# Patient Record
Sex: MALE | Race: WHITE | NOT HISPANIC OR LATINO | Employment: FULL TIME | URBAN - METROPOLITAN AREA
[De-identification: names, ages, dates, MRNs, and addresses within clinical notes are randomized per-mention and may not be internally consistent; named-entity substitution may affect disease eponyms.]

---

## 2017-01-18 ENCOUNTER — CLINICAL SUPPORT NO REQUIREMENTS (OUTPATIENT)
Dept: CARDIOLOGY | Facility: CLINIC | Age: 57
End: 2017-01-18

## 2017-01-18 DIAGNOSIS — I42.9 CARDIOMYOPATHY (HCC): Primary | ICD-10-CM

## 2017-01-25 ENCOUNTER — TELEPHONE (OUTPATIENT)
Dept: GASTROENTEROLOGY | Facility: CLINIC | Age: 57
End: 2017-01-25

## 2017-02-13 RX ORDER — MESALAMINE 375 MG/1
CAPSULE, EXTENDED RELEASE ORAL
Qty: 120 CAPSULE | Refills: 0 | Status: SHIPPED | OUTPATIENT
Start: 2017-02-13 | End: 2017-03-10 | Stop reason: SDUPTHER

## 2017-02-16 ENCOUNTER — HOSPITAL ENCOUNTER (INPATIENT)
Facility: HOSPITAL | Age: 57
LOS: 3 days | Discharge: HOME | End: 2017-02-19
Attending: EMERGENCY MEDICINE | Admitting: INTERNAL MEDICINE

## 2017-02-16 ENCOUNTER — APPOINTMENT (OUTPATIENT)
Dept: CT IMAGING | Facility: HOSPITAL | Age: 57
End: 2017-02-16

## 2017-02-16 DIAGNOSIS — K50.012: Primary | ICD-10-CM

## 2017-02-16 PROBLEM — K50.00 EXACERBATION OF CROHN'S DISEASE OF SMALL INTESTINE (HCC): Status: ACTIVE | Noted: 2017-02-16

## 2017-02-16 LAB
ALBUMIN SERPL-MCNC: 4.5 G/DL (ref 3.5–5.2)
ALBUMIN/GLOB SERPL: 1 G/DL
ALP SERPL-CCNC: 69 U/L (ref 39–117)
ALT SERPL W P-5'-P-CCNC: 23 U/L (ref 1–41)
ANION GAP SERPL CALCULATED.3IONS-SCNC: 14.7 MMOL/L
AST SERPL-CCNC: 28 U/L (ref 1–40)
BASOPHILS # BLD AUTO: 0.04 10*3/MM3 (ref 0–0.2)
BASOPHILS NFR BLD AUTO: 0.3 % (ref 0–1.5)
BILIRUB SERPL-MCNC: 0.3 MG/DL (ref 0.1–1.2)
BILIRUB UR QL STRIP: NEGATIVE
BUN BLD-MCNC: 15 MG/DL (ref 6–20)
BUN/CREAT SERPL: 15.6 (ref 7–25)
CALCIUM SPEC-SCNC: 10.1 MG/DL (ref 8.6–10.5)
CHLORIDE SERPL-SCNC: 100 MMOL/L (ref 98–107)
CLARITY UR: ABNORMAL
CO2 SERPL-SCNC: 25.3 MMOL/L (ref 22–29)
COLOR UR: ABNORMAL
CREAT BLD-MCNC: 0.96 MG/DL (ref 0.76–1.27)
CRP SERPL-MCNC: 2.33 MG/DL (ref 0–0.5)
DEPRECATED RDW RBC AUTO: 46.2 FL (ref 37–54)
EOSINOPHIL # BLD AUTO: 0.14 10*3/MM3 (ref 0–0.7)
EOSINOPHIL NFR BLD AUTO: 1 % (ref 0.3–6.2)
ERYTHROCYTE [DISTWIDTH] IN BLOOD BY AUTOMATED COUNT: 15.8 % (ref 11.5–14.5)
GFR SERPL CREATININE-BSD FRML MDRD: 81 ML/MIN/1.73
GLOBULIN UR ELPH-MCNC: 4.4 GM/DL
GLUCOSE BLD-MCNC: 153 MG/DL (ref 65–99)
GLUCOSE BLDC GLUCOMTR-MCNC: 134 MG/DL (ref 70–130)
GLUCOSE BLDC GLUCOMTR-MCNC: 162 MG/DL (ref 70–130)
GLUCOSE UR STRIP-MCNC: NEGATIVE MG/DL
HBA1C MFR BLD: 5.6 % (ref 4.8–5.6)
HCT VFR BLD AUTO: 46.3 % (ref 40.4–52.2)
HGB BLD-MCNC: 15.4 G/DL (ref 13.7–17.6)
HGB UR QL STRIP.AUTO: NEGATIVE
HOLD SPECIMEN: NORMAL
HOLD SPECIMEN: NORMAL
IMM GRANULOCYTES # BLD: 0.03 10*3/MM3 (ref 0–0.03)
IMM GRANULOCYTES NFR BLD: 0.2 % (ref 0–0.5)
KETONES UR QL STRIP: ABNORMAL
LEUKOCYTE ESTERASE UR QL STRIP.AUTO: NEGATIVE
LIPASE SERPL-CCNC: 31 U/L (ref 13–60)
LYMPHOCYTES # BLD AUTO: 2.48 10*3/MM3 (ref 0.9–4.8)
LYMPHOCYTES NFR BLD AUTO: 17.8 % (ref 19.6–45.3)
MCH RBC QN AUTO: 26.5 PG (ref 27–32.7)
MCHC RBC AUTO-ENTMCNC: 33.3 G/DL (ref 32.6–36.4)
MCV RBC AUTO: 79.7 FL (ref 79.8–96.2)
MONOCYTES # BLD AUTO: 1.29 10*3/MM3 (ref 0.2–1.2)
MONOCYTES NFR BLD AUTO: 9.2 % (ref 5–12)
NEUTROPHILS # BLD AUTO: 9.98 10*3/MM3 (ref 1.9–8.1)
NEUTROPHILS NFR BLD AUTO: 71.5 % (ref 42.7–76)
NITRITE UR QL STRIP: NEGATIVE
PH UR STRIP.AUTO: 5.5 [PH] (ref 5–8)
PLATELET # BLD AUTO: 234 10*3/MM3 (ref 140–500)
PMV BLD AUTO: 10.1 FL (ref 6–12)
POTASSIUM BLD-SCNC: 4.5 MMOL/L (ref 3.5–5.2)
PROT SERPL-MCNC: 8.9 G/DL (ref 6–8.5)
PROT UR QL STRIP: ABNORMAL
RBC # BLD AUTO: 5.81 10*6/MM3 (ref 4.6–6)
SODIUM BLD-SCNC: 140 MMOL/L (ref 136–145)
SP GR UR STRIP: 1.03 (ref 1–1.03)
UROBILINOGEN UR QL STRIP: ABNORMAL
WBC NRBC COR # BLD: 13.96 10*3/MM3 (ref 4.5–10.7)
WHOLE BLOOD HOLD SPECIMEN: NORMAL
WHOLE BLOOD HOLD SPECIMEN: NORMAL

## 2017-02-16 PROCEDURE — 25010000002 METHYLPREDNISOLONE PER 40 MG: Performed by: INTERNAL MEDICINE

## 2017-02-16 PROCEDURE — 81003 URINALYSIS AUTO W/O SCOPE: CPT | Performed by: EMERGENCY MEDICINE

## 2017-02-16 PROCEDURE — 80053 COMPREHEN METABOLIC PANEL: CPT | Performed by: EMERGENCY MEDICINE

## 2017-02-16 PROCEDURE — 82962 GLUCOSE BLOOD TEST: CPT

## 2017-02-16 PROCEDURE — 99284 EMERGENCY DEPT VISIT MOD MDM: CPT

## 2017-02-16 PROCEDURE — 0 IOPAMIDOL 61 % SOLUTION: Performed by: EMERGENCY MEDICINE

## 2017-02-16 PROCEDURE — 25010000002 MORPHINE PER 10 MG: Performed by: EMERGENCY MEDICINE

## 2017-02-16 PROCEDURE — 85025 COMPLETE CBC W/AUTO DIFF WBC: CPT | Performed by: EMERGENCY MEDICINE

## 2017-02-16 PROCEDURE — 74177 CT ABD & PELVIS W/CONTRAST: CPT

## 2017-02-16 PROCEDURE — 63710000001 INSULIN ASPART PER 5 UNITS: Performed by: HOSPITALIST

## 2017-02-16 PROCEDURE — 86140 C-REACTIVE PROTEIN: CPT | Performed by: INTERNAL MEDICINE

## 2017-02-16 PROCEDURE — 83690 ASSAY OF LIPASE: CPT | Performed by: EMERGENCY MEDICINE

## 2017-02-16 PROCEDURE — 83036 HEMOGLOBIN GLYCOSYLATED A1C: CPT | Performed by: HOSPITALIST

## 2017-02-16 PROCEDURE — 25010000002 METHYLPREDNISOLONE PER 125 MG: Performed by: EMERGENCY MEDICINE

## 2017-02-16 PROCEDURE — 25010000002 ONDANSETRON PER 1 MG: Performed by: EMERGENCY MEDICINE

## 2017-02-16 PROCEDURE — 99253 IP/OBS CNSLTJ NEW/EST LOW 45: CPT | Performed by: INTERNAL MEDICINE

## 2017-02-16 RX ORDER — MESALAMINE 0.38 G/1
1.5 CAPSULE, EXTENDED RELEASE ORAL DAILY
Status: DISCONTINUED | OUTPATIENT
Start: 2017-02-16 | End: 2017-02-19 | Stop reason: HOSPADM

## 2017-02-16 RX ORDER — METHYLPREDNISOLONE SODIUM SUCCINATE 125 MG/2ML
60 INJECTION, POWDER, LYOPHILIZED, FOR SOLUTION INTRAMUSCULAR; INTRAVENOUS ONCE
Status: COMPLETED | OUTPATIENT
Start: 2017-02-16 | End: 2017-02-16

## 2017-02-16 RX ORDER — ZOLPIDEM TARTRATE 5 MG/1
5 TABLET ORAL NIGHTLY PRN
Status: DISCONTINUED | OUTPATIENT
Start: 2017-02-16 | End: 2017-02-19 | Stop reason: HOSPADM

## 2017-02-16 RX ORDER — PANTOPRAZOLE SODIUM 40 MG/1
40 TABLET, DELAYED RELEASE ORAL
Status: DISCONTINUED | OUTPATIENT
Start: 2017-02-17 | End: 2017-02-19 | Stop reason: HOSPADM

## 2017-02-16 RX ORDER — ACETAMINOPHEN 325 MG/1
650 TABLET ORAL EVERY 4 HOURS PRN
Status: DISCONTINUED | OUTPATIENT
Start: 2017-02-16 | End: 2017-02-19 | Stop reason: HOSPADM

## 2017-02-16 RX ORDER — SODIUM CHLORIDE 0.9 % (FLUSH) 0.9 %
1-10 SYRINGE (ML) INJECTION AS NEEDED
Status: DISCONTINUED | OUTPATIENT
Start: 2017-02-16 | End: 2017-02-19 | Stop reason: HOSPADM

## 2017-02-16 RX ORDER — PROMETHAZINE HYDROCHLORIDE 25 MG/1
12.5 TABLET ORAL EVERY 6 HOURS PRN
Status: DISCONTINUED | OUTPATIENT
Start: 2017-02-16 | End: 2017-02-19 | Stop reason: HOSPADM

## 2017-02-16 RX ORDER — METHYLPREDNISOLONE SODIUM SUCCINATE 40 MG/ML
40 INJECTION, POWDER, LYOPHILIZED, FOR SOLUTION INTRAMUSCULAR; INTRAVENOUS EVERY 8 HOURS
Status: DISCONTINUED | OUTPATIENT
Start: 2017-02-16 | End: 2017-02-19 | Stop reason: HOSPADM

## 2017-02-16 RX ORDER — ONDANSETRON 2 MG/ML
4 INJECTION INTRAMUSCULAR; INTRAVENOUS ONCE
Status: COMPLETED | OUTPATIENT
Start: 2017-02-16 | End: 2017-02-16

## 2017-02-16 RX ORDER — MORPHINE SULFATE 2 MG/ML
1 INJECTION, SOLUTION INTRAMUSCULAR; INTRAVENOUS EVERY 4 HOURS PRN
Status: DISCONTINUED | OUTPATIENT
Start: 2017-02-16 | End: 2017-02-19 | Stop reason: HOSPADM

## 2017-02-16 RX ORDER — DEXTROSE, SODIUM CHLORIDE, AND POTASSIUM CHLORIDE 5; .45; .15 G/100ML; G/100ML; G/100ML
75 INJECTION INTRAVENOUS CONTINUOUS
Status: DISCONTINUED | OUTPATIENT
Start: 2017-02-16 | End: 2017-02-18

## 2017-02-16 RX ORDER — HYDROCODONE BITARTRATE AND ACETAMINOPHEN 5; 325 MG/1; MG/1
1 TABLET ORAL EVERY 6 HOURS PRN
Status: DISCONTINUED | OUTPATIENT
Start: 2017-02-16 | End: 2017-02-19 | Stop reason: HOSPADM

## 2017-02-16 RX ORDER — NALOXONE HCL 0.4 MG/ML
0.4 VIAL (ML) INJECTION
Status: DISCONTINUED | OUTPATIENT
Start: 2017-02-16 | End: 2017-02-19 | Stop reason: HOSPADM

## 2017-02-16 RX ORDER — DEXTROSE MONOHYDRATE 25 G/50ML
25 INJECTION, SOLUTION INTRAVENOUS
Status: DISCONTINUED | OUTPATIENT
Start: 2017-02-16 | End: 2017-02-19 | Stop reason: HOSPADM

## 2017-02-16 RX ORDER — SODIUM CHLORIDE 0.9 % (FLUSH) 0.9 %
10 SYRINGE (ML) INJECTION AS NEEDED
Status: DISCONTINUED | OUTPATIENT
Start: 2017-02-16 | End: 2017-02-19 | Stop reason: HOSPADM

## 2017-02-16 RX ORDER — ONDANSETRON 2 MG/ML
4 INJECTION INTRAMUSCULAR; INTRAVENOUS EVERY 6 HOURS PRN
Status: DISCONTINUED | OUTPATIENT
Start: 2017-02-16 | End: 2017-02-19 | Stop reason: HOSPADM

## 2017-02-16 RX ORDER — PROMETHAZINE HYDROCHLORIDE 25 MG/ML
12.5 INJECTION, SOLUTION INTRAMUSCULAR; INTRAVENOUS EVERY 6 HOURS PRN
Status: DISCONTINUED | OUTPATIENT
Start: 2017-02-16 | End: 2017-02-19 | Stop reason: HOSPADM

## 2017-02-16 RX ORDER — CARVEDILOL 12.5 MG/1
12.5 TABLET ORAL 2 TIMES DAILY WITH MEALS
Status: DISCONTINUED | OUTPATIENT
Start: 2017-02-16 | End: 2017-02-18

## 2017-02-16 RX ORDER — NICOTINE POLACRILEX 4 MG
15 LOZENGE BUCCAL
Status: DISCONTINUED | OUTPATIENT
Start: 2017-02-16 | End: 2017-02-19 | Stop reason: HOSPADM

## 2017-02-16 RX ADMIN — POTASSIUM CHLORIDE, DEXTROSE MONOHYDRATE AND SODIUM CHLORIDE 75 ML/HR: 150; 5; 450 INJECTION, SOLUTION INTRAVENOUS at 21:01

## 2017-02-16 RX ADMIN — IOPAMIDOL 85 ML: 612 INJECTION, SOLUTION INTRAVENOUS at 06:28

## 2017-02-16 RX ADMIN — ONDANSETRON 4 MG: 2 INJECTION INTRAMUSCULAR; INTRAVENOUS at 06:08

## 2017-02-16 RX ADMIN — METRONIDAZOLE 500 MG: 500 INJECTION, SOLUTION INTRAVENOUS at 14:44

## 2017-02-16 RX ADMIN — METHYLPREDNISOLONE SODIUM SUCCINATE 40 MG: 40 INJECTION, POWDER, FOR SOLUTION INTRAMUSCULAR; INTRAVENOUS at 14:43

## 2017-02-16 RX ADMIN — POTASSIUM CHLORIDE, DEXTROSE MONOHYDRATE AND SODIUM CHLORIDE 75 ML/HR: 150; 5; 450 INJECTION, SOLUTION INTRAVENOUS at 13:45

## 2017-02-16 RX ADMIN — METRONIDAZOLE 500 MG: 500 INJECTION, SOLUTION INTRAVENOUS at 21:01

## 2017-02-16 RX ADMIN — SODIUM CHLORIDE 1000 ML: 9 INJECTION, SOLUTION INTRAVENOUS at 06:04

## 2017-02-16 RX ADMIN — CARVEDILOL 12.5 MG: 12.5 TABLET, FILM COATED ORAL at 21:01

## 2017-02-16 RX ADMIN — METHYLPREDNISOLONE SODIUM SUCCINATE 60 MG: 125 INJECTION, POWDER, FOR SOLUTION INTRAMUSCULAR; INTRAVENOUS at 07:21

## 2017-02-16 RX ADMIN — INSULIN ASPART 2 UNITS: 100 INJECTION, SOLUTION INTRAVENOUS; SUBCUTANEOUS at 21:02

## 2017-02-16 RX ADMIN — METHYLPREDNISOLONE SODIUM SUCCINATE 40 MG: 40 INJECTION, POWDER, FOR SOLUTION INTRAMUSCULAR; INTRAVENOUS at 21:02

## 2017-02-16 RX ADMIN — MORPHINE SULFATE 4 MG: 4 INJECTION, SOLUTION INTRAMUSCULAR; INTRAVENOUS at 06:08

## 2017-02-16 NOTE — ED PROVIDER NOTES
" EMERGENCY DEPARTMENT ENCOUNTER    CHIEF COMPLAINT  Chief Complaint: Abdominal Pain  History given by: Patient  History limited by: Nothing  Room Number: 11/11  PMD: Zechariah Fatima MD,  (GI)      HPI:  Pt is a 56 y.o. male, h/o Chron's, presents complaining of constant, non-radiating epigastric abdominal pain, which began suddenly 02:00 this morning and woke the patient from sleep. He describes the sensation as \"aching\". The patient states that he's noticed unusual abdominal distension and he had a few episodes of loose stool PTA.  Pt denies fever, chills, hematochezia, or mucus in his stool.  The patient states that the current abdominal pain is worsened with palpation and improved with nothing. He reports that he developed mild right sided abdominal pain two days ago, although this was alleviated with Aleve. No other complaints at this time.      Duration:  3.5 hours  Onset: Sudden  Timing: Constant  Location: Epigastric  Radiation: None  Quality: Aching  Intensity/Severity: Moderate  Progression: No Change  Associated Symptoms: Abdominal pain, abd distension, diarrhea  Aggravating Factors: Palpation  Alleviating Factors: None  Previous Episodes: None  Treatment before arrival: None    PAST MEDICAL HISTORY  Active Ambulatory Problems     Diagnosis Date Noted   • Cardiomyopathy 02/11/2016   • Paroxysmal VT 02/11/2016   • Palpitations 02/12/2016   • PVC's (premature ventricular contractions) 02/12/2016     Resolved Ambulatory Problems     Diagnosis Date Noted   • No Resolved Ambulatory Problems     Past Medical History   Diagnosis Date   • Crohn's disease    • Diabetes mellitus    • Dysthymic disorder 2012   • Early onset dysthymia    • Essential hypertension    • Genital herpes    • Gout    • Paroxysmal ventricular tachycardia        PAST SURGICAL HISTORY  Past Surgical History   Procedure Laterality Date   • Coronary angioplasty with stent placement  2006     Normal.   • Cardiac defibrillator placement  " 09 Dec 2011     Had [place] lead that was fractured.  Lead was tied off.  New lead and new device implanted.       FAMILY HISTORY  Family History   Problem Relation Age of Onset   • Hypertension Mother    • Diabetes Mother      type 2 mellitus    • Cancer Father      colon   • Heart failure Father      congestive   • Gout Father        SOCIAL HISTORY  Social History     Social History   • Marital status:      Spouse name: N/A   • Number of children: N/A   • Years of education: N/A     Occupational History   • Not on file.     Social History Main Topics   • Smoking status: Never Smoker   • Smokeless tobacco: Not on file   • Alcohol use No   • Drug use: Not on file   • Sexual activity: Not on file     Other Topics Concern   • Not on file     Social History Narrative       ALLERGIES  Bactrim [sulfamethoxazole-trimethoprim] and Latex    REVIEW OF SYSTEMS  Review of Systems   Constitutional: Negative for chills and fatigue.   HENT: Negative for congestion, rhinorrhea and sore throat.    Eyes: Negative for pain.   Respiratory: Negative for cough, shortness of breath and wheezing.    Cardiovascular: Negative for chest pain, palpitations and leg swelling.   Gastrointestinal: Positive for abdominal distention, abdominal pain and diarrhea. Negative for nausea and vomiting.   Genitourinary: Negative for difficulty urinating, dysuria, flank pain and frequency.   Musculoskeletal: Negative for arthralgias, myalgias, neck pain and neck stiffness.   Skin: Negative for rash.   Neurological: Negative for dizziness, speech difficulty, weakness, light-headedness, numbness and headaches.   Psychiatric/Behavioral: Negative.    All other systems reviewed and are negative.      PHYSICAL EXAM  ED Triage Vitals   Temp Heart Rate Resp BP SpO2   02/16/17 0508 02/16/17 0508 02/16/17 0508 02/16/17 0513 02/16/17 0508   98.9 °F (37.2 °C) 118 20 124/86 95 %      Temp src Heart Rate Source Patient Position BP Location FiO2 (%)   02/16/17 0508  02/16/17 0508 -- -- --   Tympanic Monitor          Physical Exam   Constitutional: He is oriented to person, place, and time and well-developed, well-nourished, and in no distress.   HENT:   Head: Normocephalic and atraumatic.   Eyes: EOM are normal.   Neck: Normal range of motion.   Cardiovascular: Normal rate and regular rhythm.    Pulmonary/Chest: Effort normal and breath sounds normal. No respiratory distress.   Abdominal: Bowel sounds are normal. He exhibits distension (Mild). There is tenderness in the right upper quadrant and epigastric area. There is no rebound and no guarding.   Neurological: He is alert and oriented to person, place, and time. He has normal sensation and normal strength.   Skin: Skin is warm and dry.   Psychiatric: Affect normal.   Nursing note and vitals reviewed.      LAB RESULTS  Lab Results (last 24 hours)     Procedure Component Value Units Date/Time    CBC & Differential [[id number]] Collected:  02/16/17 0532    Specimen:  Blood Updated:  02/16/17 0556    Narrative:       The following orders were created for panel order CBC & Differential.  Procedure                               Abnormality         Status                     ---------                               -----------         ------                     CBC Auto Differential[[id number]]         Abnormal            Final result                 Please view results for these tests on the individual orders.    Comprehensive Metabolic Panel [[id number]]  (Abnormal) Collected:  02/16/17 0532    Specimen:  Blood Updated:  02/16/17 0605     Glucose 153 (H) mg/dL      BUN 15 mg/dL      Creatinine 0.96 mg/dL      Sodium 140 mmol/L      Potassium 4.5 mmol/L      Chloride 100 mmol/L      CO2 25.3 mmol/L      Calcium 10.1 mg/dL      Total Protein 8.9 (H) g/dL      Albumin 4.50 g/dL      ALT (SGPT) 23 U/L      AST (SGOT) 28 U/L      Alkaline Phosphatase 69 U/L      Total Bilirubin 0.3 mg/dL      eGFR Non African Amer 81 mL/min/1.73       Globulin 4.4 gm/dL      A/G Ratio 1.0 g/dL      BUN/Creatinine Ratio 15.6      Anion Gap 14.7 mmol/L     Lipase [[id number]]  (Normal) Collected:  02/16/17 0532    Specimen:  Blood Updated:  02/16/17 0605     Lipase 31 U/L     CBC Auto Differential [[id number]]  (Abnormal) Collected:  02/16/17 0532    Specimen:  Blood Updated:  02/16/17 0556     WBC 13.96 (H) 10*3/mm3      RBC 5.81 10*6/mm3      Hemoglobin 15.4 g/dL      Hematocrit 46.3 %      MCV 79.7 (L) fL      MCH 26.5 (L) pg      MCHC 33.3 g/dL      RDW 15.8 (H) %      RDW-SD 46.2 fl      MPV 10.1 fL      Platelets 234 10*3/mm3      Neutrophil % 71.5 %      Lymphocyte % 17.8 (L) %      Monocyte % 9.2 %      Eosinophil % 1.0 %      Basophil % 0.3 %      Immature Grans % 0.2 %      Neutrophils, Absolute 9.98 (H) 10*3/mm3      Lymphocytes, Absolute 2.48 10*3/mm3      Monocytes, Absolute 1.29 (H) 10*3/mm3      Eosinophils, Absolute 0.14 10*3/mm3      Basophils, Absolute 0.04 10*3/mm3      Immature Grans, Absolute 0.03 10*3/mm3     Urinalysis With / Culture If Indicated [[id number]]  (Abnormal) Collected:  02/16/17 0550    Specimen:  Urine from Urine, Clean Catch Updated:  02/16/17 0608     Color, UA Dark Yellow (A)      Appearance, UA Cloudy (A)      pH, UA 5.5      Specific Gravity, UA 1.026      Glucose, UA Negative      Ketones, UA Trace (A)      Bilirubin, UA Negative      Blood, UA Negative      Protein, UA Trace (A)      Leuk Esterase, UA Negative      Nitrite, UA Negative      Urobilinogen, UA 1.0 E.U./dL     Narrative:       Urine microscopic not indicated.          I ordered the above labs and reviewed the results    RADIOLOGY  CT Abdomen Pelvis With Contrast    (Results Pending)      06:47  Reviewed CT Abd/pel - Extensive wall thickening with mesenteric stricture causing dilation of the proximal small bowel. Independently viewed by me. Interpreted by radiologist. Discussed with .    I ordered the above noted radiological studies. Interpreted by  radiologist. Discussed with radiologist (). Reviewed by me in PACS.       PROCEDURES  Procedures      PROGRESS AND CONSULTS  ED Course     05:36  Ordered Labs and CT abd/pel for further evaluation. Also ordered Zofran for nausea and morphine for pain control. Ordered IVF for hydration.     06:47  Rechecked patient and they are resting more comfortably and his pain has alleviated. Discussed pertinent lab and imaging results, including CT abd/pel shows an obstruction due to the inflmmation of Crohn's at the proximal small bowel. He denies any vomiting since onset of his abdominal pain. Discussed the plan to call  (GI) for further treatment plan. Patient agrees with plan and all questions were addressed.     06:50  Discussed case with  (GI) she recommends to admit the patient to medicine and give the patient 60 mg of Solu-medrol. Reviewed history, exam, results and treatments.  Discussed concerns and plan of care.     07:03  Discussed case with Dr Oreilly. Reviewed history, exam, results and treatments.  Discussed concerns and plan of care. He would like me to call GI back and abdomen admit the patient..     07:32  Discussed case with Dr Johnson(GI). Reviewed history, exam, results and treatments.  Discussed concerns and plan of care. Dr Johnson accepts pt to be admitted to med/surg.        MEDICAL DECISION MAKING  Results were reviewed/discussed with the patient and they were also made aware of online access. Pt also made aware that some labs, such as cultures, will not be resulted during ER visit and follow up with PMD is necessary.     MDM  Number of Diagnoses or Management Options  Exacerbation of Crohn's disease of small intestine, with intestinal obstruction:   Diagnosis management comments: Patient has a history of Crohn's.  He presented to the ER complaining of abdominal pain and distention.  CT scan showed inflammation and wall thickening of the terminal ileum with a resulting  stricture.  Patient's white blood cell count was elevated.  He was afebrile.  He was not vomiting.  Case was discussed with Dr. Johnson and she will admit the patient.  Patient was given IV fluids, IV morphine, IV Zofran, and IV Solu-Medrol.       Amount and/or Complexity of Data Reviewed  Decide to obtain previous medical records or to obtain history from someone other than the patient: yes  Review and summarize past medical records: yes (Last seen ER Feb 2015 for fever, pneumonia and sinusitis. )           DIAGNOSIS  Final diagnoses:   Exacerbation of Crohn's disease of small intestine, with intestinal obstruction       DISPOSITION  ADMISSION    Discussed treatment plan and reason for admission with pt/family and admitting physician.  Pt/family voiced understanding of the plan for admission for further testing/treatment as needed.       Latest Documented Vital Signs:  As of 7:03 AM  BP- 113/78 HR- 86 Temp- 98.9 °F (37.2 °C) (Tympanic) O2 sat- 93%    --  Documentation assistance provided by shannan Pro for .  Information recorded by the scribe was done at my direction and has been verified and validated by me.             Moi Pro  02/16/17 0714       Harrison Gutiérrez MD  02/16/17 0733

## 2017-02-16 NOTE — PLAN OF CARE
Problem: Bowel Obstruction (Adult)  Goal: Signs and Symptoms of Listed Potential Problems Will be Absent or Manageable (Bowel Obstruction)  Outcome: Ongoing (interventions implemented as appropriate)    Problem: Patient Care Overview (Adult)  Goal: Plan of Care Review  Outcome: Ongoing (interventions implemented as appropriate)    02/16/17 1314   Coping/Psychosocial Response Interventions   Plan Of Care Reviewed With patient   Patient Care Overview   Progress improving   Outcome Evaluation   Outcome Summary/Follow up Plan no pain since admission to floor-shari clear liq diet at dinner-denies nausea-bm prior to admission-hypoactive bs-no s/s hyper or hypoglycemia noted-no distress noted-vss       Goal: Adult Individualization and Mutuality  Outcome: Ongoing (interventions implemented as appropriate)    Problem: Fall Risk (Adult)  Goal: Identify Related Risk Factors and Signs and Symptoms  Outcome: Ongoing (interventions implemented as appropriate)  Goal: Absence of Falls  Outcome: Ongoing (interventions implemented as appropriate)    Problem: Infection, Risk/Actual (Adult)  Goal: Identify Related Risk Factors and Signs and Symptoms  Outcome: Ongoing (interventions implemented as appropriate)  Goal: Infection Prevention/Resolution  Outcome: Ongoing (interventions implemented as appropriate)    Problem: Pain, Acute (Adult)  Goal: Identify Related Risk Factors and Signs and Symptoms  Outcome: Ongoing (interventions implemented as appropriate)  Goal: Acceptable Pain Control/Comfort Level  Outcome: Ongoing (interventions implemented as appropriate)

## 2017-02-16 NOTE — ED NOTES
Pt reports abd pain and distension that began last night. Pt c/o diarrhea. Pt states hx of Crohn's.      Cynthia Lowry RN  02/16/17 7291

## 2017-02-16 NOTE — ED NOTES
Called [facility] 10:12 AM for consult from Dr Tierra Lincoln  02/16/17 8062

## 2017-02-16 NOTE — CONSULTS
Inpatient Consult to Internal Medicine  Consult performed by: MADAI CARTER  Consult ordered by: MIREYA MAO  Reason for consult: management of DM2 and HTN          Patient Care Team:  Madai Fatima MD as PCP - General    Chief complaint:abd pain    Subjective     HPI Comments: Pt is a very pleasant 55yo gentleman with long h/o Crohn's disease admitted through ER for flare of Crohn's at terminal ileum with resultant pSBO. After receiving abx and steroids in ER he feels much better. Pt has h/o diet controlled DM2. Was on insulin for 3 months only many years ago and since has required no therapy.    Abdominal Pain   Associated symptoms include diarrhea.       Review of Systems   Constitutional: Negative.    HENT: Negative.    Eyes: Negative.    Respiratory: Negative.    Cardiovascular: Negative.    Gastrointestinal: Positive for abdominal distention, abdominal pain and diarrhea.   Endocrine: Negative.    Genitourinary: Negative.    Musculoskeletal: Negative.    Skin: Negative.    Allergic/Immunologic: Negative.    Neurological: Negative.    Hematological: Negative.    Psychiatric/Behavioral: Negative.         Past Medical History   Diagnosis Date   • Arthritis    • Cardiomyopathy      sees Dr. Reyes; NICM/ (-) cath, EF 25-35%; has ICD   • CHF (congestive heart failure)    • Crohn's disease    • Diabetes mellitus      Diet controlled.   • Dysthymic disorder 2012   • Early onset dysthymia    • Essential hypertension    • Genital herpes    • Gout    • Paroxysmal ventricular tachycardia    ,   Past Surgical History   Procedure Laterality Date   • Cardiac surgery     ,   Family History   Problem Relation Age of Onset   • Hypertension Mother    • Diabetes Mother      type 2 mellitus    • Cancer Father      colon   • Heart failure Father      congestive   • Gout Father    ,   Social History   Substance Use Topics   • Smoking status: Never Smoker   • Smokeless tobacco: None   • Alcohol use No   ,   Prescriptions Prior  to Admission   Medication Sig Dispense Refill Last Dose   • Adalimumab (HUMIRA PEN SC) Inject under the skin. 1 INJECTION TWICE WEEKLY   Taking   • APRISO 0.375 G 24 hr capsule TAKE 4 CAPSULES BY MOUTH EVERY  capsule 0    • carvedilol (COREG) 12.5 MG tablet TAKE 1 TABLET BY MOUTH TWICE DAILY 180 tablet 5 Taking   • carvedilol (COREG) 12.5 MG tablet TAKE 1 TABLET BY MOUTH TWICE DAILY 180 tablet 3    • carvedilol (COREG) 12.5 MG tablet TAKE 1 TABLET BY MOUTH TWICE DAILY 60 tablet 0     and Allergies:  Bactrim [sulfamethoxazole-trimethoprim] and Latex    Objective      Vital Signs  Temp:  [97.7 °F (36.5 °C)-98.9 °F (37.2 °C)] 98.1 °F (36.7 °C)  Heart Rate:  [] 77  Resp:  [14-20] 14  BP: (108-124)/(69-86) 108/69    Physical Exam   Constitutional: He is oriented to person, place, and time. He appears well-developed and well-nourished. No distress.   HENT:   Head: Normocephalic and atraumatic.   Mouth/Throat: Oropharynx is clear and moist. No oropharyngeal exudate.   Eyes: Conjunctivae and EOM are normal. Pupils are equal, round, and reactive to light. Right eye exhibits no discharge. Left eye exhibits no discharge. No scleral icterus.   Neck: Normal range of motion. Neck supple. No JVD present. No tracheal deviation present. No thyromegaly present.   Cardiovascular: Normal rate, regular rhythm, normal heart sounds and intact distal pulses.    Pulmonary/Chest: Effort normal and breath sounds normal. No respiratory distress.   Abdominal: Soft. Bowel sounds are normal. He exhibits no distension. There is tenderness (generalized). There is no rebound and no guarding.   Musculoskeletal: Normal range of motion. He exhibits no edema or deformity.   Lymphadenopathy:     He has no cervical adenopathy.   Neurological: He is alert and oriented to person, place, and time. No cranial nerve deficit. He exhibits normal muscle tone. Coordination normal.   Skin: Skin is warm. No rash noted. He is diaphoretic. No erythema.    Psychiatric: He has a normal mood and affect. His behavior is normal. Judgment and thought content normal.   Nursing note and vitals reviewed.      Results Review:    I reviewed the patient's new clinical results.  I reviewed the patient's new imaging results and agree with the interpretation.  I reviewed the patient's other test results and agree with the interpretation  I personally viewed and interpreted the patient's EKG/Telemetry data  Discussed with patient and wife        Assessment/Plan     Active Problems:    Exacerbation of Crohn's disease of small intestine      Assessment:  (1. Crohn's flare terminal ileum  2. pSBO due to above  3. DM2  4. HTN  5. Non-ischemic cardiomyopathy with AICD).     Plan:   (Thanks for consult!  Continue steroids, Flagyl, IVFs, and gut rest per GI  Monitor closely for volume overload  Cover with sliding scale insulin  Surgery eval pending  ).       I discussed the patients findings and my recommendations with patient and family    Zechariah Lambert MD  02/16/17  3:22 PM    Time: 40min

## 2017-02-16 NOTE — H&P
"[facility]  Initial Inpatient Consult Note    Referring Provider: Dr. Gutiérrez    Reason for Consultation: Abdominal pain, h/o crohn's disease.    Subjective     History of present illness:  This 56-year-old white male has a history of Crohn's disease of the terminal ileum that was diagnosed in approximately 2011.  He was admitted through the emergency room today with abdominal pain that he said started last night with abdominal distention.  He also has had lots of acid reflux.  He's had no nausea or vomiting.  He has had similar abdominal pain and abdominal distention before.  In the emergency room the patient did have a CAT scan of the abdomen and pelvis that showed \"findings are consistent with previously resected Crohn's disease.  There is focal small bowel wall thickening at the neoterminal ileum.  There is stranding within the adjacent mesentery.  This whole assumes a somewhat masslike morphology.  Asst. represent focal recurrent Crohn's disease with probable Crohn's-related stricture.  Small bowel proximal to this level is dilated with several small air-fluid levels.  A true underlying mass cannot be excluded.  There are a few small reactive nodes in the right lower quadrant mesentery.  No lymphadenopathy is identified.  I see no evidence for fistula rising or abscess formation.\"  Patient has been on a presumptive 2 by mouth twice a day and Humira 40 mg subcutaneous every 2 weeks.  The Humira is being managed by Dr. Joleen Gama for his ankylosing spondylitis.  He's never had Crohn's disease surgery.  His last bowel movement was about 5 AM today.  He did have some diarrhea once or twice over the last 24 hours.  No rectal bleeding or melena.  No fever or chills or night sweats.  His weight has been stable.  He does have arthritis in hands neck and low back.  He has had mouth sores but has not had any mouth sores recently.  He does have a history of iritis.  He does describe skin rashes.  " Patient does take when necessary Aleve.  He also describes having some right lower quadrant abdominal pain on and off for the last few weeks.  He's had 5 flares of Crohn's disease in the last year to these requiring steroids.  The patient has not seen me in the office in approximately 2 years.  He last had a colonoscopy by me on 4/3/2015.  In the cecum there were 3 polypoid masses each measuring about 3 cm x 1.5 cm with smooth normal-looking overlying mucosa which I biopsied.  There was some scarring in the cecum.  The patient had internal hemorrhoids and some sigmoid colonic diverticula.  Biopsies of the cecal polyp showed that they were hyperplastic.  The patient denies alcohol use and is a nonsmoker.  He is .    Past Medical History:  Past Medical History   Diagnosis Date   • Cardiomyopathy      sees Dr. Reyes; NICM/ (-) cath, EF 25-35%; has ICD   • Crohn's disease    • Diabetes mellitus      Diet controlled.   • Dysthymic disorder 2012   • Early onset dysthymia    • Essential hypertension    • Genital herpes    • Gout    • Paroxysmal ventricular tachycardia        Past Surgical History:  Past Surgical History   Procedure Laterality Date   • Coronary angioplasty with stent placement  2006     Normal.   • Cardiac defibrillator placement  09 Dec 2011     Had Jude lead that was fractured.  Lead was tied off.  New lead and new device implanted.        Social History:   Social History   Substance Use Topics   • Smoking status: Never Smoker   • Smokeless tobacco: Not on file   • Alcohol use No        Family History:  Family History   Problem Relation Age of Onset   • Hypertension Mother    • Diabetes Mother      type 2 mellitus    • Cancer Father      colon   • Heart failure Father      congestive   • Gout Father        Home Meds:    (Not in a hospital admission)    Current Meds:        Allergies:  Allergies   Allergen Reactions   • Bactrim [Sulfamethoxazole-Trimethoprim]    • Latex        Review of  Systems  Pertinent items are noted in HPI     Objective     Vital Signs  Temp:  [98.9 °F (37.2 °C)] 98.9 °F (37.2 °C)  Heart Rate:  [] 80  Resp:  [16-20] 16  BP: (113-124)/(76-86) 119/79    Physical Exam:  General Appearance:    Alert, cooperative, in no acute distress   Head:    Normocephalic, without obvious abnormality, atraumatic   Eyes:            Lids and lashes normal, conjunctivae and sclerae normal, no   icterus   Throat:   No oral lesions, no thrush, oral mucosa moist   Neck:   No adenopathy, supple, trachea midline, no thyromegaly, no   carotid bruit, no JVD   Lungs:     Clear to auscultation,respirations regular, even and                   unlabored    Heart:    Regular rhythm and normal rate, normal S1 and S2, no            murmur, no gallop, no rub, no click   Chest Wall:    No abnormalities observed   Abdomen:     Normal bowel sounds, no masses, no organomegaly, soft        Minimal tenderness in the RLQ, non-distended, no guarding, no rebound                 tenderness   Rectal:     Deferred   Extremities:   no edema, no cyanosis, no redness   Skin:   No bleeding, bruising or rash   Lymph nodes:   No palpable adenopathy   Psychiatric:  Judgement and insight: normal   Orientation to person place and time: normal   Mood and affect: normal     Results Review:   I reviewed the patient's new clinical results.      Results from last 7 days  Lab Units 02/16/17  0532   WBC 10*3/mm3 13.96*   HEMOGLOBIN g/dL 15.4   HEMATOCRIT % 46.3   PLATELETS 10*3/mm3 234         Results from last 7 days  Lab Units 02/16/17  0532   SODIUM mmol/L 140   POTASSIUM mmol/L 4.5   CHLORIDE mmol/L 100   TOTAL CO2 mmol/L 25.3   BUN mg/dL 15   CREATININE mg/dL 0.96   CALCIUM mg/dL 10.1   BILIRUBIN mg/dL 0.3   ALK PHOS U/L 69   ALT (SGPT) U/L 23   AST (SGOT) U/L 28   GLUCOSE mg/dL 153*               0  Lab Value Date/Time   LIPASE 31 02/16/2017 0532       Radiology:  Imaging Results (last 72 hours)     Procedure Component Value  Units Date/Time    CT Abdomen Pelvis With Contrast [[id number]] Collected:  02/16/17 0817     Updated:  02/16/17 0817    Narrative:       CT ABDOMEN AND PELVIS WITH CONTRAST     HISTORY: Abdominal pain, history of Crohn's disease.     TECHNIQUE: Spiral axial CT imaging of the abdomen and pelvis was  performed at 3 mm intervals throughout. Intravenous contrast was  administered for this imaging.     COMPARISON: None.     FINDINGS: The liver, gallbladder, spleen, pancreas, adrenal glands, and  kidneys are largely unremarkable. There is a simple cortical cyst upper  pole right kidney. There appears to have been a distal small bowel and  ascending colonic resection. The colon is fluid-filled and somewhat  decompressed. There are a number of mildly prominent small bowel loops  throughout the abdomen extending into the pelvis. These loops measure up  to 3.5 cm in diameter and contain scattered air-fluid levels. There  clearly is distal small bowel wall thickening at the neoterminal ileum  and extensive surrounding inflammation. This process actually assumes a  somewhat masslike morphology. There is clearly luminal compromise. No  adenopathy is present. There are a few small reactive nodes in the right  lower quadrant mesentery. There is no evidence for fistulization or  abscess formation. There is a trace amount of sympathetic free fluid in  the pelvic cul-de-sac. There is no free air. Visualized lung bases are  clear.       Impression:       Findings are consistent with previously resected Crohn's  disease. There is focal small bowel wall thickening at the neoterminal  ileum. There is stranding within the adjacent mesentery. This whole  assumes a somewhat masslike morphology. This is assumed to represent  focal recurrent Crohn's with probable Crohn's related stricture. Small  bowel proximal to this level is dilated with several small air-fluid  levels. A true underlying mass cannot be excluded. There are few  small  reactive nodes in the right lower quadrant mesentery. No lymphadenopathy  is identified. I see no evidence for fistulization or abscess formation.     NOTE: These findings were discussed with Dr. Hernandez Gutiérrez of the ER at  the time of the dictation.             Assessment/Plan     Active Problems:    Exacerbation of Crohn's disease of small intestine      Assessment :  1.  This is a 56-year-old gentleman who has a flare of terminal ileal Crohn's disease with a partial small bowel obstruction.  He has no nausea or vomiting and his bowels have been moving.  The patient is on Humira and a presumed.  He has required steroids twice in the last year because of flares of Crohn's disease.  Has failed medical therapy?  Should he have resection of the terminal ileum?  Patient has seen Dr. Kuhn in the past.   2.  The patient does have a history of paroxysmal ventricular tachycardia and has a pacemaker with defibrillator.  He also has a history of cardiomyopathy.    Recommendations:  1.  I think we will admit him to the hospital and give him IV fluids.  We'll put him on gut rest and give him IV steroids with IV Flagyl.  2.  I last Dr. Kuhn see the patient to consider at some point resecting his terminal ileum.  3.  I will get a an upper GI with small bowel follow-through.  4.  I will put him on a proton pump inhibitor.  5.  We will continue his other home medications.  6.  I'll ask [facility] to follow him for other medical issues including the paroxysmal ventricular tachycardia.  If they would like to have cardiology see him that would be fine.      I discussed the patients findings and my recommendations with patient, family and nursing staff    Del Mayorga MD  [facility]  02/16/17

## 2017-02-17 ENCOUNTER — APPOINTMENT (OUTPATIENT)
Dept: GENERAL RADIOLOGY | Facility: HOSPITAL | Age: 57
End: 2017-02-17
Attending: INTERNAL MEDICINE

## 2017-02-17 LAB
ALBUMIN SERPL-MCNC: 3.7 G/DL (ref 3.5–5.2)
ALBUMIN/GLOB SERPL: 1 G/DL
ALP SERPL-CCNC: 53 U/L (ref 39–117)
ALT SERPL W P-5'-P-CCNC: 16 U/L (ref 1–41)
AMYLASE SERPL-CCNC: 65 U/L (ref 28–100)
ANION GAP SERPL CALCULATED.3IONS-SCNC: 11.5 MMOL/L
AST SERPL-CCNC: 17 U/L (ref 1–40)
BASOPHILS # BLD AUTO: 0 10*3/MM3 (ref 0–0.2)
BASOPHILS NFR BLD AUTO: 0 % (ref 0–1.5)
BILIRUB SERPL-MCNC: 0.3 MG/DL (ref 0.1–1.2)
BUN BLD-MCNC: 11 MG/DL (ref 6–20)
BUN/CREAT SERPL: 13.8 (ref 7–25)
CALCIUM SPEC-SCNC: 8.8 MG/DL (ref 8.6–10.5)
CHLORIDE SERPL-SCNC: 104 MMOL/L (ref 98–107)
CO2 SERPL-SCNC: 25.5 MMOL/L (ref 22–29)
CREAT BLD-MCNC: 0.8 MG/DL (ref 0.76–1.27)
DEPRECATED RDW RBC AUTO: 46.1 FL (ref 37–54)
EOSINOPHIL # BLD AUTO: 0 10*3/MM3 (ref 0–0.7)
EOSINOPHIL NFR BLD AUTO: 0 % (ref 0.3–6.2)
ERYTHROCYTE [DISTWIDTH] IN BLOOD BY AUTOMATED COUNT: 15.7 % (ref 11.5–14.5)
ERYTHROCYTE [SEDIMENTATION RATE] IN BLOOD: 15 MM/HR (ref 0–20)
GFR SERPL CREATININE-BSD FRML MDRD: 100 ML/MIN/1.73
GLOBULIN UR ELPH-MCNC: 3.6 GM/DL
GLUCOSE BLD-MCNC: 148 MG/DL (ref 65–99)
GLUCOSE BLDC GLUCOMTR-MCNC: 109 MG/DL (ref 70–130)
GLUCOSE BLDC GLUCOMTR-MCNC: 126 MG/DL (ref 70–130)
GLUCOSE BLDC GLUCOMTR-MCNC: 126 MG/DL (ref 70–130)
GLUCOSE BLDC GLUCOMTR-MCNC: 162 MG/DL (ref 70–130)
HCT VFR BLD AUTO: 39.6 % (ref 40.4–52.2)
HGB BLD-MCNC: 13 G/DL (ref 13.7–17.6)
IMM GRANULOCYTES # BLD: 0.03 10*3/MM3 (ref 0–0.03)
IMM GRANULOCYTES NFR BLD: 0.2 % (ref 0–0.5)
LIPASE SERPL-CCNC: 18 U/L (ref 13–60)
LYMPHOCYTES # BLD AUTO: 2.05 10*3/MM3 (ref 0.9–4.8)
LYMPHOCYTES NFR BLD AUTO: 16.2 % (ref 19.6–45.3)
MCH RBC QN AUTO: 26.5 PG (ref 27–32.7)
MCHC RBC AUTO-ENTMCNC: 32.8 G/DL (ref 32.6–36.4)
MCV RBC AUTO: 80.7 FL (ref 79.8–96.2)
MONOCYTES # BLD AUTO: 0.31 10*3/MM3 (ref 0.2–1.2)
MONOCYTES NFR BLD AUTO: 2.5 % (ref 5–12)
NEUTROPHILS # BLD AUTO: 10.25 10*3/MM3 (ref 1.9–8.1)
NEUTROPHILS NFR BLD AUTO: 81.1 % (ref 42.7–76)
PLATELET # BLD AUTO: 207 10*3/MM3 (ref 140–500)
PMV BLD AUTO: 10 FL (ref 6–12)
POTASSIUM BLD-SCNC: 4.3 MMOL/L (ref 3.5–5.2)
PROT SERPL-MCNC: 7.3 G/DL (ref 6–8.5)
RBC # BLD AUTO: 4.91 10*6/MM3 (ref 4.6–6)
SODIUM BLD-SCNC: 141 MMOL/L (ref 136–145)
TROPONIN T SERPL-MCNC: <0.01 NG/ML (ref 0–0.03)
WBC NRBC COR # BLD: 12.64 10*3/MM3 (ref 4.5–10.7)

## 2017-02-17 PROCEDURE — 25010000002 METHYLPREDNISOLONE PER 40 MG: Performed by: INTERNAL MEDICINE

## 2017-02-17 PROCEDURE — 82962 GLUCOSE BLOOD TEST: CPT

## 2017-02-17 PROCEDURE — 74249: CPT

## 2017-02-17 PROCEDURE — 84484 ASSAY OF TROPONIN QUANT: CPT | Performed by: INTERNAL MEDICINE

## 2017-02-17 PROCEDURE — 82150 ASSAY OF AMYLASE: CPT | Performed by: INTERNAL MEDICINE

## 2017-02-17 PROCEDURE — 83690 ASSAY OF LIPASE: CPT | Performed by: INTERNAL MEDICINE

## 2017-02-17 PROCEDURE — 85025 COMPLETE CBC W/AUTO DIFF WBC: CPT | Performed by: INTERNAL MEDICINE

## 2017-02-17 PROCEDURE — 99232 SBSQ HOSP IP/OBS MODERATE 35: CPT | Performed by: INTERNAL MEDICINE

## 2017-02-17 PROCEDURE — 99254 IP/OBS CNSLTJ NEW/EST MOD 60: CPT | Performed by: SURGERY

## 2017-02-17 PROCEDURE — 80053 COMPREHEN METABOLIC PANEL: CPT | Performed by: INTERNAL MEDICINE

## 2017-02-17 PROCEDURE — 85652 RBC SED RATE AUTOMATED: CPT | Performed by: INTERNAL MEDICINE

## 2017-02-17 RX ADMIN — METRONIDAZOLE 500 MG: 500 INJECTION, SOLUTION INTRAVENOUS at 17:18

## 2017-02-17 RX ADMIN — CARVEDILOL 12.5 MG: 12.5 TABLET, FILM COATED ORAL at 17:19

## 2017-02-17 RX ADMIN — METRONIDAZOLE 500 MG: 500 INJECTION, SOLUTION INTRAVENOUS at 05:04

## 2017-02-17 RX ADMIN — PANTOPRAZOLE SODIUM 40 MG: 40 TABLET, DELAYED RELEASE ORAL at 17:19

## 2017-02-17 RX ADMIN — METHYLPREDNISOLONE SODIUM SUCCINATE 40 MG: 40 INJECTION, POWDER, FOR SOLUTION INTRAMUSCULAR; INTRAVENOUS at 17:18

## 2017-02-17 RX ADMIN — MESALAMINE 1.5 G: 375 CAPSULE, EXTENDED RELEASE ORAL at 17:19

## 2017-02-17 RX ADMIN — METHYLPREDNISOLONE SODIUM SUCCINATE 40 MG: 40 INJECTION, POWDER, FOR SOLUTION INTRAMUSCULAR; INTRAVENOUS at 05:04

## 2017-02-17 NOTE — PROGRESS NOTES
BGA/GI Progress Note   Chief Complaint:  abd pain, nausea/vomiting, hx of Crohn's    Subjective     Interval History: No abdominal pain since IV steroids initiated, no further nausea/vomiting.  Last stool 24 hours ago, soft and brown in color.  Currently NPO awaiting UGI SBFT.      History taken from: patient chart    Review of Systems:    The following systems were reviewed and negative;  gastrointestinal    Objective     Vital Signs  Temp:  [97.6 °F (36.4 °C)-98.7 °F (37.1 °C)] 97.6 °F (36.4 °C)  Heart Rate:  [73-80] 73  Resp:  [14-20] 20  BP: ()/(55-81) 94/55  Body mass index is 30.27 kg/(m^2).  No intake or output data in the 24 hours ending 02/17/17 0828       Physical Exam:   General: patient awake, alert and cooperative.  Resting in bed, no distress   Eyes: Normal lids and lashes, no scleral icterus   Neck: supple, normal ROM, no tracheal deviation   Skin: warm and dry, not jaundiced   Cardiovascular: regular rhythm and rate, no murmurs auscultated   Pulm: clear to auscultation bilaterally, regular and unlabored   Abdomen: soft, nontender, nondistended; hypoactive bowel sounds, positive flatus   Rectal: deferred   Extremities: no rash or edema   Neurologic: Normal mood and behavior    All Medications Have Been Reviewed     Results Review:       Results from last 7 days  Lab Units 02/17/17  0555 02/16/17  0532   WBC 10*3/mm3 12.64* 13.96*   HEMOGLOBIN g/dL 13.0* 15.4   HEMATOCRIT % 39.6* 46.3   PLATELETS 10*3/mm3 207 234         Results from last 7 days  Lab Units 02/17/17  0555 02/16/17  0532   SODIUM mmol/L 141 140   POTASSIUM mmol/L 4.3 4.5   CHLORIDE mmol/L 104 100   TOTAL CO2 mmol/L 25.5 25.3   BUN mg/dL 11 15   CREATININE mg/dL 0.80 0.96   CALCIUM mg/dL 8.8 10.1   BILIRUBIN mg/dL 0.3 0.3   ALK PHOS U/L 53 69   ALT (SGPT) U/L 16 23   AST (SGOT) U/L 17 28   GLUCOSE mg/dL 148* 153*             RADIOLOGY:    Imaging Results (last 72 hours)     Procedure Component Value Units Date/Time    CT  Abdomen Pelvis With Contrast [[id number]] Collected:  02/16/17 0817     Updated:  02/16/17 1141    Narrative:       CT ABDOMEN AND PELVIS WITH CONTRAST     HISTORY: Abdominal pain, history of Crohn's disease.     TECHNIQUE: Spiral axial CT imaging of the abdomen and pelvis was  performed at 3 mm intervals throughout. Intravenous contrast was  administered for this imaging.     COMPARISON: None.     FINDINGS: The liver, gallbladder, spleen, pancreas, adrenal glands, and  kidneys are largely unremarkable. There is a simple cortical cyst upper  pole right kidney. There appears to have been a distal small bowel and  ascending colonic resection. The colon is fluid-filled and somewhat  decompressed. There are a number of mildly prominent small bowel loops  throughout the abdomen extending into the pelvis. These loops measure up  to 3.5 cm in diameter and contain scattered air-fluid levels. There  clearly is distal small bowel wall thickening at the neoterminal ileum  and extensive surrounding inflammation. This process actually assumes a  somewhat masslike morphology. There is clearly luminal compromise. No  adenopathy is present. There are a few small reactive nodes in the right  lower quadrant mesentery. There is no evidence for fistulization or  abscess formation. There is a trace amount of sympathetic free fluid in  the pelvic cul-de-sac. There is no free air. Visualized lung bases are  clear.       Impression:       Findings are consistent with previously resected Crohn's  disease. There is focal small bowel wall thickening at the neoterminal  ileum. There is stranding within the adjacent mesentery. This whole  assumes a somewhat masslike morphology. This is assumed to represent  focal recurrent Crohn's with probable Crohn's related stricture. Small  bowel proximal to this level is dilated with several small air-fluid  levels. A true underlying mass cannot be excluded. There are few small  reactive nodes in the right  lower quadrant mesentery. No lymphadenopathy  is identified. I see no evidence for fistulization or abscess formation.     NOTE: These findings were discussed with Dr. Hernandez Gutiérrez of the ER at  the time of the dictation.     This report was finalized on 2/16/2017 11:38 AM by Dr. Specner Brink MD.             Assessment/Plan     Patient Active Problem List   Diagnosis Code   • Cardiomyopathy I42.9   • Paroxysmal VT I47.2   • Palpitations R00.2   • PVC's (premature ventricular contractions) I49.3   • Exacerbation of Crohn's disease of small intestine K50.00           Kecia Farfan, APRN  02/17/17  8:28 AM    No further n/v.  No BM today.  Denies pain - abd distension improved shortly after starting steroids.      NAD  Alert and oriented  Chest - CTAB  CV - RRR no murmur  abd - soft, nontender, nondistended. Nml BS  No le edema    Labs reviewed by me      Assessment :  1. Crohn's ileitis with partial small bowel obstruction - on outpatient humira and apriso  2. anklyosing spondylitis    Plan:  Continue IV steroids and Flagyl.  UGI SBFT pending for this am.    Advance diet depending on SBFT results  Surgery consult pending - will f/u on recs

## 2017-02-17 NOTE — PLAN OF CARE
Problem: Bowel Obstruction (Adult)  Goal: Signs and Symptoms of Listed Potential Problems Will be Absent or Manageable (Bowel Obstruction)  Outcome: Ongoing (interventions implemented as appropriate)    02/17/17 0337   Bowel Obstruction   Problems Assessed (Bowel Obstruction) situational response   Problems Present (Bowel Obstruction) pain         Problem: Patient Care Overview (Adult)  Goal: Plan of Care Review  Outcome: Ongoing (interventions implemented as appropriate)    02/17/17 0337   Coping/Psychosocial Response Interventions   Plan Of Care Reviewed With patient   Patient Care Overview   Progress improving   Outcome Evaluation   Outcome Summary/Follow up Plan Denies pain this shift. To have UGI with SBFT today-NPO for test. Up ad bernie.       Goal: Adult Individualization and Mutuality  Outcome: Ongoing (interventions implemented as appropriate)  Goal: Discharge Needs Assessment  Outcome: Ongoing (interventions implemented as appropriate)    Problem: Fall Risk (Adult)  Goal: Identify Related Risk Factors and Signs and Symptoms  Outcome: Outcome(s) achieved Date Met:  02/17/17  Goal: Absence of Falls  Outcome: Ongoing (interventions implemented as appropriate)    Problem: Infection, Risk/Actual (Adult)  Goal: Identify Related Risk Factors and Signs and Symptoms  Outcome: Outcome(s) achieved Date Met:  02/17/17  Goal: Infection Prevention/Resolution  Outcome: Ongoing (interventions implemented as appropriate)    Problem: Pain, Acute (Adult)  Goal: Identify Related Risk Factors and Signs and Symptoms  Outcome: Outcome(s) achieved Date Met:  02/17/17  Goal: Acceptable Pain Control/Comfort Level  Outcome: Ongoing (interventions implemented as appropriate)

## 2017-02-17 NOTE — PLAN OF CARE
Problem: Bowel Obstruction (Adult)  Goal: Signs and Symptoms of Listed Potential Problems Will be Absent or Manageable (Bowel Obstruction)  Outcome: Ongoing (interventions implemented as appropriate)    Problem: Patient Care Overview (Adult)  Goal: Plan of Care Review  Outcome: Ongoing (interventions implemented as appropriate)    02/17/17 8567   Coping/Psychosocial Response Interventions   Plan Of Care Reviewed With patient   Patient Care Overview   Progress improving   Outcome Evaluation   Outcome Summary/Follow up Plan No falls. NO c/o pain. Tolerating clear liquid diet. SBFT completed today. On IV abx and IV steroids yet. Blood sugars with good control today.        Goal: Adult Individualization and Mutuality  Outcome: Ongoing (interventions implemented as appropriate)  Goal: Discharge Needs Assessment  Outcome: Ongoing (interventions implemented as appropriate)    Problem: Fall Risk (Adult)  Goal: Absence of Falls  Outcome: Ongoing (interventions implemented as appropriate)    Problem: Infection, Risk/Actual (Adult)  Goal: Infection Prevention/Resolution  Outcome: Ongoing (interventions implemented as appropriate)    Problem: Pain, Acute (Adult)  Goal: Acceptable Pain Control/Comfort Level  Outcome: Ongoing (interventions implemented as appropriate)

## 2017-02-17 NOTE — PROGRESS NOTES
LOS: 1 day   Patient Care Team:  Zechariah Fatima MD as PCP - General    Chief Complaint: none    Subjective     HPI Comments: Pt off the floor for SBFT, will follow up tomorrow    Abdominal Pain         Subjective    History taken from: chart    Objective     Vital Signs  Temp:  [97.6 °F (36.4 °C)-97.8 °F (36.6 °C)] 97.6 °F (36.4 °C)  Heart Rate:  [73-79] 73  Resp:  [14-20] 20  BP: ()/(55-72) 94/55    Objective    Results Review:     I reviewed the patient's new clinical results.  I reviewed the patient's new imaging results and agree with the interpretation.  I reviewed the patient's other test results and agree with the interpretation    Medication Review: reviewed    Assessment/Plan     Active Problems:    Exacerbation of Crohn's disease of small intestine      Assessment:  (1. Crohn's flare terminal ileum  2. pSBO due to above  3. DM2  4. HTN  5. Non-ischemic cardiomyopathy with AICD  6. Ankylosing spondylitis).     Plan:   (May need to decrease dose of Coreg  Continue IV steroids and Flagyl per GI).       Zechariah Lambert MD  02/17/17  3:40 PM    Time: 10min

## 2017-02-18 LAB
BASOPHILS # BLD AUTO: 0 10*3/MM3 (ref 0–0.2)
BASOPHILS NFR BLD AUTO: 0 % (ref 0–1.5)
DEPRECATED RDW RBC AUTO: 45.5 FL (ref 37–54)
EOSINOPHIL # BLD AUTO: 0 10*3/MM3 (ref 0–0.7)
EOSINOPHIL NFR BLD AUTO: 0 % (ref 0.3–6.2)
ERYTHROCYTE [DISTWIDTH] IN BLOOD BY AUTOMATED COUNT: 15.6 % (ref 11.5–14.5)
GLUCOSE BLDC GLUCOMTR-MCNC: 116 MG/DL (ref 70–130)
GLUCOSE BLDC GLUCOMTR-MCNC: 124 MG/DL (ref 70–130)
GLUCOSE BLDC GLUCOMTR-MCNC: 125 MG/DL (ref 70–130)
HCT VFR BLD AUTO: 38.2 % (ref 40.4–52.2)
HGB BLD-MCNC: 12.2 G/DL (ref 13.7–17.6)
IMM GRANULOCYTES # BLD: 0.04 10*3/MM3 (ref 0–0.03)
IMM GRANULOCYTES NFR BLD: 0.3 % (ref 0–0.5)
LYMPHOCYTES # BLD AUTO: 1.48 10*3/MM3 (ref 0.9–4.8)
LYMPHOCYTES NFR BLD AUTO: 12.6 % (ref 19.6–45.3)
MCH RBC QN AUTO: 25.7 PG (ref 27–32.7)
MCHC RBC AUTO-ENTMCNC: 31.9 G/DL (ref 32.6–36.4)
MCV RBC AUTO: 80.4 FL (ref 79.8–96.2)
MONOCYTES # BLD AUTO: 0.33 10*3/MM3 (ref 0.2–1.2)
MONOCYTES NFR BLD AUTO: 2.8 % (ref 5–12)
NEUTROPHILS # BLD AUTO: 9.88 10*3/MM3 (ref 1.9–8.1)
NEUTROPHILS NFR BLD AUTO: 84.3 % (ref 42.7–76)
PLATELET # BLD AUTO: 221 10*3/MM3 (ref 140–500)
PMV BLD AUTO: 9.9 FL (ref 6–12)
RBC # BLD AUTO: 4.75 10*6/MM3 (ref 4.6–6)
WBC NRBC COR # BLD: 11.73 10*3/MM3 (ref 4.5–10.7)

## 2017-02-18 PROCEDURE — 82962 GLUCOSE BLOOD TEST: CPT

## 2017-02-18 PROCEDURE — 85025 COMPLETE CBC W/AUTO DIFF WBC: CPT | Performed by: INTERNAL MEDICINE

## 2017-02-18 PROCEDURE — 99232 SBSQ HOSP IP/OBS MODERATE 35: CPT | Performed by: INTERNAL MEDICINE

## 2017-02-18 PROCEDURE — 25010000002 METHYLPREDNISOLONE PER 40 MG: Performed by: INTERNAL MEDICINE

## 2017-02-18 PROCEDURE — 99231 SBSQ HOSP IP/OBS SF/LOW 25: CPT | Performed by: SURGERY

## 2017-02-18 RX ORDER — CARVEDILOL 6.25 MG/1
6.25 TABLET ORAL 2 TIMES DAILY WITH MEALS
Status: DISCONTINUED | OUTPATIENT
Start: 2017-02-18 | End: 2017-02-19 | Stop reason: HOSPADM

## 2017-02-18 RX ADMIN — CARVEDILOL 6.25 MG: 6.25 TABLET, FILM COATED ORAL at 17:39

## 2017-02-18 RX ADMIN — PANTOPRAZOLE SODIUM 40 MG: 40 TABLET, DELAYED RELEASE ORAL at 06:19

## 2017-02-18 RX ADMIN — METRONIDAZOLE 500 MG: 500 INJECTION, SOLUTION INTRAVENOUS at 01:11

## 2017-02-18 RX ADMIN — METRONIDAZOLE 500 MG: 500 INJECTION, SOLUTION INTRAVENOUS at 08:29

## 2017-02-18 RX ADMIN — POTASSIUM CHLORIDE, DEXTROSE MONOHYDRATE AND SODIUM CHLORIDE 75 ML/HR: 150; 5; 450 INJECTION, SOLUTION INTRAVENOUS at 05:12

## 2017-02-18 RX ADMIN — CARVEDILOL 12.5 MG: 12.5 TABLET, FILM COATED ORAL at 08:27

## 2017-02-18 RX ADMIN — METHYLPREDNISOLONE SODIUM SUCCINATE 40 MG: 40 INJECTION, POWDER, FOR SOLUTION INTRAMUSCULAR; INTRAVENOUS at 08:27

## 2017-02-18 RX ADMIN — METHYLPREDNISOLONE SODIUM SUCCINATE 40 MG: 40 INJECTION, POWDER, FOR SOLUTION INTRAMUSCULAR; INTRAVENOUS at 17:40

## 2017-02-18 RX ADMIN — MESALAMINE 1.5 G: 375 CAPSULE, EXTENDED RELEASE ORAL at 09:08

## 2017-02-18 RX ADMIN — METHYLPREDNISOLONE SODIUM SUCCINATE 40 MG: 40 INJECTION, POWDER, FOR SOLUTION INTRAMUSCULAR; INTRAVENOUS at 01:11

## 2017-02-18 RX ADMIN — METRONIDAZOLE 500 MG: 500 INJECTION, SOLUTION INTRAVENOUS at 17:39

## 2017-02-18 NOTE — CONSULTS
Subjective:     Chief Complaint   Patient presents with   • Abdominal Pain          Arnie Calvo is a 56 y.o. male who was admitted to the emergency department a couple of nights ago with the acute onset of abdominal pain, abdominal distention as well as some nausea and vomiting.  This came on out of the blue after eating some dinner, after coming to the emergency department and getting started on steroids, the symptoms resolved almost immediately.  Over the last 24 hours he has really had no symptoms.  The pain that he had was most prominent in the lower half of his abdomen and.  It did not seem to radiate.  He has not had any diarrhea.    He has a history significant for Crohn's disease which has been altogether pretty well tolerated and treated over the last 7-8 years.  He has never been hospitalized for a previously.  In general his bowels work well and he eats well.  He's had no significant weight loss.      The following portions of the patient's history were reviewed and updated as appropriate: allergies, current medications, past family history, past medical history, past social history and past surgical history.    Past Medical History   Diagnosis Date   • Acute pain of left hip    • Adjustment disorder with depressed mood    • Ankylosis of spine    • Arthritis    • Cardiomyopathy      sees Dr. Reyes; NICM/ (-) cath, EF 25-35%; has ICD   • CHF (congestive heart failure)    • Crohn's disease    • Diabetes mellitus      Diet controlled.   • Dysthymic disorder 2012   • Dysuria    • Early onset dysthymia    • Elevated total protein    • Essential hypertension    • External hemorrhoids    • Genital herpes    • Gout    • Health care maintenance    • Insomnia    • Iron deficiency    • Paroxysmal ventricular tachycardia    • Pneumonia    • Prostate nodule    • Recurrent canker sores    • Thoracic back pain    • Urinary hesitancy    • Xerosis cutis        Past Surgical History   Procedure Laterality Date   •  Cardiac surgery     • Cardiac defibrillator placement       Had [place] lead that was fractured.  Lead was tied off.  New lead and new device implanted.   • Colonoscopy  04/03/2015     abnormal cecum, three polypoid masses, pre cancerous vs crohns, IH, tics, hyperplastic polyp         Current Facility-Administered Medications:   •  acetaminophen (TYLENOL) tablet 650 mg, 650 mg, Oral, Q4H PRN, Del Mayroga MD  •  carvedilol (COREG) tablet 12.5 mg, 12.5 mg, Oral, BID With Meals, Del Mayorga MD, 12.5 mg at 02/17/17 1719  •  dextrose (D50W) solution 25 g, 25 g, Intravenous, Q15 Min PRN, Zechariah Lambert MD  •  dextrose (GLUTOSE) oral gel 15 g, 15 g, Oral, Q15 Min PRN, Zechariah Lambert MD  •  dextrose 5 % and sodium chloride 0.45 % with KCl 20 mEq/L infusion, 75 mL/hr, Intravenous, Continuous, Del Mayorga MD, Last Rate: 75 mL/hr at 02/16/17 2101, 75 mL/hr at 02/16/17 2101  •  glucagon (human recombinant) (GLUCAGEN DIAGNOSTIC) injection 1 mg, 1 mg, Subcutaneous, Q15 Min PRN, Zechariah Lambert MD  •  HYDROcodone-acetaminophen (NORCO) 5-325 MG per tablet 1 tablet, 1 tablet, Oral, Q6H PRN, Gume Pisano MD  •  insulin aspart (novoLOG) injection 0-7 Units, 0-7 Units, Subcutaneous, 4x Daily AC & at Bedtime, Zechariah Lambert MD, 2 Units at 02/16/17 2102  •  mesalamine (APRISO) 24 hr capsule 1.5 g, 1.5 g, Oral, Daily, Del Mayorga MD, 1.5 g at 02/17/17 1719  •  methylPREDNISolone sodium succinate (SOLU-Medrol) injection 40 mg, 40 mg, Intravenous, Q8H, Del Mayorga MD, 40 mg at 02/17/17 1718  •  metroNIDAZOLE (FLAGYL) IVPB 500 mg, 500 mg, Intravenous, Q8H, Del Mayorga MD, 500 mg at 02/17/17 1718  •  morphine injection 1 mg, 1 mg, Intravenous, Q4H PRN **AND** naloxone (NARCAN) injection 0.4 mg, 0.4 mg, Intravenous, Q5 Min PRN, Del Mayorga MD  •  ondansetron (ZOFRAN) injection 4 mg, 4 mg, Intravenous, Q6H PRN, Gume Pisano MD  •  pantoprazole (PROTONIX) EC tablet 40 mg, 40 mg, Oral, Q AM, Del Mayorga MD, 40 mg at 02/17/17 1719  •   promethazine (PHENERGAN) tablet 12.5 mg, 12.5 mg, Oral, Q6H PRN **OR** promethazine (PHENERGAN) injection 12.5 mg, 12.5 mg, Intravenous, Q6H PRN, Gume Pisano MD  •  sodium chloride 0.9 % flush 1-10 mL, 1-10 mL, Intravenous, PRN, Del Mayorga MD  •  sodium chloride 0.9 % flush 10 mL, 10 mL, Intravenous, PRN, Harrison Gutiérrez MD  •  zolpidem (AMBIEN) tablet 5 mg, 5 mg, Oral, Nightly PRN, Gume Pisano MD    Allergies   Allergen Reactions   • Bactrim [Sulfamethoxazole-Trimethoprim]    • Latex        Family History   Problem Relation Age of Onset   • Hypertension Mother    • Diabetes Mother      type 2 mellitus    • Cancer Father      colon   • Heart failure Father      congestive   • Gout Father        Social History     Social History   • Marital status:      Spouse name: N/A   • Number of children: N/A   • Years of education: N/A     Occupational History   • Not on file.     Social History Main Topics   • Smoking status: Never Smoker   • Smokeless tobacco: Not on file   • Alcohol use No   • Drug use: No   • Sexual activity: Not on file     Other Topics Concern   • Not on file     Social History Narrative         Review of Systems  Constitutional: negative for anorexia, fatigue, night sweats and weight loss  Respiratory: negative for chronic bronchitis, dyspnea on exertion and emphysema  Gastrointestinal: positive for abdominal pain, negative for constipation, diarrhea and melena  Behavioral/Psych: negative for irritability and tobacco use    All other systems were reviewed and were negative     Objective:     Vitals:    02/17/17 1900   BP: 103/76   Pulse: 77   Resp: 18   Temp: 97.8 °F (36.6 °C)   SpO2: 92%       PHYSICAL EXAM:  - Constitutional: Well-developed well-nourished, no acute distress  Height 69 inches  Weight 205 pounds    - Eyes: Conjunctiva normal, sclera nonicteric  - ENMT: Hearing grossly normal, oral mucosa moist  - Neck: Supple, no palpable mass,  trachea midline  - Respiratory: Clear  to auscultation, normal inspiratory effort  - Cardiovascular: Regular rate, no peripheral edema, no jugular venous distention  - Gastrointestinal: Soft, nontender, no palpable mass, no hepatosplenomegaly, negative for hernia, bowel sounds normal  - Skin: Warm, dry  - Musculoskeletal: Symmetric strength, normal gait  - Psychiatric: Alert and oriented ×3, normal affect     Laboratory  Lab Results   Component Value Date    WBC 12.64 (H) 02/17/2017    RBC 4.91 02/17/2017    HGB 13.0 (L) 02/17/2017    HCT 39.6 (L) 02/17/2017     02/17/2017     Lab Results   Component Value Date    AMYLASE 65 02/17/2017     Lab Results   Component Value Date    LIPASE 18 02/17/2017         CT scan of the abdomen and pelvis is reviewed by me personally.  There is obvious think acute inflammatory changes with what appears be mass effect in the right lower quadrant in the distal ileum consistent with that of Crohn's disease.  There is no abscess formation noted.  There may be some minimal proximal dilatation of the small bowel.    I also reviewed the small bowel follow-through.  This demonstrates what appears to be a very short segment stricture down to about 7 mm in the distal ileum, and some and chronic inflammatory changes in the terminal ileum with possible sinus or fistula formation.    Assessment:     Flare of Crohn's disease with apparent complication of possible stricture and/or fistula formation.  However, the patient is almost entirely asymptomatic at this point.     Plan:     I had a very long discussion with the patient about his potential options.  I did tell him that people who appear to have disease which is limited to the distal ileum do tend to respond well to ileocecal ileocecectomy.  Many of them may develop a long-term remission from their disease.  However, is also fair to say that the patient essentially has had very well controlled disease for a long period of time and this is his first hospitalization for this  problem, and he resolved any symptoms almost immediately upon receiving some steroids.  At this time the patient does not seem to be inclined towards an intestinal resection, and I don't know that I can tell them that it's absolutely necessary.  I think it might be wise to try and get him over this acute flare and set him up for a colonoscopy, to rule out malignancy as the source of his problem.  The patient tells me that he believes Dr. Mayorga is going to get this set up for him on an outpatient basis.  I think it might be wise to repeat his CT scan several weeks from now after he is had a time for the flare to cool and see what it looks like.  If his colonoscopy suggests only Crohn's disease, and the CT continues to show mass effect, then I think an augment could be made for an elective resection.  I do not think that it and augment should be made immediately for an urgent or emergent resection.  Furthermore, the patient will need to be more convinced that this would benefit him.  It is a little bit hard to tell him that he needs to have this done, given the fact that his symptoms have been so well controlled on his medication regimen.  However, the imaging findings do suggest that he may develop worse problems in the relatively near future without further treatment        Jose Corona MD  General and Endoscopic Surgery  [facility]    [address], [address]  [place], [place], [address]  P: [phone]  F: [phone]

## 2017-02-18 NOTE — PROGRESS NOTES
[facility], operated by [facility]  Inpatient Progress Note    Reason for Follow Up:  Flair of crohn's disease, abdominal pain.    Subjective     Interval History:   He denies abdominal pain, nausea or vomiting. No diarrhea.    Current Facility-Administered Medications:   •  acetaminophen (TYLENOL) tablet 650 mg, 650 mg, Oral, Q4H PRN, Del Mayorga MD  •  carvedilol (COREG) tablet 12.5 mg, 12.5 mg, Oral, BID With Meals, Del Mayorga MD, 12.5 mg at 02/18/17 0827  •  dextrose (D50W) solution 25 g, 25 g, Intravenous, Q15 Min PRN, Zechariah Lambert MD  •  dextrose (GLUTOSE) oral gel 15 g, 15 g, Oral, Q15 Min PRN, Zechariah Labmert MD  •  dextrose 5 % and sodium chloride 0.45 % with KCl 20 mEq/L infusion, 75 mL/hr, Intravenous, Continuous, Del Mayorga MD, Last Rate: 75 mL/hr at 02/18/17 0512, 75 mL/hr at 02/18/17 0512  •  glucagon (human recombinant) (GLUCAGEN DIAGNOSTIC) injection 1 mg, 1 mg, Subcutaneous, Q15 Min PRN, Zechariah Lambert MD  •  HYDROcodone-acetaminophen (NORCO) 5-325 MG per tablet 1 tablet, 1 tablet, Oral, Q6H PRN, Gume Pisano MD  •  insulin aspart (novoLOG) injection 0-7 Units, 0-7 Units, Subcutaneous, 4x Daily AC & at Bedtime, Zechariah Lambert MD, 2 Units at 02/16/17 2102  •  mesalamine (APRISO) 24 hr capsule 1.5 g, 1.5 g, Oral, Daily, Del Mayorga MD, 1.5 g at 02/18/17 0908  •  methylPREDNISolone sodium succinate (SOLU-Medrol) injection 40 mg, 40 mg, Intravenous, Q8H, Del Mayorga MD, 40 mg at 02/18/17 0827  •  metroNIDAZOLE (FLAGYL) IVPB 500 mg, 500 mg, Intravenous, Q8H, Del Mayorga MD, 500 mg at 02/18/17 0829  •  morphine injection 1 mg, 1 mg, Intravenous, Q4H PRN **AND** naloxone (NARCAN) injection 0.4 mg, 0.4 mg, Intravenous, Q5 Min PRN, Del Mayorga MD  •  ondansetron (ZOFRAN) injection 4 mg, 4 mg, Intravenous, Q6H PRN, Gume Pisano MD  •  pantoprazole (PROTONIX) EC tablet 40 mg, 40 mg, Oral, Q AM, Del Mayorga MD, 40 mg at 02/18/17 0619  •  promethazine (PHENERGAN) tablet 12.5 mg, 12.5 mg, Oral,  Q6H PRN **OR** promethazine (PHENERGAN) injection 12.5 mg, 12.5 mg, Intravenous, Q6H PRN, Gume Pisano MD  •  sodium chloride 0.9 % flush 1-10 mL, 1-10 mL, Intravenous, PRN, Del Mayorga MD  •  sodium chloride 0.9 % flush 10 mL, 10 mL, Intravenous, PRN, Harrison Gutiérrez MD  •  zolpidem (AMBIEN) tablet 5 mg, 5 mg, Oral, Nightly PRN, Gume Pisano MD  Review of Systems:    The following systems were reviewed and negative;  respiratory, cardiovascular, musculoskeletal and neurological    Objective     Vital Signs  Temp:  [97.3 °F (36.3 °C)-97.8 °F (36.6 °C)] 97.7 °F (36.5 °C)  Heart Rate:  [56-77] 56  Resp:  [16-18] 16  BP: ()/(57-76) 106/68  Body mass index is 30.27 kg/(m^2).    Intake/Output Summary (Last 24 hours) at 02/18/17 0917  Last data filed at 02/18/17 0512   Gross per 24 hour   Intake   2564 ml   Output      0 ml   Net   2564 ml             Physical Exam:   General: patient awake, alert and cooperative   Eyes: Normal lids and lashes, no scleral icterus   Neck: supple, normal ROM   Skin: warm and dry, not jaundiced   Cardiovascular: regular rhythm and rate, no murmurs auscultated   Pulm: clear to auscultation bilaterally, regular and unlabored   Abdomen: soft, nontender, nondistended; normal bowel sounds   Rectal: deferred   Extremities: no rash or edema   Psychiatric: Normal mood and behavior; memory intact     Results Review:     I reviewed the patient's new clinical results.      Results from last 7 days  Lab Units 02/18/17  0627 02/17/17  0555 02/16/17  0532   WBC 10*3/mm3 11.73* 12.64* 13.96*   HEMOGLOBIN g/dL 12.2* 13.0* 15.4   HEMATOCRIT % 38.2* 39.6* 46.3   PLATELETS 10*3/mm3 221 207 234         Results from last 7 days  Lab Units 02/17/17  0555 02/16/17  0532   SODIUM mmol/L 141 140   POTASSIUM mmol/L 4.3 4.5   CHLORIDE mmol/L 104 100   TOTAL CO2 mmol/L 25.5 25.3   BUN mg/dL 11 15   CREATININE mg/dL 0.80 0.96   CALCIUM mg/dL 8.8 10.1   BILIRUBIN mg/dL 0.3 0.3   ALK PHOS U/L 53 69   ALT  (SGPT) U/L 16 23   AST (SGOT) U/L 17 28   GLUCOSE mg/dL 148* 153*               0  Lab Value Date/Time   LIPASE 18 02/17/2017 0555   LIPASE 31 02/16/2017 0532       Radiology:    Imaging Results (last 24 hours)     Procedure Component Value Units Date/Time    FL Upper GI With Air Contrast & SBFT [[id number]] Collected:  02/17/17 1717     Updated:  02/17/17 1751    Narrative:       CLINICAL HISTORY: 56-year-old male with diagnosis of Crohn's disease  approximately 6 years ago with recent onset of marked abdominal pain and  CT scan abdominal findings of both abnormal wall thickening of terminal  ileum to cecum and of numerous fluid-filled borderline to mildly dilated  small bowel loops compatible with partial obstruction.     EXAM: AIR-CONTRAST UPPER GI SERIES AND SMALL BOWEL FOLLOW-THROUGH DATED  02/17/2017.     FINDINGS: The multiplanar CT scan sections of abdomen and pelvis dated  02/16/2017 have been reviewed. The only other available prior study is  PA and lateral chest exam of 02/28/2015, also reviewed. The current  preliminary radiographs of the abdomen and pelvis demonstrate  degenerative changes at both hips and in the spine. A few air-filled  borderline to mildly dilated small bowel loops are demonstrated in the  mid to right abdomen. Stool and some gas are present in the colon. There  is partial demonstration of lead wires from the left subclavian  permanent transvenous pacer or pacer defibrillator device demonstrated  on chest radiographs of 02/28/2015. This, or a similar device, is again  demonstrated on subsequent radiographs.     AIR-CONTRAST UPPER GI SERIES     The patient ingested gas-forming crystals, water, and both thick and  thin barium liquid mixtures for the current examinations. No aspiration  was demonstrated. Trace upper laryngeal penetration was seen. The  esophagus has apparently normal distensibility. There was prompt  emptying of barium into the stomach through approximately 2.9 cm  maximal  demonstrated diameter esophagogastric junction and small sliding hiatal  herniation of the upper stomach. Slightly thickened mucosal folds in the  esophagus do not allow exclusion of esophagitis. Gastroesophageal reflux  was demonstrated with patient recumbent. With the exception of the  hiatal hernia, the gastric contours appear otherwise normal. There was  prompt emptying of some barium from the stomach into the duodenum. The  duodenum distends normally and has apparently normal mucosal pattern.  There was prompt propulsion of barium through the duodenum and into the  jejunum.     SMALL BOWEL FOLLOW-THROUGH     Progress of the barium meal through the mesenteric small bowel was  followed by exposure of serial abdominal radiographs. The upper  mesenteric small bowel demonstrated normal caliber but there was  progressively increased caliber of the mid mesenteric small bowel. The  small bowel follow-through study was monitored by the afternoon and  evening radiologist between 3 hours and 4 hours. Trace barium began to  reach the cecum by the time of the 4 hour radiograph and was not present  on the 3 1/2 hour radiograph or earlier. At 4 1/2 hours there was  excellent opacification of the ascending and transverse colon.  Compression fluoroscopy and radiography of the small bowel were  performed by Dr. Doan with the patient in multiple projections on the  x-ray table. Final overhead oblique projection radiographs were  acquired. There is change in caliber from mild persistent dilatation of  mid mesenteric small bowel up to approximately 4 cm diameter to  approximately 2.5 cm immediately more distal diameter by a persistent  short segment, smoothly contoured stenosis of approximately 7 mm maximal  diameter. This is located apparently several tens of centimeters  proximal to the ileocecal valve. The terminal approximately 10 cm of the  ileum has mildly spiculated irregular contours with mild to  moderate  narrowing and with some intramural sinus tract or fistula demonstrated.  There appears to be moderate to marked narrowing of caliber of the  ileocecal valve, and there is distortion of the caudal portions of the  cecum by lobulated filling defect of concern for superimposed mass  lesion. The findings therefore could all be related to Crohn's disease  but do not allow exclusion of superimposed carcinoma, lymphoma, or  tuberculosis. Colonoscopy might provide additional information if  feasible.     A total of 7 minutes 51 seconds fluoroscopy was used. A total of 21  digital overhead radiographs and 110 digital fluoroscopic spot image  radiographs were acquired.     CONCLUSION: Small hiatal hernia with gastroesophageal reflux and some  thickening of esophageal mucosal folds. No esophageal, gastric, or  duodenal mass or mucosal ulceration was demonstrated. Several tens of  centimeters proximal to the cecum is approximately 7 mm diameter by  10-15mm-long, persistently stenotic segment that accompanies a change in  caliber from dilated to upper normal caliber and may represent Crohn's  disease or other lesion. The most distal and terminal 10 cm or more of  the ileum, as suspected from the CT scan findings, demonstrate  irregularity of contour and some apparent sinus tract or fistula  extending into what is presumed to be wall thickening or abnormal  tissue, and there is a lobulated mass effect lesion distorting the  ileocecal valve and caudal portion of the cecum. Neoplasm superimposing  Crohn's disease or other granulomatous disease such a tuberculosis needs  to be considered.     This report was finalized on 2/17/2017 5:48 PM by Dr. Arnie Doan MD.               Assessment/Plan     Patient Active Problem List   Diagnosis Code   • Cardiomyopathy I42.9   • Paroxysmal VT I47.2   • Palpitations R00.2   • PVC's (premature ventricular contractions) I49.3   • Exacerbation of Crohn's disease of small intestine  K50.00       Assessment/Plan:    1) Flair of TI crohn's with CT abd showing a mass in the area of the TI, crohn's vs other (?).  The patient said that the Surgeon had not finished talking to him when he was called away but that Surgery was inclined to not operate on him now but to see what an outpatient colonoscopy shows. The patient is tolerating a GI Soft Diet that she started this am.  We will see how he does today on this diet. We will see if discharge tomorrow (potentially) would be OK with Surgery. If Surgery is OK with discharge then I might discharge Sunday.  2) HTN - Appreciate [facility] help. Yesterday's note had suggested that they may decrease his Coreg dose. I defer to them. Is Internal Medicine OK with the patient potentially being discharged Sunday?  3) Non-ischemic cardiomyopathy with AICD  4) Ankylosing spondylitis - on Theo given by Dr. Joleen Gama.    Del Mayorga MD  02/18/17  9:17 AM

## 2017-02-18 NOTE — PLAN OF CARE
Problem: Bowel Obstruction (Adult)  Goal: Signs and Symptoms of Listed Potential Problems Will be Absent or Manageable (Bowel Obstruction)  Outcome: Ongoing (interventions implemented as appropriate)    02/18/17 0537   Bowel Obstruction   Problems Assessed (Bowel Obstruction) pain;nausea and vomiting;diarrhea;electrolyte/acid-base imbalance;fluid volume deficit/hypovolemia   Problems Present (Bowel Obstruction) none         Problem: Patient Care Overview (Adult)  Goal: Plan of Care Review  Outcome: Ongoing (interventions implemented as appropriate)    02/18/17 0537   Coping/Psychosocial Response Interventions   Plan Of Care Reviewed With patient   Patient Care Overview   Progress improving   Outcome Evaluation   Outcome Summary/Follow up Plan VSS; no reports of pain or discomfort; blood sugars well controlled today; on IV abx w/continuous fluids; no distress       Goal: Adult Individualization and Mutuality  Outcome: Ongoing (interventions implemented as appropriate)    02/18/17 0537   Individualization   Patient Specific Preferences pt wants fingersticks to be on side of finger, not middle   Patient Specific Goals pt would like to be on a soft diet today; pt to be D/C as soon as possible       Goal: Discharge Needs Assessment  Outcome: Ongoing (interventions implemented as appropriate)    02/18/17 0537   Discharge Needs Assessment   Discharge Disposition still a patient         Problem: Fall Risk (Adult)  Goal: Absence of Falls  Outcome: Ongoing (interventions implemented as appropriate)    02/18/17 0537   Fall Risk (Adult)   Absence of Falls making progress toward outcome         Problem: Infection, Risk/Actual (Adult)  Goal: Infection Prevention/Resolution  Outcome: Ongoing (interventions implemented as appropriate)    02/18/17 0537   Infection, Risk/Actual (Adult)   Infection Prevention/Resolution making progress toward outcome         Problem: Pain, Acute (Adult)  Goal: Acceptable Pain Control/Comfort  Level  Outcome: Ongoing (interventions implemented as appropriate)    02/18/17 0537   Pain, Acute (Adult)   Acceptable Pain Control/Comfort Level making progress toward outcome

## 2017-02-18 NOTE — PROGRESS NOTES
"   LOS: 2 days   Patient Care Team:  Zechariah Fatima MD as PCP - General    Chief Complaint: none today    Subjective     HPI Comments: Feeling much better today. No complaints. No headache or visual changes.    Abdominal Pain   Pertinent negatives include no anorexia, diarrhea, fever, nausea or vomiting.       Subjective:  Symptoms:  Resolved.  No shortness of breath, malaise, cough, chest pain, weakness, headache, chest pressure, anorexia, diarrhea or anxiety.    Diet:  Adequate intake.  No nausea or vomiting.    Activity level: Normal.    Pain:  He reports no pain.        History taken from: patient chart    Objective     Vital Signs  Temp:  [97.3 °F (36.3 °C)-97.8 °F (36.6 °C)] 97.7 °F (36.5 °C)  Heart Rate:  [56-77] 56  Resp:  [16-18] 16  BP: ()/(57-76) 106/68    Objective:  General Appearance:  Comfortable and in no acute distress.    Vital signs: (most recent): Blood pressure 106/68, pulse 56, temperature 97.7 °F (36.5 °C), temperature source Oral, resp. rate 16, height 69\" (175.3 cm), weight 205 lb (93 kg), SpO2 94 %.  Vital signs are normal.  No fever.    Output: Producing urine and producing stool.    Lungs:  Normal respiratory rate and normal effort.  Breath sounds clear to auscultation.    Heart: Normal rate.  Regular rhythm.    Abdomen: Abdomen is soft.  Bowel sounds are normal.   There is no abdominal tenderness.     Extremities: There is no dependent edema.    Pulses: Distal pulses are intact.    Neurological: Patient is alert and oriented to person, place and time.    Skin:  Warm and dry.              Results Review:     I reviewed the patient's new clinical results.  I reviewed the patient's other test results and agree with the interpretation  Discussed with [patient    Medication Review: reviewed and adjusted    Assessment/Plan     Active Problems:    Exacerbation of Crohn's disease of small intestine      Assessment:  (1. Crohn's flare terminal ileum  2. pSBO due to above  3. DM2  4. HTN  5. " Non-ischemic cardiomyopathy with AICD  6. Ankylosing spondylitis).     Plan:   (Will decrease dose of Coreg given his low BPs and HRs  Continue IV steroids and Flagyl per GI  Possibly home tomorrow if no surgery indicated  (likely will complete current treatment and get a c-scope prior to considering surgery)).       Zechariah Lambert MD  02/18/17  12:44 PM    Time: 20min

## 2017-02-18 NOTE — PLAN OF CARE
Problem: Patient Care Overview (Adult)  Goal: Plan of Care Review  Outcome: Ongoing (interventions implemented as appropriate)    02/18/17 7712   Coping/Psychosocial Response Interventions   Plan Of Care Reviewed With patient   Patient Care Overview   Progress improving   Outcome Evaluation   Outcome Summary/Follow up Plan Pt rested well through out the day. Diet advanced to GI bland and pt tolerating well without nausea or diarrhea. IV abx and steroids continued, IV fluids d/c. VSS.        Goal: Adult Individualization and Mutuality  Outcome: Ongoing (interventions implemented as appropriate)  Goal: Discharge Needs Assessment  Outcome: Ongoing (interventions implemented as appropriate)    Problem: Fall Risk (Adult)  Goal: Absence of Falls  Outcome: Ongoing (interventions implemented as appropriate)    Problem: Infection, Risk/Actual (Adult)  Goal: Infection Prevention/Resolution  Outcome: Ongoing (interventions implemented as appropriate)    Problem: Pain, Acute (Adult)  Goal: Acceptable Pain Control/Comfort Level  Outcome: Ongoing (interventions implemented as appropriate)

## 2017-02-19 ENCOUNTER — PREP FOR SURGERY (OUTPATIENT)
Dept: GASTROENTEROLOGY | Facility: CLINIC | Age: 57
End: 2017-02-19

## 2017-02-19 ENCOUNTER — TELEPHONE (OUTPATIENT)
Dept: GASTROENTEROLOGY | Facility: CLINIC | Age: 57
End: 2017-02-19

## 2017-02-19 VITALS
HEART RATE: 59 BPM | HEIGHT: 69 IN | TEMPERATURE: 97.5 F | BODY MASS INDEX: 30.36 KG/M2 | DIASTOLIC BLOOD PRESSURE: 61 MMHG | WEIGHT: 205 LBS | OXYGEN SATURATION: 94 % | RESPIRATION RATE: 14 BRPM | SYSTOLIC BLOOD PRESSURE: 96 MMHG

## 2017-02-19 DIAGNOSIS — K50.012 CROHN'S DISEASE OF SMALL INTESTINE WITH INTESTINAL OBSTRUCTION (HCC): Primary | ICD-10-CM

## 2017-02-19 LAB — GLUCOSE BLDC GLUCOMTR-MCNC: 119 MG/DL (ref 70–130)

## 2017-02-19 PROCEDURE — 99238 HOSP IP/OBS DSCHRG MGMT 30/<: CPT | Performed by: INTERNAL MEDICINE

## 2017-02-19 PROCEDURE — 25010000002 METHYLPREDNISOLONE PER 40 MG: Performed by: INTERNAL MEDICINE

## 2017-02-19 PROCEDURE — 82962 GLUCOSE BLOOD TEST: CPT

## 2017-02-19 RX ORDER — CARVEDILOL 6.25 MG/1
6.25 TABLET ORAL 2 TIMES DAILY WITH MEALS
Qty: 60 TABLET | Refills: 11 | Status: SHIPPED | OUTPATIENT
Start: 2017-02-19 | End: 2017-02-19 | Stop reason: SDUPTHER

## 2017-02-19 RX ORDER — PREDNISONE 10 MG/1
10 TABLET ORAL 4 TIMES DAILY
Qty: 120 TABLET | Refills: 3 | Status: SHIPPED | OUTPATIENT
Start: 2017-02-19 | End: 2017-04-18

## 2017-02-19 RX ORDER — SODIUM CHLORIDE 0.9 % (FLUSH) 0.9 %
1-10 SYRINGE (ML) INJECTION AS NEEDED
Status: CANCELLED | OUTPATIENT
Start: 2017-02-19

## 2017-02-19 RX ADMIN — METRONIDAZOLE 500 MG: 500 INJECTION, SOLUTION INTRAVENOUS at 08:20

## 2017-02-19 RX ADMIN — PANTOPRAZOLE SODIUM 40 MG: 40 TABLET, DELAYED RELEASE ORAL at 06:14

## 2017-02-19 RX ADMIN — METRONIDAZOLE 500 MG: 500 INJECTION, SOLUTION INTRAVENOUS at 01:16

## 2017-02-19 RX ADMIN — ZOLPIDEM TARTRATE 5 MG: 5 TABLET, FILM COATED ORAL at 01:30

## 2017-02-19 RX ADMIN — METHYLPREDNISOLONE SODIUM SUCCINATE 40 MG: 40 INJECTION, POWDER, FOR SOLUTION INTRAMUSCULAR; INTRAVENOUS at 01:17

## 2017-02-19 RX ADMIN — MESALAMINE 1.5 G: 375 CAPSULE, EXTENDED RELEASE ORAL at 08:20

## 2017-02-19 RX ADMIN — METHYLPREDNISOLONE SODIUM SUCCINATE 40 MG: 40 INJECTION, POWDER, FOR SOLUTION INTRAMUSCULAR; INTRAVENOUS at 08:20

## 2017-02-19 NOTE — DISCHARGE INSTR - APPOINTMENTS
Follow up with your primary care doctor in 2 weeks.  Take you blood pressure and heart rate and record them every day until seeing your primary care provider so they can adjust your carvedilol as needed.

## 2017-02-19 NOTE — DISCHARGE SUMMARY
"[facility]  Discharge Summary  Date of admission: 2/16/17  Date of Discharge: 2/19/17    Discharge Diagnoses: Exacerbation of terminal ileal crohn's disease.  Hypertension  Idiopathic Cardiomyopathy with paroxysmal ventricular tachycardia  Diet controlled DM  Ankylosing spondylitis     History:   This 56-year-old white male is followed by me because of a history of Crohn's disease of the terminal ileum that was diagnosed in approximately 2011.  The patient was admitted 2/16/2017 with generalized abdominal pain and abdominal distention that it been going on from the night before.  He had a CAT scan of the abdomen and pelvis that showed \"findings consistent with previously resected Crohn's disease.  There is focal small bowel wall thickening at the neoterminal ileum.  There is stranding within the adjacent mesentery.  This assumes a somewhat masslike morphology.  This could represent focal recurrent Crohn's disease with probable Crohn's related stricture.  Small bowel proximal to this level was dilated with several small air-fluid levels.  A true underlying mass cannot be excluded.  There were a few small reactive nodes in the right lower quadrant mesentery.  No lymphadenopathy is identified.  I see no evidence for fistulae or abscess formation.\"  The patient has been on Humira for ankylosing spondylitis.  He also has been on a presumptive 4 pills a day.  During the last 1 year he's had 5 flares of his Crohn's disease requiring him to be on steroids 2 other times besides this time.    The patient was admitted to the hospital and was placed on IV steroids and IV Flagyl.  He did have a small bowel follow-through on 2/17/2017 that showed a small hiatal hernia was a distal small bowel stricture consistent with Crohn's disease.  The radiologist could not rule out neoplasm on top of Crohn's disease?.  The patient was followed by [facility] for his hypertension, cardiomyopathy, and diet-controlled diabetes " mellitus..  They did decrease the Coreg from 12.5 mg BID to 6.25 mg by mouth twice a day.  The patient is going to follow-up with Dr. Paresh Reyes tomorrow.  The patient was seen by Dr. Corona from the general surgery service.  The plan is for me to do a colonoscopy on him as an outpatient in the next 2 weeks.  Dr. Corona then wants to see the patient in follow-up and plans to do an elective resection of this terminal small bowel stricture and mass.  My plan is to discharge the patient on prednisone 40 mg by mouth daily.  I will keep him on that dose until I did colonoscopy.  Then talked to Dr. Corona about when to start weaning the prednisone.    Current Facility-Administered Medications:   •  acetaminophen (TYLENOL) tablet 650 mg, 650 mg, Oral, Q4H PRN, Del Mayorga MD  •  carvedilol (COREG) tablet 6.25 mg, 6.25 mg, Oral, BID With Meals, Zechariah Lambert MD, 6.25 mg at 02/18/17 1739  •  dextrose (D50W) solution 25 g, 25 g, Intravenous, Q15 Min PRN, Zechariah Lambert MD  •  dextrose (GLUTOSE) oral gel 15 g, 15 g, Oral, Q15 Min PRN, Zechariah Lambert MD  •  glucagon (human recombinant) (GLUCAGEN DIAGNOSTIC) injection 1 mg, 1 mg, Subcutaneous, Q15 Min PRN, Zechariah Lambert MD  •  HYDROcodone-acetaminophen (NORCO) 5-325 MG per tablet 1 tablet, 1 tablet, Oral, Q6H PRN, Gume Pisano MD  •  insulin aspart (novoLOG) injection 0-7 Units, 0-7 Units, Subcutaneous, 4x Daily AC & at Bedtime, Zechariah Lambert MD, 2 Units at 02/16/17 2102  •  mesalamine (APRISO) 24 hr capsule 1.5 g, 1.5 g, Oral, Daily, Del Mayorga MD, 1.5 g at 02/19/17 0820  •  methylPREDNISolone sodium succinate (SOLU-Medrol) injection 40 mg, 40 mg, Intravenous, Q8H, Del Mayorga MD, 40 mg at 02/19/17 0820  •  metroNIDAZOLE (FLAGYL) IVPB 500 mg, 500 mg, Intravenous, Q8H, Del Mayorga MD, 500 mg at 02/19/17 0820  •  morphine injection 1 mg, 1 mg, Intravenous, Q4H PRN **AND** naloxone (NARCAN) injection 0.4 mg, 0.4 mg, Intravenous, Q5 Min PRN, Del Mayorga MD  •   ondansetron (ZOFRAN) injection 4 mg, 4 mg, Intravenous, Q6H PRN, Gume Pisano MD  •  pantoprazole (PROTONIX) EC tablet 40 mg, 40 mg, Oral, Q AM, Del Mayorga MD, 40 mg at 02/19/17 0614  •  promethazine (PHENERGAN) tablet 12.5 mg, 12.5 mg, Oral, Q6H PRN **OR** promethazine (PHENERGAN) injection 12.5 mg, 12.5 mg, Intravenous, Q6H PRN, Gume Pisano MD  •  sodium chloride 0.9 % flush 1-10 mL, 1-10 mL, Intravenous, PRN, Del Mayorga MD  •  sodium chloride 0.9 % flush 10 mL, 10 mL, Intravenous, PRN, Harrison Gutiérrez MD  •  zolpidem (AMBIEN) tablet 5 mg, 5 mg, Oral, Nightly PRN, Gume Pisano MD, 5 mg at 02/19/17 0130    Objective     Vital Signs  Temp:  [97.5 °F (36.4 °C)-98.6 °F (37 °C)] 97.5 °F (36.4 °C)  Heart Rate:  [50-70] 59  Resp:  [14-20] 14  BP: ()/(61-64) 96/61  Body mass index is 30.27 kg/(m^2).    Intake/Output Summary (Last 24 hours) at 02/19/17 0922  Last data filed at 02/19/17 0815   Gross per 24 hour   Intake   1091 ml   Output      0 ml   Net   1091 ml     I/O this shift:  In: 240 [P.O.:240]  Out: -        Physical Exam:   General: patient awake, alert and cooperative   Eyes: Normal lids and lashes, no scleral icterus   Neck: supple, normal ROM   Skin: warm and dry, not jaundiced   Cardiovascular: regular rhythm and rate, no murmurs auscultated   Pulm: clear to auscultation bilaterally, regular and unlabored   Abdomen: soft, nontender, nondistended; normal bowel sounds   Rectal: deferred   Extremities: no rash or edema   Psychiatric: Normal mood and behavior; memory intact     Results Review:     I reviewed the patient's new clinical results.      Results from last 7 days  Lab Units 02/18/17  0627 02/17/17  0555 02/16/17  0532   WBC 10*3/mm3 11.73* 12.64* 13.96*   HEMOGLOBIN g/dL 12.2* 13.0* 15.4   HEMATOCRIT % 38.2* 39.6* 46.3   PLATELETS 10*3/mm3 221 207 234         Results from last 7 days  Lab Units 02/17/17  0555 02/16/17  0532   SODIUM mmol/L 141 140   POTASSIUM mmol/L 4.3 4.5    CHLORIDE mmol/L 104 100   TOTAL CO2 mmol/L 25.5 25.3   BUN mg/dL 11 15   CREATININE mg/dL 0.80 0.96   CALCIUM mg/dL 8.8 10.1   BILIRUBIN mg/dL 0.3 0.3   ALK PHOS U/L 53 69   ALT (SGPT) U/L 16 23   AST (SGOT) U/L 17 28   GLUCOSE mg/dL 148* 153*               0  Lab Value Date/Time   LIPASE 18 02/17/2017 0555   LIPASE 31 02/16/2017 0532       Radiology:    Imaging Results (last 24 hours)     ** No results found for the last 24 hours. **            Assessment/Plan     Patient Active Problem List   Diagnosis Code   • Cardiomyopathy I42.9   • Paroxysmal VT I47.2   • Palpitations R00.2   • PVC's (premature ventricular contractions) I49.3   • Exacerbation of Crohn's disease of small intestine K50.00       Discharge Meds:  - Prednisone 10 mg 4 po daily.  - Apriso 4 po daily  - Coreg 6.25 one po BID  - Humira 40 mg sq q 2 weeks. The patient talked to Dr. Corona about taking his next dose this next Tuesday and to then hold the Humira in anticipation of Dr. Corona performing surgery soon.    Diet - Low residue diet.    Follow up:  - My office will call him to schedule an outpatient colonoscopy to be done in the next two weeks.  - f/u with Dr. Harrison Reyes tomorrow. This has been previously scheduled.  - f/u with your PCP in the next 2 weeks. Check your BP and pulse daily and write on a piece of paper and take with you to see your PCP so that they could adjust the Coreg if needed.    Del Mayorga MD  02/19/17  9:22 AM    CC: Send a copy of this to his PCP, Dr. Harrison Reyes, and Del Mayorga M.D.

## 2017-02-19 NOTE — PLAN OF CARE
Problem: Patient Care Overview (Adult)  Goal: Plan of Care Review  Outcome: Ongoing (interventions implemented as appropriate)    02/19/17 0428   Coping/Psychosocial Response Interventions   Plan Of Care Reviewed With patient   Patient Care Overview   Progress improving   Outcome Evaluation   Outcome Summary/Follow up Plan pt irritable from lack of sleep throughout the night; ambien seemed to help; pt ready for D/C ASAP; tolerating GI Soft diet; afebrile w/VSS other than low HR; no pain; no distress; albin continue to monitor w/ possibility of D/C today;          Problem: Fall Risk (Adult)  Goal: Absence of Falls  Outcome: Ongoing (interventions implemented as appropriate)    02/19/17 0428   Fall Risk (Adult)   Absence of Falls making progress toward outcome         Problem: Infection, Risk/Actual (Adult)  Goal: Infection Prevention/Resolution  Outcome: Ongoing (interventions implemented as appropriate)    02/19/17 0428   Infection, Risk/Actual (Adult)   Infection Prevention/Resolution making progress toward outcome         Problem: Pain, Acute (Adult)  Goal: Acceptable Pain Control/Comfort Level  Outcome: Ongoing (interventions implemented as appropriate)    02/19/17 0428   Pain, Acute (Adult)   Acceptable Pain Control/Comfort Level making progress toward outcome

## 2017-02-19 NOTE — PROGRESS NOTES
Chief complaint: Exacerbation of Crohn's disease    Subjective:  Patient continues to feel quite well at this time.  He denies any pain.  He is tolerated a regular diet.  He had a bowel movement.      Objective:  Patient has been afebrile with normal vital signs  Gen.: Patient in no acute distress, alert oriented and appropriate ×3  Chest: Clear bilaterally without wheezes, no use of accessory muscles  Abdomen: Soft, benign, nontender     assessment and plan:  1.  Crohn's exacerbation with apparent fistula formation, further workup indicated in the sense of colonoscopy.  2.  Ankylosing spondylitis, adequately controlled on Humira.  I had a long discussion with the patient and I also discussed the patient's case with Dr. Mayorga.  From my standpoint if he continues to do well and think it would be okay to discharge him tomorrow with tapering steroid dose.  He certainly does not have a hard indication for operation at this time, but I think given the findings at small bowel follow-through, and the fact that he has been on both Humira and Apriso, despite the fact that he seems to improve so rapidly with a steroid burst, it is probably reasonable to consider ileocecectomy.  However I think given the fact that his small bowel follow-through could not rule out malignancy, I think a colonoscopy prior to this would be wise.  A cancer operation would be slightly different, and will need to be sure that were giving him the exact right operation.  I would like to see him back in my office after his colonoscopy has been completed.  At that time we can have a further detailed discussion of the risks and benefits of surgery.      Jose Corona MD  General and Endoscopic Surgery  [facility]    [address], [address]  [place], [place], [address]  P: [phone]  F: [phone]

## 2017-02-19 NOTE — TELEPHONE ENCOUNTER
I just discharged him from [facility]. He was admitted for a flair of Crohn's disease.    SCHEDULING - please schedule him for a colonscopy to be done in the next two weeks. [email or web address]

## 2017-02-20 ENCOUNTER — OFFICE VISIT (OUTPATIENT)
Dept: CARDIOLOGY | Facility: CLINIC | Age: 57
End: 2017-02-20

## 2017-02-20 ENCOUNTER — CLINICAL SUPPORT NO REQUIREMENTS (OUTPATIENT)
Dept: CARDIOLOGY | Facility: CLINIC | Age: 57
End: 2017-02-20

## 2017-02-20 VITALS
HEIGHT: 69 IN | HEART RATE: 53 BPM | BODY MASS INDEX: 30.51 KG/M2 | SYSTOLIC BLOOD PRESSURE: 126 MMHG | DIASTOLIC BLOOD PRESSURE: 88 MMHG | WEIGHT: 206 LBS

## 2017-02-20 DIAGNOSIS — I49.3 PVC'S (PREMATURE VENTRICULAR CONTRACTIONS): ICD-10-CM

## 2017-02-20 DIAGNOSIS — K50.012: ICD-10-CM

## 2017-02-20 DIAGNOSIS — I42.9 CARDIOMYOPATHY (HCC): Primary | ICD-10-CM

## 2017-02-20 DIAGNOSIS — I47.29 PAROXYSMAL VT (HCC): ICD-10-CM

## 2017-02-20 PROCEDURE — 93290 INTERROG DEV EVAL ICPMS IP: CPT | Performed by: INTERNAL MEDICINE

## 2017-02-20 PROCEDURE — 99214 OFFICE O/P EST MOD 30 MIN: CPT | Performed by: INTERNAL MEDICINE

## 2017-02-20 PROCEDURE — 93283 PRGRMG EVAL IMPLANTABLE DFB: CPT | Performed by: INTERNAL MEDICINE

## 2017-02-20 PROCEDURE — 93000 ELECTROCARDIOGRAM COMPLETE: CPT | Performed by: INTERNAL MEDICINE

## 2017-02-20 RX ORDER — CARVEDILOL 6.25 MG/1
TABLET ORAL
Qty: 180 TABLET | Refills: 11 | Status: SHIPPED | OUTPATIENT
Start: 2017-02-20 | End: 2018-01-25

## 2017-02-20 NOTE — PROGRESS NOTES
Date of Office Visit: 2017  Encounter Provider: Harrison Reyes MD  Place of Service: [facility]  Patient Name: Arnie Calvo  :1960  [id number]    Chief Complaint   Patient presents with   • Cardiomyopathy   :     HPI: Arnie Calvo is a 56 y.o. male   He just recently was hospitalized with a Crohn's disease exacerbation and he is evidently going to have to see a surgeon.  At that time he did have some bradycardia and they cut his Coreg back in half.  He does have some fatigue but he is not sure if it is from his medications or his illness.  He is not having any PND, orthopnea, edema or syncope.  He is not having much in the way of palpitations.          Past Medical History   Diagnosis Date   • Acute pain of left hip    • Adjustment disorder with depressed mood    • Ankylosis of spine    • Arthritis    • Cardiomyopathy      sees Dr. Reyes; NICM/ (-) cath, EF 25-35%; has ICD   • CHF (congestive heart failure)    • Crohn's disease    • Diabetes mellitus      Diet controlled.   • Dysthymic disorder    • Dysuria    • Early onset dysthymia    • Elevated total protein    • Essential hypertension    • External hemorrhoids    • Genital herpes    • Gout    • Health care maintenance    • Insomnia    • Iron deficiency    • Paroxysmal ventricular tachycardia    • Pneumonia    • Prostate nodule    • Recurrent canker sores    • Thoracic back pain    • Urinary hesitancy    • Xerosis cutis        Past Surgical History   Procedure Laterality Date   • Cardiac surgery     • Cardiac defibrillator placement       Had Jude lead that was fractured.  Lead was tied off.  New lead and new device implanted.   • Colonoscopy  2015     abnormal cecum, three polypoid masses, pre cancerous vs crohns, IH, tics, hyperplastic polyp       Social History     Social History   • Marital status:      Spouse name: N/A   • Number of children: N/A   • Years of education: N/A      Occupational History   • Not on file.     Social History Main Topics   • Smoking status: Never Smoker   • Smokeless tobacco: Not on file   • Alcohol use No   • Drug use: No   • Sexual activity: Not on file     Other Topics Concern   • Not on file     Social History Narrative       Family History   Problem Relation Age of Onset   • Hypertension Mother    • Diabetes Mother      type 2 mellitus    • Cancer Father      colon   • Heart failure Father      congestive   • Gout Father        Review of Systems   Constitution: Negative for decreased appetite, fever, malaise/fatigue and weight loss.   HENT: Negative for nosebleeds.    Eyes: Negative for double vision.   Cardiovascular: Negative for chest pain, claudication, cyanosis, dyspnea on exertion, irregular heartbeat, leg swelling, near-syncope, orthopnea, palpitations, paroxysmal nocturnal dyspnea and syncope.   Respiratory: Negative for cough, hemoptysis and shortness of breath.    Hematologic/Lymphatic: Negative for bleeding problem.   Skin: Negative for rash.   Musculoskeletal: Negative for falls and myalgias.   Gastrointestinal: Negative for hematochezia, jaundice, melena, nausea and vomiting.   Genitourinary: Negative for hematuria.   Neurological: Negative for dizziness and seizures.   Psychiatric/Behavioral: Negative for altered mental status and memory loss.       Allergies   Allergen Reactions   • Bactrim [Sulfamethoxazole-Trimethoprim]    • Latex          Current Outpatient Prescriptions:   •  Adalimumab (HUMIRA PEN SC), Inject under the skin. 1 INJECTION TWICE WEEKLY, Disp: , Rfl:   •  APRISO 0.375 G 24 hr capsule, TAKE 4 CAPSULES BY MOUTH EVERY DAY, Disp: 120 capsule, Rfl: 0  •  carvedilol (COREG) 6.25 MG tablet, Take 1 tablet by mouth 2 (Two) Times a Day With Meals., Disp: 60 tablet, Rfl: 11  •  predniSONE (DELTASONE) 10 MG tablet, Take 1 tablet by mouth 4 (Four) Times a Day., Disp: 120 tablet, Rfl: 3  No current facility-administered medications for  "this visit.      Objective:     Vitals:    02/20/17 0923   BP: 126/88   Pulse: 53   Weight: 206 lb (93.4 kg)   Height: 69\" (175.3 cm)     Body mass index is 30.42 kg/(m^2).    Physical Exam   Constitutional: He is oriented to person, place, and time. He appears well-developed and well-nourished.   HENT:   Head: Normocephalic.   Eyes: No scleral icterus.   Neck: No JVD present. No thyromegaly present.   Cardiovascular: Normal rate, regular rhythm and normal heart sounds.  Exam reveals no friction rub.    No murmur heard.  Pulmonary/Chest: Effort normal and breath sounds normal. He has no wheezes. He has no rales.   Abdominal: Soft. There is no hepatosplenomegaly. There is no tenderness.   Musculoskeletal: Normal range of motion. He exhibits no edema.   Lymphadenopathy:     He has no cervical adenopathy.   Neurological: He is alert and oriented to person, place, and time.   Skin: Skin is warm and dry. No rash noted.   Psychiatric: He has a normal mood and affect.         ECG 12 Lead  Date/Time: 2/20/2017 10:06 AM  Performed by: NATHANIEL BRAN  Authorized by: NATHANIEL BRAN   Comparison: compared with previous ECG   Similar to previous ECG  Rhythm: sinus bradycardia  Clinical impression: abnormal ECG  Comments: ns ST-T wave abnormality                 Assessment:       Diagnosis Plan   1. Cardiomyopathy     2. Paroxysmal VT     3. PVC's (premature ventricular contractions)     4. Exacerbation of Crohn's disease of small intestine, with intestinal obstruction            Plan:        I think he seems to be stable.  He has had some lowering of his heart rate.  I am not clear why.  He just had his Coreg cut back.  He has not really had much in the way of ventricular tachycardia or palpitation symptoms.  Unfortunately for this amanuel, he has a severe ischemic cardiomyopathy.  He has trouble with lowish blood pressures in the past so adding anything else probably is not going to work and he has been diagnosed with Crohns and " is having a flare.  He may even need to have surgery.  That is where it gets dangerous for him because his ejection fraction is about 20% to 25% and, if he gets a lot of IV fluid, that could be tricky for him.  He does not have any evidence really of heart failure.  I would say he is kind of between a class II and class III on his heart failure association ranking.  I will have him come and see me in six months and he will see ILIANA Camacho in three months.      Heart Failure  Assessment  • NYHA class III-A - There is limitation of physical activity. The patient is comfortable at rest, but ordinary activity causes fatigue, palpitations or shortness of breath.  • ACE inhibitor not prescribed for medical reasons  • Beta blocker prescribed  • Diuretics not prescribed for medical reasons  • Angiotensin receptor blocker (ARB) not prescribed  • Left ventricular function is severely reduced by qualitative assessment    Plan  • The patient has received heart failure education on the following topics: dietary sodium restriction, medication instructions, symptom management, physical activity, weight monitoring and minimizing alcohol intake  • The heart failure care plan was discussed with the patient today including: continuing the current program    Subjective/Objective    • Physical exam findings negative for rales and elevated JVP.  • The patient has an ICD implant        As always, it has been a pleasure to participate in your patient's care.      Sincerely,       Harrison Reyes MD

## 2017-03-08 ENCOUNTER — TELEPHONE (OUTPATIENT)
Dept: GASTROENTEROLOGY | Facility: CLINIC | Age: 57
End: 2017-03-08

## 2017-03-08 NOTE — TELEPHONE ENCOUNTER
Call to pt.  Advise per Dr Mayorga that it is fine to be on prednisone when c/s done.  Pt verb understanding.

## 2017-03-08 NOTE — TELEPHONE ENCOUNTER
Call from pt.  He reports is on Prednisone daily and wondering if ok to remain on this until c/s scheduled for 3/17.  Advise pt will send message to Dr Mayorga.  Pt verb understanding.

## 2017-03-08 NOTE — TELEPHONE ENCOUNTER
----- Message from Lakeshia Jordan sent at 3/8/2017 10:18 AM EST -----  Regarding: MED QUESTIONS   Contact: [phone]  PT CALLED WITH QUESTIONS ABOUT MEDICATION TO HAVING A SCOPE 3-17-17

## 2017-03-10 RX ORDER — MESALAMINE 375 MG/1
CAPSULE, EXTENDED RELEASE ORAL
Qty: 120 CAPSULE | Refills: 0 | Status: SHIPPED | OUTPATIENT
Start: 2017-03-10 | End: 2017-04-04 | Stop reason: SDUPTHER

## 2017-03-17 ENCOUNTER — ANESTHESIA EVENT (OUTPATIENT)
Dept: GASTROENTEROLOGY | Facility: HOSPITAL | Age: 57
End: 2017-03-17

## 2017-03-17 ENCOUNTER — HOSPITAL ENCOUNTER (OUTPATIENT)
Facility: HOSPITAL | Age: 57
Setting detail: HOSPITAL OUTPATIENT SURGERY
Discharge: HOME | End: 2017-03-17
Attending: INTERNAL MEDICINE | Admitting: INTERNAL MEDICINE

## 2017-03-17 ENCOUNTER — ANESTHESIA (OUTPATIENT)
Dept: GASTROENTEROLOGY | Facility: HOSPITAL | Age: 57
End: 2017-03-17

## 2017-03-17 VITALS
RESPIRATION RATE: 14 BRPM | DIASTOLIC BLOOD PRESSURE: 74 MMHG | SYSTOLIC BLOOD PRESSURE: 100 MMHG | HEIGHT: 69 IN | OXYGEN SATURATION: 96 % | TEMPERATURE: 98.2 F | WEIGHT: 196.6 LBS | HEART RATE: 74 BPM | BODY MASS INDEX: 29.12 KG/M2

## 2017-03-17 DIAGNOSIS — K50.012 CROHN'S DISEASE OF SMALL INTESTINE WITH INTESTINAL OBSTRUCTION (HCC): ICD-10-CM

## 2017-03-17 LAB — GLUCOSE BLDC GLUCOMTR-MCNC: 115 MG/DL (ref 70–130)

## 2017-03-17 PROCEDURE — 88305 TISSUE EXAM BY PATHOLOGIST: CPT | Performed by: INTERNAL MEDICINE

## 2017-03-17 PROCEDURE — 45380 COLONOSCOPY AND BIOPSY: CPT | Performed by: INTERNAL MEDICINE

## 2017-03-17 PROCEDURE — 25010000002 PROPOFOL 10 MG/ML EMULSION: Performed by: ANESTHESIOLOGY

## 2017-03-17 PROCEDURE — 82962 GLUCOSE BLOOD TEST: CPT

## 2017-03-17 RX ORDER — PROPOFOL 10 MG/ML
VIAL (ML) INTRAVENOUS AS NEEDED
Status: DISCONTINUED | OUTPATIENT
Start: 2017-03-17 | End: 2017-03-17 | Stop reason: SURG

## 2017-03-17 RX ORDER — LIDOCAINE HYDROCHLORIDE 20 MG/ML
INJECTION, SOLUTION INFILTRATION; PERINEURAL AS NEEDED
Status: DISCONTINUED | OUTPATIENT
Start: 2017-03-17 | End: 2017-03-17 | Stop reason: SURG

## 2017-03-17 RX ORDER — PROPOFOL 10 MG/ML
VIAL (ML) INTRAVENOUS CONTINUOUS PRN
Status: DISCONTINUED | OUTPATIENT
Start: 2017-03-17 | End: 2017-03-17 | Stop reason: SURG

## 2017-03-17 RX ORDER — SODIUM CHLORIDE, SODIUM LACTATE, POTASSIUM CHLORIDE, CALCIUM CHLORIDE 600; 310; 30; 20 MG/100ML; MG/100ML; MG/100ML; MG/100ML
30 INJECTION, SOLUTION INTRAVENOUS CONTINUOUS PRN
Status: DISCONTINUED | OUTPATIENT
Start: 2017-03-17 | End: 2017-03-17 | Stop reason: HOSPADM

## 2017-03-17 RX ADMIN — PROPOFOL 300 MG: 10 INJECTION, EMULSION INTRAVENOUS at 12:36

## 2017-03-17 RX ADMIN — PROPOFOL 140 MCG/KG/MIN: 10 INJECTION, EMULSION INTRAVENOUS at 12:39

## 2017-03-17 RX ADMIN — LIDOCAINE HYDROCHLORIDE 50 MG: 20 INJECTION, SOLUTION INFILTRATION; PERINEURAL at 12:35

## 2017-03-17 RX ADMIN — SODIUM CHLORIDE, POTASSIUM CHLORIDE, SODIUM LACTATE AND CALCIUM CHLORIDE 30 ML/HR: 600; 310; 30; 20 INJECTION, SOLUTION INTRAVENOUS at 11:50

## 2017-03-17 NOTE — ANESTHESIA POSTPROCEDURE EVALUATION
Patient: Arnie Calvo    Procedure Summary     Date Anesthesia Start Anesthesia Stop Room / Location    03/17/17 1230 1309  [facility] /  [facility]       Procedure Diagnosis Surgeon Provider    COLONOSCOPY WITH BIOPSIES AND POLYPECTOMY (COLD BIOPSY) (N/A ) Crohn's disease of small intestine with intestinal obstruction  (Crohn's disease of small intestine with intestinal obstruction [K50.012]) MD Soco Ortega MD          Anesthesia Type: MAC  Last vitals  BP      Temp      Pulse     Resp      SpO2        Post Anesthesia Care and Evaluation    Patient location during evaluation: PACU  Patient participation: complete - patient participated  Level of consciousness: awake  Pain management: adequate  Airway patency: patent  Anesthetic complications: No anesthetic complications  PONV Status: none  Cardiovascular status: acceptable  Respiratory status: acceptable  Hydration status: acceptable

## 2017-03-17 NOTE — H&P
[facility], [place], operated by [facility]  Pre Procedure History & Physical    Chief Complaint:   H/o TI crohn's disease.    Subjective     HPI:   55 yo male with a h/o TI crohn's disease. He was admitted for a few days in 2/17. Dr. Corona is considering performing a resection of the TI if this colonoscopy only shows crohn's disease.     Past Medical History:   Past Medical History   Diagnosis Date   • Acute pain of left hip    • Adjustment disorder with depressed mood    • Ankylosis of spine    • Arthritis    • Cardiomyopathy      sees Dr. Reyes; NICM/ (-) cath, EF 25-35%; has ICD   • CHF (congestive heart failure)    • Crohn's disease    • Diabetes mellitus      Diet controlled.   • Dysthymic disorder 2012   • Dysuria    • Early onset dysthymia    • Elevated total protein    • Essential hypertension    • External hemorrhoids    • Genital herpes    • Gout    • Health care maintenance    • Insomnia    • Iron deficiency    • Paroxysmal ventricular tachycardia    • Pneumonia    • Prostate nodule    • Recurrent canker sores    • Thoracic back pain    • Urinary hesitancy    • Xerosis cutis        Family History:  Family History   Problem Relation Age of Onset   • Hypertension Mother    • Diabetes Mother      type 2 mellitus    • Cancer Father      colon   • Heart failure Father      congestive   • Gout Father        Social History:   reports that he has never smoked. He does not have any smokeless tobacco history on file. He reports that he does not drink alcohol or use illicit drugs.    Medications:   Prescriptions Prior to Admission   Medication Sig Dispense Refill Last Dose   • Adalimumab (HUMIRA PEN SC) Inject under the skin. 1 INJECTION TWICE WEEKLY   Past Week at Unknown time   • APRISO 0.375 G 24 hr capsule TAKE 4 CAPSULES BY MOUTH EVERY  capsule 0 3/16/2017 at Unknown time   • carvedilol (COREG) 6.25 MG tablet TAKE 1 TABLET BY MOUTH TWICE DAILY WITH MEALS 180 tablet 11 3/17/2017 at Unknown time   • predniSONE  "(DELTASONE) 10 MG tablet Take 1 tablet by mouth 4 (Four) Times a Day. 120 tablet 3 3/16/2017 at Unknown time       Allergies:  Bactrim [sulfamethoxazole-trimethoprim] and Latex    ROS:    Pertinent items are noted in HPI     Objective     Blood pressure 114/82, pulse 79, temperature 98.2 °F (36.8 °C), temperature source Oral, height 69\" (175.3 cm), weight 196 lb 9.6 oz (89.2 kg), SpO2 98 %.    Physical Exam   Constitutional: Pt is oriented to person, place, and time and well-developed, well-nourished, and in no distress.   HENT:   Mouth/Throat: Oropharynx is clear and moist.   Neck: Normal range of motion. Neck supple.   Cardiovascular: Normal rate, regular rhythm and normal heart sounds.    Pulmonary/Chest: Effort normal and breath sounds normal. No respiratory distress. No  wheezes.   Abdominal: Soft. Bowel sounds are normal.   Skin: Skin is warm and dry.   Psychiatric: Mood, memory, affect and judgment normal.     Assessment/Plan     Diagnosis:  57 yo male with a h/o TI crohn's disease. He was admitted for a few days in 2/17. Dr. Corona is considering performing a resection of the TI if this colonoscopy only shows crohn's disease.       Anticipated Surgical Procedure:  Colonoscopy.    The risks, benefits, and alternatives of this procedure have been discussed with the patient or the responsible party- the patient understands and agrees to proceed.                                                                  "

## 2017-03-17 NOTE — PLAN OF CARE
Problem: Patient Care Overview (Adult)  Goal: Plan of Care Review  Outcome: Ongoing (interventions implemented as appropriate)    03/17/17 1138   Coping/Psychosocial Response Interventions   Plan Of Care Reviewed With patient   Patient Care Overview   Progress no change       Goal: Adult Individualization and Mutuality  Outcome: Ongoing (interventions implemented as appropriate)  Goal: Discharge Needs Assessment  Outcome: Ongoing (interventions implemented as appropriate)    03/17/17 1138   Discharge Needs Assessment   Concerns To Be Addressed no discharge needs identified   Discharge Disposition home or self-care   Living Environment   Transportation Available car;family or friend will provide         Problem: GI Endoscopy (Adult)  Goal: Signs and Symptoms of Listed Potential Problems Will be Absent or Manageable (GI Endoscopy)  Outcome: Ongoing (interventions implemented as appropriate)    03/17/17 1138   GI Endoscopy   Problems Assessed (GI Endoscopy) all   Problems Present (GI Endoscopy) none

## 2017-03-17 NOTE — ANESTHESIA PREPROCEDURE EVALUATION
Anesthesia Evaluation     Patient summary reviewed and Nursing notes reviewed   no history of anesthetic complications:  NPO Status: > 8 hours   Airway   Mallampati: II  TM distance: >3 FB  Neck ROM: full  no difficulty expected  Dental - normal exam     Pulmonary - normal exam    breath sounds clear to auscultation  (+) pneumonia resolved ,   Cardiovascular - normal exam    Rhythm: regular  Rate: normal    (+) pacemaker, hypertension, CHF (EF = 25-35%),       Neuro/Psych  (+) psychiatric history,    GI/Hepatic/Renal/Endo    (+)  diabetes mellitus (diet controled),     Musculoskeletal     Abdominal  - normal exam   Substance History      OB/GYN negative ob/gyn ROS         Other   (+) arthritis                                 Anesthesia Plan    ASA 3     MAC     intravenous induction   Anesthetic plan and risks discussed with patient.

## 2017-03-20 LAB
CYTO UR: NORMAL
LAB AP CASE REPORT: NORMAL
Lab: NORMAL
PATH REPORT.FINAL DX SPEC: NORMAL
PATH REPORT.GROSS SPEC: NORMAL

## 2017-04-03 ENCOUNTER — TELEPHONE (OUTPATIENT)
Dept: GASTROENTEROLOGY | Facility: CLINIC | Age: 57
End: 2017-04-03

## 2017-04-03 NOTE — TELEPHONE ENCOUNTER
----- Message from Del Mayorga MD sent at 4/2/2017  2:18 PM EDT -----  Tell him that the colon biopsies were all consistent with him having Crohn's at the end of the small intestine and it coming into the first part of the colon where we saw scarring.  Has he seen Dr. Corona yet to discuss possibly doing surgery?  Is he still feeling okay?

## 2017-04-03 NOTE — TELEPHONE ENCOUNTER
Called pt and advised per Dr Mayorga that the colon bx were all consistent with him having Crohns at the end of the sm intestine and it coming into the first part of the colon where we saw scarring.      Pt verb understanding .  Pt reports he has not seen Dr Corona yet and did not know if Dr Mayorga needed to refer him.  He states he wanted to get these results before seeing Dr Corona.  Also the pt reports he is feeling great.  Message sent to Dr Mayorga to update him.

## 2017-04-04 RX ORDER — MESALAMINE 375 MG/1
CAPSULE, EXTENDED RELEASE ORAL
Qty: 120 CAPSULE | Refills: 2 | Status: SHIPPED | OUTPATIENT
Start: 2017-04-04 | End: 2017-06-22 | Stop reason: SDUPTHER

## 2017-04-04 NOTE — TELEPHONE ENCOUNTER
Called pt and he advises that he is on his last week of prednisone and he is currently taking one pill per day.  Pt made appt for 06/15 10am with Dr Mayorga.

## 2017-04-04 NOTE — TELEPHONE ENCOUNTER
Is he off of prednisone yet? If he wants we could just hold off on Dr. Corona and have him f/u with me in the office in 8 weeks and we'll talk more about what to do next. [email or web address]

## 2017-04-06 ENCOUNTER — OFFICE VISIT (OUTPATIENT)
Dept: INTERNAL MEDICINE | Facility: CLINIC | Age: 57
End: 2017-04-06

## 2017-04-06 VITALS
DIASTOLIC BLOOD PRESSURE: 74 MMHG | WEIGHT: 207 LBS | OXYGEN SATURATION: 98 % | SYSTOLIC BLOOD PRESSURE: 130 MMHG | BODY MASS INDEX: 30.57 KG/M2 | HEART RATE: 104 BPM

## 2017-04-06 DIAGNOSIS — J01.00 ACUTE NON-RECURRENT MAXILLARY SINUSITIS: ICD-10-CM

## 2017-04-06 DIAGNOSIS — M54.5 ACUTE BILATERAL LOW BACK PAIN, WITH SCIATICA PRESENCE UNSPECIFIED: ICD-10-CM

## 2017-04-06 DIAGNOSIS — R35.0 FREQUENCY OF URINATION: Primary | ICD-10-CM

## 2017-04-06 DIAGNOSIS — R22.9 NODULE, SUBCUTANEOUS: ICD-10-CM

## 2017-04-06 LAB
BILIRUB BLD-MCNC: NEGATIVE MG/DL
CLARITY, POC: CLEAR
COLOR UR: YELLOW
GLUCOSE UR STRIP-MCNC: NEGATIVE MG/DL
KETONES UR QL: NEGATIVE
LEUKOCYTE EST, POC: NEGATIVE
NITRITE UR-MCNC: NEGATIVE MG/ML
PH UR: 7.5 [PH] (ref 5–8)
PROT UR STRIP-MCNC: NEGATIVE MG/DL
RBC # UR STRIP: ABNORMAL /UL
SP GR UR: 1.02 (ref 1–1.03)
UROBILINOGEN UR QL: NORMAL

## 2017-04-06 PROCEDURE — 99214 OFFICE O/P EST MOD 30 MIN: CPT | Performed by: INTERNAL MEDICINE

## 2017-04-06 PROCEDURE — 81003 URINALYSIS AUTO W/O SCOPE: CPT | Performed by: INTERNAL MEDICINE

## 2017-04-06 RX ORDER — ECHINACEA PURPUREA EXTRACT 125 MG
1 TABLET ORAL AS NEEDED
Qty: 1 EACH | Refills: 12 | Status: SHIPPED | OUTPATIENT
Start: 2017-04-06 | End: 2017-08-08

## 2017-04-06 RX ORDER — FLUTICASONE PROPIONATE 50 MCG
SPRAY, SUSPENSION (ML) NASAL
Qty: 48 ML | Refills: 0 | Status: SHIPPED | OUTPATIENT
Start: 2017-04-06 | End: 2017-06-30 | Stop reason: SDUPTHER

## 2017-04-06 RX ORDER — SACCHAROMYCES BOULARDII 250 MG
250 CAPSULE ORAL 2 TIMES DAILY
Qty: 20 CAPSULE | Refills: 0 | Status: SHIPPED | OUTPATIENT
Start: 2017-04-06 | End: 2017-04-18

## 2017-04-06 RX ORDER — AMOXICILLIN AND CLAVULANATE POTASSIUM 875; 125 MG/1; MG/1
1 TABLET, FILM COATED ORAL 2 TIMES DAILY
Qty: 20 TABLET | Refills: 0 | Status: SHIPPED | OUTPATIENT
Start: 2017-04-06 | End: 2017-04-16

## 2017-04-06 RX ORDER — FLUTICASONE PROPIONATE 50 MCG
2 SPRAY, SUSPENSION (ML) NASAL DAILY
Qty: 1 EACH | Refills: 0 | Status: SHIPPED | OUTPATIENT
Start: 2017-04-06 | End: 2017-04-06 | Stop reason: SDUPTHER

## 2017-04-06 NOTE — PROGRESS NOTES
"Chief Complaint   Patient presents with   • Urinary Tract Infection   • Nasal Congestion       History of Present Illness   Arnie Calvo is a 56 y.o. male presents for acute care/ new needs. Reports having about 3-4 day history of sinus pain and pressure. \"behind my eyes there is so much pressure it is so painful\". Sore throat. Cough.   Additional c/o low back pain and a burning sensation when urinating. On humira, apriso, and prednisone for crohns/ ankylosing spondilitis.        The following portions of the patient's history were reviewed and updated as appropriate: allergies, current medications, past family history, past medical history, past social history, past surgical history and problem list.  Current Outpatient Prescriptions on File Prior to Visit   Medication Sig Dispense Refill   • Adalimumab (HUMIRA PEN SC) Inject under the skin. 1 INJECTION TWICE WEEKLY     • APRISO 0.375 G 24 hr capsule TAKE 4 CAPSULES BY MOUTH EVERY  capsule 2   • carvedilol (COREG) 6.25 MG tablet TAKE 1 TABLET BY MOUTH TWICE DAILY WITH MEALS 180 tablet 11   • predniSONE (DELTASONE) 10 MG tablet Take 1 tablet by mouth 4 (Four) Times a Day. 120 tablet 3     No current facility-administered medications on file prior to visit.      Review of Systems   Constitutional: Positive for fatigue.   HENT: Positive for ear pain, rhinorrhea, sinus pressure and sore throat.    Respiratory: Positive for cough. Negative for shortness of breath and wheezing.    Cardiovascular: Negative.    Gastrointestinal: Negative.    Endocrine: Negative.    Genitourinary: Positive for flank pain.   Skin:        New nodule midline forehead   Neurological: Negative.    Hematological: Negative.    Psychiatric/Behavioral: Negative.        Objective   Physical Exam   Constitutional: He is oriented to person, place, and time. He appears well-developed and well-nourished.   Alert. No acute distress. Mild ill appearing   HENT:   Head: Normocephalic and atraumatic. "   Pharyngeal erythema. Max sinus pain and pressure. Tm dullness B.   Eyes: Conjunctivae and EOM are normal. Pupils are equal, round, and reactive to light.   Neck: Normal range of motion. Neck supple.   shotty lymphadenopathy   Cardiovascular: Normal rate, regular rhythm, normal heart sounds and intact distal pulses.    Pulmonary/Chest: Effort normal and breath sounds normal.   Abdominal: Soft. Bowel sounds are normal.   Neurological: He is alert and oriented to person, place, and time. He has normal reflexes.   Skin:   Large subcutaneous nodule midline forehead   Psychiatric: He has a normal mood and affect. His behavior is normal. Judgment and thought content normal.   Nursing note and vitals reviewed.       /74  Pulse 104  Wt 207 lb (93.9 kg)  SpO2 98%  BMI 30.57 kg/m2    Assessment/Plan   Diagnoses and all orders for this visit:    Frequency of urination  -     POC Urinalysis Dipstick, Automated  -     Urine Culture    Acute bilateral low back pain, with sciatica presence unspecified  -     POC Urinalysis Dipstick, Automated  -     Urine Culture    Nodule, subcutaneous  -     Ambulatory Referral to Plastic Surgery    Acute non-recurrent maxillary sinusitis    Other orders  -     amoxicillin-clavulanate (AUGMENTIN) 875-125 MG per tablet; Take 1 tablet by mouth 2 (Two) Times a Day for 10 days.  -     saccharomyces boulardii (FLORASTOR) 250 MG capsule; Take 1 capsule by mouth 2 (Two) Times a Day.  -     Discontinue: fluticasone (FLONASE) 50 MCG/ACT nasal spray; 2 sprays into each nostril Daily for 30 days.  -     sodium chloride (OCEAN NASAL SPRAY) 0.65 % nasal spray; 1 spray into each nostril As Needed for Congestion.    patient w/ acute sinusitis. New issue. Will start augmentin given immunocompromised and failed conservative therapy. To also start flonase daily and qd-tid nasal saline rinse. He will take a probiotic w/ antibiotic. Will send urine for culture. Initial u/a is relatively bland. augmentin  likely to cover most organisms. He has incidental finding of new nodule on his forehead. Will send to plastic surgeon for further review. To f/u if worsens or no improvement in symptoms.

## 2017-04-07 ENCOUNTER — TELEPHONE (OUTPATIENT)
Dept: INTERNAL MEDICINE | Facility: CLINIC | Age: 57
End: 2017-04-07

## 2017-04-07 NOTE — TELEPHONE ENCOUNTER
Please advise patient to be seen at urgent care. Needs further evaluation of cough. Can not treat over the phone

## 2017-04-07 NOTE — TELEPHONE ENCOUNTER
Pts wife called to say that pt needs something for cough. He really has a bed cough at night. She states he had whooping cough a few years back

## 2017-04-08 LAB
BACTERIA UR CULT: NO GROWTH
BACTERIA UR CULT: NORMAL

## 2017-04-10 ENCOUNTER — TELEPHONE (OUTPATIENT)
Dept: INTERNAL MEDICINE | Facility: CLINIC | Age: 57
End: 2017-04-10

## 2017-04-10 RX ORDER — BENZONATATE 100 MG/1
100 CAPSULE ORAL 3 TIMES DAILY PRN
Qty: 30 CAPSULE | Refills: 0 | Status: SHIPPED | OUTPATIENT
Start: 2017-04-10 | End: 2017-04-18

## 2017-04-10 NOTE — TELEPHONE ENCOUNTER
Pt called and stated that he is having a really bad cough. He came in and seen Dr. Oliveira  On Thursday and she  Called in an antibiotic for him. He then called back on Friday and asked for something for his cough and she informed him to go to the [facility]. He is wanting to know if you can call in something for his cough.

## 2017-04-18 ENCOUNTER — OFFICE VISIT (OUTPATIENT)
Dept: INTERNAL MEDICINE | Facility: CLINIC | Age: 57
End: 2017-04-18

## 2017-04-18 VITALS
WEIGHT: 207 LBS | OXYGEN SATURATION: 96 % | BODY MASS INDEX: 30.66 KG/M2 | SYSTOLIC BLOOD PRESSURE: 115 MMHG | HEART RATE: 94 BPM | DIASTOLIC BLOOD PRESSURE: 80 MMHG | TEMPERATURE: 98.7 F | HEIGHT: 69 IN

## 2017-04-18 DIAGNOSIS — R05.8 POST-VIRAL COUGH SYNDROME: Primary | ICD-10-CM

## 2017-04-18 PROBLEM — N42.89 PROSTATE MASS: Status: ACTIVE | Noted: 2017-04-18

## 2017-04-18 PROBLEM — M10.9 GOUT: Status: ACTIVE | Noted: 2017-04-18

## 2017-04-18 PROBLEM — F43.21 ADJUSTMENT DISORDER WITH DEPRESSED MOOD: Status: ACTIVE | Noted: 2017-04-18

## 2017-04-18 PROBLEM — K12.0 RECURRENT APHTHOUS ULCER: Status: ACTIVE | Noted: 2017-04-18

## 2017-04-18 PROBLEM — K64.4 EXTERNAL HEMORRHOIDS: Status: ACTIVE | Noted: 2017-04-18

## 2017-04-18 PROBLEM — M43.20 ANKYLOSIS OF SPINE: Status: ACTIVE | Noted: 2017-04-18

## 2017-04-18 PROBLEM — E61.1 IRON DEFICIENCY: Status: ACTIVE | Noted: 2017-04-18

## 2017-04-18 PROBLEM — K50.90 CROHN'S DISEASE: Status: ACTIVE | Noted: 2017-04-18

## 2017-04-18 PROBLEM — G47.00 INSOMNIA: Status: ACTIVE | Noted: 2017-04-18

## 2017-04-18 PROBLEM — L85.3 ASTEATOSIS CUTIS: Status: ACTIVE | Noted: 2017-04-18

## 2017-04-18 PROBLEM — A60.00 GENITAL HERPES SIMPLEX: Status: ACTIVE | Noted: 2017-04-18

## 2017-04-18 PROBLEM — E11.9 DIABETES MELLITUS (HCC): Status: ACTIVE | Noted: 2017-04-18

## 2017-04-18 PROBLEM — I10 ESSENTIAL HYPERTENSION: Status: ACTIVE | Noted: 2017-04-18

## 2017-04-18 PROCEDURE — 99213 OFFICE O/P EST LOW 20 MIN: CPT | Performed by: INTERNAL MEDICINE

## 2017-04-18 RX ORDER — GUAIFENESIN AND DEXTROMETHORPHAN HYDROBROMIDE 1200; 60 MG/1; MG/1
TABLET, EXTENDED RELEASE ORAL
Qty: 28 EACH | Refills: 0
Start: 2017-04-18 | End: 2017-05-25

## 2017-04-18 RX ORDER — DOXYCYCLINE 100 MG/1
CAPSULE ORAL
Refills: 0 | COMMUNITY
Start: 2017-04-13 | End: 2017-05-25

## 2017-04-18 NOTE — PROGRESS NOTES
"Cough (rattle in lungs )      HPI  Arnie Calvo is a 56 y.o. male  RTC In acute care:   Notes had a C-scope 6 weeks ago, Was inpt 6 weeks ago with Chrohn's flare.  Around same time had 3 teeth taken out.    Pt notes aftger that time, around 1st of April,  pt developed \"classic sinus\" sx and \"really croupy cough\".  Saw Dr. Oliveira on 4/6/17 and given Augmentin which pt completed. Feels like it did not change much.  Pt decided to go to [facility] and then given course of doxycycline, on day 4/ 7  As pt was told APRN \"heard something\" in R lower lungs.  Pt notes has lots of mucous and phlegm in throat at time that was audible to him.  Told to come in \"somewhere where can get CXR\".    \"Reminds me of when I had pertussis\" with severe episodes of cough.    In total sx have been about 2-3 weeks.     Notes overall is getting better and cough is slowly going away.  Is feeling better.  Notes only came in at direction of [facility] to get CXR. \"I wouldn't have come otherwise\".  Feels like had issues for last 6 weeks due ot Crohn's issue, tooth issues, and now cough issue so is frustrated with ongoing issues.     Meds: Doxycycline, nasal saline; off benozonatate.    Not taken Humira due to teeth issues, 2 weeks overdue to injx.     Review of Systems   Constitution: Negative for chills, fever and malaise/fatigue (doing overall well.  Worked 6p-6A last night).        \"Today, I feel as good as I have felt in a while\".    HENT: Positive for ear pain (B, mild) and hoarse voice (early on, now resolved). Negative for congestion (resolved after initial sinus sx. ) and sore throat.    Cardiovascular: Negative for dyspnea on exertion (took stairs to office today/. ).   Respiratory: Positive for cough and wheezing. Negative for shortness of breath. Sputum production: \"some, but not a lot\".     Skin: Positive for itching and rash (since on doxycycline, chest and buttocks.  Itchy.).   Musculoskeletal: Negative for myalgias.   Gastrointestinal: Positive " "for diarrhea. Negative for constipation, nausea and vomiting.       The following portions of the patient's history were reviewed and updated as appropriate: allergies, current medications, past medical history and problem list.      Current Outpatient Prescriptions:   •  Adalimumab (HUMIRA PEN SC), Inject under the skin. 1 INJECTION TWICE WEEKLY, Disp: , Rfl:   •  APRISO 0.375 G 24 hr capsule, TAKE 4 CAPSULES BY MOUTH EVERY DAY, Disp: 120 capsule, Rfl: 2  •  carvedilol (COREG) 6.25 MG tablet, TAKE 1 TABLET BY MOUTH TWICE DAILY WITH MEALS, Disp: 180 tablet, Rfl: 11  •  fluticasone (FLONASE) 50 MCG/ACT nasal spray, SHAKE LIQUID AND USE 2 SPRAYS IN EACH NOSTRIL DAILY, Disp: 48 mL, Rfl: 0  •  sodium chloride (OCEAN NASAL SPRAY) 0.65 % nasal spray, 1 spray into each nostril As Needed for Congestion., Disp: 1 each, Rfl: 12  •  aspirin 81 MG tablet, Take 81 mg by mouth., Disp: , Rfl:   •  Dextromethorphan-Guaifenesin (MUCINEX DM MAXIMUM STRENGTH)  MG tablet sustained-release 12 hour, One PO BID, Disp: 28 each, Rfl: 0  •  doxycycline (MONODOX) 100 MG capsule, TK 1 C PO BID FOR 10 DAYS, Disp: , Rfl: 0    Vitals:    04/18/17 1012   BP: 115/80   Pulse: 94   Temp: 98.7 °F (37.1 °C)   SpO2: 96%   Weight: 207 lb (93.9 kg)   Height: 69\" (175.3 cm)         Physical Exam   Constitutional: He is oriented to person, place, and time. He appears well-developed and well-nourished. He does not have a sickly appearance. He does not appear ill. No distress.   HENT:   Head: Normocephalic and atraumatic.   Right Ear: Tympanic membrane, external ear and ear canal normal.   Left Ear: Tympanic membrane, external ear and ear canal normal.   Nose: Right sinus exhibits no maxillary sinus tenderness and no frontal sinus tenderness. Left sinus exhibits no maxillary sinus tenderness and no frontal sinus tenderness.   Mouth/Throat: Oropharynx is clear and moist. No oropharyngeal exudate.   Eyes: Pupils are equal, round, and reactive to light. "   Neck: Normal range of motion. Neck supple.   Cardiovascular: Normal rate, regular rhythm and intact distal pulses.    Pulmonary/Chest: Effort normal and breath sounds normal. No tachypnea. No respiratory distress. He has no decreased breath sounds. He has no wheezes. He has no rhonchi. He has no rales.   Lymphadenopathy:     He has no cervical adenopathy.   Neurological: He is alert and oriented to person, place, and time. No cranial nerve deficit.   Psychiatric: He has a normal mood and affect. His behavior is normal.   Vitals reviewed.    XR chest: Cough x 3 weeks; No prior for comaprison  Lungs clear, NAD  No masses  Pacemaker in place  No effusion  No cardiomegaly      Assessment/ Plan  Diagnoses and all orders for this visit:    Post-viral cough syndrome  -     Dextromethorphan-Guaifenesin (MUCINEX DM MAXIMUM STRENGTH)  MG tablet sustained-release 12 hour; One PO BID    Other orders  -     aspirin 81 MG tablet; Take 81 mg by mouth.  -     doxycycline (MONODOX) 100 MG capsule; TK 1 C PO BID FOR 10 DAYS        Return for Next scheduled follow up.      Discussion:  Arnie Calvo is a 56 y.o. male RTC in acute care (new issue to examiner) with 3 weeks of acute sinusitis and bronchitis sx, now resolving with improved sx and cough. Presents for eval at direction of [facility] for reported abnormal lung exam at their visit 4 days ago. Pt is s/p 1 course of Abx and on second course per [facility] after recent visit.  Today lung exam is clear and pt looks like he feels well. CXR is clear in office today. I suspect pt is dealing with post viral cough issue. Reassured pt today. Despite my suspicion, I would like pt to complete Abx to avoid incomplete Abx courses.  Add Mucinex DM BID as mucolytic and sx control. OK to use benzonatate PRN at this point. Pt to call or RTC if T> 100.5, SOA, double sickness, or failure to continue to improve.

## 2017-05-25 ENCOUNTER — CLINICAL SUPPORT NO REQUIREMENTS (OUTPATIENT)
Dept: CARDIOLOGY | Facility: CLINIC | Age: 57
End: 2017-05-25

## 2017-05-25 ENCOUNTER — OFFICE VISIT (OUTPATIENT)
Dept: INTERNAL MEDICINE | Facility: CLINIC | Age: 57
End: 2017-05-25

## 2017-05-25 VITALS
HEART RATE: 85 BPM | TEMPERATURE: 98 F | WEIGHT: 210 LBS | BODY MASS INDEX: 31.1 KG/M2 | SYSTOLIC BLOOD PRESSURE: 110 MMHG | OXYGEN SATURATION: 96 % | HEIGHT: 69 IN | DIASTOLIC BLOOD PRESSURE: 80 MMHG

## 2017-05-25 DIAGNOSIS — Z87.01 HISTORY OF PNEUMONIA: ICD-10-CM

## 2017-05-25 DIAGNOSIS — I42.9 CARDIOMYOPATHY (HCC): Primary | ICD-10-CM

## 2017-05-25 DIAGNOSIS — R93.89 ABNORMAL CXR (CHEST X-RAY): Primary | ICD-10-CM

## 2017-05-25 DIAGNOSIS — R05.8 POST-VIRAL COUGH SYNDROME: ICD-10-CM

## 2017-05-25 PROCEDURE — 93297 REM INTERROG DEV EVAL ICPMS: CPT | Performed by: INTERNAL MEDICINE

## 2017-05-25 PROCEDURE — 71020 XR CHEST PA AND LATERAL: CPT | Performed by: INTERNAL MEDICINE

## 2017-05-25 PROCEDURE — 99213 OFFICE O/P EST LOW 20 MIN: CPT | Performed by: INTERNAL MEDICINE

## 2017-05-25 PROCEDURE — 93295 DEV INTERROG REMOTE 1/2/MLT: CPT | Performed by: INTERNAL MEDICINE

## 2017-05-25 PROCEDURE — 93296 REM INTERROG EVL PM/IDS: CPT | Performed by: INTERNAL MEDICINE

## 2017-05-25 RX ORDER — ADALIMUMAB 40MG/0.8ML
KIT SUBCUTANEOUS
COMMUNITY
Start: 2017-04-20 | End: 2018-03-02 | Stop reason: HOSPADM

## 2017-06-15 ENCOUNTER — OFFICE VISIT (OUTPATIENT)
Dept: GASTROENTEROLOGY | Facility: CLINIC | Age: 57
End: 2017-06-15

## 2017-06-15 VITALS
BODY MASS INDEX: 31.22 KG/M2 | HEIGHT: 69 IN | DIASTOLIC BLOOD PRESSURE: 74 MMHG | SYSTOLIC BLOOD PRESSURE: 118 MMHG | WEIGHT: 210.8 LBS | TEMPERATURE: 98.1 F

## 2017-06-15 DIAGNOSIS — Z79.899 IMMUNOSUPPRESSED DUE TO CHEMOTHERAPY (HCC): ICD-10-CM

## 2017-06-15 DIAGNOSIS — D84.821 IMMUNOSUPPRESSED DUE TO CHEMOTHERAPY (HCC): ICD-10-CM

## 2017-06-15 DIAGNOSIS — Z80.0 FH: COLON CANCER: ICD-10-CM

## 2017-06-15 DIAGNOSIS — K50.00 CROHN'S DISEASE OF SMALL INTESTINE WITHOUT COMPLICATION (HCC): Primary | ICD-10-CM

## 2017-06-15 DIAGNOSIS — K63.5 COLON POLYPS: ICD-10-CM

## 2017-06-15 DIAGNOSIS — T45.1X5A IMMUNOSUPPRESSED DUE TO CHEMOTHERAPY (HCC): ICD-10-CM

## 2017-06-15 PROCEDURE — 99214 OFFICE O/P EST MOD 30 MIN: CPT | Performed by: INTERNAL MEDICINE

## 2017-06-15 NOTE — PROGRESS NOTES
Chief Complaint   Patient presents with   • Follow-up     from scope        History of Present Illness: 57 yo male with crohn's of the TI and colon diagnosed in 2011. We reviewed the 3/17 colonsoscopy. He feels good. He has been off of Prednisone for 1-2 mos and has no abdominal pain. No diarrhea, no constipation. He has 1-2 BM/day.  He is on Humira (on Humira x 1 yr) and Apriso 4/day. No nausea or vomiting, No fevers, chills, night sweats. Weight stable. C/o neck and hand joint pain. No mouth sores, No skin rashes. H/o iritis but no eye c/o in the last 2 yrs. He had labs done 3 wks ago at Dr. Hope's office - all OK. He was last tested for TB one year ago.    Past Medical History:   Diagnosis Date   • Acute pain of left hip    • Adjustment disorder with depressed mood    • Ankylosis of spine    • Arthritis    • Cardiomyopathy     sees Dr. Reyes; NICM/ (-) cath, EF 25-35%; has ICD   • CHF (congestive heart failure)    • Crohn's disease    • Diabetes mellitus     Diet controlled.   • Dysthymic disorder 2012   • Dysuria    • Early onset dysthymia    • Elevated total protein    • Essential hypertension    • External hemorrhoids    • Genital herpes    • Gout    • Health care maintenance    • Insomnia    • Iron deficiency    • Paroxysmal ventricular tachycardia    • Pneumonia    • Prostate nodule    • Recurrent canker sores    • Thoracic back pain    • Urinary hesitancy    • Xerosis cutis        Past Surgical History:   Procedure Laterality Date   • CARDIAC CATHETERIZATION     • CARDIAC DEFIBRILLATOR PLACEMENT      Had Jude lead that was fractured.  Lead was tied off.  New lead and new device implanted.   • CARDIAC SURGERY     • COLONOSCOPY  04/03/2015    abnormal cecum, three polypoid masses, pre cancerous vs crohns, IH, tics, hyperplastic polyp   • COLONOSCOPY N/A 3/17/2017    polypoid masses, tics, IH, polyp         Current Outpatient Prescriptions:   •  Adalimumab (HUMIRA PEN SC), Inject under the skin. 1  INJECTION TWICE WEEKLY, Disp: , Rfl:   •  APRISO 0.375 G 24 hr capsule, TAKE 4 CAPSULES BY MOUTH EVERY DAY, Disp: 120 capsule, Rfl: 2  •  aspirin 81 MG tablet, Take 81 mg by mouth., Disp: , Rfl:   •  carvedilol (COREG) 6.25 MG tablet, TAKE 1 TABLET BY MOUTH TWICE DAILY WITH MEALS, Disp: 180 tablet, Rfl: 11  •  HUMIRA 40 MG/0.8ML Prefilled Syringe Kit injection, , Disp: , Rfl:   •  fluticasone (FLONASE) 50 MCG/ACT nasal spray, SHAKE LIQUID AND USE 2 SPRAYS IN EACH NOSTRIL DAILY, Disp: 48 mL, Rfl: 0  •  sodium chloride (OCEAN NASAL SPRAY) 0.65 % nasal spray, 1 spray into each nostril As Needed for Congestion., Disp: 1 each, Rfl: 12    Allergies   Allergen Reactions   • Adhesive Tape    • Bactrim [Sulfamethoxazole-Trimethoprim]    • Latex        Family History   Problem Relation Age of Onset   • Hypertension Mother    • Diabetes Mother      type 2 mellitus    • Cancer Father      colon   • Heart failure Father      congestive   • Gout Father    • Colon cancer Father        Social History     Social History   • Marital status:      Spouse name: N/A   • Number of children: N/A   • Years of education: N/A     Occupational History   • Not on file.     Social History Main Topics   • Smoking status: Never Smoker   • Smokeless tobacco: Not on file   • Alcohol use No   • Drug use: No   • Sexual activity: Not on file     Other Topics Concern   • Not on file     Social History Narrative       Review of Systems   All other systems reviewed and are negative.      Vitals:    06/15/17 0939   BP: 118/74   Temp: 98.1 °F (36.7 °C)       Physical Exam   Constitutional: He is oriented to person, place, and time. He appears well-developed and well-nourished. No distress.   HENT:   Head: Normocephalic and atraumatic. Hair is normal.   Right Ear: Hearing, tympanic membrane, external ear and ear canal normal.   Left Ear: Hearing, tympanic membrane, external ear and ear canal normal.   Nose: No sinus tenderness or nasal deformity.    Mouth/Throat: Uvula is midline, oropharynx is clear and moist and mucous membranes are normal. No oral lesions. No uvula swelling.   Eyes: Conjunctivae, EOM and lids are normal. Pupils are equal, round, and reactive to light. Right eye exhibits no discharge. Left eye exhibits no discharge. No scleral icterus. Right eye exhibits normal extraocular motion and no nystagmus. Left eye exhibits normal extraocular motion and no nystagmus.   Fundoscopic exam:       The right eye shows red reflex.        The left eye shows red reflex.   Neck: Normal range of motion. Neck supple. No JVD present. No tracheal deviation present. No thyromegaly present.   Cardiovascular: Normal rate, regular rhythm, normal heart sounds, intact distal pulses and normal pulses.  Exam reveals no gallop.    No murmur heard.  Pulmonary/Chest: Effort normal and breath sounds normal. No respiratory distress. He has no wheezes. He has no rales. He exhibits no tenderness.   Abdominal: Soft. Bowel sounds are normal. He exhibits no distension and no mass. There is no tenderness. There is no guarding. No hernia.   Genitourinary: Rectum normal and prostate normal.   Musculoskeletal: Normal range of motion. He exhibits no edema, tenderness or deformity.   Lymphadenopathy:     He has no cervical adenopathy.   Neurological: He is alert and oriented to person, place, and time. He has normal reflexes. He displays normal reflexes. No cranial nerve deficit. He exhibits normal muscle tone. Coordination normal.   Skin: Skin is warm and dry. No rash noted. He is not diaphoretic.   Psychiatric: He has a normal mood and affect. His behavior is normal. Judgment and thought content normal.   Nursing note and vitals reviewed.      Arnie was seen today for follow-up.    Diagnoses and all orders for this visit:    Crohn's disease of small intestine without complication  -     QuantiFERON TB Gold; Future  -     Hepatitis B Surface Antibody; Future  -     Hepatitis B Surface  Antigen; Future    Immunosuppressed due to chemotherapy  -     QuantiFERON TB Gold; Future  -     Hepatitis B Surface Antibody; Future  -     Hepatitis B Surface Antigen; Future    Colon polyps    FH: colon cancer       Assessment:  1) h/o colonic adenomas  2) FH (dad) colon cancer.  3) h/o crohn's of the TI and colon diagnosed in 2011.   4) Ankylosing spondylitis.    Recommendations:  1) We talked about possibly sending him back to Dr. Corona about resecting his TI vs leaving him alone for now. We could have his TI resected when he flairs again.   2) Continue the Humira and the Apriso  3) Next Tues get a trough Adalimumab level and antibody level. ([facility] # [id number]), and a T-spot, hepatitis B serology.  4) f/u 3 mos.  5)Repeat c/s in 3/2018.      Return in about 3 months (around 9/15/2017).

## 2017-06-19 ENCOUNTER — RESULTS ENCOUNTER (OUTPATIENT)
Dept: GASTROENTEROLOGY | Facility: CLINIC | Age: 57
End: 2017-06-19

## 2017-06-19 DIAGNOSIS — K50.00 CROHN'S DISEASE OF SMALL INTESTINE WITHOUT COMPLICATION (HCC): ICD-10-CM

## 2017-06-19 DIAGNOSIS — D84.821 IMMUNOSUPPRESSED DUE TO CHEMOTHERAPY (HCC): ICD-10-CM

## 2017-06-19 DIAGNOSIS — Z79.899 IMMUNOSUPPRESSED DUE TO CHEMOTHERAPY (HCC): ICD-10-CM

## 2017-06-19 DIAGNOSIS — T45.1X5A IMMUNOSUPPRESSED DUE TO CHEMOTHERAPY (HCC): ICD-10-CM

## 2017-06-20 ENCOUNTER — TELEPHONE (OUTPATIENT)
Dept: GASTROENTEROLOGY | Facility: CLINIC | Age: 57
End: 2017-06-20

## 2017-06-20 DIAGNOSIS — K50.90 CROHN'S DISEASE WITHOUT COMPLICATION, UNSPECIFIED GASTROINTESTINAL TRACT LOCATION (HCC): Primary | ICD-10-CM

## 2017-06-20 NOTE — TELEPHONE ENCOUNTER
Pt in office for lab work.  Order placed for Adalimumab level and antibody level, and psa. (Pt states was advised by Dr Mayorga that could have PSA completed here). Message to DR Mayorga.

## 2017-06-22 RX ORDER — MESALAMINE 375 MG/1
CAPSULE, EXTENDED RELEASE ORAL
Qty: 120 CAPSULE | Refills: 3 | Status: SHIPPED | OUTPATIENT
Start: 2017-06-22 | End: 2017-11-18 | Stop reason: SDUPTHER

## 2017-06-24 LAB
ANNOTATION COMMENT IMP: NORMAL
GAMMA INTERFERON BACKGROUND BLD IA-ACNC: 0.15 IU/ML
HBV SURFACE AB SER QL: NON REACTIVE
HBV SURFACE AG SERPL QL IA: NEGATIVE
M TB IFN-G BLD-IMP: NEGATIVE
M TB IFN-G CD4+ BCKGRND COR BLD-ACNC: 0.34 IU/ML
M TB IFN-G CD4+ T-CELLS BLD-ACNC: 0.49 IU/ML
MITOGEN IGNF BLD-ACNC: >10 IU/ML
QUANTIFERON INCUBATION: NORMAL
SERVICE CMNT-IMP: NORMAL

## 2017-06-25 LAB
ADALIMUMAB AB SERPL-MCNC: <25 NG/ML
ADALIMUMAB SERPL-MCNC: 2.2 UG/ML
PSA SERPL-MCNC: 0.88 NG/ML (ref 0–4)

## 2017-06-27 NOTE — TELEPHONE ENCOUNTER
I reviewed the adalimumab drug level (2.2) and the adalimumab antibody level (less than 25) with the patient.  I talked to him about the possibility of increasing the Humira that he is on to every week.  The patient was concerned about his idiopathic cardiomyopathy.  I then called Dr. Paresh Reyes (Cardiology).  Dr. Reyes said he was not concerned about increasing the dose of Humira to once a week.  I then called Dr. Dion Hope.  Dr. Dion Hope is concerned about increasing the Humira to once a week because of the patient's history of cardiomyopathy.  Dr. Hope wants the patient to come see him and he will discuss with Dr. Hope whether Dr. Hope would change the patient from Humira to Stelara.  Stelara is indicated for ankylosing spondylitis and for Crohn's disease but does not have the risk of congestive heart failure.  The patient will continue the Humira every 2 weeks in the meantime.  The patient will follow up with me in September and will call sooner if there are problems.

## 2017-06-30 RX ORDER — FLUTICASONE PROPIONATE 50 MCG
SPRAY, SUSPENSION (ML) NASAL
Qty: 48 ML | Refills: 0 | Status: SHIPPED | OUTPATIENT
Start: 2017-06-30 | End: 2017-08-08

## 2017-08-08 ENCOUNTER — OFFICE VISIT (OUTPATIENT)
Dept: INTERNAL MEDICINE | Facility: CLINIC | Age: 57
End: 2017-08-08

## 2017-08-08 VITALS
DIASTOLIC BLOOD PRESSURE: 90 MMHG | OXYGEN SATURATION: 99 % | HEART RATE: 89 BPM | HEIGHT: 69 IN | BODY MASS INDEX: 31.25 KG/M2 | TEMPERATURE: 97.6 F | SYSTOLIC BLOOD PRESSURE: 120 MMHG | WEIGHT: 211 LBS

## 2017-08-08 DIAGNOSIS — B35.4 TINEA CORPORIS: Primary | ICD-10-CM

## 2017-08-08 PROCEDURE — 99214 OFFICE O/P EST MOD 30 MIN: CPT | Performed by: INTERNAL MEDICINE

## 2017-08-08 RX ORDER — FLUCONAZOLE 150 MG/1
150 TABLET ORAL WEEKLY
Qty: 2 TABLET | Refills: 0 | Status: SHIPPED | OUTPATIENT
Start: 2017-08-08 | End: 2017-08-16

## 2017-08-08 RX ORDER — PRENATAL VIT 91/IRON/FOLIC/DHA 28-975-200
COMBINATION PACKAGE (EA) ORAL 2 TIMES DAILY
Qty: 36 G | Refills: 1 | Status: SHIPPED | OUTPATIENT
Start: 2017-08-08 | End: 2018-02-14

## 2017-08-08 NOTE — PROGRESS NOTES
"Rash (under arms, stomach )      HPI  Arnie Calvo is a 56 y.o. male RTC in acute care :  Notes rash under arms about 3 weeks ago. Was using new deodorant and so stopped that, but no difference after stopped. Spread to groin aobut one week after under arm occurrence. Stings more than itches, however \"itches intensely\" when first breaks out\".  No drainage from rash.  Rash is \"hives, looking like hives\".   No F/C. No body ache issues. Feels like has \"been fighting fatigue.... Like I cannot get enough sleep\" about the same time.   Meds: tried lotion (moisturizer) but no help beyond taking care of some itching issues.  No other new meds recently other than new deodorant.    Thinks had something many years ago that did respond to oral steroids.       Review of Systems   Constitution: Positive for malaise/fatigue. Negative for chills and fever.   Hematologic/Lymphatic: Negative for bleeding problem. Does not bruise/bleed easily.   Skin: Positive for color change (redness at rash site), itching and rash.       The following portions of the patient's history were reviewed and updated as appropriate: allergies, current medications, past medical history and problem list.      Current Outpatient Prescriptions:   •  Adalimumab (HUMIRA PEN SC), Inject under the skin. 1 INJECTION TWICE WEEKLY, Disp: , Rfl:   •  APRISO 0.375 G 24 hr capsule, TAKE 4 CAPSULES BY MOUTH EVERY DAY, Disp: 120 capsule, Rfl: 3  •  carvedilol (COREG) 6.25 MG tablet, TAKE 1 TABLET BY MOUTH TWICE DAILY WITH MEALS, Disp: 180 tablet, Rfl: 11  •  HUMIRA 40 MG/0.8ML Prefilled Syringe Kit injection, , Disp: , Rfl:   •  fluconazole (DIFLUCAN) 150 MG tablet, Take 1 tablet by mouth 1 (One) Time Per Week for 2 doses., Disp: 2 tablet, Rfl: 0  •  terbinafine (lamISIL) 1 % cream, Apply  topically 2 (Two) Times a Day., Disp: 36 g, Rfl: 1    Vitals:    08/08/17 1340   BP: 120/90   Pulse: 89   Temp: 97.6 °F (36.4 °C)   SpO2: 99%   Weight: 211 lb (95.7 kg)   Height: 69\" " (175.3 cm)         Physical Exam   Constitutional: He is oriented to person, place, and time. He appears well-developed and well-nourished. No distress.   Pulmonary/Chest: Effort normal. No respiratory distress.   Neurological: He is alert and oriented to person, place, and time.   Skin: Rash noted.        Psychiatric: He has a normal mood and affect. His behavior is normal.   Vitals reviewed.      Assessment/ Plan  Diagnoses and all orders for this visit:    Tinea corporis  -     fluconazole (DIFLUCAN) 150 MG tablet; Take 1 tablet by mouth 1 (One) Time Per Week for 2 doses.  -     terbinafine (lamISIL) 1 % cream; Apply  topically 2 (Two) Times a Day.        Return for Next scheduled follow up.      Discussion:  Arnie Calvo is a 56 y.o. male  RTC in acute care with 3 weeks of itchy and stinging rash in B axilla, R ribs and under R breast, R groin, and upper gluteal fold.  Pt has typical tinea corporis appearance with erythema borders with central clearing and near isolation to sweaty areas/ skin folds.  Will have pt complete oral [id number] PO weekly x 2 doses given widespread nature of infection. Pt to also use topical terbinafine BID to cover all lesions as best as he can. RTC or call if not better.

## 2017-08-14 ENCOUNTER — TELEPHONE (OUTPATIENT)
Dept: INTERNAL MEDICINE | Facility: CLINIC | Age: 57
End: 2017-08-14

## 2017-08-14 DIAGNOSIS — B36.8 TINEA INCOGNITO: ICD-10-CM

## 2017-08-14 DIAGNOSIS — K50.919 CROHN'S DISEASE WITH COMPLICATION, UNSPECIFIED GASTROINTESTINAL TRACT LOCATION (HCC): Primary | ICD-10-CM

## 2017-08-14 DIAGNOSIS — L98.9 SKIN LESION: ICD-10-CM

## 2017-08-14 PROBLEM — R05.8 POST-VIRAL COUGH SYNDROME: Status: RESOLVED | Noted: 2017-04-18 | Resolved: 2017-08-14

## 2017-08-14 NOTE — TELEPHONE ENCOUNTER
Psoriasis and nummular eczema are on Ddx, but seem less likely.  I think areas are too widespread to reasonably apply topical steroid trial.  Would like derm to give us their opinion.   Referral placed.

## 2017-08-14 NOTE — TELEPHONE ENCOUNTER
Pt is calling and stated that the rash that he saw you for last week is spreading. He really thinks it may be getting worse.

## 2017-09-25 RX ORDER — FLUTICASONE PROPIONATE 50 MCG
SPRAY, SUSPENSION (ML) NASAL
Qty: 48 ML | Refills: 3 | Status: SHIPPED | OUTPATIENT
Start: 2017-09-25 | End: 2018-10-10

## 2017-09-25 RX ORDER — CARVEDILOL 12.5 MG/1
TABLET ORAL
Qty: 180 TABLET | Refills: 0 | Status: SHIPPED | OUTPATIENT
Start: 2017-09-25 | End: 2018-03-21 | Stop reason: SDUPTHER

## 2017-11-21 RX ORDER — MESALAMINE 375 MG/1
CAPSULE, EXTENDED RELEASE ORAL
Qty: 120 CAPSULE | Refills: 6 | Status: SHIPPED | OUTPATIENT
Start: 2017-11-21 | End: 2018-06-22 | Stop reason: HOSPADM

## 2017-12-05 ENCOUNTER — TELEPHONE (OUTPATIENT)
Dept: GASTROENTEROLOGY | Facility: CLINIC | Age: 57
End: 2017-12-05

## 2017-12-05 NOTE — TELEPHONE ENCOUNTER
I called him and discussed how he is doing.  He feels good.  He has no abdominal pain.  He has no diarrhea.  No nausea or vomiting.  No fever, chills or night sweats.  I told him that we would plan to do a colonoscopy in 3 of 2018.  If he develops any problems in the meantime he can follow-up with me before then. brice

## 2017-12-05 NOTE — TELEPHONE ENCOUNTER
Call to pt.  States insurance will allow only 4 Specialist visits/yr.  States not having any symptoms.  Asking IF asymptomatic, would it be ok to just see Dr Mayorga for yrly c/s instead of also needing o/v.    Question to Dr Mayorga.

## 2017-12-05 NOTE — TELEPHONE ENCOUNTER
----- Message from Mara Willoughby sent at 12/5/2017  1:44 PM EST -----  Regarding: PT CALLED - UPDATE  Contact: [phone]  PT CX O/V TOMORROW WITH DR OTTO. STATED DUE TO HIS INS. HE CAN ONLY SEE SPECIALIST 4X'S A YR. PT SAID HE IS NOT HAVING ANY SYMPTOMS. WANTED DR OTTO TO KNOW. HE STATED HE IS TO HAVE A C/S YEARLY AND WON'T TO KEEP DOING THAT. CAN WE PUT IN RECALL.

## 2018-01-25 ENCOUNTER — OFFICE VISIT (OUTPATIENT)
Dept: INTERNAL MEDICINE | Facility: CLINIC | Age: 58
End: 2018-01-25

## 2018-01-25 VITALS
HEART RATE: 78 BPM | TEMPERATURE: 97.6 F | HEIGHT: 69 IN | OXYGEN SATURATION: 98 % | DIASTOLIC BLOOD PRESSURE: 90 MMHG | BODY MASS INDEX: 30.66 KG/M2 | WEIGHT: 207 LBS | SYSTOLIC BLOOD PRESSURE: 120 MMHG

## 2018-01-25 DIAGNOSIS — R10.31 RIGHT LOWER QUADRANT ABDOMINAL PAIN: Primary | ICD-10-CM

## 2018-01-25 DIAGNOSIS — K50.919 CROHN'S DISEASE WITH COMPLICATION, UNSPECIFIED GASTROINTESTINAL TRACT LOCATION (HCC): ICD-10-CM

## 2018-01-25 DIAGNOSIS — K50.00 EXACERBATION OF CROHN'S DISEASE OF SMALL INTESTINE (HCC): ICD-10-CM

## 2018-01-25 DIAGNOSIS — R30.0 BURNING WITH URINATION: ICD-10-CM

## 2018-01-25 LAB
BILIRUB BLD-MCNC: NEGATIVE MG/DL
CLARITY, POC: CLEAR
COLOR UR: NORMAL
GLUCOSE UR STRIP-MCNC: NEGATIVE MG/DL
HCT VFR BLDA CALC: 45.2 %
HGB BLDA-MCNC: 14.5 G/DL
KETONES UR QL: NEGATIVE
LEUKOCYTE EST, POC: NEGATIVE
LYMPHOCYTES # BLD: 33 %
MCH, POC: 24.4
MCHC, POC: 32.1
MCV, POC: 76.2
MONOCYTES # BLD: 6.3 %
NITRITE UR-MCNC: NEGATIVE MG/ML
PH UR: 5 [PH] (ref 5–8)
PLATELET # BLD: 259 10*3/MM3
PMV BLD: 7.2 FL
POC NEUTROPHIL: 60.7 %
PROT UR STRIP-MCNC: NEGATIVE MG/DL
RBC # UR STRIP: NEGATIVE /UL
RBC, POC: 5.93
RDW, POC: 16.3
SP GR UR: 1.01 (ref 1–1.03)
UROBILINOGEN UR QL: NORMAL
WBC # BLD: 8.8 10*3/UL

## 2018-01-25 PROCEDURE — 81003 URINALYSIS AUTO W/O SCOPE: CPT | Performed by: INTERNAL MEDICINE

## 2018-01-25 PROCEDURE — 85025 COMPLETE CBC W/AUTO DIFF WBC: CPT | Performed by: INTERNAL MEDICINE

## 2018-01-25 PROCEDURE — 99214 OFFICE O/P EST MOD 30 MIN: CPT | Performed by: INTERNAL MEDICINE

## 2018-01-25 RX ORDER — PREDNISONE 10 MG/1
TABLET ORAL
Qty: 22 TABLET | Refills: 0 | Status: SHIPPED | OUTPATIENT
Start: 2018-01-25 | End: 2018-02-08

## 2018-01-25 NOTE — PROGRESS NOTES
"Abdominal Pain (right side) and Urinary Tract Infection (burning)      HPI  Arnie Calvo is a 57 y.o. male RTC in acute care:   \"Some weird pains\". Started 8-9 days ago. Pain is located in R lower quadrant of abdomen and into \"side\".  Notes \"I am used to pain from my Crohn's, but this is not the same\". Pain was severe enough last night to require pt to take a leftover hydrocodone yesterday.  Pain is variable through the day.  \"No, it just comes and goes\".  Did have some pain after eating yesterday, but not always related to meals.  Has some pain if pushes on RLQ abdomen, but not right now.    Bowels are OK, \"they are moving, ya, but yesterday I had really bad cramps but that went away with a bowel movement\".  Has some burning sensation with urination. Urine is clear and yellow, no blood.   Meds: hydrocodone yesterday, last week used a steroid dose (single 10mg pill) as thought this was Crohn's related (only took one pill).       Review of Systems   Constitution: Negative for chills and fever.   Cardiovascular: Negative for dyspnea on exertion.   Respiratory: Negative for shortness of breath, sputum production and wheezing.    Skin: Negative for rash and suspicious lesions.   Musculoskeletal: Positive for back pain (low back pain 3 days ago, but none since. Pain was only about 15 minutes in duration. ).   Gastrointestinal: Positive for abdominal pain. Negative for bloating, change in bowel habit, constipation, diarrhea, melena, nausea and vomiting.        No blood in bowels         The following portions of the patient's history were reviewed and updated as appropriate: allergies, current medications, past medical history and problem list.      Current Outpatient Prescriptions:   •  Adalimumab (HUMIRA PEN SC), Inject under the skin. 1 INJECTION TWICE WEEKLY, Disp: , Rfl:   •  APRISO 0.375 g 24 hr capsule, TAKE 4 CAPSULES BY MOUTH EVERY DAY, Disp: 120 capsule, Rfl: 6  •  carvedilol (COREG) 12.5 MG tablet, TAKE 1 " "TABLET BY MOUTH TWICE DAILY, Disp: 180 tablet, Rfl: 0  •  fluticasone (FLONASE) 50 MCG/ACT nasal spray, SHAKE LIQUID AND USE 2 SPRAYS IN EACH NOSTRIL DAILY, Disp: 48 mL, Rfl: 3  •  HUMIRA 40 MG/0.8ML Prefilled Syringe Kit injection, , Disp: , Rfl:   •  terbinafine (lamISIL) 1 % cream, Apply  topically 2 (Two) Times a Day., Disp: 36 g, Rfl: 1  •  predniSONE (DELTASONE) 10 MG tablet, Take 6 tabs PO on day 1 then decrease by one pill daily. Take 1/2 pill PO on final day of taper., Disp: 22 tablet, Rfl: 0    Vitals:    01/25/18 0854   BP: 120/90   Pulse: 78   Temp: 97.6 °F (36.4 °C)   SpO2: 98%   Weight: 93.9 kg (207 lb)   Height: 175.3 cm (69\")         Physical Exam   Constitutional: He is oriented to person, place, and time. He appears well-developed and well-nourished. No distress.   HENT:   Head: Normocephalic and atraumatic.   Eyes: Pupils are equal, round, and reactive to light.   Neck: Carotid bruit is not present.   Cardiovascular: Normal rate, regular rhythm and normal heart sounds.    Pulses:       Carotid pulses are 2+ on the right side, and 2+ on the left side.       Radial pulses are 2+ on the right side, and 2+ on the left side.   Pulmonary/Chest: Effort normal and breath sounds normal. No respiratory distress. He has no wheezes. He has no rales.   Abdominal: Soft. Bowel sounds are normal. He exhibits no distension and no mass. There is no hepatomegaly. There is tenderness (mild, no rebound) in the right lower quadrant. There is no rebound, no guarding and negative Andrade's sign. Hernia confirmed negative in the ventral area.   Musculoskeletal: He exhibits no edema.        Right hip: He exhibits normal range of motion, no tenderness and no bony tenderness.   Lymphadenopathy:        Right: No inguinal adenopathy present.        Left: No inguinal adenopathy present.   Neurological: He is alert and oriented to person, place, and time. No cranial nerve deficit. Gait normal.   Skin: No rash (over back and " torso) noted.   Psychiatric: He has a normal mood and affect. His behavior is normal.   Vitals reviewed.        Results for orders placed or performed in visit on 01/25/18   POC Urinalysis Dipstick, Automated   Result Value Ref Range    Color Dark Yellow Yellow, Straw, Dark Yellow, Mary    Clarity, UA Clear Clear    Glucose, UA Negative Negative, 1000 mg/dL (3+) mg/dL    Bilirubin Negative Negative    Ketones, UA Negative Negative    Specific Gravity  1.015 1.005 - 1.030    Blood, UA Negative Negative    pH, Urine 5.0 5.0 - 8.0    Protein, POC Negative Negative mg/dL    Urobilinogen, UA Normal Normal    Leukocytes Negative Negative    Nitrite, UA Negative Negative   POC CBC With / Auto Diff   Result Value Ref Range    WBC 8.8     RBC 5.93     Hemoglobin 14.5 g/dL    Hematocrit 45.2 %    MCV 76.2     MCH 24.4     MCHC 32.1     RDW-CV 16.3     MPV 7.2     Platelets 259 10*3/mm3    Neutrophil Rel % 60.7 %    Monocyte Rel % 6.3 %    Lymphocyte Rel % 33.0 %       Assessment/ Plan  Diagnoses and all orders for this visit:    Right lower quadrant abdominal pain  -     POC Urinalysis Dipstick, Automated  -     Urine Culture - Urine, Urine, Clean Catch  -     POC CBC With / Auto Diff  -     predniSONE (DELTASONE) 10 MG tablet; Take 6 tabs PO on day 1 then decrease by one pill daily. Take 1/2 pill PO on final day of taper.    Burning with urination  -     POC Urinalysis Dipstick, Automated  -     Urine Culture - Urine, Urine, Clean Catch  -     POC CBC With / Auto Diff    Crohn's disease with complication, unspecified gastrointestinal tract location  -     POC CBC With / Auto Diff  -     predniSONE (DELTASONE) 10 MG tablet; Take 6 tabs PO on day 1 then decrease by one pill daily. Take 1/2 pill PO on final day of taper.    Exacerbation of Crohn's disease of small intestine  -     predniSONE (DELTASONE) 10 MG tablet; Take 6 tabs PO on day 1 then decrease by one pill daily. Take 1/2 pill PO on final day of taper.        Return  for Next scheduled follow up.      Discussion:  Arnie Calvo is a 57 y.o. male with hx of Crohn's RTC In acute care with 8-9 days of RLQ abdominal pain, not responsive to single 10mg pill of prednisone pt took for home supply.  Pt has been stable on Humira and mesalamine. Exam today is largely benign with only minimal pain in RLQ. I d/w pt that his presentation is atypical for appendicitis with noted normal WBC count.  U/A clear which makes kidney stone atypcial.  I am really most suspicious that this is Crohn's flare as review of records shows pt had focal area of inflammation at terminal ileum in early 2017 that led to C-scope with Dr. Mayorga. Partial bowel resection was considered but deferred.  WIll tx with Prednisone taper at this time and see how pt progresses. Pt to call if any fevers, progression of pain, N/V/D, or other concerns. Will copy GI on note.

## 2018-01-27 LAB
BACTERIA UR CULT: NO GROWTH
BACTERIA UR CULT: NORMAL

## 2018-01-29 ENCOUNTER — TELEPHONE (OUTPATIENT)
Dept: GASTROENTEROLOGY | Facility: CLINIC | Age: 58
End: 2018-01-29

## 2018-01-29 NOTE — TELEPHONE ENCOUNTER
Called pt and pt reports he is doing better.  He states the pain is non-existing.  He denies any fever, chill, nausea, and vomiting.  He reports that his pcp did put him on a 1wk prednisone taper. Advised the pt that Dr Mayorga would like to either him or one of the np in the next 10 days.     Pt verb understanding , but reports his insurance only allows 4 total speciality visits per year and then he has to pay 100% of the office visit.  Pt states it is just January and he hates to use up one of the visits already.  He is asking if he can wait on the office visit and would like to know when should he have his next c/s done.   Advised would send message to Dr Mayorga.

## 2018-01-29 NOTE — TELEPHONE ENCOUNTER
----- Message from Del Mayorga MD sent at 1/27/2018  1:10 PM EST -----  SA/MT - please call him and see how he is doing. Work him in to see me (or one of the NP's) in the next ten days. [facility]  ----- Message -----     From: Zechariah Fatima MD     Sent: 1/26/2018   6:07 PM       To: Del Mayorga MD    [facility], see note.

## 2018-01-30 NOTE — TELEPHONE ENCOUNTER
Tell him that I last saw him in the office in 6/17. At that time we discussed possibly sending him for surgery vs changing medications. He never did f/u in 3 mos as I had recommended. I wander if he should find another GI doctor if he doesn't want to work with me in managing his crohn's? There are lots of good GI doctors in this town who he may be happier with.

## 2018-02-01 ENCOUNTER — TELEPHONE (OUTPATIENT)
Dept: GASTROENTEROLOGY | Facility: CLINIC | Age: 58
End: 2018-02-01

## 2018-02-01 NOTE — TELEPHONE ENCOUNTER
----- Message from Mara Willoughby sent at 2/1/2018 11:51 AM EST -----  Regarding: PT CALLED - SCRIPT FOR PREDNISONE  Contact: [phone]  STATED HE IS HAVING A CHRON'S FLARE. ASK HE COULD GET A SCRIPT CALLED IN.

## 2018-02-01 NOTE — TELEPHONE ENCOUNTER
"Call to pt.  (see note of 1/29/18).  States continues to have intermittent pain RLQ.  Occurs multiple times/day and lasts about 25 min/time.  Ranks at 4 on pain scale.  Denies fever, N&V, diarrhea.  States when on Prednisone thru PCP, pain went away.  States PCP will not represcribe - is to f/u with DR Mayorga.  Advise per note of 1/29 that f/u o/v recommended.    Pt verb understanding and states because of insurance issues, would it be possible to have prednisone rx to see if that \"takes care of it\", and if not - will make f/u appt.      Request to Dr Mayorga.  "

## 2018-02-07 ENCOUNTER — TELEPHONE (OUTPATIENT)
Dept: GASTROENTEROLOGY | Facility: CLINIC | Age: 58
End: 2018-02-07

## 2018-02-07 DIAGNOSIS — R10.31 RIGHT LOWER QUADRANT ABDOMINAL PAIN: ICD-10-CM

## 2018-02-07 DIAGNOSIS — K50.00 EXACERBATION OF CROHN'S DISEASE OF SMALL INTESTINE (HCC): ICD-10-CM

## 2018-02-07 DIAGNOSIS — K50.919 CROHN'S DISEASE WITH COMPLICATION, UNSPECIFIED GASTROINTESTINAL TRACT LOCATION (HCC): ICD-10-CM

## 2018-02-07 NOTE — TELEPHONE ENCOUNTER
----- Message from Tod Bentley sent at 2/7/2018 12:55 PM EST -----  Regarding: PT CALLED WITH QUESTIONS ABOUT MEDS   Contact: [phone]  ...

## 2018-02-07 NOTE — TELEPHONE ENCOUNTER
"Call to pt.  States has f/u appt with DR Mayorga on 2/14 and definitely plans to keep appt.  Asking if can have Prednisone rx \"to get me thru until the appt\".  Reports RLQ aching and occasional \"lightning bolt pains\".    Advise per DR Mayorga note of 1/29 that pt condition more complicated and not comfortable tx over the phone.    Pt verb understanding but requests check with DR Mayorga if may have Prednisone Rx.  States understands that this may not be possible, and will just wait for o/v if necessary.    Message to DR Mayorga.  "

## 2018-02-07 NOTE — TELEPHONE ENCOUNTER
Yes, you could prescribe him prednisone 10 mg 4 po daily. #120 with no refills. When I see him on 2/14/18 we can see how he is doing. brice

## 2018-02-08 RX ORDER — PREDNISONE 10 MG/1
TABLET ORAL
Qty: 120 TABLET | Refills: 0 | Status: SHIPPED | OUTPATIENT
Start: 2018-02-08 | End: 2018-02-27

## 2018-02-08 NOTE — TELEPHONE ENCOUNTER
Call to pt.  Advise per Dr Mayorga that will prescribe prednisone.  When seen on 2/14 - will see how he is doing.    Pt verb understanding.    Yajaira completed for Prednisone per DR Mayorga's instructions.

## 2018-02-14 ENCOUNTER — OFFICE VISIT (OUTPATIENT)
Dept: GASTROENTEROLOGY | Facility: CLINIC | Age: 58
End: 2018-02-14

## 2018-02-14 VITALS
TEMPERATURE: 98.5 F | BODY MASS INDEX: 30.21 KG/M2 | DIASTOLIC BLOOD PRESSURE: 80 MMHG | HEIGHT: 69 IN | SYSTOLIC BLOOD PRESSURE: 118 MMHG | WEIGHT: 204 LBS

## 2018-02-14 DIAGNOSIS — K63.5 POLYP OF COLON, UNSPECIFIED PART OF COLON, UNSPECIFIED TYPE: ICD-10-CM

## 2018-02-14 DIAGNOSIS — Z80.0 FH: COLON CANCER: ICD-10-CM

## 2018-02-14 DIAGNOSIS — R10.11 RUQ ABDOMINAL PAIN: Primary | ICD-10-CM

## 2018-02-14 DIAGNOSIS — K50.018 CROHN'S DISEASE OF SMALL INTESTINE WITH OTHER COMPLICATION (HCC): ICD-10-CM

## 2018-02-14 PROCEDURE — 99213 OFFICE O/P EST LOW 20 MIN: CPT | Performed by: INTERNAL MEDICINE

## 2018-02-14 NOTE — PROGRESS NOTES
Chief Complaint   Patient presents with   • Crohn's Disease       History of Present Illness: h/o crohn's of the TI and colon diagnosed in 2011. He developed some right flank discomfort which was relieved with a 4 day jolt of prednisone. This felt different than the usual flairs when he gets epigastric pain. No nausea or vomiting. HE had a temp of 99. His weight has been semi dieting. He has been on Humira x 2-2.5 yrs x 2 weeks for ankylosing spondylitis.     Past Medical History:   Diagnosis Date   • Acute pain of left hip    • Adjustment disorder with depressed mood    • Ankylosis of spine    • Arthritis    • Cardiomyopathy     sees Dr. Reyes; NICM/ (-) cath, EF 25-35%; has ICD   • CHF (congestive heart failure)    • Crohn's disease    • Diabetes mellitus     Diet controlled.   • Dysthymic disorder 2012   • Dysuria    • Early onset dysthymia    • Elevated total protein    • Essential hypertension    • External hemorrhoids    • Genital herpes    • Gout    • Health care maintenance    • Insomnia    • Iron deficiency    • Paroxysmal ventricular tachycardia    • Pneumonia    • Prostate nodule    • Recurrent canker sores    • Thoracic back pain    • Urinary hesitancy    • Xerosis cutis        Past Surgical History:   Procedure Laterality Date   • CARDIAC CATHETERIZATION     • CARDIAC DEFIBRILLATOR PLACEMENT      Had [place] lead that was fractured.  Lead was tied off.  New lead and new device implanted.   • CARDIAC SURGERY     • COLONOSCOPY  04/03/2015    abnormal cecum, three polypoid masses, pre cancerous vs crohns, IH, tics, hyperplastic polyp   • COLONOSCOPY N/A 3/17/2017    polypoid masses, tics, IH, polyp         Current Outpatient Prescriptions:   •  Adalimumab (HUMIRA PEN SC), Inject under the skin. 1 INJECTION TWICE WEEKLY, Disp: , Rfl:   •  APRISO 0.375 g 24 hr capsule, TAKE 4 CAPSULES BY MOUTH EVERY DAY, Disp: 120 capsule, Rfl: 6  •  carvedilol (COREG) 12.5 MG tablet, TAKE 1 TABLET BY MOUTH TWICE DAILY,  Disp: 180 tablet, Rfl: 0  •  fluticasone (FLONASE) 50 MCG/ACT nasal spray, SHAKE LIQUID AND USE 2 SPRAYS IN EACH NOSTRIL DAILY, Disp: 48 mL, Rfl: 3  •  HUMIRA 40 MG/0.8ML Prefilled Syringe Kit injection, , Disp: , Rfl:   •  predniSONE (DELTASONE) 10 MG tablet, Take 4 tablets by mouth daily., Disp: 120 tablet, Rfl: 0    Allergies   Allergen Reactions   • Adhesive Tape    • Bactrim [Sulfamethoxazole-Trimethoprim]    • Latex        Family History   Problem Relation Age of Onset   • Hypertension Mother    • Diabetes Mother      type 2 mellitus    • Cancer Father      colon   • Heart failure Father      congestive   • Gout Father    • Colon cancer Father        Social History     Social History   • Marital status:      Spouse name: N/A   • Number of children: N/A   • Years of education: N/A     Occupational History   • Not on file.     Social History Main Topics   • Smoking status: Never Smoker   • Smokeless tobacco: Not on file   • Alcohol use No   • Drug use: No   • Sexual activity: Not on file     Other Topics Concern   • Not on file     Social History Narrative       Review of Systems   All other systems reviewed and are negative.      Vitals:    02/14/18 1528   BP: 118/80   Temp: 98.5 °F (36.9 °C)       Physical Exam   Constitutional: He is oriented to person, place, and time. He appears well-developed and well-nourished. No distress.   HENT:   Head: Normocephalic and atraumatic. Hair is normal.   Right Ear: Hearing, tympanic membrane, external ear and ear canal normal.   Left Ear: Hearing, tympanic membrane, external ear and ear canal normal.   Nose: No sinus tenderness or nasal deformity.   Mouth/Throat: Uvula is midline, oropharynx is clear and moist and mucous membranes are normal. No oral lesions. No uvula swelling.   Eyes: Conjunctivae, EOM and lids are normal. Pupils are equal, round, and reactive to light. Right eye exhibits no discharge. Left eye exhibits no discharge. No scleral icterus. Right eye  exhibits normal extraocular motion and no nystagmus. Left eye exhibits normal extraocular motion and no nystagmus.   Neck: Normal range of motion. Neck supple. No JVD present. No thyromegaly present.   Cardiovascular: Normal rate, regular rhythm, normal heart sounds, intact distal pulses and normal pulses.  Exam reveals no gallop.    No murmur heard.  Pulmonary/Chest: Effort normal and breath sounds normal. No respiratory distress. He has no wheezes. He has no rales. He exhibits no tenderness.   Abdominal: Soft. Bowel sounds are normal. He exhibits no distension and no mass. There is no tenderness. There is no guarding. No hernia.   Musculoskeletal: Normal range of motion. He exhibits no edema, tenderness or deformity.   Lymphadenopathy:     He has no cervical adenopathy.   Neurological: He is alert and oriented to person, place, and time. He has normal reflexes. He displays normal reflexes. No cranial nerve deficit. He exhibits normal muscle tone. Coordination normal.   Skin: Skin is warm and dry. No rash noted. He is not diaphoretic.   Psychiatric: He has a normal mood and affect. His behavior is normal. Judgment and thought content normal.   Vitals reviewed.      Arnie was seen today for crohn's disease.    Diagnoses and all orders for this visit:    RUQ abdominal pain  -     Case Request; Standing  -     Case Request  -     US Gallbladder; Future    Crohn's disease of small intestine with other complication  -     Case Request; Standing  -     Case Request  -     US Gallbladder; Future    Polyp of colon, unspecified part of colon, unspecified type    FH: colon cancer    Other orders  -     Implement Anesthesia orders day of procedure.; Standing  -     Obtain informed consent; Standing  -     Verify bowel prep was successful; Standing  -     Give tap water enema if bowel prep was insufficient; Standing      Assessment:  1) h/o colonic adenomas  2) FH (dad) colon cancer.  3) h/o crohn's of the TI and colon  diagnosed in 2011.   4) Ankylosing spondylitis.  5) Right flank pain     Recommendations:  1) US of the gallbladder  2) c/s  3) Wean prednisone    No Follow-up on file.    Del Mayorga MD  2/14/2018

## 2018-02-27 ENCOUNTER — HOSPITAL ENCOUNTER (INPATIENT)
Facility: HOSPITAL | Age: 58
LOS: 3 days | Discharge: HOME | End: 2018-03-02
Attending: EMERGENCY MEDICINE | Admitting: HOSPITALIST

## 2018-02-27 ENCOUNTER — TELEPHONE (OUTPATIENT)
Dept: INTERNAL MEDICINE | Facility: CLINIC | Age: 58
End: 2018-02-27

## 2018-02-27 ENCOUNTER — APPOINTMENT (OUTPATIENT)
Dept: CT IMAGING | Facility: HOSPITAL | Age: 58
End: 2018-02-27

## 2018-02-27 DIAGNOSIS — R10.31 RIGHT LOWER QUADRANT ABDOMINAL PAIN: Primary | ICD-10-CM

## 2018-02-27 DIAGNOSIS — K50.119 CROHN'S DISEASE OF COLON WITH COMPLICATION (HCC): ICD-10-CM

## 2018-02-27 PROBLEM — K52.9 ENTERITIS: Status: ACTIVE | Noted: 2018-02-27

## 2018-02-27 PROBLEM — IMO0002 MASS: Status: ACTIVE | Noted: 2018-02-27

## 2018-02-27 LAB
ALBUMIN SERPL-MCNC: 3.3 G/DL (ref 3.5–5.2)
ALBUMIN/GLOB SERPL: 0.8 G/DL
ALP SERPL-CCNC: 66 U/L (ref 39–117)
ALT SERPL W P-5'-P-CCNC: 99 U/L (ref 1–41)
ANION GAP SERPL CALCULATED.3IONS-SCNC: 12.2 MMOL/L
AST SERPL-CCNC: 68 U/L (ref 1–40)
BACTERIA UR QL AUTO: NORMAL /HPF
BASOPHILS # BLD AUTO: 0.01 10*3/MM3 (ref 0–0.2)
BASOPHILS NFR BLD AUTO: 0.1 % (ref 0–1.5)
BILIRUB SERPL-MCNC: 0.9 MG/DL (ref 0.1–1.2)
BILIRUB UR QL STRIP: NEGATIVE
BUN BLD-MCNC: 11 MG/DL (ref 6–20)
BUN/CREAT SERPL: 14.7 (ref 7–25)
CALCIUM SPEC-SCNC: 8.9 MG/DL (ref 8.6–10.5)
CHLORIDE SERPL-SCNC: 99 MMOL/L (ref 98–107)
CLARITY UR: CLEAR
CO2 SERPL-SCNC: 23.8 MMOL/L (ref 22–29)
COLOR UR: YELLOW
CREAT BLD-MCNC: 0.75 MG/DL (ref 0.76–1.27)
DEPRECATED RDW RBC AUTO: 49.7 FL (ref 37–54)
EOSINOPHIL # BLD AUTO: 0.04 10*3/MM3 (ref 0–0.7)
EOSINOPHIL NFR BLD AUTO: 0.3 % (ref 0.3–6.2)
ERYTHROCYTE [DISTWIDTH] IN BLOOD BY AUTOMATED COUNT: 17 % (ref 11.5–14.5)
GFR SERPL CREATININE-BSD FRML MDRD: 107 ML/MIN/1.73
GLOBULIN UR ELPH-MCNC: 3.9 GM/DL
GLUCOSE BLD-MCNC: 111 MG/DL (ref 65–99)
GLUCOSE UR STRIP-MCNC: NEGATIVE MG/DL
HCT VFR BLD AUTO: 44.3 % (ref 40.4–52.2)
HGB BLD-MCNC: 14.1 G/DL (ref 13.7–17.6)
HGB UR QL STRIP.AUTO: NEGATIVE
HYALINE CASTS UR QL AUTO: NORMAL /LPF
IMM GRANULOCYTES # BLD: 0.04 10*3/MM3 (ref 0–0.03)
IMM GRANULOCYTES NFR BLD: 0.3 % (ref 0–0.5)
KETONES UR QL STRIP: ABNORMAL
LEUKOCYTE ESTERASE UR QL STRIP.AUTO: ABNORMAL
LIPASE SERPL-CCNC: 33 U/L (ref 13–60)
LYMPHOCYTES # BLD AUTO: 2.31 10*3/MM3 (ref 0.9–4.8)
LYMPHOCYTES NFR BLD AUTO: 18.7 % (ref 19.6–45.3)
MCH RBC QN AUTO: 25.5 PG (ref 27–32.7)
MCHC RBC AUTO-ENTMCNC: 31.8 G/DL (ref 32.6–36.4)
MCV RBC AUTO: 80.1 FL (ref 79.8–96.2)
MONOCYTES # BLD AUTO: 1.32 10*3/MM3 (ref 0.2–1.2)
MONOCYTES NFR BLD AUTO: 10.7 % (ref 5–12)
NEUTROPHILS # BLD AUTO: 8.64 10*3/MM3 (ref 1.9–8.1)
NEUTROPHILS NFR BLD AUTO: 69.9 % (ref 42.7–76)
NITRITE UR QL STRIP: NEGATIVE
PH UR STRIP.AUTO: 7 [PH] (ref 5–8)
PLATELET # BLD AUTO: 138 10*3/MM3 (ref 140–500)
PMV BLD AUTO: 9.1 FL (ref 6–12)
POTASSIUM BLD-SCNC: 4.1 MMOL/L (ref 3.5–5.2)
PROT SERPL-MCNC: 7.2 G/DL (ref 6–8.5)
PROT UR QL STRIP: ABNORMAL
RBC # BLD AUTO: 5.53 10*6/MM3 (ref 4.6–6)
RBC # UR: NORMAL /HPF
REF LAB TEST METHOD: NORMAL
SODIUM BLD-SCNC: 135 MMOL/L (ref 136–145)
SP GR UR STRIP: >=1.03 (ref 1–1.03)
SQUAMOUS #/AREA URNS HPF: NORMAL /HPF
UROBILINOGEN UR QL STRIP: ABNORMAL
WBC NRBC COR # BLD: 12.36 10*3/MM3 (ref 4.5–10.7)
WBC UR QL AUTO: NORMAL /HPF

## 2018-02-27 PROCEDURE — 81001 URINALYSIS AUTO W/SCOPE: CPT | Performed by: EMERGENCY MEDICINE

## 2018-02-27 PROCEDURE — 83690 ASSAY OF LIPASE: CPT | Performed by: EMERGENCY MEDICINE

## 2018-02-27 PROCEDURE — 74177 CT ABD & PELVIS W/CONTRAST: CPT

## 2018-02-27 PROCEDURE — 85025 COMPLETE CBC W/AUTO DIFF WBC: CPT | Performed by: EMERGENCY MEDICINE

## 2018-02-27 PROCEDURE — 25010000002 PIPERACILLIN SOD-TAZOBACTAM PER 1 G: Performed by: HOSPITALIST

## 2018-02-27 PROCEDURE — 0 IOPAMIDOL 61 % SOLUTION: Performed by: EMERGENCY MEDICINE

## 2018-02-27 PROCEDURE — 80053 COMPREHEN METABOLIC PANEL: CPT | Performed by: EMERGENCY MEDICINE

## 2018-02-27 PROCEDURE — 99285 EMERGENCY DEPT VISIT HI MDM: CPT

## 2018-02-27 RX ADMIN — IOPAMIDOL 85 ML: 612 INJECTION, SOLUTION INTRAVENOUS at 21:49

## 2018-02-27 RX ADMIN — SODIUM CHLORIDE 1000 ML: 9 INJECTION, SOLUTION INTRAVENOUS at 20:57

## 2018-02-27 RX ADMIN — TAZOBACTAM SODIUM AND PIPERACILLIN SODIUM 4.5 G: 500; 4 INJECTION, SOLUTION INTRAVENOUS at 22:57

## 2018-02-27 NOTE — TELEPHONE ENCOUNTER
Pt is calling and stating that the pain the right side lower hip area. Burning with urination more than before, low grade temp, pt is in pain when walking. Pt has the u/s for the gallbladder tomorrow. He is wanting to know what the next step is.

## 2018-02-28 ENCOUNTER — HOSPITAL ENCOUNTER (OUTPATIENT)
Dept: ULTRASOUND IMAGING | Facility: HOSPITAL | Age: 58
End: 2018-02-28
Attending: INTERNAL MEDICINE

## 2018-02-28 PROBLEM — J01.90 ACUTE SINUSITIS: Status: ACTIVE | Noted: 2018-02-28

## 2018-02-28 LAB
ALBUMIN SERPL-MCNC: 3.2 G/DL (ref 3.5–5.2)
ALBUMIN/GLOB SERPL: 0.9 G/DL
ALP SERPL-CCNC: 58 U/L (ref 39–117)
ALT SERPL W P-5'-P-CCNC: 78 U/L (ref 1–41)
ANION GAP SERPL CALCULATED.3IONS-SCNC: 7.9 MMOL/L
AST SERPL-CCNC: 37 U/L (ref 1–40)
BILIRUB SERPL-MCNC: 0.9 MG/DL (ref 0.1–1.2)
BUN BLD-MCNC: 10 MG/DL (ref 6–20)
BUN/CREAT SERPL: 12.2 (ref 7–25)
CALCIUM SPEC-SCNC: 8.4 MG/DL (ref 8.6–10.5)
CHLORIDE SERPL-SCNC: 99 MMOL/L (ref 98–107)
CO2 SERPL-SCNC: 27.1 MMOL/L (ref 22–29)
CREAT BLD-MCNC: 0.82 MG/DL (ref 0.76–1.27)
DEPRECATED RDW RBC AUTO: 51 FL (ref 37–54)
ERYTHROCYTE [DISTWIDTH] IN BLOOD BY AUTOMATED COUNT: 17.3 % (ref 11.5–14.5)
GFR SERPL CREATININE-BSD FRML MDRD: 96 ML/MIN/1.73
GLOBULIN UR ELPH-MCNC: 3.7 GM/DL
GLUCOSE BLD-MCNC: 94 MG/DL (ref 65–99)
GLUCOSE BLDC GLUCOMTR-MCNC: 74 MG/DL (ref 70–130)
GLUCOSE BLDC GLUCOMTR-MCNC: 84 MG/DL (ref 70–130)
GLUCOSE BLDC GLUCOMTR-MCNC: 87 MG/DL (ref 70–130)
GLUCOSE BLDC GLUCOMTR-MCNC: 89 MG/DL (ref 70–130)
HAV IGM SERPL QL IA: NORMAL
HBA1C MFR BLD: 5.9 % (ref 4.8–5.6)
HBV CORE IGM SERPL QL IA: NORMAL
HBV SURFACE AG SERPL QL IA: NORMAL
HCT VFR BLD AUTO: 43.3 % (ref 40.4–52.2)
HCV AB SER DONR QL: NORMAL
HGB BLD-MCNC: 13.4 G/DL (ref 13.7–17.6)
MCH RBC QN AUTO: 25.1 PG (ref 27–32.7)
MCHC RBC AUTO-ENTMCNC: 30.9 G/DL (ref 32.6–36.4)
MCV RBC AUTO: 81.1 FL (ref 79.8–96.2)
PLATELET # BLD AUTO: 135 10*3/MM3 (ref 140–500)
PMV BLD AUTO: 9.1 FL (ref 6–12)
POTASSIUM BLD-SCNC: 4 MMOL/L (ref 3.5–5.2)
PROT SERPL-MCNC: 6.9 G/DL (ref 6–8.5)
RBC # BLD AUTO: 5.34 10*6/MM3 (ref 4.6–6)
SODIUM BLD-SCNC: 134 MMOL/L (ref 136–145)
WBC NRBC COR # BLD: 11.42 10*3/MM3 (ref 4.5–10.7)

## 2018-02-28 PROCEDURE — 83036 HEMOGLOBIN GLYCOSYLATED A1C: CPT | Performed by: HOSPITALIST

## 2018-02-28 PROCEDURE — 82962 GLUCOSE BLOOD TEST: CPT

## 2018-02-28 PROCEDURE — 25010000002 PIPERACILLIN SOD-TAZOBACTAM PER 1 G: Performed by: HOSPITALIST

## 2018-02-28 PROCEDURE — 85027 COMPLETE CBC AUTOMATED: CPT | Performed by: HOSPITALIST

## 2018-02-28 PROCEDURE — 80074 ACUTE HEPATITIS PANEL: CPT | Performed by: HOSPITALIST

## 2018-02-28 PROCEDURE — 99254 IP/OBS CNSLTJ NEW/EST MOD 60: CPT | Performed by: INTERNAL MEDICINE

## 2018-02-28 PROCEDURE — 99254 IP/OBS CNSLTJ NEW/EST MOD 60: CPT | Performed by: SURGERY

## 2018-02-28 PROCEDURE — 80053 COMPREHEN METABOLIC PANEL: CPT | Performed by: HOSPITALIST

## 2018-02-28 RX ORDER — MESALAMINE 0.38 G/1
1.5 CAPSULE, EXTENDED RELEASE ORAL DAILY
Status: DISCONTINUED | OUTPATIENT
Start: 2018-02-28 | End: 2018-03-02

## 2018-02-28 RX ORDER — ACETAMINOPHEN 325 MG/1
650 TABLET ORAL EVERY 6 HOURS PRN
Status: DISCONTINUED | OUTPATIENT
Start: 2018-02-28 | End: 2018-03-02 | Stop reason: HOSPADM

## 2018-02-28 RX ORDER — ONDANSETRON 2 MG/ML
4 INJECTION INTRAMUSCULAR; INTRAVENOUS EVERY 6 HOURS PRN
Status: DISCONTINUED | OUTPATIENT
Start: 2018-02-28 | End: 2018-03-02 | Stop reason: HOSPADM

## 2018-02-28 RX ORDER — NALOXONE HCL 0.4 MG/ML
0.4 VIAL (ML) INJECTION
Status: DISCONTINUED | OUTPATIENT
Start: 2018-02-28 | End: 2018-03-02 | Stop reason: HOSPADM

## 2018-02-28 RX ORDER — NICOTINE POLACRILEX 4 MG
15 LOZENGE BUCCAL
Status: DISCONTINUED | OUTPATIENT
Start: 2018-02-28 | End: 2018-03-02 | Stop reason: HOSPADM

## 2018-02-28 RX ORDER — MORPHINE SULFATE 10 MG/ML
4 INJECTION INTRAMUSCULAR; INTRAVENOUS; SUBCUTANEOUS
Status: DISCONTINUED | OUTPATIENT
Start: 2018-02-28 | End: 2018-03-02 | Stop reason: HOSPADM

## 2018-02-28 RX ORDER — SODIUM CHLORIDE 9 MG/ML
50 INJECTION, SOLUTION INTRAVENOUS CONTINUOUS
Status: DISCONTINUED | OUTPATIENT
Start: 2018-02-28 | End: 2018-03-01

## 2018-02-28 RX ORDER — HYDROCODONE BITARTRATE AND ACETAMINOPHEN 5; 325 MG/1; MG/1
1 TABLET ORAL EVERY 6 HOURS PRN
Status: DISCONTINUED | OUTPATIENT
Start: 2018-02-28 | End: 2018-02-28

## 2018-02-28 RX ORDER — ECHINACEA PURPUREA EXTRACT 125 MG
2 TABLET ORAL AS NEEDED
Status: DISCONTINUED | OUTPATIENT
Start: 2018-02-28 | End: 2018-03-02 | Stop reason: HOSPADM

## 2018-02-28 RX ORDER — NALOXONE HCL 0.4 MG/ML
0.4 VIAL (ML) INJECTION
Status: DISCONTINUED | OUTPATIENT
Start: 2018-02-28 | End: 2018-02-28

## 2018-02-28 RX ORDER — CARVEDILOL 12.5 MG/1
12.5 TABLET ORAL 2 TIMES DAILY WITH MEALS
Status: DISCONTINUED | OUTPATIENT
Start: 2018-02-28 | End: 2018-03-02 | Stop reason: HOSPADM

## 2018-02-28 RX ORDER — DEXTROSE MONOHYDRATE 25 G/50ML
25 INJECTION, SOLUTION INTRAVENOUS
Status: DISCONTINUED | OUTPATIENT
Start: 2018-02-28 | End: 2018-03-02 | Stop reason: HOSPADM

## 2018-02-28 RX ORDER — MORPHINE SULFATE 2 MG/ML
1 INJECTION, SOLUTION INTRAMUSCULAR; INTRAVENOUS EVERY 4 HOURS PRN
Status: DISCONTINUED | OUTPATIENT
Start: 2018-02-28 | End: 2018-02-28

## 2018-02-28 RX ORDER — OXYCODONE AND ACETAMINOPHEN 7.5; 325 MG/1; MG/1
1 TABLET ORAL EVERY 4 HOURS PRN
Status: DISCONTINUED | OUTPATIENT
Start: 2018-02-28 | End: 2018-03-02 | Stop reason: HOSPADM

## 2018-02-28 RX ORDER — SODIUM CHLORIDE 0.9 % (FLUSH) 0.9 %
1-10 SYRINGE (ML) INJECTION AS NEEDED
Status: DISCONTINUED | OUTPATIENT
Start: 2018-02-28 | End: 2018-03-02 | Stop reason: HOSPADM

## 2018-02-28 RX ORDER — FLUTICASONE PROPIONATE 50 MCG
2 SPRAY, SUSPENSION (ML) NASAL DAILY
Status: DISCONTINUED | OUTPATIENT
Start: 2018-02-28 | End: 2018-03-02 | Stop reason: HOSPADM

## 2018-02-28 RX ADMIN — ACETAMINOPHEN 650 MG: 325 TABLET ORAL at 19:30

## 2018-02-28 RX ADMIN — HYDROCODONE BITARTRATE AND ACETAMINOPHEN 1 TABLET: 5; 325 TABLET ORAL at 03:28

## 2018-02-28 RX ADMIN — TAZOBACTAM SODIUM AND PIPERACILLIN SODIUM 3.38 G: 375; 3 INJECTION, SOLUTION INTRAVENOUS at 08:27

## 2018-02-28 RX ADMIN — SODIUM CHLORIDE 50 ML/HR: 9 INJECTION, SOLUTION INTRAVENOUS at 13:13

## 2018-02-28 RX ADMIN — CARVEDILOL 12.5 MG: 12.5 TABLET, FILM COATED ORAL at 18:07

## 2018-02-28 RX ADMIN — CARVEDILOL 12.5 MG: 12.5 TABLET, FILM COATED ORAL at 08:27

## 2018-02-28 RX ADMIN — HYDROCODONE BITARTRATE AND ACETAMINOPHEN 1 TABLET: 5; 325 TABLET ORAL at 10:09

## 2018-02-28 RX ADMIN — TAZOBACTAM SODIUM AND PIPERACILLIN SODIUM 3.38 G: 375; 3 INJECTION, SOLUTION INTRAVENOUS at 16:22

## 2018-03-01 PROBLEM — R51.9 HEADACHE: Status: ACTIVE | Noted: 2018-03-01

## 2018-03-01 LAB
ANION GAP SERPL CALCULATED.3IONS-SCNC: 7.7 MMOL/L
BUN BLD-MCNC: 11 MG/DL (ref 6–20)
BUN/CREAT SERPL: 11 (ref 7–25)
CALCIUM SPEC-SCNC: 8.6 MG/DL (ref 8.6–10.5)
CHLORIDE SERPL-SCNC: 99 MMOL/L (ref 98–107)
CO2 SERPL-SCNC: 30.3 MMOL/L (ref 22–29)
CREAT BLD-MCNC: 1 MG/DL (ref 0.76–1.27)
DEPRECATED RDW RBC AUTO: 49.2 FL (ref 37–54)
ERYTHROCYTE [DISTWIDTH] IN BLOOD BY AUTOMATED COUNT: 16.7 % (ref 11.5–14.5)
GFR SERPL CREATININE-BSD FRML MDRD: 77 ML/MIN/1.73
GLUCOSE BLD-MCNC: 84 MG/DL (ref 65–99)
GLUCOSE BLDC GLUCOMTR-MCNC: 125 MG/DL (ref 70–130)
GLUCOSE BLDC GLUCOMTR-MCNC: 84 MG/DL (ref 70–130)
GLUCOSE BLDC GLUCOMTR-MCNC: 90 MG/DL (ref 70–130)
GLUCOSE BLDC GLUCOMTR-MCNC: 94 MG/DL (ref 70–130)
HCT VFR BLD AUTO: 41.3 % (ref 40.4–52.2)
HGB BLD-MCNC: 13 G/DL (ref 13.7–17.6)
MCH RBC QN AUTO: 25.3 PG (ref 27–32.7)
MCHC RBC AUTO-ENTMCNC: 31.5 G/DL (ref 32.6–36.4)
MCV RBC AUTO: 80.4 FL (ref 79.8–96.2)
PLATELET # BLD AUTO: 139 10*3/MM3 (ref 140–500)
PMV BLD AUTO: 9.4 FL (ref 6–12)
POTASSIUM BLD-SCNC: 4.1 MMOL/L (ref 3.5–5.2)
RBC # BLD AUTO: 5.14 10*6/MM3 (ref 4.6–6)
SODIUM BLD-SCNC: 137 MMOL/L (ref 136–145)
WBC NRBC COR # BLD: 10.48 10*3/MM3 (ref 4.5–10.7)

## 2018-03-01 PROCEDURE — 99231 SBSQ HOSP IP/OBS SF/LOW 25: CPT | Performed by: SURGERY

## 2018-03-01 PROCEDURE — 82962 GLUCOSE BLOOD TEST: CPT

## 2018-03-01 PROCEDURE — 25010000002 PIPERACILLIN SOD-TAZOBACTAM PER 1 G: Performed by: HOSPITALIST

## 2018-03-01 PROCEDURE — 85027 COMPLETE CBC AUTOMATED: CPT | Performed by: INTERNAL MEDICINE

## 2018-03-01 PROCEDURE — 25010000002 DIPHENHYDRAMINE PER 50 MG: Performed by: INTERNAL MEDICINE

## 2018-03-01 PROCEDURE — 99222 1ST HOSP IP/OBS MODERATE 55: CPT | Performed by: INTERNAL MEDICINE

## 2018-03-01 PROCEDURE — 93005 ELECTROCARDIOGRAM TRACING: CPT | Performed by: INTERNAL MEDICINE

## 2018-03-01 PROCEDURE — 93010 ELECTROCARDIOGRAM REPORT: CPT | Performed by: INTERNAL MEDICINE

## 2018-03-01 PROCEDURE — 99232 SBSQ HOSP IP/OBS MODERATE 35: CPT | Performed by: INTERNAL MEDICINE

## 2018-03-01 PROCEDURE — 25010000002 PROCHLORPERAZINE EDISYLATE PER 10 MG: Performed by: INTERNAL MEDICINE

## 2018-03-01 PROCEDURE — 80048 BASIC METABOLIC PNL TOTAL CA: CPT | Performed by: INTERNAL MEDICINE

## 2018-03-01 RX ORDER — METHYLPREDNISOLONE SODIUM SUCCINATE 40 MG/ML
20 INJECTION, POWDER, LYOPHILIZED, FOR SOLUTION INTRAMUSCULAR; INTRAVENOUS EVERY 12 HOURS
Status: DISCONTINUED | OUTPATIENT
Start: 2018-03-01 | End: 2018-03-01

## 2018-03-01 RX ORDER — DIPHENHYDRAMINE HYDROCHLORIDE 50 MG/ML
25 INJECTION INTRAMUSCULAR; INTRAVENOUS ONCE
Status: COMPLETED | OUTPATIENT
Start: 2018-03-01 | End: 2018-03-01

## 2018-03-01 RX ADMIN — CARVEDILOL 12.5 MG: 12.5 TABLET, FILM COATED ORAL at 17:59

## 2018-03-01 RX ADMIN — TAZOBACTAM SODIUM AND PIPERACILLIN SODIUM 3.38 G: 375; 3 INJECTION, SOLUTION INTRAVENOUS at 08:28

## 2018-03-01 RX ADMIN — SODIUM CHLORIDE 50 ML/HR: 9 INJECTION, SOLUTION INTRAVENOUS at 00:25

## 2018-03-01 RX ADMIN — DIPHENHYDRAMINE HYDROCHLORIDE 25 MG: 50 INJECTION, SOLUTION INTRAMUSCULAR; INTRAVENOUS at 18:31

## 2018-03-01 RX ADMIN — ACETAMINOPHEN 650 MG: 325 TABLET ORAL at 14:06

## 2018-03-01 RX ADMIN — MESALAMINE 1.5 G: 375 CAPSULE, EXTENDED RELEASE ORAL at 08:29

## 2018-03-01 RX ADMIN — ACETAMINOPHEN 650 MG: 325 TABLET ORAL at 07:51

## 2018-03-01 RX ADMIN — TAZOBACTAM SODIUM AND PIPERACILLIN SODIUM 3.38 G: 375; 3 INJECTION, SOLUTION INTRAVENOUS at 16:45

## 2018-03-01 RX ADMIN — CARVEDILOL 12.5 MG: 12.5 TABLET, FILM COATED ORAL at 08:29

## 2018-03-01 RX ADMIN — ACETAMINOPHEN 650 MG: 325 TABLET ORAL at 20:19

## 2018-03-01 RX ADMIN — TAZOBACTAM SODIUM AND PIPERACILLIN SODIUM 3.38 G: 375; 3 INJECTION, SOLUTION INTRAVENOUS at 00:25

## 2018-03-01 RX ADMIN — OXYCODONE HYDROCHLORIDE AND ACETAMINOPHEN 1 TABLET: 7.5; 325 TABLET ORAL at 20:32

## 2018-03-01 RX ADMIN — ACETAMINOPHEN 650 MG: 325 TABLET ORAL at 01:42

## 2018-03-01 RX ADMIN — PROCHLORPERAZINE EDISYLATE 10 MG: 5 INJECTION INTRAMUSCULAR; INTRAVENOUS at 18:31

## 2018-03-01 RX ADMIN — SODIUM CHLORIDE 50 ML/HR: 9 INJECTION, SOLUTION INTRAVENOUS at 16:45

## 2018-03-01 NOTE — PROGRESS NOTES
Chief complaint: Right lower quadrant pain related to Crohn's disease    Subjective:  Patient feels better today.  He's quite hungry and had no nausea.  His bowels been functioning.  Overall his right lower quadrant discomfort is improved.    Objective:  Patient reports fevers, but per chart the highest temperature I see is 100.1.  Vitals are stable  Gen.: Awake alert and oriented, no acute distress  Abdomen: Soft, mild right lower quadrant tenderness without guarding, no mass appreciated    Labs reviewed.  White blood cell count 10.4.  Hemoglobin 13.  Recent A1c reviewed and is 5.9.    Assessment and plan:  Ileocolic inflammation consistent with Crohn's disease.  No evidence of perforation or abscess, but there may be some small spider web-type fistula noted.  Long discussion undertaken with patient and his wife.  On the whole I think that we would probably be wise to go ahead with an ileocolic resection on an elective basis sometime in the not too distant future given the fact that this is his second admission with a problem.  He did do well for about a year in between.  He also has some medical issues which would need to be looked into.  I'm waiting to see what cardiology has to see about him.  If they think he is a reasonable surgical candidate then I think it would be appropriate to consider him for an attempt at a laparoscopic ileocolic resection.  Certainly there are multiple consultations to be considered.  We'll discuss with GI the timing of his medicines.  Ideally I like to have him off of his Humira for about a month's which would put him about 10 days to 2 weeks from now.  We'll have to see what to do with his steroids given his current flare.    Jose Corona MD  General and Endoscopic Surgery  [facility]    [address], [address]  [place], [place], [address]  P: [phone]  F: [phone]

## 2018-03-01 NOTE — CONSULTS
Date of Hospital Visit: 18  Encounter Provider: Harrison Reyes MD  Place of Service: [facility] CARDIOLOGY  Patient Name: Arnie Calvo  :1960  [id number]  Referral Provider: Rodo Wood MD    Chief complaint: Abdominal Pain    Reason for Consult:  Cardiomyopathy    History of Present Illness:  Mr Calvo is a 57 year old patient of mine with a history of non ischemic cardiomyopathy s/ps ICD placement, paroxysmal ventricular tachycardia, PVC's, Crohn's disease, CHF, and  hypertension.  He has had a history of low blood pressure when his medications  were adjusted so he is not able to tolerate an ACE inhibitors.     He presented to the Ed on 2018 with complaints of RLQ abdominal pain and fever of 101.  On arrival to the ED his WBC was 12.36 and CT of abdomen showed inflammation in the terminal ileum with mass like spiculations extending from the terminal ileum.  He was admitted for further evaluation and IV antibiotics and steroids. General surgery has been consulted and surgical options are being considered.  We have been consulted for cardiac evaluation in case surgery is pursued.  His last ECHO was in  which showed EF 20%.      He denies any recent cardiac symptoms.  He states that since he has been in the hospital and his fever spikes he notices some palpitations and skipped beats.  He denies any SOA, LE edema, or shocks from his defibrillator.  He is on IVF's at 50 cc/hr.      I have reviewed the above history of present illness and agree with it.,  No followed for a long time with a nonischemic cardiomyopathy.  He's had lots of ventricular ectopy at times in the past he's been stable from a heart failure symptoms standpoint no PND orthopnea edema he does have a defibrillator in place without any syncope     Previous Cardiac Testing    ECHO 2016  · The left ventricular cavity is moderately dilated.  · Left ventricular wall segments contract  abnormally. Refer to wall scoring diagram for more information.  · Left ventricular function is severely decreased. Estimated EF = 20%.  · Left ventricular diastolic dysfunction (grade I) consistent with impaired relaxation.        Past Medical History:   Diagnosis Date   • Acute pain of left hip    • Adjustment disorder with depressed mood    • Ankylosis of spine    • Arthritis    • Cardiomyopathy     sees Dr. Reyes; NICM/ (-) cath, EF 25-35%; has ICD   • CHF (congestive heart failure)    • Crohn's disease    • Diabetes mellitus     Diet controlled.   • Dysthymic disorder 2012   • Dysuria    • Early onset dysthymia    • Elevated total protein    • Essential hypertension    • External hemorrhoids    • Genital herpes    • Gout    • Health care maintenance    • Insomnia    • Iron deficiency    • Paroxysmal ventricular tachycardia    • Pneumonia    • Prostate nodule    • Recurrent canker sores    • Thoracic back pain    • Urinary hesitancy    • Xerosis cutis        Past Surgical History:   Procedure Laterality Date   • CARDIAC CATHETERIZATION     • CARDIAC DEFIBRILLATOR PLACEMENT      Had [place] lead that was fractured.  Lead was tied off.  New lead and new device implanted.   • CARDIAC SURGERY     • COLONOSCOPY  04/03/2015    abnormal cecum, three polypoid masses, pre cancerous vs crohns, IH, tics, hyperplastic polyp   • COLONOSCOPY N/A 3/17/2017    polypoid masses, tics, IH, polyp       Prescriptions Prior to Admission   Medication Sig Dispense Refill Last Dose   • APRISO 0.375 g 24 hr capsule TAKE 4 CAPSULES BY MOUTH EVERY  capsule 6 Taking   • carvedilol (COREG) 12.5 MG tablet TAKE 1 TABLET BY MOUTH TWICE DAILY 180 tablet 0 Taking   • fluticasone (FLONASE) 50 MCG/ACT nasal spray SHAKE LIQUID AND USE 2 SPRAYS IN EACH NOSTRIL DAILY (Patient taking differently: SHAKE LIQUID AND USE 2 SPRAYS IN EACH NOSTRIL DAILY PRN) 48 mL 3 Taking   • HUMIRA 40 MG/0.8ML Prefilled Syringe Kit injection    Taking       Current  Meds  Scheduled Meds:    carvedilol 12.5 mg Oral BID With Meals   fluticasone 2 spray Each Nare Daily   insulin aspart 0-7 Units Subcutaneous 4x Daily With Meals & Nightly   mesalamine 1.5 g Oral Daily   piperacillin-tazobactam 3.375 g Intravenous Q8H     Continuous Infusions:    Pharmacy to Dose Zosyn     sodium chloride 50 mL/hr Last Rate: 50 mL/hr (03/01/18 0025)     PRN Meds:.•  acetaminophen  •  dextrose  •  dextrose  •  glucagon (human recombinant)  •  Morphine **AND** naloxone  •  ondansetron  •  oxyCODONE-acetaminophen  •  Pharmacy to Dose Zosyn  •  sodium chloride  •  sodium chloride    Allergies as of 02/27/2018 - Neo as Reviewed 02/27/2018   Allergen Reaction Noted   • Bactrim [sulfamethoxazole-trimethoprim] Hives 02/12/2016   • Adhesive tape Rash 05/20/2017   • Latex Rash 02/10/2016       Social History     Social History   • Marital status:      Spouse name: N/A   • Number of children: N/A   • Years of education: N/A     Occupational History   • Not on file.     Social History Main Topics   • Smoking status: Never Smoker   • Smokeless tobacco: Not on file   • Alcohol use No   • Drug use: No   • Sexual activity: Not on file     Other Topics Concern   • Not on file     Social History Narrative       Family History   Problem Relation Age of Onset   • Hypertension Mother    • Diabetes Mother      type 2 mellitus    • Cancer Father      colon   • Heart failure Father      congestive   • Gout Father    • Colon cancer Father        REVIEW OF SYSTEMS:   ROS was performed and is negative except as outlined in HPI     REVIEW OF SYSTEMS:   CONSTITUTIONAL: No weight loss, fever, chills, weakness or fatigue.   HEENT: Eyes: No visual loss, blurred vision, double vision or yellow sclerae. Ears, Nose, Throat: No hearing loss, sneezing, congestion, runny nose or sore throat.   SKIN: No rash or itching.     RESPIRATORY: No shortness of breath, hemoptysis, cough or sputum.   GASTROINTESTINAL: No anorexia, nausea,  "vomiting or diarrhea. No abdominal pain, bright red blood per rectum or melena.  GENITOURINARY: No burning on urination, hematuria or increased frequency.  NEUROLOGICAL: No headache, dizziness, syncope, paralysis, ataxia, numbness or tingling in the extremities. No change in bowel or bladder control.   MUSCULOSKELETAL: No muscle, back pain, joint pain or stiffness.   HEMATOLOGIC: No anemia, bleeding or bruising.   LYMPHATICS: No enlarged nodes. No history of splenectomy.   PSYCHIATRIC: No history of depression, anxiety, hallucinations.   ENDOCRINOLOGIC: No reports of sweating, cold or heat intolerance. No polyuria or polydipsia.       Objective:   Temp:  [99.1 °F (37.3 °C)-100.1 °F (37.8 °C)] 99.1 °F (37.3 °C)  Heart Rate:  [79-91] 79  Resp:  [16-18] 18  BP: (105-121)/(71-89) 106/72  Body mass index is 29.36 kg/(m^2).  Flowsheet Rows         First Filed Value    Admission Height  175.3 cm (69\") Documented at 02/27/2018 2014    Admission Weight  95.3 kg (210 lb) Documented at 02/27/2018 2014        Vitals:    03/01/18 1154   BP: 106/72   Pulse: 79   Resp: 18   Temp: 99.1 °F (37.3 °C)   SpO2: 93%       Head:    Normocephalic, without obvious abnormality, atraumatic   Eyes:            Lids and lashes normal, conjunctivae and sclerae normal, no   icterus, no pallor   Ears:    Ears appear intact with no abnormalities noted   Throat:   No oral lesions, dentition good   Neck:   No adenopathy, supple, trachea midline, no thyromegaly, no   carotid bruit, no JVD   Lungs:     Breath sounds are equal and clear to auscultation    Heart:    Normal S1 and S2, RRR, No M/G/R   Abdomen:     Normal bowel sounds, no masses, no organomegaly, soft        non-tender, non-distended, no guarding   Extremities:   Moves all extremities well, no edema, no cyanosis, no redness   Pulses:   Pulses palpable and equal bilaterally.    Skin:  Psychiatric:   No bleeding, bruising or rash    Awake, alert and oriented x 3, normal mood and affect         "   CT of abdomen  IMPRESSION:  1.  Worsening inflammation of the terminal ileum which may be related to  Crohn's disease in keeping with known history.  2.  From the inflamed terminal ileum masslike spiculated densities  extending up to the right psoas muscle, one of these measures 6.4 cm,  another 3.2 cm. Etiology may be related to an inflammatory  process/phlegmon related to Crohn's disease or neoplasm since there is  interval progression of this pathology compared to previous examination.  Close follow-up is recommended. If indicated repeat examination with  oral and intravenous contrast may be performed.    Telemetry        EKG today pending    EKG 02/20/2017    I personally viewed and interpreted the patient's EKG/Telemetry data    Assessment:  Active Hospital Problems (** Indicates Principal Problem)    Diagnosis Date Noted   • **Right lower quadrant abdominal pain [R10.31] 02/27/2018   • Acute sinusitis [J01.90] 02/28/2018   • Enteritis [K52.9] 02/27/2018   • Mass [R22.9] 02/27/2018   • Crohn's disease [K50.90] 04/18/2017   • Essential hypertension [I10] 04/18/2017   • Cardiomyopathy [I42.9] 02/11/2016      Resolved Hospital Problems    Diagnosis Date Noted Date Resolved   No resolved problems to display.       Plan:He has a pretty severe nonischemic myopathy but he's had class I heart failure symptoms and I think if he needed to have abdominal surgery could have it obviously is at an increased risk but there is really nothing modifiable and we just with mainly have to watch for volume issues with him I am going to check an echo and see where we are at this point and I talked with Dr. Gomez about his care    Harrison Reyes MD  03/01/18  2:25 PM.

## 2018-03-01 NOTE — PROGRESS NOTES
BGA/GI Progress Note   Chief Complaint:   Crohns disease, abdominal pain, abnormal CT scan    Subjective     Interval History:   Abdominal pain improved. No diarrhea. Tolerating clear liquids. Wants to eat. Considering surger in several weeks.     History taken from: patient chart family RN    Review of Systems:    All systems were reviewed and negative except for:  Gastrointestinal: postitive for  pain    Objective     Vital Signs  Temp:  [99.1 °F (37.3 °C)-100.1 °F (37.8 °C)] 99.1 °F (37.3 °C)  Heart Rate:  [79-91] 79  Resp:  [16] 16  BP: (105-121)/(71-89) 109/71  Body mass index is 29.36 kg/(m^2).    Intake/Output Summary (Last 24 hours) at 03/01/18 1104  Last data filed at 03/01/18 0712   Gross per 24 hour   Intake              300 ml   Output             1000 ml   Net             -700 ml     I/O this shift:  In: -   Out: 400 [Urine:400]    Physical Exam:   General: patient awake, alert and cooperative   Eyes: Normal lids and lashes, no scleral icterus, no conjunctival pallor   Neck: supple, normal ROM, no tracheal deviation, no thyromegaly   Skin: warm and dry, not jaundiced   Cardiovascular: regular rhythm and rate, no murmurs auscultated   Pulm: clear to auscultation bilaterally, regular and unlabored   Abdomen: soft, mild RLQ tender, nondistended; normal bowel sounds   Rectal: deferred   Extremities: no rash or edema   Neurologic: Normal mood and behavior    All Medications Have Been Reviewed     Results Review:       Results from last 7 days  Lab Units 03/01/18 0520 02/28/18 0434 02/27/18 2045   WBC 10*3/mm3 10.48 11.42* 12.36*   HEMOGLOBIN g/dL 13.0* 13.4* 14.1   HEMATOCRIT % 41.3 43.3 44.3   PLATELETS 10*3/mm3 139* 135* 138*         Results from last 7 days  Lab Units 03/01/18 0520 02/28/18 0434 02/27/18 2045   SODIUM mmol/L 137 134* 135*   POTASSIUM mmol/L 4.1 4.0 4.1   CHLORIDE mmol/L 99 99 99   CO2 mmol/L 30.3* 27.1 23.8   BUN mg/dL 11 10 11   CREATININE mg/dL 1.00 0.82 0.75*   CALCIUM  mg/dL 8.6 8.4* 8.9   BILIRUBIN mg/dL  --  0.9 0.9   ALK PHOS U/L  --  58 66   ALT (SGPT) U/L  --  78* 99*   AST (SGOT) U/L  --  37 68*   GLUCOSE mg/dL 84 94 111*             RADIOLOGY:    Imaging Results (last 72 hours)     Procedure Component Value Units Date/Time    CT Abdomen Pelvis With Contrast [[id number]] Collected:  02/27/18 2207     Updated:  02/28/18 0444    Narrative:       CT ABDOMEN AND PELVIS WITH CONTRAST.     TECHNIQUE: Radiation dose reduction techniques were utilized, including  automated exposure control and exposure modulation based on body size. A  routine CT scan of the abdomen and pelvis was performed with coronal and  sagittal reconstructed images.     HISTORY: Abdominal pain.     COMPARISON: 02/16/2017.     FINDINGS:  Lung bases demonstrate no consolidation or effusion. Small hiatal  hernia.      Liver demonstrates homogeneous parenchyma, no definite mass.  Unremarkable gallbladder. No intra or extrahepatic biliary ductal  dilatation.      Spleen is unremarkable. Pancreas is unremarkable, no pancreatic ductal  dilatation. Adrenal glands are within normal limits.     No definite renal calculi. 2.9 cm cyst of the right kidney. Kidneys do  not demonstrate hydronephrosis. Urinary bladder is unremarkable.         Terminal ileum is inflamed with mural thickening measuring up to 1.1 cm.  Mild inflammatory changes are seen in the adjacent mesentery. On the  previous examination, spiculated inflammatory phlegmon was seen  circumventing the inflamed ileum, on today's examination it appears to  have enlarged and extends to involve the right edge of the right psoas  muscle. It appears to have enlarged on today's examination and appears  to have 2 components a longitudinal component measures 6.4 x 2.3 cm, an  ovoid spiculated shorter component measures 3.2 x 2.5 cm. Colon is  unremarkable. Appendix is not visualized.     No significant retroperitoneal lymphadenopathy.              Impression:       1.   Worsening inflammation of the terminal ileum which may be related to  Crohn's disease in keeping with known history.  2.  From the inflamed terminal ileum masslike spiculated densities  extending up to the right psoas muscle, one of these measures 6.4 cm,  another 3.2 cm. Etiology may be related to an inflammatory  process/phlegmon related to Crohn's disease or neoplasm since there is  interval progression of this pathology compared to previous examination.  Close follow-up is recommended. If indicated repeat examination with  oral and intravenous contrast may be performed.     Findings called to Dr. Clemons, 10:02 PM.                      Assessment/Plan     Patient Active Problem List   Diagnosis Code   • Cardiomyopathy I42.9   • Paroxysmal VT I47.2   • Palpitations R00.2   • PVC's (premature ventricular contractions) I49.3   • Exacerbation of Crohn's disease of small intestine K50.00   • Adjustment disorder with depressed mood F43.21   • Ankylosis of spine M43.20   • Crohn's disease K50.90   • Diabetes mellitus E11.9   • Essential hypertension I10   • External hemorrhoids K64.4   • Genital herpes simplex A60.00   • Gout M10.9   • Insomnia G47.00   • Iron deficiency E61.1   • Prostate mass N42.9   • Recurrent aphthous ulcer K12.0   • Asteatosis cutis L85.3   • History of pneumonia Z87.01   • Abnormal CXR (chest x-ray) R93.8   • RUQ abdominal pain R10.11   • Crohn's disease of small intestine with other complication K50.018   • Right lower quadrant abdominal pain R10.31   • Enteritis K52.9   • Mass R22.9   • Acute sinusitis J01.90         Martina Rodrigues, ILIANA  03/01/18  11:04 AM    We are following for Crohn's disease  Surgery recommendations noted       Constitutional: He is oriented to person, place, and time. He appears well-developed and well-nourished.   HENT: anicteric and no thyromegaly  Head: Normocephalic and atraumatic.   Eyes: Conjunctivae and EOM are normal.   Neck: Normal range of motion. No  tracheal deviation present.   Cardiovascular: Normal rate and regular rhythm.    Pulmonary/Chest: Effort normal and breath sounds normal. No respiratory distress.   Abdominal: Soft. Bowel sounds are normal. He exhibits no distension and no mass. There is no tenderness. There is no rebound and no guarding.   Musculoskeletal: Normal range of motion.   Neurological: He is alert and oriented to person, place, and time.   Skin: Skin is warm and dry.   Psychiatric: He has a normal mood and affect. Judgment normal.   Nursing note and vitals reviewed.      Impression   1.  Hx of ileal Crohns disease managed with anti-TNF therapy - CT suggestive of penetrating disease with inflammatory mass/phlegmon  2. Ankylosing spondylitis   3. RLQ abdominal pain- improved    Plan  Continue broad-spectrum antibiotics.    Surgical consultation noted- patient is now now leaning toward surgical intervention  Surgery would want him off Humira for about a month's which would put him about 10 days to 2 weeks from now.  I agree with that.  Hold on steroids   Advance diet  After cardiology clearance he could be discharged home to follow-up for surgery with Dr. Corona

## 2018-03-01 NOTE — PLAN OF CARE
Problem: Patient Care Overview (Adult)  Goal: Plan of Care Review  Outcome: Outcome(s) achieved Date Met: 03/01/18 03/01/18 0319   Coping/Psychosocial Response Interventions   Plan Of Care Reviewed With patient   Patient Care Overview   Progress improving   Outcome Evaluation   Outcome Summary/Follow up Plan C/O sinus pain/pressure behind eyes and into temples. Does not want PO narcotics, only taking Tylenol and ice pack. Up ad bernie in room. States that RLQ pain is much better. Low grade fever.     Goal: Adult Individualization and Mutuality  Outcome: Ongoing (interventions implemented as appropriate)    Goal: Discharge Needs Assessment  Outcome: Ongoing (interventions implemented as appropriate)      Problem: Pain, Acute (Adult)  Goal: Acceptable Pain Control/Comfort Level  Outcome: Ongoing (interventions implemented as appropriate)

## 2018-03-01 NOTE — PLAN OF CARE
Problem: Patient Care Overview (Adult)  Goal: Plan of Care Review  Outcome: Ongoing (interventions implemented as appropriate)   03/01/18 1700   Coping/Psychosocial Response Interventions   Plan Of Care Reviewed With patient   Patient Care Overview   Progress improving   Outcome Evaluation   Outcome Summary/Follow up Plan Pain controlled with Tylenol. Using ice pack also for sinus pressure behind eyes. Up ad bernie in room. Diet advanced. Continues on IV abx. No steroids at this time d/t possible surgery in 1-2 weeks. Cont to monitor.     Goal: Adult Individualization and Mutuality  Outcome: Ongoing (interventions implemented as appropriate)    Goal: Discharge Needs Assessment  Outcome: Ongoing (interventions implemented as appropriate)      Problem: Pain, Acute (Adult)  Goal: Acceptable Pain Control/Comfort Level  Outcome: Ongoing (interventions implemented as appropriate)

## 2018-03-01 NOTE — PROGRESS NOTES
Name: Arnei Calvo ADMIT: 2018   : 1960  PCP: Zechariah Fatima MD    MRN: [id number] LOS: 2 days   AGE/SEX: 57 y.o. male  ROOM: [facility]/   Subjective   Subjective  Cc- abd pain    Pain is mild  RLQ  Improved  Tolerating diet  with sinus pain  Recurrent  Somewhat worse  HA, not improved with APAP or nasal sprays    ROS  No f/ +chills  +n/ -v  No cp/palp  No soa/cough  +HA  Objective   Vital Signs  Temp:  [99.1 °F (37.3 °C)-100.1 °F (37.8 °C)] 99.2 °F (37.3 °C)  Heart Rate:  [79-91] 80  Resp:  [16-18] 18  BP: (105-121)/(64-89) 118/64  SpO2:  [91 %-93 %] 93 %  on   ;   O2 Device: room air  Body mass index is 29.36 kg/(m^2).    Physical Exam   Constitutional: He is oriented to person, place, and time. He appears well-developed and well-nourished. No distress.   HENT:   Head: Normocephalic and atraumatic.   Mouth/Throat: Oropharynx is clear and moist.   Mild frontal sinus tenderness   Eyes: Conjunctivae are normal. No scleral icterus.   Neck: Normal range of motion. Neck supple.   Cardiovascular: Normal rate, regular rhythm and normal heart sounds.    No murmur heard.  Pulmonary/Chest: Effort normal and breath sounds normal. No respiratory distress.   Abdominal: Soft. Bowel sounds are normal. He exhibits no distension. There is tenderness (mild rlq).   Genitourinary:   Genitourinary Comments: Deferred    Musculoskeletal: He exhibits no edema or deformity.   Neurological: He is alert and oriented to person, place, and time.   Skin: Skin is warm and dry. He is not diaphoretic.   Psychiatric: He has a normal mood and affect. His behavior is normal. Thought content normal.   Nursing note and vitals reviewed.      Results Review:       I reviewed the patient's new clinical results.    Results from last 7 days  Lab Units 18  0520 18  0434 18  2045   WBC 10*3/mm3 10.48 11.42* 12.36*   HEMOGLOBIN g/dL 13.0* 13.4* 14.1   PLATELETS 10*3/mm3 139* 135* 138*       Results from last 7 days  Lab Units  03/01/18 0520 02/28/18 0434 02/27/18 2045   SODIUM mmol/L 137 134* 135*   POTASSIUM mmol/L 4.1 4.0 4.1   CHLORIDE mmol/L 99 99 99   CO2 mmol/L 30.3* 27.1 23.8   BUN mg/dL 11 10 11   CREATININE mg/dL 1.00 0.82 0.75*   GLUCOSE mg/dL 84 94 111*   Estimated Creatinine Clearance: 89.4 mL/min (by C-G formula based on Cr of 1).    Results from last 7 days  Lab Units 03/01/18 0520 02/28/18 0434 02/27/18 2045   CALCIUM mg/dL 8.6 8.4* 8.9   ALBUMIN g/dL  --  3.20* 3.30*     CT Abdomen Pelvis With Contrast [[id number]] Not Reviewed        Order Status: Completed Collected: 02/27/18 2207        Updated: 02/28/18 0444       Narrative:         CT ABDOMEN AND PELVIS WITH CONTRAST.     TECHNIQUE: Radiation dose reduction techniques were utilized, including  automated exposure control and exposure modulation based on body size. A  routine CT scan of the abdomen and pelvis was performed with coronal and  sagittal reconstructed images.     HISTORY: Abdominal pain.     COMPARISON: 02/16/2017.     FINDINGS:  Lung bases demonstrate no consolidation or effusion. Small hiatal  hernia.      Liver demonstrates homogeneous parenchyma, no definite mass.  Unremarkable gallbladder. No intra or extrahepatic biliary ductal  dilatation.      Spleen is unremarkable. Pancreas is unremarkable, no pancreatic ductal  dilatation. Adrenal glands are within normal limits.     No definite renal calculi. 2.9 cm cyst of the right kidney. Kidneys do  not demonstrate hydronephrosis. Urinary bladder is unremarkable.         Terminal ileum is inflamed with mural thickening measuring up to 1.1 cm.  Mild inflammatory changes are seen in the adjacent mesentery. On the  previous examination, spiculated inflammatory phlegmon was seen  circumventing the inflamed ileum, on today's examination it appears to  have enlarged and extends to involve the right edge of the right psoas  muscle. It appears to have enlarged on today's examination and appears  to have 2  components a longitudinal component measures 6.4 x 2.3 cm, an  ovoid spiculated shorter component measures 3.2 x 2.5 cm. Colon is  unremarkable. Appendix is not visualized.     No significant retroperitoneal lymphadenopathy.                 Impression:         1.  Worsening inflammation of the terminal ileum which may be related to  Crohn's disease in keeping with known history.  2.  From the inflamed terminal ileum masslike spiculated densities  extending up to the right psoas muscle, one of these measures 6.4 cm,  another 3.2 cm. Etiology may be related to an inflammatory  process/phlegmon related to Crohn's disease or neoplasm since there is  interval progression of this pathology compared to previous examination.  Close follow-up is recommended. If indicated repeat examination with  oral and intravenous contrast may be performed.             carvedilol 12.5 mg Oral BID With Meals   fluticasone 2 spray Each Nare Daily   insulin aspart 0-7 Units Subcutaneous 4x Daily With Meals & Nightly   mesalamine 1.5 g Oral Daily   piperacillin-tazobactam 3.375 g Intravenous Q8H       Pharmacy to Dose Zosyn    Diet Regular; GI Soft/[place], Consistent Carbohydrate, Cardiac      Assessment/Plan   Active Hospital Problems (** Indicates Principal Problem)    Diagnosis Date Noted   • **Right lower quadrant abdominal pain [R10.31] 02/27/2018   • Headache [R51] 03/01/2018   • Acute sinusitis [J01.90] 02/28/2018   • Enteritis [K52.9] 02/27/2018   • Mass [R22.9] 02/27/2018   • Crohn's disease [K50.90] 04/18/2017   • Essential hypertension [I10] 04/18/2017   • Cardiomyopathy [I42.9] 02/11/2016      Resolved Hospital Problems    Diagnosis Date Noted Date Resolved   No resolved problems to display.       Mr. Calvo is a 57 y.o. male with history of Crohn's disease and Cardiomyopathy who has been admitted with abdominal pain and phlegmon vs mass    · Empiric ABX( maybe change to augmentin +/- flagyl tomorrow depending on what GI and Surgery  think), PO and IV pain medications  · Likely will have surgery in ~2 weeks as outpatient  · Diet advanced  · IV compazine and benadryl x 1 for HA  · Add additional agents for sinusitis  · Can stop fluids  · Continue coreg for cardiomyopathy. Seems euvolemic currently. Dr. Reyes has seen today and getting ECHO pre-operatively.     D/W Family  D/W Dr. Corona  Reviewed previous records    Nate Garcia MD  [facility]  03/01/18  6:49 PM

## 2018-03-02 ENCOUNTER — APPOINTMENT (OUTPATIENT)
Dept: CARDIOLOGY | Facility: HOSPITAL | Age: 58
End: 2018-03-02
Attending: INTERNAL MEDICINE

## 2018-03-02 VITALS
BODY MASS INDEX: 29.44 KG/M2 | HEART RATE: 87 BPM | DIASTOLIC BLOOD PRESSURE: 69 MMHG | TEMPERATURE: 98.1 F | WEIGHT: 198.8 LBS | HEIGHT: 69 IN | OXYGEN SATURATION: 94 % | SYSTOLIC BLOOD PRESSURE: 108 MMHG | RESPIRATION RATE: 16 BRPM

## 2018-03-02 PROBLEM — K50.919 CROHN'S DISEASE WITH COMPLICATION (HCC): Status: ACTIVE | Noted: 2017-04-18

## 2018-03-02 LAB
BASOPHILS # BLD AUTO: 0.01 10*3/MM3 (ref 0–0.2)
BASOPHILS NFR BLD AUTO: 0.1 % (ref 0–1.5)
BH CV ECHO MEAS - ACS: 2.5 CM
BH CV ECHO MEAS - AO MEAN PG (FULL): 1 MMHG
BH CV ECHO MEAS - AO MEAN PG: 2 MMHG
BH CV ECHO MEAS - AO ROOT AREA (BSA CORRECTED): 1.8
BH CV ECHO MEAS - AO ROOT AREA: 10.8 CM^2
BH CV ECHO MEAS - AO ROOT DIAM: 3.7 CM
BH CV ECHO MEAS - AO V2 MEAN: 66.3 CM/SEC
BH CV ECHO MEAS - AO V2 VTI: 19.8 CM
BH CV ECHO MEAS - AVA(I,A): 3.2 CM^2
BH CV ECHO MEAS - AVA(I,D): 3.2 CM^2
BH CV ECHO MEAS - BSA(HAYCOCK): 2.1 M^2
BH CV ECHO MEAS - BSA: 2.1 M^2
BH CV ECHO MEAS - BZI_BMI: 29.2 KILOGRAMS/M^2
BH CV ECHO MEAS - BZI_METRIC_HEIGHT: 175.3 CM
BH CV ECHO MEAS - BZI_METRIC_WEIGHT: 89.8 KG
BH CV ECHO MEAS - CONTRAST EF (2CH): 34.6 ML/M^2
BH CV ECHO MEAS - CONTRAST EF 4CH: 32 ML/M^2
BH CV ECHO MEAS - EDV(CUBED): 185.2 ML
BH CV ECHO MEAS - EDV(MOD-SP2): 107 ML
BH CV ECHO MEAS - EDV(MOD-SP4): 153 ML
BH CV ECHO MEAS - EDV(TEICH): 160 ML
BH CV ECHO MEAS - EF(CUBED): 36.5 %
BH CV ECHO MEAS - EF(MOD-SP2): 34.6 %
BH CV ECHO MEAS - EF(MOD-SP4): 32 %
BH CV ECHO MEAS - EF(TEICH): 29.5 %
BH CV ECHO MEAS - ESV(CUBED): 117.6 ML
BH CV ECHO MEAS - ESV(MOD-SP2): 70 ML
BH CV ECHO MEAS - ESV(MOD-SP4): 104 ML
BH CV ECHO MEAS - ESV(TEICH): 112.8 ML
BH CV ECHO MEAS - FS: 14 %
BH CV ECHO MEAS - IVS/LVPW: 1
BH CV ECHO MEAS - IVSD: 1 CM
BH CV ECHO MEAS - LA DIMENSION: 4.3 CM
BH CV ECHO MEAS - LA/AO: 1.2
BH CV ECHO MEAS - LAT PEAK E' VEL: 6 CM/SEC
BH CV ECHO MEAS - LV DIASTOLIC VOL/BSA (35-75): 74.4 ML/M^2
BH CV ECHO MEAS - LV MASS(C)D: 226.4 GRAMS
BH CV ECHO MEAS - LV MASS(C)DI: 110 GRAMS/M^2
BH CV ECHO MEAS - LV MEAN PG: 1 MMHG
BH CV ECHO MEAS - LV SYSTOLIC VOL/BSA (12-30): 50.6 ML/M^2
BH CV ECHO MEAS - LV V1 MEAN: 40.9 CM/SEC
BH CV ECHO MEAS - LV V1 VTI: 12.8 CM
BH CV ECHO MEAS - LVIDD: 5.7 CM
BH CV ECHO MEAS - LVIDS: 4.9 CM
BH CV ECHO MEAS - LVLD AP2: 7.9 CM
BH CV ECHO MEAS - LVLD AP4: 8.1 CM
BH CV ECHO MEAS - LVLS AP2: 7.6 CM
BH CV ECHO MEAS - LVLS AP4: 7.6 CM
BH CV ECHO MEAS - LVOT AREA (M): 4.9 CM^2
BH CV ECHO MEAS - LVOT AREA: 4.9 CM^2
BH CV ECHO MEAS - LVOT DIAM: 2.5 CM
BH CV ECHO MEAS - LVPWD: 1 CM
BH CV ECHO MEAS - MED PEAK E' VEL: 3.8 CM/SEC
BH CV ECHO MEAS - MV A DUR: 0.14 SEC
BH CV ECHO MEAS - MV A MAX VEL: 76 CM/SEC
BH CV ECHO MEAS - MV DEC SLOPE: 230 CM/SEC^2
BH CV ECHO MEAS - MV DEC TIME: 0.19 SEC
BH CV ECHO MEAS - MV E MAX VEL: 53.3 CM/SEC
BH CV ECHO MEAS - MV E/A: 0.7
BH CV ECHO MEAS - MV MEAN PG: 1 MMHG
BH CV ECHO MEAS - MV P1/2T MAX VEL: 62.4 CM/SEC
BH CV ECHO MEAS - MV P1/2T: 79.5 MSEC
BH CV ECHO MEAS - MV V2 MEAN: 52.3 CM/SEC
BH CV ECHO MEAS - MV V2 VTI: 19.1 CM
BH CV ECHO MEAS - MVA P1/2T LCG: 3.5 CM^2
BH CV ECHO MEAS - MVA(P1/2T): 2.8 CM^2
BH CV ECHO MEAS - MVA(VTI): 3.3 CM^2
BH CV ECHO MEAS - PA ACC SLOPE: 699 CM/SEC^2
BH CV ECHO MEAS - PA ACC TIME: 0.11 SEC
BH CV ECHO MEAS - PA MAX PG (FULL): 1.5 MMHG
BH CV ECHO MEAS - PA MAX PG: 2.8 MMHG
BH CV ECHO MEAS - PA PR(ACCEL): 28.2 MMHG
BH CV ECHO MEAS - PA V2 MAX: 84.2 CM/SEC
BH CV ECHO MEAS - PULM A REVS DUR: 0.15 SEC
BH CV ECHO MEAS - PULM A REVS VEL: 28.5 CM/SEC
BH CV ECHO MEAS - PULM DIAS VEL: 33.9 CM/SEC
BH CV ECHO MEAS - PULM S/D: 1.2
BH CV ECHO MEAS - PULM SYS VEL: 42 CM/SEC
BH CV ECHO MEAS - PVA(V,A): 2.4 CM^2
BH CV ECHO MEAS - PVA(V,D): 2.4 CM^2
BH CV ECHO MEAS - QP/QS: 0.6
BH CV ECHO MEAS - RV MAX PG: 1.3 MMHG
BH CV ECHO MEAS - RV MEAN PG: 1 MMHG
BH CV ECHO MEAS - RV V1 MAX: 57.9 CM/SEC
BH CV ECHO MEAS - RV V1 MEAN: 42 CM/SEC
BH CV ECHO MEAS - RV V1 VTI: 10.8 CM
BH CV ECHO MEAS - RVOT AREA: 3.5 CM^2
BH CV ECHO MEAS - RVOT DIAM: 2.1 CM
BH CV ECHO MEAS - SI(AO): 103.5 ML/M^2
BH CV ECHO MEAS - SI(CUBED): 32.8 ML/M^2
BH CV ECHO MEAS - SI(LVOT): 30.5 ML/M^2
BH CV ECHO MEAS - SI(MOD-SP2): 18 ML/M^2
BH CV ECHO MEAS - SI(MOD-SP4): 23.8 ML/M^2
BH CV ECHO MEAS - SI(TEICH): 23 ML/M^2
BH CV ECHO MEAS - SV(AO): 212.9 ML
BH CV ECHO MEAS - SV(CUBED): 67.5 ML
BH CV ECHO MEAS - SV(LVOT): 62.8 ML
BH CV ECHO MEAS - SV(MOD-SP2): 37 ML
BH CV ECHO MEAS - SV(MOD-SP4): 49 ML
BH CV ECHO MEAS - SV(RVOT): 37.4 ML
BH CV ECHO MEAS - SV(TEICH): 47.2 ML
BH CV ECHO MEAS - TAPSE (>1.6): 1.8 CM2
BH CV XLRA - RV BASE: 3.4 CM
BH CV XLRA - RV LENGTH: 8.3 CM
BH CV XLRA - RV MID: 3.3 CM
BH CV XLRA - TDI S': 13.3 CM/SEC
DEPRECATED RDW RBC AUTO: 48.9 FL (ref 37–54)
E/E' RATIO: 12
EOSINOPHIL # BLD AUTO: 0.04 10*3/MM3 (ref 0–0.7)
EOSINOPHIL NFR BLD AUTO: 0.4 % (ref 0.3–6.2)
ERYTHROCYTE [DISTWIDTH] IN BLOOD BY AUTOMATED COUNT: 16.6 % (ref 11.5–14.5)
GLUCOSE BLDC GLUCOMTR-MCNC: 102 MG/DL (ref 70–130)
GLUCOSE BLDC GLUCOMTR-MCNC: 106 MG/DL (ref 70–130)
HCT VFR BLD AUTO: 40.9 % (ref 40.4–52.2)
HGB BLD-MCNC: 13 G/DL (ref 13.7–17.6)
IMM GRANULOCYTES # BLD: 0 10*3/MM3 (ref 0–0.03)
IMM GRANULOCYTES NFR BLD: 0 % (ref 0–0.5)
LEFT ATRIUM VOLUME INDEX: 36 ML/M2
LV EF 2D ECHO EST: 32 %
LYMPHOCYTES # BLD AUTO: 1.84 10*3/MM3 (ref 0.9–4.8)
LYMPHOCYTES NFR BLD AUTO: 20 % (ref 19.6–45.3)
MAXIMAL PREDICTED HEART RATE: 162 BPM
MCH RBC QN AUTO: 25.5 PG (ref 27–32.7)
MCHC RBC AUTO-ENTMCNC: 31.8 G/DL (ref 32.6–36.4)
MCV RBC AUTO: 80.4 FL (ref 79.8–96.2)
MONOCYTES # BLD AUTO: 1.42 10*3/MM3 (ref 0.2–1.2)
MONOCYTES NFR BLD AUTO: 15.5 % (ref 5–12)
NEUTROPHILS # BLD AUTO: 5.87 10*3/MM3 (ref 1.9–8.1)
NEUTROPHILS NFR BLD AUTO: 64 % (ref 42.7–76)
PLATELET # BLD AUTO: 111 10*3/MM3 (ref 140–500)
PMV BLD AUTO: 9.2 FL (ref 6–12)
RBC # BLD AUTO: 5.09 10*6/MM3 (ref 4.6–6)
STRESS TARGET HR: 138 BPM
WBC NRBC COR # BLD: 9.18 10*3/MM3 (ref 4.5–10.7)

## 2018-03-02 PROCEDURE — 85025 COMPLETE CBC W/AUTO DIFF WBC: CPT | Performed by: NURSE PRACTITIONER

## 2018-03-02 PROCEDURE — 25010000002 PERFLUTREN (DEFINITY) 8.476 MG IN SODIUM CHLORIDE 0.9 % 10 ML INJECTION: Performed by: INTERNAL MEDICINE

## 2018-03-02 PROCEDURE — 99232 SBSQ HOSP IP/OBS MODERATE 35: CPT | Performed by: INTERNAL MEDICINE

## 2018-03-02 PROCEDURE — 99231 SBSQ HOSP IP/OBS SF/LOW 25: CPT | Performed by: SURGERY

## 2018-03-02 PROCEDURE — 93306 TTE W/DOPPLER COMPLETE: CPT | Performed by: INTERNAL MEDICINE

## 2018-03-02 PROCEDURE — 82962 GLUCOSE BLOOD TEST: CPT

## 2018-03-02 PROCEDURE — 93306 TTE W/DOPPLER COMPLETE: CPT

## 2018-03-02 PROCEDURE — 25010000002 PIPERACILLIN SOD-TAZOBACTAM PER 1 G: Performed by: HOSPITALIST

## 2018-03-02 RX ORDER — PREDNISONE 20 MG/1
40 TABLET ORAL
Qty: 2 TABLET | Refills: 0 | Status: SHIPPED | OUTPATIENT
Start: 2018-03-02 | End: 2018-03-04

## 2018-03-02 RX ORDER — ECHINACEA PURPUREA EXTRACT 125 MG
2 TABLET ORAL AS NEEDED
Refills: 12
Start: 2018-03-02 | End: 2018-10-10

## 2018-03-02 RX ORDER — METRONIDAZOLE 500 MG/1
500 TABLET ORAL 3 TIMES DAILY
Qty: 30 TABLET | Refills: 0 | Status: SHIPPED | OUTPATIENT
Start: 2018-03-02 | End: 2018-03-12

## 2018-03-02 RX ORDER — MESALAMINE 0.38 G/1
0.75 CAPSULE, EXTENDED RELEASE ORAL 2 TIMES DAILY
Status: DISCONTINUED | OUTPATIENT
Start: 2018-03-02 | End: 2018-03-02 | Stop reason: HOSPADM

## 2018-03-02 RX ORDER — METRONIDAZOLE 500 MG/1
500 TABLET ORAL 3 TIMES DAILY
Status: DISCONTINUED | OUTPATIENT
Start: 2018-03-02 | End: 2018-03-02 | Stop reason: HOSPADM

## 2018-03-02 RX ORDER — AMOXICILLIN AND CLAVULANATE POTASSIUM 875; 125 MG/1; MG/1
1 TABLET, FILM COATED ORAL EVERY 12 HOURS SCHEDULED
Qty: 20 TABLET | Refills: 0 | Status: SHIPPED | OUTPATIENT
Start: 2018-03-02 | End: 2018-03-12

## 2018-03-02 RX ORDER — PREDNISONE 20 MG/1
40 TABLET ORAL
Status: DISCONTINUED | OUTPATIENT
Start: 2018-03-02 | End: 2018-03-02 | Stop reason: HOSPADM

## 2018-03-02 RX ORDER — OXYCODONE AND ACETAMINOPHEN 7.5; 325 MG/1; MG/1
1 TABLET ORAL EVERY 6 HOURS PRN
Qty: 8 TABLET | Refills: 0 | Status: SHIPPED | OUTPATIENT
Start: 2018-03-02 | End: 2018-03-04

## 2018-03-02 RX ORDER — AMOXICILLIN AND CLAVULANATE POTASSIUM 875; 125 MG/1; MG/1
1 TABLET, FILM COATED ORAL EVERY 12 HOURS SCHEDULED
Status: DISCONTINUED | OUTPATIENT
Start: 2018-03-02 | End: 2018-03-02 | Stop reason: HOSPADM

## 2018-03-02 RX ORDER — ACETAMINOPHEN 500 MG
1000 TABLET ORAL EVERY 6 HOURS PRN
Start: 2018-03-02 | End: 2018-10-10

## 2018-03-02 RX ADMIN — PERFLUTREN 4 ML: 6.52 INJECTION, SUSPENSION INTRAVENOUS at 08:08

## 2018-03-02 RX ADMIN — TAZOBACTAM SODIUM AND PIPERACILLIN SODIUM 3.38 G: 375; 3 INJECTION, SOLUTION INTRAVENOUS at 00:40

## 2018-03-02 RX ADMIN — METRONIDAZOLE 500 MG: 500 TABLET, FILM COATED ORAL at 14:24

## 2018-03-02 RX ADMIN — OXYCODONE HYDROCHLORIDE AND ACETAMINOPHEN 1 TABLET: 7.5; 325 TABLET ORAL at 04:48

## 2018-03-02 RX ADMIN — ACETAMINOPHEN 650 MG: 325 TABLET ORAL at 04:44

## 2018-03-02 RX ADMIN — TAZOBACTAM SODIUM AND PIPERACILLIN SODIUM 3.38 G: 375; 3 INJECTION, SOLUTION INTRAVENOUS at 08:54

## 2018-03-02 RX ADMIN — AMOXICILLIN AND CLAVULANATE POTASSIUM 1 TABLET: 875; 125 TABLET, FILM COATED ORAL at 14:24

## 2018-03-02 RX ADMIN — CARVEDILOL 12.5 MG: 12.5 TABLET, FILM COATED ORAL at 08:54

## 2018-03-02 RX ADMIN — MESALAMINE 0.75 G: 375 CAPSULE, EXTENDED RELEASE ORAL at 08:54

## 2018-03-02 NOTE — PROGRESS NOTES
Case Management Discharge Note    Final Note: pt dc'd home     Discharge Placement     No information found        Other:  (private vehicle )    Discharge Codes: 01  Discharge to home

## 2018-03-02 NOTE — PROGRESS NOTES
Chief complaint: Crohn's disease, abdominal pain    Subjective:  Overall patient is feeling better.  He denies any significant abdominal pain.  He still is little bit of pain in his hip when he is moving.  Tolerating a regular diet and had a couple of normal bowel movements yesterday.    Objective:  MAXIMUM TEMPERATURE 101 yesterday.  Currently afebrile.  Vitals are stable  Gen.: Awake alert and oriented, no acute distress  Abdomen: Soft, minimally tender in the right lower quadrant without any guarding or rebound.    Assessment and plan:  Crohn's disease with terminal ileal involvement.  No evidence of obstruction at this time.  Symptoms are improved.  I again discussed with the patient that I think he would benefit from an ileocolic resection.  I think ideally this would be done once he is over this acute flare and has been off of his Humira for a couple more weeks.  If gastroenterology feels that a burst of steroids would help him get over this I would not be opposed to that.  It's not clear to me that his fever is related to this GI process, although it is certainly possible.  If he is doing better it is okay from my standpoint from to go home and follow up with me on an outpatient basis so that we can arrange for his resection.    Jose Corona MD  General and Endoscopic Surgery  [facility]    [address], [address]  [place], [place], [address]  P: [phone]  F: [phone]

## 2018-03-02 NOTE — PROGRESS NOTES
"Arnie Calvo  1960 57 y.o.  [id number]      Patient Care Team:  Zechariah Fatima MD as PCP - General    CC: Severe nonischemic cardiomyopathy ventricular ectopy Crohns disease    Interval History: He is stable and doing well      Objective   Vital Signs  Temp:  [98.1 °F (36.7 °C)-101 °F (38.3 °C)] 98.1 °F (36.7 °C)  Heart Rate:  [] 75  Resp:  [16-18] 16  BP: ()/(63-72) 98/63    Intake/Output Summary (Last 24 hours) at 03/02/18 0912  Last data filed at 03/02/18 0446   Gross per 24 hour   Intake              240 ml   Output              400 ml   Net             -160 ml     Flowsheet Rows         First Filed Value    Admission Height  175.3 cm (69\") Documented at 02/27/2018 2014    Admission Weight  95.3 kg (210 lb) Documented at 02/27/2018 2014          Physical Exam:   General Appearance:    Alert,oriented, in no acute distress   Lungs:     Clear to auscultation,BS are equal    Heart:    Normal S1 and S2, RRR without murmur, gallop or rub   HEENT:    Sclera are clear, no JVD or adenopathy   Abdomen:     Normal bowel sounds, soft non-tender, non-distended, no HSM   Extremities:   Moves all extremities well, no edema, no cyanosis, no             Redness, no rash     Medication Review:        carvedilol 12.5 mg Oral BID With Meals   fluticasone 2 spray Each Nare Daily   insulin aspart 0-7 Units Subcutaneous 4x Daily With Meals & Nightly   mesalamine 0.75 g Oral BID   piperacillin-tazobactam 3.375 g Intravenous Q8H       Pharmacy to Dose Zosyn          I reviewed the patient's new clinical results.  I personally viewed and interpreted the patient's EKG/Telemetry data    Assessment/Plan  Active Hospital Problems (** Indicates Principal Problem)    Diagnosis Date Noted   • **Right lower quadrant abdominal pain [R10.31] 02/27/2018   • Headache [R51] 03/01/2018   • Acute sinusitis [J01.90] 02/28/2018   • Enteritis [K52.9] 02/27/2018   • Mass [R22.9] 02/27/2018   • Crohn's disease [K50.90] 04/18/2017   • " Essential hypertension [I10] 04/18/2017   • Cardiomyopathy [I42.9] 02/11/2016      Resolved Hospital Problems    Diagnosis Date Noted Date Resolved   No resolved problems to display.       He has a long-standing severe nonischemic cardiomyopathy with class I heart failure symptoms and is been stable he's also had fairly frequent ventricular ectopy in the past.  If he needs to have surgery he is at an increased but acceptable risk and we would have to watch for volume overload we will see him again on Monday if he still here or sooner if there is an issue that comes up over the weekend    Harrison Reyes MD  03/02/18  9:12 AM

## 2018-03-02 NOTE — DISCHARGE SUMMARY
NAME: Arnie Calvo ADMIT: 2018   : 1960  PCP: Zechariah Fatima MD    MRN: [id number] LOS: 3 days   AGE/SEX: 57 y.o. male  ROOM: [facility]/     Date of Admission:  2018  Date of Discharge:  3/2/2018    PCP: Zechariah Fatima MD    CHIEF COMPLAINT  Abdominal Pain      DISCHARGE DIAGNOSIS  Active Hospital Problems (** Indicates Principal Problem)    Diagnosis Date Noted   • **Crohn's disease with complication [K50.919] 2017   • Headache [R51] 2018   • Acute sinusitis [J01.90] 2018   • Right lower quadrant abdominal pain [R10.31] 2018   • Enteritis [K52.9] 2018   • Mass-like inflammation at terminal ileum [R22.9] 2018   • Essential hypertension [I10] 2017   • Cardiomyopathy [I42.9] 2016      Resolved Hospital Problems    Diagnosis Date Noted Date Resolved   No resolved problems to display.       SECONDARY DIAGNOSES  Past Medical History:   Diagnosis Date   • Acute pain of left hip    • Adjustment disorder with depressed mood    • Ankylosis of spine    • Arthritis    • Cardiomyopathy     sees Dr. Reyes; NICM/ (-) cath, EF 25-35%; has ICD   • CHF (congestive heart failure)    • Crohn's disease    • Diabetes mellitus     Diet controlled.   • Dysthymic disorder    • Dysuria    • Early onset dysthymia    • Elevated total protein    • Essential hypertension    • External hemorrhoids    • Genital herpes    • Gout    • Health care maintenance    • Insomnia    • Iron deficiency    • Paroxysmal ventricular tachycardia    • Pneumonia    • Prostate nodule    • Recurrent canker sores    • Thoracic back pain    • Urinary hesitancy    • Xerosis cutis        CONSULTS   Dr. Corona/Dr. Roberson- Surgery  Dr. Reyes- Cardiology  Dr. Rodrigues-     HOSPITAL COURSE  Mr Calvo is a 57 year old with a history of non ischemic cardiomyopathy s/ps ICD placement, paroxysmal ventricular tachycardia, PVC's, Crohn's disease on humira, Ankylosing spondylitis, CHF, and  hypertension.   He has had a history of low blood pressure when his medications were adjusted so he is not able to tolerate an ACE inhibitors. He presented with abdominal pain. CT suggestive of penetrating disease with inflammatory mass/phlegmon near the terminal ileum.     He was admitted. Started on IV zosyn. Seen by GI and Surgery. It was felt this is probably going to need surgical resection (see CT below). His abdominal symptoms have resolved and Dr. Corona felt would be better to wait for surgery for a few weeks after some abx and to be off his humira for a few more weeks. Will dc with 10 additional days of augmentin and flagyl and 3 days steroids.    He did have some headache and symptoms of sinusitis. This has improved. ABX may help for this but I am also having him do daily Flonase and netti pot (using distilled water) at home. The 3 days of steroids may decrease some of this inflammation too.    He was seen by Dr. Reyes Cardiology who felt he was acceptable risk for GI surgery. ECHO was done and listed below. Would need to watch for volume overload perioperatively with his CHF.      DIAGNOSTICS    CT Abdomen Pelvis With Contrast [[id number]] Not Reviewed        Order Status: Completed Collected: 02/27/18 2207        Updated: 03/02/18 1137       Narrative:         CT ABDOMEN AND PELVIS WITH CONTRAST.     TECHNIQUE: Radiation dose reduction techniques were utilized, including  automated exposure control and exposure modulation based on body size. A  routine CT scan of the abdomen and pelvis was performed with coronal and  sagittal reconstructed images.     HISTORY: Abdominal pain.     COMPARISON: 02/16/2017.     FINDINGS:  Lung bases demonstrate no consolidation or effusion. Small hiatal  hernia.      Liver demonstrates homogeneous parenchyma, no definite mass.  Unremarkable gallbladder. No intra or extrahepatic biliary ductal  dilatation.      Spleen is unremarkable. Pancreas is unremarkable, no pancreatic  ductal  dilatation. Adrenal glands are within normal limits.     No definite renal calculi. 2.9 cm cyst of the right kidney. Kidneys do  not demonstrate hydronephrosis. Urinary bladder is unremarkable.         Terminal ileum is inflamed with mural thickening measuring up to 1.1 cm.  Mild inflammatory changes are seen in the adjacent mesentery. On the  previous examination, spiculated inflammatory phlegmon was seen  circumventing the inflamed ileum, on today's examination it appears to  have enlarged and extends to involve the right edge of the right psoas  muscle. It appears to have enlarged on today's examination and appears  to have 2 components a longitudinal component measures 6.4 x 2.3 cm, an  ovoid spiculated shorter component measures 3.2 x 2.5 cm. Colon is  unremarkable. Appendix is not visualized.     No significant retroperitoneal lymphadenopathy.                 Impression:         1.  Worsening inflammation of the terminal ileum which may be related to  Crohn's disease in keeping with known history.  2.  From the inflamed terminal ileum masslike spiculated densities  extending up to the right psoas muscle, one of these measures 6.4 cm,  another 3.2 cm. Etiology may be related to an inflammatory  process/phlegmon related to Crohn's disease or neoplasm since there is  interval progression of this pathology compared to previous examination.  Close follow-up is recommended. If indicated repeat examination with  oral and intravenous contrast may be performed.        CBC Auto Differential [[id number]] (Abnormal) Collected: 03/02/18 1209       Lab Status: Final result Specimen: Blood Updated: 03/02/18 1225        WBC 9.18 10*3/mm3         RBC 5.09 10*6/mm3         Hemoglobin 13.0 (L) g/dL         Hematocrit 40.9 %         MCV 80.4 fL         MCH 25.5 (L) pg         MCHC 31.8 (L) g/dL         RDW 16.6 (H) %         RDW-SD 48.9 fl         MPV 9.2 fL         Platelets 111 (L) 10*3/mm3         Neutrophil % 64.0 %          Lymphocyte % 20.0 %         Monocyte % 15.5 (H) %         Eosinophil % 0.4 %         Basophil % 0.1 %         Immature Grans % 0.0 %         Neutrophils, Absolute 5.87 10*3/mm3         Lymphocytes, Absolute 1.84 10*3/mm3         Monocytes, Absolute 1.42 (H) 10*3/mm3         Eosinophils, Absolute 0.04 10*3/mm3         Basophils, Absolute 0.01 10*3/mm3         Immature Grans, Absolute 0.00 10*3/mm3        Basic Metabolic Panel [[id number]] (Abnormal) Collected: 03/01/18 0520       Lab Status: Final result Specimen: Blood Updated: 03/01/18 0655        Glucose 84 mg/dL         BUN 11 mg/dL         Creatinine 1.00 mg/dL         Sodium 137 mmol/L         Potassium 4.1 mmol/L         Chloride 99 mmol/L         CO2 30.3 (H) mmol/L         Calcium 8.6 mg/dL         eGFR Non African Amer 77 mL/min/1.73         BUN/Creatinine Ratio 11.0        Anion Gap 7.7 mmol/L       Hemoglobin A1c [[id number]] (Abnormal) Collected: 02/28/18 0434        Lab Status: Final result Specimen: Blood Updated: 02/28/18 0534        Hemoglobin A1C 5.90 (H) %        Narrative:         Hemoglobin A1C Ranges:    Increased Risk for Diabetes  5.7% to 6.4%  Diabetes                     >= 6.5%  Diabetic Goal                < 7.0%       Hepatitis Panel, Acute [[id number]] (Normal) Collected: 02/28/18 0434       Lab Status: Final result Specimen: Blood Updated: 02/28/18 1018        Hepatitis B Surface Ag Non-Reactive        Hep A IgM Non-Reactive        Hep B C IgM Non-Reactive        Hepatitis C Ab Non-Reactive      2D ECHO 3/1/18  · Left ventricular systolic function is severely decreased. Estimated EF = 32%.  · The left ventricular cavity is borderline dilated.  · Left ventricular diastolic dysfunction (grade I) consistent with impaired relaxation.  · Left atrial cavity size is mildly dilated.  · The following left ventricular wall segments are hypokinetic: mid anterior, apical anterior, basal anterolateral, mid anterolateral, apical lateral, basal  inferolateral, mid inferolateral, apical inferior, mid inferior, apical septal, basal inferoseptal, mid inferoseptal, apex hypokinetic, mid anteroseptal, basal anterior, basal inferior and basal inferoseptal.    PHYSICAL EXAM  Objective     Alert, nad  Lungs clear  Soft, nt  Neck supple, no stiff neck    CONDITION ON DISCHARGE  Stable.      DISCHARGE DISPOSITION   Home or Self Care    Cali Reviewed  Gave 2 days of oxycodone 7.5/325mg only for significant pain not controlled by tylenol    DISCHARGE MEDICATIONS   CalvoArnie   Home Medication Instructions GEE:[id number]    Printed on:03/02/18 0688   Medication Information                      acetaminophen (TYLENOL) 500 MG tablet  Take 2 tablets by mouth Every 6 (Six) Hours As Needed for Mild Pain  (do not exceede 4grams total/day).             amoxicillin-clavulanate (AUGMENTIN) 875-125 MG per tablet  Take 1 tablet by mouth Every 12 (Twelve) Hours for 10 days. Indications: Infection Within the Abdomen             APRISO 0.375 g 24 hr capsule  TAKE 4 CAPSULES BY MOUTH EVERY DAY             carvedilol (COREG) 12.5 MG tablet  TAKE 1 TABLET BY MOUTH TWICE DAILY             fluticasone (FLONASE) 50 MCG/ACT nasal spray  SHAKE LIQUID AND USE 2 SPRAYS IN EACH NOSTRIL DAILY             hyoscyamine (LEVSIN) 0.125 MG SL tablet  Take 1 tablet by mouth Every 4 (Four) Hours As Needed for Cramping or Diarrhea.             metroNIDAZOLE (FLAGYL) 500 MG tablet  Take 1 tablet by mouth 3 (Three) Times a Day for 10 days. Indications: Infection Within the Abdomen             oxyCODONE-acetaminophen (PERCOCET) 7.5-325 MG per tablet  Take 1 tablet by mouth Every 6 (Six) Hours As Needed for Moderate Pain  or Severe Pain  for up to 2 days.             predniSONE (DELTASONE) 20 MG tablet  Take 2 tablets by mouth Daily With Breakfast for 2 doses.             sodium chloride (OCEAN) 0.65 % nasal spray  2 sprays into each nostril As Needed for Congestion.                  Future  Appointments  Date Time Provider Department Center   7/24/2018 10:30 AM ESTEVAN Currie [facility] None     Additional Instructions for the Follow-ups that You Need to Schedule     Discharge Follow-up with PCP    As directed    Follow Up Details:  1-2 weeks if able to schedule           Discharge Follow-up with Specialty: Dr. Reyes; 6 Weeks    As directed    Specialty:  Dr. Reyes    Follow Up:  6 Weeks           Discharge Follow-up with Specified Provider: Dr. Corona Surgery    As directed    To:  Dr. oCrona Surgery    Follow Up Details:  1-2 weeks           Discharge Follow-up with Specified Provider: GI; 1 Month    As directed    To:  GI    Follow Up:  1 Month                 Follow-up Information     Follow up with Zechariah Fatima MD .    Specialty:  Internal Medicine    Why:  1-2 weeks if able to schedule    Contact information:    [address] [place] [id number]  [phone]            TEST  RESULTS PENDING AT DISCHARGE         Nate Garcia MD  [facility]  03/02/18  2:55 PM      Time: greater than 32 minutes on discharge  It was a pleasure taking care of this patient while in the hospital.

## 2018-03-02 NOTE — PROGRESS NOTES
[facility]/GI Progress Note   Chief Complaint:   Crohns disease, abdominal pain, abnormal CT scan    Subjective     Interval History:   Abdominal pain improved but mild abdominal cramping. No diarrhea. Tolerating diet. Headache with fever overnight.  surgery in several weeks.     History taken from: patient chart family RN    Review of Systems:    All systems were reviewed and negative except for:  Gastrointestinal: postitive for  pain    Objective     Vital Signs  Temp:  [98.1 °F (36.7 °C)-101 °F (38.3 °C)] 98.1 °F (36.7 °C)  Heart Rate:  [] 75  Resp:  [16-18] 16  BP: ()/(63-72) 98/63  Body mass index is 29.36 kg/(m^2).    Intake/Output Summary (Last 24 hours) at 03/02/18 0943  Last data filed at 03/02/18 0446   Gross per 24 hour   Intake              240 ml   Output              400 ml   Net             -160 ml          Physical Exam:   General: patient awake, alert and cooperative   Eyes: Normal lids and lashes, no scleral icterus, no conjunctival pallor   Neck: supple, normal ROM, no tracheal deviation, no thyromegaly   Skin: warm and dry, not jaundiced   Cardiovascular: regular rhythm and rate, no murmurs auscultated   Pulm: clear to auscultation bilaterally, regular and unlabored   Abdomen: soft, non tender, nondistended; normal bowel sounds   Rectal: deferred   Extremities: no rash or edema   Neurologic: Normal mood and behavior    All Medications Have Been Reviewed     Results Review:       Results from last 7 days  Lab Units 03/01/18 0520 02/28/18 0434 02/27/18 2045   WBC 10*3/mm3 10.48 11.42* 12.36*   HEMOGLOBIN g/dL 13.0* 13.4* 14.1   HEMATOCRIT % 41.3 43.3 44.3   PLATELETS 10*3/mm3 139* 135* 138*         Results from last 7 days  Lab Units 03/01/18 0520 02/28/18 0434 02/27/18 2045   SODIUM mmol/L 137 134* 135*   POTASSIUM mmol/L 4.1 4.0 4.1   CHLORIDE mmol/L 99 99 99   CO2 mmol/L 30.3* 27.1 23.8   BUN mg/dL 11 10 11   CREATININE mg/dL 1.00 0.82 0.75*   CALCIUM mg/dL 8.6 8.4* 8.9    BILIRUBIN mg/dL  --  0.9 0.9   ALK PHOS U/L  --  58 66   ALT (SGPT) U/L  --  78* 99*   AST (SGOT) U/L  --  37 68*   GLUCOSE mg/dL 84 94 111*             RADIOLOGY:    Imaging Results (last 72 hours)     Procedure Component Value Units Date/Time    CT Abdomen Pelvis With Contrast [[id number]] Collected:  02/27/18 2207     Updated:  02/28/18 0444    Narrative:       CT ABDOMEN AND PELVIS WITH CONTRAST.     TECHNIQUE: Radiation dose reduction techniques were utilized, including  automated exposure control and exposure modulation based on body size. A  routine CT scan of the abdomen and pelvis was performed with coronal and  sagittal reconstructed images.     HISTORY: Abdominal pain.     COMPARISON: 02/16/2017.     FINDINGS:  Lung bases demonstrate no consolidation or effusion. Small hiatal  hernia.      Liver demonstrates homogeneous parenchyma, no definite mass.  Unremarkable gallbladder. No intra or extrahepatic biliary ductal  dilatation.      Spleen is unremarkable. Pancreas is unremarkable, no pancreatic ductal  dilatation. Adrenal glands are within normal limits.     No definite renal calculi. 2.9 cm cyst of the right kidney. Kidneys do  not demonstrate hydronephrosis. Urinary bladder is unremarkable.         Terminal ileum is inflamed with mural thickening measuring up to 1.1 cm.  Mild inflammatory changes are seen in the adjacent mesentery. On the  previous examination, spiculated inflammatory phlegmon was seen  circumventing the inflamed ileum, on today's examination it appears to  have enlarged and extends to involve the right edge of the right psoas  muscle. It appears to have enlarged on today's examination and appears  to have 2 components a longitudinal component measures 6.4 x 2.3 cm, an  ovoid spiculated shorter component measures 3.2 x 2.5 cm. Colon is  unremarkable. Appendix is not visualized.     No significant retroperitoneal lymphadenopathy.              Impression:       1.  Worsening  inflammation of the terminal ileum which may be related to  Crohn's disease in keeping with known history.  2.  From the inflamed terminal ileum masslike spiculated densities  extending up to the right psoas muscle, one of these measures 6.4 cm,  another 3.2 cm. Etiology may be related to an inflammatory  process/phlegmon related to Crohn's disease or neoplasm since there is  interval progression of this pathology compared to previous examination.  Close follow-up is recommended. If indicated repeat examination with  oral and intravenous contrast may be performed.     Findings called to Dr. Clemons, 10:02 PM.                      Assessment/Plan     Patient Active Problem List   Diagnosis Code   • Cardiomyopathy I42.9   • Paroxysmal VT I47.2   • Palpitations R00.2   • PVC's (premature ventricular contractions) I49.3   • Exacerbation of Crohn's disease of small intestine K50.00   • Adjustment disorder with depressed mood F43.21   • Ankylosis of spine M43.20   • Crohn's disease K50.90   • Diabetes mellitus E11.9   • Essential hypertension I10   • External hemorrhoids K64.4   • Genital herpes simplex A60.00   • Gout M10.9   • Insomnia G47.00   • Iron deficiency E61.1   • Prostate mass N42.9   • Recurrent aphthous ulcer K12.0   • Asteatosis cutis L85.3   • History of pneumonia Z87.01   • Abnormal CXR (chest x-ray) R93.8   • RUQ abdominal pain R10.11   • Crohn's disease of small intestine with other complication K50.018   • Right lower quadrant abdominal pain R10.31   • Enteritis K52.9   • Mass R22.9   • Acute sinusitis J01.90   • Headache R51         Martina Rodrigues, APRN  03/02/18  9:43 AM    We are following for Crohn's disease, phlegmon, Gen. surgery notes reviewed       Constitutional: He is oriented to person, place, and time. He appears well-developed and well-nourished.   HENT: anicteric and no thyromegaly  Head: Normocephalic and atraumatic.   Eyes: Conjunctivae and EOM are normal.   Neck: Normal range of  motion. No tracheal deviation present.   Cardiovascular: Normal rate and regular rhythm.    Pulmonary/Chest: Effort normal and breath sounds normal. No respiratory distress.   Abdominal: Soft. Bowel sounds are normal. He exhibits no distension and no mass. There is no tenderness. There is no rebound and no guarding.   Musculoskeletal: Normal range of motion.   Neurological: He is alert and oriented to person, place, and time.   Skin: Skin is warm and dry.   Psychiatric: He has a normal mood and affect. Judgment normal.   Nursing note and vitals reviewed.       Impression   1.  Hx of ileal Crohns disease managed with anti-TNF therapy - CT suggestive of penetrating disease with inflammatory mass/phlegmon  2. Ankylosing spondylitis   3. RLQ abdominal pain- improved   4. Fever- temp 101 last night. NO significant GI symptoms.   5. Headache     Plan  Continue antibiotics  Add levsin  Agree with holding Humira prior to surgery  Tolerating diet  Cardiology evaluation noted.  Follow-up for surgery with Dr. Corona  GI will sign off. Ok to switch abx to augmentin + flagyl.

## 2018-03-02 NOTE — DISCHARGE INSTR - LAB
[facility]  Dr. Jose Corona  [address] [address]  [phone]

## 2018-03-02 NOTE — PLAN OF CARE
Problem: Patient Care Overview (Adult)  Goal: Plan of Care Review  Outcome: Ongoing (interventions implemented as appropriate)   03/02/18 0600   Coping/Psychosocial Response Interventions   Plan Of Care Reviewed With patient   Patient Care Overview   Progress no change   Outcome Evaluation   Outcome Summary/Follow up Plan Temp up to 101.0. Still c/o headache. Denies abd. pain. at present.     Goal: Adult Individualization and Mutuality  Outcome: Ongoing (interventions implemented as appropriate)    Goal: Discharge Needs Assessment  Outcome: Ongoing (interventions implemented as appropriate)      Problem: Pain, Acute (Adult)  Goal: Acceptable Pain Control/Comfort Level  Outcome: Ongoing (interventions implemented as appropriate)

## 2018-03-05 ENCOUNTER — TRANSITIONAL CARE MANAGEMENT TELEPHONE ENCOUNTER (OUTPATIENT)
Dept: INTERNAL MEDICINE | Facility: CLINIC | Age: 58
End: 2018-03-05

## 2018-03-06 ENCOUNTER — OFFICE (OUTPATIENT)
Dept: URBAN - METROPOLITAN AREA CLINIC 75 | Facility: CLINIC | Age: 58
End: 2018-03-06

## 2018-03-06 VITALS
WEIGHT: 198 LBS | DIASTOLIC BLOOD PRESSURE: 80 MMHG | HEIGHT: 69 IN | SYSTOLIC BLOOD PRESSURE: 110 MMHG | HEART RATE: 88 BPM

## 2018-03-06 DIAGNOSIS — Z92.25 PERSONAL HISTORY OF IMMUNOSUPPRESSION THERAPY: ICD-10-CM

## 2018-03-06 DIAGNOSIS — K50.818 CROHN'S DISEASE OF BOTH SMALL AND LARGE INTESTINE WITH OTHER: ICD-10-CM

## 2018-03-06 PROCEDURE — 99205 OFFICE O/P NEW HI 60 MIN: CPT | Performed by: INTERNAL MEDICINE

## 2018-03-06 RX ORDER — PREDNISONE 10 MG/1
TABLET ORAL
Qty: 120 | Refills: 2 | Status: COMPLETED
Start: 2018-03-06 | End: 2020-10-08

## 2018-03-21 RX ORDER — CARVEDILOL 12.5 MG/1
TABLET ORAL
Qty: 180 TABLET | Refills: 0 | Status: ON HOLD | OUTPATIENT
Start: 2018-03-21 | End: 2018-06-16 | Stop reason: SDUPTHER

## 2018-03-29 ENCOUNTER — CLINICAL SUPPORT NO REQUIREMENTS (OUTPATIENT)
Dept: CARDIOLOGY | Facility: CLINIC | Age: 58
End: 2018-03-29

## 2018-03-29 DIAGNOSIS — I25.5 ISCHEMIC CARDIOMYOPATHY: Primary | ICD-10-CM

## 2018-03-29 DIAGNOSIS — I47.29 PAROXYSMAL VT (HCC): ICD-10-CM

## 2018-03-29 PROCEDURE — 93283 PRGRMG EVAL IMPLANTABLE DFB: CPT | Performed by: INTERNAL MEDICINE

## 2018-03-29 PROCEDURE — 93290 INTERROG DEV EVAL ICPMS IP: CPT | Performed by: INTERNAL MEDICINE

## 2018-06-17 ENCOUNTER — APPOINTMENT (OUTPATIENT)
Dept: GENERAL RADIOLOGY | Facility: HOSPITAL | Age: 58
End: 2018-06-17

## 2018-06-17 ENCOUNTER — HOSPITAL ENCOUNTER (INPATIENT)
Facility: HOSPITAL | Age: 58
LOS: 5 days | Discharge: ANOTHER HOSPITAL | End: 2018-06-22
Attending: EMERGENCY MEDICINE | Admitting: INTERNAL MEDICINE

## 2018-06-17 ENCOUNTER — APPOINTMENT (OUTPATIENT)
Dept: CT IMAGING | Facility: HOSPITAL | Age: 58
End: 2018-06-17

## 2018-06-17 DIAGNOSIS — R00.2 PALPITATIONS: ICD-10-CM

## 2018-06-17 DIAGNOSIS — I95.1 ORTHOSTATIC HYPOTENSION: Primary | ICD-10-CM

## 2018-06-17 PROBLEM — D50.9 IRON DEFICIENCY ANEMIA: Status: ACTIVE | Noted: 2018-06-17

## 2018-06-17 PROBLEM — Z93.2 ILEOSTOMY PRESENT (HCC): Status: ACTIVE | Noted: 2018-06-17

## 2018-06-17 PROBLEM — K50.90 CROHN'S DISEASE: Status: ACTIVE | Noted: 2018-06-17

## 2018-06-17 PROBLEM — I50.22 CHRONIC SYSTOLIC CHF (CONGESTIVE HEART FAILURE): Status: ACTIVE | Noted: 2018-06-17

## 2018-06-17 PROBLEM — I47.29 PAROXYSMAL VENTRICULAR TACHYCARDIA (HCC): Status: ACTIVE | Noted: 2018-06-17

## 2018-06-17 PROBLEM — I42.8 NONISCHEMIC CARDIOMYOPATHY (HCC): Status: ACTIVE | Noted: 2018-06-17

## 2018-06-17 LAB
ALBUMIN SERPL-MCNC: 4.2 G/DL (ref 3.5–5.2)
ALBUMIN/GLOB SERPL: 1.1 G/DL
ALP SERPL-CCNC: 60 U/L (ref 39–117)
ALT SERPL W P-5'-P-CCNC: 33 U/L (ref 1–41)
ANION GAP SERPL CALCULATED.3IONS-SCNC: 16.1 MMOL/L
AST SERPL-CCNC: 32 U/L (ref 1–40)
BASOPHILS # BLD AUTO: 0.02 10*3/MM3 (ref 0–0.2)
BASOPHILS NFR BLD AUTO: 0.3 % (ref 0–1.5)
BILIRUB SERPL-MCNC: 0.6 MG/DL (ref 0.1–1.2)
BILIRUB UR QL STRIP: NEGATIVE
BUN BLD-MCNC: 17 MG/DL (ref 6–20)
BUN/CREAT SERPL: 16.2 (ref 7–25)
CALCIUM SPEC-SCNC: 10.3 MG/DL (ref 8.6–10.5)
CHLORIDE SERPL-SCNC: 98 MMOL/L (ref 98–107)
CLARITY UR: CLEAR
CO2 SERPL-SCNC: 18.9 MMOL/L (ref 22–29)
COLOR UR: YELLOW
CREAT BLD-MCNC: 1.05 MG/DL (ref 0.76–1.27)
DEPRECATED RDW RBC AUTO: 43.7 FL (ref 37–54)
EOSINOPHIL # BLD AUTO: 0.13 10*3/MM3 (ref 0–0.7)
EOSINOPHIL NFR BLD AUTO: 1.8 % (ref 0.3–6.2)
ERYTHROCYTE [DISTWIDTH] IN BLOOD BY AUTOMATED COUNT: 15 % (ref 11.5–14.5)
GFR SERPL CREATININE-BSD FRML MDRD: 73 ML/MIN/1.73
GLOBULIN UR ELPH-MCNC: 4 GM/DL
GLUCOSE BLD-MCNC: 104 MG/DL (ref 65–99)
GLUCOSE BLDC GLUCOMTR-MCNC: 110 MG/DL (ref 70–130)
GLUCOSE UR STRIP-MCNC: NEGATIVE MG/DL
HCT VFR BLD AUTO: 42.2 % (ref 40.4–52.2)
HGB BLD-MCNC: 14.2 G/DL (ref 13.7–17.6)
HGB UR QL STRIP.AUTO: NEGATIVE
HOLD SPECIMEN: NORMAL
HOLD SPECIMEN: NORMAL
IMM GRANULOCYTES # BLD: 0 10*3/MM3 (ref 0–0.03)
IMM GRANULOCYTES NFR BLD: 0 % (ref 0–0.5)
KETONES UR QL STRIP: ABNORMAL
LEUKOCYTE ESTERASE UR QL STRIP.AUTO: NEGATIVE
LYMPHOCYTES # BLD AUTO: 3.12 10*3/MM3 (ref 0.9–4.8)
LYMPHOCYTES NFR BLD AUTO: 44.3 % (ref 19.6–45.3)
MAGNESIUM SERPL-MCNC: 1.6 MG/DL (ref 1.6–2.6)
MCH RBC QN AUTO: 27.2 PG (ref 27–32.7)
MCHC RBC AUTO-ENTMCNC: 33.6 G/DL (ref 32.6–36.4)
MCV RBC AUTO: 80.7 FL (ref 79.8–96.2)
MONOCYTES # BLD AUTO: 0.77 10*3/MM3 (ref 0.2–1.2)
MONOCYTES NFR BLD AUTO: 10.9 % (ref 5–12)
NEUTROPHILS # BLD AUTO: 3.01 10*3/MM3 (ref 1.9–8.1)
NEUTROPHILS NFR BLD AUTO: 42.7 % (ref 42.7–76)
NITRITE UR QL STRIP: NEGATIVE
PH UR STRIP.AUTO: 5.5 [PH] (ref 5–8)
PLATELET # BLD AUTO: 183 10*3/MM3 (ref 140–500)
PMV BLD AUTO: 9.8 FL (ref 6–12)
POTASSIUM BLD-SCNC: 5 MMOL/L (ref 3.5–5.2)
PROT SERPL-MCNC: 8.2 G/DL (ref 6–8.5)
PROT UR QL STRIP: NEGATIVE
RBC # BLD AUTO: 5.23 10*6/MM3 (ref 4.6–6)
SODIUM BLD-SCNC: 133 MMOL/L (ref 136–145)
SP GR UR STRIP: 1.02 (ref 1–1.03)
TROPONIN T SERPL-MCNC: <0.01 NG/ML (ref 0–0.03)
UROBILINOGEN UR QL STRIP: ABNORMAL
WBC NRBC COR # BLD: 7.05 10*3/MM3 (ref 4.5–10.7)
WHOLE BLOOD HOLD SPECIMEN: NORMAL
WHOLE BLOOD HOLD SPECIMEN: NORMAL

## 2018-06-17 PROCEDURE — G0378 HOSPITAL OBSERVATION PER HR: HCPCS

## 2018-06-17 PROCEDURE — 96360 HYDRATION IV INFUSION INIT: CPT

## 2018-06-17 PROCEDURE — 85025 COMPLETE CBC W/AUTO DIFF WBC: CPT

## 2018-06-17 PROCEDURE — 82962 GLUCOSE BLOOD TEST: CPT

## 2018-06-17 PROCEDURE — 93005 ELECTROCARDIOGRAM TRACING: CPT

## 2018-06-17 PROCEDURE — 83735 ASSAY OF MAGNESIUM: CPT

## 2018-06-17 PROCEDURE — 80053 COMPREHEN METABOLIC PANEL: CPT

## 2018-06-17 PROCEDURE — 71275 CT ANGIOGRAPHY CHEST: CPT

## 2018-06-17 PROCEDURE — 0 IOPAMIDOL PER 1 ML: Performed by: EMERGENCY MEDICINE

## 2018-06-17 PROCEDURE — 99285 EMERGENCY DEPT VISIT HI MDM: CPT

## 2018-06-17 PROCEDURE — 81003 URINALYSIS AUTO W/O SCOPE: CPT | Performed by: EMERGENCY MEDICINE

## 2018-06-17 PROCEDURE — 82272 OCCULT BLD FECES 1-3 TESTS: CPT | Performed by: HOSPITALIST

## 2018-06-17 PROCEDURE — 93005 ELECTROCARDIOGRAM TRACING: CPT | Performed by: EMERGENCY MEDICINE

## 2018-06-17 PROCEDURE — 84484 ASSAY OF TROPONIN QUANT: CPT

## 2018-06-17 PROCEDURE — 93010 ELECTROCARDIOGRAM REPORT: CPT | Performed by: INTERNAL MEDICINE

## 2018-06-17 PROCEDURE — 71045 X-RAY EXAM CHEST 1 VIEW: CPT

## 2018-06-17 RX ORDER — CARVEDILOL 12.5 MG/1
12.5 TABLET ORAL 2 TIMES DAILY
Status: DISCONTINUED | OUTPATIENT
Start: 2018-06-17 | End: 2018-06-19

## 2018-06-17 RX ORDER — ONDANSETRON 4 MG/1
4 TABLET, FILM COATED ORAL EVERY 6 HOURS PRN
Status: DISCONTINUED | OUTPATIENT
Start: 2018-06-17 | End: 2018-06-22 | Stop reason: HOSPADM

## 2018-06-17 RX ORDER — ONDANSETRON 4 MG/1
4 TABLET, ORALLY DISINTEGRATING ORAL EVERY 6 HOURS PRN
Status: DISCONTINUED | OUTPATIENT
Start: 2018-06-17 | End: 2018-06-22 | Stop reason: HOSPADM

## 2018-06-17 RX ORDER — ECHINACEA PURPUREA EXTRACT 125 MG
2 TABLET ORAL AS NEEDED
Status: DISCONTINUED | OUTPATIENT
Start: 2018-06-17 | End: 2018-06-22 | Stop reason: HOSPADM

## 2018-06-17 RX ORDER — ACETAMINOPHEN 325 MG/1
650 TABLET ORAL EVERY 4 HOURS PRN
Status: DISCONTINUED | OUTPATIENT
Start: 2018-06-17 | End: 2018-06-22 | Stop reason: HOSPADM

## 2018-06-17 RX ORDER — ONDANSETRON 2 MG/ML
4 INJECTION INTRAMUSCULAR; INTRAVENOUS EVERY 6 HOURS PRN
Status: DISCONTINUED | OUTPATIENT
Start: 2018-06-17 | End: 2018-06-22 | Stop reason: HOSPADM

## 2018-06-17 RX ORDER — SODIUM CHLORIDE 9 MG/ML
75 INJECTION, SOLUTION INTRAVENOUS CONTINUOUS
Status: DISCONTINUED | OUTPATIENT
Start: 2018-06-17 | End: 2018-06-20

## 2018-06-17 RX ORDER — TAMSULOSIN HYDROCHLORIDE 0.4 MG/1
0.4 CAPSULE ORAL NIGHTLY
Status: DISCONTINUED | OUTPATIENT
Start: 2018-06-17 | End: 2018-06-19

## 2018-06-17 RX ORDER — NITROGLYCERIN 0.4 MG/1
0.4 TABLET SUBLINGUAL
Status: DISCONTINUED | OUTPATIENT
Start: 2018-06-17 | End: 2018-06-22 | Stop reason: HOSPADM

## 2018-06-17 RX ORDER — FLUTICASONE PROPIONATE 50 MCG
2 SPRAY, SUSPENSION (ML) NASAL DAILY
Status: DISCONTINUED | OUTPATIENT
Start: 2018-06-18 | End: 2018-06-22 | Stop reason: HOSPADM

## 2018-06-17 RX ORDER — TAMSULOSIN HYDROCHLORIDE 0.4 MG/1
1 CAPSULE ORAL NIGHTLY
COMMUNITY
End: 2018-06-22 | Stop reason: HOSPADM

## 2018-06-17 RX ORDER — SODIUM CHLORIDE 0.9 % (FLUSH) 0.9 %
1-10 SYRINGE (ML) INJECTION AS NEEDED
Status: DISCONTINUED | OUTPATIENT
Start: 2018-06-17 | End: 2018-06-22 | Stop reason: HOSPADM

## 2018-06-17 RX ORDER — SODIUM CHLORIDE 0.9 % (FLUSH) 0.9 %
10 SYRINGE (ML) INJECTION AS NEEDED
Status: DISCONTINUED | OUTPATIENT
Start: 2018-06-17 | End: 2018-06-22 | Stop reason: HOSPADM

## 2018-06-17 RX ADMIN — TAMSULOSIN HYDROCHLORIDE 0.4 MG: 0.4 CAPSULE ORAL at 23:14

## 2018-06-17 RX ADMIN — IOPAMIDOL 100 ML: 755 INJECTION, SOLUTION INTRAVENOUS at 16:21

## 2018-06-17 RX ADMIN — SODIUM CHLORIDE 75 ML/HR: 9 INJECTION, SOLUTION INTRAVENOUS at 22:09

## 2018-06-17 RX ADMIN — SODIUM CHLORIDE 1000 ML: 9 INJECTION, SOLUTION INTRAVENOUS at 17:38

## 2018-06-17 NOTE — ED PROVIDER NOTES
EMERGENCY DEPARTMENT ENCOUNTER    Room Number:  39/39  Date seen:  6/17/2018  Time seen: 1:33 PM  PCP: Zechariah Fatima MD   Cardiac: Dr. Reyes    HPI:  Chief complaint:Palpitations  Context:Arnie Calvo is a 57 y.o. male who has a pacemaker in place and presents to the ED with c/o palpitations and a sudden decrease in his heart rate. Patient also notes intermittent SOA that is worsened exertion Patient states that his palpitations are worsened when the patient stands up and that he will have dizziness. Patient has been less mobile due to his recent small bowel reduction seven weeks ago.       Timing:gradual  Duration: PTA  Location:Cardiac  Radiation:none  Quality:palpitations  Intensity/Severity:moderate  Associated Symptoms:dizziness, SOA  Aggravating Factors:standing up  Alleviating Factors:none  Previous Episodes:none  Treatment before arrival:none    MEDICAL RECORD REVIEW  Patient had his pacemaker evaluated on March 29,2018. Patient had an echo on 3/1/18 that showed an EF of 32%.     ALLERGIES  Adhesive tape; Bactrim [sulfamethoxazole-trimethoprim]; and Latex    PAST MEDICAL HISTORY  Active Ambulatory Problems     Diagnosis Date Noted   • Cardiomyopathy 02/11/2016   • Paroxysmal VT 02/11/2016   • Palpitations 02/12/2016   • PVC's (premature ventricular contractions) 02/12/2016   • Exacerbation of Crohn's disease of small intestine 02/16/2017   • Adjustment disorder with depressed mood 04/18/2017   • Ankylosis of spine 04/18/2017   • Crohn's disease with complication 04/18/2017   • Diabetes mellitus 04/18/2017   • Essential hypertension 04/18/2017   • External hemorrhoids 04/18/2017   • Genital herpes simplex 04/18/2017   • Gout 04/18/2017   • Insomnia 04/18/2017   • Iron deficiency 04/18/2017   • Prostate mass 04/18/2017   • Recurrent aphthous ulcer 04/18/2017   • Asteatosis cutis 04/18/2017   • History of pneumonia 05/25/2017   • Abnormal CXR (chest x-ray) 05/25/2017   • RUQ abdominal pain 02/14/2018   •  Crohn's disease of small intestine with other complication 02/14/2018   • Right lower quadrant abdominal pain 02/27/2018   • Enteritis 02/27/2018   • Mass-like inflammation at terminal ileum 02/27/2018   • Acute sinusitis 02/28/2018   • Headache 03/01/2018     Resolved Ambulatory Problems     Diagnosis Date Noted   • Post-viral cough syndrome 04/18/2017     Past Medical History:   Diagnosis Date   • Acute pain of left hip    • Adjustment disorder with depressed mood    • Ankylosis of spine    • Arthritis    • Cardiomyopathy    • CHF (congestive heart failure)    • Crohn's disease    • Diabetes mellitus    • Dysthymic disorder 2012   • Dysuria    • Early onset dysthymia    • Elevated total protein    • Essential hypertension    • External hemorrhoids    • Genital herpes    • Gout    • Health care maintenance    • Insomnia    • Iron deficiency    • Paroxysmal ventricular tachycardia    • Pneumonia    • Prostate nodule    • Recurrent canker sores    • Thoracic back pain    • Urinary hesitancy    • Xerosis cutis        PAST SURGICAL HISTORY  Past Surgical History:   Procedure Laterality Date   • CARDIAC CATHETERIZATION     • CARDIAC DEFIBRILLATOR PLACEMENT      Had Jude lead that was fractured.  Lead was tied off.  New lead and new device implanted.   • CARDIAC SURGERY     • COLONOSCOPY  04/03/2015    abnormal cecum, three polypoid masses, pre cancerous vs crohns, IH, tics, hyperplastic polyp   • COLONOSCOPY N/A 3/17/2017    polypoid masses, tics, IH, polyp       FAMILY HISTORY  Family History   Problem Relation Age of Onset   • Hypertension Mother    • Diabetes Mother         type 2 mellitus    • Cancer Father         colon   • Heart failure Father         congestive   • Gout Father    • Colon cancer Father        SOCIAL HISTORY  Social History     Social History   • Marital status:      Spouse name: N/A   • Number of children: N/A   • Years of education: N/A     Occupational History   • Not on file.      Social History Main Topics   • Smoking status: Never Smoker   • Smokeless tobacco: Not on file   • Alcohol use No   • Drug use: No   • Sexual activity: Defer     Other Topics Concern   • Not on file     Social History Narrative   • No narrative on file       REVIEW OF SYSTEMS  Review of Systems    PHYSICAL EXAM  ED Triage Vitals   Temp Heart Rate Resp BP SpO2   06/17/18 1243 06/17/18 1241 06/17/18 1242 06/17/18 1241 06/17/18 1241   98.2 °F (36.8 °C) 66 18 106/72 97 %      Temp src Heart Rate Source Patient Position BP Location FiO2 (%)   06/17/18 1243 -- -- -- --   Tympanic         Physical Exam   Constitutional: He is oriented to person, place, and time and well-developed, well-nourished, and in no distress. No distress.   HENT:   Head: Normocephalic and atraumatic.   Eyes: Pupils are equal, round, and reactive to light.   Neck: Normal range of motion. Neck supple.   Cardiovascular: Normal rate, S1 normal, S2 normal and normal heart sounds.  Exam reveals no gallop and no friction rub.    No murmur heard.  Pulmonary/Chest: Effort normal. No respiratory distress. He has no wheezes. He has no rales. He exhibits no tenderness.   Abdominal: Soft. There is no rebound and no guarding.   Musculoskeletal: Normal range of motion. He exhibits no deformity.   Lymphadenopathy:     He has no cervical adenopathy.   Neurological: He is alert and oriented to person, place, and time.   Skin: Skin is warm and dry.   Psychiatric: Affect normal.   Nursing note and vitals reviewed.      LAB RESULTS  Recent Results (from the past 24 hour(s))   Comprehensive Metabolic Panel    Collection Time: 06/17/18 12:58 PM   Result Value Ref Range    Glucose 104 (H) 65 - 99 mg/dL    BUN 17 6 - 20 mg/dL    Creatinine 1.05 0.76 - 1.27 mg/dL    Sodium 133 (L) 136 - 145 mmol/L    Potassium 5.0 3.5 - 5.2 mmol/L    Chloride 98 98 - 107 mmol/L    CO2 18.9 (L) 22.0 - 29.0 mmol/L    Calcium 10.3 8.6 - 10.5 mg/dL    Total Protein 8.2 6.0 - 8.5 g/dL     Albumin 4.20 3.50 - 5.20 g/dL    ALT (SGPT) 33 1 - 41 U/L    AST (SGOT) 32 1 - 40 U/L    Alkaline Phosphatase 60 39 - 117 U/L    Total Bilirubin 0.6 0.1 - 1.2 mg/dL    eGFR Non African Amer 73 >60 mL/min/1.73    Globulin 4.0 gm/dL    A/G Ratio 1.1 g/dL    BUN/Creatinine Ratio 16.2 7.0 - 25.0    Anion Gap 16.1 mmol/L   Troponin    Collection Time: 06/17/18 12:58 PM   Result Value Ref Range    Troponin T <0.010 0.000 - 0.030 ng/mL   Magnesium    Collection Time: 06/17/18 12:58 PM   Result Value Ref Range    Magnesium 1.6 1.6 - 2.6 mg/dL   Light Blue Top    Collection Time: 06/17/18 12:58 PM   Result Value Ref Range    Extra Tube hold for add-on    Green Top (Gel)    Collection Time: 06/17/18 12:58 PM   Result Value Ref Range    Extra Tube Hold for add-ons.    Lavender Top    Collection Time: 06/17/18 12:58 PM   Result Value Ref Range    Extra Tube hold for add-on    Gold Top - SST    Collection Time: 06/17/18 12:58 PM   Result Value Ref Range    Extra Tube Hold for add-ons.    CBC Auto Differential    Collection Time: 06/17/18 12:58 PM   Result Value Ref Range    WBC 7.05 4.50 - 10.70 10*3/mm3    RBC 5.23 4.60 - 6.00 10*6/mm3    Hemoglobin 14.2 13.7 - 17.6 g/dL    Hematocrit 42.2 40.4 - 52.2 %    MCV 80.7 79.8 - 96.2 fL    MCH 27.2 27.0 - 32.7 pg    MCHC 33.6 32.6 - 36.4 g/dL    RDW 15.0 (H) 11.5 - 14.5 %    RDW-SD 43.7 37.0 - 54.0 fl    MPV 9.8 6.0 - 12.0 fL    Platelets 183 140 - 500 10*3/mm3    Neutrophil % 42.7 42.7 - 76.0 %    Lymphocyte % 44.3 19.6 - 45.3 %    Monocyte % 10.9 5.0 - 12.0 %    Eosinophil % 1.8 0.3 - 6.2 %    Basophil % 0.3 0.0 - 1.5 %    Immature Grans % 0.0 0.0 - 0.5 %    Neutrophils, Absolute 3.01 1.90 - 8.10 10*3/mm3    Lymphocytes, Absolute 3.12 0.90 - 4.80 10*3/mm3    Monocytes, Absolute 0.77 0.20 - 1.20 10*3/mm3    Eosinophils, Absolute 0.13 0.00 - 0.70 10*3/mm3    Basophils, Absolute 0.02 0.00 - 0.20 10*3/mm3    Immature Grans, Absolute 0.00 0.00 - 0.03 10*3/mm3   Urinalysis With / Culture  If Indicated - Urine, Clean Catch    Collection Time: 06/17/18  2:53 PM   Result Value Ref Range    Color, UA Yellow Yellow, Straw    Appearance, UA Clear Clear    pH, UA 5.5 5.0 - 8.0    Specific Gravity, UA 1.016 1.005 - 1.030    Glucose, UA Negative Negative    Ketones, UA Trace (A) Negative    Bilirubin, UA Negative Negative    Blood, UA Negative Negative    Protein, UA Negative Negative    Leuk Esterase, UA Negative Negative    Nitrite, UA Negative Negative    Urobilinogen, UA 0.2 E.U./dL 0.2 - 1.0 E.U./dL       I ordered the above labs and reviewed the results    RADIOLOGY  CT Angiogram Chest With Contrast   Final Result   The CT scan was performed as an emergency procedure through the chest  with CT angiography protocol using venous contrast and 3-D  reconstructions. The following findings are present:  1. The pulmonary arteries are moderately well opacified and there is no  evidence of pulmonary embolus. The thoracic aorta shows no evidence of  aneurysm or dissection.  2. The lungs are well-expanded and clear. There is no mediastinal or  hilar or axillary adenopathy. There is no pericardial effusion.  3. The CT images through the upper liver, spleen, and both adrenal  glands are unremarkable. There is a small cyst in the partially  visualized upper pole of right kidney.                 Radiation dose reduction techniques were utilized, including automated  exposure control and exposure modulation based on body size.     This report was finalized on 6/17/2018 5:06 PM by Dr. Dante Hassan M.D.   XR Chest 1 View   Final Result   FINDINGS:  The lungs are well-expanded and clear. The heart is top  normal in size with a pacemaker in place and there is no acute disease  or change from 02/28/2015.     This report was finalized on 6/17/2018 3:59 PM by Dr. Dante Hassan M.D.         I ordered the above noted radiological studies and reviewed the images on the PACS system.  Spoke with radiologis regarding CT/MRI  "scan results    MEDICATIONS GIVEN IN ER  Medications   sodium chloride 0.9 % flush 10 mL (not administered)   sodium chloride 0.9 % bolus 1,000 mL (1,000 mL Intravenous New Bag 6/17/18 1738)   iopamidol (ISOVUE-370) 76 % injection 100 mL (100 mL Intravenous Given 6/17/18 1621)       EKG  Interpreted by ED Physician    PROCEDURES  Procedures    COURSE & MEDICAL DECISION MAKING  Pertinent Labs and Imaging studies that were ordered and reviewed are noted above.  Results were reviewed/discussed with the patient and they were also made aware of online access.  Pt also made aware that some labs, such as cultures, will not be resulted during ER visit and follow up with PMD is necessary.     PROGRESS AND CONSULTS    Progress Notes:       1351: I discussed plan for the patient to have his pacemaker evaluated in the ED. I discussed labs so far and plan for UA to be collected.     1400 Reviewed pt's history and workup with Dr. Hernandez Gutiérrez.  After a bedside evaluation, Dr. Gutiérrez agrees with the plan of care.    1645: Rechecked pt. I discussed labs, CHest CT and plan for consultation with cardiology for admission at this time. Pt understands and agrees with the plan, all questions answered.    1750: Discussed case with Dr. Patterson, [facility] hospitalist concerning the patient and the findings in the ED. Dr. Patterson requests admittance to tele.    Based on the patient's lab findings and presenting symptoms, the doctor and I feel it is appropriate to admit the patient for further management, evaluation, and treatment.  I have discussed this with the admitting team.  I have also discussed this with the patient/family.  They are in agreement with admission.        Disposition vitals:  /76 (BP Location: Right arm, Patient Position: Sitting)   Pulse 102   Temp 98.2 °F (36.8 °C) (Tympanic)   Resp 15   Ht 175.3 cm (69\")   Wt 79.4 kg (175 lb)   SpO2 95%   BMI 25.84 kg/m²       DIAGNOSIS  Final diagnoses:   Orthostatic " hypotension   Palpitations     Documentation assistance provided by shannan Fowler for ILIANA Kelley.  Information recorded by the brittoniblizandro was done at my direction and has been verified and validated by me.  Electronically signed by Loni Fowler on 6/17/2018 at time 1:33 PM       Loni Fowler  06/17/18 1805       ILIANA Fermin  06/17/18 181

## 2018-06-17 NOTE — ED TRIAGE NOTES
Pt to ED via [facility] with c/o persistent fatigue x 2 weeks, SOA with exertion, dizziness when standing. Hx tachycardia, pt had small bowel reduction 7 weeks ago at . Had low K and magnesium while in hospital. Sees dr walter, cardio

## 2018-06-17 NOTE — ED TRIAGE NOTES
Pt also states intermittent trouble with urination since surgery. 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 Intermittent sharp pains to midsternal chest.

## 2018-06-17 NOTE — ED PROVIDER NOTES
Pt presents to the ED with complaints of increasing SOA, worsening with exertion for the past month. Pt also c/o lightheadedness, dizziness, nausea, HA, brief episodic CP, and states that his heart rate has been intermittently speeding up and slowing down without cause. Pt denies vomiting, abnormal ostomy output. Pt states that he had a bowel resection last month for chron's.      Physical Exam:   Cardiovascular: mild tachycardia   Lungs: CTAB  Abdomen: R sided ostomy  Neuro: normal exam    EKG           EKG time: 1327  Rhythm/Rate: sinus rhythm 87 with PVCs  P waves and RI: normal   QRS, axis: normal    ST and T waves: normal      Interpreted Contemporaneously by me, independently viewed  unchanged compared to prior 3/1/18    Plan to evaluate the pt's labs and interrogate his pacemaker.     Documentation assistance provided by shannan Lopez for Dr. Gutiérrez.  Information recorded by the scribe was done at my direction and has been verified and validated by me.    MD ATTESTATION NOTE    The KEHINDE and I have discussed this patient's history, physical exam, and treatment plan.  I have reviewed the documentation and personally had a face to face interaction with the patient. I affirm the documentation and agree with the treatment and plan.  The attached note describes my personal findings.         Bernard Lopez  06/17/18 9166       Harrison Gutiérrez MD  06/17/18 7842

## 2018-06-18 ENCOUNTER — APPOINTMENT (OUTPATIENT)
Dept: CARDIOLOGY | Facility: HOSPITAL | Age: 58
End: 2018-06-18
Attending: INTERNAL MEDICINE

## 2018-06-18 LAB
ANION GAP SERPL CALCULATED.3IONS-SCNC: 14.7 MMOL/L
BASOPHILS # BLD AUTO: 0.03 10*3/MM3 (ref 0–0.2)
BASOPHILS NFR BLD AUTO: 0.5 % (ref 0–1.5)
BH CV ECHO MEAS - BSA(HAYCOCK): 2 M^2
BH CV ECHO MEAS - BSA: 2 M^2
BH CV ECHO MEAS - BZI_BMI: 25.8 KILOGRAMS/M^2
BH CV ECHO MEAS - BZI_METRIC_HEIGHT: 175.3 CM
BH CV ECHO MEAS - BZI_METRIC_WEIGHT: 79.4 KG
BH CV ECHO MEAS - CONTRAST EF (2CH): 18.3 ML/M^2
BH CV ECHO MEAS - CONTRAST EF 4CH: 15.1 ML/M^2
BH CV ECHO MEAS - EDV(CUBED): 175.6 ML
BH CV ECHO MEAS - EDV(MOD-SP2): 115 ML
BH CV ECHO MEAS - EDV(MOD-SP4): 159 ML
BH CV ECHO MEAS - EDV(TEICH): 153.7 ML
BH CV ECHO MEAS - EF(CUBED): 37 %
BH CV ECHO MEAS - EF(MOD-BP): 15 %
BH CV ECHO MEAS - EF(MOD-SP2): 18.3 %
BH CV ECHO MEAS - EF(MOD-SP4): 15.1 %
BH CV ECHO MEAS - EF(TEICH): 30 %
BH CV ECHO MEAS - ESV(CUBED): 110.6 ML
BH CV ECHO MEAS - ESV(MOD-SP2): 94 ML
BH CV ECHO MEAS - ESV(MOD-SP4): 135 ML
BH CV ECHO MEAS - ESV(TEICH): 107.5 ML
BH CV ECHO MEAS - FS: 14.3 %
BH CV ECHO MEAS - IVS/LVPW: 1
BH CV ECHO MEAS - IVSD: 1 CM
BH CV ECHO MEAS - LV DIASTOLIC VOL/BSA (35-75): 81.5 ML/M^2
BH CV ECHO MEAS - LV MASS(C)D: 219.7 GRAMS
BH CV ECHO MEAS - LV MASS(C)DI: 112.6 GRAMS/M^2
BH CV ECHO MEAS - LV SYSTOLIC VOL/BSA (12-30): 69.2 ML/M^2
BH CV ECHO MEAS - LVIDD: 5.6 CM
BH CV ECHO MEAS - LVIDS: 4.8 CM
BH CV ECHO MEAS - LVLD AP2: 7.8 CM
BH CV ECHO MEAS - LVLD AP4: 8.1 CM
BH CV ECHO MEAS - LVLS AP2: 7.6 CM
BH CV ECHO MEAS - LVLS AP4: 7.9 CM
BH CV ECHO MEAS - LVPWD: 1 CM
BH CV ECHO MEAS - SI(CUBED): 33.3 ML/M^2
BH CV ECHO MEAS - SI(MOD-SP2): 10.8 ML/M^2
BH CV ECHO MEAS - SI(MOD-SP4): 12.3 ML/M^2
BH CV ECHO MEAS - SI(TEICH): 23.6 ML/M^2
BH CV ECHO MEAS - SV(CUBED): 65 ML
BH CV ECHO MEAS - SV(MOD-SP2): 21 ML
BH CV ECHO MEAS - SV(MOD-SP4): 24 ML
BH CV ECHO MEAS - SV(TEICH): 46.1 ML
BH CV VAS BP RIGHT ARM: NORMAL MMHG
BUN BLD-MCNC: 12 MG/DL (ref 6–20)
BUN/CREAT SERPL: 12.1 (ref 7–25)
CALCIUM SPEC-SCNC: 9.4 MG/DL (ref 8.6–10.5)
CHLORIDE SERPL-SCNC: 101 MMOL/L (ref 98–107)
CO2 SERPL-SCNC: 19.3 MMOL/L (ref 22–29)
CORTIS SERPL-MCNC: 12.43 MCG/DL
CORTIS SERPL-MCNC: 19.41 MCG/DL
CORTIS SERPL-MCNC: 20.24 MCG/DL
CORTIS SERPL-MCNC: 4.36 MCG/DL
CREAT BLD-MCNC: 0.99 MG/DL (ref 0.76–1.27)
DEPRECATED RDW RBC AUTO: 45.6 FL (ref 37–54)
EOSINOPHIL # BLD AUTO: 0.19 10*3/MM3 (ref 0–0.7)
EOSINOPHIL NFR BLD AUTO: 3 % (ref 0.3–6.2)
ERYTHROCYTE [DISTWIDTH] IN BLOOD BY AUTOMATED COUNT: 15.2 % (ref 11.5–14.5)
GFR SERPL CREATININE-BSD FRML MDRD: 78 ML/MIN/1.73
GLUCOSE BLD-MCNC: 87 MG/DL (ref 65–99)
HBA1C MFR BLD: 5.91 % (ref 4.8–5.6)
HCT VFR BLD AUTO: 39.8 % (ref 40.4–52.2)
HEMOCCULT STL QL: NEGATIVE
HGB BLD-MCNC: 12.9 G/DL (ref 13.7–17.6)
IMM GRANULOCYTES # BLD: 0 10*3/MM3 (ref 0–0.03)
IMM GRANULOCYTES NFR BLD: 0 % (ref 0–0.5)
LV EF 2D ECHO EST: 15 %
LYMPHOCYTES # BLD AUTO: 3.06 10*3/MM3 (ref 0.9–4.8)
LYMPHOCYTES NFR BLD AUTO: 48.6 % (ref 19.6–45.3)
MAGNESIUM SERPL-MCNC: 1.8 MG/DL (ref 1.6–2.6)
MAXIMAL PREDICTED HEART RATE: 162 BPM
MCH RBC QN AUTO: 26.8 PG (ref 27–32.7)
MCHC RBC AUTO-ENTMCNC: 32.4 G/DL (ref 32.6–36.4)
MCV RBC AUTO: 82.6 FL (ref 79.8–96.2)
MONOCYTES # BLD AUTO: 0.71 10*3/MM3 (ref 0.2–1.2)
MONOCYTES NFR BLD AUTO: 11.3 % (ref 5–12)
NEUTROPHILS # BLD AUTO: 2.3 10*3/MM3 (ref 1.9–8.1)
NEUTROPHILS NFR BLD AUTO: 36.6 % (ref 42.7–76)
PHOSPHATE SERPL-MCNC: 3.7 MG/DL (ref 2.5–4.5)
PLATELET # BLD AUTO: 170 10*3/MM3 (ref 140–500)
PMV BLD AUTO: 10.1 FL (ref 6–12)
POTASSIUM BLD-SCNC: 5.1 MMOL/L (ref 3.5–5.2)
RBC # BLD AUTO: 4.82 10*6/MM3 (ref 4.6–6)
SODIUM BLD-SCNC: 135 MMOL/L (ref 136–145)
STRESS TARGET HR: 138 BPM
TSH SERPL DL<=0.05 MIU/L-ACNC: 1.76 MIU/ML (ref 0.27–4.2)
WBC NRBC COR # BLD: 6.29 10*3/MM3 (ref 4.5–10.7)

## 2018-06-18 PROCEDURE — 99244 OFF/OP CNSLTJ NEW/EST MOD 40: CPT | Performed by: INTERNAL MEDICINE

## 2018-06-18 PROCEDURE — 94799 UNLISTED PULMONARY SVC/PX: CPT

## 2018-06-18 PROCEDURE — 93325 DOPPLER ECHO COLOR FLOW MAPG: CPT

## 2018-06-18 PROCEDURE — 85025 COMPLETE CBC W/AUTO DIFF WBC: CPT | Performed by: HOSPITALIST

## 2018-06-18 PROCEDURE — 82533 TOTAL CORTISOL: CPT | Performed by: HOSPITALIST

## 2018-06-18 PROCEDURE — G0378 HOSPITAL OBSERVATION PER HR: HCPCS

## 2018-06-18 PROCEDURE — 83036 HEMOGLOBIN GLYCOSYLATED A1C: CPT | Performed by: HOSPITALIST

## 2018-06-18 PROCEDURE — 25010000002 PERFLUTREN (DEFINITY) 8.476 MG IN SODIUM CHLORIDE 0.9 % 10 ML INJECTION: Performed by: INTERNAL MEDICINE

## 2018-06-18 PROCEDURE — 93308 TTE F-UP OR LMTD: CPT

## 2018-06-18 PROCEDURE — 93325 DOPPLER ECHO COLOR FLOW MAPG: CPT | Performed by: INTERNAL MEDICINE

## 2018-06-18 PROCEDURE — 84100 ASSAY OF PHOSPHORUS: CPT | Performed by: HOSPITALIST

## 2018-06-18 PROCEDURE — 80048 BASIC METABOLIC PNL TOTAL CA: CPT | Performed by: HOSPITALIST

## 2018-06-18 PROCEDURE — 93321 DOPPLER ECHO F-UP/LMTD STD: CPT

## 2018-06-18 PROCEDURE — 99255 IP/OBS CONSLTJ NEW/EST HI 80: CPT | Performed by: INTERNAL MEDICINE

## 2018-06-18 PROCEDURE — 25010000002 COSYNTROPIN PER 0.25 MG: Performed by: INTERNAL MEDICINE

## 2018-06-18 PROCEDURE — 93321 DOPPLER ECHO F-UP/LMTD STD: CPT | Performed by: INTERNAL MEDICINE

## 2018-06-18 PROCEDURE — 93308 TTE F-UP OR LMTD: CPT | Performed by: INTERNAL MEDICINE

## 2018-06-18 PROCEDURE — 84443 ASSAY THYROID STIM HORMONE: CPT | Performed by: HOSPITALIST

## 2018-06-18 PROCEDURE — 83735 ASSAY OF MAGNESIUM: CPT | Performed by: HOSPITALIST

## 2018-06-18 PROCEDURE — 82533 TOTAL CORTISOL: CPT | Performed by: INTERNAL MEDICINE

## 2018-06-18 RX ORDER — DOXYCYCLINE 100 MG/1
100 CAPSULE ORAL EVERY 12 HOURS SCHEDULED
Status: DISCONTINUED | OUTPATIENT
Start: 2018-06-18 | End: 2018-06-22 | Stop reason: HOSPADM

## 2018-06-18 RX ORDER — COSYNTROPIN 0.25 MG/ML
0.25 INJECTION, POWDER, FOR SOLUTION INTRAMUSCULAR; INTRAVENOUS ONCE
Status: COMPLETED | OUTPATIENT
Start: 2018-06-18 | End: 2018-06-18

## 2018-06-18 RX ORDER — DIPHENOXYLATE HYDROCHLORIDE AND ATROPINE SULFATE 2.5; .025 MG/1; MG/1
1 TABLET ORAL 4 TIMES DAILY PRN
Status: DISCONTINUED | OUTPATIENT
Start: 2018-06-18 | End: 2018-06-22 | Stop reason: HOSPADM

## 2018-06-18 RX ORDER — SACCHAROMYCES BOULARDII 250 MG
250 CAPSULE ORAL 2 TIMES DAILY
Status: DISCONTINUED | OUTPATIENT
Start: 2018-06-18 | End: 2018-06-22 | Stop reason: HOSPADM

## 2018-06-18 RX ORDER — CARVEDILOL 12.5 MG/1
TABLET ORAL
Qty: 180 TABLET | Refills: 0 | OUTPATIENT
Start: 2018-06-18 | End: 2018-06-22 | Stop reason: HOSPADM

## 2018-06-18 RX ORDER — DIPHENOXYLATE HCL/ATROPINE 2.5-.025/5
10 LIQUID (ML) ORAL 4 TIMES DAILY PRN
Status: DISCONTINUED | OUTPATIENT
Start: 2018-06-18 | End: 2018-06-18 | Stop reason: CLARIF

## 2018-06-18 RX ADMIN — COSYNTROPIN 0.25 MG: 0.25 INJECTION, POWDER, LYOPHILIZED, FOR SOLUTION INTRAMUSCULAR; INTRAVENOUS at 13:32

## 2018-06-18 RX ADMIN — PERFLUTREN 3 ML: 6.52 INJECTION, SUSPENSION INTRAVENOUS at 13:45

## 2018-06-18 RX ADMIN — TAMSULOSIN HYDROCHLORIDE 0.4 MG: 0.4 CAPSULE ORAL at 20:33

## 2018-06-18 RX ADMIN — Medication 250 MG: at 20:33

## 2018-06-18 RX ADMIN — DOXYCYCLINE 100 MG: 100 CAPSULE ORAL at 20:33

## 2018-06-18 RX ADMIN — Medication 250 MG: at 13:09

## 2018-06-18 RX ADMIN — DOXYCYCLINE 100 MG: 100 CAPSULE ORAL at 11:14

## 2018-06-18 NOTE — PROGRESS NOTES
Discharge Planning Assessment  [facility]     Patient Name: Arnie Calvo  MRN: [id number]  Today's Date: 6/18/2018    Admit Date: 6/17/2018          Discharge Needs Assessment     Row Name 06/18/18 0804       Living Environment    Lives With spouse    Current Living Arrangements home/apartment/condo    Primary Care Provided by self    Provides Primary Care For no one    Family Caregiver if Needed spouse    Family Caregiver Names Kiara Calvo  ( [phone])    Able to Return to Prior Arrangements yes    Living Arrangement Comments Pt lives in single story house with wife  (Kiara)       Resource/Environmental Concerns    Resource/Environmental Concerns none    Transportation Concerns car, none       Transition Planning    Patient/Family Anticipates Transition to home with family    Patient/Family Anticipated Services at Transition none    Transportation Anticipated car, drives self;family or friend will provide       Discharge Needs Assessment    Equipment Currently Used at Home none    Anticipated Changes Related to Illness none    Equipment Needed After Discharge none    Offered/Gave Vendor List no            Discharge Plan     Row Name 06/18/18 7461       Plan    Plan Plan home with Shantel Villarreal RN    Patient/Family in Agreement with Plan yes    Plan Comments FACE SHEET VERIFIED.   Spoke to pt at bedside.  Pt lives in a single story house with wife  (Kiara Calvo [phone]). Pt is independent with ADL's.  Pt gets prescriptions from [facility]  ([facility]).  Pt denies any issues affording medications.  Pt states he is current with [facility].  Viola  ([phone]) with [facility] called..  Viola states pt is approved for HH thru 7/1.  Pt has not been in SNF.   Plan home with Shantel Villarreal RN        Destination     No service coordination in this encounter.      Durable Medical Equipment     No service coordination in this encounter.      Dialysis/Infusion     No service coordination  in this encounter.      Home Medical Care     Service Request Status Selected Specialties Address Phone Number Fax Number    [facility] Pending - Request Sent N/A [id number] [facility][note 3], [place] [id number] [phone]      Social Care     No service coordination in this encounter.                Demographic Summary     Row Name 06/18/18 0854       General Information    Admission Type observation    Arrived From emergency department    Referral Source admission list    Reason for Consult discharge planning    Preferred Language English     Used During This Interaction no            Functional Status     Row Name 06/18/18 0826       Functional Status    Usual Activity Tolerance good    Current Activity Tolerance good       Functional Status, IADL    Medications independent    Meal Preparation independent    Housekeeping independent    Laundry independent    Shopping independent       Mental Status    General Appearance WDL WDL       Mental Status Summary    Recent Changes in Mental Status/Cognitive Functioning no changes            Psychosocial    No documentation.           Abuse/Neglect    No documentation.           Legal    No documentation.           Substance Abuse    No documentation.           Patient Forms    No documentation.         Elizabet Cuadra, RN

## 2018-06-18 NOTE — PROGRESS NOTES
Malnutrition Severity Assessment    Patient Name:  Arnie Calvo  YOB: 1960  MRN: [id number]  Admit Date:  6/17/2018    Patient meets criteria for : Moderate malnutrition    Comments:  Pt meets criteria for moderate malnutrition based on nutrition focused physical exam as detailed below. Pt reports 25 lb weight loss/2 months, or 12.5% weight change, since his bowel resection in April 2018. His energy intake has been decreased associated with this surgery, post-op infection and dizziness.     Full nutrition assessment in separate note.     Malnutrition Type: Chronic Illness Malnutrition     Malnutrition Type (last 8 hours)      Malnutrition Severity Assessment     Row Name 06/18/18 1345       Malnutrition Severity Assessment    Malnutrition Type Chronic Illness Malnutrition    Row Name 06/18/18 1345       Physical Signs of Malnutrition (Chronic)    Muscle Wasting None    Fat Loss Mild   slightly dark circles in orbital region, slight hollowing.     Fluid Accumulation None    Row Name 06/18/18 1345       Weight Status (Chronic)    %UBW Mild (86-90%)    Weight Loss Severe (>7.5% / 3 mo)    Row Name 06/18/18 1345       Energy Intake Status (Chronic)    Energy Intake Mod (<75% / > or equal to 1 mo)    Row Name 06/18/18 1345       Criteria Met (Must meet criteria for severity in at least 2 of these categories: M Wasting, Fat Loss, Fluid, Secondary Signs, Wt. Status, Intake)    Patient meets criteria for  Moderate malnutrition          Electronically signed by:  Nancy Lee RD  06/18/18 1:48 PM

## 2018-06-18 NOTE — DISCHARGE PLACEMENT REQUEST
"Leda Calvo (57 y.o. Male)     Date of Birth Social Security Number Address Home Phone MRN    1960  [id number] [facility] [phone] [id number]    Cheondoism Marital Status          None        Admission Date Admission Type Admitting Provider Attending Provider Department, Room/Bed    6/17/18 Emergency Abel Pal MD [place], Robert Russell, MD [address], 657/1    Discharge Date Discharge Disposition Discharge Destination                       Attending Provider:  Hermes Oreilly MD    Allergies:  Iodine, Shellfish-derived Products, Adhesive Tape, Bactrim [Sulfamethoxazole-trimethoprim], Latex    Isolation:  Contact   Infection:  None   Code Status:  FULL    Ht:  175.3 cm (69\")   Wt:  79.4 kg (175 lb 0.7 oz)    Admission Cmt:  None   Principal Problem:  Orthostasis [I95.1]                 Active Insurance as of 6/17/2018     Primary Coverage     Payor Plan Insurance Group Employer/Plan Group    [facility] [id number]     Payor Plan Address Payor Plan Phone Number Effective From Effective To    [address]  10/1/2017     [place] [facility] [address]       Subscriber Name Subscriber Birth Date Member ID       LEDA CALVO 1960 [id number]                 Emergency Contacts      (Rel.) Home Phone Work Phone Mobile Phone    Kiara Calvo (Spouse) [phone] -- [phone]              "

## 2018-06-18 NOTE — PLAN OF CARE
Problem: Patient Care Overview  Goal: Plan of Care Review  Outcome: Ongoing (interventions implemented as appropriate)   06/18/18 1612   Coping/Psychosocial   Plan of Care Reviewed With patient;spouse   Plan of Care Review   Progress no change   OTHER   Outcome Summary Pt has had some Bigiminys today throughout his HR but Cariologist is aware of the Dysrhthmia. Cortisol testing today and Sandhya is aware of the result in the labwork. Doxy restarted for infection. BP can be low and so perimeters have been placed for the Coreg for administration. Will cont to monitor.       Problem: Ileostomy (Adult)  Goal: Signs and Symptoms of Listed Potential Problems Will be Absent, Minimized or Managed (Ileostomy)  Outcome: Ongoing (interventions implemented as appropriate)    Goal: Anesthesia/Sedation Recovery  Outcome: Ongoing (interventions implemented as appropriate)      Problem: Fall Risk (Adult)  Goal: Identify Related Risk Factors and Signs and Symptoms  Outcome: Ongoing (interventions implemented as appropriate)    Goal: Absence of Fall  Outcome: Ongoing (interventions implemented as appropriate)      Problem: Cardiac: Heart Failure (Adult)  Goal: Signs and Symptoms of Listed Potential Problems Will be Absent, Minimized or Managed (Cardiac: Heart Failure)  Outcome: Ongoing (interventions implemented as appropriate)

## 2018-06-18 NOTE — CONSULTS
"Adult Nutrition  Assessment/PES    Patient Name:  Arnie Calvo  YOB: 1960  MRN: [id number]  Admit Date:  6/17/2018    Assessment Date:  6/18/2018    Comments:  Nursing consult due to reported weight loss. Down 25 lb since his bowel resection in 4/2018. Pt reports issues with hydration, ostomy output and changes in eating habits due to fear of clogging his ostomy. We discussed diet, foods to avoid. Encouraged supplements and reviewed alternative menu options while here.     Will continue to monitor.           Reason for Assessment     Row Name 06/18/18 1336          Reason for Assessment    Reason For Assessment nurse/nurse practitioner consult     Diagnosis gastrointestinal disease;cardiac disease     Identified At Risk by Screening Criteria unintentional loss of 10 lbs or more in the past 2 mos             Nutrition/Diet History     Row Name 06/18/18 1334          Nutrition/Diet History    Typical Food/Fluid Intake SB resection on April 23 at [facility] - since then, decreased intake & appetite. Issues with peristomal infection (about to finish abx) and dehydration/dizziness. Very frustrated & hoping he can get better in time to have reversal surgery on 7/9.     Food Preferences None specific. Said he's always been one for spicy foods but unable to tolerate salads due to Crohn's. Reviewed alt choices on menu.      Factors Affecting Nutritional Intake abdominal pain;altered gastrointestinal function;other (see comments)   generalized weakness             Anthropometrics     Row Name 06/18/18 1340          Anthropometrics    Height 175.3 cm (69\")     Weight 79.4 kg (175 lb 0.7 oz)        Admit Weight    Admit Weight Method measured     Admit Weight 79.8 kg (176 lb)        Ideal Body Weight (IBW)    Ideal Body Weight (IBW) (kg) 73.69     % Ideal Body Weight 107.75        Usual Body Weight (UBW)    Usual Body Weight 90.7 kg (200 lb)     % Usual Body Weight 87.52     % of Usual Body Weight Assessment 85-95% - " "mild deficit     Weight Loss unintentional     Weight Loss Time Frame 25#/2 mos (12.5%)        Body Mass Index (BMI)    BMI (kg/m2) 25.9     BMI Assessment BMI 25-29.9: overweight          Labs/Tests/Procedures/Meds     Row Name 06/18/18 1342          Labs/Procedures/Meds    Lab Results Reviewed reviewed     Lab Results Comments Na 135, Aic 5.91, h/h        Diagnostic Tests/Procedures    Diagnostic Test/Procedure Reviewed reviewed        Medications    Pertinent Medications Reviewed reviewed     Pertinent Medications Comments abx, IVF 75             Physical Findings     Row Name 06/18/18 1343          Physical Findings    Overall Physical Appearance loss of subcutaneous fat     Gastrointestinal ostomy     Skin other (see comments);pallor   healing infected area at peristomal site             Estimated/Assessed Needs     Row Name 06/18/18 1343 06/18/18 1340       Calculation Measurements    Weight Used For Calculations --   8369-9534 (25-30 doreen/kg), 79 gm pro (1.0), 2382 cc  --    Height  -- 175.3 cm (69\")            Nutrition Prescription Ordered     Row Name 06/18/18 1344          Nutrition Prescription PO    Common Modifiers Cardiac;Consistent Carbohydrate             Evaluation of Received Nutrient/Fluid Intake     Row Name 06/18/18 1345 06/18/18 1343       Calculation Measurements    Weight Used For Calculations  -- --   4841-5028 (25-30 doreen/kg), 79 gm pro (1.0), 2382 cc       Fluid Intake Evaluation    IV Fluid (mL) 1800  --       PO Evaluation    Number of Days PO Intake Evaluated 1 day  --    Number of Meals 2  --    % PO Intake 50-75%. says he is tolerating trays  --    Row Name 06/18/18 1340          Calculation Measurements    Height 175.3 cm (69\")             Evaluation of Prescribed Nutrient/Fluid Intake     Row Name 06/18/18 1343 06/18/18 1340       Calculation Measurements    Weight Used For Calculations --   4946-6918 (25-30 doreen/kg), 79 gm pro (1.0), 2382 cc  --    Height  -- 175.3 cm (69\")          "   Malnutrition Severity Assessment     Row Name 06/18/18 1341          Malnutrition Severity Assessment    Malnutrition Type Chronic Illness Malnutrition        Physical Signs of Malnutrition (Chronic)    Muscle Wasting None     Fat Loss Mild   slightly dark circles in orbital region, slight hollowing.      Fluid Accumulation None        Weight Status (Chronic)    %UBW Mild (86-90%)     Weight Loss Severe (>7.5% / 3 mo)        Energy Intake Status (Chronic)    Energy Intake Mod (<75% / > or equal to 1 mo)        Criteria Met (Must meet criteria for severity in at least 2 of these categories: M Wasting, Fat Loss, Fluid, Secondary Signs, Wt. Status, Intake)    Patient meets criteria for  Moderate malnutrition         Problem/Interventions:        Problem 1     Row Name 06/18/18 1351          Nutrition Diagnoses Problem 1    Problem 1 Altered GI Function     Etiology (related to) Medical Diagnosis     Gastrointestinal Bowel resection;Crohn's disease             Problem 2     Row Name 06/18/18 1352          Nutrition Diagnoses Problem 2    Problem 2 Malnutrition     Etiology (related to) Factors Affecting Nutrition     Reported GI Symptoms Abdominal Pain;Altered GI Function     Signs/Symptoms (evidenced by) Report of Minimal PO Intake;Unintended Weight Change     Unintended Weight Change Loss     Number of Pounds Lost 25     Weight loss time period 2                   Intervention Goal     Row Name 06/18/18 1354          Intervention Goal    General Maintain nutrition;Disease management/therapy;Reduce/improve symptoms     PO Tolerate PO;Increase intake;PO intake (%)     PO Intake % 75 %     Weight Appropriate weight gain             Nutrition Intervention     Row Name 06/18/18 1358          Nutrition Intervention    RD/Tech Action Menu provided;Interview for preference;Advise alternate selection;Encourage intake;Recommend/ordered;Follow Tx progress;Care plan reviewd     Recommended/Ordered Supplement   pt will request  ensure if desired - not ordered                Education/Evaluation     Row Name 06/18/18 2247          Education    Education Provided education regarding;Education topics;Advised regarding habits/behavior     Provided education regarding Diet rationale     Education Topics Ostomy     Advised Regarding Habits/Behavior Food choices;Use supplement        Monitor/Evaluation    Monitor Per protocol     Education Follow-up Reinforce PRN         Electronically signed by:  Nancy Lee RD  06/18/18 1:54 PM

## 2018-06-18 NOTE — PLAN OF CARE
Problem: Nutrition, Imbalanced: Inadequate Oral Intake (Adult)  Intervention: Promote/Optimize Nutrition   06/18/18 1359   Nutrition Interventions   Oral Nutrition Promotion nutritional therapy counseling provided       Goal: Identify Related Risk Factors and Signs and Symptoms  Outcome: Outcome(s) achieved Date Met: 06/18/18 06/18/18 1359   Nutrition, Imbalanced: Inadequate Oral Intake (Adult)   Related Risk Factors (Nutrition Imbalance, Inadequate Oral Intake) appetite decreased;chronic illness/infection   Signs and Symptoms (Nutrition Imbalance, Inadequate Oral Intake: Signs and Symptoms) diarrhea/malabsorption;loss of subcutaneous fat;weakness/lethargy;weight decreased (percent weight loss, percent usual body weight, body mass index less than 18.5) (Adults)     Goal: Improved Oral Intake  Outcome: Ongoing (interventions implemented as appropriate)   06/18/18 1359   Nutrition, Imbalanced: Inadequate Oral Intake (Adult)   Improved Oral Intake making progress toward outcome     Goal: Prevent Further Weight Loss  Outcome: Ongoing (interventions implemented as appropriate)   06/18/18 1359   Nutrition, Imbalanced: Inadequate Oral Intake (Adult)   Prevent Further Weight Loss making progress toward outcome

## 2018-06-18 NOTE — PROGRESS NOTES
Continued Stay Note  [facility]     Patient Name: Arnie Calvo  MRN: [id number]  Today's Date: 6/18/2018    Admit Date: 6/17/2018          Discharge Plan     Row Name 06/18/18 0827       Plan    Plan [facility]   BREE Villarreal    Patient/Family in Agreement with Plan yes    Plan Comments FACE SHEET VERIFIED.   Spoke to pt at bedside.  Pt lives in a single story house with wife  (Kiara Calvo [phone]). Pt is independent with ADL's.  Pt gets prescriptions from [facility]  ([employer]  Pt denies any issues affording medications.  Pt states [facility] is [facility]  ([phone]) with [facility] called..  Viola states pt is approved for HH thru 7/1.  Pt has not been in SNF.   Plan home with Shantel LUONG.  BREE Villarreal              Discharge Codes    No documentation.           Elizabet Cuadra, RN

## 2018-06-18 NOTE — CONSULTS
57 y.o.  Patient Care Team:  Zechariah Fatima MD as PCP - General      Chief Complaint   Patient presents with   • Weakness - Generalized     x 2 weeks   • Chest Pain     intermittent x 2 weeks   • Dizziness     with standing   Orthostatic symptoms and adrenal insufficiency    HPI   Patient is a 57-year-old white male with a past medical history of nonischemic cardiomyopathy and congestive heart failure status post AICD hypertension and Crohn's disease admitted to the hospital for 2 weeks of symptoms including generalized weakness chest pain and dizziness and lightheadedness.  Patient is also having significant fluctuations in his heart rate with heart rate dropping into 40s intermittently  He reports shortness of breath on exertion  He also reports lightheadedness on standing and dizziness  Denies any nausea and abdominal pain  He had ileostomy and denies any significant change in the consistency of the stool    Patient admitted that he had been on steroids multiple times in the past several years.  He reports that he used to be on 30 mg of prednisone prior to his surgery 6 weeks ago and apparently was discontinued one day after surgery  He has had 3-4 week courses of prednisone several times in the past few years  Patient admits that when he is on steroids he feels significantly better more energy and is able to walk without any symptoms  Apparently this last use of prednisone 30 mg was 6 weeks ago prior to surgery    Patient denies any history of hyponatremia in the past  His blood pressure has been in the 70 to 80s systolic range and he was holding his Coreg due to his low blood pressure        Past Medical History:   Diagnosis Date   • Acute pain of left hip    • Adjustment disorder with depressed mood    • Ankylosis of spine    • Arthritis    • Cardiomyopathy     sees Dr. Reyes; NICM/ (-) cath, EF 25-35%; has ICD   • CHF (congestive heart failure)    • Crohn's disease    • Diabetes mellitus     Diet controlled.    • Dysthymic disorder 2012   • Dysuria    • Early onset dysthymia    • Elevated total protein    • Essential hypertension    • External hemorrhoids    • Genital herpes    • Gout    • Health care maintenance    • Insomnia    • Iron deficiency    • Paroxysmal ventricular tachycardia    • Pneumonia    • Prostate nodule    • Recurrent canker sores    • Thoracic back pain    • Urinary hesitancy    • Xerosis cutis        Family History   Problem Relation Age of Onset   • Hypertension Mother    • Diabetes Mother         type 2 mellitus    • Cancer Father         colon   • Heart failure Father         congestive   • Gout Father    • Colon cancer Father        Social History     Social History   • Marital status:      Spouse name: N/A   • Number of children: N/A   • Years of education: N/A     Occupational History   • Not on file.     Social History Main Topics   • Smoking status: Never Smoker   • Smokeless tobacco: Not on file   • Alcohol use No   • Drug use: No   • Sexual activity: Defer     Other Topics Concern   • Not on file     Social History Narrative   • No narrative on file       Allergies   Allergen Reactions   • Iodine Nausea And Vomiting   • Shellfish-Derived Products Nausea And Vomiting   • Adhesive Tape Rash   • Bactrim [Sulfamethoxazole-Trimethoprim] Hives   • Latex Rash         Current Facility-Administered Medications:   •  acetaminophen (TYLENOL) tablet 650 mg, 650 mg, Oral, Q4H PRN, Abel Pal MD  •  carvedilol (COREG) tablet 12.5 mg, 12.5 mg, Oral, BID, Hermes Oreilly MD  •  diphenoxylate-atropine (LOMOTIL) 2.5-0.025 MG per tablet 1 tablet, 1 tablet, Oral, 4x Daily PRN, Hermes Oreilly MD  •  doxycycline (MONODOX) capsule 100 mg, 100 mg, Oral, Q12H, Hermes Oreilly MD, 100 mg at 06/18/18 1114  •  fluticasone (FLONASE) 50 MCG/ACT nasal spray 2 spray, 2 spray, Each Nare, Daily, Abel Pal MD  •  nitroglycerin (NITROSTAT) SL tablet 0.4 mg, 0.4 mg,  Sublingual, Q5 Min PRN, Abel Pal MD  •  ondansetron (ZOFRAN) tablet 4 mg, 4 mg, Oral, Q6H PRN **OR** ondansetron ODT (ZOFRAN-ODT) disintegrating tablet 4 mg, 4 mg, Oral, Q6H PRN **OR** ondansetron (ZOFRAN) injection 4 mg, 4 mg, Intravenous, Q6H PRN, Abel Pal MD  •  pneumococcal polysaccharide 23-valent (PNEUMOVAX-23) vaccine 0.5 mL, 0.5 mL, Intramuscular, During Hospitalization, Abel Pal MD  •  saccharomyces boulardii (FLORASTOR) capsule 250 mg, 250 mg, Oral, BID, Hermes Oreilly MD, 250 mg at 06/18/18 1309  •  sodium chloride (OCEAN) nasal spray 2 spray, 2 spray, Nasal, PRN, Abel Pal MD  •  sodium chloride 0.9 % flush 1-10 mL, 1-10 mL, Intravenous, PRN, Abel Pal MD  •  sodium chloride 0.9 % flush 10 mL, 10 mL, Intravenous, PRN, Harrison Gutiérrez MD  •  sodium chloride 0.9 % infusion, 75 mL/hr, Intravenous, Continuous, Abel Pal MD, Last Rate: 75 mL/hr at 06/17/18 2209, 75 mL/hr at 06/17/18 2209  •  tamsulosin (FLOMAX) 24 hr capsule 0.4 mg, 0.4 mg, Oral, Nightly, Abel Pal MD, 0.4 mg at 06/17/18 2314         Review of Systems   Constitutional: Positive for fatigue.   Eyes: Negative.    Respiratory: Positive for shortness of breath.    Cardiovascular: Positive for palpitations.   Gastrointestinal: Positive for abdominal distention. Negative for abdominal pain and nausea.   Endocrine: Negative.    Skin: Negative.         No evidence for hyperpigmentation   Neurological: Positive for dizziness and light-headedness.   Psychiatric/Behavioral: Negative.    All other systems reviewed and are negative.    Objective     Vital Signs  Temp:  [96.7 °F (35.9 °C)-98 °F (36.7 °C)] 98 °F (36.7 °C)  Heart Rate:  [] 83  Resp:  [15-18] 16  BP: ()/(57-80) 115/80    Physical Exam  Physical Exam   Eyes: EOM are normal. Pupils are equal, round, and reactive to light.   Neck: Normal range of motion. Neck supple. No thyromegaly present.    Cardiovascular: Normal rate, regular rhythm, normal heart sounds and intact distal pulses.    Pulmonary/Chest: Effort normal and breath sounds normal.   Abdominal: Soft. Bowel sounds are normal.   Musculoskeletal: Normal range of motion. He exhibits no edema.   Skin: Skin is warm and dry.   Psychiatric: He has a normal mood and affect. His behavior is normal.   Nursing note and vitals reviewed.      Results Review:    I reviewed the patient's new clinical results.  Glucose   Date/Time Value Ref Range Status   06/17/2018 2047 110 70 - 130 mg/dL Final     Lab Results (last 72 hours)     Procedure Component Value Units Date/Time    Cortisol [[id number]] Collected:  06/18/18 1407    Specimen:  Blood Updated:  06/18/18 1450    Cortisol [[id number]] Collected:  06/18/18 1435    Specimen:  Blood Updated:  06/18/18 1450    Cortisol [[id number]]  (Normal) Collected:  06/18/18 1329    Specimen:  Blood Updated:  06/18/18 1427     Cortisol 12.43 mcg/dL     Narrative:       Cortisol Reference Ranges:    Cortisol 6AM - 10AM Range: 6.02-18.40 mcg/dl  Cortisol 4PM - 8PM Range: 2.68-10.50 mcg/dl  Critical AM/PM:    Less than 2 mcg/dl    Occult Blood X 1, Stool - Stool, Per Rectum [[id number]]  (Normal) Collected:  06/17/18 2225    Specimen:  Stool from Per Stoma Updated:  06/18/18 0756     Fecal Occult Blood Negative    Cortisol [[id number]]  (Normal) Collected:  06/18/18 0515    Specimen:  Blood Updated:  06/18/18 0615     Cortisol 4.36 mcg/dL     Narrative:       Cortisol Reference Ranges:    Cortisol 6AM - 10AM Range: 6.02-18.40 mcg/dl  Cortisol 4PM - 8PM Range: 2.68-10.50 mcg/dl  Critical AM/PM:    Less than 2 mcg/dl    TSH [[id number]]  (Normal) Collected:  06/18/18 0515    Specimen:  Blood Updated:  06/18/18 0615     TSH 1.760 mIU/mL     Basic Metabolic Panel [[id number]]  (Abnormal) Collected:  06/18/18 0515    Specimen:  Blood Updated:  06/18/18 0601     Glucose 87 mg/dL      BUN 12 mg/dL      Creatinine 0.99 mg/dL      Sodium  135 (L) mmol/L      Potassium 5.1 mmol/L      Chloride 101 mmol/L      CO2 19.3 (L) mmol/L      Calcium 9.4 mg/dL      eGFR Non African Amer 78 mL/min/1.73      BUN/Creatinine Ratio 12.1     Anion Gap 14.7 mmol/L     Narrative:       GFR Normal >60  Chronic Kidney Disease <60  Kidney Failure <15    Phosphorus [[id number]]  (Normal) Collected:  06/18/18 0515    Specimen:  Blood Updated:  06/18/18 0601     Phosphorus 3.7 mg/dL     Magnesium [[id number]]  (Normal) Collected:  06/18/18 0515    Specimen:  Blood Updated:  06/18/18 0601     Magnesium 1.8 mg/dL     Hemoglobin A1c [[id number]]  (Abnormal) Collected:  06/18/18 0515    Specimen:  Blood Updated:  06/18/18 0545     Hemoglobin A1C 5.91 (H) %     Narrative:       Hemoglobin A1C Ranges:    Increased Risk for Diabetes  5.7% to 6.4%  Diabetes                     >= 6.5%  Diabetic Goal                < 7.0%    CBC Auto Differential [[id number]]  (Abnormal) Collected:  06/18/18 0515    Specimen:  Blood Updated:  06/18/18 0541     WBC 6.29 10*3/mm3      RBC 4.82 10*6/mm3      Hemoglobin 12.9 (L) g/dL      Hematocrit 39.8 (L) %      MCV 82.6 fL      MCH 26.8 (L) pg      MCHC 32.4 (L) g/dL      RDW 15.2 (H) %      RDW-SD 45.6 fl      MPV 10.1 fL      Platelets 170 10*3/mm3      Neutrophil % 36.6 (L) %      Lymphocyte % 48.6 (H) %      Monocyte % 11.3 %      Eosinophil % 3.0 %      Basophil % 0.5 %      Immature Grans % 0.0 %      Neutrophils, Absolute 2.30 10*3/mm3      Lymphocytes, Absolute 3.06 10*3/mm3      Monocytes, Absolute 0.71 10*3/mm3      Eosinophils, Absolute 0.19 10*3/mm3      Basophils, Absolute 0.03 10*3/mm3      Immature Grans, Absolute 0.00 10*3/mm3     POC Glucose Once [[id number]]  (Normal) Collected:  06/17/18 2047    Specimen:  Blood Updated:  06/17/18 2047     Glucose 110 mg/dL     Narrative:       Meter: [id number] : [id number] Siddharth VELASCO    Urinalysis With / Culture If Indicated - Urine, Clean Catch [[id number]]  (Abnormal) Collected:  06/17/18  1453    Specimen:  Urine from Urine, Clean Catch Updated:  06/17/18 1510     Color, UA Yellow     Appearance, UA Clear     pH, UA 5.5     Specific Gravity, UA 1.016     Glucose, UA Negative     Ketones, UA Trace (A)     Bilirubin, UA Negative     Blood, UA Negative     Protein, UA Negative     Leuk Esterase, UA Negative     Nitrite, UA Negative     Urobilinogen, UA 0.2 E.U./dL    Narrative:       Urine microscopic not indicated.    [place] [facility] [[id number]] Collected:  06/17/18 1258    Specimen:  Blood Updated:  06/17/18 1400    Narrative:       The following orders were created for panel order [place] [facility].  Procedure                               Abnormality         Status                     ---------                               -----------         ------                     Light Blue Top[[id number]]                                   Final result               Green Top (Gel)[[id number]]                                  Final result               Lavender Top[[id number]]                                     Final result               Gold Top - SST[[id number]]                                   Final result                 Please view results for these tests on the individual orders.    Light Blue Top [[id number]] Collected:  06/17/18 1258    Specimen:  Blood Updated:  06/17/18 1400     Extra Tube hold for add-on     Comment: Auto resulted       Green Top (Gel) [[id number]] Collected:  06/17/18 1258    Specimen:  Blood Updated:  06/17/18 1400     Extra Tube Hold for add-ons.     Comment: Auto resulted.       Lavender Top [[id number]] Collected:  06/17/18 1258    Specimen:  Blood Updated:  06/17/18 1400     Extra Tube hold for add-on     Comment: Auto resulted       Gold Top - SST [[id number]] Collected:  06/17/18 1258    Specimen:  Blood Updated:  06/17/18 1400     Extra Tube Hold for add-ons.     Comment: Auto resulted.       Comprehensive Metabolic Panel [[id number]]  (Abnormal) Collected:  06/17/18 1258    Specimen:   Blood Updated:  06/17/18 1334     Glucose 104 (H) mg/dL      BUN 17 mg/dL      Creatinine 1.05 mg/dL      Sodium 133 (L) mmol/L      Potassium 5.0 mmol/L      Chloride 98 mmol/L      CO2 18.9 (L) mmol/L      Calcium 10.3 mg/dL      Total Protein 8.2 g/dL      Albumin 4.20 g/dL      ALT (SGPT) 33 U/L      AST (SGOT) 32 U/L      Alkaline Phosphatase 60 U/L      Total Bilirubin 0.6 mg/dL      eGFR Non African Amer 73 mL/min/1.73      Globulin 4.0 gm/dL      A/G Ratio 1.1 g/dL      BUN/Creatinine Ratio 16.2     Anion Gap 16.1 mmol/L     Troponin [[id number]]  (Normal) Collected:  06/17/18 1258    Specimen:  Blood Updated:  06/17/18 1334     Troponin T <0.010 ng/mL     Narrative:       Troponin T Reference Ranges:  Less than 0.03 ng/mL:    Negative for AMI  0.03 to 0.09 ng/mL:      Indeterminant for AMI  Greater than 0.09 ng/mL: Positive for AMI    Magnesium [[id number]]  (Normal) Collected:  06/17/18 1258    Specimen:  Blood Updated:  06/17/18 1334     Magnesium 1.6 mg/dL     CBC & Differential [[id number]] Collected:  06/17/18 1258    Specimen:  Blood Updated:  06/17/18 1308    Narrative:       The following orders were created for panel order CBC & Differential.  Procedure                               Abnormality         Status                     ---------                               -----------         ------                     CBC Auto Differential[[id number]]        Abnormal            Final result                 Please view results for these tests on the individual orders.    CBC Auto Differential [[id number]]  (Abnormal) Collected:  06/17/18 1258    Specimen:  Blood Updated:  06/17/18 1308     WBC 7.05 10*3/mm3      RBC 5.23 10*6/mm3      Hemoglobin 14.2 g/dL      Hematocrit 42.2 %      MCV 80.7 fL      MCH 27.2 pg      MCHC 33.6 g/dL      RDW 15.0 (H) %      RDW-SD 43.7 fl      MPV 9.8 fL      Platelets 183 10*3/mm3      Neutrophil % 42.7 %      Lymphocyte % 44.3 %      Monocyte % 10.9 %      Eosinophil % 1.8 %       Basophil % 0.3 %      Immature Grans % 0.0 %      Neutrophils, Absolute 3.01 10*3/mm3      Lymphocytes, Absolute 3.12 10*3/mm3      Monocytes, Absolute 0.77 10*3/mm3      Eosinophils, Absolute 0.13 10*3/mm3      Basophils, Absolute 0.02 10*3/mm3      Immature Grans, Absolute 0.00 10*3/mm3           Imaging Results (last 72 hours)     Procedure Component Value Units Date/Time    CT Angiogram Chest With Contrast [[id number]] Collected:  06/17/18 1633     Updated:  06/17/18 1710    Narrative:       CT ANGIOGRAPHY OF THE CHEST WITH INTRAVENOUS CONTRAST AND 3-D  RECONSTRUCTIONS     HISTORY: Chest pain.     The CT scan was performed as an emergency procedure through the chest  with CT angiography protocol using venous contrast and 3-D  reconstructions. The following findings are present:  1. The pulmonary arteries are moderately well opacified and there is no  evidence of pulmonary embolus. The thoracic aorta shows no evidence of  aneurysm or dissection.  2. The lungs are well-expanded and clear. There is no mediastinal or  hilar or axillary adenopathy. There is no pericardial effusion.  3. The CT images through the upper liver, spleen, and both adrenal  glands are unremarkable. There is a small cyst in the partially  visualized upper pole of right kidney.                 Radiation dose reduction techniques were utilized, including automated  exposure control and exposure modulation based on body size.     This report was finalized on 6/17/2018 5:06 PM by Dr. Dante Hassan M.D.       XR Chest 1 View [[id number]] Collected:  06/17/18 1442     Updated:  06/17/18 1602    Narrative:       ONE-VIEW PORTABLE CHEST     HISTORY: Shortness of breath.     FINDINGS:  The lungs are well-expanded and clear. The heart is top  normal in size with a pacemaker in place and there is no acute disease  or change from 02/28/2015.     This report was finalized on 6/17/2018 3:59 PM by Dr. Dante Hassan M.D.             Assessment/Plan      Patient Active Problem List    Diagnosis   • Crohn's disease [K50.90]   • Ileostomy present [Z93.2]   • Paroxysmal ventricular tachycardia [I47.2]   • Chronic systolic CHF (congestive heart failure) [I50.22]   • Nonischemic cardiomyopathy [I42.8]   • Orthostasis [I95.1]   • Iron deficiency anemia [D50.9]   • Headache [R51]   • Acute sinusitis [J01.90]   • Right lower quadrant abdominal pain [R10.31]   • Enteritis [K52.9]   • Mass-like inflammation at terminal ileum [R22.9]   • RUQ abdominal pain [R10.11]     Overview Note:     Added automatically from request for surgery [id number]     • Crohn's disease of small intestine with other complication [K50.018]     Overview Note:     Added automatically from request for surgery [id number]     • History of pneumonia [Z87.01]   • Abnormal CXR (chest x-ray) [R93.8]     Overview Note:     Hazy area in RLL on CXR noted since 2015 CXR     • Adjustment disorder with depressed mood [F43.21]   • Ankylosis of spine [M43.20]   • Crohn's disease with complication [K50.919]   • Diabetes mellitus [E11.9]     Overview Note:     Description: Diet controlled.     • Essential hypertension [I10]   • External hemorrhoids [K64.4]     Overview Note:     Description: hx thrombosis     • Genital herpes simplex [A60.00]   • Gout [M10.9]   • Insomnia [G47.00]   • Iron deficiency [E61.1]   • Prostate mass [N42.9]   • Recurrent aphthous ulcer [K12.0]   • Asteatosis cutis [L85.3]   • Exacerbation of Crohn's disease of small intestine [K50.00]   • Palpitations [R00.2]   • PVC's (premature ventricular contractions) [I49.3]   • Cardiomyopathy [I42.9]     Overview Note:     Description: sees Dr. Reyes; NICM/ (-) cath, EF 25-35%; has ICD     • Paroxysmal VT [I47.2]     Orthostatic hypotension  Dizziness  Mild hyponatremia  History of intermittent steroid use for the past several years  Crohn's disease  Palpitations    I discussed with the patient and his wife over the phone  It appears that patient has had  "several courses of steroids in the past several years and the last use was 6 weeks ago immediately prior to his surgery and was discontinued abruptly  Patient has not felt well since then  He has been feeling fatigued and lightheaded and dizzy weight loss and poor appetite since then    Baseline cortisol was 5.5 at 5 AM not an optimal time for baseline test  I discussed the cosyntropin stim testing with the patient  Will check baseline cortisol prior to the testing  Considering his history of chronic intermittent steroid use he may not have maximal response to cosyntropin  At best he may have secondary adrenal insufficiency  Cosyntropin testing would've confirmed if he had primary adrenal insufficiency    I recommend doing this test this afternoon  After the results have been reviewed I will recommend further  It is possible that patient may have to try empiric steroids for some time and see for the response  I discussed my plan with the patient in person and his wife over the phone and they both verbalized understanding    The total time spent for old record and lab review and floor time was more than 110 min of which greater than 60 min of time ( greater than 50% of the total time )  was spent face to face with the patient counseling and coordination of care on recommended evaluation and treatment options, instructions for management/treatment and /or follow up  and importance of compliance with chosen management or treatment options  Compa Arthur MD FACE.  06/18/18  3:15 PM        EMR Dragon / transcription disclaimer:     \"Dictated utilizing Dragon dictation\".   "

## 2018-06-18 NOTE — CONSULTS
Date of Hospital Visit: 18  Encounter Provider: Harrison Reyes MD  Place of Service: [facility] CARDIOLOGY  Patient Name: Arnie Calvo  :1960  [id number]  Referral Provider: No ref. provider found    Chief complaint: Orthostasis    Reason for Consult:  Palpitations/ Dyspnea on exertion    History of Present Illness:    Mr Calvo is a 57 year old patient of mine with a history of non-ischemic cardiomyopathy ( has ICD and EF 25-35%), CHF, diabetes, hypertension, paroxysmal ventricular tachycardia, and Crohn's disease.  I last saw this patient in 2018.  At the time of that visit he was recently hospitalized with an exacerbation of his Crohn's and had some bradycardia during that admission. His Coreg was cut in half.  His HR was 53 on his EKG in office. He was not having any ventricular tachycardia or palpitations so no further medication changes were done.  His last ECHO from 2018 showed EF 32% with hypokinetic  mid anterior, apical anterior, basal anterolateral, mid anterolateral, apical lateral, basal inferolateral, mid inferolateral, apical inferior, mid inferior, apical septal, basal inferoseptal, mid inferoseptal, apex hypokinetic, mid anteroseptal, basal anterior, basal inferior and basal inferoseptal walls.    He presented to the ED yesterday with complaints of palpitations and decrease in his HR along with SOA on exertion.  His stated his palpitations increased when he stood up and he became dizzy.  He noticed his HR speeding up and slowing down.   He had small bowel reduction seven weeks ago and is scheduled for his ostomy reversal on . His HR was 66 on arrival and he EKG showed NSR with PVC's.  Initial troponin was negative, BUN/Creat 12/0.99, K 5.1, TSH 1.76, Hgb 12.9  WBC 6.29.  His ICD was interrogated in the ER and the patient states they did not find anything significant. Orthostatic blood pressures were obtained 101/76 lying, 99/55  standing, 105/71 standing.  He was admitted for further evaluation.    This morning he is not feeling SOA or dizzy.  He has gotten out of bed several times to empty his ostomy through the night.  His blood pressure has been running low 76/57 and 84/66 this morning.  The patient states he has marginal blood pressures but this is very low for him.  His Coreg is on hold.  He has episodes of bigeminy and had some SVT with  that last few minutes.  He is getting IVF's for hydration due to his ostomy output.          I reviewed the above history of present illness and agree with it.  Is a gentleman with a severe nonischemic cardiomyopathy he's had ventricular tachycardia and frequent PVCs he also has Crohn's disease.  He recently has had surgery on his Crohn's disease and is lost about 25 pounds and now is having shortness of breath with minimal exertion as well as lightheadedness and he's been hypotensive is not having hemoptysis does not have any pain or swelling in his legs not had chest pain       Previous Cardiac Testing   ECHO 03/02/2018  · Left ventricular systolic function is severely decreased. Estimated EF = 32%.  · The left ventricular cavity is borderline dilated.  · Left ventricular diastolic dysfunction (grade I) consistent with impaired relaxation.  · Left atrial cavity size is mildly dilated.  · The following left ventricular wall segments are hypokinetic: mid anterior, apical anterior, basal anterolateral, mid anterolateral, apical lateral, basal inferolateral, mid inferolateral, apical inferior, mid inferior, apical septal, basal inferoseptal, mid inferoseptal, apex hypokinetic, mid anteroseptal, basal anterior, basal inferior and basal inferoseptal.           Past Medical History:   Diagnosis Date   • Acute pain of left hip    • Adjustment disorder with depressed mood    • Ankylosis of spine    • Arthritis    • Cardiomyopathy     sees Dr. Reyes; NICM/ (-) cath, EF 25-35%; has ICD   • CHF  (congestive heart failure)    • Crohn's disease    • Diabetes mellitus     Diet controlled.   • Dysthymic disorder 2012   • Dysuria    • Early onset dysthymia    • Elevated total protein    • Essential hypertension    • External hemorrhoids    • Genital herpes    • Gout    • Health care maintenance    • Insomnia    • Iron deficiency    • Paroxysmal ventricular tachycardia    • Pneumonia    • Prostate nodule    • Recurrent canker sores    • Thoracic back pain    • Urinary hesitancy    • Xerosis cutis        Past Surgical History:   Procedure Laterality Date   • ABDOMINAL PERINEAL BOWEL RESECTION W/ ILEOANAL POUCH     • CARDIAC CATHETERIZATION     • CARDIAC DEFIBRILLATOR PLACEMENT      Had Jude lead that was fractured.  Lead was tied off.  New lead and new device implanted.   • CARDIAC SURGERY     • COLON SURGERY     • COLONOSCOPY  04/03/2015    abnormal cecum, three polypoid masses, pre cancerous vs crohns, IH, tics, hyperplastic polyp   • COLONOSCOPY N/A 3/17/2017    polypoid masses, tics, IH, polyp   • COLOSTOMY         Prescriptions Prior to Admission   Medication Sig Dispense Refill Last Dose   • acetaminophen (TYLENOL) 500 MG tablet Take 2 tablets by mouth Every 6 (Six) Hours As Needed for Mild Pain  (do not exceede 4grams total/day).   Past Month at Unknown time   • DOXYCYCLINE CALCIUM PO Take  by mouth. Patient does not know mg   6/16/2018 at Unknown time   • fluticasone (FLONASE) 50 MCG/ACT nasal spray SHAKE LIQUID AND USE 2 SPRAYS IN EACH NOSTRIL DAILY (Patient taking differently: SHAKE LIQUID AND USE 2 SPRAYS IN EACH NOSTRIL DAILY PRN) 48 mL 3 Past Month at Unknown time   • sodium chloride (OCEAN) 0.65 % nasal spray 2 sprays into each nostril As Needed for Congestion.  12 Past Month at Unknown time   • tamsulosin (FLOMAX) 0.4 MG capsule 24 hr capsule Take 1 capsule by mouth Every Night.   6/16/2018 at Unknown time   • APRISO 0.375 g 24 hr capsule TAKE 4 CAPSULES BY MOUTH EVERY  capsule 6 Taking    • hyoscyamine (LEVSIN) 0.125 MG SL tablet Take 1 tablet by mouth Every 4 (Four) Hours As Needed for Cramping or Diarrhea. 30 tablet 0        Current Meds  Scheduled Meds:    carvedilol 12.5 mg Oral BID   fluticasone 2 spray Each Nare Daily   tamsulosin 0.4 mg Oral Nightly     Continuous Infusions:    sodium chloride 75 mL/hr Last Rate: 75 mL/hr (06/17/18 7993)     PRN Meds:.•  acetaminophen  •  nitroglycerin  •  ondansetron **OR** ondansetron ODT **OR** ondansetron  •  pneumococcal polysaccharide 23-valent  •  sodium chloride  •  sodium chloride  •  sodium chloride    Allergies as of 06/17/2018 - Reviewed 06/17/2018   Allergen Reaction Noted   • Iodine Nausea And Vomiting 06/17/2018   • Shellfish-derived products Nausea And Vomiting 06/17/2018   • Adhesive tape Rash 05/20/2017   • Bactrim [sulfamethoxazole-trimethoprim] Hives 02/12/2016   • Latex Rash 02/10/2016       Social History     Social History   • Marital status:      Spouse name: N/A   • Number of children: N/A   • Years of education: N/A     Occupational History   • Not on file.     Social History Main Topics   • Smoking status: Never Smoker   • Smokeless tobacco: Not on file   • Alcohol use No   • Drug use: No   • Sexual activity: Defer     Other Topics Concern   • Not on file     Social History Narrative   • No narrative on file       Family History   Problem Relation Age of Onset   • Hypertension Mother    • Diabetes Mother         type 2 mellitus    • Cancer Father         colon   • Heart failure Father         congestive   • Gout Father    • Colon cancer Father        REVIEW OF SYSTEMS:   ROS was performed and is negative except as outlined in HPI     REVIEW OF SYSTEMS:   CONSTITUTIONAL: No weight loss, fever, chills, weakness or fatigue.   HEENT: Eyes: No visual loss, blurred vision, double vision or yellow sclerae. Ears, Nose, Throat: No hearing loss, sneezing, congestion, runny nose or sore throat.   SKIN: No rash or itching.    "  RESPIRATORY: No shortness of breath, hemoptysis, cough or sputum.   GASTROINTESTINAL: No anorexia, nausea, vomiting or diarrhea. No abdominal pain, bright red blood per rectum or melena.  GENITOURINARY: No burning on urination, hematuria or increased frequency.  NEUROLOGICAL: No headache, dizziness, syncope, paralysis, ataxia, numbness or tingling in the extremities. No change in bowel or bladder control.   MUSCULOSKELETAL: No muscle, back pain, joint pain or stiffness.   HEMATOLOGIC: No anemia, bleeding or bruising.   LYMPHATICS: No enlarged nodes. No history of splenectomy.   PSYCHIATRIC: No history of depression, anxiety, hallucinations.   ENDOCRINOLOGIC: No reports of sweating, cold or heat intolerance. No polyuria or polydipsia.        Objective:   Temp:  [96.7 °F (35.9 °C)-98.2 °F (36.8 °C)] 97.5 °F (36.4 °C)  Heart Rate:  [] 91  Resp:  [15-18] 18  BP: ()/(55-80) 92/70  Body mass index is 25.85 kg/m².  Flowsheet Rows      First Filed Value   Admission Height  175.3 cm (69\") Documented at 06/17/2018 1246   Admission Weight  79.4 kg (175 lb) Documented at 06/17/2018 1246        Vitals:    06/18/18 0822   BP: 92/70   Pulse: 91   Resp: 18   Temp: 97.5 °F (36.4 °C)   SpO2: 97%       Head:    Normocephalic, without obvious abnormality, atraumatic   Eyes:            Lids and lashes normal, conjunctivae and sclerae normal, no   icterus, no pallor   Ears:    Ears appear intact with no abnormalities noted   Throat:   No oral lesions, dentition good   Neck:   No adenopathy, supple, trachea midline, no thyromegaly, no   carotid bruit, no JVD   Lungs:     Breath sounds are equal and clear to auscultation    Heart:    Normal S1 and S2, RRR, No M/G/R   Abdomen:     Normal bowel sounds, no masses, no organomegaly, soft        non-tender, non-distended, no guarding   Extremities:   Moves all extremities well, no edema, no cyanosis, no redness   Pulses:   Pulses palpable and equal bilaterally.  "   Skin:  Psychiatric:   No bleeding, bruising or rash    Awake, alert and oriented x 3, normal mood and affect             CXR  FINDINGS:  The lungs are well-expanded and clear. The heart is top  normal in size with a pacemaker in place and there is no acute disease  or change from 02/28/2015.    Telemetry          EKG      I personally viewed and interpreted the patient's EKG/Telemetry data    Assessment:  Active Hospital Problems (** Indicates Principal Problem)    Diagnosis Date Noted   • **Orthostasis [I95.1] 06/17/2018   • Crohn's disease [K50.90] 06/17/2018   • Ileostomy present [Z93.2] 06/17/2018   • Paroxysmal ventricular tachycardia [I47.2] 06/17/2018   • Chronic systolic CHF (congestive heart failure) [I50.22] 06/17/2018   • Nonischemic cardiomyopathy [I42.8] 06/17/2018   • Iron deficiency anemia [D50.9] 06/17/2018      Resolved Hospital Problems    Diagnosis Date Noted Date Resolved   No resolved problems to display.       Plan:He looks the same as he usually does except he's lost quite a bit of weight and he is hypotensive now.  His Coreg has been held it appears that his a.m. cortisol level is a little on the low side and he may need a cosyntropin stem test.  I don't have an explanation for his hypotension we can get a limited echo and make sure that has not been a change but the EF before was very reduced.  He has had a CT of his chest that was unremarkable    Harrison Reyes MD  06/18/18  8:52 AM.

## 2018-06-18 NOTE — H&P
HISTORY AND PHYSICAL   [facility]        Patient Identification:  Name: Arnie Calvo  Age: 57 y.o.  Sex: male  :  1960  MRN: [id number]                     Primary Care Physician: Zechariah Fatima MD    Chief Complaint:  Dyspnea on exertion.  Lightheadedness with standing    History of Present Illness:   Pleasant 57-year-old gentleman with a number of medical issues.  This includes a nonischemic cardiomyopathy.  A fairly recent echocardiogram showed ejection fraction 32% with essential global hypokinesia.  He has a history of paroxysmal VT.  He has a pacer and defibrillator in place.  He has noted in the last couple weeks a decline in his exercise tolerance.  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  The last couple weeks however he is noting increasing dyspnea on exertion.  In addition to this he is also noting that he feels lightheaded when he goes from a sitting or lying position to standing.  He has been noting increase in palpitations.  Most notably has been noting his heart rate going from very slow, apparently paced rate too very rapid heart rates.  He periodically has had a subjective sense of shortness of air.  He has noted atypical chest pains but they do not appear to have any anginal pattern.  He notes they're very brief and sharp and positional.    He has a history of Crohn's disease for which she has been struggling for quite some time.  Approximately 2 weeks prior to the above he underwent resection of a part of the small bowel and he now has an ileostomy.  He states that most of the colon has been preserved and plans are for a reanastomosis in the future.  However in the meantime he does have the ileostomy with the associated increased output.  He has not noted any recent changes in that output.  No nausea or vomiting.  Per rectum he only passes occasional mucus which is to be expected from his surgical history.  Presently lying and resting in the bed he feels fine.      Past Medical  History:  Past Medical History:   Diagnosis Date   • Acute pain of left hip    • Adjustment disorder with depressed mood    • Ankylosis of spine    • Arthritis    • Cardiomyopathy     sees Dr. Reyes; NICM/ (-) cath, EF 25-35%; has ICD   • CHF (congestive heart failure)    • Crohn's disease    • Diabetes mellitus     Diet controlled.   • Dysthymic disorder 2012   • Dysuria    • Early onset dysthymia    • Elevated total protein    • Essential hypertension    • External hemorrhoids    • Genital herpes    • Gout    • Health care maintenance    • Insomnia    • Iron deficiency    • Paroxysmal ventricular tachycardia    • Pneumonia    • Prostate nodule    • Recurrent canker sores    • Thoracic back pain    • Urinary hesitancy    • Xerosis cutis      Past Surgical History:  Past Surgical History:   Procedure Laterality Date   • ABDOMINAL PERINEAL BOWEL RESECTION W/ ILEOANAL POUCH     • CARDIAC CATHETERIZATION     • CARDIAC DEFIBRILLATOR PLACEMENT      Had Jude lead that was fractured.  Lead was tied off.  New lead and new device implanted.   • CARDIAC SURGERY     • COLON SURGERY     • COLONOSCOPY  04/03/2015    abnormal cecum, three polypoid masses, pre cancerous vs crohns, IH, tics, hyperplastic polyp   • COLONOSCOPY N/A 3/17/2017    polypoid masses, tics, IH, polyp   • COLOSTOMY        Home Meds:  Prescriptions Prior to Admission   Medication Sig Dispense Refill Last Dose   • acetaminophen (TYLENOL) 500 MG tablet Take 2 tablets by mouth Every 6 (Six) Hours As Needed for Mild Pain  (do not exceede 4grams total/day).   Past Month at Unknown time   • carvedilol (COREG) 12.5 MG tablet TAKE 1 TABLET BY MOUTH TWICE DAILY 180 tablet 0 6/16/2018 at Unknown time   • DOXYCYCLINE CALCIUM PO Take  by mouth. Patient does not know mg   6/16/2018 at Unknown time   • fluticasone (FLONASE) 50 MCG/ACT nasal spray SHAKE LIQUID AND USE 2 SPRAYS IN EACH NOSTRIL DAILY (Patient taking differently: SHAKE LIQUID AND USE 2 SPRAYS IN EACH  NOSTRIL DAILY PRN) 48 mL 3 Past Month at Unknown time   • sodium chloride (OCEAN) 0.65 % nasal spray 2 sprays into each nostril As Needed for Congestion.  12 Past Month at Unknown time   • tamsulosin (FLOMAX) 0.4 MG capsule 24 hr capsule Take 1 capsule by mouth Every Night.   6/16/2018 at Unknown time   • APRISO 0.375 g 24 hr capsule TAKE 4 CAPSULES BY MOUTH EVERY  capsule 6 Taking   • hyoscyamine (LEVSIN) 0.125 MG SL tablet Take 1 tablet by mouth Every 4 (Four) Hours As Needed for Cramping or Diarrhea. 30 tablet 0        Allergies:  Allergies   Allergen Reactions   • Iodine Nausea And Vomiting   • Shellfish-Derived Products Nausea And Vomiting   • Adhesive Tape Rash   • Bactrim [Sulfamethoxazole-Trimethoprim] Hives   • Latex Rash     Immunizations:  Immunization History   Administered Date(s) Administered   • Influenza, Quadrivalent 09/14/2015   • Pneumococcal Conjugate (PCV7) 01/01/2011   • Td 01/01/2011     Social History:   Social History     Social History Narrative   • No narrative on file     Social History   Substance Use Topics   • Smoking status: Never Smoker   • Smokeless tobacco: Not on file   • Alcohol use No     Family History:  Family History   Problem Relation Age of Onset   • Hypertension Mother    • Diabetes Mother         type 2 mellitus    • Cancer Father         colon   • Heart failure Father         congestive   • Gout Father    • Colon cancer Father         Review of Systems  Review of Systems   Constitutional:        As per history of present illness.  Otherwise negative.   HENT: Negative.    Eyes: Negative.    Respiratory:        As per history of present illness.  Otherwise negative.   Cardiovascular:        As per history of present illness.  Otherwise negative.   Gastrointestinal:        As per history of present illness.  Otherwise negative.   Endocrine: Negative.    Genitourinary: Negative.    Musculoskeletal: Negative.    Skin: Negative.    Allergic/Immunologic: Negative.     Neurological: Negative.    Hematological: Negative.    Psychiatric/Behavioral: Negative.        Objective:  tMax 24 hrs: Temp (24hrs), Av.4 °F (36.3 °C), Min:96.7 °F (35.9 °C), Max:98.2 °F (36.8 °C)    Vitals Ranges:   Temp:  [96.7 °F (35.9 °C)-98.2 °F (36.8 °C)] 97.9 °F (36.6 °C)  Heart Rate:  [] 94  Resp:  [15-18] 16  BP: ()/(55-79) 112/74      Exam:  Physical Exam   Constitutional: He is oriented to person, place, and time. He appears well-developed and well-nourished. No distress.   HENT:   Head: Normocephalic and atraumatic.   Right Ear: External ear normal.   Left Ear: External ear normal.   Nose: Nose normal.   Mouth/Throat: Oropharynx is clear and moist.   Eyes: Conjunctivae and EOM are normal. Right eye exhibits no discharge. Left eye exhibits no discharge. No scleral icterus.   Neck: Neck supple. No JVD present. No tracheal deviation present. No thyromegaly present.   Cardiovascular: Normal rate, regular rhythm, normal heart sounds and intact distal pulses.    Pulmonary/Chest: Effort normal and breath sounds normal. No stridor. No respiratory distress. He exhibits no tenderness.   Abdominal: Soft. Bowel sounds are normal. He exhibits no distension and no mass. There is no tenderness. There is no rebound and no guarding.   The output from his ileostomy in the right lower quadrant appears frankly bloody.  He states he believes this is because he just drank a lot of red Gatorade.     Musculoskeletal: He exhibits no edema or deformity.   Neurological: He is alert and oriented to person, place, and time. No cranial nerve deficit. He exhibits normal muscle tone. Coordination normal.   Skin: Skin is warm and dry. He is not diaphoretic.   Psychiatric: He has a normal mood and affect. His behavior is normal. Judgment and thought content normal.       Data Review:  All labs and radiology reviewed.    Assessment:  Principal Problem:    Orthostasis  Active Problems:    Crohn's disease    Ileostomy  present    Paroxysmal ventricular tachycardia    Chronic systolic CHF (congestive heart failure)    Nonischemic cardiomyopathy    Iron deficiency anemia      Plan:  Patient is been admitted to monitored unit.  We'll obtain a evaluation by his cardiologist.  I suspect that with the recent ileostomy he has slowly been getting behind in his fluids and we will cautiously hydrate him overnight.  Check a cortisol level in the morning.  I will check the contents of his ileostomy be sure that indeed it is not blood.  For full details please see orders.    Abel Pal MD  6/17/2018  8:47 PM    EMR Dragon/Transcription disclaimer:   Much of this encounter note is an electronic transcription/translation of spoken language to printed text. The electronic translation of spoken language may permit erroneous, or at times, nonsensical words or phrases to be inadvertently transcribed; Although I have reviewed the note for such errors, some may still exist.

## 2018-06-18 NOTE — PLAN OF CARE
Problem: Patient Care Overview  Goal: Plan of Care Review  Outcome: Ongoing (interventions implemented as appropriate)   06/18/18 6586   Coping/Psychosocial   Plan of Care Reviewed With spouse   Plan of Care Review   Progress no change   OTHER   Outcome Summary ADMISSION ORDERS EXPLAINED TO PT, QUESTIONS ANWERED. UP TO BR TO EMPTY ILEOSTOMY. PREFERS TO TAKE CARE OF ILEOSTOMY. STOOL IN ILEOSTOMY LIQUID, BROWN WITH BRIGHT RED WATER. SAMPLE SENT TO LAB FOR OCCULT TEST. PT DENIES PAIN, AND NAUSEA. ABD FLAT WITH SCAR ,WELL HEALED. NAD,. ORTHO BP OBTAINED, WAS 20MMHG LOWER FROM LYING TO STANDING B/P. COREG NOT GIVEN. INSTRUCTED TO CALL FOR HELP WHEN GETTING OOB, PT VOICED UNDERSTANDING OF BEING FALL RISK..     Goal: Individualization and Mutuality  Outcome: Ongoing (interventions implemented as appropriate)    Goal: Discharge Needs Assessment  Outcome: Ongoing (interventions implemented as appropriate)    Goal: Interprofessional Rounds/Family Conf  Outcome: Ongoing (interventions implemented as appropriate)      Problem: Ileostomy (Adult)  Goal: Signs and Symptoms of Listed Potential Problems Will be Absent, Minimized or Managed (Ileostomy)  Outcome: Ongoing (interventions implemented as appropriate)      Problem: Fall Risk (Adult)  Goal: Identify Related Risk Factors and Signs and Symptoms  Outcome: Ongoing (interventions implemented as appropriate)    Goal: Absence of Fall  Outcome: Ongoing (interventions implemented as appropriate)

## 2018-06-18 NOTE — PROGRESS NOTES
" LOS: 0 days     Name: Arnie Calvo  Age: 57 y.o.  Sex: male  :  1960  MRN: [id number]         Primary Care Physician: Zechariah Fatima MD    Subjective   Subjective  Feeling better today with IV fluids overnight.  Still a little dizzy and lightheaded when he gets up and also becomes short of breath when he walks or then about 15.  Does not feel that his ostomy output has increased from baseline.  The bloody appearance yesterday has resolved and stool occult blood was negative.  He denies any abdominal pain, nausea, vomiting.  He states that he has 5 more days of doxycycline to take for a peristomal abscess that was found to be MRSA positive.    Objective   Vital Signs  Temp:  [96.7 °F (35.9 °C)-97.9 °F (36.6 °C)] 97.5 °F (36.4 °C)  Heart Rate:  [] 91  Resp:  [15-18] 18  BP: ()/(55-80) 92/70  Body mass index is 25.85 kg/m².    Objective:  General Appearance:  Comfortable and in no acute distress.    Vital signs: (most recent): Blood pressure 92/70, pulse 91, temperature 97.5 °F (36.4 °C), temperature source Oral, resp. rate 18, height 175.3 cm (69\"), weight 79.4 kg (175 lb 0.7 oz), SpO2 95 %.    Lungs:  Normal effort and normal respiratory rate.    Heart: Normal rate.  Regular rhythm.    Abdomen: Abdomen is soft.  (Ileostomy present).  Bowel sounds are normal.   There is no abdominal tenderness.     Extremities: There is no dependent edema or local swelling.    Neurological: Patient is alert and oriented to person, place and time.    Skin:  Warm and dry.              Results Review:       I reviewed the patient's new clinical results.      Results from last 7 days  Lab Units 18  0515 18  1258   WBC 10*3/mm3 6.29 7.05   HEMOGLOBIN g/dL 12.9* 14.2   PLATELETS 10*3/mm3 170 183       Results from last 7 days  Lab Units 18  0515 18  1258   SODIUM mmol/L 135* 133*   POTASSIUM mmol/L 5.1 5.0   CHLORIDE mmol/L 101 98   CO2 mmol/L 19.3* 18.9*   BUN mg/dL 12 17   CREATININE mg/dL " 0.99 1.05   CALCIUM mg/dL 9.4 10.3   GLUCOSE mg/dL 87 104*                 Scheduled Meds:     carvedilol 12.5 mg Oral BID   doxycycline 100 mg Oral Q12H   fluticasone 2 spray Each Nare Daily   saccharomyces boulardii 250 mg Oral BID   tamsulosin 0.4 mg Oral Nightly     PRN Meds:   •  acetaminophen  •  diphenoxylate-atropine  •  nitroglycerin  •  ondansetron **OR** ondansetron ODT **OR** ondansetron  •  pneumococcal polysaccharide 23-valent  •  sodium chloride  •  sodium chloride  •  sodium chloride  Continuous Infusions:    sodium chloride 75 mL/hr Last Rate: 75 mL/hr (06/17/18 2209)       Assessment/Plan   Principal Problem:    Orthostasis  Active Problems:    Crohn's disease    Ileostomy present    Paroxysmal ventricular tachycardia    Chronic systolic CHF (congestive heart failure)    Nonischemic cardiomyopathy    Iron deficiency anemia  Resolving peristomal abscess    Assessment & Plan    - Limited echocardiogram has been ordered for reevaluation of his his heart failure.  - With the slightly depressed cortisol level this morning I will ask endocrinology to evaluate for the possibility of adrenal insufficiency contributing to his hypotension  - Holding parameters have been placed on Coreg and he did not receive this morning.  - Continue gentle IV fluids  - per the patient it does not appear that he has had excessive ostomy output but does say that he was recently admitted at [facility] for dehydration.  Will monitor this and add as needed Lomotil and a probiotic.  - He will complete 5 more days of doxycycline for peristomal abscess that now appears largely resolved.      Hermes Oreilly MD  [facility]  06/18/18  12:52 PM

## 2018-06-19 PROBLEM — I95.1 ORTHOSTATIC HYPOTENSION: Status: ACTIVE | Noted: 2018-06-19

## 2018-06-19 LAB
ANION GAP SERPL CALCULATED.3IONS-SCNC: 14 MMOL/L
BASOPHILS # BLD AUTO: 0.02 10*3/MM3 (ref 0–0.2)
BASOPHILS NFR BLD AUTO: 0.4 % (ref 0–1.5)
BUN BLD-MCNC: 11 MG/DL (ref 6–20)
BUN/CREAT SERPL: 13.1 (ref 7–25)
CALCIUM SPEC-SCNC: 9.1 MG/DL (ref 8.6–10.5)
CHLORIDE SERPL-SCNC: 107 MMOL/L (ref 98–107)
CO2 SERPL-SCNC: 19 MMOL/L (ref 22–29)
CREAT BLD-MCNC: 0.84 MG/DL (ref 0.76–1.27)
DEPRECATED RDW RBC AUTO: 45.9 FL (ref 37–54)
EOSINOPHIL # BLD AUTO: 0.14 10*3/MM3 (ref 0–0.7)
EOSINOPHIL NFR BLD AUTO: 2.8 % (ref 0.3–6.2)
ERYTHROCYTE [DISTWIDTH] IN BLOOD BY AUTOMATED COUNT: 15.3 % (ref 11.5–14.5)
GFR SERPL CREATININE-BSD FRML MDRD: 94 ML/MIN/1.73
GLUCOSE BLD-MCNC: 89 MG/DL (ref 65–99)
HCT VFR BLD AUTO: 36.7 % (ref 40.4–52.2)
HGB BLD-MCNC: 11.7 G/DL (ref 13.7–17.6)
IMM GRANULOCYTES # BLD: 0 10*3/MM3 (ref 0–0.03)
IMM GRANULOCYTES NFR BLD: 0 % (ref 0–0.5)
LYMPHOCYTES # BLD AUTO: 2.4 10*3/MM3 (ref 0.9–4.8)
LYMPHOCYTES NFR BLD AUTO: 47.9 % (ref 19.6–45.3)
MCH RBC QN AUTO: 26.4 PG (ref 27–32.7)
MCHC RBC AUTO-ENTMCNC: 31.9 G/DL (ref 32.6–36.4)
MCV RBC AUTO: 82.8 FL (ref 79.8–96.2)
MONOCYTES # BLD AUTO: 0.6 10*3/MM3 (ref 0.2–1.2)
MONOCYTES NFR BLD AUTO: 12 % (ref 5–12)
NEUTROPHILS # BLD AUTO: 1.85 10*3/MM3 (ref 1.9–8.1)
NEUTROPHILS NFR BLD AUTO: 36.9 % (ref 42.7–76)
PLATELET # BLD AUTO: 153 10*3/MM3 (ref 140–500)
PMV BLD AUTO: 10.2 FL (ref 6–12)
POTASSIUM BLD-SCNC: 4 MMOL/L (ref 3.5–5.2)
RBC # BLD AUTO: 4.43 10*6/MM3 (ref 4.6–6)
SODIUM BLD-SCNC: 140 MMOL/L (ref 136–145)
WBC NRBC COR # BLD: 5.01 10*3/MM3 (ref 4.5–10.7)

## 2018-06-19 PROCEDURE — 63710000001 PREDNISONE PER 5 MG: Performed by: INTERNAL MEDICINE

## 2018-06-19 PROCEDURE — 94799 UNLISTED PULMONARY SVC/PX: CPT

## 2018-06-19 PROCEDURE — 99233 SBSQ HOSP IP/OBS HIGH 50: CPT | Performed by: INTERNAL MEDICINE

## 2018-06-19 PROCEDURE — 80048 BASIC METABOLIC PNL TOTAL CA: CPT | Performed by: INTERNAL MEDICINE

## 2018-06-19 PROCEDURE — 25010000002 HYDROCORTISONE SODIUM SUCCINATE 100 MG RECONSTITUTED SOLUTION: Performed by: INTERNAL MEDICINE

## 2018-06-19 PROCEDURE — 99232 SBSQ HOSP IP/OBS MODERATE 35: CPT | Performed by: INTERNAL MEDICINE

## 2018-06-19 PROCEDURE — 85025 COMPLETE CBC W/AUTO DIFF WBC: CPT | Performed by: INTERNAL MEDICINE

## 2018-06-19 RX ORDER — FLUDROCORTISONE ACETATE 0.1 MG/1
50 TABLET ORAL DAILY
Status: DISCONTINUED | OUTPATIENT
Start: 2018-06-19 | End: 2018-06-19

## 2018-06-19 RX ORDER — PREDNISONE 2.5 MG
2.5 TABLET ORAL
Status: DISCONTINUED | OUTPATIENT
Start: 2018-06-19 | End: 2018-06-22 | Stop reason: HOSPADM

## 2018-06-19 RX ORDER — PREDNISONE 1 MG/1
5 TABLET ORAL
Status: DISCONTINUED | OUTPATIENT
Start: 2018-06-19 | End: 2018-06-22 | Stop reason: HOSPADM

## 2018-06-19 RX ADMIN — Medication 250 MG: at 20:16

## 2018-06-19 RX ADMIN — DOXYCYCLINE 100 MG: 100 CAPSULE ORAL at 07:39

## 2018-06-19 RX ADMIN — PREDNISONE 2.5 MG: 2.5 TABLET ORAL at 17:42

## 2018-06-19 RX ADMIN — DOXYCYCLINE 100 MG: 100 CAPSULE ORAL at 20:16

## 2018-06-19 RX ADMIN — HYDROCORTISONE SODIUM SUCCINATE 100 MG: 100 INJECTION, POWDER, FOR SOLUTION INTRAMUSCULAR; INTRAVENOUS at 11:17

## 2018-06-19 RX ADMIN — Medication 250 MG: at 07:39

## 2018-06-19 RX ADMIN — PREDNISONE 5 MG: 5 TABLET ORAL at 11:17

## 2018-06-19 NOTE — PLAN OF CARE
Problem: Patient Care Overview  Goal: Plan of Care Review  Outcome: Ongoing (interventions implemented as appropriate)   06/19/18 0326   Coping/Psychosocial   Plan of Care Reviewed With patient   Plan of Care Review   Progress no change   OTHER   Outcome Summary Patient alert and oriented. IV fluids continued. Coreg held per parameters. HR overnight labile. SR with bigeminy. HR did dip into the high 40s-mid 50s a couple of times. Ostomy output is thickening up. Probiotic given last night. No signs of acute distress noted. Will continue to monitor.        Problem: Fall Risk (Adult)  Goal: Identify Related Risk Factors and Signs and Symptoms  Outcome: Outcome(s) achieved Date Met: 06/19/18    Goal: Absence of Fall  Outcome: Ongoing (interventions implemented as appropriate)      Problem: Nutrition, Imbalanced: Inadequate Oral Intake (Adult)  Goal: Improved Oral Intake  Outcome: Ongoing (interventions implemented as appropriate)    Goal: Prevent Further Weight Loss  Outcome: Ongoing (interventions implemented as appropriate)      Problem: Cardiac: Heart Failure (Adult)  Goal: Signs and Symptoms of Listed Potential Problems Will be Absent, Minimized or Managed (Cardiac: Heart Failure)  Outcome: Ongoing (interventions implemented as appropriate)

## 2018-06-19 NOTE — PLAN OF CARE
Problem: Patient Care Overview  Goal: Plan of Care Review  Outcome: Ongoing (interventions implemented as appropriate)   06/19/18 3233   Coping/Psychosocial   Plan of Care Reviewed With spouse   Plan of Care Review   Progress no change   OTHER   Outcome Summary Pt had pacemaker interrogated and [employer] said that the pacemaker is working as it should. I dissagreed about their answer as wel as the patient. EF 15% per Echo, half less than last time. Pt did not receive good news about the outcome but he is staying optimistist. BP remain low, received steroids as orders. No c/o pain or nausea. Will cont to monitor.       Problem: Ileostomy (Adult)  Goal: Signs and Symptoms of Listed Potential Problems Will be Absent, Minimized or Managed (Ileostomy)  Outcome: Ongoing (interventions implemented as appropriate)    Goal: Anesthesia/Sedation Recovery  Outcome: Ongoing (interventions implemented as appropriate)      Problem: Fall Risk (Adult)  Goal: Absence of Fall  Outcome: Ongoing (interventions implemented as appropriate)      Problem: Cardiac: Heart Failure (Adult)  Goal: Signs and Symptoms of Listed Potential Problems Will be Absent, Minimized or Managed (Cardiac: Heart Failure)  Outcome: Ongoing (interventions implemented as appropriate)

## 2018-06-19 NOTE — PROGRESS NOTES
57 y.o.   LOS: 0 days   Patient Care Team:  Zechariah Fatima MD as PCP - General    Chief Complaint:  Orthostatic symptoms and adrenal insufficiency    Chief Complaint   Patient presents with   • Weakness - Generalized     x 2 weeks   • Chest Pain     intermittent x 2 weeks   • Dizziness     with standing       Subjective     HPI  Patient is still experiencing dizziness and lightheadedness  His heart rate apparently drops into 40s while he is resting and is very concerned about it  Patient's blood pressure is also low and is not able to take any medication  Patient reports shortness of breath on standing    Interval History:    Orthostatic symptoms and adrenal insufficiency     HPI   Patient is a 57-year-old white male with a past medical history of nonischemic cardiomyopathy and congestive heart failure status post AICD hypertension and Crohn's disease admitted to the hospital for 2 weeks of symptoms including generalized weakness chest pain and dizziness and lightheadedness.  Patient is also having significant fluctuations in his heart rate with heart rate dropping into 40s intermittently  He reports shortness of breath on exertion  He also reports lightheadedness on standing and dizziness  Denies any nausea and abdominal pain  He had ileostomy and denies any significant change in the consistency of the stool     Patient admitted that he had been on steroids multiple times in the past several years.  He reports that he used to be on 30 mg of prednisone prior to his surgery 6 weeks ago and apparently was discontinued one day after surgery  He has had 3-4 week courses of prednisone several times in the past few years  Patient admits that when he is on steroids he feels significantly better more energy and is able to walk without any symptoms  Apparently this last use of prednisone 30 mg was 6 weeks ago prior to surgery     Patient denies any history of hyponatremia in the past  His blood pressure has been in the 70 to  80s systolic range and he was holding his Coreg due to his low blood pressure     Review of Systems:      Review of Systems   Constitutional: Positive for fatigue.   Respiratory: Positive for shortness of breath.    Cardiovascular: Negative.    Gastrointestinal: Positive for abdominal distention. Negative for abdominal pain and nausea.   Neurological: Positive for dizziness, light-headedness and headaches.   Psychiatric/Behavioral: Negative.    All other systems reviewed and are negative.    Objective     Vital Signs   Temp:  [97.2 °F (36.2 °C)-98 °F (36.7 °C)] 97.2 °F (36.2 °C)  Heart Rate:  [78-90] 86  Resp:  [16-18] 16  BP: ()/(58-80) 90/72    Physical Exam:  Physical Exam   Constitutional: He is oriented to person, place, and time.   Eyes: EOM are normal. Pupils are equal, round, and reactive to light.   Neck: Normal range of motion. Neck supple.   Cardiovascular: Normal rate, regular rhythm, normal heart sounds and intact distal pulses.    Pulmonary/Chest: Effort normal and breath sounds normal.   Abdominal: Soft. Bowel sounds are normal. He exhibits distension.   Musculoskeletal: Normal range of motion. He exhibits no edema.   Neurological: He is alert and oriented to person, place, and time.   Skin: Skin is warm and dry.   Psychiatric: He has a normal mood and affect. His behavior is normal.   Results Review:     I reviewed the patient's new clinical results.      Glucose   Date/Time Value Ref Range Status   06/19/2018 0721 89 65 - 99 mg/dL Final   06/18/2018 0515 87 65 - 99 mg/dL Final   06/17/2018 1258 104 (H) 65 - 99 mg/dL Final     Lab Results (last 72 hours)     Procedure Component Value Units Date/Time    Basic Metabolic Panel [[id number]]  (Abnormal) Collected:  06/19/18 0721    Specimen:  Blood Updated:  06/19/18 0826     Glucose 89 mg/dL      BUN 11 mg/dL      Creatinine 0.84 mg/dL      Sodium 140 mmol/L      Potassium 4.0 mmol/L      Chloride 107 mmol/L      CO2 19.0 (L) mmol/L      Calcium 9.1  mg/dL      eGFR Non African Amer 94 mL/min/1.73      BUN/Creatinine Ratio 13.1     Anion Gap 14.0 mmol/L     Narrative:       GFR Normal >60  Chronic Kidney Disease <60  Kidney Failure <15    CBC & Differential [[id number]] Collected:  06/19/18 0721    Specimen:  Blood Updated:  06/19/18 0757    Narrative:       The following orders were created for panel order CBC & Differential.  Procedure                               Abnormality         Status                     ---------                               -----------         ------                     CBC Auto Differential[[id number]]        Abnormal            Final result                 Please view results for these tests on the individual orders.    CBC Auto Differential [[id number]]  (Abnormal) Collected:  06/19/18 0721    Specimen:  Blood Updated:  06/19/18 0757     WBC 5.01 10*3/mm3      RBC 4.43 (L) 10*6/mm3      Hemoglobin 11.7 (L) g/dL      Hematocrit 36.7 (L) %      MCV 82.8 fL      MCH 26.4 (L) pg      MCHC 31.9 (L) g/dL      RDW 15.3 (H) %      RDW-SD 45.9 fl      MPV 10.2 fL      Platelets 153 10*3/mm3      Neutrophil % 36.9 (L) %      Lymphocyte % 47.9 (H) %      Monocyte % 12.0 %      Eosinophil % 2.8 %      Basophil % 0.4 %      Immature Grans % 0.0 %      Neutrophils, Absolute 1.85 (L) 10*3/mm3      Lymphocytes, Absolute 2.40 10*3/mm3      Monocytes, Absolute 0.60 10*3/mm3      Eosinophils, Absolute 0.14 10*3/mm3      Basophils, Absolute 0.02 10*3/mm3      Immature Grans, Absolute 0.00 10*3/mm3     Cortisol [[id number]]  (Normal) Collected:  06/18/18 1407    Specimen:  Blood Updated:  06/18/18 1532     Cortisol 19.41 mcg/dL     Narrative:       Cortisol Reference Ranges:    Cortisol 6AM - 10AM Range: 6.02-18.40 mcg/dl  Cortisol 4PM - 8PM Range: 2.68-10.50 mcg/dl  Critical AM/PM:    Less than 2 mcg/dl    Cortisol [[id number]]  (Normal) Collected:  06/18/18 1435    Specimen:  Blood Updated:  06/18/18 1531     Cortisol 20.24 mcg/dL     Narrative:        Cortisol Reference Ranges:    Cortisol 6AM - 10AM Range: 6.02-18.40 mcg/dl  Cortisol 4PM - 8PM Range: 2.68-10.50 mcg/dl  Critical AM/PM:    Less than 2 mcg/dl    Cortisol [[id number]]  (Normal) Collected:  06/18/18 1329    Specimen:  Blood Updated:  06/18/18 1427     Cortisol 12.43 mcg/dL     Narrative:       Cortisol Reference Ranges:    Cortisol 6AM - 10AM Range: 6.02-18.40 mcg/dl  Cortisol 4PM - 8PM Range: 2.68-10.50 mcg/dl  Critical AM/PM:    Less than 2 mcg/dl    Occult Blood X 1, Stool - Stool, Per Rectum [[id number]]  (Normal) Collected:  06/17/18 2225    Specimen:  Stool from Per Stoma Updated:  06/18/18 0756     Fecal Occult Blood Negative    Cortisol [[id number]]  (Normal) Collected:  06/18/18 0515    Specimen:  Blood Updated:  06/18/18 0615     Cortisol 4.36 mcg/dL     Narrative:       Cortisol Reference Ranges:    Cortisol 6AM - 10AM Range: 6.02-18.40 mcg/dl  Cortisol 4PM - 8PM Range: 2.68-10.50 mcg/dl  Critical AM/PM:    Less than 2 mcg/dl    TSH [[id number]]  (Normal) Collected:  06/18/18 0515    Specimen:  Blood Updated:  06/18/18 0615     TSH 1.760 mIU/mL     Basic Metabolic Panel [[id number]]  (Abnormal) Collected:  06/18/18 0515    Specimen:  Blood Updated:  06/18/18 0601     Glucose 87 mg/dL      BUN 12 mg/dL      Creatinine 0.99 mg/dL      Sodium 135 (L) mmol/L      Potassium 5.1 mmol/L      Chloride 101 mmol/L      CO2 19.3 (L) mmol/L      Calcium 9.4 mg/dL      eGFR Non African Amer 78 mL/min/1.73      BUN/Creatinine Ratio 12.1     Anion Gap 14.7 mmol/L     Narrative:       GFR Normal >60  Chronic Kidney Disease <60  Kidney Failure <15    Phosphorus [[id number]]  (Normal) Collected:  06/18/18 0515    Specimen:  Blood Updated:  06/18/18 0601     Phosphorus 3.7 mg/dL     Magnesium [[id number]]  (Normal) Collected:  06/18/18 0515    Specimen:  Blood Updated:  06/18/18 0601     Magnesium 1.8 mg/dL     Hemoglobin A1c [[id number]]  (Abnormal) Collected:  06/18/18 0515    Specimen:  Blood Updated:  06/18/18  0545     Hemoglobin A1C 5.91 (H) %     Narrative:       Hemoglobin A1C Ranges:    Increased Risk for Diabetes  5.7% to 6.4%  Diabetes                     >= 6.5%  Diabetic Goal                < 7.0%    CBC Auto Differential [[id number]]  (Abnormal) Collected:  06/18/18 0515    Specimen:  Blood Updated:  06/18/18 0541     WBC 6.29 10*3/mm3      RBC 4.82 10*6/mm3      Hemoglobin 12.9 (L) g/dL      Hematocrit 39.8 (L) %      MCV 82.6 fL      MCH 26.8 (L) pg      MCHC 32.4 (L) g/dL      RDW 15.2 (H) %      RDW-SD 45.6 fl      MPV 10.1 fL      Platelets 170 10*3/mm3      Neutrophil % 36.6 (L) %      Lymphocyte % 48.6 (H) %      Monocyte % 11.3 %      Eosinophil % 3.0 %      Basophil % 0.5 %      Immature Grans % 0.0 %      Neutrophils, Absolute 2.30 10*3/mm3      Lymphocytes, Absolute 3.06 10*3/mm3      Monocytes, Absolute 0.71 10*3/mm3      Eosinophils, Absolute 0.19 10*3/mm3      Basophils, Absolute 0.03 10*3/mm3      Immature Grans, Absolute 0.00 10*3/mm3     POC Glucose Once [[id number]]  (Normal) Collected:  06/17/18 2047    Specimen:  Blood Updated:  06/17/18 2047     Glucose 110 mg/dL     Narrative:       Meter: [id number] : [id number] Siddharth VELASCO    Urinalysis With / Culture If Indicated - Urine, Clean Catch [[id number]]  (Abnormal) Collected:  06/17/18 1453    Specimen:  Urine from Urine, Clean Catch Updated:  06/17/18 1510     Color, UA Yellow     Appearance, UA Clear     pH, UA 5.5     Specific Gravity, UA 1.016     Glucose, UA Negative     Ketones, UA Trace (A)     Bilirubin, UA Negative     Blood, UA Negative     Protein, UA Negative     Leuk Esterase, UA Negative     Nitrite, UA Negative     Urobilinogen, UA 0.2 E.U./dL    Narrative:       Urine microscopic not indicated.    [place] Draw [[id number]] Collected:  06/17/18 1258    Specimen:  Blood Updated:  06/17/18 1400    Narrative:       The following orders were created for panel order [place] Draw.  Procedure                                Abnormality         Status                     ---------                               -----------         ------                     Light Blue Top[[id number]]                                   Final result               Green Top (Gel)[[id number]]                                  Final result               Lavender Top[[id number]]                                     Final result               Gold Top - SST[[id number]]                                   Final result                 Please view results for these tests on the individual orders.    Light Blue Top [[id number]] Collected:  06/17/18 1258    Specimen:  Blood Updated:  06/17/18 1400     Extra Tube hold for add-on     Comment: Auto resulted       Green Top (Gel) [[id number]] Collected:  06/17/18 1258    Specimen:  Blood Updated:  06/17/18 1400     Extra Tube Hold for add-ons.     Comment: Auto resulted.       Lavender Top [[id number]] Collected:  06/17/18 1258    Specimen:  Blood Updated:  06/17/18 1400     Extra Tube hold for add-on     Comment: Auto resulted       Gold Top - SST [[id number]] Collected:  06/17/18 1258    Specimen:  Blood Updated:  06/17/18 1400     Extra Tube Hold for add-ons.     Comment: Auto resulted.       Comprehensive Metabolic Panel [[id number]]  (Abnormal) Collected:  06/17/18 1258    Specimen:  Blood Updated:  06/17/18 1334     Glucose 104 (H) mg/dL      BUN 17 mg/dL      Creatinine 1.05 mg/dL      Sodium 133 (L) mmol/L      Potassium 5.0 mmol/L      Chloride 98 mmol/L      CO2 18.9 (L) mmol/L      Calcium 10.3 mg/dL      Total Protein 8.2 g/dL      Albumin 4.20 g/dL      ALT (SGPT) 33 U/L      AST (SGOT) 32 U/L      Alkaline Phosphatase 60 U/L      Total Bilirubin 0.6 mg/dL      eGFR Non African Amer 73 mL/min/1.73      Globulin 4.0 gm/dL      A/G Ratio 1.1 g/dL      BUN/Creatinine Ratio 16.2     Anion Gap 16.1 mmol/L     Troponin [[id number]]  (Normal) Collected:  06/17/18 1258    Specimen:  Blood Updated:  06/17/18 1334     Troponin T  <0.010 ng/mL     Narrative:       Troponin T Reference Ranges:  Less than 0.03 ng/mL:    Negative for AMI  0.03 to 0.09 ng/mL:      Indeterminant for AMI  Greater than 0.09 ng/mL: Positive for AMI    Magnesium [[id number]]  (Normal) Collected:  06/17/18 1258    Specimen:  Blood Updated:  06/17/18 1334     Magnesium 1.6 mg/dL     CBC & Differential [[id number]] Collected:  06/17/18 1258    Specimen:  Blood Updated:  06/17/18 1308    Narrative:       The following orders were created for panel order CBC & Differential.  Procedure                               Abnormality         Status                     ---------                               -----------         ------                     CBC Auto Differential[[id number]]        Abnormal            Final result                 Please view results for these tests on the individual orders.    CBC Auto Differential [[id number]]  (Abnormal) Collected:  06/17/18 1258    Specimen:  Blood Updated:  06/17/18 1308     WBC 7.05 10*3/mm3      RBC 5.23 10*6/mm3      Hemoglobin 14.2 g/dL      Hematocrit 42.2 %      MCV 80.7 fL      MCH 27.2 pg      MCHC 33.6 g/dL      RDW 15.0 (H) %      RDW-SD 43.7 fl      MPV 9.8 fL      Platelets 183 10*3/mm3      Neutrophil % 42.7 %      Lymphocyte % 44.3 %      Monocyte % 10.9 %      Eosinophil % 1.8 %      Basophil % 0.3 %      Immature Grans % 0.0 %      Neutrophils, Absolute 3.01 10*3/mm3      Lymphocytes, Absolute 3.12 10*3/mm3      Monocytes, Absolute 0.77 10*3/mm3      Eosinophils, Absolute 0.13 10*3/mm3      Basophils, Absolute 0.02 10*3/mm3      Immature Grans, Absolute 0.00 10*3/mm3         Imaging Results (last 72 hours)     Procedure Component Value Units Date/Time    CT Angiogram Chest With Contrast [[id number]] Collected:  06/17/18 1633     Updated:  06/17/18 1710    Narrative:       CT ANGIOGRAPHY OF THE CHEST WITH INTRAVENOUS CONTRAST AND 3-D  RECONSTRUCTIONS     HISTORY: Chest pain.     The CT scan was performed as an emergency  procedure through the chest  with CT angiography protocol using venous contrast and 3-D  reconstructions. The following findings are present:  1. The pulmonary arteries are moderately well opacified and there is no  evidence of pulmonary embolus. The thoracic aorta shows no evidence of  aneurysm or dissection.  2. The lungs are well-expanded and clear. There is no mediastinal or  hilar or axillary adenopathy. There is no pericardial effusion.  3. The CT images through the upper liver, spleen, and both adrenal  glands are unremarkable. There is a small cyst in the partially  visualized upper pole of right kidney.                 Radiation dose reduction techniques were utilized, including automated  exposure control and exposure modulation based on body size.     This report was finalized on 6/17/2018 5:06 PM by Dr. Dante Hassan M.D.       XR Chest 1 View [[id number]] Collected:  06/17/18 1442     Updated:  06/17/18 1602    Narrative:       ONE-VIEW PORTABLE CHEST     HISTORY: Shortness of breath.     FINDINGS:  The lungs are well-expanded and clear. The heart is top  normal in size with a pacemaker in place and there is no acute disease  or change from 02/28/2015.     This report was finalized on 6/17/2018 3:59 PM by Dr. Dante Hassan M.D.             Medication Review:       Current Facility-Administered Medications:   •  acetaminophen (TYLENOL) tablet 650 mg, 650 mg, Oral, Q4H PRN, Abel Pal MD  •  carvedilol (COREG) tablet 12.5 mg, 12.5 mg, Oral, BID, Hermes Oreilly MD  •  diphenoxylate-atropine (LOMOTIL) 2.5-0.025 MG per tablet 1 tablet, 1 tablet, Oral, 4x Daily PRN, Hermes Oreilly MD  •  doxycycline (MONODOX) capsule 100 mg, 100 mg, Oral, Q12H, Hermes Oreilly MD, 100 mg at 06/19/18 0739  •  fluticasone (FLONASE) 50 MCG/ACT nasal spray 2 spray, 2 spray, Each Nare, Daily, Abel Pal MD  •  hydrocortisone sodium succinate (Solu-CORTEF) injection 100 mg, 100  mg, Intravenous, Once, Compa EVANS MD  •  nitroglycerin (NITROSTAT) SL tablet 0.4 mg, 0.4 mg, Sublingual, Q5 Min PRN, Abel Pal MD  •  ondansetron (ZOFRAN) tablet 4 mg, 4 mg, Oral, Q6H PRN **OR** ondansetron ODT (ZOFRAN-ODT) disintegrating tablet 4 mg, 4 mg, Oral, Q6H PRN **OR** ondansetron (ZOFRAN) injection 4 mg, 4 mg, Intravenous, Q6H PRN, Abel Pal MD  •  pneumococcal polysaccharide 23-valent (PNEUMOVAX-23) vaccine 0.5 mL, 0.5 mL, Intramuscular, During Hospitalization, Abel Pal MD  •  predniSONE (DELTASONE) tablet 2.5 mg, 2.5 mg, Oral, Daily Before Supper, Compa EVANS MD  •  predniSONE (DELTASONE) tablet 5 mg, 5 mg, Oral, Daily With Breakfast, Compa EVANS MD  •  saccharomyces boulardii (FLORASTOR) capsule 250 mg, 250 mg, Oral, BID, Hermes Oreilly MD, 250 mg at 06/19/18 0739  •  sodium chloride (OCEAN) nasal spray 2 spray, 2 spray, Nasal, PRN, Abel Pal MD  •  sodium chloride 0.9 % flush 1-10 mL, 1-10 mL, Intravenous, PRN, Abel Pal MD  •  sodium chloride 0.9 % flush 10 mL, 10 mL, Intravenous, PRN, Harrison Gutiérrez MD  •  sodium chloride 0.9 % infusion, 75 mL/hr, Intravenous, Continuous, Abel Pal MD, Last Rate: 75 mL/hr at 06/17/18 2209, 75 mL/hr at 06/17/18 2209  •  tamsulosin (FLOMAX) 24 hr capsule 0.4 mg, 0.4 mg, Oral, Nightly, Abel Pal MD, 0.4 mg at 06/18/18 2033    Assessment/Plan     Patient Active Problem List   Diagnosis   • Cardiomyopathy   • Paroxysmal VT   • Palpitations   • PVC's (premature ventricular contractions)   • Exacerbation of Crohn's disease of small intestine   • Adjustment disorder with depressed mood   • Ankylosis of spine   • Crohn's disease with complication   • Diabetes mellitus   • Essential hypertension   • External hemorrhoids   • Genital herpes simplex   • Gout   • Insomnia   • Iron deficiency   • Prostate mass   • Recurrent aphthous ulcer   • Asteatosis cutis   •  History of pneumonia   • Abnormal CXR (chest x-ray)   • RUQ abdominal pain   • Crohn's disease of small intestine with other complication   • Right lower quadrant abdominal pain   • Enteritis   • Mass-like inflammation at terminal ileum   • Acute sinusitis   • Headache   • Crohn's disease   • Ileostomy present   • Paroxysmal ventricular tachycardia   • Chronic systolic CHF (congestive heart failure)   • Nonischemic cardiomyopathy   • Orthostasis   • Iron deficiency anemia     I discussed at length with the patient again  Cosyntropin stim testing response was excellent  Patient is still very symptomatic and hypotensive    With a history of excellent symptomatic improvement with steroids in the past I recommend empiric use of steroids for now  I will start him on a low dose of prednisone 5 mg in the morning and 2.5 mg in the afternoon  Due to mild hyponatremia he may benefit from fludrocortisone but I would avoid that for now since he has history of congestive heart failure    I will also give him one dose of intravenous hydrocortisone 50 mg today this morning  Will monitor the response closely and then adjust further    Secondary adrenal insufficiency might be the only consideration in this situation but patient may have autonomic dysfunction and to return to orthostatic symptoms  Midodrine may also be used cautiously if needed while monitoring the heart rate mixed and patient verbalized understanding    The total time spent for old record and lab review and floor time was more than 35 min of which greater than 20 min of time  ( greater than 50% of the total time )  was spent face to face with the patient counseling and coordination of care owas spent on counseling the patient on recommended evaluation and treatment options, instructions for management/treatment and /or follow up  and importance of compliance with chosen management or treatment options    Compa Arthur MD FACE.  06/19/18  9:13 AM      EMR  "Dragon / transcription disclaimer:     \"Dictated utilizing Dragon dictation\".   "

## 2018-06-19 NOTE — PROGRESS NOTES
"Arnie Calvo  1960 57 y.o.  [id number]      Patient Care Team:  Zechariah Fatima MD as PCP - General    CC: Nonsustained VT, severe nonischemic cardiomyopathy, Crohn's disease, ectopy him a ICD    Interval History: He feels fine no shortness of breath      Objective   Vital Signs  Temp:  [97.2 °F (36.2 °C)-98 °F (36.7 °C)] 97.2 °F (36.2 °C)  Heart Rate:  [78-90] 86  Resp:  [16-18] 16  BP: ()/(58-80) 90/72    Intake/Output Summary (Last 24 hours) at 06/19/18 1046  Last data filed at 06/19/18 0623   Gross per 24 hour   Intake             1635 ml   Output                0 ml   Net             1635 ml     Flowsheet Rows      First Filed Value   Admission Height  175.3 cm (69\") Documented at 06/17/2018 1246   Admission Weight  79.4 kg (175 lb) Documented at 06/17/2018 1246          Physical Exam:   General Appearance:    Alert,oriented, in no acute distress   Lungs:     Clear to auscultation,BS are equal    Heart:    Normal S1 and S2, RRR without murmur, + S3 gallop or rub   HEENT:    Sclera are clear, no JVD or adenopathy   Abdomen:     Normal bowel sounds, soft non-tender, non-distended, no HSM   Extremities:   Moves all extremities well, no edema, no cyanosis, no             Redness, no rash     Medication Review:        doxycycline 100 mg Oral Q12H   fluticasone 2 spray Each Nare Daily   hydrocortisone sodium succinate 100 mg Intravenous Once   predniSONE 2.5 mg Oral Daily Before Supper   predniSONE 5 mg Oral Daily With Breakfast   saccharomyces boulardii 250 mg Oral BID       sodium chloride 75 mL/hr Last Rate: 75 mL/hr (06/17/18 2209)         I reviewed the patient's new clinical results.  I personally viewed and interpreted the patient's EKG/Telemetry data    Assessment/Plan  Active Hospital Problems (** Indicates Principal Problem)    Diagnosis Date Noted   • **Orthostasis [I95.1] 06/17/2018   • Crohn's disease [K50.90] 06/17/2018   • Ileostomy present [Z93.2] 06/17/2018   • Paroxysmal ventricular " tachycardia [I47.2] 06/17/2018   • Chronic systolic CHF (congestive heart failure) [I50.22] 06/17/2018   • Nonischemic cardiomyopathy [I42.8] 06/17/2018   • Iron deficiency anemia [D50.9] 06/17/2018      Resolved Hospital Problems    Diagnosis Date Noted Date Resolved   No resolved problems to display.       Clinically he is no change although he is persistently hypotensive he's given a be started on steroids in the event that he is adrenally insufficient he however his echo looks much worse than it did before it's dropped from an EF of 30 to 15.  He's never had coronary disease I don't have an explanation for this drop.  He doesn't have sleep apnea he doesn't drink alcohol possibly could be worse if he is really adrenally insufficient mind improve with replacement at this point I have stopped his Coreg and his Flomax to try and let his blood pressure, and if it does we will restart his Coreg at a low-dose if he gets worse or were to start having heart failure symptoms would have to consider advanced cardiomyopathy therapy at the [facility]    Harrison Reyes MD  06/19/18  10:46 AM

## 2018-06-19 NOTE — NURSING NOTE
Changed pt's ileostomy pouch, using a one piece convex Coloplast pouch with adapt seal.  Stoma pink, budded, peristomal skin intact and without erythema.  Pt independent in care of ileostomy; hoping to have it reversed sometime this summer

## 2018-06-19 NOTE — PROGRESS NOTES
" LOS: 0 days     Name: Arnie Calvo  Age: 57 y.o.  Sex: male  :  1960  MRN: [id number]         Primary Care Physician: Zechariah Fatima MD    Subjective   Subjective  Currently tearful and upset over receiving news that his ejection fraction had decreased to 15%.  Dizziness and lightheadedness have resolved but blood pressure remains low.    Objective   Vital Signs  Temp:  [97.2 °F (36.2 °C)-98 °F (36.7 °C)] 97.6 °F (36.4 °C)  Heart Rate:  [78-90] 89  Resp:  [16-18] 16  BP: ()/(58-80) 95/67  Body mass index is 26.89 kg/m².    Objective:  General Appearance:  Comfortable and in no acute distress.    Vital signs: (most recent): Blood pressure 95/67, pulse 89, temperature 97.6 °F (36.4 °C), temperature source Oral, resp. rate 16, height 175.3 cm (69\"), weight 82.6 kg (182 lb 1.6 oz), SpO2 97 %.    Lungs:  Normal effort and normal respiratory rate.    Heart: Normal rate.  Regular rhythm.    Abdomen: Abdomen is soft.  (Ileostomy in place in the right abdomen).  Bowel sounds are normal.   There is no abdominal tenderness.     Extremities: There is no dependent edema or local swelling.    Neurological: Patient is alert and oriented to person, place and time.    Skin:  Warm and dry.              Results Review:       I reviewed the patient's new clinical results.      Results from last 7 days  Lab Units 18  0721 18  0515 18  1258   WBC 10*3/mm3 5.01 6.29 7.05   HEMOGLOBIN g/dL 11.7* 12.9* 14.2   PLATELETS 10*3/mm3 153 170 183       Results from last 7 days  Lab Units 18  0721 18  0515 18  1258   SODIUM mmol/L 140 135* 133*   POTASSIUM mmol/L 4.0 5.1 5.0   CHLORIDE mmol/L 107 101 98   CO2 mmol/L 19.0* 19.3* 18.9*   BUN mg/dL 11 12 17   CREATININE mg/dL 0.84 0.99 1.05   CALCIUM mg/dL 9.1 9.4 10.3   GLUCOSE mg/dL 89 87 104*         Scheduled Meds:     doxycycline 100 mg Oral Q12H   fluticasone 2 spray Each Nare Daily   predniSONE 2.5 mg Oral Daily Before Supper   predniSONE 5 " mg Oral Daily With Breakfast   saccharomyces boulardii 250 mg Oral BID     PRN Meds:   •  acetaminophen  •  diphenoxylate-atropine  •  nitroglycerin  •  ondansetron **OR** ondansetron ODT **OR** ondansetron  •  pneumococcal polysaccharide 23-valent  •  sodium chloride  •  sodium chloride  •  sodium chloride  Continuous Infusions:    sodium chloride 75 mL/hr Last Rate: 75 mL/hr (06/17/18 2207)       Assessment/Plan   Principal Problem:    Orthostasis  Active Problems:    Crohn's disease    Ileostomy present    Paroxysmal ventricular tachycardia    Chronic systolic CHF (congestive heart failure)    Nonischemic cardiomyopathy    Iron deficiency anemia      Assessment & Plan    - Appreciate input from cardiology and endocrinology.  He has been started on steroids for adrenal insufficiency.  Agree with discontinuation of Coreg and Flomax.  Hopefully we can get his blood pressure improved enough that he can restart the Coreg at some point.  - Continue gentle IV fluids  - He will complete 4 more days of doxycycline for peristomal abscess that now appears largely resolved.  - Continue to monitor    Hermes Oreilly MD  [facility]  06/19/18  12:36 PM

## 2018-06-20 ENCOUNTER — APPOINTMENT (OUTPATIENT)
Dept: CT IMAGING | Facility: HOSPITAL | Age: 58
End: 2018-06-20
Attending: INTERNAL MEDICINE

## 2018-06-20 PROCEDURE — 63710000001 PREDNISONE PER 5 MG: Performed by: INTERNAL MEDICINE

## 2018-06-20 PROCEDURE — 70450 CT HEAD/BRAIN W/O DYE: CPT

## 2018-06-20 PROCEDURE — 99233 SBSQ HOSP IP/OBS HIGH 50: CPT | Performed by: INTERNAL MEDICINE

## 2018-06-20 PROCEDURE — 99232 SBSQ HOSP IP/OBS MODERATE 35: CPT | Performed by: INTERNAL MEDICINE

## 2018-06-20 RX ORDER — PREDNISONE 2.5 MG
TABLET ORAL
Qty: 90 TABLET | Refills: 1 | Status: SHIPPED | OUTPATIENT
Start: 2018-06-20 | End: 2018-06-22

## 2018-06-20 RX ADMIN — ACETAMINOPHEN 650 MG: 325 TABLET, FILM COATED ORAL at 08:10

## 2018-06-20 RX ADMIN — Medication 250 MG: at 20:15

## 2018-06-20 RX ADMIN — Medication 250 MG: at 08:10

## 2018-06-20 RX ADMIN — DOXYCYCLINE 100 MG: 100 CAPSULE ORAL at 20:15

## 2018-06-20 RX ADMIN — PREDNISONE 5 MG: 5 TABLET ORAL at 08:11

## 2018-06-20 RX ADMIN — PREDNISONE 2.5 MG: 2.5 TABLET ORAL at 17:26

## 2018-06-20 RX ADMIN — ACETAMINOPHEN 650 MG: 325 TABLET, FILM COATED ORAL at 03:56

## 2018-06-20 RX ADMIN — DOXYCYCLINE 100 MG: 100 CAPSULE ORAL at 08:11

## 2018-06-20 NOTE — PLAN OF CARE
Problem: Patient Care Overview  Goal: Plan of Care Review  Outcome: Ongoing (interventions implemented as appropriate)   06/20/18 0324   Coping/Psychosocial   Plan of Care Reviewed With patient   Plan of Care Review   Progress no change   OTHER   Outcome Summary Patient alert and oriented. BP still running low. Patient concerned with results of ECHO and about the amount of fluids he ins receiving. Patient still optimistic about ileostomy reversal in the future. No signs of acute distress noted. No signs of fluid overload. Will continue to monitor closely.        Problem: Fall Risk (Adult)  Goal: Absence of Fall  Outcome: Ongoing (interventions implemented as appropriate)      Problem: Nutrition, Imbalanced: Inadequate Oral Intake (Adult)  Goal: Improved Oral Intake  Outcome: Ongoing (interventions implemented as appropriate)    Goal: Prevent Further Weight Loss  Outcome: Ongoing (interventions implemented as appropriate)      Problem: Cardiac: Heart Failure (Adult)  Goal: Signs and Symptoms of Listed Potential Problems Will be Absent, Minimized or Managed (Cardiac: Heart Failure)  Outcome: Ongoing (interventions implemented as appropriate)

## 2018-06-20 NOTE — PLAN OF CARE
Problem: Patient Care Overview  Goal: Plan of Care Review  Outcome: Ongoing (interventions implemented as appropriate)   06/20/18 6405   Coping/Psychosocial   Plan of Care Reviewed With patient   Plan of Care Review   Progress improving   OTHER   Outcome Summary Pt A&Ox4. BP up to low 100s. Otherwise VSS. No complaints of pain or discomfort. CT of head done today. Will cont to monitor.

## 2018-06-20 NOTE — PROGRESS NOTES
57 y.o.   LOS: 1 day   Patient Care Team:  Zechariah Fatima MD as PCP - General    Chief Complaint:  Adrenal insufficiency    Chief Complaint   Patient presents with   • Weakness - Generalized     x 2 weeks   • Chest Pain     intermittent x 2 weeks   • Dizziness     with standing       Subjective     HPI  Patient is tolerating oral steroids  He is currently getting prednisone 5 mg in the morning and 2.5 mg in the evening  His blood pressure is only slightly better in the  systolic range  Patient reports slight improvement in his orthostatic symptoms  He was previously used 30 mg of prednisone daily.    His surgery 6 weeks ago  Patient also reports that he likes to keep his blood pressure close it  100 systolic due to cardiomyopathy  Interval History:    Patient is a 57-year-old white male with a past medical history of nonischemic cardiomyopathy and congestive heart failure status post AICD hypertension and Crohn's disease admitted to the hospital for 2 weeks of symptoms including generalized weakness chest pain and dizziness and lightheadedness.  Patient is also having significant fluctuations in his heart rate with heart rate dropping into 40s intermittently  He reports shortness of breath on exertion  He also reports lightheadedness on standing and dizziness  Denies any nausea and abdominal pain  He had ileostomy and denies any significant change in the consistency of the stool     Patient admitted that he had been on steroids multiple times in the past several years.  He reports that he used to be on 30 mg of prednisone prior to his surgery 6 weeks ago and apparently was discontinued one day after surgery  He has had 3-4 week courses of prednisone several times in the past few years  Patient admits that when he is on steroids he feels significantly better more energy and is able to walk without any symptoms  Apparently this last use of prednisone 30 mg was 6 weeks ago prior to surgery     Patient denies any  history of hyponatremia in the past  His blood pressure has been in the 70 to 80s systolic range and he was holding his Coreg due to his low blood pressure        Review of Systems:      Review of Systems   Constitutional: Positive for fatigue.   Respiratory: Positive for shortness of breath.    Cardiovascular: Negative.    Gastrointestinal: Negative.    Neurological: Positive for dizziness, light-headedness and headaches.   All other systems reviewed and are negative.    Objective     Vital Signs   Temp:  [96.9 °F (36.1 °C)-98 °F (36.7 °C)] 97.4 °F (36.3 °C)  Heart Rate:  [] 84  Resp:  [14-18] 15  BP: ()/(55-69) 102/69    Physical Exam:  Physical Exam   Constitutional: He is oriented to person, place, and time.   Eyes: EOM are normal. Pupils are equal, round, and reactive to light.   Neck: Normal range of motion. Neck supple.   Cardiovascular: Normal rate, regular rhythm, normal heart sounds and intact distal pulses.    Pulmonary/Chest: Effort normal and breath sounds normal.   Abdominal: Soft. Bowel sounds are normal.   Musculoskeletal: Normal range of motion. He exhibits no edema.   Neurological: He is alert and oriented to person, place, and time.   Skin: Skin is warm and dry.   Psychiatric: He has a normal mood and affect. His behavior is normal.   Nursing note and vitals reviewed.  Results Review:     I reviewed the patient's new clinical results.      Glucose   Date/Time Value Ref Range Status   06/19/2018 0721 89 65 - 99 mg/dL Final   06/18/2018 0515 87 65 - 99 mg/dL Final     Lab Results (last 72 hours)     Procedure Component Value Units Date/Time    Basic Metabolic Panel [[id number]]  (Abnormal) Collected:  06/19/18 0721    Specimen:  Blood Updated:  06/19/18 0826     Glucose 89 mg/dL      BUN 11 mg/dL      Creatinine 0.84 mg/dL      Sodium 140 mmol/L      Potassium 4.0 mmol/L      Chloride 107 mmol/L      CO2 19.0 (L) mmol/L      Calcium 9.1 mg/dL      eGFR Non African Amer 94 mL/min/1.73       BUN/Creatinine Ratio 13.1     Anion Gap 14.0 mmol/L     Narrative:       GFR Normal >60  Chronic Kidney Disease <60  Kidney Failure <15    CBC & Differential [[id number]] Collected:  06/19/18 0721    Specimen:  Blood Updated:  06/19/18 0757    Narrative:       The following orders were created for panel order CBC & Differential.  Procedure                               Abnormality         Status                     ---------                               -----------         ------                     CBC Auto Differential[[id number]]        Abnormal            Final result                 Please view results for these tests on the individual orders.    CBC Auto Differential [[id number]]  (Abnormal) Collected:  06/19/18 0721    Specimen:  Blood Updated:  06/19/18 0757     WBC 5.01 10*3/mm3      RBC 4.43 (L) 10*6/mm3      Hemoglobin 11.7 (L) g/dL      Hematocrit 36.7 (L) %      MCV 82.8 fL      MCH 26.4 (L) pg      MCHC 31.9 (L) g/dL      RDW 15.3 (H) %      RDW-SD 45.9 fl      MPV 10.2 fL      Platelets 153 10*3/mm3      Neutrophil % 36.9 (L) %      Lymphocyte % 47.9 (H) %      Monocyte % 12.0 %      Eosinophil % 2.8 %      Basophil % 0.4 %      Immature Grans % 0.0 %      Neutrophils, Absolute 1.85 (L) 10*3/mm3      Lymphocytes, Absolute 2.40 10*3/mm3      Monocytes, Absolute 0.60 10*3/mm3      Eosinophils, Absolute 0.14 10*3/mm3      Basophils, Absolute 0.02 10*3/mm3      Immature Grans, Absolute 0.00 10*3/mm3     Cortisol [[id number]]  (Normal) Collected:  06/18/18 1407    Specimen:  Blood Updated:  06/18/18 1532     Cortisol 19.41 mcg/dL     Narrative:       Cortisol Reference Ranges:    Cortisol 6AM - 10AM Range: 6.02-18.40 mcg/dl  Cortisol 4PM - 8PM Range: 2.68-10.50 mcg/dl  Critical AM/PM:    Less than 2 mcg/dl    Cortisol [[id number]]  (Normal) Collected:  06/18/18 1435    Specimen:  Blood Updated:  06/18/18 1531     Cortisol 20.24 mcg/dL     Narrative:       Cortisol Reference Ranges:    Cortisol 6AM - 10AM  Range: 6.02-18.40 mcg/dl  Cortisol 4PM - 8PM Range: 2.68-10.50 mcg/dl  Critical AM/PM:    Less than 2 mcg/dl    Cortisol [[id number]]  (Normal) Collected:  06/18/18 1329    Specimen:  Blood Updated:  06/18/18 1427     Cortisol 12.43 mcg/dL     Narrative:       Cortisol Reference Ranges:    Cortisol 6AM - 10AM Range: 6.02-18.40 mcg/dl  Cortisol 4PM - 8PM Range: 2.68-10.50 mcg/dl  Critical AM/PM:    Less than 2 mcg/dl    Occult Blood X 1, Stool - Stool, Per Rectum [[id number]]  (Normal) Collected:  06/17/18 2225    Specimen:  Stool from Per Stoma Updated:  06/18/18 0756     Fecal Occult Blood Negative    Cortisol [[id number]]  (Normal) Collected:  06/18/18 0515    Specimen:  Blood Updated:  06/18/18 0615     Cortisol 4.36 mcg/dL     Narrative:       Cortisol Reference Ranges:    Cortisol 6AM - 10AM Range: 6.02-18.40 mcg/dl  Cortisol 4PM - 8PM Range: 2.68-10.50 mcg/dl  Critical AM/PM:    Less than 2 mcg/dl    TSH [[id number]]  (Normal) Collected:  06/18/18 0515    Specimen:  Blood Updated:  06/18/18 0615     TSH 1.760 mIU/mL     Basic Metabolic Panel [[id number]]  (Abnormal) Collected:  06/18/18 0515    Specimen:  Blood Updated:  06/18/18 0601     Glucose 87 mg/dL      BUN 12 mg/dL      Creatinine 0.99 mg/dL      Sodium 135 (L) mmol/L      Potassium 5.1 mmol/L      Chloride 101 mmol/L      CO2 19.3 (L) mmol/L      Calcium 9.4 mg/dL      eGFR Non African Amer 78 mL/min/1.73      BUN/Creatinine Ratio 12.1     Anion Gap 14.7 mmol/L     Narrative:       GFR Normal >60  Chronic Kidney Disease <60  Kidney Failure <15    Phosphorus [[id number]]  (Normal) Collected:  06/18/18 0515    Specimen:  Blood Updated:  06/18/18 0601     Phosphorus 3.7 mg/dL     Magnesium [[id number]]  (Normal) Collected:  06/18/18 0515    Specimen:  Blood Updated:  06/18/18 0601     Magnesium 1.8 mg/dL     Hemoglobin A1c [[id number]]  (Abnormal) Collected:  06/18/18 0515    Specimen:  Blood Updated:  06/18/18 0545     Hemoglobin A1C 5.91 (H) %     Narrative:        Hemoglobin A1C Ranges:    Increased Risk for Diabetes  5.7% to 6.4%  Diabetes                     >= 6.5%  Diabetic Goal                < 7.0%    CBC Auto Differential [[id number]]  (Abnormal) Collected:  06/18/18 0515    Specimen:  Blood Updated:  06/18/18 0541     WBC 6.29 10*3/mm3      RBC 4.82 10*6/mm3      Hemoglobin 12.9 (L) g/dL      Hematocrit 39.8 (L) %      MCV 82.6 fL      MCH 26.8 (L) pg      MCHC 32.4 (L) g/dL      RDW 15.2 (H) %      RDW-SD 45.6 fl      MPV 10.1 fL      Platelets 170 10*3/mm3      Neutrophil % 36.6 (L) %      Lymphocyte % 48.6 (H) %      Monocyte % 11.3 %      Eosinophil % 3.0 %      Basophil % 0.5 %      Immature Grans % 0.0 %      Neutrophils, Absolute 2.30 10*3/mm3      Lymphocytes, Absolute 3.06 10*3/mm3      Monocytes, Absolute 0.71 10*3/mm3      Eosinophils, Absolute 0.19 10*3/mm3      Basophils, Absolute 0.03 10*3/mm3      Immature Grans, Absolute 0.00 10*3/mm3     POC Glucose Once [[id number]]  (Normal) Collected:  06/17/18 2047    Specimen:  Blood Updated:  06/17/18 2047     Glucose 110 mg/dL     Narrative:       Meter: [id number] : [id number] Siddharth Acosta ROD        Imaging Results (last 72 hours)     Procedure Component Value Units Date/Time    CT Head Without Contrast [[id number]] Collected:  06/20/18 1207     Updated:  06/20/18 1207    Narrative:       CT HEAD WITHOUT CONTRAST     HISTORY:  New onset headaches.     COMPARISON: None.     TECHNIQUE: A noncontrasted CT examination of the brain was performed.  The patient's head is somewhat canted in the scanner. The brain and  ventricles are symmetrical. There is no evidence of intracranial  hemorrhage, hydrocephalus or of abnormal extra-axial fluid. No focal  area of decreased attenuation to suggest acute infarction is identified.       Impression:       No acute process identified. Further evaluation could be  performed with a MRI examination of the brain as indicated.     Radiation dose reduction techniques were  utilized, including automated  exposure control and exposure modulation based on body size.                Medication Review:       Current Facility-Administered Medications:   •  acetaminophen (TYLENOL) tablet 650 mg, 650 mg, Oral, Q4H PRN, Abel Pal MD, 650 mg at 06/20/18 0810  •  diphenoxylate-atropine (LOMOTIL) 2.5-0.025 MG per tablet 1 tablet, 1 tablet, Oral, 4x Daily PRN, Hermes Oreilly MD  •  doxycycline (MONODOX) capsule 100 mg, 100 mg, Oral, Q12H, Hermes Oreilly MD, 100 mg at 06/20/18 0811  •  fluticasone (FLONASE) 50 MCG/ACT nasal spray 2 spray, 2 spray, Each Nare, Daily, Abel Pal MD, Stopped at 06/20/18 0813  •  nitroglycerin (NITROSTAT) SL tablet 0.4 mg, 0.4 mg, Sublingual, Q5 Min PRN, Abel Pal MD  •  ondansetron (ZOFRAN) tablet 4 mg, 4 mg, Oral, Q6H PRN **OR** ondansetron ODT (ZOFRAN-ODT) disintegrating tablet 4 mg, 4 mg, Oral, Q6H PRN **OR** ondansetron (ZOFRAN) injection 4 mg, 4 mg, Intravenous, Q6H PRN, Abel Pal MD  •  pneumococcal polysaccharide 23-valent (PNEUMOVAX-23) vaccine 0.5 mL, 0.5 mL, Intramuscular, During Hospitalization, Abel Pal MD  •  predniSONE (DELTASONE) tablet 2.5 mg, 2.5 mg, Oral, Daily Before Supper, Compa EVANS MD, 2.5 mg at 06/20/18 1726  •  predniSONE (DELTASONE) tablet 5 mg, 5 mg, Oral, Daily With Breakfast, Compa EVANS MD, 5 mg at 06/20/18 0811  •  saccharomyces boulardii (FLORASTOR) capsule 250 mg, 250 mg, Oral, BID, Hermes Oreilly MD, 250 mg at 06/20/18 0810  •  sodium chloride (OCEAN) nasal spray 2 spray, 2 spray, Nasal, PRN, Abel Pal MD  •  sodium chloride 0.9 % flush 1-10 mL, 1-10 mL, Intravenous, PRN, Abel Pal MD  •  sodium chloride 0.9 % flush 10 mL, 10 mL, Intravenous, PRN, Harrison Gutiérrez MD    Assessment/Plan     Patient Active Problem List   Diagnosis   • Cardiomyopathy   • Paroxysmal VT   • Palpitations   • PVC's (premature  ventricular contractions)   • Exacerbation of Crohn's disease of small intestine   • Adjustment disorder with depressed mood   • Ankylosis of spine   • Crohn's disease with complication   • Diabetes mellitus   • Essential hypertension   • External hemorrhoids   • Genital herpes simplex   • Gout   • Insomnia   • Iron deficiency   • Prostate mass   • Recurrent aphthous ulcer   • Asteatosis cutis   • History of pneumonia   • Abnormal CXR (chest x-ray)   • RUQ abdominal pain   • Crohn's disease of small intestine with other complication   • Right lower quadrant abdominal pain   • Enteritis   • Mass-like inflammation at terminal ileum   • Acute sinusitis   • Headache   • Crohn's disease   • Ileostomy present   • Paroxysmal ventricular tachycardia   • Chronic systolic CHF (congestive heart failure)   • Nonischemic cardiomyopathy   • Orthostasis   • Iron deficiency anemia   • Orthostatic hypotension   Orthostatic hypotension  Dizziness  Mild hyponatremia  History of intermittent steroid use for the past several years  Crohn's disease  Palpitations     Patient reports that he may be going home today  I advised him to continue the prednisone 5 mg in the morning and 2.5 mg in the afternoon  Patient symptoms have only slightly improved but I doubt if it is all related to adrenal status since his cosyntropin stim testing response was adequate    Patient is currently on empiric treatment with steroids due to frequent use of steroids in the recent and more past   Will close monitoring for response and plan to taper over a longer period of time    I discussed my plan with the patient in person and his wife over the phone and they both verbalized understanding  Feels slightly better  BP stabilizing  No more dizziness  Advised to continue current prednisone dose  followup in 3-4 weeks in my office    The total time spent for old record and lab review and floor time was more than 35 min of which greater than 20 min of time  ( greater  "than 50% of the total time )  was spent face to face with the patient counseling and coordination of care owas spent on counseling the patient on recommended evaluation and treatment options, instructions for management/treatment and /or follow up  and importance of compliance with chosen management or treatment options    Compa Arthur MD FACE.  06/20/18  5:42 PM      EMR Dragon / transcription disclaimer:     \"Dictated utilizing Dragon dictation\".   "

## 2018-06-20 NOTE — PROGRESS NOTES
" LOS: 1 day     Name: Arnie Calvo  Age: 57 y.o.  Sex: male  :  1960  MRN: [id number]         Primary Care Physician: Zechariah Fatima MD    Subjective   Subjective  Complains of headache.  It is sharp and stabbing in nature and centered over the top of his head.  He has had previous issues with sinus headaches but this is different.  He states he has been getting these intermittent headaches of a new nature since his bowel surgery 6 or 7 weeks ago.  He has them 3-4 times per week.  No associated vision changes.  He feels that his dizziness and lightheadedness has improved.    Objective   Vital Signs  Temp:  [96.9 °F (36.1 °C)-98 °F (36.7 °C)] 96.9 °F (36.1 °C)  Heart Rate:  [] 81  Resp:  [14-18] 15  BP: (88-95)/(55-67) 92/62  Body mass index is 27.14 kg/m².    Objective:  General Appearance:  Comfortable and in no acute distress.    Vital signs: (most recent): Blood pressure 92/62, pulse 81, temperature 96.9 °F (36.1 °C), temperature source Oral, resp. rate 15, height 175.3 cm (69\"), weight 83.4 kg (183 lb 12.8 oz), SpO2 96 %.    Lungs:  Normal effort and normal respiratory rate.    Heart: Normal rate.  Regular rhythm.    Abdomen: Abdomen is soft.  Bowel sounds are normal.   There is no abdominal tenderness.     Extremities: There is no dependent edema or local swelling.    Neurological: Patient is alert and oriented to person, place and time.    Skin:  Warm and dry.              Results Review:       I reviewed the patient's new clinical results.      Results from last 7 days  Lab Units 18  0721 18  0515 18  1258   WBC 10*3/mm3 5.01 6.29 7.05   HEMOGLOBIN g/dL 11.7* 12.9* 14.2   PLATELETS 10*3/mm3 153 170 183       Results from last 7 days  Lab Units 18  0721 18  0515 18  1258   SODIUM mmol/L 140 135* 133*   POTASSIUM mmol/L 4.0 5.1 5.0   CHLORIDE mmol/L 107 101 98   CO2 mmol/L 19.0* 19.3* 18.9*   BUN mg/dL 11 12 17   CREATININE mg/dL 0.84 0.99 1.05   CALCIUM mg/dL " 9.1 9.4 10.3   GLUCOSE mg/dL 89 87 104*         Scheduled Meds:     doxycycline 100 mg Oral Q12H   fluticasone 2 spray Each Nare Daily   predniSONE 2.5 mg Oral Daily Before Supper   predniSONE 5 mg Oral Daily With Breakfast   saccharomyces boulardii 250 mg Oral BID     PRN Meds:   •  acetaminophen  •  diphenoxylate-atropine  •  nitroglycerin  •  ondansetron **OR** ondansetron ODT **OR** ondansetron  •  pneumococcal polysaccharide 23-valent  •  sodium chloride  •  sodium chloride  •  sodium chloride  Continuous Infusions:    sodium chloride 75 mL/hr Last Rate: 75 mL/hr (06/17/18 2200)       Assessment/Plan   Principal Problem:    Orthostasis  Active Problems:    Crohn's disease    Ileostomy present    Paroxysmal ventricular tachycardia    Chronic systolic CHF (congestive heart failure)    Nonischemic cardiomyopathy    Iron deficiency anemia    Orthostatic hypotension  Headache    Assessment & Plan    - Initiated on steroids yesterday.  Follow-up additional recommendations from endocrinology.  No change in blood pressure as of yet.  - We'll continue to hold all medications that would lower blood pressure.  This includes Flomax but he states he is not having any urinary difficulty.  - His main complaint today is headache.  These have been new since his bowel surgery 7 weeks ago and occur 3-4 times per week.  It is better at present with Tylenol but not fully resolved.  He has never had this evaluated.  The headaches are likely related to his hypotension but I will check a head CT given these are new for him and he has no history of migraines.  - He will complete 3 more days of doxycycline for peristomal abscess.  - Continue to monitor      Hermes Oreilly MD  [facility]  06/20/18  9:41 AM

## 2018-06-20 NOTE — PROGRESS NOTES
Continued Stay Note  [facility]     Patient Name: Arnie Calvo  MRN: [id number]  Today's Date: 6/20/2018    Admit Date: 6/17/2018          Discharge Plan     Row Name 06/20/18 1442       Plan    [facility]    Plan Comments Spoke with pt at bedside.  [facility]  ( [phone].  Plan home with [facility]  BREE Villarreal              Discharge Codes    No documentation.           Elizabet Cuadra, BREE

## 2018-06-20 NOTE — PROGRESS NOTES
Hospital Follow Up    LOS:  LOS: 1 day   Patient Name: Arnie Calvo  Age/Sex: 57 y.o. male  : 1960  MRN: [id number]    Date of Hospital Visit: 18  Length of Stay: 1  Encounter Provider: Nazario Farmer MD  Place of Service: [facility] CARDIOLOGY    Subjective:     Follow Up for: c/o headache    Interval History: less dizzy on ambulation    Objective:     Objective:  Temp:  [96.9 °F (36.1 °C)-98 °F (36.7 °C)] 96.9 °F (36.1 °C)  Heart Rate:  [] 81  Resp:  [14-18] 15  BP: (88-95)/(55-67) 92/62  Body mass index is 27.14 kg/m².    Intake/Output Summary (Last 24 hours) at 18 0830  Last data filed at 18 0621   Gross per 24 hour   Intake             2100 ml   Output              750 ml   Net             1350 ml     1    18  1340 18  0539 18  0624   Weight: 79.4 kg (175 lb 0.7 oz) 82.6 kg (182 lb 1.6 oz) 83.4 kg (183 lb 12.8 oz)     Weight change: 3.971 kg (8 lb 12.1 oz)    Physical Exam:   General Appearance: Alert, cooperative, in no acute distress. AAOx4.   HEENT: Normocephalic.  Neck: Supple. No JVD. No Carotid bruit. No thyromegaly  Lungs: CTAB. Normal respiratory effort and rate.  Heart:: Regular rate and rhythm, normal S1 and S2, no murmurs, gallops or rubs.  Abdomen: Soft, nontender, non-distended. positive bowel sounds  Extremities: Warm, no cyanosis, or clubbing. No edema.     Lab Review:     Results from last 7 days  Lab Units 18  0721 18  0515 18  1258   SODIUM mmol/L 140 135* 133*   POTASSIUM mmol/L 4.0 5.1 5.0   CHLORIDE mmol/L 107 101 98   CO2 mmol/L 19.0* 19.3* 18.9*   BUN mg/dL 11 12 17   CREATININE mg/dL 0.84 0.99 1.05   GLUCOSE mg/dL 89 87 104*   CALCIUM mg/dL 9.1 9.4 10.3         Results from last 7 days  Lab Units 18  1258   TROPONIN T ng/mL <0.010       Results from last 7 days  Lab Units 18  0721 18  0515   WBC 10*3/mm3 5.01 6.29   HEMOGLOBIN g/dL 11.7* 12.9*   HEMATOCRIT % 36.7*  39.8*   PLATELETS 10*3/mm3 153 170           Results from last 7 days  Lab Units 06/18/18  0515 06/17/18  1258   MAGNESIUM mg/dL 1.8 1.6               Results from last 7 days  Lab Units 06/18/18  0515   TSH mIU/mL 1.760         I reviewed the patient's new clinical results.          I personally viewed and interpreted the patient's EKG/Telemetry data.  Current Medications:   Scheduled Meds:  doxycycline 100 mg Oral Q12H   fluticasone 2 spray Each Nare Daily   predniSONE 2.5 mg Oral Daily Before Supper   predniSONE 5 mg Oral Daily With Breakfast   saccharomyces boulardii 250 mg Oral BID     Continuous Infusions:  sodium chloride 75 mL/hr Last Rate: 75 mL/hr (06/17/18 2209)       Allergies:  Allergies   Allergen Reactions   • Iodine Nausea And Vomiting   • Shellfish-Derived Products Nausea And Vomiting   • Adhesive Tape Rash   • Bactrim [Sulfamethoxazole-Trimethoprim] Hives   • Latex Rash       Assessment & Plan     Principal Problem:    Orthostasis  Active Problems:    Crohn's disease    Ileostomy present    Paroxysmal ventricular tachycardia    Chronic systolic CHF (congestive heart failure)    Nonischemic cardiomyopathy    Iron deficiency anemia    Orthostatic hypotension          Plan: continue present course.  Contribution to low BP can be severe MONI Farmer MD  06/20/18

## 2018-06-21 LAB
ANION GAP SERPL CALCULATED.3IONS-SCNC: 10.7 MMOL/L
BASOPHILS # BLD AUTO: 0.02 10*3/MM3 (ref 0–0.2)
BASOPHILS NFR BLD AUTO: 0.3 % (ref 0–1.5)
BUN BLD-MCNC: 9 MG/DL (ref 6–20)
BUN/CREAT SERPL: 11.7 (ref 7–25)
CALCIUM SPEC-SCNC: 9.4 MG/DL (ref 8.6–10.5)
CHLORIDE SERPL-SCNC: 105 MMOL/L (ref 98–107)
CO2 SERPL-SCNC: 24.3 MMOL/L (ref 22–29)
CREAT BLD-MCNC: 0.77 MG/DL (ref 0.76–1.27)
DEPRECATED RDW RBC AUTO: 47.3 FL (ref 37–54)
EOSINOPHIL # BLD AUTO: 0.11 10*3/MM3 (ref 0–0.7)
EOSINOPHIL NFR BLD AUTO: 1.9 % (ref 0.3–6.2)
ERYTHROCYTE [DISTWIDTH] IN BLOOD BY AUTOMATED COUNT: 15.8 % (ref 11.5–14.5)
GFR SERPL CREATININE-BSD FRML MDRD: 104 ML/MIN/1.73
GLUCOSE BLD-MCNC: 100 MG/DL (ref 65–99)
HCT VFR BLD AUTO: 37.8 % (ref 40.4–52.2)
HGB BLD-MCNC: 12.3 G/DL (ref 13.7–17.6)
IMM GRANULOCYTES # BLD: 0.01 10*3/MM3 (ref 0–0.03)
IMM GRANULOCYTES NFR BLD: 0.2 % (ref 0–0.5)
LYMPHOCYTES # BLD AUTO: 2.7 10*3/MM3 (ref 0.9–4.8)
LYMPHOCYTES NFR BLD AUTO: 46.3 % (ref 19.6–45.3)
MCH RBC QN AUTO: 26.9 PG (ref 27–32.7)
MCHC RBC AUTO-ENTMCNC: 32.5 G/DL (ref 32.6–36.4)
MCV RBC AUTO: 82.5 FL (ref 79.8–96.2)
MONOCYTES # BLD AUTO: 0.4 10*3/MM3 (ref 0.2–1.2)
MONOCYTES NFR BLD AUTO: 6.9 % (ref 5–12)
NEUTROPHILS # BLD AUTO: 2.6 10*3/MM3 (ref 1.9–8.1)
NEUTROPHILS NFR BLD AUTO: 44.6 % (ref 42.7–76)
PLATELET # BLD AUTO: 158 10*3/MM3 (ref 140–500)
PMV BLD AUTO: 9.8 FL (ref 6–12)
POTASSIUM BLD-SCNC: 4 MMOL/L (ref 3.5–5.2)
RBC # BLD AUTO: 4.58 10*6/MM3 (ref 4.6–6)
SODIUM BLD-SCNC: 140 MMOL/L (ref 136–145)
WBC NRBC COR # BLD: 5.83 10*3/MM3 (ref 4.5–10.7)

## 2018-06-21 PROCEDURE — 99233 SBSQ HOSP IP/OBS HIGH 50: CPT | Performed by: INTERNAL MEDICINE

## 2018-06-21 PROCEDURE — 80048 BASIC METABOLIC PNL TOTAL CA: CPT | Performed by: INTERNAL MEDICINE

## 2018-06-21 PROCEDURE — 85025 COMPLETE CBC W/AUTO DIFF WBC: CPT | Performed by: INTERNAL MEDICINE

## 2018-06-21 PROCEDURE — 63710000001 PREDNISONE PER 5 MG: Performed by: INTERNAL MEDICINE

## 2018-06-21 RX ORDER — MIDODRINE HYDROCHLORIDE 2.5 MG/1
2.5 TABLET ORAL
Status: DISCONTINUED | OUTPATIENT
Start: 2018-06-21 | End: 2018-06-22 | Stop reason: HOSPADM

## 2018-06-21 RX ADMIN — PREDNISONE 2.5 MG: 2.5 TABLET ORAL at 18:08

## 2018-06-21 RX ADMIN — PREDNISONE 5 MG: 5 TABLET ORAL at 10:25

## 2018-06-21 RX ADMIN — Medication 250 MG: at 21:35

## 2018-06-21 RX ADMIN — Medication 250 MG: at 10:26

## 2018-06-21 RX ADMIN — DOXYCYCLINE 100 MG: 100 CAPSULE ORAL at 10:26

## 2018-06-21 RX ADMIN — DOXYCYCLINE 100 MG: 100 CAPSULE ORAL at 21:35

## 2018-06-21 RX ADMIN — MIDODRINE HYDROCHLORIDE 2.5 MG: 2.5 TABLET ORAL at 18:08

## 2018-06-21 NOTE — PLAN OF CARE
Problem: Patient Care Overview  Goal: Plan of Care Review  Outcome: Ongoing (interventions implemented as appropriate)   06/21/18 0488   Coping/Psychosocial   Plan of Care Reviewed With patient   Plan of Care Review   Progress improving   OTHER   Outcome Summary pt resting quietly in bed for the day. Pt started on Mididrine today for low blood pressures, orthostatic blood pressures in the A.M., pt still able to maintain ileostomy site, no distress, BP improving this evening at 112/76, will continue to monitor for further complaints.        Problem: Ileostomy (Adult)  Goal: Signs and Symptoms of Listed Potential Problems Will be Absent, Minimized or Managed (Ileostomy)  Outcome: Ongoing (interventions implemented as appropriate)    Goal: Anesthesia/Sedation Recovery  Outcome: Ongoing (interventions implemented as appropriate)      Problem: Fall Risk (Adult)  Goal: Absence of Fall  Outcome: Ongoing (interventions implemented as appropriate)      Problem: Cardiac: Heart Failure (Adult)  Goal: Signs and Symptoms of Listed Potential Problems Will be Absent, Minimized or Managed (Cardiac: Heart Failure)  Outcome: Ongoing (interventions implemented as appropriate)

## 2018-06-21 NOTE — PROGRESS NOTES
Hospital Follow Up    LOS:  LOS: 2 days   Patient Name: Arnie Calvo  Age/Sex: 57 y.o. male  : 1960  MRN: [id number]    Date of Hospital Visit: 18  Length of Stay: 2  Encounter Provider: Nazario Farmer MD  Place of Service: [facility] CARDIOLOGY    Subjective:     Follow Up for: Cardiomyopathy    Interval History: Patient's blood pressure still remains fairly low.  No significant response to steroid therapy.  Reviewed with Dr. Monson.  Also reviewed extensively with the patient and his wife different options.    The patient has severe cardiomyopathy.  The etiology appears to be uncertain.  The rapid decline in ejection fraction is certainly concerning and probably the basis for his hypotension.  He is not able to tolerate any of his cardiac meds at this point.  It is possible in view of his Crohn's disease that he has some form of myocarditis but he doesn't have other constitutional symptoms to suggest inflammatory issue.  Objective:     Objective:  Temp:  [97.2 °F (36.2 °C)-97.9 °F (36.6 °C)] 97.3 °F (36.3 °C)  Heart Rate:  [] 83  Resp:  [16] 16  BP: ()/(64-71) 109/71  Body mass index is 26.98 kg/m².    Intake/Output Summary (Last 24 hours) at 18 1628  Last data filed at 18 1827   Gross per 24 hour   Intake              360 ml   Output                0 ml   Net              360 ml     1    18  0539 18  0624 18  0642   Weight: 82.6 kg (182 lb 1.6 oz) 83.4 kg (183 lb 12.8 oz) 82.9 kg (182 lb 11.2 oz)     Weight change: -0.499 kg (-1 lb 1.6 oz)    Physical Exam:   General Appearance: Alert, cooperative, in no acute distress. AAOx4.   HEENT: Normocephalic.  Neck: Supple. No JVD. No Carotid bruit. No thyromegaly  Lungs: CTAB. Normal respiratory effort and rate.  Heart:: Regular rate and rhythm, normal S1 and S2, no murmurs, gallops or rubs.  Abdomen: Soft, nontender, non-distended. positive bowel sounds  Extremities: Warm, no  cyanosis, or clubbing. No edema.     Lab Review:     Results from last 7 days  Lab Units 06/21/18  0851 06/19/18  0721 06/18/18  0515   SODIUM mmol/L 140 140 135*   POTASSIUM mmol/L 4.0 4.0 5.1   CHLORIDE mmol/L 105 107 101   CO2 mmol/L 24.3 19.0* 19.3*   BUN mg/dL 9 11 12   CREATININE mg/dL 0.77 0.84 0.99   GLUCOSE mg/dL 100* 89 87   CALCIUM mg/dL 9.4 9.1 9.4         Results from last 7 days  Lab Units 06/17/18  1258   TROPONIN T ng/mL <0.010       Results from last 7 days  Lab Units 06/21/18  0851 06/19/18  0721   WBC 10*3/mm3 5.83 5.01   HEMOGLOBIN g/dL 12.3* 11.7*   HEMATOCRIT % 37.8* 36.7*   PLATELETS 10*3/mm3 158 153           Results from last 7 days  Lab Units 06/18/18  0515 06/17/18  1258   MAGNESIUM mg/dL 1.8 1.6               Results from last 7 days  Lab Units 06/18/18  0515   TSH mIU/mL 1.760         I reviewed the patient's new clinical results.          I personally viewed and interpreted the patient's EKG/Telemetry data.  Current Medications:   Scheduled Meds:  doxycycline 100 mg Oral Q12H   fluticasone 2 spray Each Nare Daily   midodrine 2.5 mg Oral TID AC   predniSONE 2.5 mg Oral Daily Before Supper   predniSONE 5 mg Oral Daily With Breakfast   saccharomyces boulardii 250 mg Oral BID     Continuous Infusions:     Allergies:  Allergies   Allergen Reactions   • Iodine Nausea And Vomiting   • Shellfish-Derived Products Nausea And Vomiting   • Adhesive Tape Rash   • Bactrim [Sulfamethoxazole-Trimethoprim] Hives   • Latex Rash       Assessment & Plan     Principal Problem:    Nonischemic cardiomyopathy  Active Problems:    Crohn's disease    Ileostomy present    Paroxysmal ventricular tachycardia    Chronic systolic CHF (congestive heart failure)    Orthostasis    Iron deficiency anemia    Orthostatic hypotension          Plan: Our plan is to start him on Midodrine to see if that helps elevate his blood pressure.  We also discussed transfer to the [facility] for an extensive evaluation in  consideration of left ventricular assist device or other therapies.  I will contact the heart failure coordinator tomorrow morning at the [facility] to discuss the case and to see what options are available for this patient.  As I told the patient and his wife we will not simply discharge him home with no plan for further care.  They're completely agreeable.      Nazario Farmer MD  06/21/18

## 2018-06-21 NOTE — PROGRESS NOTES
"     LOS: 2 days   Primary Care Physician: Zechariah Fatima MD     Subjective   Multiple questions and concerns.  Wife was on speaker phone during my visit.  Patient still having dizziness and palpitations when he gets up.  His wife is contacted Dr. Lynn who requests selenium level and supplementation.  She also mentioned viral cardiomyopathy.    Vital Signs  Body mass index is 26.98 kg/m².  Temp:  [97.2 °F (36.2 °C)-97.9 °F (36.6 °C)] 97.3 °F (36.3 °C)  Heart Rate:  [] 83  Resp:  [16] 16  BP: ()/(64-71) 109/71      Objective:  General Appearance:  In no acute distress and comfortable (Looks a little older than age).    Vital signs: (most recent): Blood pressure 109/71, pulse 83, temperature 97.3 °F (36.3 °C), temperature source Oral, resp. rate 16, height 175.3 cm (69\"), weight 82.9 kg (182 lb 11.2 oz), SpO2 96 %.    HEENT: (No JVD.  Neck supple.  Trachea midline.  No lymphadenopathy)    Lungs:  He is not in respiratory distress.  No stridor.  There are decreased breath sounds.  No rales, wheezes or rhonchi.    Heart: Normal rate.  Irregular rhythm.  No murmur.   Abdomen: Abdomen is soft and non-distended.  (Ostomy noted).  Bowel sounds are normal.   There is no abdominal tenderness.   There is no splenomegaly. There is no hepatomegaly.   Extremities: There is no dependent edema.    Neurological: Patient is alert.    Skin:  Warm and dry.          Results Review:    I reviewed the patient's new clinical results.      Results from last 7 days  Lab Units 06/21/18  0851 06/19/18  0721   WBC 10*3/mm3 5.83 5.01   HEMOGLOBIN g/dL 12.3* 11.7*   PLATELETS 10*3/mm3 158 153       Results from last 7 days  Lab Units 06/21/18  0851 06/19/18  0721   SODIUM mmol/L 140 140   POTASSIUM mmol/L 4.0 4.0   CHLORIDE mmol/L 105 107   CO2 mmol/L 24.3 19.0*   BUN mg/dL 9 11   CREATININE mg/dL 0.77 0.84   CALCIUM mg/dL 9.4 9.1   GLUCOSE mg/dL 100* 89         Hemoglobin A1C:  Lab Results   Component Value Date    HGBA1C 5.91 " (H) 06/18/2018       Glucose Range:No results found for: POCGLU    No results found for: [id number]    Lab Results   Component Value Date    TSH 1.760 06/18/2018       Assessment & Plan      Medication Review: Yes    Active Hospital Problems (** Indicates Principal Problem)    Diagnosis Date Noted   • **Orthostasis [I95.1] 06/17/2018   • Orthostatic hypotension [I95.1] 06/19/2018   • Crohn's disease [K50.90] 06/17/2018   • Ileostomy present [Z93.2] 06/17/2018   • Paroxysmal ventricular tachycardia [I47.2] 06/17/2018   • Chronic systolic CHF (congestive heart failure) [I50.22] 06/17/2018   • Nonischemic cardiomyopathy [I42.8] 06/17/2018   • Iron deficiency anemia [D50.9] 06/17/2018      Resolved Hospital Problems    Diagnosis Date Noted Date Resolved   No resolved problems to display.       Assessment/Plan  1.  Nonischemic cardiomyopathy, uncertain etiology.  I did order a selenium level as requested by patient and wife.  Okay to start selenium supplementation.  Discussed briefly with Dr. Farmer who will see patient shortly.  Patient and wife asking if referral to [facility] is indicated.  Per Dr. Lagos, adrenal insufficiency is unlikely to be the cause of worsening cardiac function.  Not sure if midodrine is an option for blood pressure-  await further input.  2.  Orthostasis, as above.  Continue steroid doses as recommended by endo  3.  Peristomal abscess.  Continue doxycycline for 1 more day  4.  Crohn's disease with colon resection 7 weeks ago.    Discussed at length with patient and by phone with wife.    Diamond Monson MD  06/21/18  3:33 PM

## 2018-06-21 NOTE — PLAN OF CARE
Problem: Patient Care Overview  Goal: Plan of Care Review  Outcome: Ongoing (interventions implemented as appropriate)   06/21/18 0341   Coping/Psychosocial   Plan of Care Reviewed With patient   Plan of Care Review   Progress no change   OTHER   Outcome Summary RESTING QUIETLY IN BED, NO C/O PAIN VOICED, UP TO BR TO EMPTY ILEOSTOMY POUCH. STOOL BROWN WITH RED TINGED FROM RED JELLO. NO ACUTE DISTRESS,NO N/V. B/P REMAINS LOW [id number],[id number]. CONCERN ABOUT HIS B/P AND COREG MEDICATION. ENCOURAGED TO TALK TO CARDIAC GROUP TODAY WHEN THEY VISIT.      Goal: Individualization and Mutuality  Outcome: Ongoing (interventions implemented as appropriate)    Goal: Discharge Needs Assessment  Outcome: Ongoing (interventions implemented as appropriate)    Goal: Interprofessional Rounds/Family Conf  Outcome: Ongoing (interventions implemented as appropriate)      Problem: Ileostomy (Adult)  Goal: Signs and Symptoms of Listed Potential Problems Will be Absent, Minimized or Managed (Ileostomy)  Outcome: Ongoing (interventions implemented as appropriate)    Goal: Anesthesia/Sedation Recovery  Outcome: Ongoing (interventions implemented as appropriate)      Problem: Fall Risk (Adult)  Goal: Absence of Fall  Outcome: Ongoing (interventions implemented as appropriate)      Problem: Nutrition, Imbalanced: Inadequate Oral Intake (Adult)  Goal: Improved Oral Intake  Outcome: Ongoing (interventions implemented as appropriate)    Goal: Prevent Further Weight Loss  Outcome: Ongoing (interventions implemented as appropriate)      Problem: Cardiac: Heart Failure (Adult)  Goal: Signs and Symptoms of Listed Potential Problems Will be Absent, Minimized or Managed (Cardiac: Heart Failure)  Outcome: Ongoing (interventions implemented as appropriate)

## 2018-06-22 VITALS
OXYGEN SATURATION: 100 % | WEIGHT: 181.4 LBS | SYSTOLIC BLOOD PRESSURE: 110 MMHG | DIASTOLIC BLOOD PRESSURE: 74 MMHG | BODY MASS INDEX: 26.87 KG/M2 | HEART RATE: 89 BPM | RESPIRATION RATE: 14 BRPM | HEIGHT: 69 IN | TEMPERATURE: 97 F

## 2018-06-22 PROCEDURE — 84255 ASSAY OF SELENIUM: CPT | Performed by: INTERNAL MEDICINE

## 2018-06-22 PROCEDURE — 99232 SBSQ HOSP IP/OBS MODERATE 35: CPT | Performed by: INTERNAL MEDICINE

## 2018-06-22 PROCEDURE — 63710000001 PREDNISONE PER 5 MG: Performed by: INTERNAL MEDICINE

## 2018-06-22 RX ORDER — PREDNISONE 2.5 MG
TABLET ORAL
Qty: 90 TABLET | Refills: 1 | Status: SHIPPED | OUTPATIENT
Start: 2018-06-22 | End: 2018-06-22

## 2018-06-22 RX ORDER — MIDODRINE HYDROCHLORIDE 2.5 MG/1
2.5 TABLET ORAL
Qty: 4 TABLET | Refills: 0 | Status: SHIPPED | OUTPATIENT
Start: 2018-06-22 | End: 2018-10-10

## 2018-06-22 RX ORDER — PREDNISONE 2.5 MG
TABLET ORAL
Qty: 3 TABLET | Refills: 1 | Status: SHIPPED | OUTPATIENT
Start: 2018-06-22 | End: 2018-10-10

## 2018-06-22 RX ADMIN — MIDODRINE HYDROCHLORIDE 2.5 MG: 2.5 TABLET ORAL at 07:28

## 2018-06-22 RX ADMIN — PREDNISONE 2.5 MG: 2.5 TABLET ORAL at 19:40

## 2018-06-22 RX ADMIN — Medication 250 MG: at 08:57

## 2018-06-22 RX ADMIN — MIDODRINE HYDROCHLORIDE 2.5 MG: 2.5 TABLET ORAL at 19:40

## 2018-06-22 RX ADMIN — DOXYCYCLINE 100 MG: 100 CAPSULE ORAL at 08:57

## 2018-06-22 RX ADMIN — PREDNISONE 5 MG: 5 TABLET ORAL at 08:59

## 2018-06-22 RX ADMIN — MIDODRINE HYDROCHLORIDE 2.5 MG: 2.5 TABLET ORAL at 11:27

## 2018-06-22 NOTE — PLAN OF CARE
Problem: Patient Care Overview  Goal: Plan of Care Review  Outcome: Ongoing (interventions implemented as appropriate)   06/22/18 0419   Coping/Psychosocial   Plan of Care Reviewed With patient   Plan of Care Review   Progress improving   OTHER   Outcome Summary No complaints voiced, taking care of ostomy per self, up ad bernie, gait steady, Eyes closed at long interval, resting quietly     Goal: Individualization and Mutuality  Outcome: Ongoing (interventions implemented as appropriate)    Goal: Discharge Needs Assessment  Outcome: Ongoing (interventions implemented as appropriate)    Goal: Interprofessional Rounds/Family Conf  Outcome: Ongoing (interventions implemented as appropriate)      Problem: Ileostomy (Adult)  Goal: Signs and Symptoms of Listed Potential Problems Will be Absent, Minimized or Managed (Ileostomy)  Outcome: Ongoing (interventions implemented as appropriate)    Goal: Anesthesia/Sedation Recovery  Outcome: Ongoing (interventions implemented as appropriate)      Problem: Fall Risk (Adult)  Goal: Absence of Fall  Outcome: Ongoing (interventions implemented as appropriate)      Problem: Nutrition, Imbalanced: Inadequate Oral Intake (Adult)  Goal: Improved Oral Intake  Outcome: Ongoing (interventions implemented as appropriate)    Goal: Prevent Further Weight Loss  Outcome: Ongoing (interventions implemented as appropriate)      Problem: Cardiac: Heart Failure (Adult)  Goal: Signs and Symptoms of Listed Potential Problems Will be Absent, Minimized or Managed (Cardiac: Heart Failure)  Outcome: Ongoing (interventions implemented as appropriate)

## 2018-06-22 NOTE — PROGRESS NOTES
Continued Stay Note  [facility]     Patient Name: Arnie Calvo  MRN: [id number]  Today's Date: 6/22/2018    Admit Date: 6/17/2018          Discharge Plan     Row Name 06/22/18 1527       Plan    Plan Plan transfer to [facility].  BREE Villarreal    Patient/Family in Agreement with Plan yes    Plan Comments Spoke to pt at bedside.  Pt is awaiting transfer to [facility].  Dr. Monson and cardiology state pt can transfer per private car.  Packet with disks from radiology and  copy of chart on floor for transfer.  Plan [facility] for continue care.  BREE Villarreal                Discharge Codes    No documentation.       Expected Discharge Date and Time     Expected Discharge Date Expected Discharge Time    Jun 22, 2018             Elizabet Cuadra, RN

## 2018-06-22 NOTE — PROGRESS NOTES
"     LOS: 3 days   Primary Care Physician: Zechariah Fatima MD     Subjective   Feels no worse, may be a little better.  Heart rate goes up when he walks but he is not feeling palpitations.  No dizziness today.    Vital Signs  Body mass index is 26.79 kg/m².  Temp:  [97 °F (36.1 °C)-98.7 °F (37.1 °C)] 97 °F (36.1 °C)  Heart Rate:  [] 89  Resp:  [14-16] 14  BP: ()/(57-76) 98/58      Objective:  General Appearance:  In no acute distress.    Vital signs: (most recent): Blood pressure 98/58, pulse 89, temperature 97 °F (36.1 °C), temperature source Oral, resp. rate 14, height 175.3 cm (69\"), weight 82.3 kg (181 lb 6.4 oz), SpO2 100 %.    Lungs:  He is not in respiratory distress.  No stridor.  There are decreased breath sounds.  No rales, wheezes or rhonchi.    Heart: Normal rate.  Regular rhythm.  No murmur.   Abdomen: Abdomen is soft and non-distended.  Bowel sounds are normal.   There is no abdominal tenderness.     Extremities: There is no dependent edema.    Neurological: Patient is alert.    Skin:  Warm and dry.          Results Review:    I reviewed the patient's new clinical results.      Results from last 7 days  Lab Units 06/21/18  0851 06/19/18  0721   WBC 10*3/mm3 5.83 5.01   HEMOGLOBIN g/dL 12.3* 11.7*   PLATELETS 10*3/mm3 158 153       Results from last 7 days  Lab Units 06/21/18  0851 06/19/18  0721   SODIUM mmol/L 140 140   POTASSIUM mmol/L 4.0 4.0   CHLORIDE mmol/L 105 107   CO2 mmol/L 24.3 19.0*   BUN mg/dL 9 11   CREATININE mg/dL 0.77 0.84   CALCIUM mg/dL 9.4 9.1   GLUCOSE mg/dL 100* 89         Hemoglobin A1C:  Lab Results   Component Value Date    HGBA1C 5.91 (H) 06/18/2018       Glucose Range:No results found for: POCGLU    No results found for: [id number]    Lab Results   Component Value Date    TSH 1.760 06/18/2018       Assessment & Plan      Medication Review: Yes    Active Hospital Problems (** Indicates Principal Problem)    Diagnosis Date Noted   • **Nonischemic cardiomyopathy " [I42.8] 06/17/2018   • Orthostatic hypotension [I95.1] 06/19/2018   • Crohn's disease [K50.90] 06/17/2018   • Ileostomy present [Z93.2] 06/17/2018   • Paroxysmal ventricular tachycardia [I47.2] 06/17/2018   • Chronic systolic CHF (congestive heart failure) [I50.22] 06/17/2018   • Orthostasis [I95.1] 06/17/2018   • Iron deficiency anemia [D50.9] 06/17/2018      Resolved Hospital Problems    Diagnosis Date Noted Date Resolved   No resolved problems to display.       Assessment/Plan  1.  Nonischemic cardiomyopathy with worsening ejection fraction.  [facility] has agreed to [facility] him once bed is available.  No bed available today, possibly tomorrow.    2.  Hypotension.  Blood pressures are a little bit better with the midodrine.  Continue same  3.  Crohn's disease with resection and ileostomy 7 weeks ago.  Stable.  He completes doxycycline today for the peristomal abscess.  4.  Adrenal insufficiency, mild.  Continue prednisone 5 mg in a.m. and 2.5 mg in p.m. as directed by endocrinologist.  Cosyntropin stimulation testing response was adequate.    Discussed at length with patient, CCP, Dr. Farmer.  35 minutes with greater than half in counseling and coordinating care      Diamond Monson MD  06/22/18  3:04 PM

## 2018-06-22 NOTE — PROGRESS NOTES
57 y.o.   LOS: 2 days   Patient Care Team:  Zechariah Fatima MD as PCP - General    Chief Complaint:  Secondary adrenal insufficiency    Chief Complaint   Patient presents with   • Weakness - Generalized     x 2 weeks   • Chest Pain     intermittent x 2 weeks   • Dizziness     with standing       Subjective     HPI  Patient is currently getting prednisone 5 mg in the morning and 2.5 mg in the evening  He was also started on midodrine with cardiology 22.5 mg 3 times a day  His blood pressure is still in the 90 systolic range  Patient reports slight improvement in his symptoms    Interval History:     Patient is a 57-year-old white male with a past medical history of nonischemic cardiomyopathy and congestive heart failure status post AICD hypertension and Crohn's disease admitted to the hospital for 2 weeks of symptoms including generalized weakness chest pain and dizziness and lightheadedness.  Patient is also having significant fluctuations in his heart rate with heart rate dropping into 40s intermittently  He reports shortness of breath on exertion  He also reports lightheadedness on standing and dizziness  Denies any nausea and abdominal pain  He had ileostomy and denies any significant change in the consistency of the stool     Patient admitted that he had been on steroids multiple times in the past several years.  He reports that he used to be on 30 mg of prednisone prior to his surgery 6 weeks ago and apparently was discontinued one day after surgery  He has had 3-4 week courses of prednisone several times in the past few years  Patient admits that when he is on steroids he feels significantly better more energy and is able to walk without any symptoms  Apparently this last use of prednisone 30 mg was 6 weeks ago prior to surgery     Patient denies any history of hyponatremia in the past  His blood pressure has been in the 70 to 80s systolic range and he was holding his Coreg due to his low blood  pressure     Review of Systems:      Review of Systems   Constitutional: Positive for fatigue.   Respiratory: Positive for shortness of breath.    Cardiovascular: Negative.    Gastrointestinal: Negative.    Neurological: Positive for dizziness, light-headedness and headaches.   All other systems reviewed and are negative.    Objective     Vital Signs   Temp:  [97.2 °F (36.2 °C)-98.7 °F (37.1 °C)] 98.6 °F (37 °C)  Heart Rate:  [] 77  Resp:  [16] 16  BP: ()/(57-76) 98/69    Physical Exam:  Physical Exam   Constitutional: He is oriented to person, place, and time.   Eyes: EOM are normal. Pupils are equal, round, and reactive to light.   Neck: Normal range of motion. Neck supple.   Cardiovascular: Normal rate, regular rhythm, normal heart sounds and intact distal pulses.    Pulmonary/Chest: Effort normal and breath sounds normal.   Abdominal: Soft. Bowel sounds are normal.   Musculoskeletal: Normal range of motion. He exhibits no edema.   Neurological: He is alert and oriented to person, place, and time.   Skin: Skin is warm and dry.   Psychiatric: He has a normal mood and affect. His behavior is normal.   Nursing note and vitals reviewed.  Results Review:     I reviewed the patient's new clinical results.      Glucose   Date/Time Value Ref Range Status   06/21/2018 0851 100 (H) 65 - 99 mg/dL Final   06/19/2018 0721 89 65 - 99 mg/dL Final     Lab Results (last 72 hours)     Procedure Component Value Units Date/Time    Basic Metabolic Panel [[id number]]  (Abnormal) Collected:  06/21/18 0851    Specimen:  Blood Updated:  06/21/18 0932     Glucose 100 (H) mg/dL      BUN 9 mg/dL      Creatinine 0.77 mg/dL      Sodium 140 mmol/L      Potassium 4.0 mmol/L      Chloride 105 mmol/L      CO2 24.3 mmol/L      Calcium 9.4 mg/dL      eGFR Non African Amer 104 mL/min/1.73      BUN/Creatinine Ratio 11.7     Anion Gap 10.7 mmol/L     Narrative:       GFR Normal >60  Chronic Kidney Disease <60  Kidney Failure <15    CBC &  Differential [[id number]] Collected:  06/21/18 0851    Specimen:  Blood Updated:  06/21/18 0907    Narrative:       The following orders were created for panel order CBC & Differential.  Procedure                               Abnormality         Status                     ---------                               -----------         ------                     CBC Auto Differential[[id number]]        Abnormal            Final result                 Please view results for these tests on the individual orders.    CBC Auto Differential [[id number]]  (Abnormal) Collected:  06/21/18 0851    Specimen:  Blood Updated:  06/21/18 0907     WBC 5.83 10*3/mm3      RBC 4.58 (L) 10*6/mm3      Hemoglobin 12.3 (L) g/dL      Hematocrit 37.8 (L) %      MCV 82.5 fL      MCH 26.9 (L) pg      MCHC 32.5 (L) g/dL      RDW 15.8 (H) %      RDW-SD 47.3 fl      MPV 9.8 fL      Platelets 158 10*3/mm3      Neutrophil % 44.6 %      Lymphocyte % 46.3 (H) %      Monocyte % 6.9 %      Eosinophil % 1.9 %      Basophil % 0.3 %      Immature Grans % 0.2 %      Neutrophils, Absolute 2.60 10*3/mm3      Lymphocytes, Absolute 2.70 10*3/mm3      Monocytes, Absolute 0.40 10*3/mm3      Eosinophils, Absolute 0.11 10*3/mm3      Basophils, Absolute 0.02 10*3/mm3      Immature Grans, Absolute 0.01 10*3/mm3     Basic Metabolic Panel [[id number]]  (Abnormal) Collected:  06/19/18 0721    Specimen:  Blood Updated:  06/19/18 0826     Glucose 89 mg/dL      BUN 11 mg/dL      Creatinine 0.84 mg/dL      Sodium 140 mmol/L      Potassium 4.0 mmol/L      Chloride 107 mmol/L      CO2 19.0 (L) mmol/L      Calcium 9.1 mg/dL      eGFR Non African Amer 94 mL/min/1.73      BUN/Creatinine Ratio 13.1     Anion Gap 14.0 mmol/L     Narrative:       GFR Normal >60  Chronic Kidney Disease <60  Kidney Failure <15    CBC & Differential [[id number]] Collected:  06/19/18 0721    Specimen:  Blood Updated:  06/19/18 0757    Narrative:       The following orders were created for panel order CBC &  Differential.  Procedure                               Abnormality         Status                     ---------                               -----------         ------                     CBC Auto Differential[[id number]]        Abnormal            Final result                 Please view results for these tests on the individual orders.    CBC Auto Differential [[id number]]  (Abnormal) Collected:  06/19/18 0721    Specimen:  Blood Updated:  06/19/18 0757     WBC 5.01 10*3/mm3      RBC 4.43 (L) 10*6/mm3      Hemoglobin 11.7 (L) g/dL      Hematocrit 36.7 (L) %      MCV 82.8 fL      MCH 26.4 (L) pg      MCHC 31.9 (L) g/dL      RDW 15.3 (H) %      RDW-SD 45.9 fl      MPV 10.2 fL      Platelets 153 10*3/mm3      Neutrophil % 36.9 (L) %      Lymphocyte % 47.9 (H) %      Monocyte % 12.0 %      Eosinophil % 2.8 %      Basophil % 0.4 %      Immature Grans % 0.0 %      Neutrophils, Absolute 1.85 (L) 10*3/mm3      Lymphocytes, Absolute 2.40 10*3/mm3      Monocytes, Absolute 0.60 10*3/mm3      Eosinophils, Absolute 0.14 10*3/mm3      Basophils, Absolute 0.02 10*3/mm3      Immature Grans, Absolute 0.00 10*3/mm3         Imaging Results (last 72 hours)     Procedure Component Value Units Date/Time    CT Head Without Contrast [[id number]] Collected:  06/20/18 1207     Updated:  06/21/18 2058    Narrative:       CT HEAD WITHOUT CONTRAST     HISTORY:  New onset headaches.     COMPARISON: None.     TECHNIQUE: A noncontrasted CT examination of the brain was performed.  The patient's head is somewhat canted in the scanner. The brain and  ventricles are symmetrical. There is no evidence of intracranial  hemorrhage, hydrocephalus or of abnormal extra-axial fluid. No focal  area of decreased attenuation to suggest acute infarction is identified.       Impression:       No acute process identified. Further evaluation could be  performed with a MRI examination of the brain as indicated.     Radiation dose reduction techniques were utilized,  including automated  exposure control and exposure modulation based on body size.     This report was finalized on 6/21/2018 8:55 PM by Dr. Nigel Chapa M.D.             Medication Review:       Current Facility-Administered Medications:   •  acetaminophen (TYLENOL) tablet 650 mg, 650 mg, Oral, Q4H PRN, Abel Pal MD, 650 mg at 06/20/18 0810  •  diphenoxylate-atropine (LOMOTIL) 2.5-0.025 MG per tablet 1 tablet, 1 tablet, Oral, 4x Daily PRN, Hermes Oreilly MD  •  doxycycline (MONODOX) capsule 100 mg, 100 mg, Oral, Q12H, Hermes Oreilly MD, 100 mg at 06/21/18 2135  •  fluticasone (FLONASE) 50 MCG/ACT nasal spray 2 spray, 2 spray, Each Nare, Daily, Abel Pal MD, Stopped at 06/20/18 0813  •  midodrine (PROAMATINE) tablet 2.5 mg, 2.5 mg, Oral, TID AC, Diamond Monson MD, 2.5 mg at 06/21/18 1808  •  nitroglycerin (NITROSTAT) SL tablet 0.4 mg, 0.4 mg, Sublingual, Q5 Min PRN, Abel Pal MD  •  ondansetron (ZOFRAN) tablet 4 mg, 4 mg, Oral, Q6H PRN **OR** ondansetron ODT (ZOFRAN-ODT) disintegrating tablet 4 mg, 4 mg, Oral, Q6H PRN **OR** ondansetron (ZOFRAN) injection 4 mg, 4 mg, Intravenous, Q6H PRN, Abel Pal MD  •  pneumococcal polysaccharide 23-valent (PNEUMOVAX-23) vaccine 0.5 mL, 0.5 mL, Intramuscular, During Hospitalization, Abel Pal MD  •  predniSONE (DELTASONE) tablet 2.5 mg, 2.5 mg, Oral, Daily Before Supper, Compa EVANS MD, 2.5 mg at 06/21/18 1808  •  predniSONE (DELTASONE) tablet 5 mg, 5 mg, Oral, Daily With Breakfast, Compa EVANS MD, 5 mg at 06/21/18 1025  •  saccharomyces boulardii (FLORASTOR) capsule 250 mg, 250 mg, Oral, BID, Hermes Oreilly MD, 250 mg at 06/21/18 2133  •  sodium chloride (OCEAN) nasal spray 2 spray, 2 spray, Nasal, PRN, Abel Pal MD  •  sodium chloride 0.9 % flush 1-10 mL, 1-10 mL, Intravenous, PRN, Abel Pal MD  •  sodium chloride 0.9 % flush 10 mL, 10 mL, Intravenous,  PRN, Harrison Gutiérrez MD    Assessment/Plan     Patient Active Problem List   Diagnosis   • Cardiomyopathy   • Paroxysmal VT   • Palpitations   • PVC's (premature ventricular contractions)   • Exacerbation of Crohn's disease of small intestine   • Adjustment disorder with depressed mood   • Ankylosis of spine   • Crohn's disease with complication   • Diabetes mellitus   • Essential hypertension   • External hemorrhoids   • Genital herpes simplex   • Gout   • Insomnia   • Iron deficiency   • Prostate mass   • Recurrent aphthous ulcer   • Asteatosis cutis   • History of pneumonia   • Abnormal CXR (chest x-ray)   • RUQ abdominal pain   • Crohn's disease of small intestine with other complication   • Right lower quadrant abdominal pain   • Enteritis   • Mass-like inflammation at terminal ileum   • Acute sinusitis   • Headache   • Crohn's disease   • Ileostomy present   • Paroxysmal ventricular tachycardia   • Chronic systolic CHF (congestive heart failure)   • Nonischemic cardiomyopathy   • Orthostasis   • Iron deficiency anemia   • Orthostatic hypotension   Orthostatic hypotension  Dizziness  Mild hyponatremia  History of intermittent steroid use for the past several years  Crohn's disease  Palpitations     Patient reports that he may be going home today  I advised him to continue the prednisone 5 mg in the morning and 2.5 mg in the afternoon  Patient symptoms have only slightly improved but I doubt if it is all related to adrenal status since his cosyntropin stim testing response was adequate     Patient is currently on empiric treatment with steroids due to frequent use of steroids in the recent and more past   Will close monitoring for response and plan to taper over a longer period of time     I discussed my plan with the patient in person and his wife over the phone and they both verbalized understanding  Feels slightly better  BP stabilizing  No more dizziness  Advised to continue current prednisone  "dose  followup in 3-4 weeks in my office  Patient is tolerating the prednisone 5 mg in the morning and 2.5 mg in the evening  He reports slightly improved his symptoms    He was also started on midodrine 2.5 mg 3 times a day today by the cardiologist  Attempts are being made to communicate with the [facility] cardiology team for further management of patient's heart failure  Patient's sodium and potassium have stabilized since starting prednisone.    The total time spent for old record and lab review and floor time was more than 35 min of which greater than 20 min of time  ( greater than 50% of the total time )  was spent face to face with the patient counseling and coordination of care owas spent on counseling the patient on recommended evaluation and treatment options, instructions for management/treatment and /or follow up  and importance of compliance with chosen management or treatment options    Compa Arthur MD FACE.  06/21/18  9:44 PM      EMR Dragon / transcription disclaimer:     \"Dictated utilizing Dragon dictation\".   "

## 2018-06-22 NOTE — PLAN OF CARE
Problem: Patient Care Overview  Goal: Plan of Care Review  Outcome: Ongoing (interventions implemented as appropriate)   06/22/18 9571   Coping/Psychosocial   Plan of Care Reviewed With patient   OTHER   Outcome Summary pt denies pain, up ad bernie to bathroom, vss, sr on monitor, colostomy bag changed, prescriptions picked up from pharmacy and given to patient.     Goal: Individualization and Mutuality  Outcome: Ongoing (interventions implemented as appropriate)    Goal: Discharge Needs Assessment  Outcome: Ongoing (interventions implemented as appropriate)    Goal: Interprofessional Rounds/Family Conf  Outcome: Ongoing (interventions implemented as appropriate)      Problem: Ileostomy (Adult)  Goal: Signs and Symptoms of Listed Potential Problems Will be Absent, Minimized or Managed (Ileostomy)  Outcome: Ongoing (interventions implemented as appropriate)    Goal: Anesthesia/Sedation Recovery  Outcome: Ongoing (interventions implemented as appropriate)      Problem: Fall Risk (Adult)  Goal: Absence of Fall  Outcome: Ongoing (interventions implemented as appropriate)      Problem: Nutrition, Imbalanced: Inadequate Oral Intake (Adult)  Goal: Improved Oral Intake  Outcome: Ongoing (interventions implemented as appropriate)    Goal: Prevent Further Weight Loss  Outcome: Ongoing (interventions implemented as appropriate)      Problem: Cardiac: Heart Failure (Adult)  Goal: Signs and Symptoms of Listed Potential Problems Will be Absent, Minimized or Managed (Cardiac: Heart Failure)  Outcome: Ongoing (interventions implemented as appropriate)

## 2018-06-22 NOTE — NURSING NOTE
Spoke with Adarsh at [facility] about patient transferring. Said they do not anticipate having a bed available today. Hopefully Saturday or Sunday.

## 2018-06-22 NOTE — DISCHARGE SUMMARY
Date of Admission: 6/17/2018  Date of Discharge:  6/22/2018  Primary Care Physician: Zechariah Fatima MD     Discharge Diagnosis:  Active Hospital Problems (** Indicates Principal Problem)    Diagnosis Date Noted   • **Nonischemic cardiomyopathy [I42.8] 06/17/2018   • Orthostatic hypotension [I95.1] 06/19/2018   • Crohn's disease [K50.90] 06/17/2018   • Ileostomy present [Z93.2] 06/17/2018   • Paroxysmal ventricular tachycardia [I47.2] 06/17/2018   • Chronic systolic CHF (congestive heart failure) [I50.22] 06/17/2018   • Orthostasis [I95.1] 06/17/2018   • Iron deficiency anemia [D50.9] 06/17/2018      Resolved Hospital Problems    Diagnosis Date Noted Date Resolved   No resolved problems to display.       Presenting Problem/History of Present Illness:  Orthostatic hypotension [I95.1]  Palpitations [R00.2]  Orthostatic hypotension [I95.1]  Orthostatic hypotension [I95.1]     Hospital Course:  The patient is a 57 y.o. man admitted for hypotension and orthostasis.  He has known nonischemic cardiomyopathy.  Ejection fraction has dropped to 15% down from 32%.  Coreg had to be discontinued due to significant hypotension.  He was seen by endocrinology.  He was started on low-dose prednisone but endocrinology does not think the adrenal insufficiency is causing the worsened cardiac function.  His cosyntropin stimulation testing response was adequate.  Midodrine was started yesterday.  His blood pressures are a little better.  They're still not sufficient to allow Coreg to be restarted.  I discussed his case at length yesterday and today with cardiology.  The [facility] has been contacted and they had accepted the patient for further evaluation.  His wife will transport him to [facility].       Stable condition; uncertain prognosis    Exam Today: See progress note from today    Procedures Performed:  Ct Head Without Contrast    Result Date: 6/21/2018  No acute process identified. Further evaluation could be  performed with a MRI examination of the brain as indicated.  Radiation dose reduction techniques were utilized, including automated exposure control and exposure modulation based on body size.  This report was finalized on 6/21/2018 8:55 PM by Dr. Nigel Chapa M.D.      Echocardiogram 6/18/18 which showed ejection fraction of 15% with severe LV global hypokinesis present.  Diastolic function indeterminate.       Labs:  Lab Results   Component Value Date    WBC 5.83 06/21/2018    HGB 12.3 (L) 06/21/2018    HCT 37.8 (L) 06/21/2018     06/21/2018     Lab Results   Component Value Date     06/21/2018    K 4.0 06/21/2018     06/21/2018    CO2 24.3 06/21/2018    BUN 9 06/21/2018    CREATININE 0.77 06/21/2018    GLUCOSE 100 (H) 06/21/2018     Cortisol 4.4 at 6/18 0515 hours, 12.4 at 1329 hrs, 19.4 1407 hrs,  20 at 1435 hrs.  A1c 5.9  TSH 1.8  Fecal occult blood negative  Troponin T less than 0.01          Consults:   Dr. Paresh Lagos     Discharge Disposition:  [facility] ([place] - [place])    Discharge Medications:     Discharge Medications      New Medications      Instructions Start Date   midodrine 2.5 MG tablet  Commonly known as:  PROAMATINE   2.5 mg, Oral, 3 Times Daily Before Meals      predniSONE 2.5 MG tablet  Commonly known as:  DELTASONE   TAKE 2 TABLETS BY MOUTH IN THE MORNING AND 1 TABLET AT 4 PM         Changes to Medications      Instructions Start Date   fluticasone 50 MCG/ACT nasal spray  Commonly known as:  FLONASE  What changed:  See the new instructions.   SHAKE LIQUID AND USE 2 SPRAYS IN EACH NOSTRIL DAILY         Continue These Medications      Instructions Start Date   acetaminophen 500 MG tablet  Commonly known as:  TYLENOL   1,000 mg, Oral, Every 6 Hours PRN      sodium chloride 0.65 % nasal spray  Commonly known as:  OCEAN   2 sprays, Nasal, As Needed         Stop These Medications    APRISO 0.375 g 24 hr capsule  Generic drug:  mesalamine      carvedilol 12.5 MG tablet  Commonly known as:  COREG     DOXYCYCLINE CALCIUM PO     hyoscyamine 0.125 MG SL tablet  Commonly known as:  LEVSIN     tamsulosin 0.4 MG capsule 24 hr capsule  Commonly known as:  FLOMAX            Discharge Diet:   Diet Instructions     Diet: Specialty Diet; Thin Liquids, No Restrictions; Low Sodium       Discharge Diet:  Specialty Diet    Fluid Consistency:  Thin Liquids, No Restrictions    Specialty Diets:  Low Sodium          Activity at Discharge:   Activity Instructions     Activity as Tolerated             Follow-up Appointments:  Future Appointments  Date Time Provider Department Center   7/24/2018 10:00 AM MEIR IN OFFICE, LINDA CEJA [facility] None   7/24/2018 10:30 AM ESTEVAN Ruano [facility] None     Follow-up Information     Zechariah Fatima MD Follow up.    Specialty:  Internal Medicine  Contact information:  [address] [place] [id number] [phone]             [facility] Follow up.    Why:  Admission 6/23/18                   Test Results Pending at Discharge:   Order Current Status    Selenium Serum In process           Diamond Monson MD  06/22/18  6:17 PM    Time Spent on Discharge Activities: 45 minutes.  Discussed with Dr. Farmer, patient's nurse, CCP

## 2018-06-22 NOTE — PROGRESS NOTES
Hospital Follow Up    LOS:  LOS: 3 days   Patient Name: Arnie Calvo  Age/Sex: 57 y.o. male  : 1960  MRN: [id number]    Date of Hospital Visit: 18  Length of Stay: 3  Encounter Provider: Nazario Farmer MD  Place of Service: [facility] CARDIOLOGY    Subjective:     Follow Up for: cardiomyopathy    Interval History: The patient has had some improvement with midodrine.  Objective:     Objective:  Temp:  [98 °F (36.7 °C)-98.7 °F (37.1 °C)] 98.3 °F (36.8 °C)  Heart Rate:  [] 79  Resp:  [14-16] 14  BP: ()/(57-76) 98/68  Body mass index is 26.79 kg/m².    Intake/Output Summary (Last 24 hours) at 18 1204  Last data filed at 18 0700   Gross per 24 hour   Intake              240 ml   Output                0 ml   Net              240 ml     1    18  0624 18  0642 18  0622   Weight: 83.4 kg (183 lb 12.8 oz) 82.9 kg (182 lb 11.2 oz) 82.3 kg (181 lb 6.4 oz)     Weight change: -0.59 kg (-1 lb 4.8 oz)    Physical Exam:   General Appearance: Alert, cooperative, in no acute distress. AAOx4.   HEENT: Normocephalic.  Neck: Supple. No JVD. No Carotid bruit. No thyromegaly  Lungs: CTAB. Normal respiratory effort and rate.  Heart:: Regular rate and rhythm, normal S1 and S2, no murmurs, gallops or rubs.  Abdomen: Soft, nontender, non-distended. positive bowel sounds  Extremities: Warm, no cyanosis, or clubbing. No edema.     Lab Review:     Results from last 7 days  Lab Units 18  0851 18  0721 18  0515   SODIUM mmol/L 140 140 135*   POTASSIUM mmol/L 4.0 4.0 5.1   CHLORIDE mmol/L 105 107 101   CO2 mmol/L 24.3 19.0* 19.3*   BUN mg/dL 9 11 12   CREATININE mg/dL 0.77 0.84 0.99   GLUCOSE mg/dL 100* 89 87   CALCIUM mg/dL 9.4 9.1 9.4         Results from last 7 days  Lab Units 18  1258   TROPONIN T ng/mL <0.010       Results from last 7 days  Lab Units 18  0851 18  0721   WBC 10*3/mm3 5.83 5.01   HEMOGLOBIN g/dL  12.3* 11.7*   HEMATOCRIT % 37.8* 36.7*   PLATELETS 10*3/mm3 158 153           Results from last 7 days  Lab Units 06/18/18  0515 06/17/18  1258   MAGNESIUM mg/dL 1.8 1.6               Results from last 7 days  Lab Units 06/18/18  0515   TSH mIU/mL 1.760         I reviewed the patient's new clinical results.          I personally viewed and interpreted the patient's EKG/Telemetry data.  Current Medications:   Scheduled Meds:  doxycycline 100 mg Oral Q12H   fluticasone 2 spray Each Nare Daily   midodrine 2.5 mg Oral TID AC   predniSONE 2.5 mg Oral Daily Before Supper   predniSONE 5 mg Oral Daily With Breakfast   saccharomyces boulardii 250 mg Oral BID     Continuous Infusions:     Allergies:  Allergies   Allergen Reactions   • Iodine Nausea And Vomiting   • Shellfish-Derived Products Nausea And Vomiting   • Adhesive Tape Rash   • Bactrim [Sulfamethoxazole-Trimethoprim] Hives   • Latex Rash       Assessment & Plan     Principal Problem:    Nonischemic cardiomyopathy  Active Problems:    Crohn's disease    Ileostomy present    Paroxysmal ventricular tachycardia    Chronic systolic CHF (congestive heart failure)    Orthostasis    Iron deficiency anemia    Orthostatic hypotension          Plan: I spoke to Dr. Andrew at the [facility].  The patient will be accepted for further evaluation and treatment pending insurance approval and that availability.  He indicated he would either be later today or tomorrow.      Nazario Farmer MD  06/22/18

## 2018-06-22 NOTE — NURSING NOTE
Ileostomy pouch changed.  1 piece Coloplast convex pouch with adapt ring.  Stoma pink, moist, mild prolapse. Patient independent of emptying pouch.  Hopeful for discharge to [facility] this weekend.  Supplied for 2 additional pouch changes left in room.

## 2018-06-23 NOTE — NURSING NOTE
Was notified around 1700 that patient had bed available tonight at [facility]. Patient and wife were unable to drive this evening. They were notified that the bed could not be held overnight. Patient was discharged home. Wife will drive patient to [facility] tomorrow morning. Spoke with Uday Bed Manager at [facility] to notify him patient will be coming tomorrow 6/23/18. Although they cannot hold his bed, Uday did state they have a lot of discharges on Saturday, and getting a bed shouldn't be a problem. Patient was thoroughly explained his choices, and decided to travel tomorrow morning. Was given packet of medical records and xrays to take with him. Patient discharged 6/22/18 2007. Monica Funez RN

## 2018-06-25 LAB — SELENIUM SERPL-MCNC: 136 UG/L (ref 91–198)

## 2018-06-25 NOTE — PROGRESS NOTES
Case Management Discharge Note    Final Note: Pt discharged to go to [facility].  BREE Villarreal    Destination - Selection Complete     Service Request Status Selected Specialties Address Phone Number Fax Number    [facility] [address] [facility] [id number] [phone] --      Durable Medical Equipment     No service coordination in this encounter.      Dialysis/Infusion     No service coordination in this encounter.      Home Medical Care     Service Request Status Selected Specialties Address Phone Number Fax Number    [facility] N/A [id number] [place], [place] [id number] [phone]      Social Care     No service coordination in this encounter.        Other: Other (Private Car)

## 2018-06-28 ENCOUNTER — TELEPHONE (OUTPATIENT)
Dept: CARDIOLOGY | Facility: CLINIC | Age: 58
End: 2018-06-28

## 2018-07-02 NOTE — TELEPHONE ENCOUNTER
Serena,  I know he is anxious to have his surgery.  In an ideal world it would be good to have one check up prior to it.  As you know I am out of the office until next week.  We could see him on Tuesday morning at 10:40 if he could move his surgery back till later in the week.  I think that would be best.

## 2018-07-02 NOTE — TELEPHONE ENCOUNTER
Patient called back and said he wouldn't  Be able to make the appt. For next Tue. Will call back next week.

## 2018-07-02 NOTE — TELEPHONE ENCOUNTER
Called s/w patient he said he would get back with me and let me know if he can come in on Tue at 10:40

## 2018-07-30 ENCOUNTER — TELEPHONE (OUTPATIENT)
Dept: CARDIOLOGY | Facility: CLINIC | Age: 58
End: 2018-07-30

## 2018-07-30 NOTE — TELEPHONE ENCOUNTER
Pt requesting to speak with Dr. Reyes regarding Stomach procedures. Pt states sorry he missed appointment, was in the hospital. Called pt to inform Eric Shannon is Out of office, will leave message for a returned phone call upon Dr. Reyes's return. Thanks, Jessica

## 2018-08-07 ENCOUNTER — OFFICE (OUTPATIENT)
Dept: URBAN - METROPOLITAN AREA CLINIC 75 | Facility: CLINIC | Age: 58
End: 2018-08-07

## 2018-08-07 VITALS
SYSTOLIC BLOOD PRESSURE: 126 MMHG | HEART RATE: 86 BPM | HEIGHT: 69 IN | DIASTOLIC BLOOD PRESSURE: 82 MMHG | WEIGHT: 177 LBS | RESPIRATION RATE: 16 BRPM

## 2018-08-07 DIAGNOSIS — K50.818 CROHN'S DISEASE OF BOTH SMALL AND LARGE INTESTINE WITH OTHER: ICD-10-CM

## 2018-08-07 PROCEDURE — 99214 OFFICE O/P EST MOD 30 MIN: CPT

## 2018-08-15 ENCOUNTER — TELEPHONE (OUTPATIENT)
Dept: CARDIOLOGY | Facility: CLINIC | Age: 58
End: 2018-08-15

## 2018-08-15 RX ORDER — CARVEDILOL 3.12 MG/1
3.12 TABLET ORAL 2 TIMES DAILY
Qty: 180 TABLET | Refills: 3 | Status: SHIPPED | OUTPATIENT
Start: 2018-08-15 | End: 2019-05-20 | Stop reason: SDUPTHER

## 2018-08-15 NOTE — TELEPHONE ENCOUNTER
Patient states his carvedilol was lowered to 3.125mg 1 po bid when he was at the [facility].  He needs this refilled is this ok to do.  [facility] [id number]

## 2018-10-10 ENCOUNTER — CLINICAL SUPPORT NO REQUIREMENTS (OUTPATIENT)
Dept: CARDIOLOGY | Facility: CLINIC | Age: 58
End: 2018-10-10

## 2018-10-10 ENCOUNTER — OFFICE VISIT (OUTPATIENT)
Dept: CARDIOLOGY | Facility: CLINIC | Age: 58
End: 2018-10-10

## 2018-10-10 VITALS
HEART RATE: 92 BPM | HEIGHT: 69 IN | WEIGHT: 187 LBS | SYSTOLIC BLOOD PRESSURE: 112 MMHG | BODY MASS INDEX: 27.7 KG/M2 | OXYGEN SATURATION: 99 % | DIASTOLIC BLOOD PRESSURE: 80 MMHG

## 2018-10-10 DIAGNOSIS — I42.9 CARDIOMYOPATHY, UNSPECIFIED TYPE (HCC): Primary | ICD-10-CM

## 2018-10-10 PROBLEM — K56.609 BOWEL OBSTRUCTION (HCC): Status: ACTIVE | Noted: 2018-07-15

## 2018-10-10 PROBLEM — E44.0 MALNUTRITION OF MODERATE DEGREE (HCC): Status: ACTIVE | Noted: 2018-06-25

## 2018-10-10 PROBLEM — Z93.9 HISTORY OF CREATION OF OSTOMY (HCC): Status: ACTIVE | Noted: 2018-06-24

## 2018-10-10 PROBLEM — Z90.49 S/P COLECTOMY: Status: ACTIVE | Noted: 2018-06-24

## 2018-10-10 PROBLEM — K50.119 CROHN'S DISEASE OF COLON WITH COMPLICATION (HCC): Status: ACTIVE | Noted: 2018-06-24

## 2018-10-10 PROBLEM — K91.89 DEHISCED INTESTINAL ANASTOMOSIS: Status: ACTIVE | Noted: 2018-07-15

## 2018-10-10 PROBLEM — I95.89 HYPOTENSION, CHRONIC: Status: ACTIVE | Noted: 2018-06-24

## 2018-10-10 PROBLEM — T81.32XA DEHISCED INTESTINAL ANASTOMOSIS: Status: ACTIVE | Noted: 2018-07-15

## 2018-10-10 PROBLEM — E87.6 HYPOKALEMIA: Status: ACTIVE | Noted: 2018-07-18

## 2018-10-10 PROCEDURE — 93283 PRGRMG EVAL IMPLANTABLE DFB: CPT | Performed by: INTERNAL MEDICINE

## 2018-10-10 PROCEDURE — 93000 ELECTROCARDIOGRAM COMPLETE: CPT | Performed by: PHYSICIAN ASSISTANT

## 2018-10-10 PROCEDURE — 99213 OFFICE O/P EST LOW 20 MIN: CPT | Performed by: PHYSICIAN ASSISTANT

## 2018-10-10 NOTE — PROGRESS NOTES
Date of Office Visit: 10/10/2018  Encounter Provider: ESTEVAN Ruano  Place of Service: [facility] CARDIOLOGY  Patient Name: Arnie Calvo  :1960    Chief Complaint   Patient presents with   • Congestive Heart Failure     1 year follow up   • Cardiomyopathy   :     HPI: Arnie Calvo is a 57 y.o. male , new to me, who presents today for follow-up.  Old records have been obtained and reviewed by me.  He is a patient of Dr. Reyes's with a past cardiac history significant for nonischemic cardiomyopathy.  He has multiple different autoimmune diseases, including Crohn's and ankylosing spondylitis.  We have been treating him for his cardiomyopathy.  His EF has cut been all over the place ever since .  In 2016 it was 20%.  Then in 2018 it improved to 32%.  He recently had it checked in  of this year, and he was down to 15%.  Evidently sometime after that echocardiogram he was at the [facility] in [place] and was diagnosed with severe dehydration.  He was fluid resuscitated, had a repeat echocardiogram, and his EF improved to 25%.  It looks like his medications were significantly reduced.  He is here today for follow-up.   Have his pacemaker interrogated today.  He has not had any shocks.  He has had 35 episodes of nonsustained tachycardia, the longest of which was 9 seconds.  He was mostly asymptomatic with the exception of feeling a few palpitations during one of those episodes.  Since he was last in our office and especially since he's been to the [facility] he's been doing very well.  Evidently his ileostomy is very high output, and this was causing him to be dehydrated.  He worked with a nutritionist up at the [facility], she suggested that he add a little bit of salt to his Gatorade and this has made all the difference in the world.  He is suffering from a little bit of sciatic pain right now, however he exercises every day.  He rides his  bike when the weather is a little higher, and with the weather cools down he hikes for at least 2 hours daily.  He can do this without difficulty.  He does not add salt to any of his food, and his weights have remained stable.    Past Medical History:   Diagnosis Date   • Acute pain of left hip    • Adjustment disorder with depressed mood    • Ankylosis of spine    • Arthritis    • Cardiomyopathy (CMS/HCC)     sees Dr. Reyes; NICM/ (-) cath, EF 25-35%; has ICD   • CHF (congestive heart failure) (CMS/HCC)    • Crohn's disease (CMS/HCC)    • Diabetes mellitus (CMS/HCC)     Diet controlled.   • Dysthymic disorder 2012   • Dysuria    • Early onset dysthymia    • Elevated total protein    • Essential hypertension    • External hemorrhoids    • Genital herpes    • Gout    • Health care maintenance    • Insomnia    • Iron deficiency    • Paroxysmal ventricular tachycardia (CMS/HCC)    • Pneumonia    • Prostate nodule    • Recurrent canker sores    • Thoracic back pain    • Urinary hesitancy    • Xerosis cutis        Past Surgical History:   Procedure Laterality Date   • ABDOMINAL PERINEAL BOWEL RESECTION W/ ILEOANAL POUCH     • CARDIAC CATHETERIZATION     • CARDIAC DEFIBRILLATOR PLACEMENT      Had Jude lead that was fractured.  Lead was tied off.  New lead and new device implanted.   • CARDIAC SURGERY     • COLON SURGERY     • COLONOSCOPY  04/03/2015    abnormal cecum, three polypoid masses, pre cancerous vs crohns, IH, tics, hyperplastic polyp   • COLONOSCOPY N/A 3/17/2017    polypoid masses, tics, IH, polyp   • COLOSTOMY         Social History     Social History   • Marital status:      Spouse name: N/A   • Number of children: N/A   • Years of education: N/A     Occupational History   • Not on file.     Social History Main Topics   • Smoking status: Never Smoker   • Smokeless tobacco: Never Used   • Alcohol use No   • Drug use: No   • Sexual activity: Defer     Other Topics Concern   • Not on file     Social  "History Narrative   • No narrative on file       Family History   Problem Relation Age of Onset   • Hypertension Mother    • Diabetes Mother         type 2 mellitus    • Cancer Father         colon   • Heart failure Father         congestive   • Gout Father    • Colon cancer Father        Review of Systems   Constitution: Negative for chills, fever and malaise/fatigue.   Cardiovascular: Positive for palpitations. Negative for chest pain, dyspnea on exertion, leg swelling, near-syncope, orthopnea, paroxysmal nocturnal dyspnea and syncope.   Respiratory: Negative for cough and shortness of breath.    Musculoskeletal: Negative for joint pain, joint swelling and myalgias.   Gastrointestinal: Negative for abdominal pain, diarrhea, melena, nausea and vomiting.   Genitourinary: Negative for frequency and hematuria.   Neurological: Negative for light-headedness, numbness, paresthesias and seizures.   Allergic/Immunologic: Negative.    All other systems reviewed and are negative.      Allergies   Allergen Reactions   • Iodine Nausea And Vomiting   • Shellfish-Derived Products Nausea And Vomiting   • Adhesive Tape Rash   • Bactrim [Sulfamethoxazole-Trimethoprim] Hives   • Latex Rash         Current Outpatient Prescriptions:   •  carvedilol (COREG) 3.125 MG tablet, Take 1 tablet by mouth 2 (Two) Times a Day., Disp: 180 tablet, Rfl: 3      Objective:     Vitals:    10/10/18 1353 10/10/18 1420   BP: 110/78 112/80   BP Location: Right arm Left arm   Pulse: 92    SpO2: 99%    Weight: 84.8 kg (187 lb)    Height: 175.3 cm (69\")      Body mass index is 27.62 kg/m².    PHYSICAL EXAM:    Physical Exam   Constitutional: He is oriented to person, place, and time. He appears well-developed and well-nourished. No distress.   HENT:   Head: Normocephalic and atraumatic.   Eyes: Pupils are equal, round, and reactive to light.   Neck: No JVD present. No thyromegaly present.   Cardiovascular: Normal rate, regular rhythm, normal heart sounds and " intact distal pulses.    No murmur heard.  Pulmonary/Chest: Effort normal and breath sounds normal. No respiratory distress. He has no rales.   Abdominal: Soft. Bowel sounds are normal. He exhibits no distension and no ascites. There is no splenomegaly or hepatomegaly. There is no tenderness.   Musculoskeletal: Normal range of motion. He exhibits no edema.   Neurological: He is alert and oriented to person, place, and time.   Skin: Skin is warm and dry. He is not diaphoretic. No erythema.   Psychiatric: He has a normal mood and affect. His behavior is normal. Judgment normal.         ECG 12 Lead  Date/Time: 10/10/2018 2:30 PM  Performed by: SUNDAR ACOSTA  Authorized by: SUNDAR ACOSTA   Comparison: compared with previous ECG from 6/17/2018  Similar to previous ECG  Rhythm: sinus rhythm  BPM: 88  Clinical impression: normal ECG  Comments: Indication: Cardiomyopathy.              Assessment:       Diagnosis Plan   1. Cardiomyopathy, unspecified type (CMS/HCC)  ECG 12 Lead     Orders Placed This Encounter   Procedures   • ECG 12 Lead     This order was created via procedure documentation          Plan:       1.  Heart Failure  Assessment  • NYHA class I - There is no limitation of physical activity. Physical activity does not cause fatigue, palpitations or shortness of breath.  • Beta blocker prescribed  • The most recent ejection fraction is 25%  • Left ventricular function is severely reduced by qualitative assessment    Plan  • The patient has received heart failure education on the following topics: dietary sodium restriction, medication instructions, physical activity and weight monitoring  • The heart failure care plan was discussed with the patient today including: continuing the current program    Subjective/Objective    • Physical exam findings negative for rales, peripheral edema, elevated JVP, hepatomegaly and ascites.  • The patient has an ICD implant  • Overall he's actually doing remarkably well  considering his multiple medical issues.  His last EF was up to 25%.  He is an interesting position because he has heart failure and he also has a high output ileostomy.  This has lead to dehydration that was diagnosed by the [facility].  Ever since he has been treating this accordingly he has done much better.  He has had some episodes of nonsustained tachycardia, however he has not had any ICD shocks and he has been relatively asymptomatic.  At this point I'm not going to make any changes to his medical regimen.  He will follow-up with Dr. Reyes in 1 year or sooner if needed.      As always, it has been a pleasure to participate in your patient's care.      Sincerely,         Alexandra Bettencourt PA-C

## 2018-11-14 ENCOUNTER — TELEPHONE (OUTPATIENT)
Dept: CARDIOLOGY | Facility: CLINIC | Age: 58
End: 2018-11-14

## 2018-11-14 ENCOUNTER — CLINICAL SUPPORT NO REQUIREMENTS (OUTPATIENT)
Dept: CARDIOLOGY | Facility: CLINIC | Age: 58
End: 2018-11-14

## 2018-11-14 DIAGNOSIS — I47.29 PAROXYSMAL VENTRICULAR TACHYCARDIA (HCC): Primary | ICD-10-CM

## 2018-11-14 DIAGNOSIS — I42.9 CARDIOMYOPATHY, UNSPECIFIED TYPE (HCC): Primary | ICD-10-CM

## 2018-11-14 PROCEDURE — 93295 DEV INTERROG REMOTE 1/2/MLT: CPT | Performed by: INTERNAL MEDICINE

## 2018-11-14 PROCEDURE — 93296 REM INTERROG EVL PM/IDS: CPT | Performed by: INTERNAL MEDICINE

## 2018-11-14 NOTE — TELEPHONE ENCOUNTER
alert remote transmission on Medtronic Protecta ICD, D801ODS, dual chamber.  Battery has reached AMY as of today, 11/14/18, 2.61v.   Presenting egm, sinus, AS/VS @ 96bpm.  Since last in-clinic check on 10/10/18, 14 VT-NS, 1 VHR, 1-5 sec. in duration and 2 AT/AF, 26 and 35 sec. OPtivol fluid index is at baseline today.  LEADS 6947 and 5076 are MRI compatible. I called pt. and gave results.  Need order for replacement.  Thank you. Shannon

## 2018-11-14 NOTE — TELEPHONE ENCOUNTER
I spoke with him at length -- he wants to stay w the ICD   WIll replace   Kenya he was agreeable to the DECIDE_ICD    Stay w [facility] for MRI

## 2018-11-15 ENCOUNTER — TELEPHONE (OUTPATIENT)
Dept: CARDIOLOGY | Facility: CLINIC | Age: 58
End: 2018-11-15

## 2018-11-15 NOTE — TELEPHONE ENCOUNTER
I called patient to speak with him about possible participation in the DECIDE-ICD Trial. Dr. Randolph had discussed it with him yesterday.  I  explained the study,went over the consent form over the phone, and allowed adequate time for questions He verbalized understanding and gave me verbal consent to participate in the study. I told him I will mail out paper copies of the baseline survey and ICF, including a return stamped envelope for him to mail them back in upon completion.  He was  agreeable to this and had no further questions at this time. BREE Marie

## 2018-12-05 ENCOUNTER — OFFICE (OUTPATIENT)
Dept: URBAN - METROPOLITAN AREA INFUSION 4 | Facility: INFUSION | Age: 58
End: 2018-12-05

## 2018-12-05 VITALS
SYSTOLIC BLOOD PRESSURE: 114 MMHG | SYSTOLIC BLOOD PRESSURE: 107 MMHG | DIASTOLIC BLOOD PRESSURE: 71 MMHG | TEMPERATURE: 97.2 F | SYSTOLIC BLOOD PRESSURE: 109 MMHG | SYSTOLIC BLOOD PRESSURE: 118 MMHG | HEART RATE: 76 BPM | DIASTOLIC BLOOD PRESSURE: 76 MMHG | HEART RATE: 81 BPM | TEMPERATURE: 97.8 F | WEIGHT: 186 LBS | HEART RATE: 96 BPM | DIASTOLIC BLOOD PRESSURE: 72 MMHG | HEIGHT: 69 IN | RESPIRATION RATE: 18 BRPM

## 2018-12-05 DIAGNOSIS — K50.818 CROHN'S DISEASE OF BOTH SMALL AND LARGE INTESTINE WITH OTHER: ICD-10-CM

## 2018-12-05 PROCEDURE — 96365 THER/PROPH/DIAG IV INF INIT: CPT

## 2018-12-11 ENCOUNTER — OFFICE (OUTPATIENT)
Dept: URBAN - METROPOLITAN AREA CLINIC 75 | Facility: CLINIC | Age: 58
End: 2018-12-11

## 2018-12-11 VITALS
SYSTOLIC BLOOD PRESSURE: 116 MMHG | DIASTOLIC BLOOD PRESSURE: 88 MMHG | WEIGHT: 200 LBS | OXYGEN SATURATION: 98 % | HEART RATE: 80 BPM | HEIGHT: 69 IN

## 2018-12-11 DIAGNOSIS — Z86.010 PERSONAL HISTORY OF COLONIC POLYPS: ICD-10-CM

## 2018-12-11 DIAGNOSIS — Z92.25 PERSONAL HISTORY OF IMMUNOSUPPRESSION THERAPY: ICD-10-CM

## 2018-12-11 DIAGNOSIS — K50.818 CROHN'S DISEASE OF BOTH SMALL AND LARGE INTESTINE WITH OTHER: ICD-10-CM

## 2018-12-11 DIAGNOSIS — Z80.0 FAMILY HISTORY OF MALIGNANT NEOPLASM OF DIGESTIVE ORGANS: ICD-10-CM

## 2018-12-11 PROCEDURE — 99213 OFFICE O/P EST LOW 20 MIN: CPT

## 2018-12-28 ENCOUNTER — TRANSCRIBE ORDERS (OUTPATIENT)
Dept: ADMINISTRATIVE | Facility: HOSPITAL | Age: 58
End: 2018-12-28

## 2018-12-28 ENCOUNTER — LAB (OUTPATIENT)
Dept: LAB | Facility: HOSPITAL | Age: 58
End: 2018-12-28
Attending: INTERNAL MEDICINE

## 2018-12-28 DIAGNOSIS — Z13.6 SCREENING FOR ISCHEMIC HEART DISEASE: ICD-10-CM

## 2018-12-28 DIAGNOSIS — I47.29 PAROXYSMAL VENTRICULAR TACHYCARDIA (HCC): ICD-10-CM

## 2018-12-28 DIAGNOSIS — Z01.810 PRE-OPERATIVE CARDIOVASCULAR EXAMINATION: Primary | ICD-10-CM

## 2018-12-28 DIAGNOSIS — Z01.810 PRE-OPERATIVE CARDIOVASCULAR EXAMINATION: ICD-10-CM

## 2018-12-28 LAB
ANION GAP SERPL CALCULATED.3IONS-SCNC: 10.3 MMOL/L
BASOPHILS # BLD AUTO: 0.03 10*3/MM3 (ref 0–0.2)
BASOPHILS NFR BLD AUTO: 0.5 % (ref 0–1.5)
BUN BLD-MCNC: 14 MG/DL (ref 6–20)
BUN/CREAT SERPL: 14 (ref 7–25)
CALCIUM SPEC-SCNC: 9.8 MG/DL (ref 8.6–10.5)
CHLORIDE SERPL-SCNC: 107 MMOL/L (ref 98–107)
CO2 SERPL-SCNC: 24.7 MMOL/L (ref 22–29)
CREAT BLD-MCNC: 1 MG/DL (ref 0.76–1.27)
DEPRECATED RDW RBC AUTO: 55.7 FL (ref 37–54)
EOSINOPHIL # BLD AUTO: 0.11 10*3/MM3 (ref 0–0.7)
EOSINOPHIL NFR BLD AUTO: 1.7 % (ref 0.3–6.2)
ERYTHROCYTE [DISTWIDTH] IN BLOOD BY AUTOMATED COUNT: 17.2 % (ref 11.5–14.5)
GFR SERPL CREATININE-BSD FRML MDRD: 76 ML/MIN/1.73
GLUCOSE BLD-MCNC: 100 MG/DL (ref 65–99)
HCT VFR BLD AUTO: 45.1 % (ref 40.4–52.2)
HGB BLD-MCNC: 14.3 G/DL (ref 13.7–17.6)
IMM GRANULOCYTES # BLD AUTO: 0 10*3/MM3 (ref 0–0.03)
IMM GRANULOCYTES NFR BLD AUTO: 0 % (ref 0–0.5)
LYMPHOCYTES # BLD AUTO: 1.9 10*3/MM3 (ref 0.9–4.8)
LYMPHOCYTES NFR BLD AUTO: 29 % (ref 19.6–45.3)
MCH RBC QN AUTO: 27.9 PG (ref 27–32.7)
MCHC RBC AUTO-ENTMCNC: 31.7 G/DL (ref 32.6–36.4)
MCV RBC AUTO: 88.1 FL (ref 79.8–96.2)
MONOCYTES # BLD AUTO: 0.62 10*3/MM3 (ref 0.2–1.2)
MONOCYTES NFR BLD AUTO: 9.5 % (ref 5–12)
NEUTROPHILS # BLD AUTO: 3.89 10*3/MM3 (ref 1.9–8.1)
NEUTROPHILS NFR BLD AUTO: 59.3 % (ref 42.7–76)
PLATELET # BLD AUTO: 151 10*3/MM3 (ref 140–500)
PMV BLD AUTO: 9.9 FL (ref 6–12)
POTASSIUM BLD-SCNC: 5.2 MMOL/L (ref 3.5–5.2)
RBC # BLD AUTO: 5.12 10*6/MM3 (ref 4.6–6)
SODIUM BLD-SCNC: 142 MMOL/L (ref 136–145)
WBC NRBC COR # BLD: 6.55 10*3/MM3 (ref 4.5–10.7)

## 2018-12-28 PROCEDURE — 80048 BASIC METABOLIC PNL TOTAL CA: CPT

## 2018-12-28 PROCEDURE — 36415 COLL VENOUS BLD VENIPUNCTURE: CPT

## 2018-12-28 PROCEDURE — 85025 COMPLETE CBC W/AUTO DIFF WBC: CPT

## 2019-01-03 ENCOUNTER — RESEARCH ENCOUNTER (OUTPATIENT)
Dept: CARDIOLOGY | Facility: CLINIC | Age: 59
End: 2019-01-03

## 2019-01-03 ENCOUNTER — HOSPITAL ENCOUNTER (OUTPATIENT)
Facility: HOSPITAL | Age: 59
Setting detail: HOSPITAL OUTPATIENT SURGERY
Discharge: HOME | End: 2019-01-03
Attending: INTERNAL MEDICINE | Admitting: INTERNAL MEDICINE

## 2019-01-03 VITALS
WEIGHT: 195 LBS | RESPIRATION RATE: 16 BRPM | TEMPERATURE: 99.6 F | HEART RATE: 79 BPM | HEIGHT: 69 IN | DIASTOLIC BLOOD PRESSURE: 75 MMHG | SYSTOLIC BLOOD PRESSURE: 110 MMHG | BODY MASS INDEX: 28.88 KG/M2 | OXYGEN SATURATION: 95 %

## 2019-01-03 DIAGNOSIS — I47.29 PAROXYSMAL VENTRICULAR TACHYCARDIA (HCC): ICD-10-CM

## 2019-01-03 PROCEDURE — 93005 ELECTROCARDIOGRAM TRACING: CPT | Performed by: INTERNAL MEDICINE

## 2019-01-03 PROCEDURE — 33263 RMVL & RPLCMT DFB GEN 2 LEAD: CPT | Performed by: INTERNAL MEDICINE

## 2019-01-03 PROCEDURE — C1721 AICD, DUAL CHAMBER: HCPCS | Performed by: INTERNAL MEDICINE

## 2019-01-03 PROCEDURE — 25010000002 MIDAZOLAM PER 1 MG: Performed by: INTERNAL MEDICINE

## 2019-01-03 PROCEDURE — 25010000002 VANCOMYCIN PER 500 MG

## 2019-01-03 PROCEDURE — 25010000002 FENTANYL CITRATE (PF) 100 MCG/2ML SOLUTION: Performed by: INTERNAL MEDICINE

## 2019-01-03 PROCEDURE — S0260 H&P FOR SURGERY: HCPCS | Performed by: INTERNAL MEDICINE

## 2019-01-03 PROCEDURE — 93010 ELECTROCARDIOGRAM REPORT: CPT | Performed by: INTERNAL MEDICINE

## 2019-01-03 DEVICE — IMPLANTABLE DEVICE: Type: IMPLANTABLE DEVICE | Status: FUNCTIONAL

## 2019-01-03 RX ORDER — LIDOCAINE HYDROCHLORIDE AND EPINEPHRINE 10; 10 MG/ML; UG/ML
INJECTION, SOLUTION INFILTRATION; PERINEURAL AS NEEDED
Status: DISCONTINUED | OUTPATIENT
Start: 2019-01-03 | End: 2019-01-03 | Stop reason: HOSPADM

## 2019-01-03 RX ORDER — SODIUM CHLORIDE 0.9 % (FLUSH) 0.9 %
3-10 SYRINGE (ML) INJECTION AS NEEDED
Status: DISCONTINUED | OUTPATIENT
Start: 2019-01-03 | End: 2019-01-03 | Stop reason: HOSPADM

## 2019-01-03 RX ORDER — MIDAZOLAM HYDROCHLORIDE 1 MG/ML
INJECTION INTRAMUSCULAR; INTRAVENOUS AS NEEDED
Status: DISCONTINUED | OUTPATIENT
Start: 2019-01-03 | End: 2019-01-03 | Stop reason: HOSPADM

## 2019-01-03 RX ORDER — VANCOMYCIN HYDROCHLORIDE 1 G/200ML
INJECTION, SOLUTION INTRAVENOUS CONTINUOUS PRN
Status: COMPLETED | OUTPATIENT
Start: 2019-01-03 | End: 2019-01-03

## 2019-01-03 RX ORDER — PREDNISONE 10 MG/1
10 TABLET ORAL DAILY
COMMUNITY
End: 2019-08-12 | Stop reason: DRUGHIGH

## 2019-01-03 RX ORDER — LIDOCAINE HYDROCHLORIDE 10 MG/ML
0.1 INJECTION, SOLUTION EPIDURAL; INFILTRATION; INTRACAUDAL; PERINEURAL ONCE AS NEEDED
Status: DISCONTINUED | OUTPATIENT
Start: 2019-01-03 | End: 2019-01-03 | Stop reason: HOSPADM

## 2019-01-03 RX ORDER — SODIUM CHLORIDE 9 MG/ML
75 INJECTION, SOLUTION INTRAVENOUS CONTINUOUS
Status: DISCONTINUED | OUTPATIENT
Start: 2019-01-03 | End: 2019-01-03 | Stop reason: HOSPADM

## 2019-01-03 RX ORDER — FENTANYL CITRATE 50 UG/ML
INJECTION, SOLUTION INTRAMUSCULAR; INTRAVENOUS AS NEEDED
Status: DISCONTINUED | OUTPATIENT
Start: 2019-01-03 | End: 2019-01-03 | Stop reason: HOSPADM

## 2019-01-03 RX ORDER — SODIUM CHLORIDE 0.9 % (FLUSH) 0.9 %
3 SYRINGE (ML) INJECTION EVERY 12 HOURS SCHEDULED
Status: DISCONTINUED | OUTPATIENT
Start: 2019-01-03 | End: 2019-01-03 | Stop reason: HOSPADM

## 2019-01-03 RX ADMIN — SODIUM CHLORIDE 75 ML/HR: 9 INJECTION, SOLUTION INTRAVENOUS at 07:54

## 2019-01-03 NOTE — DISCHARGE INSTRUCTIONS
[facility]   [phone]    Post Pacemaker / Defibrillator Implant Instructions        1. You may shower in 24 hours// do not rub glue off     2. Do not allow shower water to hit directly on incision.    3 No lotion/powder/ointment/cream on incision until it is healed       4. You may reapply a dressing if there is drainage, otherwise leave your incision open to air. If you reapply a dressing, please notify the pacemaker clinic.    5. No heavy lifting, pulling, or pushing.    6  No  heavy arm exercises for at least 2 weeks     7 SEDATION DISCHARGE INSTRUCTIONS.    IMPORTANT: The following information will help you return to your best level of health.    You had sedation     Follow these instructions:   Go right home. Rest quietly at home today, then you can be up and about.   Do not drink alcohol, drive or operate machinery for 24 hours.   Do not make any important decisions or sign any legal papers in the next 24 hours.   Have a RESPONSIBLE PERSON stay with you the rest of today and overnight for your protection and safety.   Start your diet with fluids and light foods (jello, soup, juice, toast). Then eat a normal diet if not nauseated.    Call your doctor if you have:   any blue or gray skin color.   repeated vomiting.   trouble breathing.   any new problems or concerns. Please call the office if you experience any of the following:   bleeding or drainage from your incision   swelling, redness, or opening of your incision   fever or chills   pain not relieved with medication   chest pain or difficulty breathing   lightheadness    11. For defibrillator patients only: If you receive a shock from your device, please call the office. If you receive 2 or more shocks within a 24 hour period OR if you receive 1 shock and feel poorly, you should be evaluated in the emergency room. Please DO NOT DRIVE if you have received a shock until your device has been checked.

## 2019-01-03 NOTE — H&P
CC here for gen change     No changes   cardiac history significant for nonischemic cardiomyopathy.  He has multiple different autoimmune diseases, including Crohn's and ankylosing spondylitis.  We have been treating him for his cardiomyopathy.  His EF has cut been all over the place ever since 2016.  In March 2016 it was 20%.  Then in March 2018 it improved to 32%.  He recently had it checked in June of this year, and he was down to 15%.  Evidently sometime after that echocardiogram he was at the [facility] in [place] and was diagnosed with severe dehydration.  He was fluid resuscitated, had a repeat echocardiogram, and his EF improved to 25%.  It looks like his medications were significantly reduced.  He is here today for follow-up.              Have his pacemaker interrogated today.  He has not had any shocks.  He has had 35 episodes of nonsustained tachycardia, the longest of which was 9 seconds.  He was mostly asymptomatic with the exception of feeling a few palpitations during one of those episodes.  Since he was last in our office and especially since he's been to the [facility] he's been doing very well.  Evidently his ileostomy is very high output, and this was causing him to be dehydrated.  He worked with a nutritionist up at the [facility], she suggested that he add a little bit of salt to his Gatorade and this has made all the difference in the world.  He is suffering from a little bit of sciatic pain right now, however he exercises every day.  He rides his bike when the weather is a little higher, and with the weather cools down he hikes for at least 2 hours daily.  He can do this without difficulty.  He does not add salt to any of his food, and his weights have remained stable.     Medical History        Past Medical History:   Diagnosis Date   • Acute pain of left hip     • Adjustment disorder with depressed mood     • Ankylosis of spine     • Arthritis     • Cardiomyopathy (CMS/HCC)        sees Dr. Reyes; NICM/ (-) cath, EF 25-35%; has ICD   • CHF (congestive heart failure) (CMS/HCC)     • Crohn's disease (CMS/HCC)     • Diabetes mellitus (CMS/HCC)       Diet controlled.   • Dysthymic disorder 2012   • Dysuria     • Early onset dysthymia     • Elevated total protein     • Essential hypertension     • External hemorrhoids     • Genital herpes     • Gout     • Health care maintenance     • Insomnia     • Iron deficiency     • Paroxysmal ventricular tachycardia (CMS/HCC)     • Pneumonia     • Prostate nodule     • Recurrent canker sores     • Thoracic back pain     • Urinary hesitancy     • Xerosis cutis              Surgical History         Past Surgical History:   Procedure Laterality Date   • ABDOMINAL PERINEAL BOWEL RESECTION W/ ILEOANAL POUCH       • CARDIAC CATHETERIZATION       • CARDIAC DEFIBRILLATOR PLACEMENT         Had [employer] lead that was fractured.  Lead was tied off.  New lead and new device implanted.   • CARDIAC SURGERY       • COLON SURGERY       • COLONOSCOPY   04/03/2015     abnormal cecum, three polypoid masses, pre cancerous vs crohns, IH, tics, hyperplastic polyp   • COLONOSCOPY N/A 3/17/2017     polypoid masses, tics, IH, polyp   • COLOSTOMY                Social History               Social History   • Marital status:        Spouse name: N/A   • Number of children: N/A   • Years of education: N/A          Occupational History   • Not on file.           Social History Main Topics   • Smoking status: Never Smoker   • Smokeless tobacco: Never Used   • Alcohol use No   • Drug use: No   • Sexual activity: Defer      Other Topics Concern   • Not on file          Social History Narrative   • No narrative on file                  Family History   Problem Relation Age of Onset   • Hypertension Mother     • Diabetes Mother           type 2 mellitus    • Cancer Father           colon   • Heart failure Father           congestive   • Gout Father     • Colon cancer Father            Review of Systems   Constitution: Negative for chills, fever and malaise/fatigue.   Cardiovascular: Positive for palpitations. Negative for chest pain, dyspnea on exertion, leg swelling, near-syncope, orthopnea, paroxysmal nocturnal dyspnea and syncope.   Respiratory: Negative for cough and shortness of breath.    Musculoskeletal: Negative for joint pain, joint swelling and myalgias.   Gastrointestinal: Negative for abdominal pain, diarrhea, melena, nausea and vomiting.   Genitourinary: Negative for frequency and hematuria.   Neurological: Negative for light-headedness, numbness, paresthesias and seizures.   Allergic/Immunologic: Negative.    All other systems reviewed and are negative.             Allergies   Allergen Reactions   • Iodine Nausea And Vomiting   • Shellfish-Derived Products Nausea And Vomiting   • Adhesive Tape Rash   • Bactrim [Sulfamethoxazole-Trimethoprim] Hives   • Latex Rash            Current Outpatient Prescriptions:   •  carvedilol (COREG) 3.125 MG tablet, Take 1 tablet by mouth 2 (Two) Times a Day., Disp: 180 tablet, Rfl: 3      Objective:      Vitals     Body mass index is 27.62 kg/m².     PHYSICAL EXAM:     Physical Exam   Constitutional: He is oriented to person, place, and time. He appears well-developed and well-nourished. No distress.   HENT:   Head: Normocephalic and atraumatic.   Eyes: Pupils are equal, round, and reactive to light.   Neck: No JVD present. No thyromegaly present.   Cardiovascular: Normal rate, regular rhythm, normal heart sounds and intact distal pulses.    No murmur heard.  Pulmonary/Chest: Effort normal and breath sounds normal. No respiratory distress. He has no rales.   Abdominal: Soft. Bowel sounds are normal. He exhibits no distension and no ascites. There is no splenomegaly or hepatomegaly. There is no tenderness.   Musculoskeletal: Normal range of motion. He exhibits no edema.   Neurological: He is alert and oriented to person, place, and time.    Skin: Skin is warm and dry. He is not diaphoretic. No erythema.   Psychiatric: He has a normal mood and affect. His behavior is normal. Judgment normal.            ECG 12 Lead  Date/Time: 10/10/2018 2:30 PM  Performed by: SUNDAR ACOSTA  Authorized by: SUNDAR ACOSTA   Comparison: compared with previous ECG from 6/17/2018  Similar to previous ECG  Rhythm: sinus rhythm  BPM: 88  Clinical impression: normal ECG  Comments: Indication: Cardiomyopathy.                Assessment:        Diagnosis Plan   1. Cardiomyopathy, unspecified type (CMS/HCC)  ECG 12 Lead           Orders Placed This Encounter   Procedures   • ECG 12 Lead       This order was created via procedure documentation            Plan:       1 Gen change -- we have disc previously   Risks discussed now and on the phone previously

## 2019-01-04 NOTE — RESEARCH
[facility]  [address]  [place] [address]  [phone]    Research Note:   DECIDE-ICD Trial    Patient Information:  Arnie Calvo    Study ID#: [id number]  Subject Initials: ROGELIO    I met with ROGELIO this morning at the time of his ICD generator change to have him sign a  copy of the ICF form.  As per study protocol, we obtained informed consent for the study verbally over the phone, and at the time of his verbal consent, we reviewed the ICF, allowing plenty of time for questions, after which he verbally consented to participate in the study. I had previously mailed [email or web address] A copy of the ICF form that we discussed on the phone and explained I would have him sign it today, when he came in for his generator change, which I did, as well as had him initial and sign the pages of the form previously reviewed. He had no additional questions or concerns at this time.  He completed the REALM survey on the I-pad. I provide him a copy of the baseline survey and a return, stamped envelope to mail back to us when completed.  I had mailed one previously, but he stated he had not had a chance to mail back yet.   I explained to GUNNER that we we will mail him his $20 gift card upon our receipt of his completed survey booklet..  He verbalized understanding. We will see ROGELIO for  his 1 month survey.  In the meantime, he will call us as needed with any questions or concerns.    1/10/19:         I received his completed responses today in the mail and entered them into [employer] as per protocol.  I also mailed his baseline survey gift card as per protocol. We will see ROGELIO  at the time of his 1- month survey.         In the meantime, he will call us as needed with any questions or concerns.        Kenya Coyle RN  Research Coordinator

## 2019-01-11 ENCOUNTER — TELEPHONE (OUTPATIENT)
Dept: CARDIOLOGY | Facility: CLINIC | Age: 59
End: 2019-01-11

## 2019-01-11 ENCOUNTER — CLINICAL SUPPORT NO REQUIREMENTS (OUTPATIENT)
Dept: CARDIOLOGY | Facility: CLINIC | Age: 59
End: 2019-01-11

## 2019-01-11 DIAGNOSIS — I42.8 NON-ISCHEMIC CARDIOMYOPATHY (HCC): Primary | ICD-10-CM

## 2019-01-11 PROCEDURE — 93283 PRGRMG EVAL IMPLANTABLE DFB: CPT | Performed by: INTERNAL MEDICINE

## 2019-01-11 NOTE — TELEPHONE ENCOUNTER
Pt had an icd gen change on 1/3/19.  Later that day he had 25 beats of nst vt and within seconds of vt termination he had AF.  The VT had rate of 190bpm.  It did briefly fall below the detection rate and the device labeled it as 2 separate episodes.  Since doi there have been an additional 4 nst vt, 6-15 beats each.  AF was 26 min, max v rate 171bpm, median v rate 125pbm.  Pt states he felt dizzy and could feel his heart skipping beats when he got home that day.  He does not have a history of af.  I did set his home monitor to alert us to af if he has 6 hrs with in a 24 hr period.  This value is programmable to as low as .5hrs with in a 24 hour period.  Next apt is with Dr. Reyes in Oct.    Dr. Reyes pt, Dr. Randolph did gen change and follows icd.

## 2019-01-14 ENCOUNTER — CLINICAL SUPPORT NO REQUIREMENTS (OUTPATIENT)
Dept: CARDIOLOGY | Facility: CLINIC | Age: 59
End: 2019-01-14

## 2019-01-14 DIAGNOSIS — I42.8 NON-ISCHEMIC CARDIOMYOPATHY (HCC): Primary | ICD-10-CM

## 2019-01-22 ENCOUNTER — RESEARCH ENCOUNTER (OUTPATIENT)
Dept: CARDIOLOGY | Facility: CLINIC | Age: 59
End: 2019-01-22

## 2019-01-22 NOTE — RESEARCH
Research Study = Decide ICD  Patient ID# [id number]    ADAM returned a completed 1-month survey. The data was entered into [employer] today as per study protocol. We mailed his $20 gift card per study protocol. We plan to contact ADAM with his 6-month survey either by mail or during a f/u appointment.     Shanon LEACH RN  Research Coordinator

## 2019-01-31 ENCOUNTER — CLINICAL SUPPORT NO REQUIREMENTS (OUTPATIENT)
Dept: CARDIOLOGY | Facility: CLINIC | Age: 59
End: 2019-01-31

## 2019-01-31 DIAGNOSIS — I42.8 NON-ISCHEMIC CARDIOMYOPATHY (HCC): Primary | ICD-10-CM

## 2019-01-31 NOTE — TELEPHONE ENCOUNTER
Remote check today to assess for arrhythmias per Dr. Randolph order.  Since 1/14/19 6 nst vt, 5 with egms are 7-9 beats each.  No atrial arrhythmias since 1/14/19.  His next remote is in April.

## 2019-04-16 ENCOUNTER — CLINICAL SUPPORT NO REQUIREMENTS (OUTPATIENT)
Dept: CARDIOLOGY | Facility: CLINIC | Age: 59
End: 2019-04-16

## 2019-04-16 DIAGNOSIS — I42.9 CARDIOMYOPATHY, UNSPECIFIED TYPE (HCC): Primary | ICD-10-CM

## 2019-04-16 PROCEDURE — 93295 DEV INTERROG REMOTE 1/2/MLT: CPT | Performed by: INTERNAL MEDICINE

## 2019-04-16 PROCEDURE — 93296 REM INTERROG EVL PM/IDS: CPT | Performed by: INTERNAL MEDICINE

## 2019-04-29 ENCOUNTER — OFFICE VISIT (OUTPATIENT)
Dept: INTERNAL MEDICINE | Facility: CLINIC | Age: 59
End: 2019-04-29

## 2019-04-29 VITALS
HEIGHT: 69 IN | TEMPERATURE: 98 F | WEIGHT: 205 LBS | SYSTOLIC BLOOD PRESSURE: 124 MMHG | OXYGEN SATURATION: 98 % | BODY MASS INDEX: 30.36 KG/M2 | HEART RATE: 49 BPM | DIASTOLIC BLOOD PRESSURE: 80 MMHG

## 2019-04-29 DIAGNOSIS — M43.20 ANKYLOSIS OF SPINE: ICD-10-CM

## 2019-04-29 DIAGNOSIS — R74.8 ELEVATED LIVER ENZYMES: ICD-10-CM

## 2019-04-29 DIAGNOSIS — H00.011 HORDEOLUM EXTERNUM OF RIGHT UPPER EYELID: Primary | ICD-10-CM

## 2019-04-29 DIAGNOSIS — Z00.00 HEALTHCARE MAINTENANCE: ICD-10-CM

## 2019-04-29 PROCEDURE — 90715 TDAP VACCINE 7 YRS/> IM: CPT | Performed by: INTERNAL MEDICINE

## 2019-04-29 PROCEDURE — 90471 IMMUNIZATION ADMIN: CPT | Performed by: INTERNAL MEDICINE

## 2019-04-29 PROCEDURE — 99214 OFFICE O/P EST MOD 30 MIN: CPT | Performed by: INTERNAL MEDICINE

## 2019-04-29 RX ORDER — ALENDRONATE SODIUM 35 MG/1
35 TABLET ORAL
COMMUNITY
End: 2021-09-13

## 2019-04-29 NOTE — PROGRESS NOTES
"Labs Only (liver enzymes ) and eye swelling (right )      HPI  Arnie Calvo is a 58 y.o. male RTC In acute care:  Has not been seen in > 1 year.  Notes 'has had 3 stomach surgeries(due to Crohn's) since I saw you last, ultimately leading to an ostomy'.  Notes was in Dr. Jamison in last week and was placed on Stellara for AS. Notes it has not been helpful for AS.  Is still planned for repeat infusion before calling it a treatment failure. Had labs and noted liver enzymes \"were elevated\".  \"They did not have anything to compare it to\".  Only new med is alendronate for bone density last week, so brand new med. No herbals or OTC meds. No real sx noted from LFT elevation. Was incidental on labs.   Has some issue with R eyelid. Tenderness and swelling at R upper eyelid, \"just in eyelid\". No vision changes.  No pain in globe. No associated HA with issues.  Wonders if stye. Did do a few compresses but not regularly.  This AM was more itchy and irritated than it had been. Thought it was going away until this AM.   \"I also want to see if there are any vaccinations I need to get\".     Review of Systems   Constitution: Negative for chills and fever.   HENT: Negative for congestion, hoarse voice and sore throat.    Eyes: Negative for blurred vision, vision loss in left eye and vision loss in right eye.   Cardiovascular: Negative for chest pain and dyspnea on exertion.   Respiratory: Negative for shortness of breath.    Gastrointestinal: Negative for bloating, abdominal pain, nausea and vomiting.       The following portions of the patient's history were reviewed and updated as appropriate: allergies, current medications, past medical history, past social history and problem list.      Current Outpatient Medications:   •  alendronate (FOSAMAX) 35 MG tablet, Take 35 mg by mouth Every 7 (Seven) Days., Disp: , Rfl:   •  carvedilol (COREG) 3.125 MG tablet, Take 1 tablet by mouth 2 (Two) Times a Day., Disp: 180 tablet, Rfl: 3  •  " "predniSONE (DELTASONE) 10 MG tablet, Take 10 mg by mouth Daily., Disp: , Rfl:   •  Ustekinumab (STELARA) 90 MG/ML solution prefilled syringe Injection, Inject  under the skin into the appropriate area as directed. HAS EVERY 8 WEEKS, Disp: , Rfl:     Vitals:    04/29/19 0943   BP: 124/80   Pulse: (!) 49   Temp: 98 °F (36.7 °C)   SpO2: 98%   Weight: 93 kg (205 lb)   Height: 175.3 cm (69\")         Physical Exam   Constitutional: He is oriented to person, place, and time. He appears well-developed and well-nourished. No distress.   HENT:   Head: Normocephalic and atraumatic.   Mouth/Throat: Oropharynx is clear and moist. No oropharyngeal exudate.   Eyes: Conjunctivae and EOM are normal. Pupils are equal, round, and reactive to light. Right eye exhibits hordeolum (upper eyelid, small punctum at lid margin. ).   Neck: Normal range of motion. Neck supple. Carotid bruit is not present.   Cardiovascular: Normal rate, regular rhythm and normal heart sounds. Frequent extrasystoles are present.   Pulses:       Carotid pulses are 2+ on the right side, and 2+ on the left side.       Radial pulses are 2+ on the right side, and 2+ on the left side.   Pulmonary/Chest: Effort normal and breath sounds normal. No respiratory distress. He has no wheezes. He has no rales.   Abdominal: Soft. Bowel sounds are normal. He exhibits no distension and no mass. There is no tenderness. There is no rebound, no guarding and negative Andrade's sign.       Musculoskeletal: He exhibits no edema.   Lymphadenopathy:     He has no cervical adenopathy.   Neurological: He is alert and oriented to person, place, and time. No cranial nerve deficit.   Psychiatric: He has a normal mood and affect. His behavior is normal.   Vitals reviewed.      Assessment/ Plan  Diagnoses and all orders for this visit:    Hordeolum externum of right upper eyelid    Healthcare maintenance  -     Tdap Vaccine Greater Than or Equal To 6yo IM    Elevated liver enzymes  -     " Comprehensive Metabolic Panel  -     CK  -     Hepatitis Panel, Acute  -     Ferritin  -     Anti-Smooth Muscle Antibody Titer  -     JANELL  -     Iron Profile    Ankylosis of spine    Other orders  -     alendronate (FOSAMAX) 35 MG tablet; Take 35 mg by mouth Every 7 (Seven) Days.        Return in about 4 months (around 8/29/2019) for Annual physical.      Discussion:  1. Hordeolum - R upper eyelid. D/W pt mainstay of tx with warm compresses. Offered ABx ointment but pt declined, which is reasonable.  Call if not better.   2. Elevated LFT's - noted on recent labs at rheum, ALT elevation isolated. Unclear etiology and simple lab variation is on Ddx.  Chronic prednisone use could be source if elevated CPK, will check. Otherwise, will recheck today and add initial w/u with labs as noted above.  RUQ U/S if not revealing.   3. HM - Tdap today; SHingrix at pharmacy.     RTC 4 months CPE, F labs prior.

## 2019-04-30 LAB
ACTIN IGG SERPL-ACNC: 5 UNITS (ref 0–19)
ALBUMIN SERPL-MCNC: 4.5 G/DL (ref 3.5–5.2)
ALBUMIN/GLOB SERPL: 1.5 G/DL
ALP SERPL-CCNC: 48 U/L (ref 39–117)
ALT SERPL-CCNC: 89 U/L (ref 1–41)
ANA SER QL: NEGATIVE
AST SERPL-CCNC: 51 U/L (ref 1–40)
BILIRUB SERPL-MCNC: 0.5 MG/DL (ref 0.2–1.2)
BUN SERPL-MCNC: 14 MG/DL (ref 6–20)
BUN/CREAT SERPL: 14.9 (ref 7–25)
CALCIUM SERPL-MCNC: 10.4 MG/DL (ref 8.6–10.5)
CHLORIDE SERPL-SCNC: 102 MMOL/L (ref 98–107)
CK SERPL-CCNC: 92 U/L (ref 20–200)
CO2 SERPL-SCNC: 23 MMOL/L (ref 22–29)
CREAT SERPL-MCNC: 0.94 MG/DL (ref 0.76–1.27)
FERRITIN SERPL-MCNC: 77.5 NG/ML (ref 30–400)
GLOBULIN SER CALC-MCNC: 3 GM/DL
GLUCOSE SERPL-MCNC: 77 MG/DL (ref 65–99)
HAV IGM SERPL QL IA: NEGATIVE
HBV CORE IGM SERPL QL IA: NEGATIVE
HBV SURFACE AG SERPL QL IA: NEGATIVE
HCV AB S/CO SERPL IA: <0.1 S/CO RATIO (ref 0–0.9)
IRON SATN MFR SERPL: 17 % (ref 20–50)
IRON SERPL-MCNC: 91 MCG/DL (ref 59–158)
POTASSIUM SERPL-SCNC: 4.5 MMOL/L (ref 3.5–5.2)
PROT SERPL-MCNC: 7.5 G/DL (ref 6–8.5)
SODIUM SERPL-SCNC: 141 MMOL/L (ref 136–145)
TIBC SERPL-MCNC: 531 MCG/DL
UIBC SERPL-MCNC: 440 MCG/DL

## 2019-05-04 DIAGNOSIS — R79.89 ELEVATED LFTS: Primary | ICD-10-CM

## 2019-05-07 RX ORDER — CARVEDILOL 6.25 MG/1
TABLET ORAL
Qty: 180 TABLET | Refills: 0 | OUTPATIENT
Start: 2019-05-07

## 2019-05-09 DIAGNOSIS — E61.1 IRON DEFICIENCY: ICD-10-CM

## 2019-05-09 DIAGNOSIS — R79.89 ELEVATED LIVER FUNCTION TESTS: Primary | ICD-10-CM

## 2019-05-13 ENCOUNTER — HOSPITAL ENCOUNTER (OUTPATIENT)
Dept: ULTRASOUND IMAGING | Facility: HOSPITAL | Age: 59
Discharge: HOME | End: 2019-05-13
Admitting: INTERNAL MEDICINE

## 2019-05-13 ENCOUNTER — APPOINTMENT (OUTPATIENT)
Dept: LAB | Facility: HOSPITAL | Age: 59
End: 2019-05-13

## 2019-05-13 DIAGNOSIS — R79.89 ELEVATED LFTS: ICD-10-CM

## 2019-05-13 LAB
ALPHA1 GLOB MFR UR ELPH: 106 MG/DL (ref 90–200)
CERULOPLASMIN SERPL-MCNC: 13 MG/DL (ref 16–31)

## 2019-05-13 PROCEDURE — 36415 COLL VENOUS BLD VENIPUNCTURE: CPT | Performed by: INTERNAL MEDICINE

## 2019-05-13 PROCEDURE — 82103 ALPHA-1-ANTITRYPSIN TOTAL: CPT | Performed by: INTERNAL MEDICINE

## 2019-05-13 PROCEDURE — 82390 ASSAY OF CERULOPLASMIN: CPT | Performed by: INTERNAL MEDICINE

## 2019-05-13 PROCEDURE — 76705 ECHO EXAM OF ABDOMEN: CPT

## 2019-05-13 PROCEDURE — 83516 IMMUNOASSAY NONANTIBODY: CPT | Performed by: INTERNAL MEDICINE

## 2019-05-14 LAB — DEPRECATED MITOCHONDRIA M2 IGG SER-ACNC: <20 UNITS (ref 0–20)

## 2019-05-20 DIAGNOSIS — R79.89 LFT ELEVATION: Primary | ICD-10-CM

## 2019-05-20 DIAGNOSIS — E61.1 IRON DEFICIENCY: ICD-10-CM

## 2019-05-20 RX ORDER — CARVEDILOL 3.12 MG/1
3.12 TABLET ORAL 2 TIMES DAILY
Qty: 180 TABLET | Refills: 3 | Status: SHIPPED | OUTPATIENT
Start: 2019-05-20 | End: 2019-07-22 | Stop reason: SDUPTHER

## 2019-05-30 LAB
COPPER 24H UR-MRATE: 9 UG/24 HR (ref 3–35)
COPPER UR-MCNC: 6 UG/L
COPPER/CREAT UR: 6 UG/G CREAT (ref 0–49)
CREAT UR-MCNC: 0.93 G/L (ref 0.3–3)

## 2019-05-31 ENCOUNTER — OFFICE VISIT (OUTPATIENT)
Dept: INTERNAL MEDICINE | Facility: CLINIC | Age: 59
End: 2019-05-31

## 2019-05-31 VITALS
OXYGEN SATURATION: 98 % | TEMPERATURE: 98.1 F | HEIGHT: 69 IN | DIASTOLIC BLOOD PRESSURE: 80 MMHG | HEART RATE: 90 BPM | BODY MASS INDEX: 30.36 KG/M2 | WEIGHT: 205 LBS | SYSTOLIC BLOOD PRESSURE: 122 MMHG

## 2019-05-31 DIAGNOSIS — K76.0 FATTY LIVER DISEASE, NONALCOHOLIC: ICD-10-CM

## 2019-05-31 DIAGNOSIS — L03.011 PARONYCHIA OF RIGHT INDEX FINGER: Primary | ICD-10-CM

## 2019-05-31 PROCEDURE — 26010 DRAINAGE OF FINGER ABSCESS: CPT | Performed by: INTERNAL MEDICINE

## 2019-05-31 PROCEDURE — 99212 OFFICE O/P EST SF 10 MIN: CPT | Performed by: INTERNAL MEDICINE

## 2019-05-31 RX ORDER — DOXYCYCLINE 100 MG/1
100 CAPSULE ORAL 2 TIMES DAILY
Qty: 14 CAPSULE | Refills: 0 | Status: SHIPPED | OUTPATIENT
Start: 2019-05-31 | End: 2019-06-07

## 2019-05-31 RX ORDER — LIDOCAINE HYDROCHLORIDE 10 MG/ML
1 INJECTION, SOLUTION INFILTRATION; PERINEURAL ONCE
Status: COMPLETED | OUTPATIENT
Start: 2019-05-31 | End: 2019-05-31

## 2019-05-31 NOTE — PROGRESS NOTES
"finger infection      HPI  Arnie Calvo is a 58 y.o. male RTC in acute care:   1. Finger infection - \"I think it is a staph infection\".  Recalls had similar event 12 years ago in L hand.  Has event now on R index finger, started 3 days ago.  No drainage.  Painful to touch.  No fevers. No loss of ROM but feels a little stiff and swollen.    2. Fatty Liver - has been gaining weight over time.  Had work-up and is aware of results however has not gotten 24 hour urine results. Is struggling to loose weight on Prednisone and notes exercise is way down due to pain.  Notes Stelara is not helping with AS sx.  Thinks needs alternate biologic. Weight loss as been a struggle. Is working out cutting out carbs at this time.     Review of Systems   Constitution: Negative for chills and fever.   Skin: Positive for color change (redness at index finger with swelling. ). Negative for nail changes and rash.   Musculoskeletal: Negative for joint pain and joint swelling.       The following portions of the patient's history were reviewed and updated as appropriate: allergies, current medications, past medical history, past social history and problem list.      Current Outpatient Medications:   •  alendronate (FOSAMAX) 35 MG tablet, Take 35 mg by mouth Every 7 (Seven) Days., Disp: , Rfl:   •  carvedilol (COREG) 3.125 MG tablet, Take 1 tablet by mouth 2 (Two) Times a Day., Disp: 180 tablet, Rfl: 3  •  predniSONE (DELTASONE) 10 MG tablet, Take 10 mg by mouth Daily., Disp: , Rfl:   •  Ustekinumab (STELARA) 90 MG/ML solution prefilled syringe Injection, Inject  under the skin into the appropriate area as directed. HAS EVERY 8 WEEKS, Disp: , Rfl:   •  doxycycline (MONODOX) 100 MG capsule, Take 1 capsule by mouth 2 (Two) Times a Day for 7 days., Disp: 14 capsule, Rfl: 0  No current facility-administered medications for this visit.     Vitals:    05/31/19 0948   BP: 122/80   Pulse: 90   Temp: 98.1 °F (36.7 °C)   SpO2: 98%   Weight: 93 kg (205 " "lb)   Height: 175.3 cm (69\")         Physical Exam   Constitutional: He is oriented to person, place, and time. He appears well-developed and well-nourished. No distress.   HENT:   Head: Normocephalic and atraumatic.   Pulmonary/Chest: Effort normal. No respiratory distress.   Musculoskeletal:        Right hand: He exhibits tenderness (at distal index finger at base of nail, swelling and flucutance noted. ). He exhibits normal range of motion and no bony tenderness.        Left hand: He exhibits normal range of motion, no tenderness and no bony tenderness.   Neurological: He is alert and oriented to person, place, and time.   Skin:        Psychiatric: He has a normal mood and affect. His behavior is normal.   Vitals reviewed.    Incision & Drainage  Date/Time: 5/31/2019 12:40 PM  Performed by: Zechariah Fatima MD  Authorized by: Zechariah Fatima MD   Consent: Verbal consent obtained.  Risks and benefits: risks, benefits and alternatives were discussed  Consent given by: patient  Type: abscess  Body area: upper extremity  Location details: right index finger  Anesthesia: local infiltration    Anesthesia:  Local Anesthetic: lidocaine 1% without epinephrine  Anesthetic total: 0.1 mL  Needle gauge: 22  Incision type: 2 separate needle sticks at skin at nail border, pus obtained from both.  Complexity: simple  Drainage: purulent and  bloody  Drainage amount: moderate  Patient tolerance: Patient tolerated the procedure well with no immediate complications          Assessment/ Plan  Diagnoses and all orders for this visit:    Paronychia of right index finger  -     lidocaine (XYLOCAINE) 1 % injection 1 mL  -     doxycycline (MONODOX) 100 MG capsule; Take 1 capsule by mouth 2 (Two) Times a Day for 7 days.  -     Anaerobic & Aerobic Culture ([facility] Only) - Swab, Finger    Fatty liver disease, nonalcoholic    Other orders  -     Incision & Drainage        Return for Next scheduled follow up.      Discussion:  Arnie Calvo is a " 58 y.o. male RTC In acute care:   1. Paronychia, R index finger, acute - simple large bore needle incision today x 2 sites with good pus drainage and resolved fluctuance.  Will have pt complete clean-up therapy with doxycycline 100mg BID x 7 days.  Will cx and tailor Abx as needed.   2. LFT elevation - reviewed recent work-up with pt and normal 24 hour urine copper result.  I am suspicious this is all fatty liver as U/S showed changes c/w fatty liver and pt has gained weight.  Weight loss advised. Will trend over time but if progressive or persistent, will need to consider adding fish oil.  Do not suspect med related as not inciting meds on med list at this time.     RTC as scheduled.

## 2019-06-04 LAB
BACTERIA SPEC AEROBE CULT: NORMAL
BACTERIA SPEC ANAEROBE CULT: NORMAL
BACTERIA SPEC CULT: NORMAL
BACTERIA SPEC CULT: NORMAL

## 2019-07-12 ENCOUNTER — TELEPHONE (OUTPATIENT)
Dept: INTERNAL MEDICINE | Facility: CLINIC | Age: 59
End: 2019-07-12

## 2019-07-12 NOTE — TELEPHONE ENCOUNTER
Nakia int called stating his thinks he is dehydrated. He is asking for a blood to be order without him having OV.     I called patient back left a detailed message explaining  is out of the office until Monday. If he thinks he might be dehydrated he needs to go to Urgent care or ED for futher treatment. MAULIK

## 2019-07-22 RX ORDER — CARVEDILOL 6.25 MG/1
6.25 TABLET ORAL 2 TIMES DAILY
Qty: 180 TABLET | Refills: 2 | Status: SHIPPED | OUTPATIENT
Start: 2019-07-22 | End: 2020-03-19

## 2019-08-08 ENCOUNTER — TELEPHONE (OUTPATIENT)
Dept: CARDIOLOGY | Facility: CLINIC | Age: 59
End: 2019-08-08

## 2019-08-08 NOTE — TELEPHONE ENCOUNTER
----- Message from Josselyn Greene sent at 8/8/2019  8:31 AM EDT -----  This is a Dr. Reyes patient and he called today and said he is scheduled for an ileostomy reversal and hernia repair surgery on 9/16/19 and he is needing surgery clearance. Does he need to come in to be seen or is this something that can be taken care of without an appt?     Thanks so much,   Libertad Ramos

## 2019-08-12 ENCOUNTER — OFFICE VISIT (OUTPATIENT)
Dept: CARDIOLOGY | Facility: CLINIC | Age: 59
End: 2019-08-12

## 2019-08-12 VITALS
RESPIRATION RATE: 18 BRPM | WEIGHT: 205 LBS | BODY MASS INDEX: 30.36 KG/M2 | HEART RATE: 94 BPM | HEIGHT: 69 IN | DIASTOLIC BLOOD PRESSURE: 80 MMHG | OXYGEN SATURATION: 97 % | SYSTOLIC BLOOD PRESSURE: 120 MMHG

## 2019-08-12 DIAGNOSIS — I42.8 CARDIOMYOPATHY, NONISCHEMIC (HCC): Primary | ICD-10-CM

## 2019-08-12 PROCEDURE — 93000 ELECTROCARDIOGRAM COMPLETE: CPT | Performed by: NURSE PRACTITIONER

## 2019-08-12 PROCEDURE — 99213 OFFICE O/P EST LOW 20 MIN: CPT | Performed by: NURSE PRACTITIONER

## 2019-08-12 RX ORDER — PREDNISONE 1 MG/1
10 TABLET ORAL
Refills: 1 | COMMUNITY
Start: 2019-07-22 | End: 2021-08-13

## 2019-08-12 NOTE — PROGRESS NOTES
Date of Office Visit: 2019  Encounter Provider: ILIANA Kovacs  Place of Service: [facility] CARDIOLOGY  Patient Name: rAnie Calvo  :1960    Chief Complaint   Patient presents with   • Surgical Clearance     Ileostomy Reversal and Hernia Repair   :     HPI: Arnie Calvo is a 58 y.o. male, new to me, who presents today for follow-up.  Old records have been obtained and reviewed by me.  He is a patient of Dr. Reyes's with a past cardiac history significant for nonischemic cardiomyopathy status post ICD.  Additionally, he has multiple different autoimmune diseases including Crohn's and ankylosing spondylitis.  His ejection fraction has been all over the place since .  In 2016 it was 20%.  In 2018 and improved to 32%.  In 2018 it was estimated to be 15%.  Evidently around the time of that echocardiogram he was at the [facility] in [place] and diagnosed with severe dehydration.  After fluid resuscitation he had a repeat echocardiogram and his EF had improved to 25%.  He was last seen in the office on 10/10/2018 at which time he was overall doing well.  Evidently his ileostomy has a very high output which was causing the dehydration.  He worked with a nutritionist at the [facility] who suggested adding salt to his Gatorade, and apparently this made all the difference in the world.  No changes were made to his medical regimen and he was advised to follow-up in 1 year.  His last remote check was on 2019 which revealed normal dual-chamber ICD function with 20 NST VT episodes the longest being 37 beats.  There were also 2 SVT episodes both with one-to-one AV conduction the longest lasting 43 seconds.  I am seeing him today sooner than that appointment for upcoming surgical clearance for an ileostomy reversal and hernia repair.   Since he was last seen in the office, he has been doing well from a cardiac standpoint.  He denies any chest  "pain, shortness of air, edema, dizziness, PND, orthopnea, or syncope.  He will occasionally have episodes of \"fluttering\" in his chest but this is nothing new for him.  He has been a little off of his exercise routine because of the heat.  He normally enjoys hiking and biking outside.  Evidently he took the stairs to the office this morning which he was able to do with no difficulty.  He works on the weekends at a [employer].  He is looking forward to undergoing the ileostomy reversal.  Evidently the ostomy is a high output and has been causing him issues with hydration.  H is currently having to drinks 6-8 Gatorade's a day to prevent dehydration.    Past Medical History:   Diagnosis Date   • Acute pain of left hip    • Adjustment disorder with depressed mood    • Ankylosis of spine    • Arthritis    • Cardiomyopathy (CMS/HCC)     sees Dr. Reyes; NICM/ (-) cath, EF 25-35%; has ICD   • CHF (congestive heart failure) (CMS/HCC)    • Crohn's disease (CMS/HCC)    • Diabetes mellitus (CMS/HCC)     Diet controlled.   • Dysthymic disorder 2012   • Dysuria    • Early onset dysthymia    • Elevated total protein    • Essential hypertension    • External hemorrhoids    • Genital herpes    • Gout    • Health care maintenance    • Insomnia    • Iron deficiency    • Paroxysmal ventricular tachycardia (CMS/HCC)    • Pneumonia    • Prostate nodule    • Recurrent canker sores    • Thoracic back pain    • Urinary hesitancy    • Xerosis cutis        Past Surgical History:   Procedure Laterality Date   • ABDOMINAL PERINEAL BOWEL RESECTION W/ ILEOANAL POUCH     • CARDIAC CATHETERIZATION     • CARDIAC DEFIBRILLATOR PLACEMENT      Had Jude lead that was fractured.  Lead was tied off.  New lead and new device implanted.   • CARDIAC ELECTROPHYSIOLOGY PROCEDURE N/A 1/3/2019    Procedure: ICD DC generator change  [employer];  Surgeon: Zechariah Randolph MD;  Location:  INVASIVE LOCATION;  Service: Cardiology   • CARDIAC " SURGERY     • COLON SURGERY     • COLONOSCOPY  04/03/2015    abnormal cecum, three polypoid masses, pre cancerous vs crohns, IH, tics, hyperplastic polyp   • COLONOSCOPY N/A 3/17/2017    polypoid masses, tics, IH, polyp   • COLOSTOMY         Social History     Socioeconomic History   • Marital status:      Spouse name: Not on file   • Number of children: Not on file   • Years of education: Not on file   • Highest education level: Not on file   Tobacco Use   • Smoking status: Never Smoker   • Smokeless tobacco: Never Used   Substance and Sexual Activity   • Alcohol use: No   • Drug use: No   • Sexual activity: Defer       Family History   Problem Relation Age of Onset   • Hypertension Mother    • Diabetes Mother         type 2 mellitus    • Cancer Father         colon   • Heart failure Father         congestive   • Gout Father    • Colon cancer Father        Review of Systems   Constitution: Negative for chills, fever and malaise/fatigue.   Cardiovascular: Positive for palpitations. Negative for chest pain, dyspnea on exertion, leg swelling, near-syncope, orthopnea, paroxysmal nocturnal dyspnea and syncope.   Respiratory: Negative for cough and shortness of breath.    Musculoskeletal: Negative for joint pain, joint swelling and myalgias.   Gastrointestinal: Negative for abdominal pain, diarrhea, melena, nausea and vomiting.   Genitourinary: Negative for frequency and hematuria.   Neurological: Negative for light-headedness, numbness, paresthesias and seizures.   Allergic/Immunologic: Negative.    All other systems reviewed and are negative.      Allergies   Allergen Reactions   • Iodine Nausea And Vomiting   • Shellfish-Derived Products Nausea And Vomiting   • Adhesive Tape Rash   • Bactrim [Sulfamethoxazole-Trimethoprim] Hives   • Latex Rash         Current Outpatient Medications:   •  alendronate (FOSAMAX) 35 MG tablet, Take 35 mg by mouth Every 7 (Seven) Days., Disp: , Rfl:   •  carvedilol (COREG) 6.25 MG  "tablet, Take 1 tablet by mouth 2 (Two) Times a Day., Disp: 180 tablet, Rfl: 2  •  predniSONE (DELTASONE) 5 MG tablet, , Disp: , Rfl: 1  •  Ustekinumab (STELARA) 90 MG/ML solution prefilled syringe Injection, Inject  under the skin into the appropriate area as directed. HAS EVERY 8 WEEKS, Disp: , Rfl:       Objective:     Vitals:    08/12/19 0901 08/12/19 0904   BP: 118/78 120/80   BP Location: Right arm Left arm   Patient Position: Sitting Sitting   Pulse: 94    Resp: 18    SpO2: 97%    Weight: 93 kg (205 lb)    Height: 175.3 cm (69\")      Body mass index is 30.27 kg/m².    PHYSICAL EXAM:    Physical Exam   Constitutional: He is oriented to person, place, and time. He appears well-developed and well-nourished. No distress.   HENT:   Head: Normocephalic and atraumatic.   Eyes: Pupils are equal, round, and reactive to light.   Neck: No JVD present. No thyromegaly present.   Cardiovascular: Normal rate, regular rhythm, normal heart sounds and intact distal pulses.   No murmur heard.  Pulmonary/Chest: Effort normal and breath sounds normal. No respiratory distress.   Abdominal: Soft. Bowel sounds are normal. He exhibits no distension. There is no splenomegaly or hepatomegaly. There is no tenderness.   Musculoskeletal: Normal range of motion. He exhibits no edema.   Neurological: He is alert and oriented to person, place, and time.   Skin: Skin is warm and dry. He is not diaphoretic. No erythema.   Psychiatric: He has a normal mood and affect. His behavior is normal. Judgment normal.         ECG 12 Lead  Date/Time: 8/12/2019 9:13 AM  Performed by: Jenny Lima APRN  Authorized by: Jenny Lima APRN   Comparison: compared with previous ECG from 1/3/2019  Similar to previous ECG  Rhythm: sinus rhythm  Rate: normal  BPM: 89    Clinical impression: normal ECG  Comments: Indication: Cardiomyopathy              Assessment:       Diagnosis Plan   1. Cardiomyopathy, nonischemic (CMS/HCC)  ECG 12 Lead     Orders " Placed This Encounter   Procedures   • ECG 12 Lead     This order was created via procedure documentation          Plan:       1. Nonischemic cardiomyopathy/Heart Failure  Assessment  • NYHA class I - There is no limitation of physical activity. Physical activity does not cause fatigue, palpitations or shortness of breath.  • Beta blocker prescribed  • The most recent ejection fraction is 25%  • Left ventricular function is severely reduced by qualitative assessment    Plan  • The patient has received heart failure education on the following topics: dietary sodium restriction, medication instructions and weight monitoring  • The heart failure care plan was discussed with the patient today including: continuing the current program    Subjective/Objective    • Physical exam findings negative for elevated JVP and hepatomegaly.  • The patient has an ICD implant        Overall I think he is doing well from a cardiac standpoint.  He denies any symptoms of angina or heart failure.  According to the Miguel Ángel's Revised Cardiac Risk Index, his risk of adverse cardiac outcome during high risk surgery is moderate (6.6%).  I did discuss with Dr. Israel and he is in agreement.  I would recommend keeping a close eye on his volume status.  I am not going to make any changes to his medical regimen and he will follow-up as regularly scheduled in October or sooner if needed.      As always, it has been a pleasure to participate in your patient's care.      Sincerely,         ILIANA Tate

## 2019-10-09 ENCOUNTER — RESEARCH ENCOUNTER (OUTPATIENT)
Dept: CARDIOLOGY | Facility: CLINIC | Age: 59
End: 2019-10-09

## 2019-10-09 NOTE — RESEARCH
Research study: DECIDE-ICD Trial  Subject initials: ADAM  Subject study ID#: [id number]    ADAM completed his final survey for the DECIDE study and returned it via the US Mail per our instructions. I entered his responses into the survey database ([employer]) and mailed a gift card to ADAM per study protocol. I also completed a 6-month chart review. This concludes ADAM's active participation in the study.     Shanon LEACH RN  Research Coordinator

## 2019-10-21 ENCOUNTER — OFFICE VISIT (OUTPATIENT)
Dept: CARDIOLOGY | Facility: CLINIC | Age: 59
End: 2019-10-21

## 2019-10-21 ENCOUNTER — CLINICAL SUPPORT NO REQUIREMENTS (OUTPATIENT)
Dept: CARDIOLOGY | Facility: CLINIC | Age: 59
End: 2019-10-21

## 2019-10-21 VITALS
DIASTOLIC BLOOD PRESSURE: 76 MMHG | SYSTOLIC BLOOD PRESSURE: 108 MMHG | BODY MASS INDEX: 31.31 KG/M2 | RESPIRATION RATE: 18 BRPM | WEIGHT: 211.4 LBS | HEIGHT: 69 IN | HEART RATE: 87 BPM

## 2019-10-21 DIAGNOSIS — I42.9 CARDIOMYOPATHY, UNSPECIFIED TYPE (HCC): Primary | ICD-10-CM

## 2019-10-21 DIAGNOSIS — I47.20 VENTRICULAR TACHYCARDIA (HCC): Primary | ICD-10-CM

## 2019-10-21 PROCEDURE — 99213 OFFICE O/P EST LOW 20 MIN: CPT | Performed by: PHYSICIAN ASSISTANT

## 2019-10-21 PROCEDURE — 93283 PRGRMG EVAL IMPLANTABLE DFB: CPT | Performed by: INTERNAL MEDICINE

## 2019-10-21 PROCEDURE — 93000 ELECTROCARDIOGRAM COMPLETE: CPT | Performed by: PHYSICIAN ASSISTANT

## 2019-10-21 NOTE — PROGRESS NOTES
Date of Office Visit: 10/21/2019  Encounter Provider: ESTEVAN Ruano  Place of Service: [facility]  Patient Name: Arnie Calvo  :1960    Chief Complaint   Patient presents with   • Congestive Heart Failure     1 YR FOLLOW UP   • Cardiomyopathy   :     HPI: Arnie Calvo is a 58 y.o. male who presents today for follow-up.  Old records have been obtained and reviewed by me.  He is a patient of Dr. Gloria with a past cardiac history significant for nonischemic cardiomyopathy.  He also has multiple different autoimmune diseases including Crohn's and ankylosing spondylitis.  Because of his Crohn's disease he has an ileostomy.  His EF has been all over the place since 2016, anywhere from 15% up to 32%.  Last year he became pretty dehydrated when he was at the [facility].  This was attributed to the high output of his ileostomy.  His nutritionist at the [facility] suggested that he had a little bit of salt to his Gatorade and this made a huge difference.  His last echocardiogram was performed at the [facility] in 2018.  Specifically on 2018.  This showed his EF to be around 25% with no significant valvular abnormalities.  He does have an ICD and had his generator changed in January of this year..  Despite all of this he is managed to exercise regularly and do fairly well.  He was last in our office to see Jenny Lima on 2019.  This was for surgical clearance prior to an ileostomy reversal.  She felt that he was at an acceptable although elevated risk for his surgery.  Recommendations were to keep a close eye on his volume status during surgery.  He is here today for his regular follow-up appointment.   He did have his ICD interrogated today and he had an episode that was in the V. fib range and he almost got shocked.  He has also had several episodes of nonsustained VT lasting anywhere between 6 and 15 beats.  He has been  asymptomatic with the nonsustained VT, however he did have symptoms with the V. fib episode.  He was actually driving his car and pulled over.  He felt like he was going to pass out but did not.  The V. fib self terminated.  Other than that he is been doing really well.  He is still hiking some pretty difficult trails about 2 times a week at least.  He denies any chest pain, edema, dizziness, syncope, orthopnea, or PND.  He gets a little short of breath with exertion but the exertion that he does is pretty significant when he is hiking on the trails so I think is appropriate.  He has not had his ileostomy reversed yet, he is thinking this will happen sometime at the beginning of next year.  He states he has never been on Entresto therapy, lisinopril, or losartan.      Past Medical History:   Diagnosis Date   • Acute pain of left hip    • Adjustment disorder with depressed mood    • Ankylosis of spine    • Arthritis    • Cardiomyopathy (CMS/HCC)     sees Dr. Reyes; NICM/ (-) cath, EF 25-35%; has ICD   • CHF (congestive heart failure) (CMS/HCC)    • Crohn's disease (CMS/HCC)    • Diabetes mellitus (CMS/HCC)     Diet controlled.   • Dysthymic disorder 2012   • Dysuria    • Early onset dysthymia    • Elevated total protein    • Essential hypertension    • External hemorrhoids    • Genital herpes    • Gout    • Health care maintenance    • Insomnia    • Iron deficiency    • Paroxysmal ventricular tachycardia (CMS/HCC)    • Pneumonia    • Prostate nodule    • Recurrent canker sores    • Thoracic back pain    • Urinary hesitancy    • Xerosis cutis        Past Surgical History:   Procedure Laterality Date   • ABDOMINAL PERINEAL BOWEL RESECTION W/ ILEOANAL POUCH     • CARDIAC CATHETERIZATION     • CARDIAC DEFIBRILLATOR PLACEMENT      Had Shaniko lead that was fractured.  Lead was tied off.  New lead and new device implanted.   • CARDIAC ELECTROPHYSIOLOGY PROCEDURE N/A 1/3/2019    Procedure: ICD DC generator change   [employer];  Surgeon: Zechariah Randolph MD;  Location: [facility] INVASIVE LOCATION;  Service: Cardiology   • CARDIAC SURGERY     • COLON SURGERY     • COLONOSCOPY  04/03/2015    abnormal cecum, three polypoid masses, pre cancerous vs crohns, IH, tics, hyperplastic polyp   • COLONOSCOPY N/A 3/17/2017    polypoid masses, tics, IH, polyp   • COLOSTOMY         Social History     Socioeconomic History   • Marital status:      Spouse name: Not on file   • Number of children: Not on file   • Years of education: Not on file   • Highest education level: Not on file   Tobacco Use   • Smoking status: Never Smoker   • Smokeless tobacco: Never Used   • Tobacco comment: NO CAFFEINE USE   Substance and Sexual Activity   • Alcohol use: No   • Drug use: No   • Sexual activity: Defer       Family History   Problem Relation Age of Onset   • Hypertension Mother    • Diabetes Mother         type 2 mellitus    • Cancer Father         colon   • Heart failure Father         congestive   • Gout Father    • Colon cancer Father        Review of Systems   Constitution: Negative for chills, fever and malaise/fatigue.   Cardiovascular: Positive for dyspnea on exertion and palpitations. Negative for chest pain, leg swelling, near-syncope, orthopnea, paroxysmal nocturnal dyspnea and syncope.   Respiratory: Negative for cough and shortness of breath.    Musculoskeletal: Negative for joint pain, joint swelling and myalgias.   Gastrointestinal: Negative for abdominal pain, diarrhea, melena, nausea and vomiting.   Genitourinary: Negative for frequency and hematuria.   Neurological: Negative for light-headedness, numbness, paresthesias and seizures.   Allergic/Immunologic: Negative.    All other systems reviewed and are negative.      Allergies   Allergen Reactions   • Iodine Nausea And Vomiting   • Shellfish-Derived Products Nausea And Vomiting   • Adhesive Tape Rash   • Bactrim [Sulfamethoxazole-Trimethoprim] Hives   • Latex Rash  "        Current Outpatient Medications:   •  carvedilol (COREG) 6.25 MG tablet, Take 1 tablet by mouth 2 (Two) Times a Day., Disp: 180 tablet, Rfl: 2  •  predniSONE (DELTASONE) 5 MG tablet, , Disp: , Rfl: 1  •  alendronate (FOSAMAX) 35 MG tablet, Take 35 mg by mouth Every 7 (Seven) Days., Disp: , Rfl:   •  Ustekinumab (STELARA) 90 MG/ML solution prefilled syringe Injection, Inject  under the skin into the appropriate area as directed. HAS EVERY 8 WEEKS  MEDICATION ON HOLD, Disp: , Rfl:       Objective:     Vitals:    10/21/19 1221 10/21/19 1230   BP: 112/80 108/76   BP Location: Right arm Left arm   Patient Position: Sitting Sitting   Pulse: 87    Resp: 18    Weight: 95.9 kg (211 lb 6.4 oz)    Height: 175.3 cm (69\")      Body mass index is 31.22 kg/m².    PHYSICAL EXAM:    Physical Exam   Constitutional: He is oriented to person, place, and time. He appears well-developed and well-nourished. No distress.   HENT:   Head: Normocephalic and atraumatic.   Eyes: Pupils are equal, round, and reactive to light.   Neck: No JVD present. No thyromegaly present.   Cardiovascular: Normal rate, regular rhythm, normal heart sounds and intact distal pulses.   No murmur heard.  Pulmonary/Chest: Effort normal and breath sounds normal. No respiratory distress.   Abdominal: Soft. Bowel sounds are normal. He exhibits no distension. There is no splenomegaly or hepatomegaly. There is no tenderness.   Musculoskeletal: Normal range of motion. He exhibits no edema.   Neurological: He is alert and oriented to person, place, and time.   Skin: Skin is warm and dry. He is not diaphoretic. No erythema.   Psychiatric: He has a normal mood and affect. His behavior is normal. Judgment normal.         ECG 12 Lead  Date/Time: 10/21/2019 12:45 PM  Performed by: Alexandra Bettencourt PA  Authorized by: Alexandra Bettencourt PA   Comparison: compared with previous ECG from 8/12/2019  Similar to previous ECG  Rhythm: sinus rhythm  BPM: 87    Clinical impression: " normal ECG  Comments: Indication: Nonischemic cardiomyopathy              Assessment:       Diagnosis Plan   1. Cardiomyopathy, unspecified type (CMS/HCC)  ECG 12 Lead     Orders Placed This Encounter   Procedures   • ECG 12 Lead     This order was created via procedure documentation          Plan:       Overall he stable.  However, it looks like he did have some V. fib on his ICD interrogation.  He was actually symptomatic with this.  He never lost consciousness but was close.  He was driving at the time.  I am going to further discuss the plan of care with Dr. Reyes.  We may need to recheck another echocardiogram, and I think that at this point we can consider decreasing his carvedilol to give us some blood pressure room and may be starting him on very low-dose Entresto.  We may also need to add amiodarone to his medical regimen as well if he is having VT and V. fib.  From a symptom standpoint he is actually doing great and has excellent risk factor modification.  I am not going to make any changes at this time, further recommendations will be made pending my conversation with Dr. Reyes.  He will come back and see us in a year or sooner if needed.    As always, it has been a pleasure to participate in your patient's care.      Sincerely,         Alexandra Bettencourt PA-C

## 2019-10-23 ENCOUNTER — TELEPHONE (OUTPATIENT)
Dept: CARDIOLOGY | Facility: CLINIC | Age: 59
End: 2019-10-23

## 2019-10-23 DIAGNOSIS — I50.22 CHRONIC SYSTOLIC CONGESTIVE HEART FAILURE (HCC): Primary | ICD-10-CM

## 2019-10-23 DIAGNOSIS — I49.01 VENTRICULAR FIBRILLATION (HCC): ICD-10-CM

## 2019-10-23 NOTE — TELEPHONE ENCOUNTER
I reviewed the plan of care with Dr. Randolph.  The patient is having some VT and VF.  Our recommendations is for him to no longer drive, the episode of VT fib that he had occurred while he was driving and he almost passed out.  I am also going to check a magnesium level and a CMP on him.  Dr. Randolph is considering starting him on amiodarone if his liver function and electro lites are normal.  We will also need to have him come and see Dr. Randolph for an appointment in the office.  I will put a referral in for this as well.    Elizabeth, this is an FYI for you.  Not sure when Agustín was wanting to see him, please check with Dr. Randolph.

## 2019-10-23 NOTE — TELEPHONE ENCOUNTER
----- Message from ESTEVAN Ruano sent at 10/22/2019  1:52 PM EDT -----  Zechariah and Jessica, this patient had V. fib on his last interrogation.  He was not shocked but was close.  He was driving his car when it happened and felt like he was going to pass out so he pulled over.  I discussed this with Dr. Reyes and he would like for you guys to give some input here.    P

## 2019-10-23 NOTE — TELEPHONE ENCOUNTER
He wants to see the labs---can we see when he is coming in for those--I would say a couple of weeks

## 2019-10-24 ENCOUNTER — LAB (OUTPATIENT)
Dept: LAB | Facility: HOSPITAL | Age: 59
End: 2019-10-24

## 2019-10-24 DIAGNOSIS — I50.22 CHRONIC SYSTOLIC CONGESTIVE HEART FAILURE (HCC): ICD-10-CM

## 2019-10-24 LAB
ALBUMIN SERPL-MCNC: 4.4 G/DL (ref 3.5–5.2)
ALBUMIN/GLOB SERPL: 1.4 G/DL
ALP SERPL-CCNC: 56 U/L (ref 39–117)
ALT SERPL W P-5'-P-CCNC: 68 U/L (ref 1–41)
ANION GAP SERPL CALCULATED.3IONS-SCNC: 10.9 MMOL/L (ref 5–15)
AST SERPL-CCNC: 53 U/L (ref 1–40)
BILIRUB SERPL-MCNC: 0.5 MG/DL (ref 0.2–1.2)
BUN BLD-MCNC: 9 MG/DL (ref 6–20)
BUN/CREAT SERPL: 9.6 (ref 7–25)
CALCIUM SPEC-SCNC: 9.6 MG/DL (ref 8.6–10.5)
CHLORIDE SERPL-SCNC: 102 MMOL/L (ref 98–107)
CO2 SERPL-SCNC: 25.1 MMOL/L (ref 22–29)
CREAT BLD-MCNC: 0.94 MG/DL (ref 0.76–1.27)
GFR SERPL CREATININE-BSD FRML MDRD: 82 ML/MIN/1.73
GLOBULIN UR ELPH-MCNC: 3.2 GM/DL
GLUCOSE BLD-MCNC: 98 MG/DL (ref 65–99)
MAGNESIUM SERPL-MCNC: 2.2 MG/DL (ref 1.6–2.6)
POTASSIUM BLD-SCNC: 4.1 MMOL/L (ref 3.5–5.2)
PROT SERPL-MCNC: 7.6 G/DL (ref 6–8.5)
SODIUM BLD-SCNC: 138 MMOL/L (ref 136–145)

## 2019-10-24 PROCEDURE — 36415 COLL VENOUS BLD VENIPUNCTURE: CPT

## 2019-10-24 PROCEDURE — 83735 ASSAY OF MAGNESIUM: CPT

## 2019-10-24 PROCEDURE — 80053 COMPREHEN METABOLIC PANEL: CPT

## 2019-10-28 ENCOUNTER — TELEPHONE (OUTPATIENT)
Dept: CARDIOLOGY | Facility: CLINIC | Age: 59
End: 2019-10-28

## 2019-10-28 NOTE — TELEPHONE ENCOUNTER
----- Message from Zechariah Randolph MD sent at 10/28/2019  1:05 PM EDT -----  Let's wait until then

## 2019-10-28 NOTE — TELEPHONE ENCOUNTER
I spoke with him about his lab results.  His electrolytes are normal.  His LFTs are a little elevated.  Per Dr. Randolph's recommendations, we are going to wait until he sees Dr. Menendez in office on 11/14/2019 before making any other changes to his medical regimen.    Del, I am copying you on this.  This is just an FYI.  I originally sent this to Zechariah because I thought that the patient was going to be seeing him instead.

## 2019-11-14 ENCOUNTER — OFFICE VISIT (OUTPATIENT)
Dept: CARDIOLOGY | Facility: CLINIC | Age: 59
End: 2019-11-14

## 2019-11-14 ENCOUNTER — CLINICAL SUPPORT NO REQUIREMENTS (OUTPATIENT)
Dept: CARDIOLOGY | Facility: CLINIC | Age: 59
End: 2019-11-14

## 2019-11-14 VITALS
BODY MASS INDEX: 30.36 KG/M2 | SYSTOLIC BLOOD PRESSURE: 122 MMHG | HEIGHT: 69 IN | WEIGHT: 205 LBS | DIASTOLIC BLOOD PRESSURE: 80 MMHG

## 2019-11-14 DIAGNOSIS — I47.20 VENTRICULAR TACHYCARDIA (HCC): Primary | ICD-10-CM

## 2019-11-14 DIAGNOSIS — I47.29 PAROXYSMAL VT (HCC): Primary | ICD-10-CM

## 2019-11-14 DIAGNOSIS — I50.22 CHRONIC SYSTOLIC HEART FAILURE (HCC): ICD-10-CM

## 2019-11-14 PROCEDURE — 99214 OFFICE O/P EST MOD 30 MIN: CPT | Performed by: INTERNAL MEDICINE

## 2019-11-14 PROCEDURE — 93283 PRGRMG EVAL IMPLANTABLE DFB: CPT | Performed by: INTERNAL MEDICINE

## 2019-11-15 PROCEDURE — 93000 ELECTROCARDIOGRAM COMPLETE: CPT | Performed by: INTERNAL MEDICINE

## 2019-11-15 NOTE — PROGRESS NOTES
Date of Office Visit: 2019  Encounter Provider: Del Abel MD  Place of Service: [facility] CARDIOLOGY  Patient Name: Arnie Calvo  :1960    Chief Complaint   Patient presents with   • Irregular Heart Beat     New Patient Consult V-Fib PT ref   :     HPI: Arnie Calvo is a 58 y.o. male who presents today for evaluation of ventricular tachycardia.  The patient has a many year history of non-ischemic cardiomyopathy.  He has a long history of non-sustained ventricular tachycardia.  He had an episodes of sudden very fast palpitations which correspond to an episode of ventricular tachycardia in the VF zone of his device 2 months ago.  He had symptoms of palpitations and dizziness with the the episode.  The episode spontaneously terminated prior to receiving any therapy. He reports having several similar previous episodes.  He has never gotten an ICD shock nor had syncope.           Past Medical History:   Diagnosis Date   • Acute pain of left hip    • Adjustment disorder with depressed mood    • Ankylosis of spine    • Arthritis    • Cardiomyopathy (CMS/HCC)     sees Dr. Reyes; NICM/ (-) cath, EF 25-35%; has ICD   • CHF (congestive heart failure) (CMS/HCC)    • Crohn's disease (CMS/HCC)    • Diabetes mellitus (CMS/HCC)     Diet controlled.   • Dysthymic disorder    • Dysuria    • Early onset dysthymia    • Elevated total protein    • Essential hypertension    • External hemorrhoids    • Genital herpes    • Gout    • Health care maintenance    • Insomnia    • Iron deficiency    • Paroxysmal ventricular tachycardia (CMS/HCC)    • Pneumonia    • Prostate nodule    • Recurrent canker sores    • Thoracic back pain    • Urinary hesitancy    • Xerosis cutis        Past Surgical History:   Procedure Laterality Date   • ABDOMINAL PERINEAL BOWEL RESECTION W/ ILEOANAL POUCH     • CARDIAC CATHETERIZATION     • CARDIAC DEFIBRILLATOR PLACEMENT      Had Jude lead that was  fractured.  Lead was tied off.  New lead and new device implanted.   • CARDIAC ELECTROPHYSIOLOGY PROCEDURE N/A 1/3/2019    Procedure: ICD DC generator change  [employer];  Surgeon: Zechariah Randolph MD;  Location: [facility] INVASIVE LOCATION;  Service: Cardiology   • CARDIAC SURGERY     • COLON SURGERY     • COLONOSCOPY  04/03/2015    abnormal cecum, three polypoid masses, pre cancerous vs crohns, IH, tics, hyperplastic polyp   • COLONOSCOPY N/A 3/17/2017    polypoid masses, tics, IH, polyp   • COLOSTOMY         Social History     Socioeconomic History   • Marital status:      Spouse name: Not on file   • Number of children: Not on file   • Years of education: Not on file   • Highest education level: Not on file   Tobacco Use   • Smoking status: Never Smoker   • Smokeless tobacco: Never Used   • Tobacco comment: NO CAFFEINE USE   Substance and Sexual Activity   • Alcohol use: No   • Drug use: No   • Sexual activity: Defer       Family History   Problem Relation Age of Onset   • Hypertension Mother    • Diabetes Mother         type 2 mellitus    • Cancer Father         colon   • Heart failure Father         congestive   • Gout Father    • Colon cancer Father        Review of Systems   Constitution: Negative for weakness and malaise/fatigue.   HENT: Negative.    Eyes: Negative.    Cardiovascular: Positive for palpitations. Negative for chest pain, dyspnea on exertion, leg swelling and near-syncope.   Respiratory: Negative for cough and shortness of breath.    Endocrine: Negative.    Hematologic/Lymphatic: Negative.    Skin: Negative.    Musculoskeletal: Negative.    Gastrointestinal: Negative.    Genitourinary: Negative.    Neurological: Negative.    Psychiatric/Behavioral: Negative.    Allergic/Immunologic: Negative.        Allergies   Allergen Reactions   • Iodine Nausea And Vomiting   • Shellfish-Derived Products Nausea And Vomiting   • Adhesive Tape Rash   • Bactrim [Sulfamethoxazole-Trimethoprim] Hives   •  "Latex Rash         Current Outpatient Medications:   •  carvedilol (COREG) 6.25 MG tablet, Take 1 tablet by mouth 2 (Two) Times a Day., Disp: 180 tablet, Rfl: 2  •  alendronate (FOSAMAX) 35 MG tablet, Take 35 mg by mouth Every 7 (Seven) Days., Disp: , Rfl:   •  predniSONE (DELTASONE) 5 MG tablet, , Disp: , Rfl: 1  •  Ustekinumab (STELARA) 90 MG/ML solution prefilled syringe Injection, Inject  under the skin into the appropriate area as directed. HAS EVERY 8 WEEKS  MEDICATION ON HOLD, Disp: , Rfl:       Objective:     Vitals:    11/14/19 1102   BP: 122/80   BP Location: Right arm   Patient Position: Sitting   Cuff Size: Large Adult   Weight: 93 kg (205 lb)   Height: 175.3 cm (69\")     Body mass index is 30.27 kg/m².    PHYSICAL EXAM:    Physical Exam   Constitutional: He is oriented to person, place, and time. He appears well-developed and well-nourished. No distress.   HENT:   Head: Normocephalic and atraumatic.   Eyes: Conjunctivae are normal. Right eye exhibits no discharge. Left eye exhibits no discharge.   Neck: Neck supple. No JVD present.   Cardiovascular: Normal rate and normal heart sounds.   Pulmonary/Chest: Effort normal and breath sounds normal. No respiratory distress.   ICD in place on left chest   Abdominal: Soft. Bowel sounds are normal. He exhibits no distension. There is no tenderness.   Musculoskeletal: Normal range of motion. He exhibits no edema.   Neurological: He is alert and oriented to person, place, and time. No cranial nerve deficit.   Skin: He is not diaphoretic.   Nursing note and vitals reviewed.          ECG 12 Lead  Date/Time: 11/15/2019 2:13 PM  Performed by: Del Abel MD  Authorized by: Del Abel MD   Comparison: compared with previous ECG from 10/21/2019  Similar to previous ECG  Rhythm: sinus rhythm  Q waves: II, III and aVF      Clinical impression: abnormal EKG        I reviewed echocardiographic images from 2018.  His ejection fraction is severely " decreased.        Assessment:       Diagnosis Plan   1. Paroxysmal VT (CMS/[facility])     2. Chronic systolic heart failure (CMS/[facility])            Plan:       Nonsustained ventricular tachycardia, very fast, but brief.  I would recommend no change to device therapy or addition of antiarrhythmic at this time.  If further events result in therapy, we can consider addition of sotalol or amiodarone.     As always, it has been a pleasure to participate in your patient's care.      Sincerely,         Del Abel MD

## 2019-12-18 NOTE — ADDENDUM NOTE
Addendum  created 03/17/17 1521 by Soco Pierce MD    Anesthesia Event edited, Anesthesia Intra Flowsheets edited, Anesthesia Intra Meds edited, Anesthesia Review and Sign - Ready for Procedure, Anesthesia Review and Sign - Signed, Anesthesia Staff edited, Flowsheet data copied forward, Patient device added, Patient device removed, Problem List reviewed, Procedure Event Log accessed, Sign clinical note, SmartForm saved      
Negative

## 2020-02-19 ENCOUNTER — CLINICAL SUPPORT NO REQUIREMENTS (OUTPATIENT)
Dept: CARDIOLOGY | Facility: CLINIC | Age: 60
End: 2020-02-19

## 2020-02-19 DIAGNOSIS — I42.8 NICM (NONISCHEMIC CARDIOMYOPATHY) (HCC): Primary | ICD-10-CM

## 2020-02-19 PROCEDURE — 93297 REM INTERROG DEV EVAL ICPMS: CPT | Performed by: INTERNAL MEDICINE

## 2020-02-19 PROCEDURE — 93295 DEV INTERROG REMOTE 1/2/MLT: CPT | Performed by: INTERNAL MEDICINE

## 2020-02-19 PROCEDURE — 93296 REM INTERROG EVL PM/IDS: CPT | Performed by: INTERNAL MEDICINE

## 2020-03-19 RX ORDER — CARVEDILOL 6.25 MG/1
TABLET ORAL
Qty: 180 TABLET | Refills: 1 | Status: SHIPPED | OUTPATIENT
Start: 2020-03-19 | End: 2020-10-13

## 2020-07-16 ENCOUNTER — OFFICE VISIT (OUTPATIENT)
Dept: CARDIOLOGY | Facility: CLINIC | Age: 60
End: 2020-07-16

## 2020-07-16 VITALS
HEIGHT: 69 IN | DIASTOLIC BLOOD PRESSURE: 80 MMHG | HEART RATE: 89 BPM | BODY MASS INDEX: 29.51 KG/M2 | SYSTOLIC BLOOD PRESSURE: 110 MMHG | WEIGHT: 199.2 LBS

## 2020-07-16 DIAGNOSIS — I50.22 CHRONIC SYSTOLIC HEART FAILURE (HCC): ICD-10-CM

## 2020-07-16 DIAGNOSIS — K50.00 EXACERBATION OF CROHN'S DISEASE OF SMALL INTESTINE (HCC): ICD-10-CM

## 2020-07-16 DIAGNOSIS — I42.8 CARDIOMYOPATHY, NONISCHEMIC (HCC): ICD-10-CM

## 2020-07-16 DIAGNOSIS — I47.29 PAROXYSMAL VENTRICULAR TACHYCARDIA (HCC): Primary | ICD-10-CM

## 2020-07-16 PROCEDURE — 99214 OFFICE O/P EST MOD 30 MIN: CPT | Performed by: INTERNAL MEDICINE

## 2020-07-16 PROCEDURE — 93000 ELECTROCARDIOGRAM COMPLETE: CPT | Performed by: INTERNAL MEDICINE

## 2020-07-16 NOTE — PROGRESS NOTES
Date of Office Visit: 20  Encounter Provider: Harrison Reyes MD  Place of Service: [facility]  Patient Name: Arnie Calvo  :1960  [id number]    Chief Complaint   Patient presents with   • Congestive Heart Failure   • Hypertension   :     HPI: Arnie Calvo is a 59 y.o. male he has a history of a nonischemic cardiomyopathy nonsustained ventricular tachycardia and an ICD.  When I last saw him in 2018 he had a EF of 15% and he went to the [facility] and was seen there.  They determined that he was just dehydrated from his high ostomy output he has had surgery to reattach things that broke down and he had a re-disconnected.  His Crohn's disease is been pretty quiesced sent but he has ankylosing spondylitis and that is been bothering him and is interested in starting back on Humira.  He has been active no PND no orthopnea no edema occasional palpitations which he is always had he just recently hiked 7 miles in the [facility] with no problem.    Past Medical History:   Diagnosis Date   • Acute pain of left hip    • Adjustment disorder with depressed mood    • Ankylosis of spine    • Arthritis    • Cardiomyopathy (CMS/HCC)     sees Dr. Reyes; NICM/ (-) cath, EF 25-35%; has ICD   • CHF (congestive heart failure) (CMS/HCC)    • Crohn's disease (CMS/HCC)    • Diabetes mellitus (CMS/HCC)     Diet controlled.   • Dysthymic disorder    • Dysuria    • Early onset dysthymia    • Elevated total protein    • Essential hypertension    • External hemorrhoids    • Genital herpes    • Gout    • Health care maintenance    • Insomnia    • Iron deficiency    • Paroxysmal ventricular tachycardia (CMS/HCC)    • Pneumonia    • Prostate nodule    • Recurrent canker sores    • Thoracic back pain    • Urinary hesitancy    • Xerosis cutis        Past Surgical History:   Procedure Laterality Date   • ABDOMINAL PERINEAL BOWEL RESECTION W/ ILEOANAL POUCH     • CARDIAC  CATHETERIZATION     • CARDIAC DEFIBRILLATOR PLACEMENT      Had Jude lead that was fractured.  Lead was tied off.  New lead and new device implanted.   • CARDIAC ELECTROPHYSIOLOGY PROCEDURE N/A 1/3/2019    Procedure: ICD DC generator change  [employer];  Surgeon: Zechariah Randolph MD;  Location: [facility] INVASIVE LOCATION;  Service: Cardiology   • CARDIAC SURGERY     • COLON SURGERY     • COLONOSCOPY  04/03/2015    abnormal cecum, three polypoid masses, pre cancerous vs crohns, IH, tics, hyperplastic polyp   • COLONOSCOPY N/A 3/17/2017    polypoid masses, tics, IH, polyp   • COLOSTOMY         Social History     Socioeconomic History   • Marital status:      Spouse name: Not on file   • Number of children: Not on file   • Years of education: Not on file   • Highest education level: Not on file   Tobacco Use   • Smoking status: Never Smoker   • Smokeless tobacco: Never Used   • Tobacco comment: NO CAFFEINE USE   Substance and Sexual Activity   • Alcohol use: No   • Drug use: No   • Sexual activity: Defer       Family History   Problem Relation Age of Onset   • Hypertension Mother    • Diabetes Mother         type 2 mellitus    • Cancer Father         colon   • Heart failure Father         congestive   • Gout Father    • Colon cancer Father        Review of Systems   Constitution: Negative for decreased appetite, fever, malaise/fatigue and weight loss.   HENT: Negative for nosebleeds.    Eyes: Negative for double vision.   Cardiovascular: Negative for chest pain, claudication, cyanosis, dyspnea on exertion, irregular heartbeat, leg swelling, near-syncope, orthopnea, palpitations, paroxysmal nocturnal dyspnea and syncope.   Respiratory: Negative for cough, hemoptysis and shortness of breath.    Hematologic/Lymphatic: Negative for bleeding problem.   Skin: Negative for rash.   Musculoskeletal: Negative for falls and myalgias.   Gastrointestinal: Negative for hematochezia, jaundice, melena, nausea and vomiting.  "  Genitourinary: Negative for hematuria.   Neurological: Negative for dizziness and seizures.   Psychiatric/Behavioral: Negative for altered mental status and memory loss.       Allergies   Allergen Reactions   • Iodine Nausea And Vomiting   • Shellfish-Derived Products Nausea And Vomiting   • Adhesive Tape Rash   • Bactrim [Sulfamethoxazole-Trimethoprim] Hives   • Latex Rash         Current Outpatient Medications:   •  alendronate (FOSAMAX) 35 MG tablet, Take 35 mg by mouth Every 7 (Seven) Days., Disp: , Rfl:   •  carvedilol (COREG) 6.25 MG tablet, TAKE 1 TABLET BY MOUTH TWICE DAILY, Disp: 180 tablet, Rfl: 1  •  predniSONE (DELTASONE) 5 MG tablet, 10 mg., Disp: , Rfl: 1  •  Ustekinumab (STELARA) 90 MG/ML solution prefilled syringe Injection, Inject  under the skin into the appropriate area as directed. HAS EVERY 8 WEEKS  MEDICATION ON HOLD, Disp: , Rfl:       Objective:     Vitals:    07/16/20 0954   Weight: 90.4 kg (199 lb 3.2 oz)   Height: 175.3 cm (69\")     Body mass index is 29.42 kg/m².    Physical Exam   Constitutional: He is oriented to person, place, and time. He appears well-developed and well-nourished.   HENT:   Head: Normocephalic.   Eyes: No scleral icterus.   Neck: No JVD present. No thyromegaly present.   Cardiovascular: Normal rate, regular rhythm and normal heart sounds. Exam reveals no gallop and no friction rub.   No murmur heard.  Pulmonary/Chest: Effort normal and breath sounds normal. He has no wheezes. He has no rales.   Abdominal: Soft. There is no hepatosplenomegaly. There is no tenderness.   Musculoskeletal: Normal range of motion. He exhibits no edema.   Lymphadenopathy:     He has no cervical adenopathy.   Neurological: He is alert and oriented to person, place, and time.   Skin: Skin is warm and dry. No rash noted.   Psychiatric: He has a normal mood and affect.         ECG 12 Lead  Date/Time: 7/16/2020 10:20 AM  Performed by: Harrison Reyes MD  Authorized by: Harrison Reyes MD "   Comparison: compared with previous ECG   Similar to previous ECG  Rhythm: sinus rhythm  Conduction: non-specific intraventricular conduction delay    Clinical impression: abnormal EKG             Assessment:       Diagnosis Plan   1. Paroxysmal ventricular tachycardia (CMS/HCC)     2. Chronic systolic heart failure (CMS/HCC)     3. Cardiomyopathy, nonischemic (CMS/HCC)     4. Exacerbation of Crohn's disease of small intestine (CMS/HCC)            Plan:       He has had a pretty severe nonischemic cardiomyopathy in the past and I have not seen him in a while now he wants to go back on Humira potentially which can exacerbate heart failure I think at this point the best route to go would be to check an echo and see where he is probably will clear him to go ahead and try Humira but will have to do it with some trepidation would like him to come back and see us in a year    As always, it has been a pleasure to participate in your patient's care.      Sincerely,       Harrison Reyes MD

## 2020-07-28 ENCOUNTER — HOSPITAL ENCOUNTER (OUTPATIENT)
Dept: CARDIOLOGY | Facility: HOSPITAL | Age: 60
Discharge: HOME | End: 2020-07-28
Admitting: INTERNAL MEDICINE

## 2020-07-28 VITALS
WEIGHT: 195 LBS | HEART RATE: 89 BPM | SYSTOLIC BLOOD PRESSURE: 128 MMHG | DIASTOLIC BLOOD PRESSURE: 90 MMHG | HEIGHT: 69 IN | BODY MASS INDEX: 28.88 KG/M2

## 2020-07-28 DIAGNOSIS — I42.8 CARDIOMYOPATHY, NONISCHEMIC (HCC): ICD-10-CM

## 2020-07-28 DIAGNOSIS — K50.00 EXACERBATION OF CROHN'S DISEASE OF SMALL INTESTINE (HCC): ICD-10-CM

## 2020-07-28 DIAGNOSIS — I47.29 PAROXYSMAL VENTRICULAR TACHYCARDIA (HCC): ICD-10-CM

## 2020-07-28 DIAGNOSIS — I50.22 CHRONIC SYSTOLIC HEART FAILURE (HCC): ICD-10-CM

## 2020-07-28 LAB
AORTIC ARCH: 2.7 CM
ASCENDING AORTA: 3.1 CM
BH CV ECHO MEAS - ACS: 2.3 CM
BH CV ECHO MEAS - AO ARCH DIAM (PROXIMAL TRANS.): 2.7 CM
BH CV ECHO MEAS - AO MAX PG (FULL): 1.8 MMHG
BH CV ECHO MEAS - AO MAX PG: 2.7 MMHG
BH CV ECHO MEAS - AO MEAN PG (FULL): 1 MMHG
BH CV ECHO MEAS - AO MEAN PG: 2 MMHG
BH CV ECHO MEAS - AO ROOT AREA (BSA CORRECTED): 2
BH CV ECHO MEAS - AO ROOT AREA: 12.6 CM^2
BH CV ECHO MEAS - AO ROOT DIAM: 4 CM
BH CV ECHO MEAS - AO V2 MAX: 81.9 CM/SEC
BH CV ECHO MEAS - AO V2 MEAN: 62.9 CM/SEC
BH CV ECHO MEAS - AO V2 VTI: 17.8 CM
BH CV ECHO MEAS - ASC AORTA: 3.1 CM
BH CV ECHO MEAS - AVA(I,A): 2.3 CM^2
BH CV ECHO MEAS - AVA(I,D): 2.3 CM^2
BH CV ECHO MEAS - AVA(V,A): 2.3 CM^2
BH CV ECHO MEAS - AVA(V,D): 2.3 CM^2
BH CV ECHO MEAS - BSA(HAYCOCK): 2.1 M^2
BH CV ECHO MEAS - BSA: 2 M^2
BH CV ECHO MEAS - BZI_BMI: 28.8 KILOGRAMS/M^2
BH CV ECHO MEAS - BZI_METRIC_HEIGHT: 175.3 CM
BH CV ECHO MEAS - BZI_METRIC_WEIGHT: 88.5 KG
BH CV ECHO MEAS - EDV(MOD-SP2): 147 ML
BH CV ECHO MEAS - EDV(MOD-SP4): 152 ML
BH CV ECHO MEAS - EDV(TEICH): 216 ML
BH CV ECHO MEAS - EF(CUBED): 21.3 %
BH CV ECHO MEAS - EF(MOD-BP): 16 %
BH CV ECHO MEAS - EF(MOD-SP2): 17.7 %
BH CV ECHO MEAS - EF(MOD-SP4): 15.1 %
BH CV ECHO MEAS - EF(TEICH): 16.7 %
BH CV ECHO MEAS - ESV(MOD-SP2): 121 ML
BH CV ECHO MEAS - ESV(MOD-SP4): 129 ML
BH CV ECHO MEAS - ESV(TEICH): 180 ML
BH CV ECHO MEAS - FS: 7.7 %
BH CV ECHO MEAS - IVS/LVPW: 1.1
BH CV ECHO MEAS - IVSD: 0.8 CM
BH CV ECHO MEAS - LAT PEAK E' VEL: 5.6 CM/SEC
BH CV ECHO MEAS - LV DIASTOLIC VOL/BSA (35-75): 74.4 ML/M^2
BH CV ECHO MEAS - LV MASS(C)D: 198.1 GRAMS
BH CV ECHO MEAS - LV MASS(C)DI: 96.9 GRAMS/M^2
BH CV ECHO MEAS - LV MAX PG: 0.84 MMHG
BH CV ECHO MEAS - LV MEAN PG: 1 MMHG
BH CV ECHO MEAS - LV SYSTOLIC VOL/BSA (12-30): 63.1 ML/M^2
BH CV ECHO MEAS - LV V1 MAX: 45.8 CM/SEC
BH CV ECHO MEAS - LV V1 MEAN: 36.5 CM/SEC
BH CV ECHO MEAS - LV V1 VTI: 10 CM
BH CV ECHO MEAS - LVIDD: 6.5 CM
BH CV ECHO MEAS - LVIDS: 6 CM
BH CV ECHO MEAS - LVLD AP2: 8 CM
BH CV ECHO MEAS - LVLD AP4: 7.7 CM
BH CV ECHO MEAS - LVLS AP2: 7.8 CM
BH CV ECHO MEAS - LVLS AP4: 7.8 CM
BH CV ECHO MEAS - LVOT AREA (M): 4.2 CM^2
BH CV ECHO MEAS - LVOT AREA: 4.2 CM^2
BH CV ECHO MEAS - LVOT DIAM: 2.3 CM
BH CV ECHO MEAS - LVPWD: 0.7 CM
BH CV ECHO MEAS - MED PEAK E' VEL: 4.2 CM/SEC
BH CV ECHO MEAS - MV A MAX VEL: 86.8 CM/SEC
BH CV ECHO MEAS - MV DEC TIME: 0.12 SEC
BH CV ECHO MEAS - MV E MAX VEL: 52.4 CM/SEC
BH CV ECHO MEAS - MV E/A: 0.6
BH CV ECHO MEAS - MV MAX PG: 3.8 MMHG
BH CV ECHO MEAS - MV MEAN PG: 2 MMHG
BH CV ECHO MEAS - MV V2 MAX: 98.1 CM/SEC
BH CV ECHO MEAS - MV V2 MEAN: 59 CM/SEC
BH CV ECHO MEAS - MV V2 VTI: 15 CM
BH CV ECHO MEAS - MVA(VTI): 2.8 CM^2
BH CV ECHO MEAS - PA MAX PG (FULL): 1.1 MMHG
BH CV ECHO MEAS - PA MAX PG: 2.5 MMHG
BH CV ECHO MEAS - PA V2 MAX: 79.3 CM/SEC
BH CV ECHO MEAS - PULM A REVS DUR: 0.09 SEC
BH CV ECHO MEAS - PULM A REVS VEL: 32.6 CM/SEC
BH CV ECHO MEAS - PULM DIAS VEL: 28 CM/SEC
BH CV ECHO MEAS - PULM S/D: 1.9
BH CV ECHO MEAS - PULM SYS VEL: 52.3 CM/SEC
BH CV ECHO MEAS - PVA(V,A): 4.3 CM^2
BH CV ECHO MEAS - PVA(V,D): 4.3 CM^2
BH CV ECHO MEAS - QP/QS: 1.5
BH CV ECHO MEAS - RV MAX PG: 1.4 MMHG
BH CV ECHO MEAS - RV MEAN PG: 1 MMHG
BH CV ECHO MEAS - RV V1 MAX: 60.1 CM/SEC
BH CV ECHO MEAS - RV V1 MEAN: 46.2 CM/SEC
BH CV ECHO MEAS - RV V1 VTI: 11.2 CM
BH CV ECHO MEAS - RVOT AREA: 5.7 CM^2
BH CV ECHO MEAS - RVOT DIAM: 2.7 CM
BH CV ECHO MEAS - SI(AO): 109.5 ML/M^2
BH CV ECHO MEAS - SI(CUBED): 28.7 ML/M^2
BH CV ECHO MEAS - SI(LVOT): 20.3 ML/M^2
BH CV ECHO MEAS - SI(MOD-SP2): 12.7 ML/M^2
BH CV ECHO MEAS - SI(MOD-SP4): 11.3 ML/M^2
BH CV ECHO MEAS - SI(TEICH): 17.6 ML/M^2
BH CV ECHO MEAS - SUP REN AO DIAM: 2.5 CM
BH CV ECHO MEAS - SV(AO): 223.7 ML
BH CV ECHO MEAS - SV(CUBED): 58.6 ML
BH CV ECHO MEAS - SV(LVOT): 41.4 ML
BH CV ECHO MEAS - SV(MOD-SP2): 26 ML
BH CV ECHO MEAS - SV(MOD-SP4): 23 ML
BH CV ECHO MEAS - SV(RVOT): 64.1 ML
BH CV ECHO MEAS - SV(TEICH): 36 ML
BH CV ECHO MEAS - TAPSE (>1.6): 1.6 CM
BH CV ECHO MEASUREMENTS AVERAGE E/E' RATIO: 10.69
BH CV XLRA - RV BASE: 3.4 CM
BH CV XLRA - RV LENGTH: 7.1 CM
BH CV XLRA - RV MID: 2.6 CM
BH CV XLRA - TDI S': 15 CM/SEC
LEFT ATRIUM VOLUME INDEX: 33 ML/M2
LEFT ATRIUM VOLUME: 63 CM3
MAXIMAL PREDICTED HEART RATE: 160 BPM
SINUS: 3.7 CM
STJ: 3.4 CM
STRESS TARGET HR: 136 BPM

## 2020-07-28 PROCEDURE — 93306 TTE W/DOPPLER COMPLETE: CPT | Performed by: INTERNAL MEDICINE

## 2020-07-28 PROCEDURE — 93306 TTE W/DOPPLER COMPLETE: CPT

## 2020-07-29 DIAGNOSIS — I42.8 CARDIOMYOPATHY, NONISCHEMIC (HCC): Primary | ICD-10-CM

## 2020-07-29 RX ORDER — LISINOPRIL 2.5 MG/1
2.5 TABLET ORAL DAILY
Qty: 30 TABLET | Refills: 0 | Status: SHIPPED | OUTPATIENT
Start: 2020-07-29 | End: 2020-08-25

## 2020-08-25 RX ORDER — LISINOPRIL 2.5 MG/1
TABLET ORAL
Qty: 30 TABLET | Refills: 0 | Status: SHIPPED | OUTPATIENT
Start: 2020-08-25 | End: 2020-10-13

## 2020-09-17 ENCOUNTER — TELEMEDICINE (OUTPATIENT)
Dept: INTERNAL MEDICINE | Facility: CLINIC | Age: 60
End: 2020-09-17

## 2020-09-17 DIAGNOSIS — R05.9 COUGH: Primary | ICD-10-CM

## 2020-09-17 DIAGNOSIS — R53.83 FATIGUE, UNSPECIFIED TYPE: ICD-10-CM

## 2020-09-17 DIAGNOSIS — R51.9 ACUTE NONINTRACTABLE HEADACHE, UNSPECIFIED HEADACHE TYPE: ICD-10-CM

## 2020-09-17 PROCEDURE — 99214 OFFICE O/P EST MOD 30 MIN: CPT | Performed by: NURSE PRACTITIONER

## 2020-09-17 RX ORDER — PROMETHAZINE HYDROCHLORIDE AND CODEINE PHOSPHATE 6.25; 1 MG/5ML; MG/5ML
5 SYRUP ORAL EVERY 6 HOURS PRN
Qty: 180 ML | Refills: 0 | Status: SHIPPED | OUTPATIENT
Start: 2020-09-17 | End: 2021-08-13

## 2020-09-17 RX ORDER — PROMETHAZINE HYDROCHLORIDE AND CODEINE PHOSPHATE 6.25; 1 MG/5ML; MG/5ML
5 SYRUP ORAL EVERY 6 HOURS PRN
Qty: 180 ML | Refills: 0 | Status: SHIPPED | OUTPATIENT
Start: 2020-09-17 | End: 2020-09-17 | Stop reason: SDUPTHER

## 2020-09-17 RX ORDER — DOXYCYCLINE 100 MG/1
100 CAPSULE ORAL 2 TIMES DAILY
Qty: 20 CAPSULE | Refills: 0 | Status: SHIPPED | OUTPATIENT
Start: 2020-09-17 | End: 2021-08-23

## 2020-09-17 NOTE — PROGRESS NOTES
Subjective   Arnie Calvo is a 59 y.o. male.   Chief Complaint   Patient presents with   • Cough     There were no vitals filed for this visit.  No LMP for male patient.  You have chosen to receive care through a telehealth visit.  Do you consent to use a video/audio connection for your medical care today? Yes    Arnie is a 59 year old patient of Dr Fatima who is being seen today via telemedicine for an acute visit     Cough  This is a new problem. Episode onset: 3 days  The problem has been gradually worsening. The problem occurs every few minutes. The cough is non-productive. Associated symptoms include chills, a fever (tmax 102 ), headaches (pressure behind his eyes which started 6 days ago, ), heartburn, a sore throat and sweats. Pertinent negatives include no chest pain, ear pain, hemoptysis, nasal congestion, rash, rhinorrhea, shortness of breath or wheezing. Associated symptoms comments: Nausea . Nothing aggravates the symptoms. He has tried rest and body position changes (tylenol ) for the symptoms. The treatment provided mild relief. His past medical history is significant for pneumonia.   I spoke with his wife mostly to get his history because he was laying in bed not feeling well. I confirmed it with the patient when he was able to get on the call.   She states he had a tooth extraction yesterday and they called their dentist. He told them that the fever was not due to this. He did not have any infection or tooth abscess   His wife states that she has been checking his o2 sat at home and it's been 95%   The following portions of the patient's history were reviewed and updated as appropriate: allergies, current medications, past family history, past medical history, past social history, past surgical history and problem list.    Review of Systems   Constitutional: Positive for chills, diaphoresis, fatigue and fever (tmax 102 ).   HENT: Positive for sore throat. Negative for congestion, ear pain, facial  swelling, mouth sores and rhinorrhea.    Respiratory: Positive for cough. Negative for hemoptysis, chest tightness, shortness of breath and wheezing.    Cardiovascular: Negative for chest pain.   Gastrointestinal: Positive for heartburn and nausea. Negative for abdominal pain, diarrhea and vomiting.   Musculoskeletal: Positive for arthralgias.   Skin: Negative for rash.   Neurological: Positive for headaches (pressure behind his eyes which started 6 days ago, ). Negative for dizziness.       Objective   Physical Exam  Constitutional:       Appearance: He is ill-appearing.   HENT:      Head: Normocephalic.   Pulmonary:      Effort: Pulmonary effort is normal.   Skin:     Comments: Skin is flushed and diaphoretic    Neurological:      Mental Status: He is alert and oriented to person, place, and time.   Psychiatric:         Mood and Affect: Mood normal.         Assessment/Plan   Arnie was seen today for cough.    Diagnoses and all orders for this visit:    Cough  -     COVID-19,[facility],NP/OP Swab in Transport Media or ESwab 72 HR TAT - Swab, Nasopharynx; Future  -     Discontinue: promethazine-codeine (PHENERGAN with CODEINE) 6.25-10 MG/5ML syrup; Take 5 mL by mouth Every 6 (Six) Hours As Needed for Cough.  -     promethazine-codeine (PHENERGAN with CODEINE) 6.25-10 MG/5ML syrup; Take 5 mL by mouth Every 6 (Six) Hours As Needed for Cough.    Acute nonintractable headache, unspecified headache type  -     COVID-19,[facility],NP/OP Swab in Transport Media or ESwab 72 HR TAT - Swab, Nasopharynx; Future    Fatigue, unspecified type  -     COVID-19,[facility],NP/OP Swab in Transport Media or ESwab 72 HR TAT - Swab, Nasopharynx; Future    Other orders  -     doxycycline (MONODOX) 100 MG capsule; Take 1 capsule by mouth 2 (Two) Times a Day.      Continue to take tylenol as needed for headache, push fluids and diet as tolerated   Rest  Will send to [facility] for covid test and call with results  Promethazine with codeine as needed for  cough  I gave him doxycycline to hold on to and take if his covid test is negative and his symptoms worsen  Advised to go to the ER for persistent temp >100.4, lethargy, severe headache, SOA, CP, respiratory distress  Follow up as needed   telehealth duration and discussion with patient and his wife 20 minutes

## 2020-10-08 VITALS — WEIGHT: 190 LBS | HEIGHT: 69 IN

## 2020-10-09 ENCOUNTER — TELEHEALTH PROVIDED OTHER THAN IN PATIENT'S HOME (OUTPATIENT)
Dept: URBAN - METROPOLITAN AREA TELEHEALTH 6 | Facility: TELEHEALTH | Age: 60
End: 2020-10-09

## 2020-10-09 ENCOUNTER — TRANSCRIBE ORDERS (OUTPATIENT)
Dept: ADMINISTRATIVE | Facility: HOSPITAL | Age: 60
End: 2020-10-09

## 2020-10-09 DIAGNOSIS — Z79.52 LONG TERM CURRENT USE OF SYSTEMIC STEROIDS: Primary | ICD-10-CM

## 2020-10-09 DIAGNOSIS — Z92.25 PERSONAL HISTORY OF IMMUNOSUPPRESSION THERAPY: ICD-10-CM

## 2020-10-09 DIAGNOSIS — K50.818 CROHN'S DISEASE OF BOTH SMALL AND LARGE INTESTINE WITH OTHER: ICD-10-CM

## 2020-10-09 DIAGNOSIS — M45.0 ANKYLOSING SPONDYLITIS OF MULTIPLE SITES IN SPINE (HCC): ICD-10-CM

## 2020-10-09 PROCEDURE — 99214 OFFICE O/P EST MOD 30 MIN: CPT | Mod: 95 | Performed by: INTERNAL MEDICINE

## 2020-10-09 RX ORDER — SULFASALAZINE 500 MG/1
TABLET ORAL
Qty: 120 | Refills: 5 | Status: COMPLETED
Start: 2020-10-09 | End: 2022-03-31

## 2020-10-13 RX ORDER — CARVEDILOL 6.25 MG/1
TABLET ORAL
Qty: 180 TABLET | Refills: 2 | Status: SHIPPED | OUTPATIENT
Start: 2020-10-13 | End: 2021-08-13

## 2020-10-13 RX ORDER — LISINOPRIL 2.5 MG/1
TABLET ORAL
Qty: 90 TABLET | Refills: 2 | Status: SHIPPED | OUTPATIENT
Start: 2020-10-13 | End: 2021-08-13

## 2020-11-17 ENCOUNTER — APPOINTMENT (OUTPATIENT)
Dept: BONE DENSITY | Facility: HOSPITAL | Age: 60
End: 2020-11-17

## 2020-12-14 ENCOUNTER — TELEPHONE (OUTPATIENT)
Dept: INTERNAL MEDICINE | Facility: CLINIC | Age: 60
End: 2020-12-14

## 2020-12-14 NOTE — TELEPHONE ENCOUNTER
PATIENTS WIFE CALLED STATED THAT PATIENT POSSIBLY HAS INFECTION IN  LEFT EYE DUE TO CROHN'S.     PATIENT IS CONCERNED ABOUT HAVING TO COME IN DUE TO HIS UNDERLYING CONDITIONS AND THEY WANTED TO SEE IF AN ANTIBIOTIC DROP COULD BE CALLED IN.    [id number] - [place], [place] - [address] AT [facility] - [phone]  - [phone] FX  [phone]    PLEASE ADVISE   [phone]

## 2021-02-08 ENCOUNTER — TELEPHONE (OUTPATIENT)
Dept: CARDIOLOGY | Facility: CLINIC | Age: 61
End: 2021-02-08

## 2021-02-08 NOTE — TELEPHONE ENCOUNTER
Please call Dr. Hope regarding this patient at your convenience.    [phone]      .Thank you,  Frida Casiano RN  [facility]  Triage

## 2021-03-19 ENCOUNTER — BULK ORDERING (OUTPATIENT)
Dept: CASE MANAGEMENT | Facility: OTHER | Age: 61
End: 2021-03-19

## 2021-03-19 DIAGNOSIS — Z23 IMMUNIZATION DUE: ICD-10-CM

## 2021-04-16 ENCOUNTER — APPOINTMENT (OUTPATIENT)
Dept: BONE DENSITY | Facility: HOSPITAL | Age: 61
End: 2021-04-16

## 2021-04-22 ENCOUNTER — APPOINTMENT (OUTPATIENT)
Dept: BONE DENSITY | Facility: HOSPITAL | Age: 61
End: 2021-04-22

## 2021-06-29 RX ORDER — CARVEDILOL 6.25 MG/1
TABLET ORAL
Qty: 180 TABLET | Refills: 2 | OUTPATIENT
Start: 2021-06-29

## 2021-07-06 RX ORDER — LISINOPRIL 2.5 MG/1
TABLET ORAL
Qty: 90 TABLET | Refills: 2 | OUTPATIENT
Start: 2021-07-06

## 2021-08-13 ENCOUNTER — OFFICE VISIT (OUTPATIENT)
Dept: INTERNAL MEDICINE | Facility: CLINIC | Age: 61
End: 2021-08-13

## 2021-08-13 VITALS
HEART RATE: 102 BPM | RESPIRATION RATE: 16 BRPM | DIASTOLIC BLOOD PRESSURE: 80 MMHG | HEIGHT: 69 IN | SYSTOLIC BLOOD PRESSURE: 118 MMHG | OXYGEN SATURATION: 98 % | TEMPERATURE: 97.1 F | WEIGHT: 210 LBS | BODY MASS INDEX: 31.1 KG/M2

## 2021-08-13 DIAGNOSIS — R10.30 LOWER ABDOMINAL PAIN: Primary | ICD-10-CM

## 2021-08-13 PROBLEM — R51.9 HEADACHE: Status: RESOLVED | Noted: 2018-03-01 | Resolved: 2021-08-13

## 2021-08-13 LAB
ALBUMIN SERPL-MCNC: 4.4 G/DL (ref 3.5–5.2)
ALBUMIN/GLOB SERPL: 1.6 G/DL
ALP SERPL-CCNC: 59 U/L (ref 39–117)
ALT SERPL-CCNC: 129 U/L (ref 1–41)
AST SERPL-CCNC: 89 U/L (ref 1–40)
BASOPHILS # BLD AUTO: NORMAL 10*3/UL
BASOPHILS # BLD MANUAL: 0.05 10*3/MM3 (ref 0–0.2)
BASOPHILS NFR BLD MANUAL: 1 % (ref 0–1.5)
BILIRUB SERPL-MCNC: 0.8 MG/DL (ref 0–1.2)
BUN SERPL-MCNC: 9 MG/DL (ref 8–23)
BUN/CREAT SERPL: 8.8 (ref 7–25)
CALCIUM SERPL-MCNC: 10.1 MG/DL (ref 8.6–10.5)
CHLORIDE SERPL-SCNC: 105 MMOL/L (ref 98–107)
CO2 SERPL-SCNC: 25.1 MMOL/L (ref 22–29)
CREAT SERPL-MCNC: 1.02 MG/DL (ref 0.76–1.27)
DIFFERENTIAL COMMENT: ABNORMAL
EOSINOPHIL # BLD AUTO: NORMAL 10*3/UL
EOSINOPHIL # BLD MANUAL: 0.16 10*3/MM3 (ref 0–0.4)
EOSINOPHIL NFR BLD AUTO: NORMAL %
EOSINOPHIL NFR BLD MANUAL: 3 % (ref 0.3–6.2)
ERYTHROCYTE [DISTWIDTH] IN BLOOD BY AUTOMATED COUNT: 14.7 % (ref 12.3–15.4)
GLOBULIN SER CALC-MCNC: 2.8 GM/DL
GLUCOSE SERPL-MCNC: 93 MG/DL (ref 65–99)
HCT VFR BLD AUTO: 46.4 % (ref 37.5–51)
HGB BLD-MCNC: 15.5 G/DL (ref 13–17.7)
LYMPHOCYTES # BLD AUTO: NORMAL 10*3/UL
LYMPHOCYTES # BLD MANUAL: 2.58 10*3/MM3 (ref 0.7–3.1)
LYMPHOCYTES NFR BLD AUTO: NORMAL %
LYMPHOCYTES NFR BLD MANUAL: 49 % (ref 19.6–45.3)
MCH RBC QN AUTO: 29.9 PG (ref 26.6–33)
MCHC RBC AUTO-ENTMCNC: 33.4 G/DL (ref 31.5–35.7)
MCV RBC AUTO: 89.6 FL (ref 79–97)
MONOCYTES # BLD MANUAL: 0.37 10*3/MM3 (ref 0.1–0.9)
MONOCYTES NFR BLD AUTO: NORMAL %
MONOCYTES NFR BLD MANUAL: 7 % (ref 5–12)
NEUTROPHILS # BLD MANUAL: 2.11 10*3/MM3 (ref 1.7–7)
NEUTROPHILS NFR BLD AUTO: NORMAL %
NEUTROPHILS NFR BLD MANUAL: 40 % (ref 42.7–76)
PLATELET # BLD AUTO: 143 10*3/MM3 (ref 140–450)
PLATELET BLD QL SMEAR: ABNORMAL
POTASSIUM SERPL-SCNC: 5.1 MMOL/L (ref 3.5–5.2)
PROT SERPL-MCNC: 7.2 G/DL (ref 6–8.5)
RBC # BLD AUTO: 5.18 10*6/MM3 (ref 4.14–5.8)
RBC MORPH BLD: ABNORMAL
SODIUM SERPL-SCNC: 138 MMOL/L (ref 136–145)
WBC # BLD AUTO: 5.27 10*3/MM3 (ref 3.4–10.8)

## 2021-08-13 PROCEDURE — 99214 OFFICE O/P EST MOD 30 MIN: CPT | Performed by: NURSE PRACTITIONER

## 2021-08-13 RX ORDER — ADALIMUMAB 40MG/0.8ML
40 KIT SUBCUTANEOUS 2 TIMES WEEKLY
COMMUNITY
Start: 2021-07-30 | End: 2021-12-03

## 2021-08-13 RX ORDER — LOSARTAN POTASSIUM 25 MG/1
12.5 TABLET ORAL DAILY
COMMUNITY
Start: 2021-08-09 | End: 2021-12-23 | Stop reason: HOSPADM

## 2021-08-13 RX ORDER — SPIRONOLACTONE 25 MG/1
12.5 TABLET ORAL EVERY OTHER DAY
COMMUNITY
Start: 2021-06-09 | End: 2022-02-03 | Stop reason: HOSPADM

## 2021-08-13 RX ORDER — CARVEDILOL 6.25 MG/1
9.38 TABLET ORAL DAILY
COMMUNITY
Start: 2021-04-03 | End: 2021-12-23 | Stop reason: HOSPADM

## 2021-08-13 RX ORDER — CARVEDILOL 12.5 MG/1
12.5 TABLET ORAL EVERY EVENING
COMMUNITY
Start: 2021-08-09 | End: 2022-02-15 | Stop reason: HOSPADM

## 2021-08-13 NOTE — PROGRESS NOTES
Subjective   Arnie Calvo is a 60 y.o. male.   Chief Complaint   Patient presents with   • Abdominal Pain     on and off x 3 days      Vitals:    08/13/21 0814   BP: 118/80   Pulse: 102   Resp: 16   Temp: 97.1 °F (36.2 °C)   SpO2: 98%     No LMP for male patient.    Arnie is a 60 year old male patient of Dr Fatima who is here for an acute visit     Abdominal Pain  This is a new problem. Episode onset: 3 days ago  The onset quality is sudden. The problem occurs intermittently. Progression since onset: no pain currently  The pain is located in the suprapubic region (lower abdomen below umbilicus ). The pain is mild. Quality: cramping  The abdominal pain does not radiate. Pertinent negatives include no constipation, dysuria, fever, frequency, melena, nausea, vomiting or weight loss. Nothing aggravates the pain. The pain is relieved by nothing. He has tried nothing for the symptoms. His past medical history is significant for abdominal surgery and Crohn's disease. ostomy         The following portions of the patient's history were reviewed and updated as appropriate: allergies, current medications, past family history, past medical history, past social history, past surgical history and problem list.    Review of Systems   Constitutional: Negative for diaphoresis, fatigue, fever and weight loss.   Respiratory: Negative.    Cardiovascular: Negative.    Gastrointestinal: Positive for abdominal pain. Negative for abdominal distention, blood in stool, constipation, melena, nausea and vomiting.   Genitourinary: Negative for difficulty urinating, dysuria and frequency.       Objective   Physical Exam  Vitals and nursing note reviewed.   Constitutional:       General: He is not in acute distress.     Appearance: Normal appearance. He is well-developed and well-groomed.   HENT:      Head: Normocephalic.   Cardiovascular:      Rate and Rhythm: Normal rate and regular rhythm.   Pulmonary:      Effort: Pulmonary effort is normal.       Breath sounds: Normal breath sounds.   Abdominal:      General: The ostomy site is clean. Bowel sounds are normal.      Palpations: Abdomen is soft.      Tenderness: There is no abdominal tenderness.       Neurological:      Mental Status: He is alert.         Assessment/Plan   Diagnoses and all orders for this visit:    1. Lower abdominal pain (Primary)  -     Comprehensive Metabolic Panel  -     CBC & Differential  -     Urinalysis With Culture If Indicated - Urine, Clean Catch      Will check labs today and call with results  He has a GI consult schedule for October, follow up with Dr Fatima next week for a recheck   Advised to go to the ER for fever, worsening abdominal pain

## 2021-08-14 LAB
APPEARANCE UR: CLEAR
BACTERIA #/AREA URNS HPF: NORMAL /HPF
BILIRUB UR QL STRIP: NEGATIVE
CASTS URNS QL MICRO: NORMAL /LPF
COLOR UR: YELLOW
EPI CELLS #/AREA URNS HPF: NORMAL /HPF (ref 0–10)
GLUCOSE UR QL: NEGATIVE
HGB UR QL STRIP: NEGATIVE
KETONES UR QL STRIP: NEGATIVE
LEUKOCYTE ESTERASE UR QL STRIP: NEGATIVE
MICRO URNS: NORMAL
MICRO URNS: NORMAL
NITRITE UR QL STRIP: NEGATIVE
PH UR STRIP: 5.5 [PH] (ref 5–7.5)
PROT UR QL STRIP: NEGATIVE
RBC #/AREA URNS HPF: NORMAL /HPF (ref 0–2)
SP GR UR: 1.02 (ref 1–1.03)
URINALYSIS REFLEX: NORMAL
UROBILINOGEN UR STRIP-MCNC: 0.2 MG/DL (ref 0.2–1)
WBC #/AREA URNS HPF: NORMAL /HPF (ref 0–5)

## 2021-08-17 ENCOUNTER — TELEPHONE (OUTPATIENT)
Dept: INTERNAL MEDICINE | Facility: CLINIC | Age: 61
End: 2021-08-17

## 2021-08-17 DIAGNOSIS — R79.89 ELEVATED LFTS: Primary | ICD-10-CM

## 2021-08-17 NOTE — TELEPHONE ENCOUNTER
Caller: Leda Calvo    Relationship: Self    Best call back number: [phone]     Caller requesting test results: LEDA    What test was performed: LABS    When was the test performed:     Where was the test performed:     Additional notes: PLEASE CALL WITH LAB RESULTS

## 2021-08-17 NOTE — TELEPHONE ENCOUNTER
Reviewed labs drawn by APRN. Urine clear, blood counts and manual smear OK.  DO note some progression of liver enzymes. Can be related to Humira but cannot r/o other etiologies.  Would advise starting with RUQ US and can eval further next labs.   How are pt sx?

## 2021-08-18 NOTE — TELEPHONE ENCOUNTER
Pt informed. Pt's is still having cramping in the lower abdomen area. No blood in urine. He sees his urologist next week and will discuss this with him.

## 2021-08-23 ENCOUNTER — OFFICE VISIT (OUTPATIENT)
Dept: INTERNAL MEDICINE | Facility: CLINIC | Age: 61
End: 2021-08-23

## 2021-08-23 VITALS
HEART RATE: 89 BPM | BODY MASS INDEX: 30.51 KG/M2 | HEIGHT: 69 IN | TEMPERATURE: 96.8 F | WEIGHT: 206 LBS | OXYGEN SATURATION: 98 % | DIASTOLIC BLOOD PRESSURE: 70 MMHG | SYSTOLIC BLOOD PRESSURE: 118 MMHG

## 2021-08-23 DIAGNOSIS — A60.00 GENITAL HERPES SIMPLEX, UNSPECIFIED SITE: ICD-10-CM

## 2021-08-23 DIAGNOSIS — K94.00 COLOSTOMY COMPLICATION, UNSPECIFIED (HCC): ICD-10-CM

## 2021-08-23 DIAGNOSIS — R10.11 RUQ ABDOMINAL PAIN: ICD-10-CM

## 2021-08-23 DIAGNOSIS — N48.9 PENILE LESION: Primary | ICD-10-CM

## 2021-08-23 DIAGNOSIS — K80.20 GALLSTONES: ICD-10-CM

## 2021-08-23 DIAGNOSIS — K80.50 BILIARY COLIC: ICD-10-CM

## 2021-08-23 DIAGNOSIS — K50.919 CROHN'S DISEASE WITH COMPLICATION, UNSPECIFIED GASTROINTESTINAL TRACT LOCATION (HCC): ICD-10-CM

## 2021-08-23 DIAGNOSIS — Z12.5 SPECIAL SCREENING FOR MALIGNANT NEOPLASM OF PROSTATE: ICD-10-CM

## 2021-08-23 PROBLEM — J01.90 ACUTE SINUSITIS: Status: RESOLVED | Noted: 2018-02-28 | Resolved: 2021-08-23

## 2021-08-23 LAB — PSA SERPL-MCNC: 0.63 NG/ML (ref 0–4)

## 2021-08-23 PROCEDURE — 99214 OFFICE O/P EST MOD 30 MIN: CPT | Performed by: INTERNAL MEDICINE

## 2021-08-23 RX ORDER — DICYCLOMINE HYDROCHLORIDE 10 MG/1
10 CAPSULE ORAL
Qty: 30 CAPSULE | Refills: 0 | Status: SHIPPED | OUTPATIENT
Start: 2021-08-23 | End: 2021-09-13

## 2021-08-23 RX ORDER — VALACYCLOVIR HYDROCHLORIDE 500 MG/1
500 TABLET, FILM COATED ORAL 2 TIMES DAILY
Qty: 18 TABLET | Refills: 2 | Status: SHIPPED | OUTPATIENT
Start: 2021-08-23 | End: 2021-08-26

## 2021-08-23 NOTE — PROGRESS NOTES
"Follow-up      HPI  Arnie Calvo is a 60 y.o. male RTC in f/u:   In office last week and saw APRN for lower abdominal pain.    Started about 2 1/2 weeks ago with 'spasm like pains in the bladder area'. Would occur in 45 minute cycles. Lasted one week or more before came in here to be seen.  Had some burning sensation in 'penis area' that was ''not exacerbated or relieved by urination'.  Burning was 'tip area' but no TTP. \"it was very strange\". Burning was variable as well.  Only occasionally did urinating cause any pain. Never saw blood in the urine.    Totally has gone away in the last week.    Has herpes and wondered if this was an outbreak. Never did have sore or itchiness with this event and felt like was not herpes event.   Never had kidney stones in past, no prior dx of this.       This weekend was working alone in office and had pains on both axiallae over lower ribs, felt like a band upper abdomen and lower ribs.  Was not SOA.  Was panicked.  Went home and relaxed and sx faded away.  Took hydrocodone and then pain was better and had some ache between shoulder blade until about 2AM. Recalled that was diagnosed with gallstones in ED about 3 months ago in ED.  Pain on Saturday was different and had pain with deep breath in.  Pressure on home check was normal.   Did see surgeon at [facility] and 'he was in no hurry'.  Saw Dr. Lisa and she was 'not a fan' of ostomy reversal due to risk with his low EF.       Review of Systems   Constitutional: Positive for decreased appetite (since had third COVID shot). Negative for chills and fever.   HENT: Negative for sore throat.    Cardiovascular: Negative for chest pain, dyspnea on exertion, irregular heartbeat and palpitations.   Respiratory: Negative for cough (dry cough for months to years; Cards changed off ACE-I and feels like has been better.  ) and shortness of breath.    Gastrointestinal: Positive for abdominal pain (on Saturday. ). Negative for constipation, " "diarrhea, nausea and vomiting.       The following portions of the patient's history were reviewed and updated as appropriate: allergies, current medications, past medical history, past social history and problem list.      Current Outpatient Medications:   •  alendronate (FOSAMAX) 35 MG tablet, Take 35 mg by mouth Every 7 (Seven) Days., Disp: , Rfl:   •  carvedilol (COREG) 12.5 MG tablet, Take 12.5 mg by mouth Daily., Disp: , Rfl:   •  carvedilol (COREG) 6.25 MG tablet, Take 9.375 mg by mouth Daily., Disp: , Rfl:   •  Humira 40 MG/0.8ML Prefilled Syringe Kit injection, , Disp: , Rfl:   •  losartan (COZAAR) 25 MG tablet, Take 12.5 mg by mouth Daily., Disp: , Rfl:   •  spironolactone (ALDACTONE) 25 MG tablet, Take 12.5 mg by mouth., Disp: , Rfl:   •  dicyclomine (Bentyl) 10 MG capsule, Take 1 capsule by mouth 4 (Four) Times a Day Before Meals & at Bedtime., Disp: 30 capsule, Rfl: 0  •  mupirocin (Bactroban) 2 % ointment, Apply  topically to the appropriate area as directed 3 (Three) Times a Day., Disp: 15 g, Rfl: 1  •  valACYclovir (Valtrex) 500 MG tablet, Take 1 tablet by mouth 2 (Two) Times a Day for 3 days. At symptom onset, Disp: 18 tablet, Rfl: 2    Vitals:    08/23/21 0806   BP: 118/70   Pulse: 89   Temp: 96.8 °F (36 °C)   SpO2: 98%   Weight: 93.4 kg (206 lb)   Height: 175.3 cm (69\")     Body mass index is 30.42 kg/m².      Physical Exam  Vitals reviewed.   Constitutional:       General: He is not in acute distress.     Appearance: He is well-developed. He is not ill-appearing or toxic-appearing.   HENT:      Head: Normocephalic and atraumatic.      Mouth/Throat:      Mouth: No oral lesions.      Tongue: No lesions.      Pharynx: No pharyngeal swelling or uvula swelling.   Eyes:      General: No scleral icterus.     Conjunctiva/sclera: Conjunctivae normal.      Pupils: Pupils are equal, round, and reactive to light.   Neck:      Vascular: No carotid bruit.   Cardiovascular:      Rate and Rhythm: Normal rate and " regular rhythm.      Pulses:           Carotid pulses are 2+ on the right side and 2+ on the left side.       Radial pulses are 2+ on the right side and 2+ on the left side.      Heart sounds: Normal heart sounds.   Pulmonary:      Effort: Pulmonary effort is normal. No respiratory distress.      Breath sounds: Normal breath sounds. No wheezing, rhonchi or rales.   Abdominal:      General: Abdomen is protuberant. Bowel sounds are normal. There is no distension.      Palpations: Abdomen is soft. There is no mass.      Tenderness: There is no abdominal tenderness. There is no guarding or rebound. Negative signs include Andrade's sign.      Comments: Ostomy in place R mid abdomen   Genitourinary:     Pubic Area: No rash.       Penis: Circumcised. Lesions present. No hypospadias, erythema, tenderness or swelling.       Testes:         Right: Mass, tenderness or swelling not present.         Left: Mass, tenderness or swelling not present.       Musculoskeletal:      Cervical back: Normal range of motion and neck supple. No muscular tenderness.      Right lower leg: No edema.      Left lower leg: No edema.   Lymphadenopathy:      Cervical: No cervical adenopathy.   Skin:     Findings: No rash.   Neurological:      Mental Status: He is alert and oriented to person, place, and time.      Cranial Nerves: No cranial nerve deficit.      Gait: Gait normal.   Psychiatric:         Attention and Perception: Attention normal.         Mood and Affect: Mood and affect normal.         Behavior: Behavior normal.         Thought Content: Thought content normal.         Assessment/ Plan  Diagnoses and all orders for this visit:    Penile lesion  -     mupirocin (Bactroban) 2 % ointment; Apply  topically to the appropriate area as directed 3 (Three) Times a Day.    Crohn's disease with complication, unspecified gastrointestinal tract location (CMS/HCC)  -     Ambulatory Referral to General Surgery    RUQ abdominal pain  -     Ambulatory  Referral to General Surgery  -     dicyclomine (Bentyl) 10 MG capsule; Take 1 capsule by mouth 4 (Four) Times a Day Before Meals & at Bedtime.    Colostomy complication, unspecified (CMS/HCC)  -     Ambulatory Referral to General Surgery    Special screening for malignant neoplasm of prostate  -     PSA Screen    Gallstones  -     Ambulatory Referral to General Surgery    Biliary colic  -     Ambulatory Referral to General Surgery    Genital herpes simplex, unspecified site  -     valACYclovir (Valtrex) 500 MG tablet; Take 1 tablet by mouth 2 (Two) Times a Day for 3 days. At symptom onset        Return for Next scheduled follow up.      Discussion:  Arnie Calvo is a 60 y.o. male with hx of AS and Crohn's with ileostomy in place RTC in f/u after recent visit in office with APRN for lower abdominal pain/ penile burning, largely resolved but now with B lower axillary pain with known hx of gallstones on imaging at [facility].  Asx'ic today, exam benign with noted baseline ostomy in place.  Concern for biliary colic as source of axillary 'band like' pain that occurred.  However, surgical issue is complex as there is some consideration for ileostomy take down with noted hernia and pt desires second surgical opinion on this having seen Dr. Lisa recently at [facility].  Certainly risk of recurrent biliary colic is present or progression. Referral place to surgery today. Desires trial of antispasmodic if any recurrence, ERx'd bentyl to try.     In terms of urinary pain, pt has small ulceration at distal glans penis that I think accounts for noted pain.  Had herpetic outbreak in past and scarred over, but has open ulceration today.  Valtrex refilled for PRN use.  Topical mupirocin ointment at this time.    Elevated LFT's on recent office labs. Progressive from past, now on Humira and may be source. Will complete repeat U/S RUQ at this time to assess.     HM - PSA check prior to upcoming urology appt.     RTC as planned.

## 2021-08-25 ENCOUNTER — TELEPHONE (OUTPATIENT)
Dept: INTERNAL MEDICINE | Facility: CLINIC | Age: 61
End: 2021-08-25

## 2021-08-25 NOTE — TELEPHONE ENCOUNTER
----- Message from Zechariah Fatima MD sent at 8/24/2021  4:55 PM EDT -----  Results called to pt 08/24/21  PSA low at 0.633, good news.   Forward to urology for upcoming appt.

## 2021-09-02 ENCOUNTER — TRANSCRIBE ORDERS (OUTPATIENT)
Dept: ADMINISTRATIVE | Facility: HOSPITAL | Age: 61
End: 2021-09-02

## 2021-09-02 DIAGNOSIS — Z79.52 LONG TERM CURRENT USE OF SYSTEMIC STEROIDS: Primary | ICD-10-CM

## 2021-09-02 DIAGNOSIS — Z79.83 LONG TERM (CURRENT) USE OF BISPHOSPHONATES: ICD-10-CM

## 2021-09-13 ENCOUNTER — OFFICE VISIT (OUTPATIENT)
Dept: SURGERY | Facility: CLINIC | Age: 61
End: 2021-09-13

## 2021-09-13 VITALS — WEIGHT: 209 LBS | BODY MASS INDEX: 30.96 KG/M2 | HEIGHT: 69 IN

## 2021-09-13 DIAGNOSIS — K81.9 CHOLECYSTITIS: Primary | ICD-10-CM

## 2021-09-13 DIAGNOSIS — K40.90 RIGHT INGUINAL HERNIA: ICD-10-CM

## 2021-09-13 DIAGNOSIS — Z93.2 ILEOSTOMY IN PLACE (HCC): ICD-10-CM

## 2021-09-13 DIAGNOSIS — K43.5 PARASTOMAL HERNIA WITHOUT OBSTRUCTION OR GANGRENE: ICD-10-CM

## 2021-09-13 PROCEDURE — 99204 OFFICE O/P NEW MOD 45 MIN: CPT | Performed by: SURGERY

## 2021-09-13 RX ORDER — HYDROCODONE BITARTRATE AND ACETAMINOPHEN 5; 325 MG/1; MG/1
TABLET ORAL
Qty: 24 TABLET | Refills: 0 | Status: SHIPPED | OUTPATIENT
Start: 2021-09-13 | End: 2021-11-30

## 2021-09-14 NOTE — PROGRESS NOTES
SUMMARY (A/P):    60-year-old gentleman with symptomatic cholelithiasis, small asymptomatic right inguinal hernia evident on CT scan but not exam, ileostomy secondary to surgery for Crohn's disease and subsequent complications from that surgery, and parastomal hernia.  I have recommended cholecystectomy based on his symptoms and findings of cholelithiasis.  His right inguinal hernia does not warrant repair.  His ileostomy is reversible and obviously reversal of this would include repair of his parastomal hernia.  If he wishes to have his ileostomy reversed, cholecystectomy could be performed at the same time.  If not, I did recommend performing cholecystectomy with attempt at laparoscopic approach but reasonably high probability of converting to open based on abdominal incisions and prior surgical history.  He understands his options and is going to consider how he would like to proceed although at the moment he is inclined to proceed with ileostomy reversal, parastomal hernia repair, and open cholecystectomy.  He understands nature the procedure and the risks.  Currently due to Covid crisis he is unable to be scheduled and is on the list to be called to schedule when possible.  Due to ongoing intermittent symptoms and inability to schedule in a particularly timely manner, prescription for hydrocodone was given as well.      CC:    Abdominal pain, referred for consultation by Dr. Zechariah Faitma    HPI:    60-year-old gentleman with episode in April of this year of severe right upper quadrant abdominal pain radiating to the shoulder blade region and lasting approximately 7 hours.  Associated with nausea.  Since that time he has had 3 additional episodes of similar severity.    ENDOSCOPY:   • Colonoscopy 3/17/2017 polypoid masses where the ileocecal valve should be, question Crohn's disease with scarring.  Diverticulosis.  4 mm ascending colon polyp.    RADIOLOGY:   • CT angiogram abdomen and pelvis 4/22/2021  [facility]: Cholelithiasis with gallbladder distention.  Several stones were also noted within the cystic duct and gallbladder neck.  Small fat-containing right inguinal hernia.  • Water-soluble barium enema 9/17/2019: No evidence of stricture, obstructing mass, or contrast leak.  No reflux of contrast through the ileocolic anastomosis.    PREVIOUS ABDOMINAL SURGERY    • Ileocolic resection with diverting loop ileostomy Dr. Lisa 4/23/2018  • Ileostomy reversal Dr. Lisa 7/9/2018  • Ileocolic resection with end ileostomy for postoperative anastomotic leak 2018 Dr. Dowd (reviewing the operative note from this procedure, the ileal anastomosis was resected and end ileostomy was performed.    PMH:    • Depression  • Arthritis  • Cardiomyopathy with congestive heart failure  • Crohn's disease  • Diabetes  • Hypertension    ALLERGIES:   • Sulfa-hives, rash  • Trimethoprim-hives  • Iodine-nausea and vomiting  • Adhesive tape-rash  • Latex-rash    MEDICATIONS:   • Coreg  • Humira  • Cozaar  • Aldactone  • Norco    FAMILY HISTORY:    • Colorectal cancer: Father  • Gallbladder disease: Negative    SOCIAL HISTORY:   • Denies tobacco use  • Denies alcohol use    PHYSICAL EXAM:   • Constitutional: Well-developed well-nourished, no acute distress  • Vital signs: Weight 209 pounds, height 69 inches, BMI 30.8  • Eyes: Conjunctiva normal, sclera nonicteric  • ENMT: Hearing grossly normal, oral mucosa moist  • Neck: Supple, no palpable mass, trachea midline  • Respiratory: Clear to auscultation, normal inspiratory effort  • Cardiovascular: Regular rate, no murmur, no carotid bruit  • Gastrointestinal: Soft, nontender, no palpable mass, no hepatosplenomegaly, ileostomy right mid abdomen with reducible parastomal hernia  • Lymphatics (palpable nodes):  cervical-negative, axillary-negative  • Skin:  Warm, dry, no rash on visualized skin surfaces  • Musculoskeletal: Symmetric strength, normal gait  • Psychiatric:  Alert and oriented ×3, normal affect     ROS:    No cough, chest pain, shortness of air.  All other systems reviewed and negative other than presenting complaints.    MARCI BAINS M.D.

## 2021-10-14 ENCOUNTER — TELEPHONE (OUTPATIENT)
Dept: SURGERY | Facility: CLINIC | Age: 61
End: 2021-10-14

## 2021-10-14 ENCOUNTER — PREP FOR SURGERY (OUTPATIENT)
Dept: OTHER | Facility: HOSPITAL | Age: 61
End: 2021-10-14

## 2021-10-14 DIAGNOSIS — K81.9 CHOLECYSTITIS: ICD-10-CM

## 2021-10-14 DIAGNOSIS — Z93.2 ILEOSTOMY IN PLACE (HCC): Primary | ICD-10-CM

## 2021-10-14 DIAGNOSIS — K81.9 CHOLECYSTITIS: Primary | ICD-10-CM

## 2021-10-14 NOTE — TELEPHONE ENCOUNTER
I called pt back to sched & he wants his gallbladder done 1st? Or is something that can be done at the same time? If it can't be I know need orders for it

## 2021-10-18 ENCOUNTER — TELEPHONE (OUTPATIENT)
Dept: SURGERY | Facility: CLINIC | Age: 61
End: 2021-10-18

## 2021-10-18 PROBLEM — K81.9 CHOLECYSTITIS: Status: ACTIVE | Noted: 2021-10-18

## 2021-10-18 NOTE — TELEPHONE ENCOUNTER
Pt now wants to do both surgeries as his ins changes @ the 1st of the month.  Can you modify case request

## 2021-10-25 ENCOUNTER — OFFICE VISIT (OUTPATIENT)
Dept: GASTROENTEROLOGY | Facility: CLINIC | Age: 61
End: 2021-10-25

## 2021-10-25 VITALS
OXYGEN SATURATION: 96 % | DIASTOLIC BLOOD PRESSURE: 66 MMHG | HEIGHT: 69 IN | SYSTOLIC BLOOD PRESSURE: 104 MMHG | BODY MASS INDEX: 30.87 KG/M2 | TEMPERATURE: 96.8 F | HEART RATE: 90 BPM | WEIGHT: 208.4 LBS

## 2021-10-25 DIAGNOSIS — K76.0 FATTY LIVER DISEASE, NONALCOHOLIC: ICD-10-CM

## 2021-10-25 DIAGNOSIS — Z93.2 ILEOSTOMY IN PLACE (HCC): ICD-10-CM

## 2021-10-25 DIAGNOSIS — K50.119 CROHN'S DISEASE OF COLON WITH COMPLICATION (HCC): Primary | ICD-10-CM

## 2021-10-25 DIAGNOSIS — K91.89: ICD-10-CM

## 2021-10-25 DIAGNOSIS — T81.32XS: ICD-10-CM

## 2021-10-25 DIAGNOSIS — K56.50 INTESTINAL ADHESIONS WITH OBSTRUCTION, UNSPECIFIED WHETHER PARTIAL OR COMPLETE (HCC): ICD-10-CM

## 2021-10-25 DIAGNOSIS — K81.9 CHOLECYSTITIS: ICD-10-CM

## 2021-10-25 PROCEDURE — 99204 OFFICE O/P NEW MOD 45 MIN: CPT | Performed by: INTERNAL MEDICINE

## 2021-10-25 NOTE — PROGRESS NOTES
No chief complaint on file.          History of Present Illness    History of Crohn's disease of the terminal ileum and colon diagnosed in 2011.  He has been on Humira in the past and has had ankylosing spondylitis  Diagnosed with Crohn's od ti and colon  disease approximately 2011. Admitted 2/2017 with flare and CT with infl mass-- Underwent ileocecectomy had an initial ileostomy for that surgery which was ultimately reversed after the reversal he went for emergency surgery and has had an ileostomy since that time he has a history of ejection fraction of 14% therefore he is not on any anti-TNF therapy he has a history of ankylosing spondylitis he has been on Cosentyx in the past but it apparently flared his Crohn's disease he cannot take NSAIDs he still has most of his colon     Failed stelara    Has ankylosing spondylitis    the ileocecal area was removed in 2017 due to polypoid appearance of the cecum the ileocecal surgery was performed in 2017 due to history of nodularity of the cecum--he had surgery in 2017 with ileocecectomy cause of nodularity in the cecum. It appears he has a takedown scheduled in November with dr chowdhury.  apparamtly this is to be done at the time of a checlecystectomy.  Pt has a parastomal hernia.  It seems he is back on humira and tolerating it well despite his ef of 14%.       The above history was taken from the chart review     speaking to the patient today the patient underwent his original surgery in 2017 at that time he was on Stelara.  He underwent a small bowel resection.  He was seen by the [facility] who discussed his dehydration issues with him recommended G-tube and a reversal of his ileostomy.  When he came back to [facility] he did undergo reversal of his ileostomy but it was at this surgery where he had the need for reoperation and formation of an ileostomy for which he has had since that time.  He has been doing well then he developed pain in his back and was found  "to have cholelithiasis he has seen 2 surgeons for this and is recommended to have cholecystectomy as he has had multiple symptomatic episodes it is offered to him to have reversal of this ileostomy at the time of his cholecystectomy as well as fixing his parastomal hernia.  He has seen Dr. Engle for this and has been advised that the Crohn's could come back with reversal.    The patient has been seen by cardiology and one cardiologist did not want to put him on Humira but he saw another cardiologist and despite his ejection fraction of around 14% because he is class II functionally was placed back on Humira and he has been back on Humira now for 3 months.    He reports the Stelara in the past did nothing for his arthritis and his ankylosing spondylitis symptoms while the Humira does help  Review of Systems   CAD, DM  Cholelithiasis  Result Review :           Vital Signs:   /66   Pulse 90   Temp 96.8 °F (36 °C)   Ht 175.3 cm (69\")   Wt 94.5 kg (208 lb 6.4 oz)   SpO2 96%   BMI 30.78 kg/m²     Body mass index is 30.78 kg/m².     Physical Exam  Vitals reviewed.   Constitutional:       Appearance: Normal appearance.   HENT:      Nose: No nasal deformity.   Eyes:      General: No scleral icterus.  Neck:      Comments: Trachea midline.  Cardiovascular:      Rate and Rhythm: Normal rate and regular rhythm.   Pulmonary:      Effort: No respiratory distress.      Breath sounds: Normal breath sounds.   Abdominal:      General: Bowel sounds are normal. There is no distension.      Palpations: Abdomen is soft. There is no mass.      Tenderness: There is no abdominal tenderness.      Comments: Ileostomy in the right lower quadrant with a parastomal hernia   Lymphadenopathy:      Comments: No periumbilical lymphadenopathy.   Skin:     General: Skin is warm.   Neurological:      Mental Status: He is alert.           Assessment and Plan    Diagnoses and all orders for this visit:    1. Crohn's disease of colon with " complication (HCC) (Primary)  -     Cancel: CT Abdomen Pelvis With & Without Contrast Enterography  -     CT Abdomen Pelvis With Contrast Enterography    2. Dehiscence of intestinal anastomosis, sequela    3. Fatty liver disease, nonalcoholic    4. Cholecystitis  -     Cancel: CT Abdomen Pelvis With & Without Contrast Enterography  -     CT Abdomen Pelvis With Contrast Enterography    5. Intestinal adhesions with obstruction, unspecified whether partial or complete (HCC)    6. Ileostomy in place (HCC)         I had a lengthy discussion with him and his wife who was on the phone.  There is a lot a history to review.  1.  We talked about the fact that it appears that the cholecystectomy is necessary and the fact that he has had recurrent episodes of symptomatic cholelithiasis.  Per their history it appears that the surgical services feel strongly that he should have the cholecystectomy  2.  We talked about the concern of where the Humira heart issue comes in.  It is recommended to avoid anti-TNF therapy in stage III and IV heart failure.  Fortunately the patient has not suffered any adverse events from being on the Humira but I did remind them of this caution and we should be clear with his cardiologist whether he truly is a stage III or stage IV as we make plans for what the best treatment is for him long-term  3.  We also discussed the concern about hooking him back up.  He has been relatively Crohn's symptom-free for quite some time.  There is a risk when he is hooked back up that his Crohn's could become more active.  It is important to note however that the reason for the ileostomy is because he had a leak when it was reversed and not because he had Crohn's at that time that required an ileostomy for healing.  Additionally, he is on therapy which could help decrease the risk of a recurrence.  I let him know that we make the best decision we can with the information that we have and currently the information we  are lacking is the evaluation of the extent and severity of his disease currently.  I offered endoscopic evaluation there would prefer to start with a noninvasive approach although they do think he needs a colonoscopy at some point because of colon cancer screening and therefore we are going to start with a CT enterography because an MR is not able to be done due to his pacemaker defibrillator.  4.  We talked about leaving the ileostomy in place which is also a consideration.  This has somewhat to do with his quality of life measures in terms of leaving well enough alone as he is doing well and not having significant symptoms however if he prefers to try without having the ileostomy then with no active disease on evaluation this is reasonable to be done at the time of his cholecystectomy    I have reviewed and confirmed the accuracy of the HPI and Assessment and Plan as documented by DR SANFORD    ADDENDUM:   11/3/2021 430 pm: Dr. Sanford reviewed CT enterography.    No evidence of active disease.    Spoke with patient and his wife via telephone.    Patient is scheduled for cholecystectomy and ileoscopy takedown/reconnection the first week of December.  Given quality of life with ileostomy and parastomal hernia, patient is interested in reconnection.  As there is no evidence of active inflammation, we not have any contraindication regarding reconnection.  Discussed palpable and unknown risk for recurrence with reconnection.  Patient is currently on Humira for ankylosing spondylitis.    Given parastomal hernia with temporary ileostomy, patient would likely need permanent ileostomy with a different site if he deferred reconnection and given ostomy constant leaking due to difficulty with hardware seal patient's quality of life is negative.  He is leaning towards reconnection and would consider permanent ileostomy if reconnection is not tolerated due to symptoms/ there is need for ileostomy secondary to active  inflammatory bowel disease etc.    Also discussed the possibility of looser stools with postprandial urgency status post cholecystectomy.  To consider cholestyramine or another bile sequestering agent based on clinical course postoperatively.    Both patient and his wife verbalized agreement and understanding, all questions answered and support provided.  Follow Up   No follow-ups on file.    There are no Patient Instructions on file for this visit.

## 2021-10-27 ENCOUNTER — HOSPITAL ENCOUNTER (OUTPATIENT)
Dept: CT IMAGING | Facility: HOSPITAL | Age: 61
Discharge: HOME | End: 2021-10-27
Admitting: INTERNAL MEDICINE

## 2021-10-27 PROCEDURE — 25010000002 IOPAMIDOL 61 % SOLUTION: Performed by: INTERNAL MEDICINE

## 2021-10-27 PROCEDURE — 74178 CT ABD&PLV WO CNTR FLWD CNTR: CPT

## 2021-10-27 PROCEDURE — 74177 CT ABD & PELVIS W/CONTRAST: CPT

## 2021-10-27 PROCEDURE — 82565 ASSAY OF CREATININE: CPT

## 2021-10-27 RX ADMIN — IOPAMIDOL 95 ML: 612 INJECTION, SOLUTION INTRAVENOUS at 11:40

## 2021-10-27 RX ADMIN — Medication 237 ML: at 10:50

## 2021-10-27 RX ADMIN — Medication 237 ML: at 11:30

## 2021-10-27 RX ADMIN — Medication 237 ML: at 11:10

## 2021-10-29 LAB — CREAT BLDA-MCNC: 1.2 MG/DL (ref 0.6–1.3)

## 2021-11-01 ENCOUNTER — TELEPHONE (OUTPATIENT)
Dept: SURGERY | Facility: CLINIC | Age: 61
End: 2021-11-01

## 2021-11-08 ENCOUNTER — TELEPHONE (OUTPATIENT)
Dept: SURGERY | Facility: CLINIC | Age: 61
End: 2021-11-08

## 2021-11-12 ENCOUNTER — APPOINTMENT (OUTPATIENT)
Dept: PREADMISSION TESTING | Facility: HOSPITAL | Age: 61
End: 2021-11-12

## 2021-11-30 ENCOUNTER — TELEPHONE (OUTPATIENT)
Dept: GASTROENTEROLOGY | Facility: CLINIC | Age: 61
End: 2021-11-30

## 2021-11-30 DIAGNOSIS — K50.119 CROHN'S DISEASE OF COLON WITH COMPLICATION (HCC): Primary | ICD-10-CM

## 2021-11-30 RX ORDER — TRIAMCINOLONE ACETONIDE 0.25 MG/G
1 CREAM TOPICAL 2 TIMES DAILY PRN
Qty: 80 G | Refills: 0 | Status: SHIPPED | OUTPATIENT
Start: 2021-11-30 | End: 2021-12-03

## 2021-11-30 NOTE — TELEPHONE ENCOUNTER
Patient was wanting to know if you could send over some Triamcinolone acetonide cream for his hemorrhoids. Patient states that works best on his hemorrhoids and that can be sent to [facility] pharmacy

## 2021-12-03 ENCOUNTER — PRE-ADMISSION TESTING (OUTPATIENT)
Dept: PREADMISSION TESTING | Facility: HOSPITAL | Age: 61
End: 2021-12-03

## 2021-12-03 VITALS
DIASTOLIC BLOOD PRESSURE: 68 MMHG | WEIGHT: 214.7 LBS | HEART RATE: 74 BPM | BODY MASS INDEX: 31.8 KG/M2 | SYSTOLIC BLOOD PRESSURE: 105 MMHG | TEMPERATURE: 97.7 F | OXYGEN SATURATION: 97 % | RESPIRATION RATE: 16 BRPM | HEIGHT: 69 IN

## 2021-12-03 LAB
ALBUMIN SERPL-MCNC: 4.4 G/DL (ref 3.5–5.2)
ALBUMIN/GLOB SERPL: 1.5 G/DL
ALP SERPL-CCNC: 60 U/L (ref 39–117)
ALT SERPL W P-5'-P-CCNC: 139 U/L (ref 1–41)
ANION GAP SERPL CALCULATED.3IONS-SCNC: 7.8 MMOL/L (ref 5–15)
AST SERPL-CCNC: 91 U/L (ref 1–40)
BILIRUB SERPL-MCNC: 0.7 MG/DL (ref 0–1.2)
BUN SERPL-MCNC: 9 MG/DL (ref 8–23)
BUN/CREAT SERPL: 10.6 (ref 7–25)
CALCIUM SPEC-SCNC: 9.5 MG/DL (ref 8.6–10.5)
CHLORIDE SERPL-SCNC: 104 MMOL/L (ref 98–107)
CO2 SERPL-SCNC: 28.2 MMOL/L (ref 22–29)
CREAT SERPL-MCNC: 0.85 MG/DL (ref 0.76–1.27)
DEPRECATED RDW RBC AUTO: 45.4 FL (ref 37–54)
ERYTHROCYTE [DISTWIDTH] IN BLOOD BY AUTOMATED COUNT: 14.1 % (ref 12.3–15.4)
GFR SERPL CREATININE-BSD FRML MDRD: 92 ML/MIN/1.73
GLOBULIN UR ELPH-MCNC: 3 GM/DL
GLUCOSE SERPL-MCNC: 92 MG/DL (ref 65–99)
HCT VFR BLD AUTO: 42.8 % (ref 37.5–51)
HGB BLD-MCNC: 14.8 G/DL (ref 13–17.7)
MCH RBC QN AUTO: 30.5 PG (ref 26.6–33)
MCHC RBC AUTO-ENTMCNC: 34.6 G/DL (ref 31.5–35.7)
MCV RBC AUTO: 88.2 FL (ref 79–97)
PLATELET # BLD AUTO: 136 10*3/MM3 (ref 140–450)
PMV BLD AUTO: 10.4 FL (ref 6–12)
POTASSIUM SERPL-SCNC: 4.3 MMOL/L (ref 3.5–5.2)
PROT SERPL-MCNC: 7.4 G/DL (ref 6–8.5)
RBC # BLD AUTO: 4.85 10*6/MM3 (ref 4.14–5.8)
SODIUM SERPL-SCNC: 140 MMOL/L (ref 136–145)
WBC NRBC COR # BLD: 4.37 10*3/MM3 (ref 3.4–10.8)

## 2021-12-03 PROCEDURE — 85027 COMPLETE CBC AUTOMATED: CPT

## 2021-12-03 PROCEDURE — 36415 COLL VENOUS BLD VENIPUNCTURE: CPT

## 2021-12-03 PROCEDURE — 80053 COMPREHEN METABOLIC PANEL: CPT

## 2021-12-03 RX ORDER — CHLORHEXIDINE GLUCONATE 500 MG/1
1 CLOTH TOPICAL TAKE AS DIRECTED
COMMUNITY
End: 2021-12-23 | Stop reason: HOSPADM

## 2021-12-03 NOTE — DISCHARGE INSTRUCTIONS

## 2021-12-04 ENCOUNTER — LAB (OUTPATIENT)
Dept: LAB | Facility: HOSPITAL | Age: 61
End: 2021-12-04

## 2021-12-04 DIAGNOSIS — K81.9 CHOLECYSTITIS: ICD-10-CM

## 2021-12-04 LAB — SARS-COV-2 ORF1AB RESP QL NAA+PROBE: NOT DETECTED

## 2021-12-04 PROCEDURE — U0004 COV-19 TEST NON-CDC HGH THRU: HCPCS

## 2021-12-04 PROCEDURE — C9803 HOPD COVID-19 SPEC COLLECT: HCPCS

## 2021-12-07 ENCOUNTER — HOSPITAL ENCOUNTER (INPATIENT)
Facility: HOSPITAL | Age: 61
LOS: 16 days | Discharge: HOME | End: 2021-12-23
Attending: SURGERY | Admitting: SURGERY

## 2021-12-07 ENCOUNTER — ANESTHESIA EVENT (OUTPATIENT)
Dept: PERIOP | Facility: HOSPITAL | Age: 61
End: 2021-12-07

## 2021-12-07 ENCOUNTER — ANESTHESIA (OUTPATIENT)
Dept: PERIOP | Facility: HOSPITAL | Age: 61
End: 2021-12-07

## 2021-12-07 DIAGNOSIS — Z93.2 ILEOSTOMY IN PLACE: ICD-10-CM

## 2021-12-07 DIAGNOSIS — K81.9 CHOLECYSTITIS: ICD-10-CM

## 2021-12-07 LAB
GLUCOSE BLDC GLUCOMTR-MCNC: 133 MG/DL (ref 70–130)
GLUCOSE BLDC GLUCOMTR-MCNC: 96 MG/DL (ref 70–130)

## 2021-12-07 PROCEDURE — P9041 ALBUMIN (HUMAN),5%, 50ML: HCPCS | Performed by: NURSE ANESTHETIST, CERTIFIED REGISTERED

## 2021-12-07 PROCEDURE — 0 BUPIVACAINE LIPOSOME 1.3 % SUSPENSION 20 ML VIAL: Performed by: SURGERY

## 2021-12-07 PROCEDURE — 25010000002 FENTANYL CITRATE (PF) 50 MCG/ML SOLUTION: Performed by: NURSE ANESTHETIST, CERTIFIED REGISTERED

## 2021-12-07 PROCEDURE — S0260 H&P FOR SURGERY: HCPCS | Performed by: SURGERY

## 2021-12-07 PROCEDURE — 94799 UNLISTED PULMONARY SVC/PX: CPT

## 2021-12-07 PROCEDURE — 0FT40ZZ RESECTION OF GALLBLADDER, OPEN APPROACH: ICD-10-PCS | Performed by: SURGERY

## 2021-12-07 PROCEDURE — C1889 IMPLANT/INSERT DEVICE, NOC: HCPCS | Performed by: SURGERY

## 2021-12-07 PROCEDURE — 25010000002 HYDROMORPHONE PER 4 MG: Performed by: NURSE ANESTHETIST, CERTIFIED REGISTERED

## 2021-12-07 PROCEDURE — 25010000002 ALBUMIN HUMAN 5% PER 50 ML: Performed by: NURSE ANESTHETIST, CERTIFIED REGISTERED

## 2021-12-07 PROCEDURE — C1765 ADHESION BARRIER: HCPCS | Performed by: SURGERY

## 2021-12-07 PROCEDURE — 82962 GLUCOSE BLOOD TEST: CPT | Performed by: INTERNAL MEDICINE

## 2021-12-07 PROCEDURE — 25010000002 CEFOXITIN PER 1 G: Performed by: NURSE ANESTHETIST, CERTIFIED REGISTERED

## 2021-12-07 PROCEDURE — 25010000002 CEFOXITIN PER 1 G: Performed by: SURGERY

## 2021-12-07 PROCEDURE — 25010000002 DROPERIDOL PER 5 MG: Performed by: NURSE ANESTHETIST, CERTIFIED REGISTERED

## 2021-12-07 PROCEDURE — 47600 CHOLECYSTECTOMY: CPT

## 2021-12-07 PROCEDURE — C9290 INJ, BUPIVACAINE LIPOSOME: HCPCS | Performed by: SURGERY

## 2021-12-07 PROCEDURE — 25010000002 EPINEPHRINE 1 MG/ML SOLUTION: Performed by: NURSE ANESTHETIST, CERTIFIED REGISTERED

## 2021-12-07 PROCEDURE — 88307 TISSUE EXAM BY PATHOLOGIST: CPT | Performed by: SURGERY

## 2021-12-07 PROCEDURE — 47600 CHOLECYSTECTOMY: CPT | Performed by: SURGERY

## 2021-12-07 PROCEDURE — 25010000002 KETOROLAC TROMETHAMINE PER 15 MG: Performed by: SURGERY

## 2021-12-07 PROCEDURE — 25010000002 FENTANYL CITRATE (PF) 50 MCG/ML SOLUTION: Performed by: ANESTHESIOLOGY

## 2021-12-07 PROCEDURE — 25010000002 MIDAZOLAM PER 1 MG: Performed by: ANESTHESIOLOGY

## 2021-12-07 PROCEDURE — 0WQF0ZZ REPAIR ABDOMINAL WALL, OPEN APPROACH: ICD-10-PCS | Performed by: SURGERY

## 2021-12-07 PROCEDURE — 25010000002 FUROSEMIDE PER 20 MG: Performed by: NURSE ANESTHETIST, CERTIFIED REGISTERED

## 2021-12-07 PROCEDURE — 82962 GLUCOSE BLOOD TEST: CPT

## 2021-12-07 PROCEDURE — 0DBB0ZZ EXCISION OF ILEUM, OPEN APPROACH: ICD-10-PCS | Performed by: SURGERY

## 2021-12-07 PROCEDURE — 25010000002 ONDANSETRON PER 1 MG: Performed by: NURSE ANESTHETIST, CERTIFIED REGISTERED

## 2021-12-07 PROCEDURE — 0 HYDROMORPHONE HCL PF 50 MG/5ML SOLUTION: Performed by: SURGERY

## 2021-12-07 PROCEDURE — 0DNW0ZZ RELEASE PERITONEUM, OPEN APPROACH: ICD-10-PCS | Performed by: SURGERY

## 2021-12-07 PROCEDURE — 44625 REPAIR BOWEL OPENING: CPT | Performed by: SURGERY

## 2021-12-07 PROCEDURE — 88304 TISSUE EXAM BY PATHOLOGIST: CPT | Performed by: SURGERY

## 2021-12-07 PROCEDURE — 44625 REPAIR BOWEL OPENING: CPT

## 2021-12-07 DEVICE — CLIP LIGAT VASC HORIZON TI LG ORNG 6CT: Type: IMPLANTABLE DEVICE | Site: ABDOMEN | Status: FUNCTIONAL

## 2021-12-07 DEVICE — PROXIMATE RELOADABLE LINEAR CUTTER WITH SAFETY LOCK-OUT, 75MM
Type: IMPLANTABLE DEVICE | Site: ABDOMEN | Status: FUNCTIONAL
Brand: PROXIMATE

## 2021-12-07 DEVICE — CLIP LIGAT VASC HORIZON TI MD BLU 6CT: Type: IMPLANTABLE DEVICE | Site: ABDOMEN | Status: FUNCTIONAL

## 2021-12-07 DEVICE — CLIP LIGAT VASC HORIZON TI MD/LG GRN 6CT: Type: IMPLANTABLE DEVICE | Site: ABDOMEN | Status: FUNCTIONAL

## 2021-12-07 DEVICE — SEALANT WND FIBRIN TISSEEL PREFIL/SYR/PRIMAFZ 10ML: Type: IMPLANTABLE DEVICE | Site: ABDOMEN | Status: FUNCTIONAL

## 2021-12-07 DEVICE — PROXIMATE LINEAR CUTTER RELOAD, BLUE, 75MM
Type: IMPLANTABLE DEVICE | Site: ABDOMEN | Status: FUNCTIONAL
Brand: PROXIMATE

## 2021-12-07 RX ORDER — HYDROCODONE BITARTRATE AND ACETAMINOPHEN 7.5; 325 MG/1; MG/1
1 TABLET ORAL ONCE AS NEEDED
Status: DISCONTINUED | OUTPATIENT
Start: 2021-12-07 | End: 2021-12-07 | Stop reason: HOSPADM

## 2021-12-07 RX ORDER — DROPERIDOL 2.5 MG/ML
0.62 INJECTION, SOLUTION INTRAMUSCULAR; INTRAVENOUS ONCE AS NEEDED
Status: COMPLETED | OUTPATIENT
Start: 2021-12-07 | End: 2021-12-07

## 2021-12-07 RX ORDER — HYDROMORPHONE HCL IN 0.9% NACL 10 MG/50ML
PATIENT CONTROLLED ANALGESIA SYRINGE INTRAVENOUS CONTINUOUS
Status: DISCONTINUED | OUTPATIENT
Start: 2021-12-07 | End: 2021-12-08

## 2021-12-07 RX ORDER — HYDROMORPHONE HYDROCHLORIDE 1 MG/ML
0.5 INJECTION, SOLUTION INTRAMUSCULAR; INTRAVENOUS; SUBCUTANEOUS
Status: DISCONTINUED | OUTPATIENT
Start: 2021-12-07 | End: 2021-12-07 | Stop reason: HOSPADM

## 2021-12-07 RX ORDER — EPHEDRINE SULFATE 50 MG/ML
5 INJECTION, SOLUTION INTRAVENOUS ONCE AS NEEDED
Status: DISCONTINUED | OUTPATIENT
Start: 2021-12-07 | End: 2021-12-07 | Stop reason: HOSPADM

## 2021-12-07 RX ORDER — SODIUM CHLORIDE 0.9 % (FLUSH) 0.9 %
3-10 SYRINGE (ML) INJECTION AS NEEDED
Status: DISCONTINUED | OUTPATIENT
Start: 2021-12-07 | End: 2021-12-07 | Stop reason: HOSPADM

## 2021-12-07 RX ORDER — HYDRALAZINE HYDROCHLORIDE 20 MG/ML
5 INJECTION INTRAMUSCULAR; INTRAVENOUS
Status: DISCONTINUED | OUTPATIENT
Start: 2021-12-07 | End: 2021-12-07 | Stop reason: HOSPADM

## 2021-12-07 RX ORDER — FENTANYL CITRATE 50 UG/ML
50 INJECTION, SOLUTION INTRAMUSCULAR; INTRAVENOUS
Status: DISCONTINUED | OUTPATIENT
Start: 2021-12-07 | End: 2021-12-07 | Stop reason: HOSPADM

## 2021-12-07 RX ORDER — EPHEDRINE SULFATE 50 MG/ML
INJECTION, SOLUTION INTRAVENOUS AS NEEDED
Status: DISCONTINUED | OUTPATIENT
Start: 2021-12-07 | End: 2021-12-07 | Stop reason: SURG

## 2021-12-07 RX ORDER — MAGNESIUM HYDROXIDE 1200 MG/15ML
LIQUID ORAL AS NEEDED
Status: DISCONTINUED | OUTPATIENT
Start: 2021-12-07 | End: 2021-12-07 | Stop reason: HOSPADM

## 2021-12-07 RX ORDER — SODIUM CHLORIDE 9 MG/ML
125 INJECTION, SOLUTION INTRAVENOUS CONTINUOUS
Status: DISCONTINUED | OUTPATIENT
Start: 2021-12-07 | End: 2021-12-10

## 2021-12-07 RX ORDER — GLYCOPYRROLATE 0.2 MG/ML
INJECTION INTRAMUSCULAR; INTRAVENOUS AS NEEDED
Status: DISCONTINUED | OUTPATIENT
Start: 2021-12-07 | End: 2021-12-07

## 2021-12-07 RX ORDER — ONDANSETRON 2 MG/ML
INJECTION INTRAMUSCULAR; INTRAVENOUS AS NEEDED
Status: DISCONTINUED | OUTPATIENT
Start: 2021-12-07 | End: 2021-12-07 | Stop reason: SURG

## 2021-12-07 RX ORDER — FLUMAZENIL 0.1 MG/ML
0.2 INJECTION INTRAVENOUS AS NEEDED
Status: DISCONTINUED | OUTPATIENT
Start: 2021-12-07 | End: 2021-12-07 | Stop reason: HOSPADM

## 2021-12-07 RX ORDER — ONDANSETRON 2 MG/ML
4 INJECTION INTRAMUSCULAR; INTRAVENOUS ONCE AS NEEDED
Status: DISCONTINUED | OUTPATIENT
Start: 2021-12-07 | End: 2021-12-07 | Stop reason: HOSPADM

## 2021-12-07 RX ORDER — PROMETHAZINE HYDROCHLORIDE 25 MG/1
25 SUPPOSITORY RECTAL ONCE AS NEEDED
Status: DISCONTINUED | OUTPATIENT
Start: 2021-12-07 | End: 2021-12-07 | Stop reason: HOSPADM

## 2021-12-07 RX ORDER — FENTANYL CITRATE 50 UG/ML
INJECTION, SOLUTION INTRAMUSCULAR; INTRAVENOUS AS NEEDED
Status: DISCONTINUED | OUTPATIENT
Start: 2021-12-07 | End: 2021-12-07 | Stop reason: SURG

## 2021-12-07 RX ORDER — KETOROLAC TROMETHAMINE 30 MG/ML
15 INJECTION, SOLUTION INTRAMUSCULAR; INTRAVENOUS EVERY 6 HOURS
Status: DISCONTINUED | OUTPATIENT
Start: 2021-12-07 | End: 2021-12-08

## 2021-12-07 RX ORDER — NEOSTIGMINE METHYLSULFATE 0.5 MG/ML
INJECTION, SOLUTION INTRAVENOUS AS NEEDED
Status: DISCONTINUED | OUTPATIENT
Start: 2021-12-07 | End: 2021-12-07

## 2021-12-07 RX ORDER — MIDAZOLAM HYDROCHLORIDE 1 MG/ML
1 INJECTION INTRAMUSCULAR; INTRAVENOUS
Status: DISCONTINUED | OUTPATIENT
Start: 2021-12-07 | End: 2021-12-07 | Stop reason: HOSPADM

## 2021-12-07 RX ORDER — PROMETHAZINE HYDROCHLORIDE 25 MG/1
25 TABLET ORAL ONCE AS NEEDED
Status: DISCONTINUED | OUTPATIENT
Start: 2021-12-07 | End: 2021-12-07 | Stop reason: HOSPADM

## 2021-12-07 RX ORDER — SPIRONOLACTONE 25 MG/1
12.5 TABLET ORAL EVERY OTHER DAY
Status: DISCONTINUED | OUTPATIENT
Start: 2021-12-07 | End: 2021-12-17

## 2021-12-07 RX ORDER — FENTANYL CITRATE 50 UG/ML
INJECTION, SOLUTION INTRAMUSCULAR; INTRAVENOUS
Status: COMPLETED | OUTPATIENT
Start: 2021-12-07 | End: 2021-12-07

## 2021-12-07 RX ORDER — DIPHENHYDRAMINE HYDROCHLORIDE 50 MG/ML
12.5 INJECTION INTRAMUSCULAR; INTRAVENOUS
Status: DISCONTINUED | OUTPATIENT
Start: 2021-12-07 | End: 2021-12-07 | Stop reason: HOSPADM

## 2021-12-07 RX ORDER — FUROSEMIDE 10 MG/ML
INJECTION INTRAMUSCULAR; INTRAVENOUS AS NEEDED
Status: DISCONTINUED | OUTPATIENT
Start: 2021-12-07 | End: 2021-12-07 | Stop reason: SURG

## 2021-12-07 RX ORDER — ROCURONIUM BROMIDE 10 MG/ML
INJECTION, SOLUTION INTRAVENOUS AS NEEDED
Status: DISCONTINUED | OUTPATIENT
Start: 2021-12-07 | End: 2021-12-07 | Stop reason: SURG

## 2021-12-07 RX ORDER — EPINEPHRINE 1 MG/ML
INJECTION, SOLUTION, CONCENTRATE INTRAVENOUS AS NEEDED
Status: DISCONTINUED | OUTPATIENT
Start: 2021-12-07 | End: 2021-12-07

## 2021-12-07 RX ORDER — ONDANSETRON 2 MG/ML
4 INJECTION INTRAMUSCULAR; INTRAVENOUS EVERY 6 HOURS PRN
Status: DISCONTINUED | OUTPATIENT
Start: 2021-12-07 | End: 2021-12-23 | Stop reason: HOSPADM

## 2021-12-07 RX ORDER — NALOXONE HCL 0.4 MG/ML
0.2 VIAL (ML) INJECTION AS NEEDED
Status: DISCONTINUED | OUTPATIENT
Start: 2021-12-07 | End: 2021-12-07 | Stop reason: HOSPADM

## 2021-12-07 RX ORDER — FAMOTIDINE 10 MG/ML
20 INJECTION, SOLUTION INTRAVENOUS ONCE
Status: COMPLETED | OUTPATIENT
Start: 2021-12-07 | End: 2021-12-07

## 2021-12-07 RX ORDER — CEFOXITIN 2 G/1
INJECTION, POWDER, FOR SOLUTION INTRAVENOUS AS NEEDED
Status: DISCONTINUED | OUTPATIENT
Start: 2021-12-07 | End: 2021-12-07 | Stop reason: SURG

## 2021-12-07 RX ORDER — MIDAZOLAM HYDROCHLORIDE 1 MG/ML
INJECTION INTRAMUSCULAR; INTRAVENOUS
Status: COMPLETED | OUTPATIENT
Start: 2021-12-07 | End: 2021-12-07

## 2021-12-07 RX ORDER — ALBUMIN, HUMAN INJ 5% 5 %
SOLUTION INTRAVENOUS CONTINUOUS PRN
Status: DISCONTINUED | OUTPATIENT
Start: 2021-12-07 | End: 2021-12-07 | Stop reason: SURG

## 2021-12-07 RX ORDER — ETOMIDATE 2 MG/ML
INJECTION INTRAVENOUS AS NEEDED
Status: DISCONTINUED | OUTPATIENT
Start: 2021-12-07 | End: 2021-12-07 | Stop reason: SURG

## 2021-12-07 RX ORDER — CARVEDILOL 12.5 MG/1
12.5 TABLET ORAL EVERY EVENING
Status: DISCONTINUED | OUTPATIENT
Start: 2021-12-07 | End: 2021-12-23 | Stop reason: HOSPADM

## 2021-12-07 RX ORDER — NALOXONE HCL 0.4 MG/ML
0.1 VIAL (ML) INJECTION
Status: DISCONTINUED | OUTPATIENT
Start: 2021-12-07 | End: 2021-12-23 | Stop reason: HOSPADM

## 2021-12-07 RX ORDER — LIDOCAINE HYDROCHLORIDE 10 MG/ML
0.5 INJECTION, SOLUTION EPIDURAL; INFILTRATION; INTRACAUDAL; PERINEURAL ONCE AS NEEDED
Status: DISCONTINUED | OUTPATIENT
Start: 2021-12-07 | End: 2021-12-07 | Stop reason: HOSPADM

## 2021-12-07 RX ORDER — KETAMINE HCL IN NACL, ISO-OSM 100MG/10ML
10 SYRINGE (ML) INJECTION
Status: DISCONTINUED | OUTPATIENT
Start: 2021-12-07 | End: 2021-12-08

## 2021-12-07 RX ORDER — SODIUM CHLORIDE, SODIUM LACTATE, POTASSIUM CHLORIDE, CALCIUM CHLORIDE 600; 310; 30; 20 MG/100ML; MG/100ML; MG/100ML; MG/100ML
9 INJECTION, SOLUTION INTRAVENOUS CONTINUOUS
Status: DISCONTINUED | OUTPATIENT
Start: 2021-12-07 | End: 2021-12-07

## 2021-12-07 RX ORDER — SODIUM CHLORIDE 0.9 % (FLUSH) 0.9 %
3 SYRINGE (ML) INJECTION EVERY 12 HOURS SCHEDULED
Status: DISCONTINUED | OUTPATIENT
Start: 2021-12-07 | End: 2021-12-07 | Stop reason: HOSPADM

## 2021-12-07 RX ORDER — DIPHENHYDRAMINE HCL 25 MG
25 CAPSULE ORAL
Status: DISCONTINUED | OUTPATIENT
Start: 2021-12-07 | End: 2021-12-07 | Stop reason: HOSPADM

## 2021-12-07 RX ORDER — LOSARTAN POTASSIUM 25 MG/1
12.5 TABLET ORAL DAILY
Status: DISCONTINUED | OUTPATIENT
Start: 2021-12-07 | End: 2021-12-17

## 2021-12-07 RX ORDER — OXYCODONE AND ACETAMINOPHEN 7.5; 325 MG/1; MG/1
1 TABLET ORAL EVERY 4 HOURS PRN
Status: DISCONTINUED | OUTPATIENT
Start: 2021-12-07 | End: 2021-12-07 | Stop reason: HOSPADM

## 2021-12-07 RX ORDER — BUPIVACAINE HYDROCHLORIDE AND EPINEPHRINE 5; 5 MG/ML; UG/ML
INJECTION, SOLUTION EPIDURAL; INTRACAUDAL; PERINEURAL AS NEEDED
Status: DISCONTINUED | OUTPATIENT
Start: 2021-12-07 | End: 2021-12-07 | Stop reason: HOSPADM

## 2021-12-07 RX ORDER — LABETALOL HYDROCHLORIDE 5 MG/ML
5 INJECTION, SOLUTION INTRAVENOUS
Status: DISCONTINUED | OUTPATIENT
Start: 2021-12-07 | End: 2021-12-07 | Stop reason: HOSPADM

## 2021-12-07 RX ORDER — IBUPROFEN 600 MG/1
600 TABLET ORAL ONCE AS NEEDED
Status: DISCONTINUED | OUTPATIENT
Start: 2021-12-07 | End: 2021-12-07 | Stop reason: HOSPADM

## 2021-12-07 RX ORDER — HYDROMORPHONE HCL 110MG/55ML
PATIENT CONTROLLED ANALGESIA SYRINGE INTRAVENOUS AS NEEDED
Status: DISCONTINUED | OUTPATIENT
Start: 2021-12-07 | End: 2021-12-07 | Stop reason: SURG

## 2021-12-07 RX ADMIN — HYDROMORPHONE HYDROCHLORIDE 0.5 MG: 2 INJECTION, SOLUTION INTRAMUSCULAR; INTRAVENOUS; SUBCUTANEOUS at 11:00

## 2021-12-07 RX ADMIN — HYDROMORPHONE HYDROCHLORIDE 0.5 MG: 1 INJECTION, SOLUTION INTRAMUSCULAR; INTRAVENOUS; SUBCUTANEOUS at 12:27

## 2021-12-07 RX ADMIN — CEFOXITIN 2 G: 2 INJECTION, POWDER, FOR SOLUTION INTRAVENOUS at 21:54

## 2021-12-07 RX ADMIN — HYDROMORPHONE HYDROCHLORIDE: 10 INJECTION, SOLUTION INTRAMUSCULAR; INTRAVENOUS; SUBCUTANEOUS at 12:30

## 2021-12-07 RX ADMIN — KETOROLAC TROMETHAMINE 15 MG: 30 INJECTION, SOLUTION INTRAMUSCULAR; INTRAVENOUS at 23:36

## 2021-12-07 RX ADMIN — FENTANYL CITRATE 50 MCG: 0.05 INJECTION, SOLUTION INTRAMUSCULAR; INTRAVENOUS at 06:55

## 2021-12-07 RX ADMIN — CEFOXITIN 2 G: 2 INJECTION, POWDER, FOR SOLUTION INTRAVENOUS at 07:17

## 2021-12-07 RX ADMIN — EPHEDRINE SULFATE 10 MG: 50 INJECTION INTRAVENOUS at 10:09

## 2021-12-07 RX ADMIN — FENTANYL CITRATE 50 MCG: 0.05 INJECTION, SOLUTION INTRAMUSCULAR; INTRAVENOUS at 07:38

## 2021-12-07 RX ADMIN — FAMOTIDINE 20 MG: 10 INJECTION, SOLUTION INTRAVENOUS at 07:09

## 2021-12-07 RX ADMIN — KETOROLAC TROMETHAMINE 15 MG: 30 INJECTION, SOLUTION INTRAMUSCULAR; INTRAVENOUS at 18:08

## 2021-12-07 RX ADMIN — SODIUM CHLORIDE, POTASSIUM CHLORIDE, SODIUM LACTATE AND CALCIUM CHLORIDE 9 ML/HR: 600; 310; 30; 20 INJECTION, SOLUTION INTRAVENOUS at 07:09

## 2021-12-07 RX ADMIN — Medication 10 MG: at 11:49

## 2021-12-07 RX ADMIN — ROCURONIUM BROMIDE 50 MG: 50 INJECTION INTRAVENOUS at 07:38

## 2021-12-07 RX ADMIN — DROPERIDOL 0.62 MG: 2.5 INJECTION, SOLUTION INTRAMUSCULAR; INTRAVENOUS at 11:47

## 2021-12-07 RX ADMIN — ROCURONIUM BROMIDE 10 MG: 50 INJECTION INTRAVENOUS at 09:40

## 2021-12-07 RX ADMIN — ALBUMIN HUMAN: 0.05 INJECTION, SOLUTION INTRAVENOUS at 10:05

## 2021-12-07 RX ADMIN — KETOROLAC TROMETHAMINE 15 MG: 30 INJECTION, SOLUTION INTRAMUSCULAR; INTRAVENOUS at 11:59

## 2021-12-07 RX ADMIN — ONDANSETRON 4 MG: 2 INJECTION INTRAMUSCULAR; INTRAVENOUS at 11:30

## 2021-12-07 RX ADMIN — CEFOXITIN SODIUM 2 G: 2 POWDER, FOR SOLUTION INTRAVENOUS at 09:17

## 2021-12-07 RX ADMIN — ROCURONIUM BROMIDE 10 MG: 50 INJECTION INTRAVENOUS at 10:08

## 2021-12-07 RX ADMIN — ROCURONIUM BROMIDE 10 MG: 50 INJECTION INTRAVENOUS at 10:32

## 2021-12-07 RX ADMIN — ONDANSETRON 4 MG: 2 INJECTION INTRAMUSCULAR; INTRAVENOUS at 10:38

## 2021-12-07 RX ADMIN — SODIUM CHLORIDE 125 ML/HR: 9 INJECTION, SOLUTION INTRAVENOUS at 17:34

## 2021-12-07 RX ADMIN — ROCURONIUM BROMIDE 10 MG: 50 INJECTION INTRAVENOUS at 08:30

## 2021-12-07 RX ADMIN — HYDROMORPHONE HYDROCHLORIDE 0.5 MG: 1 INJECTION, SOLUTION INTRAMUSCULAR; INTRAVENOUS; SUBCUTANEOUS at 12:07

## 2021-12-07 RX ADMIN — FUROSEMIDE 20 MG: 10 INJECTION, SOLUTION INTRAMUSCULAR; INTRAVENOUS at 11:04

## 2021-12-07 RX ADMIN — FENTANYL CITRATE 50 MCG: 0.05 INJECTION, SOLUTION INTRAMUSCULAR; INTRAVENOUS at 08:00

## 2021-12-07 RX ADMIN — SODIUM CHLORIDE, POTASSIUM CHLORIDE, SODIUM LACTATE AND CALCIUM CHLORIDE: 600; 310; 30; 20 INJECTION, SOLUTION INTRAVENOUS at 10:07

## 2021-12-07 RX ADMIN — FENTANYL CITRATE 50 MCG: 50 INJECTION INTRAMUSCULAR; INTRAVENOUS at 11:43

## 2021-12-07 RX ADMIN — ETOMIDATE 16 MG: 2 INJECTION, SOLUTION INTRAVENOUS at 07:38

## 2021-12-07 RX ADMIN — HYDROMORPHONE HYDROCHLORIDE 0.5 MG: 2 INJECTION, SOLUTION INTRAMUSCULAR; INTRAVENOUS; SUBCUTANEOUS at 10:14

## 2021-12-07 RX ADMIN — MIDAZOLAM 2 MG: 1 INJECTION INTRAMUSCULAR; INTRAVENOUS at 06:56

## 2021-12-07 RX ADMIN — HYDROMORPHONE HYDROCHLORIDE 0.5 MG: 2 INJECTION, SOLUTION INTRAMUSCULAR; INTRAVENOUS; SUBCUTANEOUS at 11:30

## 2021-12-07 RX ADMIN — ROCURONIUM BROMIDE 10 MG: 50 INJECTION INTRAVENOUS at 08:50

## 2021-12-07 RX ADMIN — Medication 0.05 MCG/KG/MIN: at 07:40

## 2021-12-07 RX ADMIN — SUGAMMADEX 200 MG: 100 INJECTION, SOLUTION INTRAVENOUS at 11:10

## 2021-12-07 NOTE — ANESTHESIA POSTPROCEDURE EVALUATION
Patient: Arnie Calvo    Procedure Summary     Date: 12/07/21 Room / Location:  [facility] 06 /  [facility]    Anesthesia Start: 0732 Anesthesia Stop: 1137    Procedures:       ILEOSTOMY TAKEDOWN WITH RESECTION, PARASTOMAL HERNIA REPAIR (N/A )      CHOLECYSTECTOMY (N/A Abdomen) Diagnosis:       Ileostomy in place (HCC)      Cholecystitis      (Ileostomy in place (HCC) [Z93.2])      (Cholecystitis [K81.9])    Surgeons: Jeovany Mott MD Provider: Paresh Hoang MD    Anesthesia Type: general ASA Status: 4          Anesthesia Type: general    Vitals  Vitals Value Taken Time   BP 99/64 12/07/21 1431   Temp 37.4 °C (99.4 °F) 12/07/21 1133   Pulse 87 12/07/21 1445   Resp 16 12/07/21 1430   SpO2 97 % 12/07/21 1445   Vitals shown include unvalidated device data.        Post Anesthesia Care and Evaluation    Patient location during evaluation: bedside  Patient participation: complete - patient participated  Level of consciousness: awake  Pain management: adequate  Airway patency: patent  Anesthetic complications: No anesthetic complications    Cardiovascular status: acceptable  Respiratory status: acceptable  Hydration status: acceptable    Comments: Pt weaned from epi and ready for transfer to telemetry.

## 2021-12-07 NOTE — ANESTHESIA PROCEDURE NOTES
Airway  Urgency: elective    Date/Time: 12/7/2021 7:39 AM  Airway not difficult    General Information and Staff    Patient location during procedure: OR  Anesthesiologist: Paresh Hoang MD  CRNA: Del Navarro CRNA    Indications and Patient Condition  Indications for airway management: airway protection    Preoxygenated: yes  MILS maintained throughout  Mask difficulty assessment: 1 - vent by mask    Final Airway Details  Final airway type: endotracheal airway      Successful airway: ETT  Cuffed: yes   Successful intubation technique: video laryngoscopy  Facilitating devices/methods: intubating stylet  Endotracheal tube insertion site: oral  Blade: CMAC  Blade size: D  ETT size (mm): 7.5  Cormack-Lehane Classification: grade I - full view of glottis  Placement verified by: chest auscultation and capnometry   Cuff volume (mL): 5  Measured from: teeth  ETT/EBT  to teeth (cm): 22  Number of attempts at approach: 1  Assessment: lips, teeth, and gum same as pre-op and atraumatic intubation    Additional Comments  Head and neck maintained neutral position

## 2021-12-07 NOTE — ANESTHESIA PROCEDURE NOTES
Arterial Line      Patient reassessed immediately prior to procedure    Patient location during procedure: holding area  Start time: 12/7/2021 6:44 AM  Stop Time:12/7/2021 6:49 AM       Line placed for hemodynamic monitoring and ABGs/Labs/ISTAT.  Performed By   Anesthesiologist: Paresh Hoang MD  Preanesthetic Checklist  Completed: patient identified, IV checked, site marked, risks and benefits discussed, surgical consent, monitors and equipment checked, pre-op evaluation and timeout performed  Arterial Line Prep   Sterile Tech: gloves, mask, cap and sterile barriers  Prep: ChloraPrep  Patient monitoring: blood pressure monitoring, continuous pulse oximetry and EKG  Arterial Line Procedure   Laterality:left  Location:  radial artery  Catheter size: 20 G   Guidance: ultrasound guided and Ultrasound guidance was utlilized and needle placement was visualized under U/S  PROCEDURE NOTE/ULTRASOUND INTERPRETATION.  Using ultrasound guidance the potential vascular sites for insertion of the catheter were visualized to determine the patency of the vessel to be used for vascular access.  After selecting the appropriate site for insertion, the needle was visualized under ultrasound being inserted into the radial artery, followed by ultrasound confirmation of wire and catheter placement. There were no abnormalities seen on ultrasound; an image was taken; and the patient tolerated the procedure with no complications.   Number of attempts: 1  Successful placement: yes  Post Assessment   Dressing Type: occlusive dressing applied, secured with tape and wrist guard applied.   Complications no  Circ/Move/Sens Assessment: normal and unchanged.   Patient Tolerance: patient tolerated the procedure well with no apparent complications

## 2021-12-08 ENCOUNTER — APPOINTMENT (OUTPATIENT)
Dept: GENERAL RADIOLOGY | Facility: HOSPITAL | Age: 61
End: 2021-12-08

## 2021-12-08 LAB
ANION GAP SERPL CALCULATED.3IONS-SCNC: 11.2 MMOL/L (ref 5–15)
ANION GAP SERPL CALCULATED.3IONS-SCNC: 15.6 MMOL/L (ref 5–15)
BASOPHILS # BLD AUTO: 0.03 10*3/MM3 (ref 0–0.2)
BASOPHILS NFR BLD AUTO: 0.4 % (ref 0–1.5)
BUN SERPL-MCNC: 18 MG/DL (ref 8–23)
BUN SERPL-MCNC: 20 MG/DL (ref 8–23)
BUN/CREAT SERPL: 11.4 (ref 7–25)
BUN/CREAT SERPL: 16 (ref 7–25)
CALCIUM SPEC-SCNC: 8.1 MG/DL (ref 8.6–10.5)
CALCIUM SPEC-SCNC: 8.2 MG/DL (ref 8.6–10.5)
CHLORIDE SERPL-SCNC: 104 MMOL/L (ref 98–107)
CHLORIDE SERPL-SCNC: 107 MMOL/L (ref 98–107)
CO2 SERPL-SCNC: 16.4 MMOL/L (ref 22–29)
CO2 SERPL-SCNC: 22.8 MMOL/L (ref 22–29)
CREAT SERPL-MCNC: 1.25 MG/DL (ref 0.76–1.27)
CREAT SERPL-MCNC: 1.58 MG/DL (ref 0.76–1.27)
DEPRECATED RDW RBC AUTO: 43.2 FL (ref 37–54)
DEPRECATED RDW RBC AUTO: 44.6 FL (ref 37–54)
EOSINOPHIL # BLD AUTO: 0.07 10*3/MM3 (ref 0–0.4)
EOSINOPHIL NFR BLD AUTO: 0.8 % (ref 0.3–6.2)
ERYTHROCYTE [DISTWIDTH] IN BLOOD BY AUTOMATED COUNT: 13.6 % (ref 12.3–15.4)
ERYTHROCYTE [DISTWIDTH] IN BLOOD BY AUTOMATED COUNT: 14 % (ref 12.3–15.4)
GFR SERPL CREATININE-BSD FRML MDRD: 45 ML/MIN/1.73
GFR SERPL CREATININE-BSD FRML MDRD: 59 ML/MIN/1.73
GLUCOSE SERPL-MCNC: 105 MG/DL (ref 65–99)
GLUCOSE SERPL-MCNC: 112 MG/DL (ref 65–99)
HCT VFR BLD AUTO: 40.2 % (ref 37.5–51)
HCT VFR BLD AUTO: 42.6 % (ref 37.5–51)
HGB BLD-MCNC: 13.9 G/DL (ref 13–17.7)
HGB BLD-MCNC: 14.5 G/DL (ref 13–17.7)
IMM GRANULOCYTES # BLD AUTO: 0.04 10*3/MM3 (ref 0–0.05)
IMM GRANULOCYTES NFR BLD AUTO: 0.5 % (ref 0–0.5)
LAB AP CASE REPORT: NORMAL
LYMPHOCYTES # BLD AUTO: 1.64 10*3/MM3 (ref 0.7–3.1)
LYMPHOCYTES NFR BLD AUTO: 19.4 % (ref 19.6–45.3)
MCH RBC QN AUTO: 30.1 PG (ref 26.6–33)
MCH RBC QN AUTO: 30.8 PG (ref 26.6–33)
MCHC RBC AUTO-ENTMCNC: 34 G/DL (ref 31.5–35.7)
MCHC RBC AUTO-ENTMCNC: 34.6 G/DL (ref 31.5–35.7)
MCV RBC AUTO: 88.4 FL (ref 79–97)
MCV RBC AUTO: 88.9 FL (ref 79–97)
MONOCYTES # BLD AUTO: 0.89 10*3/MM3 (ref 0.1–0.9)
MONOCYTES NFR BLD AUTO: 10.5 % (ref 5–12)
NEUTROPHILS NFR BLD AUTO: 5.8 10*3/MM3 (ref 1.7–7)
NEUTROPHILS NFR BLD AUTO: 68.4 % (ref 42.7–76)
NRBC BLD AUTO-RTO: 0 /100 WBC (ref 0–0.2)
PATH REPORT.FINAL DX SPEC: NORMAL
PATH REPORT.GROSS SPEC: NORMAL
PLATELET # BLD AUTO: 88 10*3/MM3 (ref 140–450)
PLATELET # BLD AUTO: 93 10*3/MM3 (ref 140–450)
PMV BLD AUTO: 10 FL (ref 6–12)
PMV BLD AUTO: 10.4 FL (ref 6–12)
POTASSIUM SERPL-SCNC: 4.5 MMOL/L (ref 3.5–5.2)
POTASSIUM SERPL-SCNC: 4.8 MMOL/L (ref 3.5–5.2)
RBC # BLD AUTO: 4.52 10*6/MM3 (ref 4.14–5.8)
RBC # BLD AUTO: 4.82 10*6/MM3 (ref 4.14–5.8)
SODIUM SERPL-SCNC: 138 MMOL/L (ref 136–145)
SODIUM SERPL-SCNC: 139 MMOL/L (ref 136–145)
WBC NRBC COR # BLD: 7.72 10*3/MM3 (ref 3.4–10.8)
WBC NRBC COR # BLD: 8.47 10*3/MM3 (ref 3.4–10.8)

## 2021-12-08 PROCEDURE — 85025 COMPLETE CBC W/AUTO DIFF WBC: CPT | Performed by: SURGERY

## 2021-12-08 PROCEDURE — 80048 BASIC METABOLIC PNL TOTAL CA: CPT | Performed by: SURGERY

## 2021-12-08 PROCEDURE — 25010000002 ENOXAPARIN PER 10 MG: Performed by: SURGERY

## 2021-12-08 PROCEDURE — 85027 COMPLETE CBC AUTOMATED: CPT | Performed by: SURGERY

## 2021-12-08 PROCEDURE — 74018 RADEX ABDOMEN 1 VIEW: CPT

## 2021-12-08 PROCEDURE — 0 HYDROMORPHONE HCL PF 50 MG/5ML SOLUTION: Performed by: SURGERY

## 2021-12-08 PROCEDURE — 25010000002 KETOROLAC TROMETHAMINE PER 15 MG: Performed by: SURGERY

## 2021-12-08 PROCEDURE — 99024 POSTOP FOLLOW-UP VISIT: CPT | Performed by: SURGERY

## 2021-12-08 PROCEDURE — 94799 UNLISTED PULMONARY SVC/PX: CPT

## 2021-12-08 PROCEDURE — 25010000002 CEFOXITIN PER 1 G: Performed by: SURGERY

## 2021-12-08 RX ORDER — HYDROMORPHONE HCL IN 0.9% NACL 10 MG/50ML
PATIENT CONTROLLED ANALGESIA SYRINGE INTRAVENOUS CONTINUOUS
Status: DISCONTINUED | OUTPATIENT
Start: 2021-12-08 | End: 2021-12-10

## 2021-12-08 RX ADMIN — SODIUM CHLORIDE 125 ML/HR: 9 INJECTION, SOLUTION INTRAVENOUS at 13:00

## 2021-12-08 RX ADMIN — KETOROLAC TROMETHAMINE 15 MG: 30 INJECTION, SOLUTION INTRAMUSCULAR; INTRAVENOUS at 11:46

## 2021-12-08 RX ADMIN — CEFOXITIN 2 G: 2 INJECTION, POWDER, FOR SOLUTION INTRAVENOUS at 04:01

## 2021-12-08 RX ADMIN — KETOROLAC TROMETHAMINE 15 MG: 30 INJECTION, SOLUTION INTRAMUSCULAR; INTRAVENOUS at 06:24

## 2021-12-08 RX ADMIN — CEFOXITIN 2 G: 2 INJECTION, POWDER, FOR SOLUTION INTRAVENOUS at 10:08

## 2021-12-08 RX ADMIN — SODIUM CHLORIDE 125 ML/HR: 9 INJECTION, SOLUTION INTRAVENOUS at 02:20

## 2021-12-08 RX ADMIN — HYDROMORPHONE HYDROCHLORIDE: 10 INJECTION, SOLUTION INTRAMUSCULAR; INTRAVENOUS; SUBCUTANEOUS at 09:55

## 2021-12-08 RX ADMIN — SODIUM CHLORIDE 125 ML/HR: 9 INJECTION, SOLUTION INTRAVENOUS at 20:59

## 2021-12-08 RX ADMIN — ENOXAPARIN SODIUM 40 MG: 40 INJECTION SUBCUTANEOUS at 10:12

## 2021-12-09 LAB
ANION GAP SERPL CALCULATED.3IONS-SCNC: 14.5 MMOL/L (ref 5–15)
BUN SERPL-MCNC: 20 MG/DL (ref 8–23)
BUN/CREAT SERPL: 17.9 (ref 7–25)
CALCIUM SPEC-SCNC: 7.9 MG/DL (ref 8.6–10.5)
CHLORIDE SERPL-SCNC: 107 MMOL/L (ref 98–107)
CO2 SERPL-SCNC: 22.5 MMOL/L (ref 22–29)
CREAT SERPL-MCNC: 1.12 MG/DL (ref 0.76–1.27)
DEPRECATED RDW RBC AUTO: 47.7 FL (ref 37–54)
ERYTHROCYTE [DISTWIDTH] IN BLOOD BY AUTOMATED COUNT: 13.9 % (ref 12.3–15.4)
GFR SERPL CREATININE-BSD FRML MDRD: 67 ML/MIN/1.73
GLUCOSE SERPL-MCNC: 111 MG/DL (ref 65–99)
HCT VFR BLD AUTO: 40.7 % (ref 37.5–51)
HGB BLD-MCNC: 13.4 G/DL (ref 13–17.7)
MCH RBC QN AUTO: 30.5 PG (ref 26.6–33)
MCHC RBC AUTO-ENTMCNC: 32.9 G/DL (ref 31.5–35.7)
MCV RBC AUTO: 92.7 FL (ref 79–97)
PLATELET # BLD AUTO: 84 10*3/MM3 (ref 140–450)
PMV BLD AUTO: 10.8 FL (ref 6–12)
POTASSIUM SERPL-SCNC: 4.5 MMOL/L (ref 3.5–5.2)
RBC # BLD AUTO: 4.39 10*6/MM3 (ref 4.14–5.8)
SODIUM SERPL-SCNC: 144 MMOL/L (ref 136–145)
WBC NRBC COR # BLD: 7.53 10*3/MM3 (ref 3.4–10.8)

## 2021-12-09 PROCEDURE — 99024 POSTOP FOLLOW-UP VISIT: CPT | Performed by: SURGERY

## 2021-12-09 PROCEDURE — 25010000002 ENOXAPARIN PER 10 MG: Performed by: SURGERY

## 2021-12-09 PROCEDURE — 85027 COMPLETE CBC AUTOMATED: CPT | Performed by: SURGERY

## 2021-12-09 PROCEDURE — 80048 BASIC METABOLIC PNL TOTAL CA: CPT | Performed by: SURGERY

## 2021-12-09 PROCEDURE — 0 HYDROMORPHONE HCL PF 50 MG/5ML SOLUTION: Performed by: SURGERY

## 2021-12-09 PROCEDURE — 94799 UNLISTED PULMONARY SVC/PX: CPT

## 2021-12-09 RX ADMIN — SODIUM CHLORIDE 125 ML/HR: 9 INJECTION, SOLUTION INTRAVENOUS at 14:03

## 2021-12-09 RX ADMIN — SODIUM CHLORIDE 125 ML/HR: 9 INJECTION, SOLUTION INTRAVENOUS at 05:40

## 2021-12-09 RX ADMIN — HYDROMORPHONE HYDROCHLORIDE: 10 INJECTION, SOLUTION INTRAMUSCULAR; INTRAVENOUS; SUBCUTANEOUS at 09:20

## 2021-12-09 RX ADMIN — SODIUM CHLORIDE 125 ML/HR: 9 INJECTION, SOLUTION INTRAVENOUS at 22:29

## 2021-12-09 RX ADMIN — Medication 1 LOZENGE: at 14:14

## 2021-12-09 RX ADMIN — ENOXAPARIN SODIUM 40 MG: 40 INJECTION SUBCUTANEOUS at 10:28

## 2021-12-10 ENCOUNTER — APPOINTMENT (OUTPATIENT)
Dept: GENERAL RADIOLOGY | Facility: HOSPITAL | Age: 61
End: 2021-12-10

## 2021-12-10 PROCEDURE — 0 HYDROMORPHONE HCL PF 50 MG/5ML SOLUTION: Performed by: SURGERY

## 2021-12-10 PROCEDURE — 94799 UNLISTED PULMONARY SVC/PX: CPT

## 2021-12-10 PROCEDURE — 99024 POSTOP FOLLOW-UP VISIT: CPT | Performed by: SURGERY

## 2021-12-10 PROCEDURE — 74018 RADEX ABDOMEN 1 VIEW: CPT

## 2021-12-10 PROCEDURE — 25010000002 ONDANSETRON PER 1 MG: Performed by: SURGERY

## 2021-12-10 PROCEDURE — 25010000002 ENOXAPARIN PER 10 MG: Performed by: SURGERY

## 2021-12-10 RX ORDER — HYDROMORPHONE HCL IN 0.9% NACL 10 MG/50ML
PATIENT CONTROLLED ANALGESIA SYRINGE INTRAVENOUS CONTINUOUS
Status: DISCONTINUED | OUTPATIENT
Start: 2021-12-10 | End: 2021-12-11

## 2021-12-10 RX ORDER — DEXTROSE, SODIUM CHLORIDE, AND POTASSIUM CHLORIDE 5; .45; .15 G/100ML; G/100ML; G/100ML
100 INJECTION INTRAVENOUS CONTINUOUS
Status: DISCONTINUED | OUTPATIENT
Start: 2021-12-10 | End: 2021-12-15

## 2021-12-10 RX ADMIN — CARVEDILOL 12.5 MG: 12.5 TABLET, FILM COATED ORAL at 16:48

## 2021-12-10 RX ADMIN — LOSARTAN POTASSIUM 12.5 MG: 25 TABLET, FILM COATED ORAL at 16:48

## 2021-12-10 RX ADMIN — POTASSIUM CHLORIDE, DEXTROSE MONOHYDRATE AND SODIUM CHLORIDE 100 ML/HR: 150; 5; 450 INJECTION, SOLUTION INTRAVENOUS at 17:11

## 2021-12-10 RX ADMIN — HYDROMORPHONE HYDROCHLORIDE: 10 INJECTION, SOLUTION INTRAMUSCULAR; INTRAVENOUS; SUBCUTANEOUS at 22:11

## 2021-12-10 RX ADMIN — GLYCERIN 2.7 ML: 2.8 LIQUID RECTAL at 11:26

## 2021-12-10 RX ADMIN — ONDANSETRON 4 MG: 2 INJECTION INTRAMUSCULAR; INTRAVENOUS at 04:35

## 2021-12-10 RX ADMIN — HYDROMORPHONE HYDROCHLORIDE: 10 INJECTION, SOLUTION INTRAMUSCULAR; INTRAVENOUS; SUBCUTANEOUS at 07:34

## 2021-12-10 RX ADMIN — SODIUM CHLORIDE 125 ML/HR: 9 INJECTION, SOLUTION INTRAVENOUS at 06:54

## 2021-12-10 RX ADMIN — ENOXAPARIN SODIUM 40 MG: 40 INJECTION SUBCUTANEOUS at 16:49

## 2021-12-11 LAB
ANION GAP SERPL CALCULATED.3IONS-SCNC: 7.6 MMOL/L (ref 5–15)
BUN SERPL-MCNC: 13 MG/DL (ref 8–23)
BUN/CREAT SERPL: 15.9 (ref 7–25)
CALCIUM SPEC-SCNC: 8.3 MG/DL (ref 8.6–10.5)
CHLORIDE SERPL-SCNC: 108 MMOL/L (ref 98–107)
CO2 SERPL-SCNC: 27.4 MMOL/L (ref 22–29)
CREAT SERPL-MCNC: 0.82 MG/DL (ref 0.76–1.27)
DEPRECATED RDW RBC AUTO: 45 FL (ref 37–54)
ERYTHROCYTE [DISTWIDTH] IN BLOOD BY AUTOMATED COUNT: 13.9 % (ref 12.3–15.4)
GFR SERPL CREATININE-BSD FRML MDRD: 96 ML/MIN/1.73
GLUCOSE SERPL-MCNC: 106 MG/DL (ref 65–99)
HCT VFR BLD AUTO: 35 % (ref 37.5–51)
HGB BLD-MCNC: 11.9 G/DL (ref 13–17.7)
MCH RBC QN AUTO: 30.3 PG (ref 26.6–33)
MCHC RBC AUTO-ENTMCNC: 34 G/DL (ref 31.5–35.7)
MCV RBC AUTO: 89.1 FL (ref 79–97)
PLATELET # BLD AUTO: 109 10*3/MM3 (ref 140–450)
PMV BLD AUTO: 10.1 FL (ref 6–12)
POTASSIUM SERPL-SCNC: 4.2 MMOL/L (ref 3.5–5.2)
RBC # BLD AUTO: 3.93 10*6/MM3 (ref 4.14–5.8)
SODIUM SERPL-SCNC: 143 MMOL/L (ref 136–145)
WBC NRBC COR # BLD: 6.72 10*3/MM3 (ref 3.4–10.8)

## 2021-12-11 PROCEDURE — 80048 BASIC METABOLIC PNL TOTAL CA: CPT | Performed by: SURGERY

## 2021-12-11 PROCEDURE — 94799 UNLISTED PULMONARY SVC/PX: CPT

## 2021-12-11 PROCEDURE — 85027 COMPLETE CBC AUTOMATED: CPT | Performed by: SURGERY

## 2021-12-11 PROCEDURE — 25010000002 ENOXAPARIN PER 10 MG: Performed by: SURGERY

## 2021-12-11 PROCEDURE — 99024 POSTOP FOLLOW-UP VISIT: CPT | Performed by: SURGERY

## 2021-12-11 RX ORDER — BISACODYL 10 MG
10 SUPPOSITORY, RECTAL RECTAL ONCE
Status: COMPLETED | OUTPATIENT
Start: 2021-12-11 | End: 2021-12-11

## 2021-12-11 RX ORDER — HYDROMORPHONE HCL IN 0.9% NACL 10 MG/50ML
PATIENT CONTROLLED ANALGESIA SYRINGE INTRAVENOUS CONTINUOUS
Status: DISCONTINUED | OUTPATIENT
Start: 2021-12-11 | End: 2021-12-14

## 2021-12-11 RX ADMIN — ENOXAPARIN SODIUM 40 MG: 40 INJECTION SUBCUTANEOUS at 08:30

## 2021-12-11 RX ADMIN — POTASSIUM CHLORIDE, DEXTROSE MONOHYDRATE AND SODIUM CHLORIDE 100 ML/HR: 150; 5; 450 INJECTION, SOLUTION INTRAVENOUS at 07:05

## 2021-12-11 RX ADMIN — POTASSIUM CHLORIDE, DEXTROSE MONOHYDRATE AND SODIUM CHLORIDE 100 ML/HR: 150; 5; 450 INJECTION, SOLUTION INTRAVENOUS at 17:29

## 2021-12-11 RX ADMIN — Medication 10 MG: at 17:29

## 2021-12-12 ENCOUNTER — APPOINTMENT (OUTPATIENT)
Dept: GENERAL RADIOLOGY | Facility: HOSPITAL | Age: 61
End: 2021-12-12

## 2021-12-12 LAB
ANION GAP SERPL CALCULATED.3IONS-SCNC: 6.8 MMOL/L (ref 5–15)
BUN SERPL-MCNC: 10 MG/DL (ref 8–23)
BUN/CREAT SERPL: 10.9 (ref 7–25)
CALCIUM SPEC-SCNC: 8.6 MG/DL (ref 8.6–10.5)
CHLORIDE SERPL-SCNC: 104 MMOL/L (ref 98–107)
CO2 SERPL-SCNC: 29.2 MMOL/L (ref 22–29)
CREAT SERPL-MCNC: 0.92 MG/DL (ref 0.76–1.27)
DEPRECATED RDW RBC AUTO: 43 FL (ref 37–54)
ERYTHROCYTE [DISTWIDTH] IN BLOOD BY AUTOMATED COUNT: 13.6 % (ref 12.3–15.4)
GFR SERPL CREATININE-BSD FRML MDRD: 84 ML/MIN/1.73
GLUCOSE SERPL-MCNC: 117 MG/DL (ref 65–99)
HCT VFR BLD AUTO: 36.5 % (ref 37.5–51)
HGB BLD-MCNC: 12.6 G/DL (ref 13–17.7)
MCH RBC QN AUTO: 30.3 PG (ref 26.6–33)
MCHC RBC AUTO-ENTMCNC: 34.5 G/DL (ref 31.5–35.7)
MCV RBC AUTO: 87.7 FL (ref 79–97)
PLATELET # BLD AUTO: 132 10*3/MM3 (ref 140–450)
PMV BLD AUTO: 10.4 FL (ref 6–12)
POTASSIUM SERPL-SCNC: 3.8 MMOL/L (ref 3.5–5.2)
RBC # BLD AUTO: 4.16 10*6/MM3 (ref 4.14–5.8)
SODIUM SERPL-SCNC: 140 MMOL/L (ref 136–145)
WBC NRBC COR # BLD: 5.6 10*3/MM3 (ref 3.4–10.8)

## 2021-12-12 PROCEDURE — 80048 BASIC METABOLIC PNL TOTAL CA: CPT | Performed by: SURGERY

## 2021-12-12 PROCEDURE — 74019 RADEX ABDOMEN 2 VIEWS: CPT

## 2021-12-12 PROCEDURE — 0 HYDROMORPHONE HCL PF 50 MG/5ML SOLUTION: Performed by: SURGERY

## 2021-12-12 PROCEDURE — 25010000002 ENOXAPARIN PER 10 MG: Performed by: SURGERY

## 2021-12-12 PROCEDURE — 94799 UNLISTED PULMONARY SVC/PX: CPT

## 2021-12-12 PROCEDURE — 85027 COMPLETE CBC AUTOMATED: CPT | Performed by: SURGERY

## 2021-12-12 PROCEDURE — 99024 POSTOP FOLLOW-UP VISIT: CPT | Performed by: SURGERY

## 2021-12-12 RX ADMIN — HYDROMORPHONE HYDROCHLORIDE: 10 INJECTION, SOLUTION INTRAMUSCULAR; INTRAVENOUS; SUBCUTANEOUS at 03:30

## 2021-12-12 RX ADMIN — ENOXAPARIN SODIUM 40 MG: 40 INJECTION SUBCUTANEOUS at 10:52

## 2021-12-12 RX ADMIN — GLYCERIN 2.7 ML: 2.8 LIQUID RECTAL at 10:52

## 2021-12-12 RX ADMIN — POTASSIUM CHLORIDE, DEXTROSE MONOHYDRATE AND SODIUM CHLORIDE 100 ML/HR: 150; 5; 450 INJECTION, SOLUTION INTRAVENOUS at 16:02

## 2021-12-12 RX ADMIN — POTASSIUM CHLORIDE, DEXTROSE MONOHYDRATE AND SODIUM CHLORIDE 100 ML/HR: 150; 5; 450 INJECTION, SOLUTION INTRAVENOUS at 05:10

## 2021-12-13 ENCOUNTER — APPOINTMENT (OUTPATIENT)
Dept: CT IMAGING | Facility: HOSPITAL | Age: 61
End: 2021-12-13

## 2021-12-13 PROCEDURE — 74176 CT ABD & PELVIS W/O CONTRAST: CPT

## 2021-12-13 PROCEDURE — 99024 POSTOP FOLLOW-UP VISIT: CPT | Performed by: SURGERY

## 2021-12-13 PROCEDURE — 25010000002 ENOXAPARIN PER 10 MG: Performed by: SURGERY

## 2021-12-13 PROCEDURE — 0 HYDROMORPHONE HCL PF 50 MG/5ML SOLUTION: Performed by: SURGERY

## 2021-12-13 RX ORDER — TAMSULOSIN HYDROCHLORIDE 0.4 MG/1
0.4 CAPSULE ORAL DAILY
Status: DISCONTINUED | OUTPATIENT
Start: 2021-12-13 | End: 2021-12-23 | Stop reason: HOSPADM

## 2021-12-13 RX ADMIN — HYDROMORPHONE HYDROCHLORIDE: 10 INJECTION, SOLUTION INTRAMUSCULAR; INTRAVENOUS; SUBCUTANEOUS at 10:58

## 2021-12-13 RX ADMIN — ENOXAPARIN SODIUM 40 MG: 40 INJECTION SUBCUTANEOUS at 09:04

## 2021-12-13 RX ADMIN — POTASSIUM CHLORIDE, DEXTROSE MONOHYDRATE AND SODIUM CHLORIDE 100 ML/HR: 150; 5; 450 INJECTION, SOLUTION INTRAVENOUS at 12:22

## 2021-12-13 RX ADMIN — POTASSIUM CHLORIDE, DEXTROSE MONOHYDRATE AND SODIUM CHLORIDE 100 ML/HR: 150; 5; 450 INJECTION, SOLUTION INTRAVENOUS at 02:14

## 2021-12-14 LAB
MAGNESIUM SERPL-MCNC: 1.7 MG/DL (ref 1.6–2.4)
POTASSIUM SERPL-SCNC: 3.8 MMOL/L (ref 3.5–5.2)
QT INTERVAL: 376 MS

## 2021-12-14 PROCEDURE — 0 HYDROMORPHONE HCL PF 50 MG/5ML SOLUTION: Performed by: SURGERY

## 2021-12-14 PROCEDURE — 94799 UNLISTED PULMONARY SVC/PX: CPT

## 2021-12-14 PROCEDURE — 93005 ELECTROCARDIOGRAM TRACING: CPT | Performed by: INTERNAL MEDICINE

## 2021-12-14 PROCEDURE — 25010000002 ENOXAPARIN PER 10 MG: Performed by: SURGERY

## 2021-12-14 PROCEDURE — 99254 IP/OBS CNSLTJ NEW/EST MOD 60: CPT | Performed by: NURSE PRACTITIONER

## 2021-12-14 PROCEDURE — 99024 POSTOP FOLLOW-UP VISIT: CPT | Performed by: SURGERY

## 2021-12-14 PROCEDURE — 83735 ASSAY OF MAGNESIUM: CPT | Performed by: SURGERY

## 2021-12-14 PROCEDURE — 25010000002 HYDROMORPHONE PER 4 MG: Performed by: SURGERY

## 2021-12-14 PROCEDURE — 25010000002 ONDANSETRON PER 1 MG: Performed by: SURGERY

## 2021-12-14 PROCEDURE — 93010 ELECTROCARDIOGRAM REPORT: CPT | Performed by: INTERNAL MEDICINE

## 2021-12-14 PROCEDURE — 84132 ASSAY OF SERUM POTASSIUM: CPT | Performed by: SURGERY

## 2021-12-14 RX ORDER — HYDROMORPHONE HYDROCHLORIDE 1 MG/ML
0.5 INJECTION, SOLUTION INTRAMUSCULAR; INTRAVENOUS; SUBCUTANEOUS
Status: DISCONTINUED | OUTPATIENT
Start: 2021-12-14 | End: 2021-12-23 | Stop reason: HOSPADM

## 2021-12-14 RX ORDER — HYDROCODONE BITARTRATE AND ACETAMINOPHEN 5; 325 MG/1; MG/1
1 TABLET ORAL EVERY 4 HOURS PRN
Status: DISCONTINUED | OUTPATIENT
Start: 2021-12-14 | End: 2021-12-19

## 2021-12-14 RX ADMIN — POTASSIUM CHLORIDE, DEXTROSE MONOHYDRATE AND SODIUM CHLORIDE 100 ML/HR: 150; 5; 450 INJECTION, SOLUTION INTRAVENOUS at 20:32

## 2021-12-14 RX ADMIN — POTASSIUM CHLORIDE, DEXTROSE MONOHYDRATE AND SODIUM CHLORIDE 100 ML/HR: 150; 5; 450 INJECTION, SOLUTION INTRAVENOUS at 04:36

## 2021-12-14 RX ADMIN — CARVEDILOL 12.5 MG: 12.5 TABLET, FILM COATED ORAL at 18:51

## 2021-12-14 RX ADMIN — TAMSULOSIN HYDROCHLORIDE 0.4 MG: 0.4 CAPSULE ORAL at 10:50

## 2021-12-14 RX ADMIN — CARVEDILOL 9.38 MG: 6.25 TABLET, FILM COATED ORAL at 10:46

## 2021-12-14 RX ADMIN — POTASSIUM CHLORIDE, DEXTROSE MONOHYDRATE AND SODIUM CHLORIDE 100 ML/HR: 150; 5; 450 INJECTION, SOLUTION INTRAVENOUS at 10:46

## 2021-12-14 RX ADMIN — HYDROMORPHONE HYDROCHLORIDE 0.5 MG: 1 INJECTION, SOLUTION INTRAMUSCULAR; INTRAVENOUS; SUBCUTANEOUS at 22:58

## 2021-12-14 RX ADMIN — GLYCERIN 2.7 ML: 2.8 LIQUID RECTAL at 18:51

## 2021-12-14 RX ADMIN — HYDROMORPHONE HYDROCHLORIDE 0.5 MG: 1 INJECTION, SOLUTION INTRAMUSCULAR; INTRAVENOUS; SUBCUTANEOUS at 20:32

## 2021-12-14 RX ADMIN — HYDROMORPHONE HYDROCHLORIDE: 10 INJECTION, SOLUTION INTRAMUSCULAR; INTRAVENOUS; SUBCUTANEOUS at 04:06

## 2021-12-14 RX ADMIN — LOSARTAN POTASSIUM 12.5 MG: 25 TABLET, FILM COATED ORAL at 10:46

## 2021-12-14 RX ADMIN — SODIUM CHLORIDE 500 ML: 9 INJECTION, SOLUTION INTRAVENOUS at 15:07

## 2021-12-14 RX ADMIN — ONDANSETRON 4 MG: 2 INJECTION INTRAMUSCULAR; INTRAVENOUS at 04:36

## 2021-12-14 RX ADMIN — ENOXAPARIN SODIUM 40 MG: 40 INJECTION SUBCUTANEOUS at 10:47

## 2021-12-15 LAB
ANION GAP SERPL CALCULATED.3IONS-SCNC: 10.3 MMOL/L (ref 5–15)
BUN SERPL-MCNC: 7 MG/DL (ref 8–23)
BUN/CREAT SERPL: 10 (ref 7–25)
CALCIUM SPEC-SCNC: 7.9 MG/DL (ref 8.6–10.5)
CHLORIDE SERPL-SCNC: 102 MMOL/L (ref 98–107)
CO2 SERPL-SCNC: 20.7 MMOL/L (ref 22–29)
CREAT SERPL-MCNC: 0.7 MG/DL (ref 0.76–1.27)
DEPRECATED RDW RBC AUTO: 43.6 FL (ref 37–54)
ERYTHROCYTE [DISTWIDTH] IN BLOOD BY AUTOMATED COUNT: 13.5 % (ref 12.3–15.4)
GFR SERPL CREATININE-BSD FRML MDRD: 115 ML/MIN/1.73
GLUCOSE SERPL-MCNC: 108 MG/DL (ref 65–99)
HCT VFR BLD AUTO: 32.8 % (ref 37.5–51)
HGB BLD-MCNC: 11.2 G/DL (ref 13–17.7)
MCH RBC QN AUTO: 30.2 PG (ref 26.6–33)
MCHC RBC AUTO-ENTMCNC: 34.1 G/DL (ref 31.5–35.7)
MCV RBC AUTO: 88.4 FL (ref 79–97)
PLATELET # BLD AUTO: 131 10*3/MM3 (ref 140–450)
PMV BLD AUTO: 10.9 FL (ref 6–12)
POTASSIUM SERPL-SCNC: 3.6 MMOL/L (ref 3.5–5.2)
RBC # BLD AUTO: 3.71 10*6/MM3 (ref 4.14–5.8)
SODIUM SERPL-SCNC: 133 MMOL/L (ref 136–145)
WBC NRBC COR # BLD: 7.33 10*3/MM3 (ref 3.4–10.8)

## 2021-12-15 PROCEDURE — 25010000002 KETOROLAC TROMETHAMINE PER 15 MG: Performed by: SURGERY

## 2021-12-15 PROCEDURE — 94799 UNLISTED PULMONARY SVC/PX: CPT

## 2021-12-15 PROCEDURE — 25010000002 HYDROMORPHONE PER 4 MG: Performed by: SURGERY

## 2021-12-15 PROCEDURE — 80048 BASIC METABOLIC PNL TOTAL CA: CPT | Performed by: SURGERY

## 2021-12-15 PROCEDURE — 25010000002 ENOXAPARIN PER 10 MG: Performed by: SURGERY

## 2021-12-15 PROCEDURE — 85027 COMPLETE CBC AUTOMATED: CPT | Performed by: SURGERY

## 2021-12-15 PROCEDURE — 99024 POSTOP FOLLOW-UP VISIT: CPT | Performed by: SURGERY

## 2021-12-15 RX ORDER — SODIUM CHLORIDE 9 MG/ML
50 INJECTION, SOLUTION INTRAVENOUS CONTINUOUS
Status: DISCONTINUED | OUTPATIENT
Start: 2021-12-15 | End: 2021-12-16

## 2021-12-15 RX ORDER — SIMETHICONE 80 MG
80 TABLET,CHEWABLE ORAL
Status: DISCONTINUED | OUTPATIENT
Start: 2021-12-15 | End: 2021-12-23 | Stop reason: HOSPADM

## 2021-12-15 RX ORDER — KETOROLAC TROMETHAMINE 30 MG/ML
15 INJECTION, SOLUTION INTRAMUSCULAR; INTRAVENOUS EVERY 6 HOURS
Status: COMPLETED | OUTPATIENT
Start: 2021-12-15 | End: 2021-12-17

## 2021-12-15 RX ORDER — LOSARTAN POTASSIUM 25 MG/1
12.5 TABLET ORAL DAILY
Status: CANCELLED | OUTPATIENT
Start: 2021-12-16

## 2021-12-15 RX ADMIN — HYDROCODONE BITARTRATE AND ACETAMINOPHEN 1 TABLET: 5; 325 TABLET ORAL at 09:31

## 2021-12-15 RX ADMIN — HYDROCODONE BITARTRATE AND ACETAMINOPHEN 1 TABLET: 5; 325 TABLET ORAL at 23:15

## 2021-12-15 RX ADMIN — KETOROLAC TROMETHAMINE 15 MG: 30 INJECTION, SOLUTION INTRAMUSCULAR at 20:51

## 2021-12-15 RX ADMIN — TAMSULOSIN HYDROCHLORIDE 0.4 MG: 0.4 CAPSULE ORAL at 10:42

## 2021-12-15 RX ADMIN — ENOXAPARIN SODIUM 40 MG: 40 INJECTION SUBCUTANEOUS at 10:42

## 2021-12-15 RX ADMIN — LOSARTAN POTASSIUM 12.5 MG: 25 TABLET, FILM COATED ORAL at 12:42

## 2021-12-15 RX ADMIN — SIMETHICONE 80 MG: 80 TABLET, CHEWABLE ORAL at 16:28

## 2021-12-15 RX ADMIN — CARVEDILOL 12.5 MG: 12.5 TABLET, FILM COATED ORAL at 16:28

## 2021-12-15 RX ADMIN — HYDROCODONE BITARTRATE AND ACETAMINOPHEN 1 TABLET: 5; 325 TABLET ORAL at 18:41

## 2021-12-15 RX ADMIN — HYDROCODONE BITARTRATE AND ACETAMINOPHEN 1 TABLET: 5; 325 TABLET ORAL at 13:33

## 2021-12-15 RX ADMIN — CARVEDILOL 9.38 MG: 6.25 TABLET, FILM COATED ORAL at 12:42

## 2021-12-15 RX ADMIN — KETOROLAC TROMETHAMINE 15 MG: 30 INJECTION, SOLUTION INTRAMUSCULAR at 16:28

## 2021-12-15 RX ADMIN — SPIRONOLACTONE 12.5 MG: 25 TABLET ORAL at 12:43

## 2021-12-15 RX ADMIN — POTASSIUM CHLORIDE, DEXTROSE MONOHYDRATE AND SODIUM CHLORIDE 100 ML/HR: 150; 5; 450 INJECTION, SOLUTION INTRAVENOUS at 09:31

## 2021-12-15 RX ADMIN — HYDROMORPHONE HYDROCHLORIDE 0.5 MG: 1 INJECTION, SOLUTION INTRAMUSCULAR; INTRAVENOUS; SUBCUTANEOUS at 02:38

## 2021-12-15 RX ADMIN — SODIUM CHLORIDE 50 ML/HR: 9 INJECTION, SOLUTION INTRAVENOUS at 16:25

## 2021-12-15 RX ADMIN — SIMETHICONE 80 MG: 80 TABLET, CHEWABLE ORAL at 20:52

## 2021-12-16 LAB — C DIFF TOX GENS STL QL NAA+PROBE: NEGATIVE

## 2021-12-16 PROCEDURE — 25010000002 KETOROLAC TROMETHAMINE PER 15 MG: Performed by: SURGERY

## 2021-12-16 PROCEDURE — 99024 POSTOP FOLLOW-UP VISIT: CPT | Performed by: SURGERY

## 2021-12-16 PROCEDURE — 25010000002 ONDANSETRON PER 1 MG: Performed by: SURGERY

## 2021-12-16 PROCEDURE — 87493 C DIFF AMPLIFIED PROBE: CPT | Performed by: SURGERY

## 2021-12-16 PROCEDURE — 94799 UNLISTED PULMONARY SVC/PX: CPT

## 2021-12-16 PROCEDURE — 99232 SBSQ HOSP IP/OBS MODERATE 35: CPT | Performed by: INTERNAL MEDICINE

## 2021-12-16 PROCEDURE — 25010000002 ENOXAPARIN PER 10 MG: Performed by: SURGERY

## 2021-12-16 RX ORDER — DEXTROSE, SODIUM CHLORIDE, AND POTASSIUM CHLORIDE 5; .9; .15 G/100ML; G/100ML; G/100ML
50 INJECTION INTRAVENOUS CONTINUOUS
Status: DISCONTINUED | OUTPATIENT
Start: 2021-12-16 | End: 2021-12-18

## 2021-12-16 RX ADMIN — SIMETHICONE 80 MG: 80 TABLET, CHEWABLE ORAL at 18:41

## 2021-12-16 RX ADMIN — TAMSULOSIN HYDROCHLORIDE 0.4 MG: 0.4 CAPSULE ORAL at 09:05

## 2021-12-16 RX ADMIN — CARVEDILOL 9.38 MG: 6.25 TABLET, FILM COATED ORAL at 09:04

## 2021-12-16 RX ADMIN — KETOROLAC TROMETHAMINE 15 MG: 30 INJECTION, SOLUTION INTRAMUSCULAR at 10:23

## 2021-12-16 RX ADMIN — SODIUM CHLORIDE 1000 ML: 9 INJECTION, SOLUTION INTRAVENOUS at 05:27

## 2021-12-16 RX ADMIN — POTASSIUM CHLORIDE, DEXTROSE MONOHYDRATE AND SODIUM CHLORIDE 75 ML/HR: 150; 5; 900 INJECTION, SOLUTION INTRAVENOUS at 18:40

## 2021-12-16 RX ADMIN — ONDANSETRON 4 MG: 2 INJECTION INTRAMUSCULAR; INTRAVENOUS at 03:56

## 2021-12-16 RX ADMIN — SIMETHICONE 80 MG: 80 TABLET, CHEWABLE ORAL at 11:41

## 2021-12-16 RX ADMIN — SIMETHICONE 80 MG: 80 TABLET, CHEWABLE ORAL at 09:04

## 2021-12-16 RX ADMIN — HYDROCODONE BITARTRATE AND ACETAMINOPHEN 1 TABLET: 5; 325 TABLET ORAL at 19:22

## 2021-12-16 RX ADMIN — KETOROLAC TROMETHAMINE 15 MG: 30 INJECTION, SOLUTION INTRAMUSCULAR at 04:57

## 2021-12-16 RX ADMIN — CARVEDILOL 12.5 MG: 12.5 TABLET, FILM COATED ORAL at 18:41

## 2021-12-16 RX ADMIN — KETOROLAC TROMETHAMINE 15 MG: 30 INJECTION, SOLUTION INTRAMUSCULAR at 21:30

## 2021-12-16 RX ADMIN — SIMETHICONE 80 MG: 80 TABLET, CHEWABLE ORAL at 21:30

## 2021-12-16 RX ADMIN — ENOXAPARIN SODIUM 40 MG: 40 INJECTION SUBCUTANEOUS at 09:05

## 2021-12-16 RX ADMIN — KETOROLAC TROMETHAMINE 15 MG: 30 INJECTION, SOLUTION INTRAMUSCULAR at 18:41

## 2021-12-16 RX ADMIN — LOSARTAN POTASSIUM 12.5 MG: 25 TABLET, FILM COATED ORAL at 11:41

## 2021-12-16 RX ADMIN — SODIUM CHLORIDE 50 ML/HR: 9 INJECTION, SOLUTION INTRAVENOUS at 04:01

## 2021-12-16 RX ADMIN — HYDROCODONE BITARTRATE AND ACETAMINOPHEN 1 TABLET: 5; 325 TABLET ORAL at 09:04

## 2021-12-17 PROCEDURE — 99232 SBSQ HOSP IP/OBS MODERATE 35: CPT | Performed by: INTERNAL MEDICINE

## 2021-12-17 PROCEDURE — 25010000002 ONDANSETRON PER 1 MG: Performed by: SURGERY

## 2021-12-17 PROCEDURE — 25010000002 KETOROLAC TROMETHAMINE PER 15 MG: Performed by: SURGERY

## 2021-12-17 PROCEDURE — 25010000002 ENOXAPARIN PER 10 MG: Performed by: SURGERY

## 2021-12-17 RX ORDER — DIPHENOXYLATE HYDROCHLORIDE AND ATROPINE SULFATE 2.5; .025 MG/1; MG/1
1 TABLET ORAL 2 TIMES DAILY PRN
Status: DISCONTINUED | OUTPATIENT
Start: 2021-12-17 | End: 2021-12-19

## 2021-12-17 RX ORDER — BENZONATATE 100 MG/1
100 CAPSULE ORAL 3 TIMES DAILY PRN
Status: DISCONTINUED | OUTPATIENT
Start: 2021-12-17 | End: 2021-12-23 | Stop reason: HOSPADM

## 2021-12-17 RX ADMIN — ONDANSETRON 4 MG: 2 INJECTION INTRAMUSCULAR; INTRAVENOUS at 03:12

## 2021-12-17 RX ADMIN — HYDROCODONE BITARTRATE AND ACETAMINOPHEN 1 TABLET: 5; 325 TABLET ORAL at 20:49

## 2021-12-17 RX ADMIN — KETOROLAC TROMETHAMINE 15 MG: 30 INJECTION, SOLUTION INTRAMUSCULAR at 04:46

## 2021-12-17 RX ADMIN — SIMETHICONE 80 MG: 80 TABLET, CHEWABLE ORAL at 16:34

## 2021-12-17 RX ADMIN — KETOROLAC TROMETHAMINE 15 MG: 30 INJECTION, SOLUTION INTRAMUSCULAR at 10:33

## 2021-12-17 RX ADMIN — SPIRONOLACTONE 12.5 MG: 25 TABLET ORAL at 09:18

## 2021-12-17 RX ADMIN — SIMETHICONE 80 MG: 80 TABLET, CHEWABLE ORAL at 09:18

## 2021-12-17 RX ADMIN — SIMETHICONE 80 MG: 80 TABLET, CHEWABLE ORAL at 20:45

## 2021-12-17 RX ADMIN — ENOXAPARIN SODIUM 40 MG: 40 INJECTION SUBCUTANEOUS at 09:17

## 2021-12-17 RX ADMIN — HYDROCODONE BITARTRATE AND ACETAMINOPHEN 1 TABLET: 5; 325 TABLET ORAL at 03:12

## 2021-12-17 RX ADMIN — SIMETHICONE 80 MG: 80 TABLET, CHEWABLE ORAL at 12:31

## 2021-12-17 RX ADMIN — CARVEDILOL 9.38 MG: 6.25 TABLET, FILM COATED ORAL at 09:18

## 2021-12-17 RX ADMIN — TAMSULOSIN HYDROCHLORIDE 0.4 MG: 0.4 CAPSULE ORAL at 09:18

## 2021-12-17 RX ADMIN — BENZONATATE 100 MG: 100 CAPSULE ORAL at 20:45

## 2021-12-17 RX ADMIN — POTASSIUM CHLORIDE, DEXTROSE MONOHYDRATE AND SODIUM CHLORIDE 75 ML/HR: 150; 5; 900 INJECTION, SOLUTION INTRAVENOUS at 14:02

## 2021-12-17 RX ADMIN — CARVEDILOL 12.5 MG: 12.5 TABLET, FILM COATED ORAL at 16:33

## 2021-12-18 LAB
ANION GAP SERPL CALCULATED.3IONS-SCNC: 7.4 MMOL/L (ref 5–15)
BASOPHILS # BLD AUTO: 0.02 10*3/MM3 (ref 0–0.2)
BASOPHILS NFR BLD AUTO: 0.3 % (ref 0–1.5)
BUN SERPL-MCNC: 7 MG/DL (ref 8–23)
BUN/CREAT SERPL: 8.2 (ref 7–25)
CALCIUM SPEC-SCNC: 8.2 MG/DL (ref 8.6–10.5)
CHLORIDE SERPL-SCNC: 106 MMOL/L (ref 98–107)
CO2 SERPL-SCNC: 20.6 MMOL/L (ref 22–29)
CREAT SERPL-MCNC: 0.85 MG/DL (ref 0.76–1.27)
DEPRECATED RDW RBC AUTO: 45.1 FL (ref 37–54)
EOSINOPHIL # BLD AUTO: 0.01 10*3/MM3 (ref 0–0.4)
EOSINOPHIL NFR BLD AUTO: 0.2 % (ref 0.3–6.2)
ERYTHROCYTE [DISTWIDTH] IN BLOOD BY AUTOMATED COUNT: 13.9 % (ref 12.3–15.4)
GFR SERPL CREATININE-BSD FRML MDRD: 92 ML/MIN/1.73
GLUCOSE SERPL-MCNC: 121 MG/DL (ref 65–99)
HCT VFR BLD AUTO: 30 % (ref 37.5–51)
HGB BLD-MCNC: 10.2 G/DL (ref 13–17.7)
IMM GRANULOCYTES # BLD AUTO: 0.1 10*3/MM3 (ref 0–0.05)
IMM GRANULOCYTES NFR BLD AUTO: 1.6 % (ref 0–0.5)
LYMPHOCYTES # BLD AUTO: 1.16 10*3/MM3 (ref 0.7–3.1)
LYMPHOCYTES NFR BLD AUTO: 19 % (ref 19.6–45.3)
MCH RBC QN AUTO: 30.3 PG (ref 26.6–33)
MCHC RBC AUTO-ENTMCNC: 34 G/DL (ref 31.5–35.7)
MCV RBC AUTO: 89 FL (ref 79–97)
MONOCYTES # BLD AUTO: 1.03 10*3/MM3 (ref 0.1–0.9)
MONOCYTES NFR BLD AUTO: 16.9 % (ref 5–12)
NEUTROPHILS NFR BLD AUTO: 3.78 10*3/MM3 (ref 1.7–7)
NEUTROPHILS NFR BLD AUTO: 62 % (ref 42.7–76)
NRBC BLD AUTO-RTO: 0.2 /100 WBC (ref 0–0.2)
PLATELET # BLD AUTO: 159 10*3/MM3 (ref 140–450)
PMV BLD AUTO: 11.5 FL (ref 6–12)
POTASSIUM SERPL-SCNC: 3.2 MMOL/L (ref 3.5–5.2)
RBC # BLD AUTO: 3.37 10*6/MM3 (ref 4.14–5.8)
SODIUM SERPL-SCNC: 134 MMOL/L (ref 136–145)
WBC NRBC COR # BLD: 6.1 10*3/MM3 (ref 3.4–10.8)

## 2021-12-18 PROCEDURE — 99232 SBSQ HOSP IP/OBS MODERATE 35: CPT | Performed by: NURSE PRACTITIONER

## 2021-12-18 PROCEDURE — 99024 POSTOP FOLLOW-UP VISIT: CPT | Performed by: SURGERY

## 2021-12-18 PROCEDURE — 25010000002 ENOXAPARIN PER 10 MG: Performed by: SURGERY

## 2021-12-18 PROCEDURE — 85025 COMPLETE CBC W/AUTO DIFF WBC: CPT | Performed by: SURGERY

## 2021-12-18 PROCEDURE — 80048 BASIC METABOLIC PNL TOTAL CA: CPT | Performed by: INTERNAL MEDICINE

## 2021-12-18 RX ORDER — LOPERAMIDE HYDROCHLORIDE 2 MG/1
4 CAPSULE ORAL 3 TIMES DAILY
Status: DISCONTINUED | OUTPATIENT
Start: 2021-12-18 | End: 2021-12-21

## 2021-12-18 RX ORDER — POTASSIUM CHLORIDE 750 MG/1
20 TABLET, FILM COATED, EXTENDED RELEASE ORAL ONCE
Status: COMPLETED | OUTPATIENT
Start: 2021-12-18 | End: 2021-12-18

## 2021-12-18 RX ADMIN — SIMETHICONE 80 MG: 80 TABLET, CHEWABLE ORAL at 17:00

## 2021-12-18 RX ADMIN — SIMETHICONE 80 MG: 80 TABLET, CHEWABLE ORAL at 09:44

## 2021-12-18 RX ADMIN — SIMETHICONE 80 MG: 80 TABLET, CHEWABLE ORAL at 20:22

## 2021-12-18 RX ADMIN — LOPERAMIDE HYDROCHLORIDE 4 MG: 2 CAPSULE ORAL at 20:22

## 2021-12-18 RX ADMIN — Medication 1 APPLICATION: at 11:40

## 2021-12-18 RX ADMIN — POTASSIUM CHLORIDE 20 MEQ: 10 TABLET, EXTENDED RELEASE ORAL at 17:39

## 2021-12-18 RX ADMIN — HYDROCODONE BITARTRATE AND ACETAMINOPHEN 1 TABLET: 5; 325 TABLET ORAL at 18:47

## 2021-12-18 RX ADMIN — POTASSIUM CHLORIDE, DEXTROSE MONOHYDRATE AND SODIUM CHLORIDE 50 ML/HR: 150; 5; 900 INJECTION, SOLUTION INTRAVENOUS at 18:41

## 2021-12-18 RX ADMIN — POTASSIUM CHLORIDE, DEXTROSE MONOHYDRATE AND SODIUM CHLORIDE 75 ML/HR: 150; 5; 900 INJECTION, SOLUTION INTRAVENOUS at 02:33

## 2021-12-18 RX ADMIN — LOPERAMIDE HYDROCHLORIDE 4 MG: 2 CAPSULE ORAL at 11:40

## 2021-12-18 RX ADMIN — BENZONATATE 100 MG: 100 CAPSULE ORAL at 11:40

## 2021-12-18 RX ADMIN — ENOXAPARIN SODIUM 40 MG: 40 INJECTION SUBCUTANEOUS at 09:43

## 2021-12-18 RX ADMIN — DIPHENOXYLATE HYDROCHLORIDE AND ATROPINE SULFATE 1 TABLET: 2.5; .025 TABLET ORAL at 00:16

## 2021-12-18 RX ADMIN — Medication 1 APPLICATION: at 20:22

## 2021-12-18 RX ADMIN — SIMETHICONE 80 MG: 80 TABLET, CHEWABLE ORAL at 11:33

## 2021-12-18 RX ADMIN — CARVEDILOL 12.5 MG: 12.5 TABLET, FILM COATED ORAL at 18:41

## 2021-12-18 RX ADMIN — LOPERAMIDE HYDROCHLORIDE 4 MG: 2 CAPSULE ORAL at 17:00

## 2021-12-18 RX ADMIN — TAMSULOSIN HYDROCHLORIDE 0.4 MG: 0.4 CAPSULE ORAL at 09:44

## 2021-12-19 LAB
ANION GAP SERPL CALCULATED.3IONS-SCNC: 11.4 MMOL/L (ref 5–15)
BUN SERPL-MCNC: 5 MG/DL (ref 8–23)
BUN/CREAT SERPL: 5.7 (ref 7–25)
CALCIUM SPEC-SCNC: 8.4 MG/DL (ref 8.6–10.5)
CHLORIDE SERPL-SCNC: 102 MMOL/L (ref 98–107)
CO2 SERPL-SCNC: 23.6 MMOL/L (ref 22–29)
CREAT SERPL-MCNC: 0.87 MG/DL (ref 0.76–1.27)
DEPRECATED RDW RBC AUTO: 43 FL (ref 37–54)
ERYTHROCYTE [DISTWIDTH] IN BLOOD BY AUTOMATED COUNT: 13.8 % (ref 12.3–15.4)
GFR SERPL CREATININE-BSD FRML MDRD: 89 ML/MIN/1.73
GLUCOSE SERPL-MCNC: 104 MG/DL (ref 65–99)
HCT VFR BLD AUTO: 28.9 % (ref 37.5–51)
HGB BLD-MCNC: 9.8 G/DL (ref 13–17.7)
MCH RBC QN AUTO: 29.5 PG (ref 26.6–33)
MCHC RBC AUTO-ENTMCNC: 33.9 G/DL (ref 31.5–35.7)
MCV RBC AUTO: 87 FL (ref 79–97)
PLATELET # BLD AUTO: 182 10*3/MM3 (ref 140–450)
PMV BLD AUTO: 11 FL (ref 6–12)
POTASSIUM SERPL-SCNC: 3.3 MMOL/L (ref 3.5–5.2)
RBC # BLD AUTO: 3.32 10*6/MM3 (ref 4.14–5.8)
SODIUM SERPL-SCNC: 137 MMOL/L (ref 136–145)
WBC NRBC COR # BLD: 6.7 10*3/MM3 (ref 3.4–10.8)

## 2021-12-19 PROCEDURE — 85027 COMPLETE CBC AUTOMATED: CPT | Performed by: SURGERY

## 2021-12-19 PROCEDURE — 80048 BASIC METABOLIC PNL TOTAL CA: CPT | Performed by: SURGERY

## 2021-12-19 PROCEDURE — 97530 THERAPEUTIC ACTIVITIES: CPT | Performed by: PHYSICAL THERAPIST

## 2021-12-19 PROCEDURE — 99024 POSTOP FOLLOW-UP VISIT: CPT | Performed by: SURGERY

## 2021-12-19 PROCEDURE — 99232 SBSQ HOSP IP/OBS MODERATE 35: CPT | Performed by: NURSE PRACTITIONER

## 2021-12-19 PROCEDURE — 25010000002 ENOXAPARIN PER 10 MG: Performed by: SURGERY

## 2021-12-19 PROCEDURE — 97161 PT EVAL LOW COMPLEX 20 MIN: CPT | Performed by: PHYSICAL THERAPIST

## 2021-12-19 RX ORDER — POTASSIUM CHLORIDE 1.5 G/1.77G
40 POWDER, FOR SOLUTION ORAL AS NEEDED
Status: DISCONTINUED | OUTPATIENT
Start: 2021-12-19 | End: 2021-12-23 | Stop reason: HOSPADM

## 2021-12-19 RX ORDER — SPIRONOLACTONE 25 MG/1
12.5 TABLET ORAL DAILY
Status: DISCONTINUED | OUTPATIENT
Start: 2021-12-19 | End: 2021-12-23 | Stop reason: HOSPADM

## 2021-12-19 RX ORDER — HYDROCODONE BITARTRATE AND ACETAMINOPHEN 10; 325 MG/1; MG/1
1 TABLET ORAL EVERY 6 HOURS PRN
Status: DISCONTINUED | OUTPATIENT
Start: 2021-12-19 | End: 2021-12-23 | Stop reason: HOSPADM

## 2021-12-19 RX ORDER — POTASSIUM CHLORIDE 750 MG/1
40 TABLET, FILM COATED, EXTENDED RELEASE ORAL AS NEEDED
Status: DISCONTINUED | OUTPATIENT
Start: 2021-12-19 | End: 2021-12-23 | Stop reason: HOSPADM

## 2021-12-19 RX ORDER — POTASSIUM CHLORIDE 750 MG/1
20 TABLET, FILM COATED, EXTENDED RELEASE ORAL 2 TIMES DAILY WITH MEALS
Status: DISCONTINUED | OUTPATIENT
Start: 2021-12-19 | End: 2021-12-19

## 2021-12-19 RX ORDER — POTASSIUM CHLORIDE 750 MG/1
20 TABLET, FILM COATED, EXTENDED RELEASE ORAL DAILY
Status: DISCONTINUED | OUTPATIENT
Start: 2021-12-20 | End: 2021-12-23 | Stop reason: HOSPADM

## 2021-12-19 RX ORDER — DIPHENOXYLATE HYDROCHLORIDE AND ATROPINE SULFATE 2.5; .025 MG/1; MG/1
1 TABLET ORAL 3 TIMES DAILY
Status: DISCONTINUED | OUTPATIENT
Start: 2021-12-19 | End: 2021-12-20

## 2021-12-19 RX ADMIN — DIPHENOXYLATE HYDROCHLORIDE AND ATROPINE SULFATE 1 TABLET: 2.5; .025 TABLET ORAL at 20:35

## 2021-12-19 RX ADMIN — CARVEDILOL 12.5 MG: 12.5 TABLET, FILM COATED ORAL at 16:33

## 2021-12-19 RX ADMIN — Medication 1 LOZENGE: at 08:25

## 2021-12-19 RX ADMIN — SPIRONOLACTONE 12.5 MG: 25 TABLET ORAL at 15:07

## 2021-12-19 RX ADMIN — SIMETHICONE 80 MG: 80 TABLET, CHEWABLE ORAL at 11:38

## 2021-12-19 RX ADMIN — ENOXAPARIN SODIUM 40 MG: 40 INJECTION SUBCUTANEOUS at 08:19

## 2021-12-19 RX ADMIN — Medication 1 APPLICATION: at 20:35

## 2021-12-19 RX ADMIN — LOPERAMIDE HYDROCHLORIDE 4 MG: 2 CAPSULE ORAL at 16:33

## 2021-12-19 RX ADMIN — HYDROCODONE BITARTRATE AND ACETAMINOPHEN 1 TABLET: 5; 325 TABLET ORAL at 08:28

## 2021-12-19 RX ADMIN — SIMETHICONE 80 MG: 80 TABLET, CHEWABLE ORAL at 16:33

## 2021-12-19 RX ADMIN — LOPERAMIDE HYDROCHLORIDE 4 MG: 2 CAPSULE ORAL at 08:19

## 2021-12-19 RX ADMIN — HYDROCODONE BITARTRATE AND ACETAMINOPHEN 1 TABLET: 10; 325 TABLET ORAL at 15:07

## 2021-12-19 RX ADMIN — LOPERAMIDE HYDROCHLORIDE 4 MG: 2 CAPSULE ORAL at 20:35

## 2021-12-19 RX ADMIN — SIMETHICONE 80 MG: 80 TABLET, CHEWABLE ORAL at 08:19

## 2021-12-19 RX ADMIN — DIPHENOXYLATE HYDROCHLORIDE AND ATROPINE SULFATE 1 TABLET: 2.5; .025 TABLET ORAL at 16:35

## 2021-12-19 RX ADMIN — SIMETHICONE 80 MG: 80 TABLET, CHEWABLE ORAL at 20:35

## 2021-12-19 RX ADMIN — Medication 1 APPLICATION: at 08:21

## 2021-12-19 RX ADMIN — TAMSULOSIN HYDROCHLORIDE 0.4 MG: 0.4 CAPSULE ORAL at 08:19

## 2021-12-19 RX ADMIN — POTASSIUM CHLORIDE 20 MEQ: 10 TABLET, EXTENDED RELEASE ORAL at 10:49

## 2021-12-20 LAB
ANION GAP SERPL CALCULATED.3IONS-SCNC: 9.8 MMOL/L (ref 5–15)
BUN SERPL-MCNC: 3 MG/DL (ref 8–23)
BUN/CREAT SERPL: 3.4 (ref 7–25)
CALCIUM SPEC-SCNC: 8.2 MG/DL (ref 8.6–10.5)
CHLORIDE SERPL-SCNC: 104 MMOL/L (ref 98–107)
CO2 SERPL-SCNC: 25.2 MMOL/L (ref 22–29)
CREAT SERPL-MCNC: 0.89 MG/DL (ref 0.76–1.27)
GFR SERPL CREATININE-BSD FRML MDRD: 87 ML/MIN/1.73
GLUCOSE SERPL-MCNC: 108 MG/DL (ref 65–99)
POTASSIUM SERPL-SCNC: 3.5 MMOL/L (ref 3.5–5.2)
SODIUM SERPL-SCNC: 139 MMOL/L (ref 136–145)

## 2021-12-20 PROCEDURE — 25010000002 ONDANSETRON PER 1 MG: Performed by: SURGERY

## 2021-12-20 PROCEDURE — 25010000002 ENOXAPARIN PER 10 MG: Performed by: SURGERY

## 2021-12-20 PROCEDURE — 99024 POSTOP FOLLOW-UP VISIT: CPT | Performed by: SURGERY

## 2021-12-20 PROCEDURE — 25010000002 FUROSEMIDE PER 20 MG: Performed by: NURSE PRACTITIONER

## 2021-12-20 PROCEDURE — 97165 OT EVAL LOW COMPLEX 30 MIN: CPT

## 2021-12-20 PROCEDURE — 80048 BASIC METABOLIC PNL TOTAL CA: CPT | Performed by: NURSE PRACTITIONER

## 2021-12-20 PROCEDURE — 99232 SBSQ HOSP IP/OBS MODERATE 35: CPT | Performed by: NURSE PRACTITIONER

## 2021-12-20 RX ORDER — FUROSEMIDE 10 MG/ML
40 INJECTION INTRAMUSCULAR; INTRAVENOUS ONCE
Status: COMPLETED | OUTPATIENT
Start: 2021-12-20 | End: 2021-12-20

## 2021-12-20 RX ORDER — DIPHENOXYLATE HYDROCHLORIDE AND ATROPINE SULFATE 2.5; .025 MG/1; MG/1
1 TABLET ORAL 3 TIMES DAILY PRN
Status: DISCONTINUED | OUTPATIENT
Start: 2021-12-20 | End: 2021-12-21

## 2021-12-20 RX ADMIN — FUROSEMIDE 40 MG: 10 INJECTION, SOLUTION INTRAMUSCULAR; INTRAVENOUS at 12:45

## 2021-12-20 RX ADMIN — ENOXAPARIN SODIUM 40 MG: 40 INJECTION SUBCUTANEOUS at 10:46

## 2021-12-20 RX ADMIN — POTASSIUM CHLORIDE 20 MEQ: 10 TABLET, EXTENDED RELEASE ORAL at 10:45

## 2021-12-20 RX ADMIN — SPIRONOLACTONE 12.5 MG: 25 TABLET ORAL at 10:45

## 2021-12-20 RX ADMIN — SIMETHICONE 80 MG: 80 TABLET, CHEWABLE ORAL at 12:41

## 2021-12-20 RX ADMIN — SIMETHICONE 80 MG: 80 TABLET, CHEWABLE ORAL at 17:55

## 2021-12-20 RX ADMIN — CARVEDILOL 12.5 MG: 12.5 TABLET, FILM COATED ORAL at 17:55

## 2021-12-20 RX ADMIN — ONDANSETRON 4 MG: 2 INJECTION INTRAMUSCULAR; INTRAVENOUS at 01:28

## 2021-12-20 RX ADMIN — Medication 1 APPLICATION: at 21:49

## 2021-12-20 RX ADMIN — SIMETHICONE 80 MG: 80 TABLET, CHEWABLE ORAL at 10:45

## 2021-12-20 RX ADMIN — SIMETHICONE 80 MG: 80 TABLET, CHEWABLE ORAL at 21:47

## 2021-12-20 RX ADMIN — Medication 1 APPLICATION: at 10:47

## 2021-12-20 RX ADMIN — TAMSULOSIN HYDROCHLORIDE 0.4 MG: 0.4 CAPSULE ORAL at 10:45

## 2021-12-20 RX ADMIN — HYDROCODONE BITARTRATE AND ACETAMINOPHEN 1 TABLET: 10; 325 TABLET ORAL at 01:28

## 2021-12-20 RX ADMIN — HYDROCODONE BITARTRATE AND ACETAMINOPHEN 1 TABLET: 10; 325 TABLET ORAL at 12:45

## 2021-12-20 RX ADMIN — HYDROCODONE BITARTRATE AND ACETAMINOPHEN 1 TABLET: 10; 325 TABLET ORAL at 23:45

## 2021-12-21 ENCOUNTER — APPOINTMENT (OUTPATIENT)
Dept: GENERAL RADIOLOGY | Facility: HOSPITAL | Age: 61
End: 2021-12-21

## 2021-12-21 LAB
ANION GAP SERPL CALCULATED.3IONS-SCNC: 5.7 MMOL/L (ref 5–15)
BUN SERPL-MCNC: 5 MG/DL (ref 8–23)
BUN/CREAT SERPL: 6 (ref 7–25)
CALCIUM SPEC-SCNC: 8.1 MG/DL (ref 8.6–10.5)
CHLORIDE SERPL-SCNC: 99 MMOL/L (ref 98–107)
CO2 SERPL-SCNC: 30.3 MMOL/L (ref 22–29)
CREAT SERPL-MCNC: 0.84 MG/DL (ref 0.76–1.27)
GFR SERPL CREATININE-BSD FRML MDRD: 93 ML/MIN/1.73
GLUCOSE SERPL-MCNC: 99 MG/DL (ref 65–99)
POTASSIUM SERPL-SCNC: 3 MMOL/L (ref 3.5–5.2)
SODIUM SERPL-SCNC: 135 MMOL/L (ref 136–145)

## 2021-12-21 PROCEDURE — 25010000002 ENOXAPARIN PER 10 MG: Performed by: SURGERY

## 2021-12-21 PROCEDURE — 74018 RADEX ABDOMEN 1 VIEW: CPT

## 2021-12-21 PROCEDURE — 80048 BASIC METABOLIC PNL TOTAL CA: CPT | Performed by: NURSE PRACTITIONER

## 2021-12-21 PROCEDURE — 99232 SBSQ HOSP IP/OBS MODERATE 35: CPT | Performed by: NURSE PRACTITIONER

## 2021-12-21 PROCEDURE — 25010000002 FUROSEMIDE PER 20 MG: Performed by: NURSE PRACTITIONER

## 2021-12-21 PROCEDURE — 99024 POSTOP FOLLOW-UP VISIT: CPT | Performed by: SURGERY

## 2021-12-21 RX ORDER — POTASSIUM CHLORIDE 750 MG/1
40 TABLET, FILM COATED, EXTENDED RELEASE ORAL ONCE
Status: DISCONTINUED | OUTPATIENT
Start: 2021-12-21 | End: 2021-12-23 | Stop reason: HOSPADM

## 2021-12-21 RX ORDER — FUROSEMIDE 10 MG/ML
40 INJECTION INTRAMUSCULAR; INTRAVENOUS ONCE
Status: COMPLETED | OUTPATIENT
Start: 2021-12-21 | End: 2021-12-21

## 2021-12-21 RX ORDER — LOPERAMIDE HYDROCHLORIDE 2 MG/1
2 CAPSULE ORAL 3 TIMES DAILY
Status: DISCONTINUED | OUTPATIENT
Start: 2021-12-21 | End: 2021-12-23 | Stop reason: HOSPADM

## 2021-12-21 RX ADMIN — POTASSIUM CHLORIDE 40 MEQ: 10 TABLET, EXTENDED RELEASE ORAL at 10:09

## 2021-12-21 RX ADMIN — LOPERAMIDE HYDROCHLORIDE 2 MG: 2 CAPSULE ORAL at 17:26

## 2021-12-21 RX ADMIN — SIMETHICONE 80 MG: 80 TABLET, CHEWABLE ORAL at 11:46

## 2021-12-21 RX ADMIN — SIMETHICONE 80 MG: 80 TABLET, CHEWABLE ORAL at 21:36

## 2021-12-21 RX ADMIN — SIMETHICONE 80 MG: 80 TABLET, CHEWABLE ORAL at 17:26

## 2021-12-21 RX ADMIN — SIMETHICONE 80 MG: 80 TABLET, CHEWABLE ORAL at 07:55

## 2021-12-21 RX ADMIN — Medication 1 APPLICATION: at 13:28

## 2021-12-21 RX ADMIN — SPIRONOLACTONE 12.5 MG: 25 TABLET ORAL at 10:03

## 2021-12-21 RX ADMIN — FUROSEMIDE 40 MG: 10 INJECTION, SOLUTION INTRAVENOUS at 13:29

## 2021-12-21 RX ADMIN — TAMSULOSIN HYDROCHLORIDE 0.4 MG: 0.4 CAPSULE ORAL at 10:03

## 2021-12-21 RX ADMIN — CARVEDILOL 12.5 MG: 12.5 TABLET, FILM COATED ORAL at 17:25

## 2021-12-21 RX ADMIN — Medication 1 APPLICATION: at 21:37

## 2021-12-21 RX ADMIN — POTASSIUM CHLORIDE 20 MEQ: 10 TABLET, EXTENDED RELEASE ORAL at 10:04

## 2021-12-21 RX ADMIN — ENOXAPARIN SODIUM 40 MG: 40 INJECTION SUBCUTANEOUS at 10:02

## 2021-12-22 LAB
ANION GAP SERPL CALCULATED.3IONS-SCNC: 10.4 MMOL/L (ref 5–15)
BUN SERPL-MCNC: 8 MG/DL (ref 8–23)
BUN/CREAT SERPL: 11.1 (ref 7–25)
CALCIUM SPEC-SCNC: 7.9 MG/DL (ref 8.6–10.5)
CHLORIDE SERPL-SCNC: 97 MMOL/L (ref 98–107)
CO2 SERPL-SCNC: 26.6 MMOL/L (ref 22–29)
CREAT SERPL-MCNC: 0.72 MG/DL (ref 0.76–1.27)
GFR SERPL CREATININE-BSD FRML MDRD: 111 ML/MIN/1.73
GLUCOSE SERPL-MCNC: 85 MG/DL (ref 65–99)
POTASSIUM SERPL-SCNC: 2.9 MMOL/L (ref 3.5–5.2)
SODIUM SERPL-SCNC: 134 MMOL/L (ref 136–145)

## 2021-12-22 PROCEDURE — 25010000002 FUROSEMIDE PER 20 MG: Performed by: INTERNAL MEDICINE

## 2021-12-22 PROCEDURE — 84132 ASSAY OF SERUM POTASSIUM: CPT | Performed by: INTERNAL MEDICINE

## 2021-12-22 PROCEDURE — 80048 BASIC METABOLIC PNL TOTAL CA: CPT | Performed by: NURSE PRACTITIONER

## 2021-12-22 PROCEDURE — 25010000002 ENOXAPARIN PER 10 MG: Performed by: SURGERY

## 2021-12-22 PROCEDURE — 99232 SBSQ HOSP IP/OBS MODERATE 35: CPT | Performed by: INTERNAL MEDICINE

## 2021-12-22 RX ORDER — FUROSEMIDE 10 MG/ML
40 INJECTION INTRAMUSCULAR; INTRAVENOUS ONCE
Status: COMPLETED | OUTPATIENT
Start: 2021-12-22 | End: 2021-12-22

## 2021-12-22 RX ADMIN — SIMETHICONE 80 MG: 80 TABLET, CHEWABLE ORAL at 17:37

## 2021-12-22 RX ADMIN — POTASSIUM CHLORIDE 40 MEQ: 10 TABLET, EXTENDED RELEASE ORAL at 08:45

## 2021-12-22 RX ADMIN — Medication 1 APPLICATION: at 20:40

## 2021-12-22 RX ADMIN — HYDROCODONE BITARTRATE AND ACETAMINOPHEN 1 TABLET: 10; 325 TABLET ORAL at 20:39

## 2021-12-22 RX ADMIN — POTASSIUM CHLORIDE 20 MEQ: 10 TABLET, EXTENDED RELEASE ORAL at 08:45

## 2021-12-22 RX ADMIN — SPIRONOLACTONE 12.5 MG: 25 TABLET ORAL at 08:45

## 2021-12-22 RX ADMIN — LOPERAMIDE HYDROCHLORIDE 2 MG: 2 CAPSULE ORAL at 02:12

## 2021-12-22 RX ADMIN — SIMETHICONE 80 MG: 80 TABLET, CHEWABLE ORAL at 20:40

## 2021-12-22 RX ADMIN — ENOXAPARIN SODIUM 40 MG: 40 INJECTION SUBCUTANEOUS at 08:44

## 2021-12-22 RX ADMIN — POTASSIUM CHLORIDE 40 MEQ: 10 TABLET, EXTENDED RELEASE ORAL at 13:22

## 2021-12-22 RX ADMIN — CARVEDILOL 12.5 MG: 12.5 TABLET, FILM COATED ORAL at 17:37

## 2021-12-22 RX ADMIN — TAMSULOSIN HYDROCHLORIDE 0.4 MG: 0.4 CAPSULE ORAL at 08:45

## 2021-12-22 RX ADMIN — SIMETHICONE 80 MG: 80 TABLET, CHEWABLE ORAL at 07:10

## 2021-12-22 RX ADMIN — POTASSIUM CHLORIDE 40 MEQ: 10 TABLET, EXTENDED RELEASE ORAL at 17:37

## 2021-12-22 RX ADMIN — Medication 1 APPLICATION: at 08:46

## 2021-12-22 RX ADMIN — FUROSEMIDE 40 MG: 10 INJECTION, SOLUTION INTRAVENOUS at 15:13

## 2021-12-22 RX ADMIN — SIMETHICONE 80 MG: 80 TABLET, CHEWABLE ORAL at 12:08

## 2021-12-23 ENCOUNTER — READMISSION MANAGEMENT (OUTPATIENT)
Dept: CALL CENTER | Facility: HOSPITAL | Age: 61
End: 2021-12-23

## 2021-12-23 VITALS
RESPIRATION RATE: 19 BRPM | SYSTOLIC BLOOD PRESSURE: 104 MMHG | BODY MASS INDEX: 32.53 KG/M2 | DIASTOLIC BLOOD PRESSURE: 64 MMHG | HEIGHT: 69 IN | WEIGHT: 219.6 LBS | HEART RATE: 83 BPM | OXYGEN SATURATION: 95 % | TEMPERATURE: 98 F

## 2021-12-23 LAB
ALBUMIN SERPL-MCNC: 2.7 G/DL (ref 3.5–5.2)
ALBUMIN/GLOB SERPL: 0.7 G/DL
ALP SERPL-CCNC: 63 U/L (ref 39–117)
ALT SERPL W P-5'-P-CCNC: 49 U/L (ref 1–41)
ANION GAP SERPL CALCULATED.3IONS-SCNC: 12.5 MMOL/L (ref 5–15)
AST SERPL-CCNC: 89 U/L (ref 1–40)
BILIRUB SERPL-MCNC: 1.1 MG/DL (ref 0–1.2)
BUN SERPL-MCNC: 9 MG/DL (ref 8–23)
BUN/CREAT SERPL: 10 (ref 7–25)
CALCIUM SPEC-SCNC: 8.7 MG/DL (ref 8.6–10.5)
CHLORIDE SERPL-SCNC: 99 MMOL/L (ref 98–107)
CO2 SERPL-SCNC: 27.5 MMOL/L (ref 22–29)
CREAT SERPL-MCNC: 0.9 MG/DL (ref 0.76–1.27)
GFR SERPL CREATININE-BSD FRML MDRD: 86 ML/MIN/1.73
GLOBULIN UR ELPH-MCNC: 4.1 GM/DL
GLUCOSE SERPL-MCNC: 93 MG/DL (ref 65–99)
MAGNESIUM SERPL-MCNC: 2 MG/DL (ref 1.6–2.4)
POTASSIUM SERPL-SCNC: 4 MMOL/L (ref 3.5–5.2)
POTASSIUM SERPL-SCNC: 4.5 MMOL/L (ref 3.5–5.2)
PROT SERPL-MCNC: 6.8 G/DL (ref 6–8.5)
SODIUM SERPL-SCNC: 139 MMOL/L (ref 136–145)

## 2021-12-23 PROCEDURE — 80053 COMPREHEN METABOLIC PANEL: CPT | Performed by: INTERNAL MEDICINE

## 2021-12-23 PROCEDURE — 25010000002 ENOXAPARIN PER 10 MG: Performed by: SURGERY

## 2021-12-23 PROCEDURE — 99232 SBSQ HOSP IP/OBS MODERATE 35: CPT | Performed by: NURSE PRACTITIONER

## 2021-12-23 PROCEDURE — 83735 ASSAY OF MAGNESIUM: CPT | Performed by: INTERNAL MEDICINE

## 2021-12-23 PROCEDURE — 97530 THERAPEUTIC ACTIVITIES: CPT

## 2021-12-23 RX ORDER — HYDROCODONE BITARTRATE AND ACETAMINOPHEN 5; 325 MG/1; MG/1
TABLET ORAL
Qty: 20 TABLET | Refills: 0 | Status: ON HOLD | OUTPATIENT
Start: 2021-12-23 | End: 2022-02-03

## 2021-12-23 RX ORDER — ONDANSETRON 4 MG/1
4 TABLET, FILM COATED ORAL EVERY 6 HOURS PRN
Qty: 10 TABLET | Refills: 1 | Status: ON HOLD | OUTPATIENT
Start: 2021-12-23 | End: 2022-02-03

## 2021-12-23 RX ADMIN — Medication 1 APPLICATION: at 10:16

## 2021-12-23 RX ADMIN — ENOXAPARIN SODIUM 40 MG: 40 INJECTION SUBCUTANEOUS at 10:15

## 2021-12-23 RX ADMIN — SPIRONOLACTONE 12.5 MG: 25 TABLET ORAL at 10:16

## 2021-12-23 RX ADMIN — SIMETHICONE 80 MG: 80 TABLET, CHEWABLE ORAL at 13:03

## 2021-12-23 RX ADMIN — HYDROCODONE BITARTRATE AND ACETAMINOPHEN 1 TABLET: 10; 325 TABLET ORAL at 16:43

## 2021-12-23 RX ADMIN — HYDROCODONE BITARTRATE AND ACETAMINOPHEN 1 TABLET: 10; 325 TABLET ORAL at 10:59

## 2021-12-23 RX ADMIN — SIMETHICONE 80 MG: 80 TABLET, CHEWABLE ORAL at 16:43

## 2021-12-23 RX ADMIN — SIMETHICONE 80 MG: 80 TABLET, CHEWABLE ORAL at 07:44

## 2021-12-23 RX ADMIN — POTASSIUM CHLORIDE 20 MEQ: 10 TABLET, EXTENDED RELEASE ORAL at 10:13

## 2021-12-23 RX ADMIN — TAMSULOSIN HYDROCHLORIDE 0.4 MG: 0.4 CAPSULE ORAL at 10:13

## 2021-12-24 ENCOUNTER — HOSPITAL ENCOUNTER (INPATIENT)
Facility: HOSPITAL | Age: 61
LOS: 41 days | End: 2022-02-03
Attending: EMERGENCY MEDICINE | Admitting: SURGERY

## 2021-12-24 ENCOUNTER — APPOINTMENT (OUTPATIENT)
Dept: GENERAL RADIOLOGY | Facility: HOSPITAL | Age: 61
End: 2021-12-24

## 2021-12-24 ENCOUNTER — APPOINTMENT (OUTPATIENT)
Dept: CT IMAGING | Facility: HOSPITAL | Age: 61
End: 2021-12-24

## 2021-12-24 DIAGNOSIS — D72.825 BANDEMIA: ICD-10-CM

## 2021-12-24 DIAGNOSIS — K56.609 COLONIC OBSTRUCTION: ICD-10-CM

## 2021-12-24 DIAGNOSIS — K56.609 INTESTINAL OBSTRUCTION, UNSPECIFIED CAUSE, UNSPECIFIED WHETHER PARTIAL OR COMPLETE: ICD-10-CM

## 2021-12-24 DIAGNOSIS — K91.89 ANASTOMOTIC LEAK OF INTESTINE: Primary | ICD-10-CM

## 2021-12-24 DIAGNOSIS — K52.9 COLITIS: ICD-10-CM

## 2021-12-24 DIAGNOSIS — D64.9 CHRONIC ANEMIA: ICD-10-CM

## 2021-12-24 LAB
ALBUMIN SERPL-MCNC: 2.8 G/DL (ref 3.5–5.2)
ALBUMIN/GLOB SERPL: 0.8 G/DL
ALP SERPL-CCNC: 67 U/L (ref 39–117)
ALT SERPL W P-5'-P-CCNC: 48 U/L (ref 1–41)
ANION GAP SERPL CALCULATED.3IONS-SCNC: 12.3 MMOL/L (ref 5–15)
AST SERPL-CCNC: 75 U/L (ref 1–40)
B PARAPERT DNA SPEC QL NAA+PROBE: NOT DETECTED
B PERT DNA SPEC QL NAA+PROBE: NOT DETECTED
BACTERIA UR QL AUTO: NORMAL /HPF
BASOPHILS # BLD AUTO: 0.06 10*3/MM3 (ref 0–0.2)
BASOPHILS NFR BLD AUTO: 0.6 % (ref 0–1.5)
BILIRUB SERPL-MCNC: 1.5 MG/DL (ref 0–1.2)
BILIRUB UR QL STRIP: ABNORMAL
BUN SERPL-MCNC: 8 MG/DL (ref 8–23)
BUN/CREAT SERPL: 9.2 (ref 7–25)
C PNEUM DNA NPH QL NAA+NON-PROBE: NOT DETECTED
CALCIUM SPEC-SCNC: 8.2 MG/DL (ref 8.6–10.5)
CHLORIDE SERPL-SCNC: 96 MMOL/L (ref 98–107)
CLARITY UR: CLEAR
CO2 SERPL-SCNC: 23.7 MMOL/L (ref 22–29)
COLOR UR: ABNORMAL
CREAT SERPL-MCNC: 0.87 MG/DL (ref 0.76–1.27)
D-LACTATE SERPL-SCNC: 1.2 MMOL/L (ref 0.5–2)
DEPRECATED RDW RBC AUTO: 44.5 FL (ref 37–54)
EOSINOPHIL # BLD AUTO: 0.15 10*3/MM3 (ref 0–0.4)
EOSINOPHIL NFR BLD AUTO: 1.5 % (ref 0.3–6.2)
ERYTHROCYTE [DISTWIDTH] IN BLOOD BY AUTOMATED COUNT: 13.9 % (ref 12.3–15.4)
FLUAV SUBTYP SPEC NAA+PROBE: NOT DETECTED
FLUBV RNA ISLT QL NAA+PROBE: NOT DETECTED
GFR SERPL CREATININE-BSD FRML MDRD: 89 ML/MIN/1.73
GLOBULIN UR ELPH-MCNC: 3.7 GM/DL
GLUCOSE SERPL-MCNC: 94 MG/DL (ref 65–99)
GLUCOSE UR STRIP-MCNC: NEGATIVE MG/DL
HADV DNA SPEC NAA+PROBE: NOT DETECTED
HCOV 229E RNA SPEC QL NAA+PROBE: NOT DETECTED
HCOV HKU1 RNA SPEC QL NAA+PROBE: NOT DETECTED
HCOV NL63 RNA SPEC QL NAA+PROBE: NOT DETECTED
HCOV OC43 RNA SPEC QL NAA+PROBE: NOT DETECTED
HCT VFR BLD AUTO: 32 % (ref 37.5–51)
HGB BLD-MCNC: 10.7 G/DL (ref 13–17.7)
HGB UR QL STRIP.AUTO: NEGATIVE
HMPV RNA NPH QL NAA+NON-PROBE: NOT DETECTED
HPIV1 RNA ISLT QL NAA+PROBE: NOT DETECTED
HPIV2 RNA SPEC QL NAA+PROBE: NOT DETECTED
HPIV3 RNA NPH QL NAA+PROBE: NOT DETECTED
HPIV4 P GENE NPH QL NAA+PROBE: NOT DETECTED
HYALINE CASTS UR QL AUTO: NORMAL /LPF
IMM GRANULOCYTES # BLD AUTO: 0.07 10*3/MM3 (ref 0–0.05)
IMM GRANULOCYTES NFR BLD AUTO: 0.7 % (ref 0–0.5)
KETONES UR QL STRIP: ABNORMAL
LEUKOCYTE ESTERASE UR QL STRIP.AUTO: ABNORMAL
LIPASE SERPL-CCNC: 105 U/L (ref 13–60)
LYMPHOCYTES # BLD AUTO: 1.44 10*3/MM3 (ref 0.7–3.1)
LYMPHOCYTES NFR BLD AUTO: 14.1 % (ref 19.6–45.3)
M PNEUMO IGG SER IA-ACNC: NOT DETECTED
MCH RBC QN AUTO: 29.6 PG (ref 26.6–33)
MCHC RBC AUTO-ENTMCNC: 33.4 G/DL (ref 31.5–35.7)
MCV RBC AUTO: 88.4 FL (ref 79–97)
MONOCYTES # BLD AUTO: 1.53 10*3/MM3 (ref 0.1–0.9)
MONOCYTES NFR BLD AUTO: 15 % (ref 5–12)
NEUTROPHILS NFR BLD AUTO: 6.94 10*3/MM3 (ref 1.7–7)
NEUTROPHILS NFR BLD AUTO: 68.1 % (ref 42.7–76)
NITRITE UR QL STRIP: NEGATIVE
NRBC BLD AUTO-RTO: 0 /100 WBC (ref 0–0.2)
PH UR STRIP.AUTO: 6.5 [PH] (ref 5–8)
PLATELET # BLD AUTO: 286 10*3/MM3 (ref 140–450)
PMV BLD AUTO: 10.1 FL (ref 6–12)
POTASSIUM SERPL-SCNC: 3.8 MMOL/L (ref 3.5–5.2)
PROT SERPL-MCNC: 6.5 G/DL (ref 6–8.5)
PROT UR QL STRIP: ABNORMAL
RBC # BLD AUTO: 3.62 10*6/MM3 (ref 4.14–5.8)
RBC # UR STRIP: NORMAL /HPF
REF LAB TEST METHOD: NORMAL
RHINOVIRUS RNA SPEC NAA+PROBE: NOT DETECTED
RSV RNA NPH QL NAA+NON-PROBE: NOT DETECTED
SARS-COV-2 RNA NPH QL NAA+NON-PROBE: NOT DETECTED
SODIUM SERPL-SCNC: 132 MMOL/L (ref 136–145)
SP GR UR STRIP: 1.02 (ref 1–1.03)
SQUAMOUS #/AREA URNS HPF: NORMAL /HPF
UROBILINOGEN UR QL STRIP: ABNORMAL
WBC # UR STRIP: NORMAL /HPF
WBC NRBC COR # BLD: 10.19 10*3/MM3 (ref 3.4–10.8)

## 2021-12-24 PROCEDURE — 25010000002 PIPERACILLIN SOD-TAZOBACTAM PER 1 G: Performed by: EMERGENCY MEDICINE

## 2021-12-24 PROCEDURE — 71045 X-RAY EXAM CHEST 1 VIEW: CPT

## 2021-12-24 PROCEDURE — 99284 EMERGENCY DEPT VISIT MOD MDM: CPT

## 2021-12-24 PROCEDURE — 25010000002 MORPHINE PER 10 MG: Performed by: EMERGENCY MEDICINE

## 2021-12-24 PROCEDURE — 25010000002 HYDROMORPHONE PER 4 MG: Performed by: SURGERY

## 2021-12-24 PROCEDURE — 25010000002 ENOXAPARIN PER 10 MG: Performed by: SURGERY

## 2021-12-24 PROCEDURE — 25010000002 ONDANSETRON PER 1 MG: Performed by: EMERGENCY MEDICINE

## 2021-12-24 PROCEDURE — 81001 URINALYSIS AUTO W/SCOPE: CPT | Performed by: EMERGENCY MEDICINE

## 2021-12-24 PROCEDURE — 0202U NFCT DS 22 TRGT SARS-COV-2: CPT | Performed by: EMERGENCY MEDICINE

## 2021-12-24 PROCEDURE — 85025 COMPLETE CBC W/AUTO DIFF WBC: CPT | Performed by: EMERGENCY MEDICINE

## 2021-12-24 PROCEDURE — 25010000002 IOPAMIDOL 61 % SOLUTION: Performed by: EMERGENCY MEDICINE

## 2021-12-24 PROCEDURE — 83605 ASSAY OF LACTIC ACID: CPT | Performed by: EMERGENCY MEDICINE

## 2021-12-24 PROCEDURE — 87040 BLOOD CULTURE FOR BACTERIA: CPT | Performed by: EMERGENCY MEDICINE

## 2021-12-24 PROCEDURE — 25010000002 PIPERACILLIN SOD-TAZOBACTAM PER 1 G: Performed by: SURGERY

## 2021-12-24 PROCEDURE — 83690 ASSAY OF LIPASE: CPT | Performed by: EMERGENCY MEDICINE

## 2021-12-24 PROCEDURE — 74177 CT ABD & PELVIS W/CONTRAST: CPT

## 2021-12-24 PROCEDURE — 80053 COMPREHEN METABOLIC PANEL: CPT | Performed by: EMERGENCY MEDICINE

## 2021-12-24 RX ORDER — ONDANSETRON 2 MG/ML
4 INJECTION INTRAMUSCULAR; INTRAVENOUS EVERY 6 HOURS PRN
Status: DISCONTINUED | OUTPATIENT
Start: 2021-12-24 | End: 2022-02-03 | Stop reason: HOSPADM

## 2021-12-24 RX ORDER — HYDROMORPHONE HYDROCHLORIDE 1 MG/ML
0.5 INJECTION, SOLUTION INTRAMUSCULAR; INTRAVENOUS; SUBCUTANEOUS
Status: DISCONTINUED | OUTPATIENT
Start: 2021-12-24 | End: 2022-01-04

## 2021-12-24 RX ORDER — SPIRONOLACTONE 25 MG/1
12.5 TABLET ORAL EVERY OTHER DAY
Status: DISCONTINUED | OUTPATIENT
Start: 2021-12-24 | End: 2021-12-26

## 2021-12-24 RX ORDER — SODIUM CHLORIDE 0.9 % (FLUSH) 0.9 %
10 SYRINGE (ML) INJECTION AS NEEDED
Status: DISCONTINUED | OUTPATIENT
Start: 2021-12-24 | End: 2022-02-03 | Stop reason: HOSPADM

## 2021-12-24 RX ORDER — VALACYCLOVIR HYDROCHLORIDE 500 MG/1
500 TABLET, FILM COATED ORAL AS NEEDED
Status: ON HOLD | COMMUNITY
Start: 2021-10-25 | End: 2022-02-03

## 2021-12-24 RX ORDER — ONDANSETRON 2 MG/ML
8 INJECTION INTRAMUSCULAR; INTRAVENOUS ONCE
Status: COMPLETED | OUTPATIENT
Start: 2021-12-24 | End: 2021-12-24

## 2021-12-24 RX ORDER — NALOXONE HCL 0.4 MG/ML
0.4 VIAL (ML) INJECTION
Status: DISCONTINUED | OUTPATIENT
Start: 2021-12-24 | End: 2022-01-04

## 2021-12-24 RX ORDER — SODIUM CHLORIDE 0.9 % (FLUSH) 0.9 %
10 SYRINGE (ML) INJECTION EVERY 12 HOURS SCHEDULED
Status: DISCONTINUED | OUTPATIENT
Start: 2021-12-24 | End: 2022-01-08

## 2021-12-24 RX ORDER — MORPHINE SULFATE 2 MG/ML
4 INJECTION, SOLUTION INTRAMUSCULAR; INTRAVENOUS ONCE
Status: COMPLETED | OUTPATIENT
Start: 2021-12-24 | End: 2021-12-24

## 2021-12-24 RX ORDER — SODIUM CHLORIDE 9 MG/ML
100 INJECTION, SOLUTION INTRAVENOUS CONTINUOUS
Status: ACTIVE | OUTPATIENT
Start: 2021-12-24 | End: 2021-12-25

## 2021-12-24 RX ORDER — CARVEDILOL 12.5 MG/1
12.5 TABLET ORAL EVERY EVENING
Status: DISCONTINUED | OUTPATIENT
Start: 2021-12-24 | End: 2021-12-29

## 2021-12-24 RX ADMIN — HYDROMORPHONE HYDROCHLORIDE 0.5 MG: 10 INJECTION INTRAMUSCULAR; INTRAVENOUS; SUBCUTANEOUS at 21:38

## 2021-12-24 RX ADMIN — ONDANSETRON 8 MG: 2 INJECTION INTRAMUSCULAR; INTRAVENOUS at 11:16

## 2021-12-24 RX ADMIN — SODIUM CHLORIDE 250 ML: 9 INJECTION, SOLUTION INTRAVENOUS at 11:15

## 2021-12-24 RX ADMIN — SPIRONOLACTONE 12.5 MG: 25 TABLET ORAL at 21:39

## 2021-12-24 RX ADMIN — ENOXAPARIN SODIUM 40 MG: 40 INJECTION SUBCUTANEOUS at 21:38

## 2021-12-24 RX ADMIN — SODIUM CHLORIDE 100 ML/HR: 9 INJECTION, SOLUTION INTRAVENOUS at 21:44

## 2021-12-24 RX ADMIN — MORPHINE SULFATE 4 MG: 2 INJECTION, SOLUTION INTRAMUSCULAR; INTRAVENOUS at 13:19

## 2021-12-24 RX ADMIN — MORPHINE SULFATE 4 MG: 2 INJECTION, SOLUTION INTRAMUSCULAR; INTRAVENOUS at 11:16

## 2021-12-24 RX ADMIN — CARVEDILOL 12.5 MG: 12.5 TABLET, FILM COATED ORAL at 21:39

## 2021-12-24 RX ADMIN — IOPAMIDOL 85 ML: 612 INJECTION, SOLUTION INTRAVENOUS at 13:11

## 2021-12-24 RX ADMIN — TAZOBACTAM SODIUM AND PIPERACILLIN SODIUM 3.38 G: 375; 3 INJECTION, SOLUTION INTRAVENOUS at 14:12

## 2021-12-24 RX ADMIN — SODIUM CHLORIDE, PRESERVATIVE FREE 10 ML: 5 INJECTION INTRAVENOUS at 21:48

## 2021-12-24 NOTE — OUTREACH NOTE
Prep Survey      Responses   [facility] patient discharged from? [place]   Is LACE score < 7 ? No   Emergency Room discharge w/ pulse ox? No   Eligibility [facility]   Date of Admission 12/07/21   Date of Discharge 12/23/21   Discharge Disposition Home or Self Care   Discharge diagnosis Symptomatic cholelithiasis   Reversible ileostomy present  Ileostomy takedown, cholecystectomy, and ventral hernia repair   Does the patient have one of the following disease processes/diagnoses(primary or secondary)? General Surgery   Does the patient have Home health ordered? No   Is there a DME ordered? No   Prep survey completed? Yes          Frida Tom RN

## 2021-12-25 ENCOUNTER — READMISSION MANAGEMENT (OUTPATIENT)
Dept: CALL CENTER | Facility: HOSPITAL | Age: 61
End: 2021-12-25

## 2021-12-25 ENCOUNTER — APPOINTMENT (OUTPATIENT)
Dept: GENERAL RADIOLOGY | Facility: HOSPITAL | Age: 61
End: 2021-12-25

## 2021-12-25 LAB
ALBUMIN SERPL-MCNC: 2.6 G/DL (ref 3.5–5.2)
ALBUMIN/GLOB SERPL: 0.7 G/DL
ALP SERPL-CCNC: 64 U/L (ref 39–117)
ALT SERPL W P-5'-P-CCNC: 35 U/L (ref 1–41)
ANION GAP SERPL CALCULATED.3IONS-SCNC: 13.8 MMOL/L (ref 5–15)
AST SERPL-CCNC: 46 U/L (ref 1–40)
BILIRUB SERPL-MCNC: 1.1 MG/DL (ref 0–1.2)
BUN SERPL-MCNC: 9 MG/DL (ref 8–23)
BUN/CREAT SERPL: 10.2 (ref 7–25)
CALCIUM SPEC-SCNC: 8.2 MG/DL (ref 8.6–10.5)
CHLORIDE SERPL-SCNC: 96 MMOL/L (ref 98–107)
CO2 SERPL-SCNC: 22.2 MMOL/L (ref 22–29)
CREAT SERPL-MCNC: 0.88 MG/DL (ref 0.76–1.27)
DEPRECATED RDW RBC AUTO: 44.3 FL (ref 37–54)
ERYTHROCYTE [DISTWIDTH] IN BLOOD BY AUTOMATED COUNT: 14.2 % (ref 12.3–15.4)
GFR SERPL CREATININE-BSD FRML MDRD: 88 ML/MIN/1.73
GLOBULIN UR ELPH-MCNC: 3.9 GM/DL
GLUCOSE BLDC GLUCOMTR-MCNC: 120 MG/DL (ref 70–130)
GLUCOSE BLDC GLUCOMTR-MCNC: 86 MG/DL (ref 70–130)
GLUCOSE BLDC GLUCOMTR-MCNC: 96 MG/DL (ref 70–130)
GLUCOSE BLDC GLUCOMTR-MCNC: 97 MG/DL (ref 70–130)
GLUCOSE SERPL-MCNC: 81 MG/DL (ref 65–99)
HCT VFR BLD AUTO: 33.9 % (ref 37.5–51)
HGB BLD-MCNC: 11.5 G/DL (ref 13–17.7)
MAGNESIUM SERPL-MCNC: 2 MG/DL (ref 1.6–2.4)
MCH RBC QN AUTO: 29.5 PG (ref 26.6–33)
MCHC RBC AUTO-ENTMCNC: 33.9 G/DL (ref 31.5–35.7)
MCV RBC AUTO: 86.9 FL (ref 79–97)
PHOSPHATE SERPL-MCNC: 2.9 MG/DL (ref 2.5–4.5)
PLATELET # BLD AUTO: 283 10*3/MM3 (ref 140–450)
PMV BLD AUTO: 10.5 FL (ref 6–12)
POTASSIUM SERPL-SCNC: 4.4 MMOL/L (ref 3.5–5.2)
PROT SERPL-MCNC: 6.5 G/DL (ref 6–8.5)
RBC # BLD AUTO: 3.9 10*6/MM3 (ref 4.14–5.8)
SODIUM SERPL-SCNC: 132 MMOL/L (ref 136–145)
TRIGL SERPL-MCNC: 102 MG/DL (ref 0–150)
WBC NRBC COR # BLD: 13.01 10*3/MM3 (ref 3.4–10.8)

## 2021-12-25 PROCEDURE — 0 DIATRIZOATE MEGLUMINE & SODIUM PER 1 ML: Performed by: SURGERY

## 2021-12-25 PROCEDURE — 25010000002 POTASSIUM CHLORIDE PER 2 MEQ OF POTASSIUM: Performed by: SURGERY

## 2021-12-25 PROCEDURE — 25010000002 MAGNESIUM SULFATE PER 500 MG OF MAGNESIUM: Performed by: SURGERY

## 2021-12-25 PROCEDURE — 80053 COMPREHEN METABOLIC PANEL: CPT | Performed by: SURGERY

## 2021-12-25 PROCEDURE — 83735 ASSAY OF MAGNESIUM: CPT | Performed by: SURGERY

## 2021-12-25 PROCEDURE — 25010000002 ONDANSETRON PER 1 MG: Performed by: SURGERY

## 2021-12-25 PROCEDURE — 74018 RADEX ABDOMEN 1 VIEW: CPT

## 2021-12-25 PROCEDURE — 25010000002 PIPERACILLIN SOD-TAZOBACTAM PER 1 G: Performed by: SURGERY

## 2021-12-25 PROCEDURE — C1751 CATH, INF, PER/CENT/MIDLINE: HCPCS

## 2021-12-25 PROCEDURE — 84478 ASSAY OF TRIGLYCERIDES: CPT | Performed by: SURGERY

## 2021-12-25 PROCEDURE — 25010000002 CALCIUM GLUCONATE PER 10 ML: Performed by: SURGERY

## 2021-12-25 PROCEDURE — 85027 COMPLETE CBC AUTOMATED: CPT | Performed by: SURGERY

## 2021-12-25 PROCEDURE — 02HV33Z INSERTION OF INFUSION DEVICE INTO SUPERIOR VENA CAVA, PERCUTANEOUS APPROACH: ICD-10-PCS | Performed by: SURGERY

## 2021-12-25 PROCEDURE — 84100 ASSAY OF PHOSPHORUS: CPT | Performed by: SURGERY

## 2021-12-25 PROCEDURE — 25010000002 HYDROMORPHONE PER 4 MG: Performed by: SURGERY

## 2021-12-25 PROCEDURE — 82962 GLUCOSE BLOOD TEST: CPT

## 2021-12-25 PROCEDURE — 74270 X-RAY XM COLON 1CNTRST STD: CPT

## 2021-12-25 PROCEDURE — 25010000002 ENOXAPARIN PER 10 MG: Performed by: SURGERY

## 2021-12-25 PROCEDURE — 99024 POSTOP FOLLOW-UP VISIT: CPT | Performed by: SURGERY

## 2021-12-25 PROCEDURE — 94799 UNLISTED PULMONARY SVC/PX: CPT

## 2021-12-25 RX ORDER — SODIUM CHLORIDE 0.9 % (FLUSH) 0.9 %
20 SYRINGE (ML) INJECTION AS NEEDED
Status: DISCONTINUED | OUTPATIENT
Start: 2021-12-25 | End: 2022-02-03 | Stop reason: HOSPADM

## 2021-12-25 RX ORDER — SODIUM CHLORIDE 0.9 % (FLUSH) 0.9 %
10 SYRINGE (ML) INJECTION EVERY 12 HOURS SCHEDULED
Status: DISCONTINUED | OUTPATIENT
Start: 2021-12-25 | End: 2022-02-03 | Stop reason: HOSPADM

## 2021-12-25 RX ORDER — NITROGLYCERIN 0.4 MG/1
0.4 TABLET SUBLINGUAL
Status: DISCONTINUED | OUTPATIENT
Start: 2021-12-25 | End: 2022-02-03 | Stop reason: HOSPADM

## 2021-12-25 RX ORDER — SODIUM CHLORIDE 9 MG/ML
50 INJECTION, SOLUTION INTRAVENOUS CONTINUOUS
Status: ACTIVE | OUTPATIENT
Start: 2021-12-25 | End: 2021-12-28

## 2021-12-25 RX ORDER — SODIUM CHLORIDE 0.9 % (FLUSH) 0.9 %
10 SYRINGE (ML) INJECTION AS NEEDED
Status: DISCONTINUED | OUTPATIENT
Start: 2021-12-25 | End: 2022-02-03 | Stop reason: HOSPADM

## 2021-12-25 RX ORDER — SODIUM CHLORIDE 0.9 % (FLUSH) 0.9 %
10 SYRINGE (ML) INJECTION EVERY 12 HOURS SCHEDULED
Status: DISCONTINUED | OUTPATIENT
Start: 2021-12-25 | End: 2022-01-08

## 2021-12-25 RX ADMIN — HYDROMORPHONE HYDROCHLORIDE 0.5 MG: 10 INJECTION INTRAMUSCULAR; INTRAVENOUS; SUBCUTANEOUS at 11:17

## 2021-12-25 RX ADMIN — ONDANSETRON 4 MG: 2 INJECTION INTRAMUSCULAR; INTRAVENOUS at 11:26

## 2021-12-25 RX ADMIN — HYDROMORPHONE HYDROCHLORIDE 0.5 MG: 10 INJECTION INTRAMUSCULAR; INTRAVENOUS; SUBCUTANEOUS at 20:23

## 2021-12-25 RX ADMIN — TAZOBACTAM SODIUM AND PIPERACILLIN SODIUM 3.38 G: 375; 3 INJECTION, SOLUTION INTRAVENOUS at 20:16

## 2021-12-25 RX ADMIN — SODIUM CHLORIDE, PRESERVATIVE FREE 10 ML: 5 INJECTION INTRAVENOUS at 08:04

## 2021-12-25 RX ADMIN — TAZOBACTAM SODIUM AND PIPERACILLIN SODIUM 3.38 G: 375; 3 INJECTION, SOLUTION INTRAVENOUS at 11:18

## 2021-12-25 RX ADMIN — HYDROMORPHONE HYDROCHLORIDE 0.5 MG: 10 INJECTION INTRAMUSCULAR; INTRAVENOUS; SUBCUTANEOUS at 22:38

## 2021-12-25 RX ADMIN — HYDROMORPHONE HYDROCHLORIDE 0.5 MG: 10 INJECTION INTRAMUSCULAR; INTRAVENOUS; SUBCUTANEOUS at 03:30

## 2021-12-25 RX ADMIN — HYDROMORPHONE HYDROCHLORIDE 0.5 MG: 10 INJECTION INTRAMUSCULAR; INTRAVENOUS; SUBCUTANEOUS at 13:09

## 2021-12-25 RX ADMIN — ENOXAPARIN SODIUM 40 MG: 40 INJECTION SUBCUTANEOUS at 20:16

## 2021-12-25 RX ADMIN — HYDROMORPHONE HYDROCHLORIDE 0.5 MG: 10 INJECTION INTRAMUSCULAR; INTRAVENOUS; SUBCUTANEOUS at 15:23

## 2021-12-25 RX ADMIN — HYDROMORPHONE HYDROCHLORIDE 0.5 MG: 10 INJECTION INTRAMUSCULAR; INTRAVENOUS; SUBCUTANEOUS at 06:13

## 2021-12-25 RX ADMIN — TAZOBACTAM SODIUM AND PIPERACILLIN SODIUM 3.38 G: 375; 3 INJECTION, SOLUTION INTRAVENOUS at 04:22

## 2021-12-25 RX ADMIN — HYDROMORPHONE HYDROCHLORIDE 0.5 MG: 10 INJECTION INTRAMUSCULAR; INTRAVENOUS; SUBCUTANEOUS at 08:04

## 2021-12-25 RX ADMIN — SODIUM CHLORIDE, PRESERVATIVE FREE 10 ML: 5 INJECTION INTRAVENOUS at 20:18

## 2021-12-25 RX ADMIN — SODIUM CHLORIDE, PRESERVATIVE FREE 10 ML: 5 INJECTION INTRAVENOUS at 20:17

## 2021-12-25 RX ADMIN — FAMOTIDINE: 10 INJECTION, SOLUTION INTRAVENOUS at 18:11

## 2021-12-25 RX ADMIN — HYDROMORPHONE HYDROCHLORIDE 0.5 MG: 10 INJECTION INTRAMUSCULAR; INTRAVENOUS; SUBCUTANEOUS at 01:06

## 2021-12-25 RX ADMIN — SODIUM CHLORIDE 50 ML/HR: 9 INJECTION, SOLUTION INTRAVENOUS at 18:15

## 2021-12-25 RX ADMIN — HYDROMORPHONE HYDROCHLORIDE 0.5 MG: 10 INJECTION INTRAMUSCULAR; INTRAVENOUS; SUBCUTANEOUS at 18:09

## 2021-12-25 RX ADMIN — DIATRIZOATE MEGLUMINE AND DIATRIZOATE SODIUM 240 ML: 660; 100 LIQUID ORAL; RECTAL at 10:12

## 2021-12-25 RX ADMIN — CARVEDILOL 12.5 MG: 12.5 TABLET, FILM COATED ORAL at 18:10

## 2021-12-25 NOTE — OUTREACH NOTE
General Surgery Week 2 Survey      Responses   [facility] patient discharged from? [place]   Does the patient have one of the following disease processes/diagnoses(primary or secondary)? General Surgery   Week 2 attempt successful? No   Revoke Readmitted          Marta Castorena RN

## 2021-12-26 ENCOUNTER — READMISSION MANAGEMENT (OUTPATIENT)
Dept: CALL CENTER | Facility: HOSPITAL | Age: 61
End: 2021-12-26

## 2021-12-26 LAB
ALBUMIN SERPL-MCNC: 2.6 G/DL (ref 3.5–5.2)
ALBUMIN/GLOB SERPL: 0.7 G/DL
ALP SERPL-CCNC: 59 U/L (ref 39–117)
ALT SERPL W P-5'-P-CCNC: 29 U/L (ref 1–41)
ANION GAP SERPL CALCULATED.3IONS-SCNC: 11.1 MMOL/L (ref 5–15)
AST SERPL-CCNC: 36 U/L (ref 1–40)
BILIRUB SERPL-MCNC: 1 MG/DL (ref 0–1.2)
BUN SERPL-MCNC: 12 MG/DL (ref 8–23)
BUN/CREAT SERPL: 12.8 (ref 7–25)
CALCIUM SPEC-SCNC: 8.2 MG/DL (ref 8.6–10.5)
CHLORIDE SERPL-SCNC: 98 MMOL/L (ref 98–107)
CO2 SERPL-SCNC: 27.9 MMOL/L (ref 22–29)
CREAT SERPL-MCNC: 0.94 MG/DL (ref 0.76–1.27)
GFR SERPL CREATININE-BSD FRML MDRD: 81 ML/MIN/1.73
GLOBULIN UR ELPH-MCNC: 3.8 GM/DL
GLUCOSE BLDC GLUCOMTR-MCNC: 120 MG/DL (ref 70–130)
GLUCOSE BLDC GLUCOMTR-MCNC: 123 MG/DL (ref 70–130)
GLUCOSE BLDC GLUCOMTR-MCNC: 126 MG/DL (ref 70–130)
GLUCOSE BLDC GLUCOMTR-MCNC: 141 MG/DL (ref 70–130)
GLUCOSE SERPL-MCNC: 135 MG/DL (ref 65–99)
MAGNESIUM SERPL-MCNC: 2.2 MG/DL (ref 1.6–2.4)
PHOSPHATE SERPL-MCNC: 2.4 MG/DL (ref 2.5–4.5)
POTASSIUM SERPL-SCNC: 3.7 MMOL/L (ref 3.5–5.2)
PROT SERPL-MCNC: 6.4 G/DL (ref 6–8.5)
SODIUM SERPL-SCNC: 137 MMOL/L (ref 136–145)

## 2021-12-26 PROCEDURE — 99254 IP/OBS CNSLTJ NEW/EST MOD 60: CPT | Performed by: INTERNAL MEDICINE

## 2021-12-26 PROCEDURE — 83735 ASSAY OF MAGNESIUM: CPT | Performed by: SURGERY

## 2021-12-26 PROCEDURE — 25010000002 PIPERACILLIN SOD-TAZOBACTAM PER 1 G: Performed by: SURGERY

## 2021-12-26 PROCEDURE — 25010000002 CALCIUM GLUCONATE PER 10 ML: Performed by: SURGERY

## 2021-12-26 PROCEDURE — 25010000002 HYDROMORPHONE PER 4 MG: Performed by: SURGERY

## 2021-12-26 PROCEDURE — 99024 POSTOP FOLLOW-UP VISIT: CPT | Performed by: SURGERY

## 2021-12-26 PROCEDURE — 25010000002 ENOXAPARIN PER 10 MG: Performed by: SURGERY

## 2021-12-26 PROCEDURE — 25010000002 MAGNESIUM SULFATE PER 500 MG OF MAGNESIUM: Performed by: SURGERY

## 2021-12-26 PROCEDURE — 25010000002 POTASSIUM CHLORIDE PER 2 MEQ OF POTASSIUM: Performed by: SURGERY

## 2021-12-26 PROCEDURE — 80053 COMPREHEN METABOLIC PANEL: CPT | Performed by: SURGERY

## 2021-12-26 PROCEDURE — 84100 ASSAY OF PHOSPHORUS: CPT | Performed by: SURGERY

## 2021-12-26 PROCEDURE — 82962 GLUCOSE BLOOD TEST: CPT

## 2021-12-26 RX ORDER — ACETAMINOPHEN 325 MG/1
650 TABLET ORAL EVERY 6 HOURS PRN
Status: DISCONTINUED | OUTPATIENT
Start: 2021-12-26 | End: 2021-12-29

## 2021-12-26 RX ORDER — SPIRONOLACTONE 25 MG/1
12.5 TABLET ORAL DAILY
Status: DISCONTINUED | OUTPATIENT
Start: 2021-12-27 | End: 2021-12-29

## 2021-12-26 RX ORDER — POTASSIUM CHLORIDE 29.8 MG/ML
20 INJECTION INTRAVENOUS ONCE
Status: DISCONTINUED | OUTPATIENT
Start: 2021-12-26 | End: 2021-12-26

## 2021-12-26 RX ORDER — FUROSEMIDE 10 MG/ML
20 INJECTION INTRAMUSCULAR; INTRAVENOUS ONCE
Status: DISCONTINUED | OUTPATIENT
Start: 2021-12-26 | End: 2021-12-26

## 2021-12-26 RX ORDER — POTASSIUM CHLORIDE 1.5 G/1.77G
20 POWDER, FOR SOLUTION ORAL ONCE
Status: COMPLETED | OUTPATIENT
Start: 2021-12-26 | End: 2021-12-26

## 2021-12-26 RX ORDER — DOCUSATE SODIUM 100 MG/1
100 CAPSULE, LIQUID FILLED ORAL 2 TIMES DAILY
Status: DISCONTINUED | OUTPATIENT
Start: 2021-12-26 | End: 2021-12-29

## 2021-12-26 RX ORDER — SIMETHICONE 80 MG
80 TABLET,CHEWABLE ORAL 4 TIMES DAILY
Status: DISCONTINUED | OUTPATIENT
Start: 2021-12-26 | End: 2021-12-29

## 2021-12-26 RX ADMIN — POTASSIUM CHLORIDE 20 MEQ: 1.5 POWDER, FOR SOLUTION ORAL at 14:10

## 2021-12-26 RX ADMIN — SODIUM CHLORIDE, PRESERVATIVE FREE 10 ML: 5 INJECTION INTRAVENOUS at 21:53

## 2021-12-26 RX ADMIN — DOCUSATE SODIUM 100 MG: 100 CAPSULE, LIQUID FILLED ORAL at 12:05

## 2021-12-26 RX ADMIN — I.V. FAT EMULSION 20 G: 20 EMULSION INTRAVENOUS at 17:27

## 2021-12-26 RX ADMIN — TAZOBACTAM SODIUM AND PIPERACILLIN SODIUM 3.38 G: 375; 3 INJECTION, SOLUTION INTRAVENOUS at 04:45

## 2021-12-26 RX ADMIN — CARVEDILOL 12.5 MG: 12.5 TABLET, FILM COATED ORAL at 17:19

## 2021-12-26 RX ADMIN — FAMOTIDINE: 10 INJECTION, SOLUTION INTRAVENOUS at 17:27

## 2021-12-26 RX ADMIN — SIMETHICONE 80 MG: 80 TABLET, CHEWABLE ORAL at 17:19

## 2021-12-26 RX ADMIN — ENOXAPARIN SODIUM 40 MG: 40 INJECTION SUBCUTANEOUS at 21:35

## 2021-12-26 RX ADMIN — SODIUM CHLORIDE, PRESERVATIVE FREE 10 ML: 5 INJECTION INTRAVENOUS at 09:48

## 2021-12-26 RX ADMIN — SPIRONOLACTONE 12.5 MG: 25 TABLET ORAL at 09:54

## 2021-12-26 RX ADMIN — SODIUM CHLORIDE 50 ML/HR: 9 INJECTION, SOLUTION INTRAVENOUS at 21:50

## 2021-12-26 RX ADMIN — SODIUM CHLORIDE, PRESERVATIVE FREE 10 ML: 5 INJECTION INTRAVENOUS at 21:54

## 2021-12-26 RX ADMIN — HYDROMORPHONE HYDROCHLORIDE 0.5 MG: 10 INJECTION INTRAMUSCULAR; INTRAVENOUS; SUBCUTANEOUS at 00:45

## 2021-12-26 RX ADMIN — HYDROMORPHONE HYDROCHLORIDE 0.5 MG: 10 INJECTION INTRAMUSCULAR; INTRAVENOUS; SUBCUTANEOUS at 14:20

## 2021-12-26 RX ADMIN — ACETAMINOPHEN 650 MG: 325 TABLET, FILM COATED ORAL at 22:52

## 2021-12-26 RX ADMIN — TAZOBACTAM SODIUM AND PIPERACILLIN SODIUM 3.38 G: 375; 3 INJECTION, SOLUTION INTRAVENOUS at 21:36

## 2021-12-26 RX ADMIN — HYDROMORPHONE HYDROCHLORIDE 0.5 MG: 10 INJECTION INTRAMUSCULAR; INTRAVENOUS; SUBCUTANEOUS at 21:34

## 2021-12-26 RX ADMIN — HYDROMORPHONE HYDROCHLORIDE 0.5 MG: 10 INJECTION INTRAMUSCULAR; INTRAVENOUS; SUBCUTANEOUS at 09:47

## 2021-12-26 RX ADMIN — HYDROMORPHONE HYDROCHLORIDE 0.5 MG: 10 INJECTION INTRAMUSCULAR; INTRAVENOUS; SUBCUTANEOUS at 12:13

## 2021-12-26 RX ADMIN — SODIUM CHLORIDE, PRESERVATIVE FREE 10 ML: 5 INJECTION INTRAVENOUS at 09:54

## 2021-12-26 RX ADMIN — SIMETHICONE 80 MG: 80 TABLET, CHEWABLE ORAL at 21:52

## 2021-12-26 RX ADMIN — HYDROMORPHONE HYDROCHLORIDE 0.5 MG: 10 INJECTION INTRAMUSCULAR; INTRAVENOUS; SUBCUTANEOUS at 23:38

## 2021-12-26 RX ADMIN — HYDROMORPHONE HYDROCHLORIDE 0.5 MG: 10 INJECTION INTRAMUSCULAR; INTRAVENOUS; SUBCUTANEOUS at 17:28

## 2021-12-26 RX ADMIN — TAZOBACTAM SODIUM AND PIPERACILLIN SODIUM 3.38 G: 375; 3 INJECTION, SOLUTION INTRAVENOUS at 12:05

## 2021-12-26 RX ADMIN — HYDROMORPHONE HYDROCHLORIDE 0.5 MG: 10 INJECTION INTRAMUSCULAR; INTRAVENOUS; SUBCUTANEOUS at 06:15

## 2021-12-26 RX ADMIN — SODIUM CHLORIDE, PRESERVATIVE FREE 10 ML: 5 INJECTION INTRAVENOUS at 09:55

## 2021-12-26 NOTE — OUTREACH NOTE
Prep Survey      Responses   [facility] patient discharged from? [place]   Is LACE score < 7 ? No   Emergency Room discharge w/ pulse ox? No   Eligibility Not Eligible   What are the reasons patient is not eligible? Readmitted   Does the patient have one of the following disease processes/diagnoses(primary or secondary)? Other   Prep survey completed? Yes          Celeste Ag RN

## 2021-12-27 ENCOUNTER — APPOINTMENT (OUTPATIENT)
Dept: CARDIOLOGY | Facility: HOSPITAL | Age: 61
End: 2021-12-27

## 2021-12-27 LAB
ALBUMIN SERPL-MCNC: 2.6 G/DL (ref 3.5–5.2)
ALBUMIN/GLOB SERPL: 0.7 G/DL
ALP SERPL-CCNC: 58 U/L (ref 39–117)
ALT SERPL W P-5'-P-CCNC: 34 U/L (ref 1–41)
ANION GAP SERPL CALCULATED.3IONS-SCNC: 9.8 MMOL/L (ref 5–15)
AORTIC ARCH: 2.9 CM
AORTIC DIMENSIONLESS INDEX: 0.6 (DI)
ASCENDING AORTA: 3.5 CM
AST SERPL-CCNC: 58 U/L (ref 1–40)
BH CV ECHO MEAS - ACS: 2.1 CM
BH CV ECHO MEAS - AO ACC TIME: 0.11 SEC
BH CV ECHO MEAS - AO MAX PG (FULL): 3.9 MMHG
BH CV ECHO MEAS - AO MAX PG: 5.8 MMHG
BH CV ECHO MEAS - AO MEAN PG (FULL): 2.5 MMHG
BH CV ECHO MEAS - AO MEAN PG: 3.8 MMHG
BH CV ECHO MEAS - AO ROOT AREA (BSA CORRECTED): 1.8
BH CV ECHO MEAS - AO ROOT AREA: 11 CM^2
BH CV ECHO MEAS - AO ROOT DIAM: 3.7 CM
BH CV ECHO MEAS - AO V2 MAX: 120.1 CM/SEC
BH CV ECHO MEAS - AO V2 MEAN: 95.1 CM/SEC
BH CV ECHO MEAS - AO V2 VTI: 23.1 CM
BH CV ECHO MEAS - ASC AORTA: 3.5 CM
BH CV ECHO MEAS - AVA(I,A): 2.2 CM^2
BH CV ECHO MEAS - AVA(I,D): 2.2 CM^2
BH CV ECHO MEAS - AVA(V,A): 2.2 CM^2
BH CV ECHO MEAS - AVA(V,D): 2.2 CM^2
BH CV ECHO MEAS - BSA(HAYCOCK): 2.2 M^2
BH CV ECHO MEAS - BSA: 2.1 M^2
BH CV ECHO MEAS - BZI_BMI: 31 KILOGRAMS/M^2
BH CV ECHO MEAS - BZI_METRIC_HEIGHT: 175 CM
BH CV ECHO MEAS - BZI_METRIC_WEIGHT: 95 KG
BH CV ECHO MEAS - EDV(CUBED): 372 ML
BH CV ECHO MEAS - EDV(MOD-SP2): 215 ML
BH CV ECHO MEAS - EDV(MOD-SP4): 200 ML
BH CV ECHO MEAS - EDV(TEICH): 271.5 ML
BH CV ECHO MEAS - EF(CUBED): 31 %
BH CV ECHO MEAS - EF(MOD-BP): 26 %
BH CV ECHO MEAS - EF(MOD-SP2): 25.1 %
BH CV ECHO MEAS - EF(MOD-SP4): 26 %
BH CV ECHO MEAS - EF(TEICH): 24.4 %
BH CV ECHO MEAS - ESV(CUBED): 256.5 ML
BH CV ECHO MEAS - ESV(MOD-SP2): 161 ML
BH CV ECHO MEAS - ESV(MOD-SP4): 148 ML
BH CV ECHO MEAS - ESV(TEICH): 205.1 ML
BH CV ECHO MEAS - FS: 11.7 %
BH CV ECHO MEAS - IVS/LVPW: 0.99
BH CV ECHO MEAS - IVSD: 0.98 CM
BH CV ECHO MEAS - LAT PEAK E' VEL: 12.6 CM/SEC
BH CV ECHO MEAS - LV DIASTOLIC VOL/BSA (35-75): 95 ML/M^2
BH CV ECHO MEAS - LV MASS(C)D: 330.6 GRAMS
BH CV ECHO MEAS - LV MASS(C)DI: 157.1 GRAMS/M^2
BH CV ECHO MEAS - LV MAX PG: 1.8 MMHG
BH CV ECHO MEAS - LV MEAN PG: 1.2 MMHG
BH CV ECHO MEAS - LV SYSTOLIC VOL/BSA (12-30): 70.3 ML/M^2
BH CV ECHO MEAS - LV V1 MAX: 67.6 CM/SEC
BH CV ECHO MEAS - LV V1 MEAN: 53.5 CM/SEC
BH CV ECHO MEAS - LV V1 VTI: 12.9 CM
BH CV ECHO MEAS - LVIDD: 7.2 CM
BH CV ECHO MEAS - LVIDS: 6.4 CM
BH CV ECHO MEAS - LVLD AP2: 10 CM
BH CV ECHO MEAS - LVLD AP4: 10 CM
BH CV ECHO MEAS - LVLS AP2: 8.7 CM
BH CV ECHO MEAS - LVLS AP4: 8.7 CM
BH CV ECHO MEAS - LVOT AREA (M): 3.8 CM^2
BH CV ECHO MEAS - LVOT AREA: 4 CM^2
BH CV ECHO MEAS - LVOT DIAM: 2.2 CM
BH CV ECHO MEAS - LVPWD: 0.99 CM
BH CV ECHO MEAS - MED PEAK E' VEL: 5.9 CM/SEC
BH CV ECHO MEAS - MV A DUR: 0.04 SEC
BH CV ECHO MEAS - MV A MAX VEL: 54.8 CM/SEC
BH CV ECHO MEAS - MV DEC SLOPE: 390.3 CM/SEC^2
BH CV ECHO MEAS - MV DEC TIME: 116 SEC
BH CV ECHO MEAS - MV E MAX VEL: 96.4 CM/SEC
BH CV ECHO MEAS - MV E/A: 1.8
BH CV ECHO MEAS - MV MAX PG: 4.2 MMHG
BH CV ECHO MEAS - MV MEAN PG: 1.7 MMHG
BH CV ECHO MEAS - MV P1/2T MAX VEL: 103.8 CM/SEC
BH CV ECHO MEAS - MV P1/2T: 77.9 MSEC
BH CV ECHO MEAS - MV V2 MAX: 102.6 CM/SEC
BH CV ECHO MEAS - MV V2 MEAN: 57.9 CM/SEC
BH CV ECHO MEAS - MV V2 VTI: 24.2 CM
BH CV ECHO MEAS - MVA P1/2T LCG: 2.1 CM^2
BH CV ECHO MEAS - MVA(P1/2T): 2.8 CM^2
BH CV ECHO MEAS - MVA(VTI): 2.1 CM^2
BH CV ECHO MEAS - PA ACC TIME: 0.11 SEC
BH CV ECHO MEAS - PA MAX PG (FULL): 2.9 MMHG
BH CV ECHO MEAS - PA MAX PG: 4.5 MMHG
BH CV ECHO MEAS - PA PR(ACCEL): 30.3 MMHG
BH CV ECHO MEAS - PA V2 MAX: 105.5 CM/SEC
BH CV ECHO MEAS - PULM A REVS DUR: 0.14 SEC
BH CV ECHO MEAS - PULM A REVS VEL: 34.9 CM/SEC
BH CV ECHO MEAS - PULM DIAS VEL: 64.1 CM/SEC
BH CV ECHO MEAS - PULM S/D: 0.55
BH CV ECHO MEAS - PULM SYS VEL: 35.3 CM/SEC
BH CV ECHO MEAS - PVA(V,A): 2.3 CM^2
BH CV ECHO MEAS - PVA(V,D): 2.3 CM^2
BH CV ECHO MEAS - QP/QS: 0.8
BH CV ECHO MEAS - RAP SYSTOLE: 8 MMHG
BH CV ECHO MEAS - RV MAX PG: 1.5 MMHG
BH CV ECHO MEAS - RV MEAN PG: 0.82 MMHG
BH CV ECHO MEAS - RV V1 MAX: 62.1 CM/SEC
BH CV ECHO MEAS - RV V1 MEAN: 42.2 CM/SEC
BH CV ECHO MEAS - RV V1 VTI: 10.4 CM
BH CV ECHO MEAS - RVOT AREA: 3.9 CM^2
BH CV ECHO MEAS - RVOT DIAM: 2.2 CM
BH CV ECHO MEAS - SI(AO): 120.3 ML/M^2
BH CV ECHO MEAS - SI(CUBED): 54.9 ML/M^2
BH CV ECHO MEAS - SI(LVOT): 24.2 ML/M^2
BH CV ECHO MEAS - SI(MOD-SP2): 25.7 ML/M^2
BH CV ECHO MEAS - SI(MOD-SP4): 24.7 ML/M^2
BH CV ECHO MEAS - SI(TEICH): 31.5 ML/M^2
BH CV ECHO MEAS - SV(AO): 253.2 ML
BH CV ECHO MEAS - SV(CUBED): 115.5 ML
BH CV ECHO MEAS - SV(LVOT): 50.9 ML
BH CV ECHO MEAS - SV(MOD-SP2): 54 ML
BH CV ECHO MEAS - SV(MOD-SP4): 52 ML
BH CV ECHO MEAS - SV(RVOT): 40.6 ML
BH CV ECHO MEAS - SV(TEICH): 66.3 ML
BH CV ECHO MEAS - TAPSE (>1.6): 1.8 CM
BH CV ECHO MEASUREMENTS AVERAGE E/E' RATIO: 10.42
BH CV VAS BP LEFT ARM: NORMAL MMHG
BH CV XLRA - RV BASE: 3.4 CM
BH CV XLRA - RV LENGTH: 7.9 CM
BH CV XLRA - RV MID: 2.8 CM
BH CV XLRA - TDI S': 18 CM/SEC
BILIRUB SERPL-MCNC: 1 MG/DL (ref 0–1.2)
BUN SERPL-MCNC: 8 MG/DL (ref 8–23)
BUN/CREAT SERPL: 8.9 (ref 7–25)
CALCIUM SPEC-SCNC: 8 MG/DL (ref 8.6–10.5)
CHLORIDE SERPL-SCNC: 101 MMOL/L (ref 98–107)
CO2 SERPL-SCNC: 25.2 MMOL/L (ref 22–29)
CREAT SERPL-MCNC: 0.9 MG/DL (ref 0.76–1.27)
DEPRECATED RDW RBC AUTO: 45.5 FL (ref 37–54)
ERYTHROCYTE [DISTWIDTH] IN BLOOD BY AUTOMATED COUNT: 13.9 % (ref 12.3–15.4)
GFR SERPL CREATININE-BSD FRML MDRD: 86 ML/MIN/1.73
GLOBULIN UR ELPH-MCNC: 3.5 GM/DL
GLUCOSE BLDC GLUCOMTR-MCNC: 114 MG/DL (ref 70–130)
GLUCOSE BLDC GLUCOMTR-MCNC: 122 MG/DL (ref 70–130)
GLUCOSE BLDC GLUCOMTR-MCNC: 129 MG/DL (ref 70–130)
GLUCOSE SERPL-MCNC: 142 MG/DL (ref 65–99)
HCT VFR BLD AUTO: 30.7 % (ref 37.5–51)
HGB BLD-MCNC: 10 G/DL (ref 13–17.7)
LEFT ATRIUM VOLUME INDEX: 32.6 ML/M2
LV EF 2D ECHO EST: 20 %
MAGNESIUM SERPL-MCNC: 2 MG/DL (ref 1.6–2.4)
MCH RBC QN AUTO: 29.4 PG (ref 26.6–33)
MCHC RBC AUTO-ENTMCNC: 32.6 G/DL (ref 31.5–35.7)
MCV RBC AUTO: 90.3 FL (ref 79–97)
PHOSPHATE SERPL-MCNC: 1.7 MG/DL (ref 2.5–4.5)
PLATELET # BLD AUTO: 234 10*3/MM3 (ref 140–450)
PMV BLD AUTO: 10 FL (ref 6–12)
POTASSIUM SERPL-SCNC: 3.7 MMOL/L (ref 3.5–5.2)
PROT SERPL-MCNC: 6.1 G/DL (ref 6–8.5)
RBC # BLD AUTO: 3.4 10*6/MM3 (ref 4.14–5.8)
SINUS: 3.8 CM
SODIUM SERPL-SCNC: 136 MMOL/L (ref 136–145)
STJ: 3.3 CM
WBC NRBC COR # BLD: 9.14 10*3/MM3 (ref 3.4–10.8)

## 2021-12-27 PROCEDURE — 25010000002 MAGNESIUM SULFATE PER 500 MG OF MAGNESIUM: Performed by: SURGERY

## 2021-12-27 PROCEDURE — 93306 TTE W/DOPPLER COMPLETE: CPT

## 2021-12-27 PROCEDURE — 25010000002 POTASSIUM CHLORIDE PER 2 MEQ OF POTASSIUM: Performed by: SURGERY

## 2021-12-27 PROCEDURE — 25010000002 PIPERACILLIN SOD-TAZOBACTAM PER 1 G: Performed by: SURGERY

## 2021-12-27 PROCEDURE — 25010000002 CALCIUM GLUCONATE PER 10 ML: Performed by: SURGERY

## 2021-12-27 PROCEDURE — 93356 MYOCRD STRAIN IMG SPCKL TRCK: CPT | Performed by: INTERNAL MEDICINE

## 2021-12-27 PROCEDURE — 25010000002 HYDROMORPHONE PER 4 MG: Performed by: SURGERY

## 2021-12-27 PROCEDURE — 93356 MYOCRD STRAIN IMG SPCKL TRCK: CPT

## 2021-12-27 PROCEDURE — 83735 ASSAY OF MAGNESIUM: CPT | Performed by: SURGERY

## 2021-12-27 PROCEDURE — 93306 TTE W/DOPPLER COMPLETE: CPT | Performed by: INTERNAL MEDICINE

## 2021-12-27 PROCEDURE — 25010000002 ENOXAPARIN PER 10 MG: Performed by: SURGERY

## 2021-12-27 PROCEDURE — 82962 GLUCOSE BLOOD TEST: CPT

## 2021-12-27 PROCEDURE — 80053 COMPREHEN METABOLIC PANEL: CPT | Performed by: SURGERY

## 2021-12-27 PROCEDURE — 99232 SBSQ HOSP IP/OBS MODERATE 35: CPT | Performed by: NURSE PRACTITIONER

## 2021-12-27 PROCEDURE — 84100 ASSAY OF PHOSPHORUS: CPT | Performed by: SURGERY

## 2021-12-27 PROCEDURE — 99024 POSTOP FOLLOW-UP VISIT: CPT | Performed by: SURGERY

## 2021-12-27 PROCEDURE — 85027 COMPLETE CBC AUTOMATED: CPT | Performed by: SURGERY

## 2021-12-27 PROCEDURE — 25010000002 ONDANSETRON PER 1 MG: Performed by: SURGERY

## 2021-12-27 RX ORDER — MAGNESIUM SULFATE HEPTAHYDRATE 40 MG/ML
2 INJECTION, SOLUTION INTRAVENOUS AS NEEDED
Status: DISCONTINUED | OUTPATIENT
Start: 2021-12-27 | End: 2022-02-01

## 2021-12-27 RX ORDER — MAGNESIUM SULFATE HEPTAHYDRATE 40 MG/ML
4 INJECTION, SOLUTION INTRAVENOUS AS NEEDED
Status: DISCONTINUED | OUTPATIENT
Start: 2021-12-27 | End: 2022-02-01

## 2021-12-27 RX ADMIN — ENOXAPARIN SODIUM 40 MG: 40 INJECTION SUBCUTANEOUS at 20:15

## 2021-12-27 RX ADMIN — ONDANSETRON 4 MG: 2 INJECTION INTRAMUSCULAR; INTRAVENOUS at 22:20

## 2021-12-27 RX ADMIN — HYDROMORPHONE HYDROCHLORIDE 0.5 MG: 10 INJECTION INTRAMUSCULAR; INTRAVENOUS; SUBCUTANEOUS at 01:48

## 2021-12-27 RX ADMIN — TAZOBACTAM SODIUM AND PIPERACILLIN SODIUM 3.38 G: 375; 3 INJECTION, SOLUTION INTRAVENOUS at 12:39

## 2021-12-27 RX ADMIN — SIMETHICONE 80 MG: 80 TABLET, CHEWABLE ORAL at 19:12

## 2021-12-27 RX ADMIN — HYDROMORPHONE HYDROCHLORIDE 0.5 MG: 10 INJECTION INTRAMUSCULAR; INTRAVENOUS; SUBCUTANEOUS at 09:16

## 2021-12-27 RX ADMIN — ONDANSETRON 4 MG: 2 INJECTION INTRAMUSCULAR; INTRAVENOUS at 15:20

## 2021-12-27 RX ADMIN — SIMETHICONE 80 MG: 80 TABLET, CHEWABLE ORAL at 12:39

## 2021-12-27 RX ADMIN — DOCUSATE SODIUM 100 MG: 100 CAPSULE, LIQUID FILLED ORAL at 20:15

## 2021-12-27 RX ADMIN — FAMOTIDINE: 10 INJECTION, SOLUTION INTRAVENOUS at 18:11

## 2021-12-27 RX ADMIN — TAZOBACTAM SODIUM AND PIPERACILLIN SODIUM 3.38 G: 375; 3 INJECTION, SOLUTION INTRAVENOUS at 04:17

## 2021-12-27 RX ADMIN — HYDROMORPHONE HYDROCHLORIDE 0.5 MG: 10 INJECTION INTRAMUSCULAR; INTRAVENOUS; SUBCUTANEOUS at 17:25

## 2021-12-27 RX ADMIN — HYDROMORPHONE HYDROCHLORIDE 0.5 MG: 10 INJECTION INTRAMUSCULAR; INTRAVENOUS; SUBCUTANEOUS at 20:12

## 2021-12-27 RX ADMIN — SODIUM CHLORIDE, PRESERVATIVE FREE 10 ML: 5 INJECTION INTRAVENOUS at 20:17

## 2021-12-27 RX ADMIN — I.V. FAT EMULSION 20 G: 20 EMULSION INTRAVENOUS at 22:58

## 2021-12-27 RX ADMIN — SODIUM CHLORIDE, PRESERVATIVE FREE 10 ML: 5 INJECTION INTRAVENOUS at 20:16

## 2021-12-27 RX ADMIN — HYDROMORPHONE HYDROCHLORIDE 0.5 MG: 10 INJECTION INTRAMUSCULAR; INTRAVENOUS; SUBCUTANEOUS at 22:20

## 2021-12-27 RX ADMIN — HYDROMORPHONE HYDROCHLORIDE 0.5 MG: 10 INJECTION INTRAMUSCULAR; INTRAVENOUS; SUBCUTANEOUS at 12:39

## 2021-12-27 RX ADMIN — POTASSIUM PHOSPHATE, MONOBASIC AND POTASSIUM PHOSPHATE, DIBASIC 30 MMOL: 224; 236 INJECTION, SOLUTION, CONCENTRATE INTRAVENOUS at 19:12

## 2021-12-27 RX ADMIN — SODIUM CHLORIDE, PRESERVATIVE FREE 10 ML: 5 INJECTION INTRAVENOUS at 09:20

## 2021-12-27 RX ADMIN — HYDROMORPHONE HYDROCHLORIDE 0.5 MG: 10 INJECTION INTRAMUSCULAR; INTRAVENOUS; SUBCUTANEOUS at 15:20

## 2021-12-27 RX ADMIN — TAZOBACTAM SODIUM AND PIPERACILLIN SODIUM 3.38 G: 375; 3 INJECTION, SOLUTION INTRAVENOUS at 20:15

## 2021-12-27 RX ADMIN — HYDROMORPHONE HYDROCHLORIDE 0.5 MG: 10 INJECTION INTRAMUSCULAR; INTRAVENOUS; SUBCUTANEOUS at 04:19

## 2021-12-27 RX ADMIN — CARVEDILOL 12.5 MG: 12.5 TABLET, FILM COATED ORAL at 19:12

## 2021-12-27 RX ADMIN — SODIUM CHLORIDE, PRESERVATIVE FREE 10 ML: 5 INJECTION INTRAVENOUS at 12:45

## 2021-12-27 RX ADMIN — SIMETHICONE 80 MG: 80 TABLET, CHEWABLE ORAL at 09:19

## 2021-12-27 RX ADMIN — DOCUSATE SODIUM 100 MG: 100 CAPSULE, LIQUID FILLED ORAL at 09:19

## 2021-12-27 RX ADMIN — SPIRONOLACTONE 12.5 MG: 25 TABLET ORAL at 09:19

## 2021-12-28 ENCOUNTER — APPOINTMENT (OUTPATIENT)
Dept: GENERAL RADIOLOGY | Facility: HOSPITAL | Age: 61
End: 2021-12-28

## 2021-12-28 ENCOUNTER — APPOINTMENT (OUTPATIENT)
Dept: CT IMAGING | Facility: HOSPITAL | Age: 61
End: 2021-12-28

## 2021-12-28 LAB
ALBUMIN SERPL-MCNC: 2.5 G/DL (ref 3.5–5.2)
ALBUMIN/GLOB SERPL: 0.6 G/DL
ALP SERPL-CCNC: 59 U/L (ref 39–117)
ALT SERPL W P-5'-P-CCNC: 30 U/L (ref 1–41)
ANION GAP SERPL CALCULATED.3IONS-SCNC: 11.1 MMOL/L (ref 5–15)
AST SERPL-CCNC: 45 U/L (ref 1–40)
BILIRUB SERPL-MCNC: 0.8 MG/DL (ref 0–1.2)
BUN SERPL-MCNC: 10 MG/DL (ref 8–23)
BUN/CREAT SERPL: 10 (ref 7–25)
CALCIUM SPEC-SCNC: 8 MG/DL (ref 8.6–10.5)
CHLORIDE SERPL-SCNC: 101 MMOL/L (ref 98–107)
CO2 SERPL-SCNC: 25.9 MMOL/L (ref 22–29)
CREAT SERPL-MCNC: 1 MG/DL (ref 0.76–1.27)
GFR SERPL CREATININE-BSD FRML MDRD: 76 ML/MIN/1.73
GLOBULIN UR ELPH-MCNC: 3.9 GM/DL
GLUCOSE BLDC GLUCOMTR-MCNC: 116 MG/DL (ref 70–130)
GLUCOSE BLDC GLUCOMTR-MCNC: 122 MG/DL (ref 70–130)
GLUCOSE BLDC GLUCOMTR-MCNC: 123 MG/DL (ref 70–130)
GLUCOSE BLDC GLUCOMTR-MCNC: 124 MG/DL (ref 70–130)
GLUCOSE BLDC GLUCOMTR-MCNC: 129 MG/DL (ref 70–130)
GLUCOSE SERPL-MCNC: 122 MG/DL (ref 65–99)
MAGNESIUM SERPL-MCNC: 2.1 MG/DL (ref 1.6–2.4)
PHOSPHATE SERPL-MCNC: 3 MG/DL (ref 2.5–4.5)
POTASSIUM SERPL-SCNC: 3.7 MMOL/L (ref 3.5–5.2)
PROT SERPL-MCNC: 6.4 G/DL (ref 6–8.5)
SARS-COV-2 RNA RESP QL NAA+PROBE: NOT DETECTED
SODIUM SERPL-SCNC: 138 MMOL/L (ref 136–145)

## 2021-12-28 PROCEDURE — 25010000002 IOPAMIDOL 61 % SOLUTION: Performed by: SURGERY

## 2021-12-28 PROCEDURE — 84100 ASSAY OF PHOSPHORUS: CPT | Performed by: SURGERY

## 2021-12-28 PROCEDURE — 25010000002 PIPERACILLIN SOD-TAZOBACTAM PER 1 G: Performed by: SURGERY

## 2021-12-28 PROCEDURE — 25010000002 CALCIUM GLUCONATE PER 10 ML: Performed by: SURGERY

## 2021-12-28 PROCEDURE — 0 DIATRIZOATE MEGLUMINE & SODIUM PER 1 ML: Performed by: SURGERY

## 2021-12-28 PROCEDURE — 99232 SBSQ HOSP IP/OBS MODERATE 35: CPT | Performed by: NURSE PRACTITIONER

## 2021-12-28 PROCEDURE — 83735 ASSAY OF MAGNESIUM: CPT | Performed by: SURGERY

## 2021-12-28 PROCEDURE — 74177 CT ABD & PELVIS W/CONTRAST: CPT

## 2021-12-28 PROCEDURE — 80053 COMPREHEN METABOLIC PANEL: CPT | Performed by: SURGERY

## 2021-12-28 PROCEDURE — 25010000002 MAGNESIUM SULFATE PER 500 MG OF MAGNESIUM: Performed by: SURGERY

## 2021-12-28 PROCEDURE — 25010000002 ONDANSETRON PER 1 MG: Performed by: SURGERY

## 2021-12-28 PROCEDURE — 25010000002 POTASSIUM CHLORIDE PER 2 MEQ OF POTASSIUM: Performed by: SURGERY

## 2021-12-28 PROCEDURE — 25010000002 HYDROMORPHONE PER 4 MG: Performed by: SURGERY

## 2021-12-28 PROCEDURE — 82962 GLUCOSE BLOOD TEST: CPT

## 2021-12-28 PROCEDURE — 99024 POSTOP FOLLOW-UP VISIT: CPT | Performed by: SURGERY

## 2021-12-28 PROCEDURE — 25010000002 HYDROMORPHONE 1 MG/ML SOLUTION: Performed by: SURGERY

## 2021-12-28 PROCEDURE — U0003 INFECTIOUS AGENT DETECTION BY NUCLEIC ACID (DNA OR RNA); SEVERE ACUTE RESPIRATORY SYNDROME CORONAVIRUS 2 (SARS-COV-2) (CORONAVIRUS DISEASE [COVID-19]), AMPLIFIED PROBE TECHNIQUE, MAKING USE OF HIGH THROUGHPUT TECHNOLOGIES AS DESCRIBED BY CMS-2020-01-R: HCPCS | Performed by: SURGERY

## 2021-12-28 RX ORDER — SODIUM CHLORIDE 9 MG/ML
100 INJECTION, SOLUTION INTRAVENOUS CONTINUOUS
Status: DISCONTINUED | OUTPATIENT
Start: 2021-12-28 | End: 2021-12-30

## 2021-12-28 RX ORDER — HYDROCODONE BITARTRATE AND ACETAMINOPHEN 5; 325 MG/1; MG/1
1 TABLET ORAL EVERY 4 HOURS PRN
Status: DISCONTINUED | OUTPATIENT
Start: 2021-12-28 | End: 2021-12-29

## 2021-12-28 RX ADMIN — SODIUM CHLORIDE 50 ML/HR: 9 INJECTION, SOLUTION INTRAVENOUS at 04:00

## 2021-12-28 RX ADMIN — TAZOBACTAM SODIUM AND PIPERACILLIN SODIUM 3.38 G: 375; 3 INJECTION, SOLUTION INTRAVENOUS at 21:05

## 2021-12-28 RX ADMIN — HYDROMORPHONE HYDROCHLORIDE 0.5 MG: 10 INJECTION INTRAMUSCULAR; INTRAVENOUS; SUBCUTANEOUS at 15:31

## 2021-12-28 RX ADMIN — SODIUM CHLORIDE, PRESERVATIVE FREE 10 ML: 5 INJECTION INTRAVENOUS at 08:26

## 2021-12-28 RX ADMIN — SODIUM CHLORIDE 40 ML/HR: 9 INJECTION, SOLUTION INTRAVENOUS at 17:46

## 2021-12-28 RX ADMIN — TAZOBACTAM SODIUM AND PIPERACILLIN SODIUM 3.38 G: 375; 3 INJECTION, SOLUTION INTRAVENOUS at 11:14

## 2021-12-28 RX ADMIN — HYDROCODONE BITARTRATE AND ACETAMINOPHEN 1 TABLET: 5; 325 TABLET ORAL at 02:56

## 2021-12-28 RX ADMIN — I.V. FAT EMULSION 20 G: 20 EMULSION INTRAVENOUS at 21:54

## 2021-12-28 RX ADMIN — SODIUM CHLORIDE, PRESERVATIVE FREE 10 ML: 5 INJECTION INTRAVENOUS at 21:06

## 2021-12-28 RX ADMIN — SPIRONOLACTONE 12.5 MG: 25 TABLET ORAL at 11:14

## 2021-12-28 RX ADMIN — HYDROMORPHONE HYDROCHLORIDE 0.5 MG: 10 INJECTION INTRAMUSCULAR; INTRAVENOUS; SUBCUTANEOUS at 00:47

## 2021-12-28 RX ADMIN — IOPAMIDOL 85 ML: 612 INJECTION, SOLUTION INTRAVENOUS at 09:42

## 2021-12-28 RX ADMIN — ONDANSETRON 4 MG: 2 INJECTION INTRAMUSCULAR; INTRAVENOUS at 21:05

## 2021-12-28 RX ADMIN — FAMOTIDINE: 10 INJECTION INTRAVENOUS at 17:46

## 2021-12-28 RX ADMIN — HYDROMORPHONE HYDROCHLORIDE 0.5 MG: 10 INJECTION INTRAMUSCULAR; INTRAVENOUS; SUBCUTANEOUS at 17:46

## 2021-12-28 RX ADMIN — DOCUSATE SODIUM 100 MG: 100 CAPSULE, LIQUID FILLED ORAL at 21:05

## 2021-12-28 RX ADMIN — TAZOBACTAM SODIUM AND PIPERACILLIN SODIUM 3.38 G: 375; 3 INJECTION, SOLUTION INTRAVENOUS at 04:00

## 2021-12-28 RX ADMIN — SIMETHICONE 80 MG: 80 TABLET, CHEWABLE ORAL at 17:46

## 2021-12-28 RX ADMIN — HYDROMORPHONE HYDROCHLORIDE 0.5 MG: 10 INJECTION INTRAMUSCULAR; INTRAVENOUS; SUBCUTANEOUS at 09:04

## 2021-12-28 RX ADMIN — HYDROMORPHONE HYDROCHLORIDE 0.5 MG: 10 INJECTION INTRAMUSCULAR; INTRAVENOUS; SUBCUTANEOUS at 13:39

## 2021-12-28 RX ADMIN — SIMETHICONE 80 MG: 80 TABLET, CHEWABLE ORAL at 00:46

## 2021-12-28 RX ADMIN — DIATRIZOATE MEGLUMINE AND DIATRIZOATE SODIUM 30 ML: 600; 100 SOLUTION ORAL; RECTAL at 08:07

## 2021-12-28 RX ADMIN — HYDROMORPHONE HYDROCHLORIDE 0.5 MG: 10 INJECTION INTRAMUSCULAR; INTRAVENOUS; SUBCUTANEOUS at 11:16

## 2021-12-28 RX ADMIN — SIMETHICONE 80 MG: 80 TABLET, CHEWABLE ORAL at 21:04

## 2021-12-28 RX ADMIN — SODIUM CHLORIDE, PRESERVATIVE FREE 10 ML: 5 INJECTION INTRAVENOUS at 21:08

## 2021-12-28 RX ADMIN — HYDROMORPHONE HYDROCHLORIDE 0.5 MG: 10 INJECTION INTRAMUSCULAR; INTRAVENOUS; SUBCUTANEOUS at 23:46

## 2021-12-28 RX ADMIN — DOCUSATE SODIUM 100 MG: 100 CAPSULE, LIQUID FILLED ORAL at 11:14

## 2021-12-28 RX ADMIN — SIMETHICONE 80 MG: 80 TABLET, CHEWABLE ORAL at 11:14

## 2021-12-28 RX ADMIN — HYDROCODONE BITARTRATE AND ACETAMINOPHEN 1 TABLET: 5; 325 TABLET ORAL at 13:03

## 2021-12-28 RX ADMIN — CARVEDILOL 12.5 MG: 12.5 TABLET, FILM COATED ORAL at 17:46

## 2021-12-28 RX ADMIN — HYDROMORPHONE HYDROCHLORIDE 0.5 MG: 10 INJECTION INTRAMUSCULAR; INTRAVENOUS; SUBCUTANEOUS at 05:13

## 2021-12-28 RX ADMIN — HYDROMORPHONE HYDROCHLORIDE 0.5 MG: 10 INJECTION INTRAMUSCULAR; INTRAVENOUS; SUBCUTANEOUS at 21:05

## 2021-12-28 RX ADMIN — ONDANSETRON 4 MG: 2 INJECTION INTRAMUSCULAR; INTRAVENOUS at 08:16

## 2021-12-28 RX ADMIN — SODIUM CHLORIDE, PRESERVATIVE FREE 10 ML: 5 INJECTION INTRAVENOUS at 21:59

## 2021-12-29 ENCOUNTER — APPOINTMENT (OUTPATIENT)
Dept: GENERAL RADIOLOGY | Facility: HOSPITAL | Age: 61
End: 2021-12-29

## 2021-12-29 ENCOUNTER — ANESTHESIA EVENT (OUTPATIENT)
Dept: PERIOP | Facility: HOSPITAL | Age: 61
End: 2021-12-29

## 2021-12-29 ENCOUNTER — ANESTHESIA (OUTPATIENT)
Dept: PERIOP | Facility: HOSPITAL | Age: 61
End: 2021-12-29

## 2021-12-29 LAB
ALBUMIN SERPL-MCNC: 2.6 G/DL (ref 3.5–5.2)
ALBUMIN/GLOB SERPL: 0.7 G/DL
ALP SERPL-CCNC: 55 U/L (ref 39–117)
ALT SERPL W P-5'-P-CCNC: 25 U/L (ref 1–41)
ANION GAP SERPL CALCULATED.3IONS-SCNC: 10.4 MMOL/L (ref 5–15)
ANION GAP SERPL CALCULATED.3IONS-SCNC: 13 MMOL/L (ref 5–15)
ARTERIAL PATENCY WRIST A: ABNORMAL
ARTERIAL PATENCY WRIST A: ABNORMAL
AST SERPL-CCNC: 37 U/L (ref 1–40)
ATMOSPHERIC PRESS: 750.3 MMHG
ATMOSPHERIC PRESS: 750.4 MMHG
BACTERIA SPEC AEROBE CULT: NORMAL
BACTERIA SPEC AEROBE CULT: NORMAL
BASE EXCESS BLDA CALC-SCNC: -2 MMOL/L (ref 0–2)
BASE EXCESS BLDA CALC-SCNC: -3.4 MMOL/L (ref 0–2)
BDY SITE: ABNORMAL
BDY SITE: ABNORMAL
BILIRUB SERPL-MCNC: 0.7 MG/DL (ref 0–1.2)
BUN SERPL-MCNC: 12 MG/DL (ref 8–23)
BUN SERPL-MCNC: 16 MG/DL (ref 8–23)
BUN/CREAT SERPL: 14.1 (ref 7–25)
BUN/CREAT SERPL: 18.8 (ref 7–25)
CALCIUM SPEC-SCNC: 7.4 MG/DL (ref 8.6–10.5)
CALCIUM SPEC-SCNC: 8.2 MG/DL (ref 8.6–10.5)
CHLORIDE SERPL-SCNC: 105 MMOL/L (ref 98–107)
CHLORIDE SERPL-SCNC: 105 MMOL/L (ref 98–107)
CO2 SERPL-SCNC: 19 MMOL/L (ref 22–29)
CO2 SERPL-SCNC: 25.6 MMOL/L (ref 22–29)
CREAT SERPL-MCNC: 0.85 MG/DL (ref 0.76–1.27)
CREAT SERPL-MCNC: 0.85 MG/DL (ref 0.76–1.27)
D-LACTATE SERPL-SCNC: 4.6 MMOL/L (ref 0.5–2)
DEPRECATED RDW RBC AUTO: 47.1 FL (ref 37–54)
ERYTHROCYTE [DISTWIDTH] IN BLOOD BY AUTOMATED COUNT: 14.1 % (ref 12.3–15.4)
GFR SERPL CREATININE-BSD FRML MDRD: 91 ML/MIN/1.73
GFR SERPL CREATININE-BSD FRML MDRD: 91 ML/MIN/1.73
GLOBULIN UR ELPH-MCNC: 3.7 GM/DL
GLUCOSE BLDC GLUCOMTR-MCNC: 130 MG/DL (ref 70–130)
GLUCOSE BLDC GLUCOMTR-MCNC: 132 MG/DL (ref 70–130)
GLUCOSE BLDC GLUCOMTR-MCNC: 134 MG/DL (ref 70–130)
GLUCOSE BLDC GLUCOMTR-MCNC: 182 MG/DL (ref 70–130)
GLUCOSE SERPL-MCNC: 137 MG/DL (ref 65–99)
GLUCOSE SERPL-MCNC: 236 MG/DL (ref 65–99)
HCO3 BLDA-SCNC: 22.5 MMOL/L (ref 22–28)
HCO3 BLDA-SCNC: 24.7 MMOL/L (ref 22–28)
HCT VFR BLD AUTO: 38.9 % (ref 37.5–51)
HGB BLD-MCNC: 12.2 G/DL (ref 13–17.7)
INHALED O2 CONCENTRATION: 100 %
INHALED O2 CONCENTRATION: 40 %
MAGNESIUM SERPL-MCNC: 2 MG/DL (ref 1.6–2.4)
MCH RBC QN AUTO: 28.6 PG (ref 26.6–33)
MCHC RBC AUTO-ENTMCNC: 31.4 G/DL (ref 31.5–35.7)
MCV RBC AUTO: 91.3 FL (ref 79–97)
MODALITY: ABNORMAL
MODALITY: ABNORMAL
NRBC BLD AUTO-RTO: 0.2 /100 WBC (ref 0–0.2)
O2 A-A PPRESDIFF RESPIRATORY: 0.4 MMHG
O2 A-A PPRESDIFF RESPIRATORY: 0.4 MMHG
PCO2 BLDA: 43.9 MM HG (ref 35–45)
PCO2 BLDA: 49.1 MM HG (ref 35–45)
PEEP RESPIRATORY: 5 CM[H2O]
PEEP RESPIRATORY: 7.5 CM[H2O]
PH BLDA: 7.31 PH UNITS (ref 7.35–7.45)
PH BLDA: 7.32 PH UNITS (ref 7.35–7.45)
PHOSPHATE SERPL-MCNC: 2.6 MG/DL (ref 2.5–4.5)
PLATELET # BLD AUTO: 329 10*3/MM3 (ref 140–450)
PMV BLD AUTO: 10.3 FL (ref 6–12)
PO2 BLDA: 101.2 MM HG (ref 80–100)
PO2 BLDA: 301.7 MM HG (ref 80–100)
POTASSIUM SERPL-SCNC: 3.9 MMOL/L (ref 3.5–5.2)
POTASSIUM SERPL-SCNC: 4.2 MMOL/L (ref 3.5–5.2)
PROT SERPL-MCNC: 6.3 G/DL (ref 6–8.5)
PSV: 8 CMH2O
RBC # BLD AUTO: 4.26 10*6/MM3 (ref 4.14–5.8)
SAO2 % BLDCOA: 97.2 % (ref 92–99)
SAO2 % BLDCOA: 99.9 % (ref 92–99)
SET MECH RESP RATE: 14
SET MECH RESP RATE: 19
SODIUM SERPL-SCNC: 137 MMOL/L (ref 136–145)
SODIUM SERPL-SCNC: 141 MMOL/L (ref 136–145)
TOTAL RATE: 14 BREATHS/MINUTE
TOTAL RATE: 19 BREATHS/MINUTE
VENTILATOR MODE: ABNORMAL
VENTILATOR MODE: AC
VT ON VENT VENT: 540 ML
VT ON VENT VENT: 550 ML
WBC NRBC COR # BLD: 10.46 10*3/MM3 (ref 3.4–10.8)

## 2021-12-29 PROCEDURE — C1889 IMPLANT/INSERT DEVICE, NOC: HCPCS | Performed by: SURGERY

## 2021-12-29 PROCEDURE — 25010000002 MAGNESIUM SULFATE PER 500 MG OF MAGNESIUM: Performed by: SURGERY

## 2021-12-29 PROCEDURE — 94799 UNLISTED PULMONARY SVC/PX: CPT

## 2021-12-29 PROCEDURE — 25010000002 HYDROMORPHONE PER 4 MG: Performed by: SURGERY

## 2021-12-29 PROCEDURE — 25010000002 ALBUMIN HUMAN 5% PER 50 ML: Performed by: ANESTHESIOLOGY

## 2021-12-29 PROCEDURE — 25010000002 FENTANYL CITRATE (PF) 50 MCG/ML SOLUTION: Performed by: ANESTHESIOLOGY

## 2021-12-29 PROCEDURE — 44143 PARTIAL REMOVAL OF COLON: CPT

## 2021-12-29 PROCEDURE — 25010000002 FENTANYL CITRATE (PF) 50 MCG/ML SOLUTION: Performed by: REGISTERED NURSE

## 2021-12-29 PROCEDURE — 44143 PARTIAL REMOVAL OF COLON: CPT | Performed by: SURGERY

## 2021-12-29 PROCEDURE — 71045 X-RAY EXAM CHEST 1 VIEW: CPT

## 2021-12-29 PROCEDURE — 85014 HEMATOCRIT: CPT

## 2021-12-29 PROCEDURE — 25010000002 PHENYLEPHRINE 10 MG/ML SOLUTION

## 2021-12-29 PROCEDURE — 5A1955Z RESPIRATORY VENTILATION, GREATER THAN 96 CONSECUTIVE HOURS: ICD-10-PCS | Performed by: INTERNAL MEDICINE

## 2021-12-29 PROCEDURE — 25010000002 PIPERACILLIN SOD-TAZOBACTAM PER 1 G: Performed by: SURGERY

## 2021-12-29 PROCEDURE — 80053 COMPREHEN METABOLIC PANEL: CPT | Performed by: SURGERY

## 2021-12-29 PROCEDURE — 85025 COMPLETE CBC W/AUTO DIFF WBC: CPT | Performed by: INTERNAL MEDICINE

## 2021-12-29 PROCEDURE — 82947 ASSAY GLUCOSE BLOOD QUANT: CPT

## 2021-12-29 PROCEDURE — 25010000002 SUCCINYLCHOLINE PER 20 MG: Performed by: REGISTERED NURSE

## 2021-12-29 PROCEDURE — 83605 ASSAY OF LACTIC ACID: CPT | Performed by: INTERNAL MEDICINE

## 2021-12-29 PROCEDURE — 88305 TISSUE EXAM BY PATHOLOGIST: CPT | Performed by: SURGERY

## 2021-12-29 PROCEDURE — 25010000002 CALCIUM GLUCONATE PER 10 ML: Performed by: SURGERY

## 2021-12-29 PROCEDURE — 25010000002 MIDAZOLAM PER 1 MG: Performed by: ANESTHESIOLOGY

## 2021-12-29 PROCEDURE — 0D1L0Z4 BYPASS TRANSVERSE COLON TO CUTANEOUS, OPEN APPROACH: ICD-10-PCS | Performed by: SURGERY

## 2021-12-29 PROCEDURE — 82962 GLUCOSE BLOOD TEST: CPT

## 2021-12-29 PROCEDURE — 83735 ASSAY OF MAGNESIUM: CPT | Performed by: SURGERY

## 2021-12-29 PROCEDURE — 94002 VENT MGMT INPAT INIT DAY: CPT

## 2021-12-29 PROCEDURE — 25010000002 PROPOFOL 10 MG/ML EMULSION: Performed by: ANESTHESIOLOGY

## 2021-12-29 PROCEDURE — 80048 BASIC METABOLIC PNL TOTAL CA: CPT | Performed by: INTERNAL MEDICINE

## 2021-12-29 PROCEDURE — 0BH17EZ INSERTION OF ENDOTRACHEAL AIRWAY INTO TRACHEA, VIA NATURAL OR ARTIFICIAL OPENING: ICD-10-PCS | Performed by: REGISTERED NURSE

## 2021-12-29 PROCEDURE — 25010000002 ONDANSETRON PER 1 MG: Performed by: SURGERY

## 2021-12-29 PROCEDURE — 82803 BLOOD GASES ANY COMBINATION: CPT

## 2021-12-29 PROCEDURE — 80053 COMPREHEN METABOLIC PANEL: CPT | Performed by: INTERNAL MEDICINE

## 2021-12-29 PROCEDURE — 99232 SBSQ HOSP IP/OBS MODERATE 35: CPT | Performed by: NURSE PRACTITIONER

## 2021-12-29 PROCEDURE — P9041 ALBUMIN (HUMAN),5%, 50ML: HCPCS | Performed by: ANESTHESIOLOGY

## 2021-12-29 PROCEDURE — 85007 BL SMEAR W/DIFF WBC COUNT: CPT | Performed by: INTERNAL MEDICINE

## 2021-12-29 PROCEDURE — 84100 ASSAY OF PHOSPHORUS: CPT | Performed by: SURGERY

## 2021-12-29 PROCEDURE — 85018 HEMOGLOBIN: CPT

## 2021-12-29 DEVICE — ENDOPATH ECHELON ENDOSCOPIC LINEAR CUTTER RELOADS, BLUE, 60MM
Type: IMPLANTABLE DEVICE | Site: ABDOMEN | Status: FUNCTIONAL
Brand: ECHELON ENDOPATH

## 2021-12-29 DEVICE — CLIP LIGAT VASC HORIZON TI LG ORNG 6CT: Type: IMPLANTABLE DEVICE | Site: ABDOMEN | Status: FUNCTIONAL

## 2021-12-29 RX ORDER — PROMETHAZINE HYDROCHLORIDE 25 MG/1
25 TABLET ORAL ONCE AS NEEDED
Status: DISCONTINUED | OUTPATIENT
Start: 2021-12-29 | End: 2021-12-29 | Stop reason: HOSPADM

## 2021-12-29 RX ORDER — SODIUM CHLORIDE 0.9 % (FLUSH) 0.9 %
3-10 SYRINGE (ML) INJECTION AS NEEDED
Status: DISCONTINUED | OUTPATIENT
Start: 2021-12-29 | End: 2021-12-29 | Stop reason: HOSPADM

## 2021-12-29 RX ORDER — FENTANYL CITRATE 50 UG/ML
INJECTION, SOLUTION INTRAMUSCULAR; INTRAVENOUS AS NEEDED
Status: DISCONTINUED | OUTPATIENT
Start: 2021-12-29 | End: 2021-12-29 | Stop reason: SURG

## 2021-12-29 RX ORDER — HYDROMORPHONE HYDROCHLORIDE 1 MG/ML
0.5 INJECTION, SOLUTION INTRAMUSCULAR; INTRAVENOUS; SUBCUTANEOUS
Status: DISCONTINUED | OUTPATIENT
Start: 2021-12-29 | End: 2021-12-29 | Stop reason: SDUPTHER

## 2021-12-29 RX ORDER — FENTANYL CITRATE 50 UG/ML
50 INJECTION, SOLUTION INTRAMUSCULAR; INTRAVENOUS
Status: DISCONTINUED | OUTPATIENT
Start: 2021-12-29 | End: 2021-12-29 | Stop reason: HOSPADM

## 2021-12-29 RX ORDER — HYDRALAZINE HYDROCHLORIDE 20 MG/ML
5 INJECTION INTRAMUSCULAR; INTRAVENOUS
Status: DISCONTINUED | OUTPATIENT
Start: 2021-12-29 | End: 2021-12-29 | Stop reason: HOSPADM

## 2021-12-29 RX ORDER — SUCCINYLCHOLINE CHLORIDE 20 MG/ML
INJECTION INTRAMUSCULAR; INTRAVENOUS AS NEEDED
Status: DISCONTINUED | OUTPATIENT
Start: 2021-12-29 | End: 2021-12-29 | Stop reason: SURG

## 2021-12-29 RX ORDER — ETOMIDATE 2 MG/ML
INJECTION INTRAVENOUS AS NEEDED
Status: DISCONTINUED | OUTPATIENT
Start: 2021-12-29 | End: 2021-12-29 | Stop reason: SURG

## 2021-12-29 RX ORDER — NALOXONE HCL 0.4 MG/ML
0.2 VIAL (ML) INJECTION AS NEEDED
Status: DISCONTINUED | OUTPATIENT
Start: 2021-12-29 | End: 2021-12-29 | Stop reason: HOSPADM

## 2021-12-29 RX ORDER — VASOPRESSIN 20 U/ML
INJECTION PARENTERAL AS NEEDED
Status: DISCONTINUED | OUTPATIENT
Start: 2021-12-29 | End: 2021-12-29 | Stop reason: SURG

## 2021-12-29 RX ORDER — SODIUM CHLORIDE 0.9 % (FLUSH) 0.9 %
3 SYRINGE (ML) INJECTION EVERY 12 HOURS SCHEDULED
Status: DISCONTINUED | OUTPATIENT
Start: 2021-12-29 | End: 2021-12-29 | Stop reason: HOSPADM

## 2021-12-29 RX ORDER — PHENYLEPHRINE HYDROCHLORIDE 10 MG/ML
INJECTION INTRAVENOUS
Status: COMPLETED
Start: 2021-12-29 | End: 2021-12-29

## 2021-12-29 RX ORDER — LIDOCAINE HYDROCHLORIDE 10 MG/ML
0.5 INJECTION, SOLUTION EPIDURAL; INFILTRATION; INTRACAUDAL; PERINEURAL ONCE AS NEEDED
Status: DISCONTINUED | OUTPATIENT
Start: 2021-12-29 | End: 2021-12-29 | Stop reason: HOSPADM

## 2021-12-29 RX ORDER — NOREPINEPHRINE BIT/0.9 % NACL 8 MG/250ML
.02-.3 INFUSION BOTTLE (ML) INTRAVENOUS
Status: DISCONTINUED | OUTPATIENT
Start: 2021-12-29 | End: 2021-12-30

## 2021-12-29 RX ORDER — FLUMAZENIL 0.1 MG/ML
0.2 INJECTION INTRAVENOUS AS NEEDED
Status: DISCONTINUED | OUTPATIENT
Start: 2021-12-29 | End: 2021-12-29 | Stop reason: HOSPADM

## 2021-12-29 RX ORDER — ACETAMINOPHEN 650 MG
TABLET, EXTENDED RELEASE ORAL AS NEEDED
Status: DISCONTINUED | OUTPATIENT
Start: 2021-12-29 | End: 2021-12-29 | Stop reason: HOSPADM

## 2021-12-29 RX ORDER — PHENYLEPHRINE HCL IN 0.9% NACL 0.5 MG/5ML
.5-3 SYRINGE (ML) INTRAVENOUS
Status: DISCONTINUED | OUTPATIENT
Start: 2021-12-30 | End: 2021-12-30

## 2021-12-29 RX ORDER — SODIUM CHLORIDE, SODIUM LACTATE, POTASSIUM CHLORIDE, CALCIUM CHLORIDE 600; 310; 30; 20 MG/100ML; MG/100ML; MG/100ML; MG/100ML
9 INJECTION, SOLUTION INTRAVENOUS CONTINUOUS
Status: DISCONTINUED | OUTPATIENT
Start: 2021-12-29 | End: 2021-12-29

## 2021-12-29 RX ORDER — DIPHENHYDRAMINE HCL 25 MG
25 CAPSULE ORAL
Status: DISCONTINUED | OUTPATIENT
Start: 2021-12-29 | End: 2021-12-29 | Stop reason: HOSPADM

## 2021-12-29 RX ORDER — ROCURONIUM BROMIDE 10 MG/ML
INJECTION, SOLUTION INTRAVENOUS AS NEEDED
Status: DISCONTINUED | OUTPATIENT
Start: 2021-12-29 | End: 2021-12-29 | Stop reason: SURG

## 2021-12-29 RX ORDER — EPHEDRINE SULFATE 50 MG/ML
5 INJECTION, SOLUTION INTRAVENOUS ONCE AS NEEDED
Status: DISCONTINUED | OUTPATIENT
Start: 2021-12-29 | End: 2021-12-29 | Stop reason: HOSPADM

## 2021-12-29 RX ORDER — LABETALOL HYDROCHLORIDE 5 MG/ML
5 INJECTION, SOLUTION INTRAVENOUS
Status: DISCONTINUED | OUTPATIENT
Start: 2021-12-29 | End: 2021-12-29 | Stop reason: HOSPADM

## 2021-12-29 RX ORDER — BUPIVACAINE HYDROCHLORIDE AND EPINEPHRINE 5; 5 MG/ML; UG/ML
INJECTION, SOLUTION EPIDURAL; INTRACAUDAL; PERINEURAL AS NEEDED
Status: DISCONTINUED | OUTPATIENT
Start: 2021-12-29 | End: 2021-12-29 | Stop reason: HOSPADM

## 2021-12-29 RX ORDER — ALBUMIN, HUMAN INJ 5% 5 %
SOLUTION INTRAVENOUS CONTINUOUS PRN
Status: DISCONTINUED | OUTPATIENT
Start: 2021-12-29 | End: 2021-12-29 | Stop reason: SURG

## 2021-12-29 RX ORDER — LIDOCAINE HYDROCHLORIDE 20 MG/ML
INJECTION, SOLUTION INFILTRATION; PERINEURAL AS NEEDED
Status: DISCONTINUED | OUTPATIENT
Start: 2021-12-29 | End: 2021-12-29 | Stop reason: SURG

## 2021-12-29 RX ORDER — DIPHENHYDRAMINE HYDROCHLORIDE 50 MG/ML
12.5 INJECTION INTRAMUSCULAR; INTRAVENOUS
Status: DISCONTINUED | OUTPATIENT
Start: 2021-12-29 | End: 2021-12-29 | Stop reason: HOSPADM

## 2021-12-29 RX ORDER — SCOLOPAMINE TRANSDERMAL SYSTEM 1 MG/1
1 PATCH, EXTENDED RELEASE TRANSDERMAL
Status: DISCONTINUED | OUTPATIENT
Start: 2021-12-29 | End: 2021-12-29 | Stop reason: HOSPADM

## 2021-12-29 RX ORDER — FAMOTIDINE 10 MG/ML
20 INJECTION, SOLUTION INTRAVENOUS ONCE
Status: DISCONTINUED | OUTPATIENT
Start: 2021-12-29 | End: 2021-12-29 | Stop reason: HOSPADM

## 2021-12-29 RX ORDER — ONDANSETRON 2 MG/ML
4 INJECTION INTRAMUSCULAR; INTRAVENOUS ONCE AS NEEDED
Status: DISCONTINUED | OUTPATIENT
Start: 2021-12-29 | End: 2021-12-29 | Stop reason: HOSPADM

## 2021-12-29 RX ORDER — HYDROMORPHONE HYDROCHLORIDE 1 MG/ML
0.5 INJECTION, SOLUTION INTRAMUSCULAR; INTRAVENOUS; SUBCUTANEOUS
Status: DISCONTINUED | OUTPATIENT
Start: 2021-12-29 | End: 2021-12-29 | Stop reason: HOSPADM

## 2021-12-29 RX ORDER — MAGNESIUM HYDROXIDE 1200 MG/15ML
LIQUID ORAL AS NEEDED
Status: DISCONTINUED | OUTPATIENT
Start: 2021-12-29 | End: 2021-12-29 | Stop reason: HOSPADM

## 2021-12-29 RX ORDER — MIDAZOLAM HYDROCHLORIDE 1 MG/ML
1 INJECTION INTRAMUSCULAR; INTRAVENOUS
Status: DISCONTINUED | OUTPATIENT
Start: 2021-12-29 | End: 2021-12-29 | Stop reason: HOSPADM

## 2021-12-29 RX ORDER — PROMETHAZINE HYDROCHLORIDE 25 MG/1
25 SUPPOSITORY RECTAL ONCE AS NEEDED
Status: DISCONTINUED | OUTPATIENT
Start: 2021-12-29 | End: 2021-12-29 | Stop reason: HOSPADM

## 2021-12-29 RX ORDER — PROPOFOL 10 MG/ML
VIAL (ML) INTRAVENOUS CONTINUOUS PRN
Status: DISCONTINUED | OUTPATIENT
Start: 2021-12-29 | End: 2021-12-29 | Stop reason: SURG

## 2021-12-29 RX ADMIN — SCOPALAMINE 1 PATCH: 1 PATCH, EXTENDED RELEASE TRANSDERMAL at 14:03

## 2021-12-29 RX ADMIN — PHENYLEPHRINE HYDROCHLORIDE 1 MCG/KG/MIN: 10 INJECTION INTRAVENOUS at 23:10

## 2021-12-29 RX ADMIN — SIMETHICONE 80 MG: 80 TABLET, CHEWABLE ORAL at 11:11

## 2021-12-29 RX ADMIN — FENTANYL CITRATE 50 MCG: 0.05 INJECTION, SOLUTION INTRAMUSCULAR; INTRAVENOUS at 15:06

## 2021-12-29 RX ADMIN — SODIUM CHLORIDE 1000 ML: 9 INJECTION, SOLUTION INTRAVENOUS at 23:15

## 2021-12-29 RX ADMIN — VASOPRESSIN 0.03 UNITS/MIN: 20 INJECTION INTRAVENOUS at 22:06

## 2021-12-29 RX ADMIN — ROCURONIUM BROMIDE 30 MG: 50 INJECTION INTRAVENOUS at 15:20

## 2021-12-29 RX ADMIN — ROCURONIUM BROMIDE 20 MG: 50 INJECTION INTRAVENOUS at 17:34

## 2021-12-29 RX ADMIN — ROCURONIUM BROMIDE 20 MG: 50 INJECTION INTRAVENOUS at 16:13

## 2021-12-29 RX ADMIN — HYDROMORPHONE HYDROCHLORIDE 0.5 MG: 10 INJECTION INTRAMUSCULAR; INTRAVENOUS; SUBCUTANEOUS at 08:17

## 2021-12-29 RX ADMIN — TAZOBACTAM SODIUM AND PIPERACILLIN SODIUM 3.38 G: 375; 3 INJECTION, SOLUTION INTRAVENOUS at 22:16

## 2021-12-29 RX ADMIN — HYDROMORPHONE HYDROCHLORIDE 0.5 MG: 10 INJECTION INTRAMUSCULAR; INTRAVENOUS; SUBCUTANEOUS at 04:01

## 2021-12-29 RX ADMIN — FAMOTIDINE: 10 INJECTION, SOLUTION INTRAVENOUS at 19:03

## 2021-12-29 RX ADMIN — ALBUMIN HUMAN: 0.05 INJECTION, SOLUTION INTRAVENOUS at 16:04

## 2021-12-29 RX ADMIN — SODIUM CHLORIDE, PRESERVATIVE FREE 10 ML: 5 INJECTION INTRAVENOUS at 08:32

## 2021-12-29 RX ADMIN — ONDANSETRON 4 MG: 2 INJECTION INTRAMUSCULAR; INTRAVENOUS at 03:07

## 2021-12-29 RX ADMIN — PROPOFOL 20 MCG/KG/MIN: 10 INJECTION, EMULSION INTRAVENOUS at 18:30

## 2021-12-29 RX ADMIN — SODIUM CHLORIDE, PRESERVATIVE FREE 10 ML: 5 INJECTION INTRAVENOUS at 21:21

## 2021-12-29 RX ADMIN — FENTANYL CITRATE 50 MCG: 50 INJECTION INTRAMUSCULAR; INTRAVENOUS at 14:03

## 2021-12-29 RX ADMIN — SODIUM CHLORIDE, PRESERVATIVE FREE 10 ML: 5 INJECTION INTRAVENOUS at 08:31

## 2021-12-29 RX ADMIN — SODIUM CHLORIDE: 9 INJECTION, SOLUTION INTRAVENOUS at 18:15

## 2021-12-29 RX ADMIN — HYDROMORPHONE HYDROCHLORIDE 0.5 MG: 10 INJECTION INTRAMUSCULAR; INTRAVENOUS; SUBCUTANEOUS at 12:08

## 2021-12-29 RX ADMIN — SUCCINYLCHOLINE CHLORIDE 175 MG: 20 INJECTION, SOLUTION INTRAMUSCULAR; INTRAVENOUS; PARENTERAL at 15:06

## 2021-12-29 RX ADMIN — TAZOBACTAM SODIUM AND PIPERACILLIN SODIUM 3.38 G: 375; 3 INJECTION, SOLUTION INTRAVENOUS at 11:11

## 2021-12-29 RX ADMIN — PHENYLEPHRINE HYDROCHLORIDE: 10 INJECTION, SOLUTION INTRAVENOUS at 23:11

## 2021-12-29 RX ADMIN — MIDAZOLAM 1 MG: 1 INJECTION INTRAMUSCULAR; INTRAVENOUS at 14:02

## 2021-12-29 RX ADMIN — VASOPRESSIN 2 UNITS: 20 INJECTION INTRAVENOUS at 16:28

## 2021-12-29 RX ADMIN — ROCURONIUM BROMIDE 20 MG: 50 INJECTION INTRAVENOUS at 17:57

## 2021-12-29 RX ADMIN — HYDROMORPHONE HYDROCHLORIDE 0.5 MG: 10 INJECTION INTRAMUSCULAR; INTRAVENOUS; SUBCUTANEOUS at 05:58

## 2021-12-29 RX ADMIN — SODIUM BICARBONATE 50 MEQ: 84 INJECTION, SOLUTION INTRAVENOUS at 16:42

## 2021-12-29 RX ADMIN — SODIUM CHLORIDE: 9 INJECTION, SOLUTION INTRAVENOUS at 16:33

## 2021-12-29 RX ADMIN — FENTANYL CITRATE 50 MCG: 0.05 INJECTION, SOLUTION INTRAMUSCULAR; INTRAVENOUS at 17:58

## 2021-12-29 RX ADMIN — NOREPINEPHRINE BITARTRATE 0.05 MCG/KG/MIN: 1 INJECTION, SOLUTION, CONCENTRATE INTRAVENOUS at 17:07

## 2021-12-29 RX ADMIN — Medication 0.3 MCG/KG/MIN: at 23:59

## 2021-12-29 RX ADMIN — HYDROMORPHONE HYDROCHLORIDE 0.5 MG: 10 INJECTION INTRAMUSCULAR; INTRAVENOUS; SUBCUTANEOUS at 01:50

## 2021-12-29 RX ADMIN — ONDANSETRON 4 MG: 2 INJECTION INTRAMUSCULAR; INTRAVENOUS at 11:30

## 2021-12-29 RX ADMIN — VASOPRESSIN 2 UNITS: 20 INJECTION INTRAVENOUS at 16:10

## 2021-12-29 RX ADMIN — HYDROMORPHONE HYDROCHLORIDE 0.5 MG: 10 INJECTION INTRAMUSCULAR; INTRAVENOUS; SUBCUTANEOUS at 22:47

## 2021-12-29 RX ADMIN — DEXMEDETOMIDINE HYDROCHLORIDE 0.2 MCG/KG/HR: 100 INJECTION, SOLUTION, CONCENTRATE INTRAVENOUS at 22:05

## 2021-12-29 RX ADMIN — LIDOCAINE HYDROCHLORIDE 80 MG: 20 INJECTION, SOLUTION INFILTRATION; PERINEURAL at 15:06

## 2021-12-29 RX ADMIN — SODIUM CHLORIDE 40 ML/HR: 9 INJECTION, SOLUTION INTRAVENOUS at 10:07

## 2021-12-29 RX ADMIN — HYDROMORPHONE HYDROCHLORIDE 0.5 MG: 10 INJECTION INTRAMUSCULAR; INTRAVENOUS; SUBCUTANEOUS at 20:15

## 2021-12-29 RX ADMIN — TAZOBACTAM SODIUM AND PIPERACILLIN SODIUM 3.38 G: 375; 3 INJECTION, SOLUTION INTRAVENOUS at 04:01

## 2021-12-29 RX ADMIN — HYDROMORPHONE HYDROCHLORIDE 0.5 MG: 10 INJECTION INTRAMUSCULAR; INTRAVENOUS; SUBCUTANEOUS at 10:10

## 2021-12-29 RX ADMIN — ETOMIDATE 30 MG: 2 INJECTION, SOLUTION INTRAVENOUS at 15:06

## 2021-12-29 NOTE — ANESTHESIA PROCEDURE NOTES
Arterial Line    Pre-sedation assessment completed: 12/29/2021 2:15 PM    Patient reassessed immediately prior to procedure    Patient location during procedure: pre-op  Start time: 12/29/2021 2:16 PM  Stop Time:12/29/2021 2:19 PM       Line placed for hemodynamic monitoring and ABGs/Labs/ISTAT.  Performed By   Anesthesiologist: Luis Willams MD  Preanesthetic Checklist  Completed: patient identified, IV checked, site marked, risks and benefits discussed, surgical consent, monitors and equipment checked, pre-op evaluation and timeout performed  Arterial Line Prep   Sterile Tech: gloves, mask, cap and sterile barriers  Prep: ChloraPrep  Patient monitoring: blood pressure monitoring, continuous pulse oximetry and EKG  Arterial Line Procedure   Laterality:left  Location:  radial artery  Catheter size: 20 G   Guidance: ultrasound guided  PROCEDURE NOTE/ULTRASOUND INTERPRETATION.  Using ultrasound guidance the potential vascular sites for insertion of the catheter were visualized to determine the patency of the vessel to be used for vascular access.  After selecting the appropriate site for insertion, the needle was visualized under ultrasound being inserted into the radial artery, followed by ultrasound confirmation of wire and catheter placement. There were no abnormalities seen on ultrasound; an image was taken; and the patient tolerated the procedure with no complications.   Number of attempts: 1  Successful placement: yes  Post Assessment   Dressing Type: occlusive dressing applied, secured with tape and wrist guard applied.   Complications no  Circ/Move/Sens Assessment: unchanged.   Patient Tolerance: patient tolerated the procedure well with no apparent complications

## 2021-12-29 NOTE — ANESTHESIA PREPROCEDURE EVALUATION
Anesthesia Evaluation     Patient summary reviewed and Nursing notes reviewed   history of anesthetic complications: PONV  NPO Solid Status: > 8 hours  NPO Liquid Status: > 8 hours           Airway   Mallampati: II  Dental - normal exam     Pulmonary - normal exam   (+) asthma,  Cardiovascular - normal exam    (+) pacemaker ICD, hypertension 2 medications or greater, CHF Systolic <55%,       Neuro/Psych  (+) psychiatric history Depression,     GI/Hepatic/Renal/Endo    (+)   liver disease fatty liver disease, diabetes mellitus,     Musculoskeletal     Abdominal    Substance History      OB/GYN          Other   arthritis,                    Anesthesia Plan    ASA 4     general     intravenous induction     Anesthetic plan, all risks, benefits, and alternatives have been provided, discussed and informed consent has been obtained with: patient.

## 2021-12-29 NOTE — ANESTHESIA PROCEDURE NOTES
Airway  Urgency: elective    Date/Time: 12/29/2021 3:09 PM  Airway not difficult    General Information and Staff    Patient location during procedure: OR  CRNA: Monica Drummond CRNA    Indications and Patient Condition  Indications for airway management: airway protection    Preoxygenated: yes  MILS maintained throughout  Mask difficulty assessment: 0 - not attempted    Final Airway Details  Final airway type: endotracheal airway      Successful airway: ETT  Cuffed: yes   Successful intubation technique: direct laryngoscopy and RSI  Endotracheal tube insertion site: oral  Blade: Margo  Blade size: 3  ETT size (mm): 7.5  Cormack-Lehane Classification: grade I - full view of glottis  Placement verified by: chest auscultation and capnometry   Measured from: lips  ETT/EBT  to lips (cm): 22  Number of attempts at approach: 1  Assessment: lips, teeth, and gum same as pre-op and atraumatic intubation    Additional Comments  Atraumatic insertion

## 2021-12-30 LAB
ALBUMIN SERPL-MCNC: 1.7 G/DL (ref 3.5–5.2)
ALBUMIN/GLOB SERPL: 0.7 G/DL
ALP SERPL-CCNC: 40 U/L (ref 39–117)
ALT SERPL W P-5'-P-CCNC: 15 U/L (ref 1–41)
ANION GAP SERPL CALCULATED.3IONS-SCNC: 10.1 MMOL/L (ref 5–15)
ANISOCYTOSIS BLD QL: ABNORMAL
ARTERIAL PATENCY WRIST A: ABNORMAL
AST SERPL-CCNC: 34 U/L (ref 1–40)
ATMOSPHERIC PRESS: 745.6 MMHG
BASE EXCESS BLDA CALC-SCNC: -7 MMOL/L (ref 0–2)
BASE EXCESS BLDA CALC-SCNC: 2 MMOL/L (ref -5–5)
BDY SITE: ABNORMAL
BILIRUB SERPL-MCNC: 0.6 MG/DL (ref 0–1.2)
BUN SERPL-MCNC: 22 MG/DL (ref 8–23)
BUN/CREAT SERPL: 12.4 (ref 7–25)
CA-I BLDA-SCNC: ABNORMAL MMOL/L
CALCIUM SPEC-SCNC: 6.6 MG/DL (ref 8.6–10.5)
CHLORIDE SERPL-SCNC: 113 MMOL/L (ref 98–107)
CO2 BLDA-SCNC: 28 MMOL/L (ref 24–29)
CO2 SERPL-SCNC: 16.9 MMOL/L (ref 22–29)
CREAT SERPL-MCNC: 1.78 MG/DL (ref 0.76–1.27)
D-LACTATE SERPL-SCNC: 5.8 MMOL/L (ref 0.5–2)
DEPRECATED RDW RBC AUTO: 42.2 FL (ref 37–54)
EOSINOPHIL # BLD MANUAL: 0.32 10*3/MM3 (ref 0–0.4)
EOSINOPHIL NFR BLD MANUAL: 3.1 % (ref 0.3–6.2)
ERYTHROCYTE [DISTWIDTH] IN BLOOD BY AUTOMATED COUNT: 13.6 % (ref 12.3–15.4)
GFR SERPL CREATININE-BSD FRML MDRD: 39 ML/MIN/1.73
GLOBULIN UR ELPH-MCNC: 2.5 GM/DL
GLUCOSE BLDC GLUCOMTR-MCNC: 127 MG/DL (ref 70–130)
GLUCOSE BLDC GLUCOMTR-MCNC: 129 MG/DL (ref 70–130)
GLUCOSE BLDC GLUCOMTR-MCNC: 175 MG/DL (ref 70–130)
GLUCOSE BLDC GLUCOMTR-MCNC: 176 MG/DL (ref 70–130)
GLUCOSE BLDC GLUCOMTR-MCNC: 189 MG/DL (ref 70–130)
GLUCOSE SERPL-MCNC: 132 MG/DL (ref 65–99)
HCO3 BLDA-SCNC: 16.5 MMOL/L (ref 22–28)
HCO3 BLDA-SCNC: 26.8 MMOL/L (ref 22–26)
HCT VFR BLD AUTO: 28.5 % (ref 37.5–51)
HCT VFR BLDA CALC: 28 % (ref 38–51)
HGB BLD-MCNC: 9.6 G/DL (ref 13–17.7)
HGB BLDA-MCNC: 9.5 G/DL (ref 12–17)
INHALED O2 CONCENTRATION: 60 %
LYMPHOCYTES # BLD MANUAL: 0.86 10*3/MM3 (ref 0.7–3.1)
LYMPHOCYTES NFR BLD MANUAL: 4.1 % (ref 5–12)
MAGNESIUM SERPL-MCNC: 1.4 MG/DL (ref 1.6–2.4)
MCH RBC QN AUTO: 29 PG (ref 26.6–33)
MCHC RBC AUTO-ENTMCNC: 33.7 G/DL (ref 31.5–35.7)
MCV RBC AUTO: 86.1 FL (ref 79–97)
MODALITY: ABNORMAL
MONOCYTES # BLD: 0.43 10*3/MM3 (ref 0.1–0.9)
MYELOCYTES NFR BLD MANUAL: 1 % (ref 0–0)
NEUTROPHILS # BLD AUTO: 8.76 10*3/MM3 (ref 1.7–7)
NEUTROPHILS NFR BLD MANUAL: 83.7 % (ref 42.7–76)
O2 A-A PPRESDIFF RESPIRATORY: 0.4 MMHG
PCO2 BLDA: 26.2 MM HG (ref 35–45)
PCO2 BLDA: 46.4 MM HG (ref 35–45)
PEEP RESPIRATORY: 5 CM[H2O]
PH BLDA: 7.37 PH UNITS (ref 7.35–7.6)
PH BLDA: 7.41 PH UNITS (ref 7.35–7.45)
PHOSPHATE SERPL-MCNC: 1.1 MG/DL (ref 2.5–4.5)
PLAT MORPH BLD: NORMAL
PLATELET # BLD AUTO: 259 10*3/MM3 (ref 140–450)
PMV BLD AUTO: 10.4 FL (ref 6–12)
PO2 BLDA: 145.3 MM HG (ref 80–100)
PO2 BLDA: 181 MMHG (ref 80–105)
POLYCHROMASIA BLD QL SMEAR: ABNORMAL
POTASSIUM BLDA-SCNC: 4.2 MMOL/L (ref 3.5–4.9)
POTASSIUM SERPL-SCNC: 3.8 MMOL/L (ref 3.5–5.2)
PROT SERPL-MCNC: 4.2 G/DL (ref 6–8.5)
RBC # BLD AUTO: 3.31 10*6/MM3 (ref 4.14–5.8)
SAO2 % BLDA: 100 % (ref 95–98)
SAO2 % BLDCOA: 99.3 % (ref 92–99)
SET MECH RESP RATE: 18
SODIUM SERPL-SCNC: 140 MMOL/L (ref 136–145)
TOTAL RATE: 18 BREATHS/MINUTE
VARIANT LYMPHS NFR BLD MANUAL: 8.2 % (ref 19.6–45.3)
VENTILATOR MODE: AC
VT ON VENT VENT: 550 ML
WBC MORPH BLD: NORMAL
WBC NRBC COR # BLD: 22.52 10*3/MM3 (ref 3.4–10.8)

## 2021-12-30 PROCEDURE — 99232 SBSQ HOSP IP/OBS MODERATE 35: CPT | Performed by: NURSE PRACTITIONER

## 2021-12-30 PROCEDURE — 25010000002 FLUCONAZOLE PER 200 MG: Performed by: SURGERY

## 2021-12-30 PROCEDURE — 85027 COMPLETE CBC AUTOMATED: CPT | Performed by: SURGERY

## 2021-12-30 PROCEDURE — 25010000002 CALCIUM GLUCONATE PER 10 ML: Performed by: SURGERY

## 2021-12-30 PROCEDURE — 84100 ASSAY OF PHOSPHORUS: CPT | Performed by: SURGERY

## 2021-12-30 PROCEDURE — 83735 ASSAY OF MAGNESIUM: CPT | Performed by: SURGERY

## 2021-12-30 PROCEDURE — 99024 POSTOP FOLLOW-UP VISIT: CPT | Performed by: SURGERY

## 2021-12-30 PROCEDURE — 80053 COMPREHEN METABOLIC PANEL: CPT | Performed by: SURGERY

## 2021-12-30 PROCEDURE — 83605 ASSAY OF LACTIC ACID: CPT | Performed by: INTERNAL MEDICINE

## 2021-12-30 PROCEDURE — 25010000002 FENTANYL CITRATE (PF) 50 MCG/ML SOLUTION: Performed by: INTERNAL MEDICINE

## 2021-12-30 PROCEDURE — 25010000002 PROPOFOL 10 MG/ML EMULSION: Performed by: SURGERY

## 2021-12-30 PROCEDURE — 82962 GLUCOSE BLOOD TEST: CPT

## 2021-12-30 PROCEDURE — 94003 VENT MGMT INPAT SUBQ DAY: CPT

## 2021-12-30 PROCEDURE — 25010000002 MAGNESIUM SULFATE PER 500 MG OF MAGNESIUM: Performed by: SURGERY

## 2021-12-30 PROCEDURE — 94799 UNLISTED PULMONARY SVC/PX: CPT

## 2021-12-30 PROCEDURE — 25010000002 MAGNESIUM SULFATE 2 GM/50ML SOLUTION: Performed by: SURGERY

## 2021-12-30 PROCEDURE — 82803 BLOOD GASES ANY COMBINATION: CPT

## 2021-12-30 PROCEDURE — 25010000002 ENOXAPARIN PER 10 MG: Performed by: SURGERY

## 2021-12-30 PROCEDURE — 25010000002 PIPERACILLIN SOD-TAZOBACTAM PER 1 G: Performed by: SURGERY

## 2021-12-30 PROCEDURE — 25010000002 HYDROMORPHONE PER 4 MG: Performed by: SURGERY

## 2021-12-30 PROCEDURE — 25010000002 PHENYLEPHRINE 10 MG/ML SOLUTION: Performed by: INTERNAL MEDICINE

## 2021-12-30 PROCEDURE — 25010000002 PHENYLEPHRINE 10 MG/ML SOLUTION 5 ML VIAL: Performed by: INTERNAL MEDICINE

## 2021-12-30 RX ORDER — SODIUM CHLORIDE, SODIUM LACTATE, POTASSIUM CHLORIDE, CALCIUM CHLORIDE 600; 310; 30; 20 MG/100ML; MG/100ML; MG/100ML; MG/100ML
75 INJECTION, SOLUTION INTRAVENOUS CONTINUOUS
Status: DISCONTINUED | OUTPATIENT
Start: 2021-12-30 | End: 2021-12-30

## 2021-12-30 RX ORDER — FLUCONAZOLE 2 MG/ML
400 INJECTION, SOLUTION INTRAVENOUS DAILY
Status: COMPLETED | OUTPATIENT
Start: 2021-12-30 | End: 2022-01-05

## 2021-12-30 RX ORDER — FENTANYL CITRATE 50 UG/ML
50 INJECTION, SOLUTION INTRAMUSCULAR; INTRAVENOUS
Status: DISCONTINUED | OUTPATIENT
Start: 2021-12-30 | End: 2022-01-05

## 2021-12-30 RX ORDER — FAMOTIDINE 10 MG/ML
20 INJECTION, SOLUTION INTRAVENOUS EVERY 12 HOURS SCHEDULED
Status: DISCONTINUED | OUTPATIENT
Start: 2021-12-30 | End: 2022-01-07

## 2021-12-30 RX ORDER — NOREPINEPHRINE BIT/0.9 % NACL 8 MG/250ML
INFUSION BOTTLE (ML) INTRAVENOUS
Status: COMPLETED
Start: 2021-12-30 | End: 2021-12-30

## 2021-12-30 RX ORDER — ACETAMINOPHEN 650 MG/1
650 SUPPOSITORY RECTAL EVERY 4 HOURS PRN
Status: DISCONTINUED | OUTPATIENT
Start: 2021-12-30 | End: 2022-02-03 | Stop reason: HOSPADM

## 2021-12-30 RX ADMIN — DEXMEDETOMIDINE HYDROCHLORIDE 1.3 MCG/KG/HR: 100 INJECTION, SOLUTION, CONCENTRATE INTRAVENOUS at 16:47

## 2021-12-30 RX ADMIN — SODIUM CHLORIDE, PRESERVATIVE FREE 10 ML: 5 INJECTION INTRAVENOUS at 20:02

## 2021-12-30 RX ADMIN — PHENYLEPHRINE HYDROCHLORIDE 3 MCG/KG/MIN: 10 INJECTION INTRAVENOUS at 16:00

## 2021-12-30 RX ADMIN — NOREPINEPHRINE BITARTRATE 0.3 MCG/KG/MIN: 1 SOLUTION INTRAVENOUS at 21:50

## 2021-12-30 RX ADMIN — SODIUM CHLORIDE 1000 ML: 9 INJECTION, SOLUTION INTRAVENOUS at 06:04

## 2021-12-30 RX ADMIN — PHENYLEPHRINE HYDROCHLORIDE 3 MCG/KG/MIN: 10 INJECTION INTRAVENOUS at 21:42

## 2021-12-30 RX ADMIN — VASOPRESSIN 0.03 UNITS/MIN: 20 INJECTION INTRAVENOUS at 08:58

## 2021-12-30 RX ADMIN — SODIUM CHLORIDE, PRESERVATIVE FREE 10 ML: 5 INJECTION INTRAVENOUS at 08:18

## 2021-12-30 RX ADMIN — FENTANYL CITRATE 50 MCG: 50 INJECTION INTRAMUSCULAR; INTRAVENOUS at 16:00

## 2021-12-30 RX ADMIN — Medication 0.3 MCG/KG/MIN: at 16:00

## 2021-12-30 RX ADMIN — SODIUM CHLORIDE, POTASSIUM CHLORIDE, SODIUM LACTATE AND CALCIUM CHLORIDE 75 ML/HR: 600; 310; 30; 20 INJECTION, SOLUTION INTRAVENOUS at 13:27

## 2021-12-30 RX ADMIN — ENOXAPARIN SODIUM 40 MG: 40 INJECTION SUBCUTANEOUS at 20:00

## 2021-12-30 RX ADMIN — TAZOBACTAM SODIUM AND PIPERACILLIN SODIUM 3.38 G: 375; 3 INJECTION, SOLUTION INTRAVENOUS at 19:49

## 2021-12-30 RX ADMIN — FENTANYL CITRATE 50 MCG: 50 INJECTION INTRAMUSCULAR; INTRAVENOUS at 20:13

## 2021-12-30 RX ADMIN — PHENYLEPHRINE HYDROCHLORIDE 3 MCG/KG/MIN: 10 INJECTION INTRAVENOUS at 12:00

## 2021-12-30 RX ADMIN — FAMOTIDINE 20 MG: 10 INJECTION INTRAVENOUS at 20:00

## 2021-12-30 RX ADMIN — Medication 0.3 MCG/KG/MIN: at 21:48

## 2021-12-30 RX ADMIN — Medication 0.3 MCG/KG/MIN: at 05:46

## 2021-12-30 RX ADMIN — FLUCONAZOLE IN SODIUM CHLORIDE 400 MG: 2 INJECTION, SOLUTION INTRAVENOUS at 10:56

## 2021-12-30 RX ADMIN — SODIUM CHLORIDE, PRESERVATIVE FREE 10 ML: 5 INJECTION INTRAVENOUS at 20:00

## 2021-12-30 RX ADMIN — FENTANYL CITRATE 50 MCG: 50 INJECTION INTRAMUSCULAR; INTRAVENOUS at 12:00

## 2021-12-30 RX ADMIN — HYDROMORPHONE HYDROCHLORIDE 0.5 MG: 10 INJECTION INTRAMUSCULAR; INTRAVENOUS; SUBCUTANEOUS at 14:02

## 2021-12-30 RX ADMIN — CALCIUM GLUCONATE: 98 INJECTION, SOLUTION INTRAVENOUS at 19:49

## 2021-12-30 RX ADMIN — PHENYLEPHRINE HYDROCHLORIDE 3 MCG/KG/MIN: 10 INJECTION INTRAVENOUS at 01:57

## 2021-12-30 RX ADMIN — MAGNESIUM SULFATE HEPTAHYDRATE 2 G: 2 INJECTION, SOLUTION INTRAVENOUS at 10:56

## 2021-12-30 RX ADMIN — DEXMEDETOMIDINE HYDROCHLORIDE 1.5 MCG/KG/HR: 100 INJECTION, SOLUTION, CONCENTRATE INTRAVENOUS at 14:02

## 2021-12-30 RX ADMIN — SODIUM CHLORIDE, POTASSIUM CHLORIDE, SODIUM LACTATE AND CALCIUM CHLORIDE 1000 ML: 600; 310; 30; 20 INJECTION, SOLUTION INTRAVENOUS at 10:56

## 2021-12-30 RX ADMIN — FAMOTIDINE 20 MG: 10 INJECTION INTRAVENOUS at 10:55

## 2021-12-30 RX ADMIN — FENTANYL CITRATE 50 MCG: 50 INJECTION INTRAMUSCULAR; INTRAVENOUS at 17:27

## 2021-12-30 RX ADMIN — POTASSIUM PHOSPHATE, MONOBASIC AND POTASSIUM PHOSPHATE, DIBASIC 45 MMOL: 224; 236 INJECTION, SOLUTION, CONCENTRATE INTRAVENOUS at 13:28

## 2021-12-30 RX ADMIN — ACETAMINOPHEN 650 MG: 325 SUPPOSITORY RECTAL at 04:19

## 2021-12-30 RX ADMIN — TAZOBACTAM SODIUM AND PIPERACILLIN SODIUM 3.38 G: 375; 3 INJECTION, SOLUTION INTRAVENOUS at 03:58

## 2021-12-30 RX ADMIN — DEXMEDETOMIDINE HYDROCHLORIDE 1.5 MCG/KG/HR: 100 INJECTION, SOLUTION, CONCENTRATE INTRAVENOUS at 10:55

## 2021-12-30 RX ADMIN — DEXMEDETOMIDINE HYDROCHLORIDE 1.5 MCG/KG/HR: 100 INJECTION, SOLUTION, CONCENTRATE INTRAVENOUS at 00:45

## 2021-12-30 RX ADMIN — FENTANYL CITRATE 50 MCG: 50 INJECTION INTRAMUSCULAR; INTRAVENOUS at 04:20

## 2021-12-30 RX ADMIN — DEXMEDETOMIDINE HYDROCHLORIDE 1.2 MCG/KG/HR: 100 INJECTION, SOLUTION, CONCENTRATE INTRAVENOUS at 19:48

## 2021-12-30 RX ADMIN — Medication 0.3 MCG/KG/MIN: at 10:55

## 2021-12-30 RX ADMIN — DEXMEDETOMIDINE HYDROCHLORIDE 1.5 MCG/KG/HR: 100 INJECTION, SOLUTION, CONCENTRATE INTRAVENOUS at 05:35

## 2021-12-30 RX ADMIN — PHENYLEPHRINE HYDROCHLORIDE 3 MCG/KG/MIN: 10 INJECTION INTRAVENOUS at 05:47

## 2021-12-30 RX ADMIN — VASOPRESSIN 0.03 UNITS/MIN: 20 INJECTION INTRAVENOUS at 20:33

## 2021-12-30 RX ADMIN — DEXMEDETOMIDINE HYDROCHLORIDE 1.5 MCG/KG/HR: 100 INJECTION, SOLUTION, CONCENTRATE INTRAVENOUS at 08:14

## 2021-12-30 RX ADMIN — PROPOFOL 5 MCG/KG/MIN: 10 INJECTION, EMULSION INTRAVENOUS at 20:46

## 2021-12-30 RX ADMIN — FENTANYL CITRATE 50 MCG: 50 INJECTION INTRAMUSCULAR; INTRAVENOUS at 08:57

## 2021-12-30 RX ADMIN — HYDROMORPHONE HYDROCHLORIDE 0.5 MG: 10 INJECTION INTRAMUSCULAR; INTRAVENOUS; SUBCUTANEOUS at 00:52

## 2021-12-30 RX ADMIN — TAZOBACTAM SODIUM AND PIPERACILLIN SODIUM 3.38 G: 375; 3 INJECTION, SOLUTION INTRAVENOUS at 12:00

## 2021-12-30 NOTE — ANESTHESIA POSTPROCEDURE EVALUATION
Patient: Arnie Calvo    Procedure Summary     Date: 12/29/21 Room / Location: [facility] OR 09 / [facility] MAIN OR    Anesthesia Start: 1455 Anesthesia Stop: 1834    Procedure: PARTIAL COLECTOMY WITH OSTOMY (N/A Abdomen) Diagnosis:       Colonic obstruction (HCC)      (Colonic obstruction (HCC) [K56.609])    Surgeons: Jeovany Mott MD Provider: Donato Pate MD    Anesthesia Type: general ASA Status: 4          Anesthesia Type: general    Vitals  Vitals Value Taken Time   BP 44/24 12/29/21 2146   Temp 37.2 °C (99 °F) 12/29/21 1915   Pulse 152 12/29/21 2150   Resp 28 12/29/21 2048   SpO2 79 % 12/29/21 2150   Vitals shown include unvalidated device data.        Anesthesia Post Evaluation

## 2021-12-31 ENCOUNTER — APPOINTMENT (OUTPATIENT)
Dept: GENERAL RADIOLOGY | Facility: HOSPITAL | Age: 61
End: 2021-12-31

## 2021-12-31 LAB
ALBUMIN SERPL-MCNC: 2 G/DL (ref 3.5–5.2)
ALBUMIN/GLOB SERPL: 0.6 G/DL
ALP SERPL-CCNC: 58 U/L (ref 39–117)
ALT SERPL W P-5'-P-CCNC: 16 U/L (ref 1–41)
ANION GAP SERPL CALCULATED.3IONS-SCNC: 16.9 MMOL/L (ref 5–15)
ARTERIAL PATENCY WRIST A: ABNORMAL
AST SERPL-CCNC: 34 U/L (ref 1–40)
ATMOSPHERIC PRESS: 745.1 MMHG
BASE EXCESS BLDA CALC-SCNC: -14.2 MMOL/L (ref 0–2)
BDY SITE: ABNORMAL
BILIRUB SERPL-MCNC: 0.7 MG/DL (ref 0–1.2)
BUN SERPL-MCNC: 35 MG/DL (ref 8–23)
BUN/CREAT SERPL: 18.8 (ref 7–25)
CALCIUM SPEC-SCNC: 6.8 MG/DL (ref 8.6–10.5)
CHLORIDE SERPL-SCNC: 110 MMOL/L (ref 98–107)
CO2 SERPL-SCNC: 11.1 MMOL/L (ref 22–29)
CREAT SERPL-MCNC: 1.86 MG/DL (ref 0.76–1.27)
CREAT UR-MCNC: 52.9 MG/DL
D-LACTATE SERPL-SCNC: 4.9 MMOL/L (ref 0.5–2)
DEPRECATED RDW RBC AUTO: 44.3 FL (ref 37–54)
ERYTHROCYTE [DISTWIDTH] IN BLOOD BY AUTOMATED COUNT: 14.1 % (ref 12.3–15.4)
GFR SERPL CREATININE-BSD FRML MDRD: 37 ML/MIN/1.73
GLOBULIN UR ELPH-MCNC: 3.1 GM/DL
GLUCOSE BLDC GLUCOMTR-MCNC: 170 MG/DL (ref 70–130)
GLUCOSE BLDC GLUCOMTR-MCNC: 178 MG/DL (ref 70–130)
GLUCOSE BLDC GLUCOMTR-MCNC: 229 MG/DL (ref 70–130)
GLUCOSE SERPL-MCNC: 223 MG/DL (ref 65–99)
HCO3 BLDA-SCNC: 12 MMOL/L (ref 22–28)
HCT VFR BLD AUTO: 30.7 % (ref 37.5–51)
HGB BLD-MCNC: 10.1 G/DL (ref 13–17.7)
INHALED O2 CONCENTRATION: 40 %
MAGNESIUM SERPL-MCNC: 2.5 MG/DL (ref 1.6–2.4)
MCH RBC QN AUTO: 28.8 PG (ref 26.6–33)
MCHC RBC AUTO-ENTMCNC: 32.9 G/DL (ref 31.5–35.7)
MCV RBC AUTO: 87.5 FL (ref 79–97)
MODALITY: ABNORMAL
O2 A-A PPRESDIFF RESPIRATORY: 0.6 MMHG
PCO2 BLDA: 28.8 MM HG (ref 35–45)
PEEP RESPIRATORY: 5 CM[H2O]
PH BLDA: 7.23 PH UNITS (ref 7.35–7.45)
PHOSPHATE SERPL-MCNC: 6.7 MG/DL (ref 2.5–4.5)
PLATELET # BLD AUTO: 236 10*3/MM3 (ref 140–450)
PMV BLD AUTO: 11.3 FL (ref 6–12)
PO2 BLDA: 146.5 MM HG (ref 80–100)
POTASSIUM SERPL-SCNC: 5.5 MMOL/L (ref 3.5–5.2)
PROT SERPL-MCNC: 5.1 G/DL (ref 6–8.5)
RBC # BLD AUTO: 3.51 10*6/MM3 (ref 4.14–5.8)
SAO2 % BLDCOA: 98.9 % (ref 92–99)
SET MECH RESP RATE: 12
SODIUM SERPL-SCNC: 138 MMOL/L (ref 136–145)
SODIUM UR-SCNC: <20 MMOL/L
TOTAL RATE: 21 BREATHS/MINUTE
TRIGL SERPL-MCNC: 168 MG/DL (ref 0–150)
VENTILATOR MODE: ABNORMAL
VT ON VENT VENT: 550 ML
WBC NRBC COR # BLD: 35.55 10*3/MM3 (ref 3.4–10.8)

## 2021-12-31 PROCEDURE — 84300 ASSAY OF URINE SODIUM: CPT | Performed by: INTERNAL MEDICINE

## 2021-12-31 PROCEDURE — 94003 VENT MGMT INPAT SUBQ DAY: CPT

## 2021-12-31 PROCEDURE — 99024 POSTOP FOLLOW-UP VISIT: CPT | Performed by: SURGERY

## 2021-12-31 PROCEDURE — 25010000002 FLUCONAZOLE PER 200 MG: Performed by: SURGERY

## 2021-12-31 PROCEDURE — 94761 N-INVAS EAR/PLS OXIMETRY MLT: CPT

## 2021-12-31 PROCEDURE — 99232 SBSQ HOSP IP/OBS MODERATE 35: CPT | Performed by: INTERNAL MEDICINE

## 2021-12-31 PROCEDURE — 25010000002 MAGNESIUM SULFATE PER 500 MG OF MAGNESIUM: Performed by: SURGERY

## 2021-12-31 PROCEDURE — 85027 COMPLETE CBC AUTOMATED: CPT | Performed by: INTERNAL MEDICINE

## 2021-12-31 PROCEDURE — 25010000002 ENOXAPARIN PER 10 MG: Performed by: SURGERY

## 2021-12-31 PROCEDURE — 25010000002 PROPOFOL 10 MG/ML EMULSION: Performed by: SURGERY

## 2021-12-31 PROCEDURE — 25010000002 PHENYLEPHRINE 10 MG/ML SOLUTION 5 ML VIAL: Performed by: INTERNAL MEDICINE

## 2021-12-31 PROCEDURE — 84478 ASSAY OF TRIGLYCERIDES: CPT | Performed by: SURGERY

## 2021-12-31 PROCEDURE — 82570 ASSAY OF URINE CREATININE: CPT | Performed by: INTERNAL MEDICINE

## 2021-12-31 PROCEDURE — 25010000002 PHENYLEPHRINE 10 MG/ML SOLUTION 10 ML VIAL: Performed by: INTERNAL MEDICINE

## 2021-12-31 PROCEDURE — 84100 ASSAY OF PHOSPHORUS: CPT | Performed by: SURGERY

## 2021-12-31 PROCEDURE — 71045 X-RAY EXAM CHEST 1 VIEW: CPT

## 2021-12-31 PROCEDURE — 25010000002 CALCIUM GLUCONATE PER 10 ML: Performed by: SURGERY

## 2021-12-31 PROCEDURE — 80053 COMPREHEN METABOLIC PANEL: CPT | Performed by: SURGERY

## 2021-12-31 PROCEDURE — 94799 UNLISTED PULMONARY SVC/PX: CPT

## 2021-12-31 PROCEDURE — 83605 ASSAY OF LACTIC ACID: CPT | Performed by: INTERNAL MEDICINE

## 2021-12-31 PROCEDURE — 82803 BLOOD GASES ANY COMBINATION: CPT

## 2021-12-31 PROCEDURE — 83735 ASSAY OF MAGNESIUM: CPT | Performed by: SURGERY

## 2021-12-31 PROCEDURE — 25010000002 PIPERACILLIN SOD-TAZOBACTAM PER 1 G: Performed by: SURGERY

## 2021-12-31 PROCEDURE — 82962 GLUCOSE BLOOD TEST: CPT

## 2021-12-31 RX ADMIN — TAZOBACTAM SODIUM AND PIPERACILLIN SODIUM 3.38 G: 375; 3 INJECTION, SOLUTION INTRAVENOUS at 03:54

## 2021-12-31 RX ADMIN — SODIUM CHLORIDE, PRESERVATIVE FREE 10 ML: 5 INJECTION INTRAVENOUS at 10:57

## 2021-12-31 RX ADMIN — ENOXAPARIN SODIUM 40 MG: 40 INJECTION SUBCUTANEOUS at 20:10

## 2021-12-31 RX ADMIN — SODIUM CHLORIDE, PRESERVATIVE FREE 10 ML: 5 INJECTION INTRAVENOUS at 20:26

## 2021-12-31 RX ADMIN — SODIUM CHLORIDE, PRESERVATIVE FREE 10 ML: 5 INJECTION INTRAVENOUS at 10:56

## 2021-12-31 RX ADMIN — TAZOBACTAM SODIUM AND PIPERACILLIN SODIUM 3.38 G: 375; 3 INJECTION, SOLUTION INTRAVENOUS at 20:26

## 2021-12-31 RX ADMIN — PHENYLEPHRINE HYDROCHLORIDE 1.3 MCG/KG/MIN: 10 INJECTION INTRAVENOUS at 23:42

## 2021-12-31 RX ADMIN — PROPOFOL 15 MCG/KG/MIN: 10 INJECTION, EMULSION INTRAVENOUS at 04:28

## 2021-12-31 RX ADMIN — FLUCONAZOLE IN SODIUM CHLORIDE 400 MG: 2 INJECTION, SOLUTION INTRAVENOUS at 09:26

## 2021-12-31 RX ADMIN — FAMOTIDINE 20 MG: 10 INJECTION INTRAVENOUS at 10:55

## 2021-12-31 RX ADMIN — FAMOTIDINE 20 MG: 10 INJECTION INTRAVENOUS at 20:25

## 2021-12-31 RX ADMIN — NOREPINEPHRINE BITARTRATE 0.3 MCG/KG/MIN: 1 SOLUTION INTRAVENOUS at 06:48

## 2021-12-31 RX ADMIN — SODIUM CHLORIDE, PRESERVATIVE FREE 10 ML: 5 INJECTION INTRAVENOUS at 10:55

## 2021-12-31 RX ADMIN — NOREPINEPHRINE BITARTRATE 0.3 MCG/KG/MIN: 1 SOLUTION INTRAVENOUS at 17:32

## 2021-12-31 RX ADMIN — TAZOBACTAM SODIUM AND PIPERACILLIN SODIUM 3.38 G: 375; 3 INJECTION, SOLUTION INTRAVENOUS at 12:45

## 2021-12-31 RX ADMIN — DEXMEDETOMIDINE HYDROCHLORIDE 1.1 MCG/KG/HR: 100 INJECTION, SOLUTION, CONCENTRATE INTRAVENOUS at 00:19

## 2021-12-31 RX ADMIN — PROPOFOL 15 MCG/KG/MIN: 10 INJECTION, EMULSION INTRAVENOUS at 20:22

## 2021-12-31 RX ADMIN — CALCIUM GLUCONATE: 98 INJECTION, SOLUTION INTRAVENOUS at 17:32

## 2021-12-31 RX ADMIN — SODIUM BICARBONATE 150 MEQ: 84 INJECTION, SOLUTION INTRAVENOUS at 16:25

## 2021-12-31 RX ADMIN — DEXMEDETOMIDINE HYDROCHLORIDE 0.9 MCG/KG/HR: 100 INJECTION, SOLUTION, CONCENTRATE INTRAVENOUS at 09:30

## 2021-12-31 RX ADMIN — DEXMEDETOMIDINE HYDROCHLORIDE 0.6 MCG/KG/HR: 100 INJECTION, SOLUTION, CONCENTRATE INTRAVENOUS at 16:23

## 2021-12-31 RX ADMIN — PHENYLEPHRINE HYDROCHLORIDE 3 MCG/KG/MIN: 10 INJECTION INTRAVENOUS at 03:36

## 2021-12-31 RX ADMIN — PHENYLEPHRINE HYDROCHLORIDE 2 MCG/KG/MIN: 10 INJECTION INTRAVENOUS at 11:29

## 2021-12-31 RX ADMIN — DEXMEDETOMIDINE HYDROCHLORIDE 1 MCG/KG/HR: 100 INJECTION, SOLUTION, CONCENTRATE INTRAVENOUS at 04:28

## 2021-12-31 RX ADMIN — DEXMEDETOMIDINE HYDROCHLORIDE 0.6 MCG/KG/HR: 100 INJECTION, SOLUTION, CONCENTRATE INTRAVENOUS at 23:43

## 2022-01-01 ENCOUNTER — APPOINTMENT (OUTPATIENT)
Dept: GENERAL RADIOLOGY | Facility: HOSPITAL | Age: 62
End: 2022-01-01

## 2022-01-01 LAB
ALBUMIN SERPL-MCNC: 1.9 G/DL (ref 3.5–5.2)
ALBUMIN/GLOB SERPL: 0.6 G/DL
ALP SERPL-CCNC: 72 U/L (ref 39–117)
ALT SERPL W P-5'-P-CCNC: 28 U/L (ref 1–41)
ANION GAP SERPL CALCULATED.3IONS-SCNC: 10.9 MMOL/L (ref 5–15)
ARTERIAL PATENCY WRIST A: ABNORMAL
AST SERPL-CCNC: 61 U/L (ref 1–40)
ATMOSPHERIC PRESS: 742.7 MMHG
BASE EXCESS BLDA CALC-SCNC: -4.5 MMOL/L (ref 0–2)
BDY SITE: ABNORMAL
BILIRUB SERPL-MCNC: 0.6 MG/DL (ref 0–1.2)
BUN SERPL-MCNC: 29 MG/DL (ref 8–23)
BUN/CREAT SERPL: 25.2 (ref 7–25)
CALCIUM SPEC-SCNC: 6.9 MG/DL (ref 8.6–10.5)
CHLORIDE SERPL-SCNC: 112 MMOL/L (ref 98–107)
CO2 SERPL-SCNC: 20.1 MMOL/L (ref 22–29)
CREAT SERPL-MCNC: 1.15 MG/DL (ref 0.76–1.27)
D-LACTATE SERPL-SCNC: 3.3 MMOL/L (ref 0.5–2)
DEPRECATED RDW RBC AUTO: 43.4 FL (ref 37–54)
ERYTHROCYTE [DISTWIDTH] IN BLOOD BY AUTOMATED COUNT: 14 % (ref 12.3–15.4)
GFR SERPL CREATININE-BSD FRML MDRD: 64 ML/MIN/1.73
GLOBULIN UR ELPH-MCNC: 3.2 GM/DL
GLUCOSE BLDC GLUCOMTR-MCNC: 164 MG/DL (ref 70–130)
GLUCOSE BLDC GLUCOMTR-MCNC: 183 MG/DL (ref 70–130)
GLUCOSE BLDC GLUCOMTR-MCNC: 191 MG/DL (ref 70–130)
GLUCOSE BLDC GLUCOMTR-MCNC: 194 MG/DL (ref 70–130)
GLUCOSE SERPL-MCNC: 195 MG/DL (ref 65–99)
HCO3 BLDA-SCNC: 20.1 MMOL/L (ref 22–28)
HCT VFR BLD AUTO: 27.3 % (ref 37.5–51)
HGB BLD-MCNC: 9.2 G/DL (ref 13–17.7)
INHALED O2 CONCENTRATION: 40 %
MAGNESIUM SERPL-MCNC: 2.2 MG/DL (ref 1.6–2.4)
MCH RBC QN AUTO: 28.8 PG (ref 26.6–33)
MCHC RBC AUTO-ENTMCNC: 33.7 G/DL (ref 31.5–35.7)
MCV RBC AUTO: 85.3 FL (ref 79–97)
MODALITY: ABNORMAL
O2 A-A PPRESDIFF RESPIRATORY: 0.6 MMHG
PCO2 BLDA: 34.1 MM HG (ref 35–45)
PEEP RESPIRATORY: 5 CM[H2O]
PH BLDA: 7.38 PH UNITS (ref 7.35–7.45)
PHOSPHATE SERPL-MCNC: 2.8 MG/DL (ref 2.5–4.5)
PLATELET # BLD AUTO: 159 10*3/MM3 (ref 140–450)
PMV BLD AUTO: 10.9 FL (ref 6–12)
PO2 BLDA: 145.3 MM HG (ref 80–100)
POTASSIUM SERPL-SCNC: 3.4 MMOL/L (ref 3.5–5.2)
PROCALCITONIN SERPL-MCNC: 19.3 NG/ML (ref 0–0.25)
PROT SERPL-MCNC: 5.1 G/DL (ref 6–8.5)
RBC # BLD AUTO: 3.2 10*6/MM3 (ref 4.14–5.8)
SAO2 % BLDCOA: 99.2 % (ref 92–99)
SET MECH RESP RATE: 12
SODIUM SERPL-SCNC: 143 MMOL/L (ref 136–145)
TOTAL RATE: 22 BREATHS/MINUTE
URATE SERPL-MCNC: 1.7 MG/DL (ref 3.4–7)
VENTILATOR MODE: AC
VT ON VENT VENT: 550 ML
WBC NRBC COR # BLD: 22.4 10*3/MM3 (ref 3.4–10.8)

## 2022-01-01 PROCEDURE — 80053 COMPREHEN METABOLIC PANEL: CPT | Performed by: SURGERY

## 2022-01-01 PROCEDURE — 25010000002 CALCIUM GLUCONATE PER 10 ML: Performed by: SURGERY

## 2022-01-01 PROCEDURE — 83735 ASSAY OF MAGNESIUM: CPT | Performed by: SURGERY

## 2022-01-01 PROCEDURE — 94799 UNLISTED PULMONARY SVC/PX: CPT

## 2022-01-01 PROCEDURE — 99255 IP/OBS CONSLTJ NEW/EST HI 80: CPT | Performed by: INTERNAL MEDICINE

## 2022-01-01 PROCEDURE — 25010000002 PIPERACILLIN SOD-TAZOBACTAM PER 1 G: Performed by: SURGERY

## 2022-01-01 PROCEDURE — 87070 CULTURE OTHR SPECIMN AEROBIC: CPT | Performed by: INTERNAL MEDICINE

## 2022-01-01 PROCEDURE — 84550 ASSAY OF BLOOD/URIC ACID: CPT | Performed by: INTERNAL MEDICINE

## 2022-01-01 PROCEDURE — 25010000002 FLUCONAZOLE PER 200 MG: Performed by: SURGERY

## 2022-01-01 PROCEDURE — 82962 GLUCOSE BLOOD TEST: CPT

## 2022-01-01 PROCEDURE — 87077 CULTURE AEROBIC IDENTIFY: CPT | Performed by: INTERNAL MEDICINE

## 2022-01-01 PROCEDURE — 87205 SMEAR GRAM STAIN: CPT | Performed by: INTERNAL MEDICINE

## 2022-01-01 PROCEDURE — 94003 VENT MGMT INPAT SUBQ DAY: CPT

## 2022-01-01 PROCEDURE — 71045 X-RAY EXAM CHEST 1 VIEW: CPT

## 2022-01-01 PROCEDURE — 84100 ASSAY OF PHOSPHORUS: CPT | Performed by: SURGERY

## 2022-01-01 PROCEDURE — 25010000002 MAGNESIUM SULFATE PER 500 MG OF MAGNESIUM: Performed by: SURGERY

## 2022-01-01 PROCEDURE — 25010000002 PROPOFOL 10 MG/ML EMULSION: Performed by: SURGERY

## 2022-01-01 PROCEDURE — 87186 SC STD MICRODIL/AGAR DIL: CPT | Performed by: INTERNAL MEDICINE

## 2022-01-01 PROCEDURE — 99024 POSTOP FOLLOW-UP VISIT: CPT | Performed by: SURGERY

## 2022-01-01 PROCEDURE — 85027 COMPLETE CBC AUTOMATED: CPT | Performed by: INTERNAL MEDICINE

## 2022-01-01 PROCEDURE — 94761 N-INVAS EAR/PLS OXIMETRY MLT: CPT

## 2022-01-01 PROCEDURE — 94760 N-INVAS EAR/PLS OXIMETRY 1: CPT

## 2022-01-01 PROCEDURE — 83605 ASSAY OF LACTIC ACID: CPT | Performed by: INTERNAL MEDICINE

## 2022-01-01 PROCEDURE — 25010000002 FENTANYL CITRATE (PF) 50 MCG/ML SOLUTION: Performed by: INTERNAL MEDICINE

## 2022-01-01 PROCEDURE — 99232 SBSQ HOSP IP/OBS MODERATE 35: CPT | Performed by: INTERNAL MEDICINE

## 2022-01-01 PROCEDURE — 84145 PROCALCITONIN (PCT): CPT | Performed by: INTERNAL MEDICINE

## 2022-01-01 PROCEDURE — 82803 BLOOD GASES ANY COMBINATION: CPT

## 2022-01-01 PROCEDURE — 87040 BLOOD CULTURE FOR BACTERIA: CPT | Performed by: INTERNAL MEDICINE

## 2022-01-01 PROCEDURE — 25010000002 ENOXAPARIN PER 10 MG: Performed by: SURGERY

## 2022-01-01 RX ORDER — LIDOCAINE HYDROCHLORIDE 40 MG/ML
SOLUTION TOPICAL
Status: ACTIVE
Start: 2022-01-01 | End: 2022-01-02

## 2022-01-01 RX ADMIN — SODIUM CHLORIDE, PRESERVATIVE FREE 10 ML: 5 INJECTION INTRAVENOUS at 20:02

## 2022-01-01 RX ADMIN — DEXMEDETOMIDINE HYDROCHLORIDE 0.8 MCG/KG/HR: 100 INJECTION, SOLUTION, CONCENTRATE INTRAVENOUS at 11:36

## 2022-01-01 RX ADMIN — DEXMEDETOMIDINE HYDROCHLORIDE 0.6 MCG/KG/HR: 100 INJECTION, SOLUTION, CONCENTRATE INTRAVENOUS at 05:52

## 2022-01-01 RX ADMIN — NOREPINEPHRINE BITARTRATE 0.3 MCG/KG/MIN: 1 SOLUTION INTRAVENOUS at 11:36

## 2022-01-01 RX ADMIN — PROPOFOL 20 MCG/KG/MIN: 10 INJECTION, EMULSION INTRAVENOUS at 14:41

## 2022-01-01 RX ADMIN — DEXMEDETOMIDINE HYDROCHLORIDE 0.8 MCG/KG/HR: 100 INJECTION, SOLUTION, CONCENTRATE INTRAVENOUS at 18:20

## 2022-01-01 RX ADMIN — CALCIUM GLUCONATE: 98 INJECTION, SOLUTION INTRAVENOUS at 18:22

## 2022-01-01 RX ADMIN — TAZOBACTAM SODIUM AND PIPERACILLIN SODIUM 3.38 G: 375; 3 INJECTION, SOLUTION INTRAVENOUS at 11:36

## 2022-01-01 RX ADMIN — TAZOBACTAM SODIUM AND PIPERACILLIN SODIUM 3.38 G: 375; 3 INJECTION, SOLUTION INTRAVENOUS at 04:43

## 2022-01-01 RX ADMIN — SODIUM CHLORIDE, PRESERVATIVE FREE 10 ML: 5 INJECTION INTRAVENOUS at 20:01

## 2022-01-01 RX ADMIN — NOREPINEPHRINE BITARTRATE 0.3 MCG/KG/MIN: 1 SOLUTION INTRAVENOUS at 03:01

## 2022-01-01 RX ADMIN — PROPOFOL 10 MCG/KG/MIN: 10 INJECTION, EMULSION INTRAVENOUS at 05:52

## 2022-01-01 RX ADMIN — FLUCONAZOLE IN SODIUM CHLORIDE 400 MG: 2 INJECTION, SOLUTION INTRAVENOUS at 08:01

## 2022-01-01 RX ADMIN — ENOXAPARIN SODIUM 40 MG: 40 INJECTION SUBCUTANEOUS at 19:44

## 2022-01-01 RX ADMIN — SODIUM CHLORIDE, PRESERVATIVE FREE 10 ML: 5 INJECTION INTRAVENOUS at 08:35

## 2022-01-01 RX ADMIN — NOREPINEPHRINE BITARTRATE 0.2 MCG/KG/MIN: 1 SOLUTION INTRAVENOUS at 22:24

## 2022-01-01 RX ADMIN — FENTANYL CITRATE 50 MCG: 50 INJECTION INTRAMUSCULAR; INTRAVENOUS at 10:50

## 2022-01-01 RX ADMIN — FENTANYL CITRATE 50 MCG: 50 INJECTION INTRAMUSCULAR; INTRAVENOUS at 07:58

## 2022-01-01 RX ADMIN — FAMOTIDINE 20 MG: 10 INJECTION INTRAVENOUS at 08:01

## 2022-01-01 RX ADMIN — FENTANYL CITRATE 50 MCG: 50 INJECTION INTRAMUSCULAR; INTRAVENOUS at 03:04

## 2022-01-01 RX ADMIN — TAZOBACTAM SODIUM AND PIPERACILLIN SODIUM 3.38 G: 375; 3 INJECTION, SOLUTION INTRAVENOUS at 19:44

## 2022-01-01 RX ADMIN — FAMOTIDINE 20 MG: 10 INJECTION INTRAVENOUS at 20:01

## 2022-01-01 RX ADMIN — FENTANYL CITRATE 50 MCG: 50 INJECTION INTRAMUSCULAR; INTRAVENOUS at 17:31

## 2022-01-01 RX ADMIN — SODIUM BICARBONATE 150 MEQ: 84 INJECTION, SOLUTION INTRAVENOUS at 04:43

## 2022-01-02 ENCOUNTER — APPOINTMENT (OUTPATIENT)
Dept: GENERAL RADIOLOGY | Facility: HOSPITAL | Age: 62
End: 2022-01-02

## 2022-01-02 LAB
ALBUMIN SERPL-MCNC: 2.1 G/DL (ref 3.5–5.2)
ALBUMIN/GLOB SERPL: 0.7 G/DL
ALP SERPL-CCNC: 97 U/L (ref 39–117)
ALT SERPL W P-5'-P-CCNC: 32 U/L (ref 1–41)
ANION GAP SERPL CALCULATED.3IONS-SCNC: 6.7 MMOL/L (ref 5–15)
ARTERIAL PATENCY WRIST A: ABNORMAL
AST SERPL-CCNC: 51 U/L (ref 1–40)
ATMOSPHERIC PRESS: 745.9 MMHG
BASE EXCESS BLDA CALC-SCNC: -0.6 MMOL/L (ref 0–2)
BDY SITE: ABNORMAL
BILIRUB SERPL-MCNC: 0.7 MG/DL (ref 0–1.2)
BUN SERPL-MCNC: 20 MG/DL (ref 8–23)
BUN/CREAT SERPL: 26.3 (ref 7–25)
CALCIUM SPEC-SCNC: 7.4 MG/DL (ref 8.6–10.5)
CHLORIDE SERPL-SCNC: 114 MMOL/L (ref 98–107)
CO2 SERPL-SCNC: 24.3 MMOL/L (ref 22–29)
CREAT SERPL-MCNC: 0.76 MG/DL (ref 0.76–1.27)
D-LACTATE SERPL-SCNC: 2.6 MMOL/L (ref 0.5–2)
DEPRECATED RDW RBC AUTO: 45.1 FL (ref 37–54)
ERYTHROCYTE [DISTWIDTH] IN BLOOD BY AUTOMATED COUNT: 14.3 % (ref 12.3–15.4)
GFR SERPL CREATININE-BSD FRML MDRD: 104 ML/MIN/1.73
GLOBULIN UR ELPH-MCNC: 3.2 GM/DL
GLUCOSE BLDC GLUCOMTR-MCNC: 185 MG/DL (ref 70–130)
GLUCOSE BLDC GLUCOMTR-MCNC: 196 MG/DL (ref 70–130)
GLUCOSE BLDC GLUCOMTR-MCNC: 211 MG/DL (ref 70–130)
GLUCOSE SERPL-MCNC: 232 MG/DL (ref 65–99)
HCO3 BLDA-SCNC: 23.7 MMOL/L (ref 22–28)
HCT VFR BLD AUTO: 26.5 % (ref 37.5–51)
HGB BLD-MCNC: 8.8 G/DL (ref 13–17.7)
INHALED O2 CONCENTRATION: 25 %
MAGNESIUM SERPL-MCNC: 2.1 MG/DL (ref 1.6–2.4)
MCH RBC QN AUTO: 28.8 PG (ref 26.6–33)
MCHC RBC AUTO-ENTMCNC: 33.2 G/DL (ref 31.5–35.7)
MCV RBC AUTO: 86.6 FL (ref 79–97)
MODALITY: ABNORMAL
O2 A-A PPRESDIFF RESPIRATORY: 0.7 MMHG
PCO2 BLDA: 36.4 MM HG (ref 35–45)
PEEP RESPIRATORY: 5 CM[H2O]
PH BLDA: 7.42 PH UNITS (ref 7.35–7.45)
PHOSPHATE SERPL-MCNC: 1.7 MG/DL (ref 2.5–4.5)
PHOSPHATE SERPL-MCNC: 2.2 MG/DL (ref 2.5–4.5)
PLATELET # BLD AUTO: 137 10*3/MM3 (ref 140–450)
PMV BLD AUTO: 10.8 FL (ref 6–12)
PO2 BLDA: 92.1 MM HG (ref 80–100)
POTASSIUM SERPL-SCNC: 3.2 MMOL/L (ref 3.5–5.2)
POTASSIUM SERPL-SCNC: 3.3 MMOL/L (ref 3.5–5.2)
PROT SERPL-MCNC: 5.3 G/DL (ref 6–8.5)
RBC # BLD AUTO: 3.06 10*6/MM3 (ref 4.14–5.8)
SAO2 % BLDCOA: 97.4 % (ref 92–99)
SET MECH RESP RATE: 12
SODIUM SERPL-SCNC: 145 MMOL/L (ref 136–145)
TOTAL RATE: 22 BREATHS/MINUTE
VENTILATOR MODE: ABNORMAL
VT ON VENT VENT: 550 ML
WBC NRBC COR # BLD: 12.87 10*3/MM3 (ref 3.4–10.8)

## 2022-01-02 PROCEDURE — 83605 ASSAY OF LACTIC ACID: CPT | Performed by: SURGERY

## 2022-01-02 PROCEDURE — 94799 UNLISTED PULMONARY SVC/PX: CPT

## 2022-01-02 PROCEDURE — 82962 GLUCOSE BLOOD TEST: CPT

## 2022-01-02 PROCEDURE — 94003 VENT MGMT INPAT SUBQ DAY: CPT

## 2022-01-02 PROCEDURE — 94761 N-INVAS EAR/PLS OXIMETRY MLT: CPT

## 2022-01-02 PROCEDURE — 25010000002 PIPERACILLIN SOD-TAZOBACTAM PER 1 G: Performed by: SURGERY

## 2022-01-02 PROCEDURE — 84132 ASSAY OF SERUM POTASSIUM: CPT | Performed by: SURGERY

## 2022-01-02 PROCEDURE — 25010000002 ENOXAPARIN PER 10 MG: Performed by: SURGERY

## 2022-01-02 PROCEDURE — 85027 COMPLETE CBC AUTOMATED: CPT | Performed by: INTERNAL MEDICINE

## 2022-01-02 PROCEDURE — 25010000002 MAGNESIUM SULFATE PER 500 MG OF MAGNESIUM: Performed by: SURGERY

## 2022-01-02 PROCEDURE — 99233 SBSQ HOSP IP/OBS HIGH 50: CPT | Performed by: INTERNAL MEDICINE

## 2022-01-02 PROCEDURE — 25010000002 FENTANYL CITRATE (PF) 50 MCG/ML SOLUTION: Performed by: INTERNAL MEDICINE

## 2022-01-02 PROCEDURE — 83735 ASSAY OF MAGNESIUM: CPT | Performed by: SURGERY

## 2022-01-02 PROCEDURE — 94760 N-INVAS EAR/PLS OXIMETRY 1: CPT

## 2022-01-02 PROCEDURE — 25010000002 FLUCONAZOLE PER 200 MG: Performed by: SURGERY

## 2022-01-02 PROCEDURE — 99024 POSTOP FOLLOW-UP VISIT: CPT | Performed by: SURGERY

## 2022-01-02 PROCEDURE — 82803 BLOOD GASES ANY COMBINATION: CPT

## 2022-01-02 PROCEDURE — 80053 COMPREHEN METABOLIC PANEL: CPT | Performed by: SURGERY

## 2022-01-02 PROCEDURE — 25010000002 FUROSEMIDE PER 20 MG: Performed by: INTERNAL MEDICINE

## 2022-01-02 PROCEDURE — 84100 ASSAY OF PHOSPHORUS: CPT | Performed by: INTERNAL MEDICINE

## 2022-01-02 PROCEDURE — 25010000002 PROPOFOL 10 MG/ML EMULSION: Performed by: SURGERY

## 2022-01-02 PROCEDURE — 25010000002 CALCIUM GLUCONATE PER 10 ML: Performed by: SURGERY

## 2022-01-02 PROCEDURE — 71045 X-RAY EXAM CHEST 1 VIEW: CPT

## 2022-01-02 RX ORDER — FUROSEMIDE 10 MG/ML
40 INJECTION INTRAMUSCULAR; INTRAVENOUS EVERY 12 HOURS
Status: DISCONTINUED | OUTPATIENT
Start: 2022-01-02 | End: 2022-01-03

## 2022-01-02 RX ADMIN — FENTANYL CITRATE 50 MCG: 50 INJECTION INTRAMUSCULAR; INTRAVENOUS at 10:39

## 2022-01-02 RX ADMIN — FAMOTIDINE 20 MG: 10 INJECTION INTRAVENOUS at 20:00

## 2022-01-02 RX ADMIN — FUROSEMIDE 40 MG: 10 INJECTION, SOLUTION INTRAVENOUS at 13:33

## 2022-01-02 RX ADMIN — DEXMEDETOMIDINE HYDROCHLORIDE 1 MCG/KG/HR: 100 INJECTION, SOLUTION, CONCENTRATE INTRAVENOUS at 10:08

## 2022-01-02 RX ADMIN — TAZOBACTAM SODIUM AND PIPERACILLIN SODIUM 3.38 G: 375; 3 INJECTION, SOLUTION INTRAVENOUS at 19:53

## 2022-01-02 RX ADMIN — DEXMEDETOMIDINE HYDROCHLORIDE 1 MCG/KG/HR: 100 INJECTION, SOLUTION, CONCENTRATE INTRAVENOUS at 19:53

## 2022-01-02 RX ADMIN — SODIUM CHLORIDE, PRESERVATIVE FREE 10 ML: 5 INJECTION INTRAVENOUS at 08:10

## 2022-01-02 RX ADMIN — TAZOBACTAM SODIUM AND PIPERACILLIN SODIUM 3.38 G: 375; 3 INJECTION, SOLUTION INTRAVENOUS at 13:33

## 2022-01-02 RX ADMIN — SODIUM CHLORIDE, PRESERVATIVE FREE 10 ML: 5 INJECTION INTRAVENOUS at 20:01

## 2022-01-02 RX ADMIN — SODIUM CHLORIDE, PRESERVATIVE FREE 10 ML: 5 INJECTION INTRAVENOUS at 20:00

## 2022-01-02 RX ADMIN — TAZOBACTAM SODIUM AND PIPERACILLIN SODIUM 3.38 G: 375; 3 INJECTION, SOLUTION INTRAVENOUS at 03:46

## 2022-01-02 RX ADMIN — ACETAMINOPHEN 650 MG: 325 SUPPOSITORY RECTAL at 20:12

## 2022-01-02 RX ADMIN — NOREPINEPHRINE BITARTRATE 0.18 MCG/KG/MIN: 1 SOLUTION INTRAVENOUS at 15:48

## 2022-01-02 RX ADMIN — PROPOFOL 20 MCG/KG/MIN: 10 INJECTION, EMULSION INTRAVENOUS at 19:56

## 2022-01-02 RX ADMIN — POTASSIUM PHOSPHATE, MONOBASIC AND POTASSIUM PHOSPHATE, DIBASIC 30 MMOL: 224; 236 INJECTION, SOLUTION, CONCENTRATE INTRAVENOUS at 06:42

## 2022-01-02 RX ADMIN — CALCIUM GLUCONATE: 98 INJECTION, SOLUTION INTRAVENOUS at 18:32

## 2022-01-02 RX ADMIN — ENOXAPARIN SODIUM 40 MG: 40 INJECTION SUBCUTANEOUS at 18:32

## 2022-01-02 RX ADMIN — DEXMEDETOMIDINE HYDROCHLORIDE 1 MCG/KG/HR: 100 INJECTION, SOLUTION, CONCENTRATE INTRAVENOUS at 15:46

## 2022-01-02 RX ADMIN — POTASSIUM PHOSPHATE, MONOBASIC AND POTASSIUM PHOSPHATE, DIBASIC 15 MMOL: 224; 236 INJECTION, SOLUTION, CONCENTRATE INTRAVENOUS at 21:32

## 2022-01-02 RX ADMIN — DEXMEDETOMIDINE HYDROCHLORIDE 0.8 MCG/KG/HR: 100 INJECTION, SOLUTION, CONCENTRATE INTRAVENOUS at 00:02

## 2022-01-02 RX ADMIN — FLUCONAZOLE IN SODIUM CHLORIDE 400 MG: 2 INJECTION, SOLUTION INTRAVENOUS at 08:09

## 2022-01-02 RX ADMIN — DEXMEDETOMIDINE HYDROCHLORIDE 1 MCG/KG/HR: 100 INJECTION, SOLUTION, CONCENTRATE INTRAVENOUS at 05:24

## 2022-01-02 RX ADMIN — FAMOTIDINE 20 MG: 10 INJECTION INTRAVENOUS at 08:09

## 2022-01-02 RX ADMIN — PROPOFOL 10 MCG/KG/MIN: 10 INJECTION, EMULSION INTRAVENOUS at 10:08

## 2022-01-03 ENCOUNTER — APPOINTMENT (OUTPATIENT)
Dept: GENERAL RADIOLOGY | Facility: HOSPITAL | Age: 62
End: 2022-01-03

## 2022-01-03 ENCOUNTER — APPOINTMENT (OUTPATIENT)
Dept: CT IMAGING | Facility: HOSPITAL | Age: 62
End: 2022-01-03

## 2022-01-03 LAB
ALBUMIN SERPL-MCNC: 1.7 G/DL (ref 3.5–5.2)
ALBUMIN SERPL-MCNC: 2.1 G/DL (ref 3.5–5.2)
ALBUMIN/GLOB SERPL: 0.6 G/DL
ALP SERPL-CCNC: 136 U/L (ref 39–117)
ALT SERPL W P-5'-P-CCNC: 27 U/L (ref 1–41)
ANION GAP SERPL CALCULATED.3IONS-SCNC: 6.2 MMOL/L (ref 5–15)
ANION GAP SERPL CALCULATED.3IONS-SCNC: 8.8 MMOL/L (ref 5–15)
AST SERPL-CCNC: 48 U/L (ref 1–40)
BACTERIA SPEC RESP CULT: ABNORMAL
BACTERIA SPEC RESP CULT: ABNORMAL
BILIRUB SERPL-MCNC: 1 MG/DL (ref 0–1.2)
BUN SERPL-MCNC: 19 MG/DL (ref 8–23)
BUN SERPL-MCNC: 19 MG/DL (ref 8–23)
BUN/CREAT SERPL: 21.1 (ref 7–25)
BUN/CREAT SERPL: 24.4 (ref 7–25)
CALCIUM SPEC-SCNC: 8 MG/DL (ref 8.6–10.5)
CALCIUM SPEC-SCNC: 8.1 MG/DL (ref 8.6–10.5)
CHLORIDE SERPL-SCNC: 108 MMOL/L (ref 98–107)
CHLORIDE SERPL-SCNC: 109 MMOL/L (ref 98–107)
CO2 SERPL-SCNC: 30.8 MMOL/L (ref 22–29)
CO2 SERPL-SCNC: 31.2 MMOL/L (ref 22–29)
CREAT SERPL-MCNC: 0.78 MG/DL (ref 0.76–1.27)
CREAT SERPL-MCNC: 0.9 MG/DL (ref 0.76–1.27)
DEPRECATED RDW RBC AUTO: 46.8 FL (ref 37–54)
ERYTHROCYTE [DISTWIDTH] IN BLOOD BY AUTOMATED COUNT: 14.5 % (ref 12.3–15.4)
GFR SERPL CREATININE-BSD FRML MDRD: 101 ML/MIN/1.73
GFR SERPL CREATININE-BSD FRML MDRD: 86 ML/MIN/1.73
GLOBULIN UR ELPH-MCNC: 3.6 GM/DL
GLUCOSE BLDC GLUCOMTR-MCNC: 220 MG/DL (ref 70–130)
GLUCOSE BLDC GLUCOMTR-MCNC: 232 MG/DL (ref 70–130)
GLUCOSE BLDC GLUCOMTR-MCNC: 255 MG/DL (ref 70–130)
GLUCOSE BLDC GLUCOMTR-MCNC: 255 MG/DL (ref 70–130)
GLUCOSE SERPL-MCNC: 239 MG/DL (ref 65–99)
GLUCOSE SERPL-MCNC: 252 MG/DL (ref 65–99)
GRAM STN SPEC: ABNORMAL
GRAM STN SPEC: ABNORMAL
HCT VFR BLD AUTO: 28.8 % (ref 37.5–51)
HGB BLD-MCNC: 9.1 G/DL (ref 13–17.7)
LAB AP CASE REPORT: NORMAL
MAGNESIUM SERPL-MCNC: 3.2 MG/DL (ref 1.6–2.4)
MCH RBC QN AUTO: 27.7 PG (ref 26.6–33)
MCHC RBC AUTO-ENTMCNC: 31.6 G/DL (ref 31.5–35.7)
MCV RBC AUTO: 87.8 FL (ref 79–97)
PATH REPORT.FINAL DX SPEC: NORMAL
PATH REPORT.GROSS SPEC: NORMAL
PHOSPHATE SERPL-MCNC: 2.5 MG/DL (ref 2.5–4.5)
PHOSPHATE SERPL-MCNC: 2.7 MG/DL (ref 2.5–4.5)
PLATELET # BLD AUTO: 126 10*3/MM3 (ref 140–450)
PMV BLD AUTO: 11.1 FL (ref 6–12)
POTASSIUM SERPL-SCNC: 2.8 MMOL/L (ref 3.5–5.2)
POTASSIUM SERPL-SCNC: 2.8 MMOL/L (ref 3.5–5.2)
POTASSIUM SERPL-SCNC: 2.9 MMOL/L (ref 3.5–5.2)
PROT SERPL-MCNC: 5.7 G/DL (ref 6–8.5)
RBC # BLD AUTO: 3.28 10*6/MM3 (ref 4.14–5.8)
SODIUM SERPL-SCNC: 146 MMOL/L (ref 136–145)
SODIUM SERPL-SCNC: 148 MMOL/L (ref 136–145)
WBC NRBC COR # BLD: 9.89 10*3/MM3 (ref 3.4–10.8)

## 2022-01-03 PROCEDURE — 87205 SMEAR GRAM STAIN: CPT | Performed by: INTERNAL MEDICINE

## 2022-01-03 PROCEDURE — 82962 GLUCOSE BLOOD TEST: CPT

## 2022-01-03 PROCEDURE — 25010000002 PROPOFOL 10 MG/ML EMULSION: Performed by: SURGERY

## 2022-01-03 PROCEDURE — 71045 X-RAY EXAM CHEST 1 VIEW: CPT

## 2022-01-03 PROCEDURE — 87070 CULTURE OTHR SPECIMN AEROBIC: CPT | Performed by: INTERNAL MEDICINE

## 2022-01-03 PROCEDURE — 25010000002 FLUCONAZOLE PER 200 MG: Performed by: SURGERY

## 2022-01-03 PROCEDURE — 80053 COMPREHEN METABOLIC PANEL: CPT | Performed by: SURGERY

## 2022-01-03 PROCEDURE — 84132 ASSAY OF SERUM POTASSIUM: CPT | Performed by: INTERNAL MEDICINE

## 2022-01-03 PROCEDURE — 94799 UNLISTED PULMONARY SVC/PX: CPT

## 2022-01-03 PROCEDURE — 85027 COMPLETE CBC AUTOMATED: CPT | Performed by: INTERNAL MEDICINE

## 2022-01-03 PROCEDURE — 25010000002 FENTANYL CITRATE (PF) 50 MCG/ML SOLUTION: Performed by: INTERNAL MEDICINE

## 2022-01-03 PROCEDURE — 25010000002 FUROSEMIDE PER 20 MG: Performed by: INTERNAL MEDICINE

## 2022-01-03 PROCEDURE — 94761 N-INVAS EAR/PLS OXIMETRY MLT: CPT

## 2022-01-03 PROCEDURE — 99233 SBSQ HOSP IP/OBS HIGH 50: CPT | Performed by: INTERNAL MEDICINE

## 2022-01-03 PROCEDURE — 84100 ASSAY OF PHOSPHORUS: CPT | Performed by: INTERNAL MEDICINE

## 2022-01-03 PROCEDURE — 25010000002 CALCIUM GLUCONATE PER 10 ML: Performed by: SURGERY

## 2022-01-03 PROCEDURE — 25010000002 PIPERACILLIN SOD-TAZOBACTAM PER 1 G: Performed by: SURGERY

## 2022-01-03 PROCEDURE — 74177 CT ABD & PELVIS W/CONTRAST: CPT

## 2022-01-03 PROCEDURE — 94003 VENT MGMT INPAT SUBQ DAY: CPT

## 2022-01-03 PROCEDURE — 87186 SC STD MICRODIL/AGAR DIL: CPT | Performed by: INTERNAL MEDICINE

## 2022-01-03 PROCEDURE — 25010000002 MAGNESIUM SULFATE PER 500 MG OF MAGNESIUM: Performed by: SURGERY

## 2022-01-03 PROCEDURE — 63710000001 INSULIN REGULAR HUMAN PER 5 UNITS: Performed by: INTERNAL MEDICINE

## 2022-01-03 PROCEDURE — 99024 POSTOP FOLLOW-UP VISIT: CPT | Performed by: SURGERY

## 2022-01-03 PROCEDURE — 0 POTASSIUM CHLORIDE 10 MEQ/100ML SOLUTION: Performed by: HOSPITALIST

## 2022-01-03 PROCEDURE — 83735 ASSAY OF MAGNESIUM: CPT | Performed by: SURGERY

## 2022-01-03 PROCEDURE — 25010000002 ENOXAPARIN PER 10 MG: Performed by: SURGERY

## 2022-01-03 PROCEDURE — 84100 ASSAY OF PHOSPHORUS: CPT | Performed by: SURGERY

## 2022-01-03 PROCEDURE — 87015 SPECIMEN INFECT AGNT CONCNTJ: CPT | Performed by: INTERNAL MEDICINE

## 2022-01-03 PROCEDURE — 25010000002 IOPAMIDOL 61 % SOLUTION: Performed by: SURGERY

## 2022-01-03 PROCEDURE — 99232 SBSQ HOSP IP/OBS MODERATE 35: CPT | Performed by: INTERNAL MEDICINE

## 2022-01-03 RX ORDER — DEXTROSE MONOHYDRATE 25 G/50ML
25 INJECTION, SOLUTION INTRAVENOUS
Status: DISCONTINUED | OUTPATIENT
Start: 2022-01-03 | End: 2022-02-01

## 2022-01-03 RX ORDER — POTASSIUM CHLORIDE 7.45 MG/ML
10 INJECTION INTRAVENOUS
Status: COMPLETED | OUTPATIENT
Start: 2022-01-03 | End: 2022-01-03

## 2022-01-03 RX ORDER — SODIUM HYPOCHLORITE 1.25 MG/ML
SOLUTION TOPICAL 2 TIMES DAILY
Status: DISCONTINUED | OUTPATIENT
Start: 2022-01-03 | End: 2022-01-05

## 2022-01-03 RX ORDER — POTASSIUM CHLORIDE 7.45 MG/ML
10 INJECTION INTRAVENOUS
Status: DISCONTINUED | OUTPATIENT
Start: 2022-01-03 | End: 2022-01-06

## 2022-01-03 RX ORDER — NICOTINE POLACRILEX 4 MG
15 LOZENGE BUCCAL
Status: DISCONTINUED | OUTPATIENT
Start: 2022-01-03 | End: 2022-02-01

## 2022-01-03 RX ORDER — DEXTROSE MONOHYDRATE 50 MG/ML
125 INJECTION, SOLUTION INTRAVENOUS CONTINUOUS
Status: ACTIVE | OUTPATIENT
Start: 2022-01-03 | End: 2022-01-03

## 2022-01-03 RX ADMIN — FENTANYL CITRATE 50 MCG: 50 INJECTION INTRAMUSCULAR; INTRAVENOUS at 18:24

## 2022-01-03 RX ADMIN — DEXMEDETOMIDINE HYDROCHLORIDE 0.8 MCG/KG/HR: 100 INJECTION, SOLUTION, CONCENTRATE INTRAVENOUS at 23:32

## 2022-01-03 RX ADMIN — INSULIN HUMAN 3 UNITS: 100 INJECTION, SOLUTION PARENTERAL at 18:18

## 2022-01-03 RX ADMIN — PROPOFOL 20 MCG/KG/MIN: 10 INJECTION, EMULSION INTRAVENOUS at 23:32

## 2022-01-03 RX ADMIN — POTASSIUM CHLORIDE 10 MEQ: 7.46 INJECTION, SOLUTION INTRAVENOUS at 15:27

## 2022-01-03 RX ADMIN — FLUCONAZOLE IN SODIUM CHLORIDE 400 MG: 2 INJECTION, SOLUTION INTRAVENOUS at 08:38

## 2022-01-03 RX ADMIN — SODIUM CHLORIDE, PRESERVATIVE FREE 10 ML: 5 INJECTION INTRAVENOUS at 21:39

## 2022-01-03 RX ADMIN — TAZOBACTAM SODIUM AND PIPERACILLIN SODIUM 3.38 G: 375; 3 INJECTION, SOLUTION INTRAVENOUS at 11:58

## 2022-01-03 RX ADMIN — POTASSIUM CHLORIDE 10 MEQ: 7.46 INJECTION, SOLUTION INTRAVENOUS at 11:51

## 2022-01-03 RX ADMIN — SODIUM CHLORIDE, PRESERVATIVE FREE 10 ML: 5 INJECTION INTRAVENOUS at 08:40

## 2022-01-03 RX ADMIN — PROPOFOL 15 MCG/KG/MIN: 10 INJECTION, EMULSION INTRAVENOUS at 03:51

## 2022-01-03 RX ADMIN — DEXMEDETOMIDINE HYDROCHLORIDE 0.8 MCG/KG/HR: 100 INJECTION, SOLUTION, CONCENTRATE INTRAVENOUS at 11:47

## 2022-01-03 RX ADMIN — IOPAMIDOL 85 ML: 612 INJECTION, SOLUTION INTRAVENOUS at 17:52

## 2022-01-03 RX ADMIN — SODIUM CHLORIDE, PRESERVATIVE FREE 10 ML: 5 INJECTION INTRAVENOUS at 08:39

## 2022-01-03 RX ADMIN — TAZOBACTAM SODIUM AND PIPERACILLIN SODIUM 3.38 G: 375; 3 INJECTION, SOLUTION INTRAVENOUS at 03:10

## 2022-01-03 RX ADMIN — POTASSIUM CHLORIDE 10 MEQ: 7.46 INJECTION, SOLUTION INTRAVENOUS at 17:24

## 2022-01-03 RX ADMIN — FAMOTIDINE 20 MG: 10 INJECTION INTRAVENOUS at 08:39

## 2022-01-03 RX ADMIN — DEXTROSE 125 ML/HR: 5 SOLUTION INTRAVENOUS at 11:30

## 2022-01-03 RX ADMIN — FUROSEMIDE 40 MG: 10 INJECTION, SOLUTION INTRAVENOUS at 01:00

## 2022-01-03 RX ADMIN — DEXMEDETOMIDINE HYDROCHLORIDE 1 MCG/KG/HR: 100 INJECTION, SOLUTION, CONCENTRATE INTRAVENOUS at 00:45

## 2022-01-03 RX ADMIN — DEXMEDETOMIDINE HYDROCHLORIDE 0.8 MCG/KG/HR: 100 INJECTION, SOLUTION, CONCENTRATE INTRAVENOUS at 17:22

## 2022-01-03 RX ADMIN — SODIUM CHLORIDE, PRESERVATIVE FREE 10 ML: 5 INJECTION INTRAVENOUS at 21:40

## 2022-01-03 RX ADMIN — NOREPINEPHRINE BITARTRATE 0.14 MCG/KG/MIN: 1 SOLUTION INTRAVENOUS at 09:25

## 2022-01-03 RX ADMIN — PROPOFOL 20 MCG/KG/MIN: 10 INJECTION, EMULSION INTRAVENOUS at 15:26

## 2022-01-03 RX ADMIN — INSULIN HUMAN 2 UNITS: 100 INJECTION, SOLUTION PARENTERAL at 12:24

## 2022-01-03 RX ADMIN — SODIUM HYPOCHLORITE: 1.25 SOLUTION TOPICAL at 21:38

## 2022-01-03 RX ADMIN — TAZOBACTAM SODIUM AND PIPERACILLIN SODIUM 3.38 G: 375; 3 INJECTION, SOLUTION INTRAVENOUS at 21:38

## 2022-01-03 RX ADMIN — ENOXAPARIN SODIUM 40 MG: 40 INJECTION SUBCUTANEOUS at 18:24

## 2022-01-03 RX ADMIN — FAMOTIDINE 20 MG: 10 INJECTION INTRAVENOUS at 21:38

## 2022-01-03 RX ADMIN — POTASSIUM CHLORIDE 10 MEQ: 7.46 INJECTION, SOLUTION INTRAVENOUS at 13:27

## 2022-01-03 RX ADMIN — CALCIUM GLUCONATE: 98 INJECTION, SOLUTION INTRAVENOUS at 18:25

## 2022-01-03 RX ADMIN — DEXMEDETOMIDINE HYDROCHLORIDE 0.8 MCG/KG/HR: 100 INJECTION, SOLUTION, CONCENTRATE INTRAVENOUS at 06:30

## 2022-01-03 RX ADMIN — FENTANYL CITRATE 50 MCG: 50 INJECTION INTRAMUSCULAR; INTRAVENOUS at 11:45

## 2022-01-04 ENCOUNTER — APPOINTMENT (OUTPATIENT)
Dept: GENERAL RADIOLOGY | Facility: HOSPITAL | Age: 62
End: 2022-01-04

## 2022-01-04 LAB
ALBUMIN SERPL-MCNC: 1.6 G/DL (ref 3.5–5.2)
ALBUMIN SERPL-MCNC: 1.6 G/DL (ref 3.5–5.2)
ALBUMIN/GLOB SERPL: 0.4 G/DL
ALP SERPL-CCNC: 127 U/L (ref 39–117)
ALT SERPL W P-5'-P-CCNC: 24 U/L (ref 1–41)
ANION GAP SERPL CALCULATED.3IONS-SCNC: 10.2 MMOL/L (ref 5–15)
ANION GAP SERPL CALCULATED.3IONS-SCNC: 5.2 MMOL/L (ref 5–15)
APTT PPP: 34 SECONDS (ref 22.7–35.4)
AST SERPL-CCNC: 43 U/L (ref 1–40)
BILIRUB SERPL-MCNC: 0.6 MG/DL (ref 0–1.2)
BUN SERPL-MCNC: 18 MG/DL (ref 8–23)
BUN SERPL-MCNC: 20 MG/DL (ref 8–23)
BUN/CREAT SERPL: 18.3 (ref 7–25)
BUN/CREAT SERPL: 20.9 (ref 7–25)
CALCIUM SPEC-SCNC: 7.9 MG/DL (ref 8.6–10.5)
CALCIUM SPEC-SCNC: 8 MG/DL (ref 8.6–10.5)
CHLORIDE SERPL-SCNC: 107 MMOL/L (ref 98–107)
CHLORIDE SERPL-SCNC: 111 MMOL/L (ref 98–107)
CO2 SERPL-SCNC: 28.8 MMOL/L (ref 22–29)
CO2 SERPL-SCNC: 30.8 MMOL/L (ref 22–29)
CREAT SERPL-MCNC: 0.86 MG/DL (ref 0.76–1.27)
CREAT SERPL-MCNC: 1.09 MG/DL (ref 0.76–1.27)
DEPRECATED RDW RBC AUTO: 46 FL (ref 37–54)
ERYTHROCYTE [DISTWIDTH] IN BLOOD BY AUTOMATED COUNT: 14.6 % (ref 12.3–15.4)
FIBRINOGEN PPP-MCNC: 672 MG/DL (ref 219–464)
GFR SERPL CREATININE-BSD FRML MDRD: 69 ML/MIN/1.73
GFR SERPL CREATININE-BSD FRML MDRD: 90 ML/MIN/1.73
GLOBULIN UR ELPH-MCNC: 3.9 GM/DL
GLUCOSE BLDC GLUCOMTR-MCNC: 152 MG/DL (ref 70–130)
GLUCOSE BLDC GLUCOMTR-MCNC: 162 MG/DL (ref 70–130)
GLUCOSE BLDC GLUCOMTR-MCNC: 184 MG/DL (ref 70–130)
GLUCOSE BLDC GLUCOMTR-MCNC: 208 MG/DL (ref 70–130)
GLUCOSE BLDC GLUCOMTR-MCNC: 234 MG/DL (ref 70–130)
GLUCOSE SERPL-MCNC: 199 MG/DL (ref 65–99)
GLUCOSE SERPL-MCNC: 232 MG/DL (ref 65–99)
HAPTOGLOB SERPL-MCNC: 296 MG/DL (ref 30–200)
HCT VFR BLD AUTO: 26.6 % (ref 37.5–51)
HGB BLD-MCNC: 8.6 G/DL (ref 13–17.7)
INR PPP: 1.54 (ref 0.9–1.1)
MAGNESIUM SERPL-MCNC: 1.7 MG/DL (ref 1.6–2.4)
MCH RBC QN AUTO: 27.9 PG (ref 26.6–33)
MCHC RBC AUTO-ENTMCNC: 32.3 G/DL (ref 31.5–35.7)
MCV RBC AUTO: 86.4 FL (ref 79–97)
PHOSPHATE SERPL-MCNC: 2.8 MG/DL (ref 2.5–4.5)
PHOSPHATE SERPL-MCNC: 3.2 MG/DL (ref 2.5–4.5)
PLATELET # BLD AUTO: 113 10*3/MM3 (ref 140–450)
PMV BLD AUTO: 10.7 FL (ref 6–12)
POTASSIUM SERPL-SCNC: 3 MMOL/L (ref 3.5–5.2)
POTASSIUM SERPL-SCNC: 3.1 MMOL/L (ref 3.5–5.2)
PROT SERPL-MCNC: 5.5 G/DL (ref 6–8.5)
PROTHROMBIN TIME: 18.3 SECONDS (ref 11.7–14.2)
RBC # BLD AUTO: 3.08 10*6/MM3 (ref 4.14–5.8)
SODIUM SERPL-SCNC: 146 MMOL/L (ref 136–145)
SODIUM SERPL-SCNC: 147 MMOL/L (ref 136–145)
TSH SERPL DL<=0.05 MIU/L-ACNC: 3.23 UIU/ML (ref 0.27–4.2)
WBC NRBC COR # BLD: 9.37 10*3/MM3 (ref 3.4–10.8)

## 2022-01-04 PROCEDURE — 0 POTASSIUM CHLORIDE PER 2 MEQ: Performed by: INTERNAL MEDICINE

## 2022-01-04 PROCEDURE — 99024 POSTOP FOLLOW-UP VISIT: CPT | Performed by: SURGERY

## 2022-01-04 PROCEDURE — 85027 COMPLETE CBC AUTOMATED: CPT | Performed by: INTERNAL MEDICINE

## 2022-01-04 PROCEDURE — 25010000002 ENOXAPARIN PER 10 MG: Performed by: SURGERY

## 2022-01-04 PROCEDURE — 85730 THROMBOPLASTIN TIME PARTIAL: CPT | Performed by: INTERNAL MEDICINE

## 2022-01-04 PROCEDURE — 94799 UNLISTED PULMONARY SVC/PX: CPT

## 2022-01-04 PROCEDURE — 85384 FIBRINOGEN ACTIVITY: CPT | Performed by: INTERNAL MEDICINE

## 2022-01-04 PROCEDURE — 83010 ASSAY OF HAPTOGLOBIN QUANT: CPT | Performed by: INTERNAL MEDICINE

## 2022-01-04 PROCEDURE — 83735 ASSAY OF MAGNESIUM: CPT | Performed by: SURGERY

## 2022-01-04 PROCEDURE — 99233 SBSQ HOSP IP/OBS HIGH 50: CPT | Performed by: INTERNAL MEDICINE

## 2022-01-04 PROCEDURE — 84443 ASSAY THYROID STIM HORMONE: CPT | Performed by: INTERNAL MEDICINE

## 2022-01-04 PROCEDURE — 25010000002 FLUCONAZOLE PER 200 MG: Performed by: SURGERY

## 2022-01-04 PROCEDURE — 25010000002 PIPERACILLIN SOD-TAZOBACTAM PER 1 G: Performed by: SURGERY

## 2022-01-04 PROCEDURE — 25010000002 PROPOFOL 10 MG/ML EMULSION: Performed by: SURGERY

## 2022-01-04 PROCEDURE — 85610 PROTHROMBIN TIME: CPT | Performed by: INTERNAL MEDICINE

## 2022-01-04 PROCEDURE — 0 MAGNESIUM SULFATE 4 GM/100ML SOLUTION: Performed by: SURGERY

## 2022-01-04 PROCEDURE — 25010000002 MAGNESIUM SULFATE PER 500 MG OF MAGNESIUM: Performed by: SURGERY

## 2022-01-04 PROCEDURE — 99232 SBSQ HOSP IP/OBS MODERATE 35: CPT | Performed by: INTERNAL MEDICINE

## 2022-01-04 PROCEDURE — 80053 COMPREHEN METABOLIC PANEL: CPT | Performed by: SURGERY

## 2022-01-04 PROCEDURE — 25010000002 FENTANYL CITRATE (PF) 50 MCG/ML SOLUTION: Performed by: INTERNAL MEDICINE

## 2022-01-04 PROCEDURE — 84100 ASSAY OF PHOSPHORUS: CPT | Performed by: SURGERY

## 2022-01-04 PROCEDURE — 82962 GLUCOSE BLOOD TEST: CPT

## 2022-01-04 PROCEDURE — 25010000002 HYDROMORPHONE PER 4 MG: Performed by: SURGERY

## 2022-01-04 PROCEDURE — 0 POTASSIUM CHLORIDE 10 MEQ/100ML SOLUTION: Performed by: INTERNAL MEDICINE

## 2022-01-04 PROCEDURE — 63710000001 INSULIN REGULAR HUMAN PER 5 UNITS: Performed by: INTERNAL MEDICINE

## 2022-01-04 PROCEDURE — 71045 X-RAY EXAM CHEST 1 VIEW: CPT

## 2022-01-04 PROCEDURE — 25010000002 CALCIUM GLUCONATE PER 10 ML: Performed by: SURGERY

## 2022-01-04 PROCEDURE — 94003 VENT MGMT INPAT SUBQ DAY: CPT

## 2022-01-04 RX ORDER — POTASSIUM CHLORIDE 29.8 MG/ML
20 INJECTION INTRAVENOUS
Status: COMPLETED | OUTPATIENT
Start: 2022-01-04 | End: 2022-01-05

## 2022-01-04 RX ORDER — BUMETANIDE 0.25 MG/ML
1 INJECTION INTRAMUSCULAR; INTRAVENOUS EVERY 12 HOURS
Status: DISCONTINUED | OUTPATIENT
Start: 2022-01-04 | End: 2022-01-05

## 2022-01-04 RX ORDER — POTASSIUM CHLORIDE 750 MG/1
40 TABLET, FILM COATED, EXTENDED RELEASE ORAL
Status: COMPLETED | OUTPATIENT
Start: 2022-01-04 | End: 2022-01-04

## 2022-01-04 RX ORDER — ACETAMINOPHEN 325 MG/1
650 TABLET ORAL EVERY 4 HOURS PRN
Status: DISCONTINUED | OUTPATIENT
Start: 2022-01-04 | End: 2022-02-03 | Stop reason: HOSPADM

## 2022-01-04 RX ADMIN — SODIUM CHLORIDE, PRESERVATIVE FREE 10 ML: 5 INJECTION INTRAVENOUS at 20:51

## 2022-01-04 RX ADMIN — PROPOFOL 20 MCG/KG/MIN: 10 INJECTION, EMULSION INTRAVENOUS at 11:52

## 2022-01-04 RX ADMIN — INSULIN HUMAN 3 UNITS: 100 INJECTION, SOLUTION PARENTERAL at 11:26

## 2022-01-04 RX ADMIN — TAZOBACTAM SODIUM AND PIPERACILLIN SODIUM 3.38 G: 375; 3 INJECTION, SOLUTION INTRAVENOUS at 16:53

## 2022-01-04 RX ADMIN — DEXMEDETOMIDINE HYDROCHLORIDE 0.6 MCG/KG/HR: 100 INJECTION, SOLUTION, CONCENTRATE INTRAVENOUS at 18:21

## 2022-01-04 RX ADMIN — FENTANYL CITRATE 50 MCG: 50 INJECTION INTRAMUSCULAR; INTRAVENOUS at 10:03

## 2022-01-04 RX ADMIN — SODIUM CHLORIDE, PRESERVATIVE FREE 10 ML: 5 INJECTION INTRAVENOUS at 14:53

## 2022-01-04 RX ADMIN — POTASSIUM CHLORIDE 40 MEQ: 750 TABLET, EXTENDED RELEASE ORAL at 11:56

## 2022-01-04 RX ADMIN — MAGNESIUM SULFATE HEPTAHYDRATE 4 G: 40 INJECTION, SOLUTION INTRAVENOUS at 08:46

## 2022-01-04 RX ADMIN — FAMOTIDINE 20 MG: 10 INJECTION INTRAVENOUS at 20:51

## 2022-01-04 RX ADMIN — FLUCONAZOLE IN SODIUM CHLORIDE 400 MG: 2 INJECTION, SOLUTION INTRAVENOUS at 08:30

## 2022-01-04 RX ADMIN — POTASSIUM CHLORIDE 10 MEQ: 10 INJECTION, SOLUTION INTRAVENOUS at 08:30

## 2022-01-04 RX ADMIN — DEXMEDETOMIDINE HYDROCHLORIDE 0.7 MCG/KG/HR: 100 INJECTION, SOLUTION, CONCENTRATE INTRAVENOUS at 11:52

## 2022-01-04 RX ADMIN — PROPOFOL 20 MCG/KG/MIN: 10 INJECTION, EMULSION INTRAVENOUS at 21:55

## 2022-01-04 RX ADMIN — DEXMEDETOMIDINE HYDROCHLORIDE 0.8 MCG/KG/HR: 100 INJECTION, SOLUTION, CONCENTRATE INTRAVENOUS at 05:16

## 2022-01-04 RX ADMIN — POTASSIUM CHLORIDE 40 MEQ: 750 TABLET, EXTENDED RELEASE ORAL at 14:50

## 2022-01-04 RX ADMIN — SODIUM CHLORIDE, PRESERVATIVE FREE 10 ML: 5 INJECTION INTRAVENOUS at 09:00

## 2022-01-04 RX ADMIN — FAMOTIDINE 20 MG: 10 INJECTION INTRAVENOUS at 08:29

## 2022-01-04 RX ADMIN — SODIUM CHLORIDE, PRESERVATIVE FREE 10 ML: 5 INJECTION INTRAVENOUS at 20:52

## 2022-01-04 RX ADMIN — MAGNESIUM SULFATE HEPTAHYDRATE: 500 INJECTION, SOLUTION INTRAMUSCULAR; INTRAVENOUS at 18:40

## 2022-01-04 RX ADMIN — INSULIN GLARGINE-YFGN 15 UNITS: 100 INJECTION, SOLUTION SUBCUTANEOUS at 20:49

## 2022-01-04 RX ADMIN — POTASSIUM CHLORIDE 10 MEQ: 10 INJECTION, SOLUTION INTRAVENOUS at 05:34

## 2022-01-04 RX ADMIN — INSULIN HUMAN 2 UNITS: 100 INJECTION, SOLUTION PARENTERAL at 18:49

## 2022-01-04 RX ADMIN — PROPOFOL 20 MCG/KG/MIN: 10 INJECTION, EMULSION INTRAVENOUS at 05:58

## 2022-01-04 RX ADMIN — NOREPINEPHRINE BITARTRATE 0.06 MCG/KG/MIN: 1 SOLUTION INTRAVENOUS at 08:29

## 2022-01-04 RX ADMIN — POTASSIUM CHLORIDE 20 MEQ: 400 INJECTION, SOLUTION INTRAVENOUS at 20:56

## 2022-01-04 RX ADMIN — INSULIN HUMAN 4 UNITS: 100 INJECTION, SOLUTION PARENTERAL at 00:48

## 2022-01-04 RX ADMIN — INSULIN GLARGINE-YFGN 15 UNITS: 100 INJECTION, SOLUTION SUBCUTANEOUS at 10:03

## 2022-01-04 RX ADMIN — POTASSIUM CHLORIDE 10 MEQ: 10 INJECTION, SOLUTION INTRAVENOUS at 11:15

## 2022-01-04 RX ADMIN — TAZOBACTAM SODIUM AND PIPERACILLIN SODIUM 3.38 G: 375; 3 INJECTION, SOLUTION INTRAVENOUS at 03:29

## 2022-01-04 RX ADMIN — ACETAMINOPHEN 650 MG: 325 TABLET, FILM COATED ORAL at 09:13

## 2022-01-04 RX ADMIN — POTASSIUM CHLORIDE 10 MEQ: 10 INJECTION, SOLUTION INTRAVENOUS at 01:44

## 2022-01-04 RX ADMIN — SODIUM HYPOCHLORITE: 1.25 SOLUTION TOPICAL at 13:15

## 2022-01-04 RX ADMIN — SODIUM HYPOCHLORITE: 1.25 SOLUTION TOPICAL at 21:54

## 2022-01-04 RX ADMIN — POTASSIUM CHLORIDE 10 MEQ: 10 INJECTION, SOLUTION INTRAVENOUS at 03:29

## 2022-01-04 RX ADMIN — POTASSIUM CHLORIDE 10 MEQ: 10 INJECTION, SOLUTION INTRAVENOUS at 06:44

## 2022-01-04 RX ADMIN — HYDROMORPHONE HYDROCHLORIDE 0.5 MG: 10 INJECTION INTRAMUSCULAR; INTRAVENOUS; SUBCUTANEOUS at 11:26

## 2022-01-04 RX ADMIN — INSULIN HUMAN 3 UNITS: 100 INJECTION, SOLUTION PARENTERAL at 06:19

## 2022-01-04 RX ADMIN — FENTANYL CITRATE 50 MCG: 50 INJECTION INTRAMUSCULAR; INTRAVENOUS at 21:55

## 2022-01-04 RX ADMIN — ENOXAPARIN SODIUM 40 MG: 40 INJECTION SUBCUTANEOUS at 20:50

## 2022-01-04 RX ADMIN — BUMETANIDE 1 MG: 0.25 INJECTION INTRAMUSCULAR; INTRAVENOUS at 11:52

## 2022-01-04 RX ADMIN — POTASSIUM CHLORIDE 20 MEQ: 400 INJECTION, SOLUTION INTRAVENOUS at 22:04

## 2022-01-05 ENCOUNTER — APPOINTMENT (OUTPATIENT)
Dept: GENERAL RADIOLOGY | Facility: HOSPITAL | Age: 62
End: 2022-01-05

## 2022-01-05 LAB
ALBUMIN SERPL-MCNC: 2 G/DL (ref 3.5–5.2)
ALBUMIN/GLOB SERPL: 0.5 G/DL
ALP SERPL-CCNC: 107 U/L (ref 39–117)
ALT SERPL W P-5'-P-CCNC: 20 U/L (ref 1–41)
ANION GAP SERPL CALCULATED.3IONS-SCNC: 6.3 MMOL/L (ref 5–15)
AST SERPL-CCNC: 39 U/L (ref 1–40)
BILIRUB SERPL-MCNC: 0.6 MG/DL (ref 0–1.2)
BUN SERPL-MCNC: 22 MG/DL (ref 8–23)
BUN/CREAT SERPL: 20.8 (ref 7–25)
CALCIUM SPEC-SCNC: 8.1 MG/DL (ref 8.6–10.5)
CHLORIDE SERPL-SCNC: 109 MMOL/L (ref 98–107)
CO2 SERPL-SCNC: 30.7 MMOL/L (ref 22–29)
CREAT SERPL-MCNC: 1.06 MG/DL (ref 0.76–1.27)
DEPRECATED RDW RBC AUTO: 44.7 FL (ref 37–54)
ERYTHROCYTE [DISTWIDTH] IN BLOOD BY AUTOMATED COUNT: 14.6 % (ref 12.3–15.4)
FERRITIN SERPL-MCNC: 666 NG/ML (ref 30–400)
FOLATE SERPL-MCNC: 6.37 NG/ML (ref 4.78–24.2)
GFR SERPL CREATININE-BSD FRML MDRD: 71 ML/MIN/1.73
GLOBULIN UR ELPH-MCNC: 4 GM/DL
GLUCOSE BLDC GLUCOMTR-MCNC: 127 MG/DL (ref 70–130)
GLUCOSE BLDC GLUCOMTR-MCNC: 140 MG/DL (ref 70–130)
GLUCOSE BLDC GLUCOMTR-MCNC: 151 MG/DL (ref 70–130)
GLUCOSE BLDC GLUCOMTR-MCNC: 164 MG/DL (ref 70–130)
GLUCOSE SERPL-MCNC: 169 MG/DL (ref 65–99)
HCT VFR BLD AUTO: 28.7 % (ref 37.5–51)
HGB BLD-MCNC: 9.5 G/DL (ref 13–17.7)
IRON 24H UR-MRATE: 26 MCG/DL (ref 59–158)
IRON SATN MFR SERPL: 14 % (ref 20–50)
MAGNESIUM SERPL-MCNC: 2.1 MG/DL (ref 1.6–2.4)
MCH RBC QN AUTO: 28.2 PG (ref 26.6–33)
MCHC RBC AUTO-ENTMCNC: 33.1 G/DL (ref 31.5–35.7)
MCV RBC AUTO: 85.2 FL (ref 79–97)
PLATELET # BLD AUTO: 105 10*3/MM3 (ref 140–450)
PMV BLD AUTO: 12.6 FL (ref 6–12)
POTASSIUM SERPL-SCNC: 3.4 MMOL/L (ref 3.5–5.2)
PROT SERPL-MCNC: 6 G/DL (ref 6–8.5)
RBC # BLD AUTO: 3.37 10*6/MM3 (ref 4.14–5.8)
SODIUM SERPL-SCNC: 146 MMOL/L (ref 136–145)
TIBC SERPL-MCNC: 185 MCG/DL (ref 298–536)
TRANSFERRIN SERPL-MCNC: 124 MG/DL (ref 200–360)
VIT B12 BLD-MCNC: 1265 PG/ML (ref 211–946)
WBC NRBC COR # BLD: 8.82 10*3/MM3 (ref 3.4–10.8)

## 2022-01-05 PROCEDURE — 25010000002 PROPOFOL 10 MG/ML EMULSION: Performed by: SURGERY

## 2022-01-05 PROCEDURE — 94003 VENT MGMT INPAT SUBQ DAY: CPT

## 2022-01-05 PROCEDURE — 94799 UNLISTED PULMONARY SVC/PX: CPT

## 2022-01-05 PROCEDURE — 82607 VITAMIN B-12: CPT | Performed by: HOSPITALIST

## 2022-01-05 PROCEDURE — 99231 SBSQ HOSP IP/OBS SF/LOW 25: CPT | Performed by: INTERNAL MEDICINE

## 2022-01-05 PROCEDURE — 25010000002 MAGNESIUM SULFATE PER 500 MG OF MAGNESIUM: Performed by: SURGERY

## 2022-01-05 PROCEDURE — 99024 POSTOP FOLLOW-UP VISIT: CPT | Performed by: SURGERY

## 2022-01-05 PROCEDURE — 82962 GLUCOSE BLOOD TEST: CPT

## 2022-01-05 PROCEDURE — 63710000001 INSULIN REGULAR HUMAN PER 5 UNITS: Performed by: INTERNAL MEDICINE

## 2022-01-05 PROCEDURE — 25010000002 FLUCONAZOLE PER 200 MG: Performed by: SURGERY

## 2022-01-05 PROCEDURE — 99233 SBSQ HOSP IP/OBS HIGH 50: CPT | Performed by: INTERNAL MEDICINE

## 2022-01-05 PROCEDURE — 25010000002 FENTANYL CITRATE (PF) 50 MCG/ML SOLUTION: Performed by: INTERNAL MEDICINE

## 2022-01-05 PROCEDURE — 25010000002 PIPERACILLIN SOD-TAZOBACTAM PER 1 G: Performed by: SURGERY

## 2022-01-05 PROCEDURE — 83735 ASSAY OF MAGNESIUM: CPT | Performed by: SURGERY

## 2022-01-05 PROCEDURE — 84466 ASSAY OF TRANSFERRIN: CPT | Performed by: HOSPITALIST

## 2022-01-05 PROCEDURE — 82728 ASSAY OF FERRITIN: CPT | Performed by: HOSPITALIST

## 2022-01-05 PROCEDURE — 94761 N-INVAS EAR/PLS OXIMETRY MLT: CPT

## 2022-01-05 PROCEDURE — 85027 COMPLETE CBC AUTOMATED: CPT | Performed by: INTERNAL MEDICINE

## 2022-01-05 PROCEDURE — 71045 X-RAY EXAM CHEST 1 VIEW: CPT

## 2022-01-05 PROCEDURE — 25010000002 ENOXAPARIN PER 10 MG: Performed by: SURGERY

## 2022-01-05 PROCEDURE — 80053 COMPREHEN METABOLIC PANEL: CPT | Performed by: SURGERY

## 2022-01-05 PROCEDURE — 82746 ASSAY OF FOLIC ACID SERUM: CPT | Performed by: HOSPITALIST

## 2022-01-05 PROCEDURE — 0 POTASSIUM CHLORIDE 10 MEQ/100ML SOLUTION: Performed by: INTERNAL MEDICINE

## 2022-01-05 PROCEDURE — 0 POTASSIUM CHLORIDE PER 2 MEQ: Performed by: INTERNAL MEDICINE

## 2022-01-05 PROCEDURE — 25010000002 CALCIUM GLUCONATE PER 10 ML: Performed by: SURGERY

## 2022-01-05 PROCEDURE — 83540 ASSAY OF IRON: CPT | Performed by: HOSPITALIST

## 2022-01-05 RX ORDER — FENTANYL CITRATE 50 UG/ML
100 INJECTION, SOLUTION INTRAMUSCULAR; INTRAVENOUS
Status: DISCONTINUED | OUTPATIENT
Start: 2022-01-05 | End: 2022-01-08

## 2022-01-05 RX ORDER — BUMETANIDE 0.25 MG/ML
2 INJECTION INTRAMUSCULAR; INTRAVENOUS EVERY 8 HOURS
Status: COMPLETED | OUTPATIENT
Start: 2022-01-05 | End: 2022-01-06

## 2022-01-05 RX ORDER — BUMETANIDE 0.25 MG/ML
1 INJECTION INTRAMUSCULAR; INTRAVENOUS EVERY MORNING
Status: DISCONTINUED | OUTPATIENT
Start: 2022-01-06 | End: 2022-01-05

## 2022-01-05 RX ADMIN — POTASSIUM CHLORIDE 10 MEQ: 10 INJECTION, SOLUTION INTRAVENOUS at 18:24

## 2022-01-05 RX ADMIN — SODIUM CHLORIDE, PRESERVATIVE FREE 10 ML: 5 INJECTION INTRAVENOUS at 20:24

## 2022-01-05 RX ADMIN — INSULIN HUMAN 2 UNITS: 100 INJECTION, SOLUTION PARENTERAL at 00:23

## 2022-01-05 RX ADMIN — TAZOBACTAM SODIUM AND PIPERACILLIN SODIUM 3.38 G: 375; 3 INJECTION, SOLUTION INTRAVENOUS at 16:50

## 2022-01-05 RX ADMIN — SODIUM CHLORIDE, PRESERVATIVE FREE 10 ML: 5 INJECTION INTRAVENOUS at 20:25

## 2022-01-05 RX ADMIN — FENTANYL CITRATE 50 MCG: 50 INJECTION INTRAMUSCULAR; INTRAVENOUS at 00:29

## 2022-01-05 RX ADMIN — SODIUM CHLORIDE, PRESERVATIVE FREE 10 ML: 5 INJECTION INTRAVENOUS at 09:14

## 2022-01-05 RX ADMIN — FAMOTIDINE 20 MG: 10 INJECTION INTRAVENOUS at 20:15

## 2022-01-05 RX ADMIN — DEXMEDETOMIDINE HYDROCHLORIDE 0.7 MCG/KG/HR: 100 INJECTION, SOLUTION, CONCENTRATE INTRAVENOUS at 00:04

## 2022-01-05 RX ADMIN — TAZOBACTAM SODIUM AND PIPERACILLIN SODIUM 3.38 G: 375; 3 INJECTION, SOLUTION INTRAVENOUS at 23:46

## 2022-01-05 RX ADMIN — BUMETANIDE 2 MG: 0.25 INJECTION INTRAMUSCULAR; INTRAVENOUS at 18:12

## 2022-01-05 RX ADMIN — FLUCONAZOLE IN SODIUM CHLORIDE 400 MG: 2 INJECTION, SOLUTION INTRAVENOUS at 09:13

## 2022-01-05 RX ADMIN — POTASSIUM CHLORIDE 20 MEQ: 400 INJECTION, SOLUTION INTRAVENOUS at 00:23

## 2022-01-05 RX ADMIN — BUMETANIDE 1 MG: 0.25 INJECTION INTRAMUSCULAR; INTRAVENOUS at 00:22

## 2022-01-05 RX ADMIN — INSULIN GLARGINE-YFGN 15 UNITS: 100 INJECTION, SOLUTION SUBCUTANEOUS at 09:15

## 2022-01-05 RX ADMIN — POTASSIUM CHLORIDE 10 MEQ: 10 INJECTION, SOLUTION INTRAVENOUS at 20:10

## 2022-01-05 RX ADMIN — PROPOFOL 20 MCG/KG/MIN: 10 INJECTION, EMULSION INTRAVENOUS at 23:45

## 2022-01-05 RX ADMIN — TAZOBACTAM SODIUM AND PIPERACILLIN SODIUM 3.38 G: 375; 3 INJECTION, SOLUTION INTRAVENOUS at 00:23

## 2022-01-05 RX ADMIN — PROPOFOL 20 MCG/KG/MIN: 10 INJECTION, EMULSION INTRAVENOUS at 05:36

## 2022-01-05 RX ADMIN — SODIUM CHLORIDE, PRESERVATIVE FREE 10 ML: 5 INJECTION INTRAVENOUS at 09:13

## 2022-01-05 RX ADMIN — ENOXAPARIN SODIUM 40 MG: 40 INJECTION SUBCUTANEOUS at 18:24

## 2022-01-05 RX ADMIN — ACETAMINOPHEN 650 MG: 325 SUPPOSITORY RECTAL at 20:15

## 2022-01-05 RX ADMIN — TAZOBACTAM SODIUM AND PIPERACILLIN SODIUM 3.38 G: 375; 3 INJECTION, SOLUTION INTRAVENOUS at 09:13

## 2022-01-05 RX ADMIN — FAMOTIDINE 20 MG: 10 INJECTION INTRAVENOUS at 09:13

## 2022-01-05 RX ADMIN — BUMETANIDE 2 MG: 0.25 INJECTION INTRAMUSCULAR; INTRAVENOUS at 11:58

## 2022-01-05 RX ADMIN — INSULIN HUMAN 2 UNITS: 100 INJECTION, SOLUTION PARENTERAL at 06:09

## 2022-01-05 RX ADMIN — INSULIN GLARGINE-YFGN 15 UNITS: 100 INJECTION, SOLUTION SUBCUTANEOUS at 20:15

## 2022-01-05 RX ADMIN — SODIUM HYPOCHLORITE: 1.25 SOLUTION TOPICAL at 09:14

## 2022-01-05 RX ADMIN — POTASSIUM CHLORIDE 10 MEQ: 10 INJECTION, SOLUTION INTRAVENOUS at 16:50

## 2022-01-05 RX ADMIN — FENTANYL CITRATE 100 MCG: 50 INJECTION INTRAMUSCULAR; INTRAVENOUS at 13:20

## 2022-01-05 RX ADMIN — CALCIUM GLUCONATE: 98 INJECTION, SOLUTION INTRAVENOUS at 18:12

## 2022-01-05 RX ADMIN — POTASSIUM CHLORIDE 10 MEQ: 10 INJECTION, SOLUTION INTRAVENOUS at 12:00

## 2022-01-05 RX ADMIN — FENTANYL CITRATE 50 MCG: 50 INJECTION INTRAMUSCULAR; INTRAVENOUS at 04:54

## 2022-01-05 RX ADMIN — DEXMEDETOMIDINE HYDROCHLORIDE 0.7 MCG/KG/HR: 100 INJECTION, SOLUTION, CONCENTRATE INTRAVENOUS at 05:39

## 2022-01-05 RX ADMIN — PROPOFOL 15 MCG/KG/MIN: 10 INJECTION, EMULSION INTRAVENOUS at 15:45

## 2022-01-05 RX ADMIN — INSULIN HUMAN 2 UNITS: 100 INJECTION, SOLUTION PARENTERAL at 12:18

## 2022-01-06 ENCOUNTER — APPOINTMENT (OUTPATIENT)
Dept: GENERAL RADIOLOGY | Facility: HOSPITAL | Age: 62
End: 2022-01-06

## 2022-01-06 LAB
ALBUMIN SERPL-MCNC: 1.9 G/DL (ref 3.5–5.2)
ALBUMIN/GLOB SERPL: 0.4 G/DL
ALP SERPL-CCNC: 103 U/L (ref 39–117)
ALT SERPL W P-5'-P-CCNC: 22 U/L (ref 1–41)
ANION GAP SERPL CALCULATED.3IONS-SCNC: 11 MMOL/L (ref 5–15)
AST SERPL-CCNC: 47 U/L (ref 1–40)
BACTERIA FLD CULT: ABNORMAL
BACTERIA SPEC AEROBE CULT: NORMAL
BACTERIA SPEC AEROBE CULT: NORMAL
BILIRUB SERPL-MCNC: 0.6 MG/DL (ref 0–1.2)
BUN SERPL-MCNC: 22 MG/DL (ref 8–23)
BUN/CREAT SERPL: 21 (ref 7–25)
CALCIUM SPEC-SCNC: 7.9 MG/DL (ref 8.6–10.5)
CHLORIDE SERPL-SCNC: 105 MMOL/L (ref 98–107)
CO2 SERPL-SCNC: 31 MMOL/L (ref 22–29)
CREAT SERPL-MCNC: 1.05 MG/DL (ref 0.76–1.27)
DEPRECATED RDW RBC AUTO: 44.7 FL (ref 37–54)
ERYTHROCYTE [DISTWIDTH] IN BLOOD BY AUTOMATED COUNT: 14.7 % (ref 12.3–15.4)
GFR SERPL CREATININE-BSD FRML MDRD: 72 ML/MIN/1.73
GLOBULIN UR ELPH-MCNC: 4.5 GM/DL
GLUCOSE BLDC GLUCOMTR-MCNC: 107 MG/DL (ref 70–130)
GLUCOSE BLDC GLUCOMTR-MCNC: 129 MG/DL (ref 70–130)
GLUCOSE BLDC GLUCOMTR-MCNC: 160 MG/DL (ref 70–130)
GLUCOSE SERPL-MCNC: 144 MG/DL (ref 65–99)
GRAM STN SPEC: ABNORMAL
HCT VFR BLD AUTO: 26.7 % (ref 37.5–51)
HGB BLD-MCNC: 8.8 G/DL (ref 13–17.7)
MAGNESIUM SERPL-MCNC: 2.1 MG/DL (ref 1.6–2.4)
MCH RBC QN AUTO: 28.1 PG (ref 26.6–33)
MCHC RBC AUTO-ENTMCNC: 33 G/DL (ref 31.5–35.7)
MCV RBC AUTO: 85.3 FL (ref 79–97)
PHOSPHATE SERPL-MCNC: 2.8 MG/DL (ref 2.5–4.5)
PLATELET # BLD AUTO: 117 10*3/MM3 (ref 140–450)
PMV BLD AUTO: 12.3 FL (ref 6–12)
POTASSIUM SERPL-SCNC: 2.7 MMOL/L (ref 3.5–5.2)
POTASSIUM SERPL-SCNC: 3.3 MMOL/L (ref 3.5–5.2)
PROT SERPL-MCNC: 6.4 G/DL (ref 6–8.5)
RBC # BLD AUTO: 3.13 10*6/MM3 (ref 4.14–5.8)
SODIUM SERPL-SCNC: 147 MMOL/L (ref 136–145)
URATE SERPL-MCNC: 1.9 MG/DL (ref 3.4–7)
WBC NRBC COR # BLD: 9.47 10*3/MM3 (ref 3.4–10.8)

## 2022-01-06 PROCEDURE — 94003 VENT MGMT INPAT SUBQ DAY: CPT

## 2022-01-06 PROCEDURE — 82962 GLUCOSE BLOOD TEST: CPT

## 2022-01-06 PROCEDURE — 94799 UNLISTED PULMONARY SVC/PX: CPT

## 2022-01-06 PROCEDURE — 63710000001 INSULIN REGULAR HUMAN PER 5 UNITS: Performed by: INTERNAL MEDICINE

## 2022-01-06 PROCEDURE — 84550 ASSAY OF BLOOD/URIC ACID: CPT | Performed by: INTERNAL MEDICINE

## 2022-01-06 PROCEDURE — 97110 THERAPEUTIC EXERCISES: CPT

## 2022-01-06 PROCEDURE — 94761 N-INVAS EAR/PLS OXIMETRY MLT: CPT

## 2022-01-06 PROCEDURE — 0 POTASSIUM CHLORIDE PER 2 MEQ: Performed by: INTERNAL MEDICINE

## 2022-01-06 PROCEDURE — 0 POTASSIUM CHLORIDE 10 MEQ/100ML SOLUTION: Performed by: INTERNAL MEDICINE

## 2022-01-06 PROCEDURE — 25010000002 PIPERACILLIN SOD-TAZOBACTAM PER 1 G: Performed by: SURGERY

## 2022-01-06 PROCEDURE — 80053 COMPREHEN METABOLIC PANEL: CPT | Performed by: SURGERY

## 2022-01-06 PROCEDURE — 25010000002 CALCIUM GLUCONATE PER 10 ML: Performed by: SURGERY

## 2022-01-06 PROCEDURE — 25010000002 ENOXAPARIN PER 10 MG: Performed by: SURGERY

## 2022-01-06 PROCEDURE — 71045 X-RAY EXAM CHEST 1 VIEW: CPT

## 2022-01-06 PROCEDURE — 99233 SBSQ HOSP IP/OBS HIGH 50: CPT | Performed by: INTERNAL MEDICINE

## 2022-01-06 PROCEDURE — 25010000002 PROPOFOL 10 MG/ML EMULSION: Performed by: SURGERY

## 2022-01-06 PROCEDURE — 25010000002 MAGNESIUM SULFATE PER 500 MG OF MAGNESIUM: Performed by: SURGERY

## 2022-01-06 PROCEDURE — 99232 SBSQ HOSP IP/OBS MODERATE 35: CPT | Performed by: INTERNAL MEDICINE

## 2022-01-06 PROCEDURE — 97162 PT EVAL MOD COMPLEX 30 MIN: CPT

## 2022-01-06 PROCEDURE — 85027 COMPLETE CBC AUTOMATED: CPT | Performed by: INTERNAL MEDICINE

## 2022-01-06 PROCEDURE — 84100 ASSAY OF PHOSPHORUS: CPT | Performed by: SURGERY

## 2022-01-06 PROCEDURE — 99024 POSTOP FOLLOW-UP VISIT: CPT | Performed by: SURGERY

## 2022-01-06 PROCEDURE — 84132 ASSAY OF SERUM POTASSIUM: CPT | Performed by: INTERNAL MEDICINE

## 2022-01-06 PROCEDURE — 25010000002 HYDROMORPHONE 1 MG/ML SOLUTION: Performed by: INTERNAL MEDICINE

## 2022-01-06 PROCEDURE — 83735 ASSAY OF MAGNESIUM: CPT | Performed by: SURGERY

## 2022-01-06 RX ORDER — POTASSIUM CHLORIDE 1.5 G/1.77G
40 POWDER, FOR SOLUTION ORAL AS NEEDED
Status: DISCONTINUED | OUTPATIENT
Start: 2022-01-06 | End: 2022-02-01

## 2022-01-06 RX ORDER — POTASSIUM CHLORIDE 29.8 MG/ML
20 INJECTION INTRAVENOUS
Status: DISCONTINUED | OUTPATIENT
Start: 2022-01-06 | End: 2022-02-01

## 2022-01-06 RX ORDER — SODIUM HYPOCHLORITE 1.25 MG/ML
SOLUTION TOPICAL 4 TIMES DAILY
Status: DISCONTINUED | OUTPATIENT
Start: 2022-01-06 | End: 2022-01-08

## 2022-01-06 RX ORDER — POTASSIUM CHLORIDE 7.45 MG/ML
10 INJECTION INTRAVENOUS
Status: DISCONTINUED | OUTPATIENT
Start: 2022-01-06 | End: 2022-02-01

## 2022-01-06 RX ORDER — POTASSIUM CHLORIDE 750 MG/1
40 TABLET, FILM COATED, EXTENDED RELEASE ORAL AS NEEDED
Status: DISCONTINUED | OUTPATIENT
Start: 2022-01-06 | End: 2022-02-01

## 2022-01-06 RX ADMIN — TAZOBACTAM SODIUM AND PIPERACILLIN SODIUM 3.38 G: 375; 3 INJECTION, SOLUTION INTRAVENOUS at 16:59

## 2022-01-06 RX ADMIN — SODIUM HYPOCHLORITE: 1.25 SOLUTION TOPICAL at 21:00

## 2022-01-06 RX ADMIN — PROPOFOL 25 MCG/KG/MIN: 10 INJECTION, EMULSION INTRAVENOUS at 20:28

## 2022-01-06 RX ADMIN — SODIUM CHLORIDE, PRESERVATIVE FREE 10 ML: 5 INJECTION INTRAVENOUS at 20:47

## 2022-01-06 RX ADMIN — TAZOBACTAM SODIUM AND PIPERACILLIN SODIUM 3.38 G: 375; 3 INJECTION, SOLUTION INTRAVENOUS at 09:14

## 2022-01-06 RX ADMIN — CALCIUM GLUCONATE: 98 INJECTION, SOLUTION INTRAVENOUS at 18:08

## 2022-01-06 RX ADMIN — POTASSIUM CHLORIDE 20 MEQ: 400 INJECTION, SOLUTION INTRAVENOUS at 12:00

## 2022-01-06 RX ADMIN — HYDROMORPHONE HYDROCHLORIDE 1 MG: 1 INJECTION, SOLUTION INTRAMUSCULAR; INTRAVENOUS; SUBCUTANEOUS at 12:14

## 2022-01-06 RX ADMIN — SODIUM CHLORIDE, PRESERVATIVE FREE 10 ML: 5 INJECTION INTRAVENOUS at 10:32

## 2022-01-06 RX ADMIN — SODIUM CHLORIDE, PRESERVATIVE FREE 10 ML: 5 INJECTION INTRAVENOUS at 10:33

## 2022-01-06 RX ADMIN — POTASSIUM CHLORIDE 10 MEQ: 10 INJECTION, SOLUTION INTRAVENOUS at 10:32

## 2022-01-06 RX ADMIN — INSULIN GLARGINE-YFGN 15 UNITS: 100 INJECTION, SOLUTION SUBCUTANEOUS at 09:14

## 2022-01-06 RX ADMIN — FAMOTIDINE 20 MG: 10 INJECTION INTRAVENOUS at 09:14

## 2022-01-06 RX ADMIN — PROPOFOL 25 MCG/KG/MIN: 10 INJECTION, EMULSION INTRAVENOUS at 07:25

## 2022-01-06 RX ADMIN — BUMETANIDE 2 MG: 0.25 INJECTION INTRAMUSCULAR; INTRAVENOUS at 02:34

## 2022-01-06 RX ADMIN — INSULIN HUMAN 2 UNITS: 100 INJECTION, SOLUTION PARENTERAL at 12:00

## 2022-01-06 RX ADMIN — POTASSIUM CHLORIDE 10 MEQ: 10 INJECTION, SOLUTION INTRAVENOUS at 06:53

## 2022-01-06 RX ADMIN — ENOXAPARIN SODIUM 40 MG: 40 INJECTION SUBCUTANEOUS at 20:46

## 2022-01-06 RX ADMIN — HYDROMORPHONE HYDROCHLORIDE 1 MG: 1 INJECTION, SOLUTION INTRAMUSCULAR; INTRAVENOUS; SUBCUTANEOUS at 18:52

## 2022-01-06 RX ADMIN — POTASSIUM CHLORIDE 20 MEQ: 400 INJECTION, SOLUTION INTRAVENOUS at 23:09

## 2022-01-06 RX ADMIN — FAMOTIDINE 20 MG: 10 INJECTION INTRAVENOUS at 20:46

## 2022-01-06 RX ADMIN — SODIUM CHLORIDE, PRESERVATIVE FREE 10 ML: 5 INJECTION INTRAVENOUS at 20:46

## 2022-01-06 RX ADMIN — PROPOFOL 25 MCG/KG/MIN: 10 INJECTION, EMULSION INTRAVENOUS at 13:56

## 2022-01-06 RX ADMIN — INSULIN GLARGINE-YFGN 15 UNITS: 100 INJECTION, SOLUTION SUBCUTANEOUS at 20:47

## 2022-01-06 RX ADMIN — POTASSIUM CHLORIDE 20 MEQ: 400 INJECTION, SOLUTION INTRAVENOUS at 13:56

## 2022-01-07 ENCOUNTER — APPOINTMENT (OUTPATIENT)
Dept: CT IMAGING | Facility: HOSPITAL | Age: 62
End: 2022-01-07

## 2022-01-07 ENCOUNTER — APPOINTMENT (OUTPATIENT)
Dept: GENERAL RADIOLOGY | Facility: HOSPITAL | Age: 62
End: 2022-01-07

## 2022-01-07 LAB
ALBUMIN SERPL-MCNC: 1.8 G/DL (ref 3.5–5.2)
ALBUMIN/GLOB SERPL: 0.4 G/DL
ALP SERPL-CCNC: 87 U/L (ref 39–117)
ALT SERPL W P-5'-P-CCNC: 21 U/L (ref 1–41)
ANION GAP SERPL CALCULATED.3IONS-SCNC: 6.1 MMOL/L (ref 5–15)
AST SERPL-CCNC: 60 U/L (ref 1–40)
BILIRUB SERPL-MCNC: 0.6 MG/DL (ref 0–1.2)
BUN SERPL-MCNC: 22 MG/DL (ref 8–23)
BUN/CREAT SERPL: 28.2 (ref 7–25)
CALCIUM SPEC-SCNC: 7.9 MG/DL (ref 8.6–10.5)
CHLORIDE SERPL-SCNC: 109 MMOL/L (ref 98–107)
CO2 SERPL-SCNC: 30.9 MMOL/L (ref 22–29)
CREAT SERPL-MCNC: 0.78 MG/DL (ref 0.76–1.27)
DEPRECATED RDW RBC AUTO: 44.2 FL (ref 37–54)
ERYTHROCYTE [DISTWIDTH] IN BLOOD BY AUTOMATED COUNT: 14.3 % (ref 12.3–15.4)
GFR SERPL CREATININE-BSD FRML MDRD: 101 ML/MIN/1.73
GLOBULIN UR ELPH-MCNC: 4.2 GM/DL
GLUCOSE BLDC GLUCOMTR-MCNC: 115 MG/DL (ref 70–130)
GLUCOSE BLDC GLUCOMTR-MCNC: 125 MG/DL (ref 70–130)
GLUCOSE BLDC GLUCOMTR-MCNC: 135 MG/DL (ref 70–130)
GLUCOSE SERPL-MCNC: 144 MG/DL (ref 65–99)
HCT VFR BLD AUTO: 25.7 % (ref 37.5–51)
HGB BLD-MCNC: 8.5 G/DL (ref 13–17.7)
MAGNESIUM SERPL-MCNC: 2.2 MG/DL (ref 1.6–2.4)
MCH RBC QN AUTO: 28.1 PG (ref 26.6–33)
MCHC RBC AUTO-ENTMCNC: 33.1 G/DL (ref 31.5–35.7)
MCV RBC AUTO: 84.8 FL (ref 79–97)
PHOSPHATE SERPL-MCNC: 2.8 MG/DL (ref 2.5–4.5)
PLATELET # BLD AUTO: 112 10*3/MM3 (ref 140–450)
PMV BLD AUTO: 12.6 FL (ref 6–12)
POTASSIUM SERPL-SCNC: 3.4 MMOL/L (ref 3.5–5.2)
POTASSIUM SERPL-SCNC: 3.5 MMOL/L (ref 3.5–5.2)
PROT SERPL-MCNC: 6 G/DL (ref 6–8.5)
RBC # BLD AUTO: 3.03 10*6/MM3 (ref 4.14–5.8)
SODIUM SERPL-SCNC: 146 MMOL/L (ref 136–145)
URATE SERPL-MCNC: 1.5 MG/DL (ref 3.4–7)
WBC NRBC COR # BLD: 7.51 10*3/MM3 (ref 3.4–10.8)

## 2022-01-07 PROCEDURE — 99232 SBSQ HOSP IP/OBS MODERATE 35: CPT | Performed by: INTERNAL MEDICINE

## 2022-01-07 PROCEDURE — 82962 GLUCOSE BLOOD TEST: CPT

## 2022-01-07 PROCEDURE — 99024 POSTOP FOLLOW-UP VISIT: CPT | Performed by: SURGERY

## 2022-01-07 PROCEDURE — 94761 N-INVAS EAR/PLS OXIMETRY MLT: CPT

## 2022-01-07 PROCEDURE — 25010000002 PIPERACILLIN SOD-TAZOBACTAM PER 1 G: Performed by: INTERNAL MEDICINE

## 2022-01-07 PROCEDURE — 80053 COMPREHEN METABOLIC PANEL: CPT | Performed by: SURGERY

## 2022-01-07 PROCEDURE — 94003 VENT MGMT INPAT SUBQ DAY: CPT

## 2022-01-07 PROCEDURE — 84100 ASSAY OF PHOSPHORUS: CPT | Performed by: SURGERY

## 2022-01-07 PROCEDURE — 71045 X-RAY EXAM CHEST 1 VIEW: CPT

## 2022-01-07 PROCEDURE — 25010000002 FENTANYL CITRATE (PF) 50 MCG/ML SOLUTION: Performed by: INTERNAL MEDICINE

## 2022-01-07 PROCEDURE — 25010000002 MAGNESIUM SULFATE PER 500 MG OF MAGNESIUM: Performed by: SURGERY

## 2022-01-07 PROCEDURE — 94799 UNLISTED PULMONARY SVC/PX: CPT

## 2022-01-07 PROCEDURE — 83735 ASSAY OF MAGNESIUM: CPT | Performed by: SURGERY

## 2022-01-07 PROCEDURE — 74176 CT ABD & PELVIS W/O CONTRAST: CPT

## 2022-01-07 PROCEDURE — 85027 COMPLETE CBC AUTOMATED: CPT | Performed by: INTERNAL MEDICINE

## 2022-01-07 PROCEDURE — 25010000002 FLUCONAZOLE PER 200 MG: Performed by: INTERNAL MEDICINE

## 2022-01-07 PROCEDURE — 25010000002 PROPOFOL 10 MG/ML EMULSION: Performed by: SURGERY

## 2022-01-07 PROCEDURE — 25010000002 PIPERACILLIN SOD-TAZOBACTAM PER 1 G: Performed by: SURGERY

## 2022-01-07 PROCEDURE — 0 POTASSIUM CHLORIDE PER 2 MEQ: Performed by: INTERNAL MEDICINE

## 2022-01-07 PROCEDURE — 25010000002 CALCIUM GLUCONATE PER 10 ML: Performed by: SURGERY

## 2022-01-07 PROCEDURE — 25010000002 PROPOFOL 10 MG/ML EMULSION: Performed by: INTERNAL MEDICINE

## 2022-01-07 PROCEDURE — 84550 ASSAY OF BLOOD/URIC ACID: CPT | Performed by: INTERNAL MEDICINE

## 2022-01-07 PROCEDURE — 25010000002 ENOXAPARIN PER 10 MG: Performed by: SURGERY

## 2022-01-07 PROCEDURE — 84132 ASSAY OF SERUM POTASSIUM: CPT | Performed by: INTERNAL MEDICINE

## 2022-01-07 PROCEDURE — 25010000002 HYDROMORPHONE 1 MG/ML SOLUTION: Performed by: INTERNAL MEDICINE

## 2022-01-07 RX ORDER — MIDODRINE HYDROCHLORIDE 5 MG/1
10 TABLET ORAL
Status: DISCONTINUED | OUTPATIENT
Start: 2022-01-07 | End: 2022-01-13

## 2022-01-07 RX ORDER — FLUCONAZOLE 2 MG/ML
400 INJECTION, SOLUTION INTRAVENOUS DAILY
Status: COMPLETED | OUTPATIENT
Start: 2022-01-07 | End: 2022-01-11

## 2022-01-07 RX ADMIN — COLLAGENASE SANTYL 1 APPLICATION: 250 OINTMENT TOPICAL at 11:10

## 2022-01-07 RX ADMIN — ENOXAPARIN SODIUM 40 MG: 40 INJECTION SUBCUTANEOUS at 18:39

## 2022-01-07 RX ADMIN — HYDROMORPHONE HYDROCHLORIDE 1 MG: 1 INJECTION, SOLUTION INTRAMUSCULAR; INTRAVENOUS; SUBCUTANEOUS at 18:40

## 2022-01-07 RX ADMIN — INSULIN GLARGINE-YFGN 15 UNITS: 100 INJECTION, SOLUTION SUBCUTANEOUS at 08:54

## 2022-01-07 RX ADMIN — POTASSIUM CHLORIDE 40 MEQ: 1.5 POWDER, FOR SOLUTION ORAL at 21:46

## 2022-01-07 RX ADMIN — FENTANYL CITRATE 100 MCG: 50 INJECTION INTRAMUSCULAR; INTRAVENOUS at 16:39

## 2022-01-07 RX ADMIN — POTASSIUM CHLORIDE 20 MEQ: 400 INJECTION, SOLUTION INTRAVENOUS at 00:19

## 2022-01-07 RX ADMIN — FAMOTIDINE 20 MG: 10 INJECTION INTRAVENOUS at 08:50

## 2022-01-07 RX ADMIN — INSULIN GLARGINE-YFGN 15 UNITS: 100 INJECTION, SOLUTION SUBCUTANEOUS at 21:46

## 2022-01-07 RX ADMIN — MIDODRINE HYDROCHLORIDE 10 MG: 5 TABLET ORAL at 12:54

## 2022-01-07 RX ADMIN — HYDROMORPHONE HYDROCHLORIDE 1 MG: 1 INJECTION, SOLUTION INTRAMUSCULAR; INTRAVENOUS; SUBCUTANEOUS at 01:00

## 2022-01-07 RX ADMIN — HYDROMORPHONE HYDROCHLORIDE 1 MG: 1 INJECTION, SOLUTION INTRAMUSCULAR; INTRAVENOUS; SUBCUTANEOUS at 14:22

## 2022-01-07 RX ADMIN — HYDROMORPHONE HYDROCHLORIDE 1 MG: 1 INJECTION, SOLUTION INTRAMUSCULAR; INTRAVENOUS; SUBCUTANEOUS at 08:51

## 2022-01-07 RX ADMIN — HYDROMORPHONE HYDROCHLORIDE 1 MG: 1 INJECTION, SOLUTION INTRAMUSCULAR; INTRAVENOUS; SUBCUTANEOUS at 23:36

## 2022-01-07 RX ADMIN — SODIUM CHLORIDE, PRESERVATIVE FREE 10 ML: 5 INJECTION INTRAVENOUS at 21:47

## 2022-01-07 RX ADMIN — MIDODRINE HYDROCHLORIDE 10 MG: 5 TABLET ORAL at 18:38

## 2022-01-07 RX ADMIN — PROPOFOL 25 MCG/KG/MIN: 10 INJECTION, EMULSION INTRAVENOUS at 21:46

## 2022-01-07 RX ADMIN — TAZOBACTAM SODIUM AND PIPERACILLIN SODIUM 3.38 G: 375; 3 INJECTION, SOLUTION INTRAVENOUS at 18:39

## 2022-01-07 RX ADMIN — FAMOTIDINE: 10 INJECTION, SOLUTION INTRAVENOUS at 21:47

## 2022-01-07 RX ADMIN — SODIUM CHLORIDE, PRESERVATIVE FREE 10 ML: 5 INJECTION INTRAVENOUS at 21:51

## 2022-01-07 RX ADMIN — SODIUM HYPOCHLORITE: 1.25 SOLUTION TOPICAL at 09:09

## 2022-01-07 RX ADMIN — PROPOFOL 20 MCG/KG/MIN: 10 INJECTION, EMULSION INTRAVENOUS at 11:46

## 2022-01-07 RX ADMIN — PROPOFOL 30 MCG/KG/MIN: 10 INJECTION, EMULSION INTRAVENOUS at 00:55

## 2022-01-07 RX ADMIN — SODIUM CHLORIDE, PRESERVATIVE FREE 10 ML: 5 INJECTION INTRAVENOUS at 08:50

## 2022-01-07 RX ADMIN — SODIUM HYPOCHLORITE: 1.25 SOLUTION TOPICAL at 11:43

## 2022-01-07 RX ADMIN — FENTANYL CITRATE 100 MCG: 50 INJECTION INTRAMUSCULAR; INTRAVENOUS at 11:09

## 2022-01-07 RX ADMIN — TAZOBACTAM SODIUM AND PIPERACILLIN SODIUM 3.38 G: 375; 3 INJECTION, SOLUTION INTRAVENOUS at 08:51

## 2022-01-07 RX ADMIN — TAZOBACTAM SODIUM AND PIPERACILLIN SODIUM 3.38 G: 375; 3 INJECTION, SOLUTION INTRAVENOUS at 01:01

## 2022-01-07 RX ADMIN — FLUCONAZOLE IN SODIUM CHLORIDE 400 MG: 2 INJECTION, SOLUTION INTRAVENOUS at 13:29

## 2022-01-07 RX ADMIN — POTASSIUM CHLORIDE 20 MEQ: 400 INJECTION, SOLUTION INTRAVENOUS at 06:43

## 2022-01-07 RX ADMIN — SODIUM CHLORIDE, PRESERVATIVE FREE 10 ML: 5 INJECTION INTRAVENOUS at 10:43

## 2022-01-07 RX ADMIN — ACETAMINOPHEN 650 MG: 325 TABLET, FILM COATED ORAL at 18:38

## 2022-01-08 ENCOUNTER — APPOINTMENT (OUTPATIENT)
Dept: GENERAL RADIOLOGY | Facility: HOSPITAL | Age: 62
End: 2022-01-08

## 2022-01-08 LAB
ALBUMIN SERPL-MCNC: 2 G/DL (ref 3.5–5.2)
ALBUMIN/GLOB SERPL: 0.5 G/DL
ALP SERPL-CCNC: 85 U/L (ref 39–117)
ALT SERPL W P-5'-P-CCNC: 23 U/L (ref 1–41)
ANION GAP SERPL CALCULATED.3IONS-SCNC: 8.1 MMOL/L (ref 5–15)
AST SERPL-CCNC: 66 U/L (ref 1–40)
ATMOSPHERIC PRESS: 758.2 MMHG
BASE EXCESS BLDV CALC-SCNC: 4.4 MMOL/L (ref -2–2)
BDY SITE: ABNORMAL
BILIRUB SERPL-MCNC: 0.6 MG/DL (ref 0–1.2)
BUN SERPL-MCNC: 25 MG/DL (ref 8–23)
BUN/CREAT SERPL: 35.2 (ref 7–25)
CALCIUM SPEC-SCNC: 7.9 MG/DL (ref 8.6–10.5)
CHLORIDE SERPL-SCNC: 109 MMOL/L (ref 98–107)
CO2 SERPL-SCNC: 27.9 MMOL/L (ref 22–29)
CREAT SERPL-MCNC: 0.71 MG/DL (ref 0.76–1.27)
DEPRECATED RDW RBC AUTO: 46.8 FL (ref 37–54)
ERYTHROCYTE [DISTWIDTH] IN BLOOD BY AUTOMATED COUNT: 14.7 % (ref 12.3–15.4)
GFR SERPL CREATININE-BSD FRML MDRD: 112 ML/MIN/1.73
GLOBULIN UR ELPH-MCNC: 4.2 GM/DL
GLUCOSE BLDC GLUCOMTR-MCNC: 102 MG/DL (ref 70–130)
GLUCOSE BLDC GLUCOMTR-MCNC: 122 MG/DL (ref 70–130)
GLUCOSE BLDC GLUCOMTR-MCNC: 135 MG/DL (ref 70–130)
GLUCOSE SERPL-MCNC: 130 MG/DL (ref 65–99)
HCO3 BLDV-SCNC: 26.9 MMOL/L (ref 22–28)
HCT VFR BLD AUTO: 25.4 % (ref 37.5–51)
HGB BLD-MCNC: 8.1 G/DL (ref 13–17.7)
INHALED O2 CONCENTRATION: 21 %
MAGNESIUM SERPL-MCNC: 2.2 MG/DL (ref 1.6–2.4)
MCH RBC QN AUTO: 27.6 PG (ref 26.6–33)
MCHC RBC AUTO-ENTMCNC: 31.9 G/DL (ref 31.5–35.7)
MCV RBC AUTO: 86.7 FL (ref 79–97)
MODALITY: ABNORMAL
PCO2 BLDV: 31.4 MM HG (ref 41–51)
PEEP RESPIRATORY: 5 CM[H2O]
PH BLDV: 7.54 PH UNITS (ref 7.31–7.41)
PHOSPHATE SERPL-MCNC: 2.7 MG/DL (ref 2.5–4.5)
PLATELET # BLD AUTO: 144 10*3/MM3 (ref 140–450)
PMV BLD AUTO: 12.9 FL (ref 6–12)
PO2 BLDV: 40.6 MM HG (ref 35–45)
POTASSIUM SERPL-SCNC: 3.7 MMOL/L (ref 3.5–5.2)
PROT SERPL-MCNC: 6.2 G/DL (ref 6–8.5)
PSV: 8 CMH2O
RBC # BLD AUTO: 2.93 10*6/MM3 (ref 4.14–5.8)
SAO2 % BLDCOA: 82.7 % (ref 92–99)
SODIUM SERPL-SCNC: 145 MMOL/L (ref 136–145)
TOTAL RATE: 26 BREATHS/MINUTE
TRIGL SERPL-MCNC: 150 MG/DL (ref 0–150)
URATE SERPL-MCNC: 1.3 MG/DL (ref 3.4–7)
VENTILATOR MODE: ABNORMAL
VT ON VENT VENT: 531 ML
WBC NRBC COR # BLD: 7.12 10*3/MM3 (ref 3.4–10.8)

## 2022-01-08 PROCEDURE — 84550 ASSAY OF BLOOD/URIC ACID: CPT | Performed by: INTERNAL MEDICINE

## 2022-01-08 PROCEDURE — 94799 UNLISTED PULMONARY SVC/PX: CPT

## 2022-01-08 PROCEDURE — 99024 POSTOP FOLLOW-UP VISIT: CPT | Performed by: SURGERY

## 2022-01-08 PROCEDURE — 25010000002 CALCIUM GLUCONATE PER 10 ML: Performed by: SURGERY

## 2022-01-08 PROCEDURE — 94761 N-INVAS EAR/PLS OXIMETRY MLT: CPT

## 2022-01-08 PROCEDURE — 84100 ASSAY OF PHOSPHORUS: CPT | Performed by: SURGERY

## 2022-01-08 PROCEDURE — 94003 VENT MGMT INPAT SUBQ DAY: CPT

## 2022-01-08 PROCEDURE — 25010000002 PIPERACILLIN SOD-TAZOBACTAM PER 1 G: Performed by: INTERNAL MEDICINE

## 2022-01-08 PROCEDURE — 84478 ASSAY OF TRIGLYCERIDES: CPT | Performed by: SURGERY

## 2022-01-08 PROCEDURE — 25010000002 FLUCONAZOLE PER 200 MG: Performed by: INTERNAL MEDICINE

## 2022-01-08 PROCEDURE — 85027 COMPLETE CBC AUTOMATED: CPT | Performed by: INTERNAL MEDICINE

## 2022-01-08 PROCEDURE — 83735 ASSAY OF MAGNESIUM: CPT | Performed by: SURGERY

## 2022-01-08 PROCEDURE — 82803 BLOOD GASES ANY COMBINATION: CPT

## 2022-01-08 PROCEDURE — 92610 EVALUATE SWALLOWING FUNCTION: CPT

## 2022-01-08 PROCEDURE — 25010000002 PROPOFOL 10 MG/ML EMULSION: Performed by: INTERNAL MEDICINE

## 2022-01-08 PROCEDURE — 25010000002 FENTANYL CITRATE (PF) 50 MCG/ML SOLUTION: Performed by: INTERNAL MEDICINE

## 2022-01-08 PROCEDURE — 71045 X-RAY EXAM CHEST 1 VIEW: CPT

## 2022-01-08 PROCEDURE — 25010000002 MAGNESIUM SULFATE PER 500 MG OF MAGNESIUM: Performed by: SURGERY

## 2022-01-08 PROCEDURE — 80053 COMPREHEN METABOLIC PANEL: CPT | Performed by: SURGERY

## 2022-01-08 PROCEDURE — 82962 GLUCOSE BLOOD TEST: CPT

## 2022-01-08 PROCEDURE — 25010000002 ENOXAPARIN PER 10 MG: Performed by: SURGERY

## 2022-01-08 PROCEDURE — 99232 SBSQ HOSP IP/OBS MODERATE 35: CPT | Performed by: INTERNAL MEDICINE

## 2022-01-08 PROCEDURE — 25010000002 HYDROMORPHONE 1 MG/ML SOLUTION: Performed by: INTERNAL MEDICINE

## 2022-01-08 RX ORDER — HYDROCODONE BITARTRATE AND ACETAMINOPHEN 10; 325 MG/1; MG/1
1 TABLET ORAL EVERY 4 HOURS PRN
Status: DISPENSED | OUTPATIENT
Start: 2022-01-08 | End: 2022-01-21

## 2022-01-08 RX ORDER — SODIUM HYPOCHLORITE 1.25 MG/ML
SOLUTION TOPICAL AS NEEDED
Status: DISCONTINUED | OUTPATIENT
Start: 2022-01-08 | End: 2022-02-03 | Stop reason: HOSPADM

## 2022-01-08 RX ORDER — CARVEDILOL 12.5 MG/1
12.5 TABLET ORAL
Status: DISCONTINUED | OUTPATIENT
Start: 2022-01-08 | End: 2022-01-23

## 2022-01-08 RX ORDER — CARVEDILOL 6.25 MG/1
6.25 TABLET ORAL
Status: DISCONTINUED | OUTPATIENT
Start: 2022-01-08 | End: 2022-01-08

## 2022-01-08 RX ORDER — FENTANYL CITRATE 50 UG/ML
50 INJECTION, SOLUTION INTRAMUSCULAR; INTRAVENOUS EVERY 4 HOURS PRN
Status: DISCONTINUED | OUTPATIENT
Start: 2022-01-08 | End: 2022-01-11

## 2022-01-08 RX ADMIN — ACETAMINOPHEN 650 MG: 325 TABLET, FILM COATED ORAL at 07:17

## 2022-01-08 RX ADMIN — INSULIN GLARGINE-YFGN 15 UNITS: 100 INJECTION, SOLUTION SUBCUTANEOUS at 09:09

## 2022-01-08 RX ADMIN — SODIUM CHLORIDE, PRESERVATIVE FREE 10 ML: 5 INJECTION INTRAVENOUS at 09:44

## 2022-01-08 RX ADMIN — POTASSIUM CHLORIDE 40 MEQ: 1.5 POWDER, FOR SOLUTION ORAL at 02:10

## 2022-01-08 RX ADMIN — HYDROMORPHONE HYDROCHLORIDE 1 MG: 1 INJECTION, SOLUTION INTRAMUSCULAR; INTRAVENOUS; SUBCUTANEOUS at 12:12

## 2022-01-08 RX ADMIN — HYDROMORPHONE HYDROCHLORIDE 1 MG: 1 INJECTION, SOLUTION INTRAMUSCULAR; INTRAVENOUS; SUBCUTANEOUS at 18:56

## 2022-01-08 RX ADMIN — TAZOBACTAM SODIUM AND PIPERACILLIN SODIUM 3.38 G: 375; 3 INJECTION, SOLUTION INTRAVENOUS at 16:32

## 2022-01-08 RX ADMIN — TAZOBACTAM SODIUM AND PIPERACILLIN SODIUM 3.38 G: 375; 3 INJECTION, SOLUTION INTRAVENOUS at 00:15

## 2022-01-08 RX ADMIN — ACETAMINOPHEN 650 MG: 325 TABLET, FILM COATED ORAL at 00:40

## 2022-01-08 RX ADMIN — FENTANYL CITRATE 50 MCG: 50 INJECTION INTRAMUSCULAR; INTRAVENOUS at 21:48

## 2022-01-08 RX ADMIN — SODIUM CHLORIDE, PRESERVATIVE FREE 10 ML: 5 INJECTION INTRAVENOUS at 20:38

## 2022-01-08 RX ADMIN — MIDODRINE HYDROCHLORIDE 10 MG: 5 TABLET ORAL at 06:52

## 2022-01-08 RX ADMIN — INSULIN GLARGINE-YFGN 15 UNITS: 100 INJECTION, SOLUTION SUBCUTANEOUS at 20:39

## 2022-01-08 RX ADMIN — CARVEDILOL 12.5 MG: 12.5 TABLET, FILM COATED ORAL at 18:56

## 2022-01-08 RX ADMIN — FAMOTIDINE: 10 INJECTION, SOLUTION INTRAVENOUS at 20:38

## 2022-01-08 RX ADMIN — ACETAMINOPHEN 650 MG: 325 TABLET, FILM COATED ORAL at 20:38

## 2022-01-08 RX ADMIN — TAZOBACTAM SODIUM AND PIPERACILLIN SODIUM 3.38 G: 375; 3 INJECTION, SOLUTION INTRAVENOUS at 09:09

## 2022-01-08 RX ADMIN — ENOXAPARIN SODIUM 40 MG: 40 INJECTION SUBCUTANEOUS at 19:00

## 2022-01-08 RX ADMIN — PROPOFOL 20 MCG/KG/MIN: 10 INJECTION, EMULSION INTRAVENOUS at 02:31

## 2022-01-08 RX ADMIN — FLUCONAZOLE IN SODIUM CHLORIDE 400 MG: 2 INJECTION, SOLUTION INTRAVENOUS at 09:09

## 2022-01-08 RX ADMIN — MIDODRINE HYDROCHLORIDE 10 MG: 5 TABLET ORAL at 18:56

## 2022-01-08 RX ADMIN — HYDROMORPHONE HYDROCHLORIDE 1 MG: 1 INJECTION, SOLUTION INTRAMUSCULAR; INTRAVENOUS; SUBCUTANEOUS at 09:09

## 2022-01-09 ENCOUNTER — APPOINTMENT (OUTPATIENT)
Dept: GENERAL RADIOLOGY | Facility: HOSPITAL | Age: 62
End: 2022-01-09

## 2022-01-09 LAB
ALBUMIN SERPL-MCNC: 1.8 G/DL (ref 3.5–5.2)
ALBUMIN/GLOB SERPL: 0.4 G/DL
ALP SERPL-CCNC: 77 U/L (ref 39–117)
ALT SERPL W P-5'-P-CCNC: 19 U/L (ref 1–41)
ANION GAP SERPL CALCULATED.3IONS-SCNC: 8 MMOL/L (ref 5–15)
AST SERPL-CCNC: 41 U/L (ref 1–40)
BILIRUB SERPL-MCNC: 0.6 MG/DL (ref 0–1.2)
BUN SERPL-MCNC: 25 MG/DL (ref 8–23)
BUN/CREAT SERPL: 39.7 (ref 7–25)
CALCIUM SPEC-SCNC: 7.8 MG/DL (ref 8.6–10.5)
CHLORIDE SERPL-SCNC: 110 MMOL/L (ref 98–107)
CO2 SERPL-SCNC: 25 MMOL/L (ref 22–29)
CREAT SERPL-MCNC: 0.63 MG/DL (ref 0.76–1.27)
DEPRECATED RDW RBC AUTO: 46.8 FL (ref 37–54)
ERYTHROCYTE [DISTWIDTH] IN BLOOD BY AUTOMATED COUNT: 14.7 % (ref 12.3–15.4)
GFR SERPL CREATININE-BSD FRML MDRD: 129 ML/MIN/1.73
GLOBULIN UR ELPH-MCNC: 4.5 GM/DL
GLUCOSE BLDC GLUCOMTR-MCNC: 129 MG/DL (ref 70–130)
GLUCOSE BLDC GLUCOMTR-MCNC: 146 MG/DL (ref 70–130)
GLUCOSE BLDC GLUCOMTR-MCNC: 153 MG/DL (ref 70–130)
GLUCOSE SERPL-MCNC: 149 MG/DL (ref 65–99)
HCT VFR BLD AUTO: 24.4 % (ref 37.5–51)
HGB BLD-MCNC: 7.7 G/DL (ref 13–17.7)
MAGNESIUM SERPL-MCNC: 2.4 MG/DL (ref 1.6–2.4)
MCH RBC QN AUTO: 27.4 PG (ref 26.6–33)
MCHC RBC AUTO-ENTMCNC: 31.6 G/DL (ref 31.5–35.7)
MCV RBC AUTO: 86.8 FL (ref 79–97)
PHOSPHATE SERPL-MCNC: 2.2 MG/DL (ref 2.5–4.5)
PHOSPHATE SERPL-MCNC: 2.8 MG/DL (ref 2.5–4.5)
PLATELET # BLD AUTO: 175 10*3/MM3 (ref 140–450)
PMV BLD AUTO: 11.8 FL (ref 6–12)
POTASSIUM SERPL-SCNC: 3.5 MMOL/L (ref 3.5–5.2)
PROT SERPL-MCNC: 6.3 G/DL (ref 6–8.5)
RBC # BLD AUTO: 2.81 10*6/MM3 (ref 4.14–5.8)
SODIUM SERPL-SCNC: 143 MMOL/L (ref 136–145)
WBC NRBC COR # BLD: 8.57 10*3/MM3 (ref 3.4–10.8)

## 2022-01-09 PROCEDURE — 71045 X-RAY EXAM CHEST 1 VIEW: CPT

## 2022-01-09 PROCEDURE — 99024 POSTOP FOLLOW-UP VISIT: CPT | Performed by: SURGERY

## 2022-01-09 PROCEDURE — 99232 SBSQ HOSP IP/OBS MODERATE 35: CPT | Performed by: INTERNAL MEDICINE

## 2022-01-09 PROCEDURE — 25010000002 FLUCONAZOLE PER 200 MG: Performed by: INTERNAL MEDICINE

## 2022-01-09 PROCEDURE — 25010000002 CALCIUM GLUCONATE PER 10 ML: Performed by: SURGERY

## 2022-01-09 PROCEDURE — 84100 ASSAY OF PHOSPHORUS: CPT | Performed by: SURGERY

## 2022-01-09 PROCEDURE — 85027 COMPLETE CBC AUTOMATED: CPT | Performed by: INTERNAL MEDICINE

## 2022-01-09 PROCEDURE — 63710000001 INSULIN REGULAR HUMAN PER 5 UNITS: Performed by: INTERNAL MEDICINE

## 2022-01-09 PROCEDURE — 25010000002 ENOXAPARIN PER 10 MG: Performed by: SURGERY

## 2022-01-09 PROCEDURE — 80053 COMPREHEN METABOLIC PANEL: CPT | Performed by: SURGERY

## 2022-01-09 PROCEDURE — 25010000002 PIPERACILLIN SOD-TAZOBACTAM PER 1 G: Performed by: INTERNAL MEDICINE

## 2022-01-09 PROCEDURE — 83735 ASSAY OF MAGNESIUM: CPT | Performed by: SURGERY

## 2022-01-09 PROCEDURE — 25010000002 ONDANSETRON PER 1 MG: Performed by: SURGERY

## 2022-01-09 PROCEDURE — 25010000002 MAGNESIUM SULFATE PER 500 MG OF MAGNESIUM: Performed by: SURGERY

## 2022-01-09 PROCEDURE — 25010000002 HYDROMORPHONE 1 MG/ML SOLUTION: Performed by: INTERNAL MEDICINE

## 2022-01-09 PROCEDURE — 82962 GLUCOSE BLOOD TEST: CPT

## 2022-01-09 RX ORDER — METOLAZONE 5 MG/1
5 TABLET ORAL ONCE
Status: COMPLETED | OUTPATIENT
Start: 2022-01-09 | End: 2022-01-09

## 2022-01-09 RX ADMIN — SODIUM CHLORIDE, PRESERVATIVE FREE 10 ML: 5 INJECTION INTRAVENOUS at 08:37

## 2022-01-09 RX ADMIN — HYDROMORPHONE HYDROCHLORIDE 1 MG: 1 INJECTION, SOLUTION INTRAMUSCULAR; INTRAVENOUS; SUBCUTANEOUS at 03:49

## 2022-01-09 RX ADMIN — INSULIN GLARGINE-YFGN 15 UNITS: 100 INJECTION, SOLUTION SUBCUTANEOUS at 08:38

## 2022-01-09 RX ADMIN — HYDROMORPHONE HYDROCHLORIDE 1 MG: 1 INJECTION, SOLUTION INTRAMUSCULAR; INTRAVENOUS; SUBCUTANEOUS at 08:37

## 2022-01-09 RX ADMIN — MIDODRINE HYDROCHLORIDE 10 MG: 5 TABLET ORAL at 11:00

## 2022-01-09 RX ADMIN — FAMOTIDINE: 10 INJECTION, SOLUTION INTRAVENOUS at 18:36

## 2022-01-09 RX ADMIN — POTASSIUM PHOSPHATE, MONOBASIC AND POTASSIUM PHOSPHATE, DIBASIC 15 MMOL: 224; 236 INJECTION, SOLUTION, CONCENTRATE INTRAVENOUS at 11:00

## 2022-01-09 RX ADMIN — SMOFLIPID 100 ML: 6; 6; 5; 3 INJECTION, EMULSION INTRAVENOUS at 18:35

## 2022-01-09 RX ADMIN — ACETAMINOPHEN 650 MG: 325 TABLET, FILM COATED ORAL at 17:22

## 2022-01-09 RX ADMIN — INSULIN HUMAN 2 UNITS: 100 INJECTION, SOLUTION PARENTERAL at 06:31

## 2022-01-09 RX ADMIN — MIDODRINE HYDROCHLORIDE 10 MG: 5 TABLET ORAL at 06:31

## 2022-01-09 RX ADMIN — SODIUM CHLORIDE, PRESERVATIVE FREE 10 ML: 5 INJECTION INTRAVENOUS at 21:36

## 2022-01-09 RX ADMIN — CARVEDILOL 12.5 MG: 12.5 TABLET, FILM COATED ORAL at 17:16

## 2022-01-09 RX ADMIN — SODIUM CHLORIDE, PRESERVATIVE FREE 10 ML: 5 INJECTION INTRAVENOUS at 21:37

## 2022-01-09 RX ADMIN — TAZOBACTAM SODIUM AND PIPERACILLIN SODIUM 3.38 G: 375; 3 INJECTION, SOLUTION INTRAVENOUS at 01:28

## 2022-01-09 RX ADMIN — FLUCONAZOLE IN SODIUM CHLORIDE 400 MG: 2 INJECTION, SOLUTION INTRAVENOUS at 08:36

## 2022-01-09 RX ADMIN — HYDROMORPHONE HYDROCHLORIDE 1 MG: 1 INJECTION, SOLUTION INTRAMUSCULAR; INTRAVENOUS; SUBCUTANEOUS at 21:36

## 2022-01-09 RX ADMIN — TAZOBACTAM SODIUM AND PIPERACILLIN SODIUM 3.38 G: 375; 3 INJECTION, SOLUTION INTRAVENOUS at 08:36

## 2022-01-09 RX ADMIN — TAZOBACTAM SODIUM AND PIPERACILLIN SODIUM 3.38 G: 375; 3 INJECTION, SOLUTION INTRAVENOUS at 17:15

## 2022-01-09 RX ADMIN — ENOXAPARIN SODIUM 40 MG: 40 INJECTION SUBCUTANEOUS at 18:36

## 2022-01-09 RX ADMIN — MIDODRINE HYDROCHLORIDE 10 MG: 5 TABLET ORAL at 17:15

## 2022-01-09 RX ADMIN — INSULIN GLARGINE-YFGN 15 UNITS: 100 INJECTION, SOLUTION SUBCUTANEOUS at 21:36

## 2022-01-09 RX ADMIN — ONDANSETRON 4 MG: 2 INJECTION INTRAMUSCULAR; INTRAVENOUS at 02:23

## 2022-01-09 RX ADMIN — METOLAZONE 5 MG: 5 TABLET ORAL at 11:00

## 2022-01-10 ENCOUNTER — APPOINTMENT (OUTPATIENT)
Dept: GENERAL RADIOLOGY | Facility: HOSPITAL | Age: 62
End: 2022-01-10

## 2022-01-10 LAB
ALBUMIN SERPL-MCNC: 2.1 G/DL (ref 3.5–5.2)
ALBUMIN/GLOB SERPL: 0.4 G/DL
ALP SERPL-CCNC: 85 U/L (ref 39–117)
ALT SERPL W P-5'-P-CCNC: 25 U/L (ref 1–41)
ANION GAP SERPL CALCULATED.3IONS-SCNC: 11.8 MMOL/L (ref 5–15)
AST SERPL-CCNC: 68 U/L (ref 1–40)
BILIRUB SERPL-MCNC: 0.5 MG/DL (ref 0–1.2)
BUN SERPL-MCNC: 25 MG/DL (ref 8–23)
BUN/CREAT SERPL: 30.9 (ref 7–25)
CALCIUM SPEC-SCNC: 8.4 MG/DL (ref 8.6–10.5)
CHLORIDE SERPL-SCNC: 105 MMOL/L (ref 98–107)
CO2 SERPL-SCNC: 23.2 MMOL/L (ref 22–29)
CREAT SERPL-MCNC: 0.81 MG/DL (ref 0.76–1.27)
DEPRECATED RDW RBC AUTO: 46.9 FL (ref 37–54)
ERYTHROCYTE [DISTWIDTH] IN BLOOD BY AUTOMATED COUNT: 14.7 % (ref 12.3–15.4)
GFR SERPL CREATININE-BSD FRML MDRD: 97 ML/MIN/1.73
GLOBULIN UR ELPH-MCNC: 4.9 GM/DL
GLUCOSE BLDC GLUCOMTR-MCNC: 125 MG/DL (ref 70–130)
GLUCOSE BLDC GLUCOMTR-MCNC: 126 MG/DL (ref 70–130)
GLUCOSE BLDC GLUCOMTR-MCNC: 126 MG/DL (ref 70–130)
GLUCOSE BLDC GLUCOMTR-MCNC: 130 MG/DL (ref 70–130)
GLUCOSE SERPL-MCNC: 118 MG/DL (ref 65–99)
HCT VFR BLD AUTO: 25.2 % (ref 37.5–51)
HGB BLD-MCNC: 7.8 G/DL (ref 13–17.7)
MAGNESIUM SERPL-MCNC: 2.2 MG/DL (ref 1.6–2.4)
MCH RBC QN AUTO: 27.1 PG (ref 26.6–33)
MCHC RBC AUTO-ENTMCNC: 31 G/DL (ref 31.5–35.7)
MCV RBC AUTO: 87.5 FL (ref 79–97)
PHOSPHATE SERPL-MCNC: 3.3 MG/DL (ref 2.5–4.5)
PLATELET # BLD AUTO: 197 10*3/MM3 (ref 140–450)
PMV BLD AUTO: 12.1 FL (ref 6–12)
POTASSIUM SERPL-SCNC: 4.3 MMOL/L (ref 3.5–5.2)
PROT SERPL-MCNC: 7 G/DL (ref 6–8.5)
RBC # BLD AUTO: 2.88 10*6/MM3 (ref 4.14–5.8)
SODIUM SERPL-SCNC: 140 MMOL/L (ref 136–145)
WBC NRBC COR # BLD: 7.57 10*3/MM3 (ref 3.4–10.8)

## 2022-01-10 PROCEDURE — 85027 COMPLETE CBC AUTOMATED: CPT | Performed by: INTERNAL MEDICINE

## 2022-01-10 PROCEDURE — 25010000002 ENOXAPARIN PER 10 MG: Performed by: SURGERY

## 2022-01-10 PROCEDURE — 84100 ASSAY OF PHOSPHORUS: CPT | Performed by: SURGERY

## 2022-01-10 PROCEDURE — 25010000002 HYDROMORPHONE 1 MG/ML SOLUTION: Performed by: INTERNAL MEDICINE

## 2022-01-10 PROCEDURE — 80053 COMPREHEN METABOLIC PANEL: CPT | Performed by: SURGERY

## 2022-01-10 PROCEDURE — 97110 THERAPEUTIC EXERCISES: CPT

## 2022-01-10 PROCEDURE — 99232 SBSQ HOSP IP/OBS MODERATE 35: CPT | Performed by: INTERNAL MEDICINE

## 2022-01-10 PROCEDURE — 82962 GLUCOSE BLOOD TEST: CPT

## 2022-01-10 PROCEDURE — 25010000002 FENTANYL CITRATE (PF) 50 MCG/ML SOLUTION: Performed by: INTERNAL MEDICINE

## 2022-01-10 PROCEDURE — 25010000002 PIPERACILLIN SOD-TAZOBACTAM PER 1 G: Performed by: INTERNAL MEDICINE

## 2022-01-10 PROCEDURE — 25010000002 FLUCONAZOLE PER 200 MG: Performed by: INTERNAL MEDICINE

## 2022-01-10 PROCEDURE — 25010000002 MAGNESIUM SULFATE PER 500 MG OF MAGNESIUM: Performed by: SURGERY

## 2022-01-10 PROCEDURE — 83735 ASSAY OF MAGNESIUM: CPT | Performed by: SURGERY

## 2022-01-10 PROCEDURE — 25010000002 CALCIUM GLUCONATE PER 10 ML: Performed by: SURGERY

## 2022-01-10 PROCEDURE — 71045 X-RAY EXAM CHEST 1 VIEW: CPT

## 2022-01-10 PROCEDURE — 97530 THERAPEUTIC ACTIVITIES: CPT

## 2022-01-10 RX ADMIN — INSULIN GLARGINE-YFGN 15 UNITS: 100 INJECTION, SOLUTION SUBCUTANEOUS at 09:49

## 2022-01-10 RX ADMIN — SODIUM CHLORIDE, PRESERVATIVE FREE 10 ML: 5 INJECTION INTRAVENOUS at 12:39

## 2022-01-10 RX ADMIN — HYDROMORPHONE HYDROCHLORIDE 1 MG: 1 INJECTION, SOLUTION INTRAMUSCULAR; INTRAVENOUS; SUBCUTANEOUS at 12:39

## 2022-01-10 RX ADMIN — ENOXAPARIN SODIUM 40 MG: 40 INJECTION SUBCUTANEOUS at 18:56

## 2022-01-10 RX ADMIN — MIDODRINE HYDROCHLORIDE 10 MG: 5 TABLET ORAL at 09:47

## 2022-01-10 RX ADMIN — FLUCONAZOLE IN SODIUM CHLORIDE 400 MG: 2 INJECTION, SOLUTION INTRAVENOUS at 09:46

## 2022-01-10 RX ADMIN — TAZOBACTAM SODIUM AND PIPERACILLIN SODIUM 3.38 G: 375; 3 INJECTION, SOLUTION INTRAVENOUS at 01:39

## 2022-01-10 RX ADMIN — INSULIN GLARGINE-YFGN 15 UNITS: 100 INJECTION, SOLUTION SUBCUTANEOUS at 20:43

## 2022-01-10 RX ADMIN — HYDROMORPHONE HYDROCHLORIDE 1 MG: 1 INJECTION, SOLUTION INTRAMUSCULAR; INTRAVENOUS; SUBCUTANEOUS at 01:39

## 2022-01-10 RX ADMIN — MIDODRINE HYDROCHLORIDE 10 MG: 5 TABLET ORAL at 18:16

## 2022-01-10 RX ADMIN — CARVEDILOL 12.5 MG: 12.5 TABLET, FILM COATED ORAL at 18:16

## 2022-01-10 RX ADMIN — HYDROMORPHONE HYDROCHLORIDE 1 MG: 1 INJECTION, SOLUTION INTRAMUSCULAR; INTRAVENOUS; SUBCUTANEOUS at 23:05

## 2022-01-10 RX ADMIN — MIDODRINE HYDROCHLORIDE 10 MG: 5 TABLET ORAL at 13:45

## 2022-01-10 RX ADMIN — TAZOBACTAM SODIUM AND PIPERACILLIN SODIUM 3.38 G: 375; 3 INJECTION, SOLUTION INTRAVENOUS at 09:47

## 2022-01-10 RX ADMIN — SODIUM CHLORIDE, PRESERVATIVE FREE 10 ML: 5 INJECTION INTRAVENOUS at 20:43

## 2022-01-10 RX ADMIN — TAZOBACTAM SODIUM AND PIPERACILLIN SODIUM 3.38 G: 375; 3 INJECTION, SOLUTION INTRAVENOUS at 16:05

## 2022-01-10 RX ADMIN — FENTANYL CITRATE 50 MCG: 50 INJECTION INTRAMUSCULAR; INTRAVENOUS at 09:54

## 2022-01-10 RX ADMIN — HYDROMORPHONE HYDROCHLORIDE 1 MG: 1 INJECTION, SOLUTION INTRAMUSCULAR; INTRAVENOUS; SUBCUTANEOUS at 06:35

## 2022-01-10 RX ADMIN — FENTANYL CITRATE 50 MCG: 50 INJECTION INTRAMUSCULAR; INTRAVENOUS at 20:38

## 2022-01-10 RX ADMIN — FAMOTIDINE: 10 INJECTION INTRAVENOUS at 18:20

## 2022-01-10 RX ADMIN — SODIUM CHLORIDE, PRESERVATIVE FREE 10 ML: 5 INJECTION INTRAVENOUS at 12:40

## 2022-01-10 RX ADMIN — SMOFLIPID 100 ML: 6; 6; 5; 3 INJECTION, EMULSION INTRAVENOUS at 18:56

## 2022-01-11 LAB
ALBUMIN SERPL-MCNC: 2.2 G/DL (ref 3.5–5.2)
ALBUMIN/GLOB SERPL: 0.4 G/DL
ALP SERPL-CCNC: 100 U/L (ref 39–117)
ALT SERPL W P-5'-P-CCNC: 30 U/L (ref 1–41)
ANION GAP SERPL CALCULATED.3IONS-SCNC: 12.6 MMOL/L (ref 5–15)
AST SERPL-CCNC: 68 U/L (ref 1–40)
BILIRUB SERPL-MCNC: 0.6 MG/DL (ref 0–1.2)
BUN SERPL-MCNC: 22 MG/DL (ref 8–23)
BUN/CREAT SERPL: 28.2 (ref 7–25)
CALCIUM SPEC-SCNC: 8.2 MG/DL (ref 8.6–10.5)
CHLORIDE SERPL-SCNC: 96 MMOL/L (ref 98–107)
CO2 SERPL-SCNC: 26.4 MMOL/L (ref 22–29)
CREAT SERPL-MCNC: 0.78 MG/DL (ref 0.76–1.27)
DEPRECATED RDW RBC AUTO: 46.3 FL (ref 37–54)
ERYTHROCYTE [DISTWIDTH] IN BLOOD BY AUTOMATED COUNT: 14.8 % (ref 12.3–15.4)
GFR SERPL CREATININE-BSD FRML MDRD: 101 ML/MIN/1.73
GLOBULIN UR ELPH-MCNC: 5 GM/DL
GLUCOSE BLDC GLUCOMTR-MCNC: 130 MG/DL (ref 70–130)
GLUCOSE BLDC GLUCOMTR-MCNC: 132 MG/DL (ref 70–130)
GLUCOSE BLDC GLUCOMTR-MCNC: 135 MG/DL (ref 70–130)
GLUCOSE BLDC GLUCOMTR-MCNC: 138 MG/DL (ref 70–130)
GLUCOSE BLDC GLUCOMTR-MCNC: 147 MG/DL (ref 70–130)
GLUCOSE SERPL-MCNC: 122 MG/DL (ref 65–99)
HCT VFR BLD AUTO: 27.1 % (ref 37.5–51)
HGB BLD-MCNC: 8.5 G/DL (ref 13–17.7)
MAGNESIUM SERPL-MCNC: 1.8 MG/DL (ref 1.6–2.4)
MCH RBC QN AUTO: 27.2 PG (ref 26.6–33)
MCHC RBC AUTO-ENTMCNC: 31.4 G/DL (ref 31.5–35.7)
MCV RBC AUTO: 86.6 FL (ref 79–97)
PHOSPHATE SERPL-MCNC: 3 MG/DL (ref 2.5–4.5)
PLATELET # BLD AUTO: 275 10*3/MM3 (ref 140–450)
PMV BLD AUTO: 12 FL (ref 6–12)
POTASSIUM SERPL-SCNC: 3.4 MMOL/L (ref 3.5–5.2)
POTASSIUM SERPL-SCNC: 3.7 MMOL/L (ref 3.5–5.2)
PROCALCITONIN SERPL-MCNC: 0.43 NG/ML (ref 0–0.25)
PROT SERPL-MCNC: 7.2 G/DL (ref 6–8.5)
RBC # BLD AUTO: 3.13 10*6/MM3 (ref 4.14–5.8)
SODIUM SERPL-SCNC: 135 MMOL/L (ref 136–145)
WBC NRBC COR # BLD: 8.44 10*3/MM3 (ref 3.4–10.8)

## 2022-01-11 PROCEDURE — 97110 THERAPEUTIC EXERCISES: CPT

## 2022-01-11 PROCEDURE — 92526 ORAL FUNCTION THERAPY: CPT

## 2022-01-11 PROCEDURE — 84100 ASSAY OF PHOSPHORUS: CPT | Performed by: SURGERY

## 2022-01-11 PROCEDURE — 25010000002 MAGNESIUM SULFATE PER 500 MG OF MAGNESIUM: Performed by: INTERNAL MEDICINE

## 2022-01-11 PROCEDURE — 25010000002 HYDROMORPHONE 1 MG/ML SOLUTION: Performed by: INTERNAL MEDICINE

## 2022-01-11 PROCEDURE — 25010000002 PIPERACILLIN SOD-TAZOBACTAM PER 1 G: Performed by: INTERNAL MEDICINE

## 2022-01-11 PROCEDURE — 25010000002 ENOXAPARIN PER 10 MG: Performed by: INTERNAL MEDICINE

## 2022-01-11 PROCEDURE — 25010000002 CALCIUM GLUCONATE PER 10 ML: Performed by: INTERNAL MEDICINE

## 2022-01-11 PROCEDURE — 84132 ASSAY OF SERUM POTASSIUM: CPT | Performed by: INTERNAL MEDICINE

## 2022-01-11 PROCEDURE — 25010000002 FENTANYL CITRATE (PF) 50 MCG/ML SOLUTION: Performed by: INTERNAL MEDICINE

## 2022-01-11 PROCEDURE — 99233 SBSQ HOSP IP/OBS HIGH 50: CPT | Performed by: INTERNAL MEDICINE

## 2022-01-11 PROCEDURE — 87040 BLOOD CULTURE FOR BACTERIA: CPT | Performed by: INTERNAL MEDICINE

## 2022-01-11 PROCEDURE — 25010000002 HYDROMORPHONE PER 4 MG: Performed by: INTERNAL MEDICINE

## 2022-01-11 PROCEDURE — 85027 COMPLETE CBC AUTOMATED: CPT | Performed by: INTERNAL MEDICINE

## 2022-01-11 PROCEDURE — 0 POTASSIUM CHLORIDE PER 2 MEQ: Performed by: INTERNAL MEDICINE

## 2022-01-11 PROCEDURE — 99024 POSTOP FOLLOW-UP VISIT: CPT | Performed by: SURGERY

## 2022-01-11 PROCEDURE — 80053 COMPREHEN METABOLIC PANEL: CPT | Performed by: SURGERY

## 2022-01-11 PROCEDURE — 25010000002 FLUCONAZOLE PER 200 MG: Performed by: INTERNAL MEDICINE

## 2022-01-11 PROCEDURE — 97530 THERAPEUTIC ACTIVITIES: CPT

## 2022-01-11 PROCEDURE — 84145 PROCALCITONIN (PCT): CPT | Performed by: INTERNAL MEDICINE

## 2022-01-11 PROCEDURE — 82962 GLUCOSE BLOOD TEST: CPT

## 2022-01-11 PROCEDURE — 83735 ASSAY OF MAGNESIUM: CPT | Performed by: SURGERY

## 2022-01-11 RX ORDER — FLUCONAZOLE 2 MG/ML
400 INJECTION, SOLUTION INTRAVENOUS DAILY
Status: COMPLETED | OUTPATIENT
Start: 2022-01-12 | End: 2022-01-16

## 2022-01-11 RX ORDER — HYDROMORPHONE HYDROCHLORIDE 1 MG/ML
0.5 INJECTION, SOLUTION INTRAMUSCULAR; INTRAVENOUS; SUBCUTANEOUS
Status: DISPENSED | OUTPATIENT
Start: 2022-01-11 | End: 2022-01-18

## 2022-01-11 RX ADMIN — SODIUM CHLORIDE, PRESERVATIVE FREE 10 ML: 5 INJECTION INTRAVENOUS at 20:11

## 2022-01-11 RX ADMIN — SMOFLIPID 100 ML: 6; 6; 5; 3 INJECTION, EMULSION INTRAVENOUS at 19:09

## 2022-01-11 RX ADMIN — HYDROMORPHONE HYDROCHLORIDE 0.5 MG: 10 INJECTION INTRAMUSCULAR; INTRAVENOUS; SUBCUTANEOUS at 15:43

## 2022-01-11 RX ADMIN — INSULIN GLARGINE-YFGN 15 UNITS: 100 INJECTION, SOLUTION SUBCUTANEOUS at 22:42

## 2022-01-11 RX ADMIN — MIDODRINE HYDROCHLORIDE 10 MG: 5 TABLET ORAL at 08:14

## 2022-01-11 RX ADMIN — CARVEDILOL 12.5 MG: 12.5 TABLET, FILM COATED ORAL at 19:10

## 2022-01-11 RX ADMIN — FENTANYL CITRATE 50 MCG: 50 INJECTION INTRAMUSCULAR; INTRAVENOUS at 02:13

## 2022-01-11 RX ADMIN — FAMOTIDINE: 10 INJECTION INTRAVENOUS at 18:56

## 2022-01-11 RX ADMIN — FLUCONAZOLE IN SODIUM CHLORIDE 400 MG: 2 INJECTION, SOLUTION INTRAVENOUS at 08:13

## 2022-01-11 RX ADMIN — ENOXAPARIN SODIUM 40 MG: 40 INJECTION SUBCUTANEOUS at 20:09

## 2022-01-11 RX ADMIN — HYDROMORPHONE HYDROCHLORIDE 1 MG: 1 INJECTION, SOLUTION INTRAMUSCULAR; INTRAVENOUS; SUBCUTANEOUS at 09:38

## 2022-01-11 RX ADMIN — POTASSIUM CHLORIDE 20 MEQ: 400 INJECTION, SOLUTION INTRAVENOUS at 07:28

## 2022-01-11 RX ADMIN — HYDROMORPHONE HYDROCHLORIDE 1 MG: 1 INJECTION, SOLUTION INTRAMUSCULAR; INTRAVENOUS; SUBCUTANEOUS at 04:11

## 2022-01-11 RX ADMIN — MIDODRINE HYDROCHLORIDE 10 MG: 5 TABLET ORAL at 19:10

## 2022-01-11 RX ADMIN — TAZOBACTAM SODIUM AND PIPERACILLIN SODIUM 3.38 G: 375; 3 INJECTION, SOLUTION INTRAVENOUS at 19:10

## 2022-01-11 RX ADMIN — MIDODRINE HYDROCHLORIDE 10 MG: 5 TABLET ORAL at 11:48

## 2022-01-11 RX ADMIN — TAZOBACTAM SODIUM AND PIPERACILLIN SODIUM 3.38 G: 375; 3 INJECTION, SOLUTION INTRAVENOUS at 08:13

## 2022-01-11 RX ADMIN — HYDROCODONE BITARTRATE AND ACETAMINOPHEN 1 TABLET: 10; 325 TABLET ORAL at 20:09

## 2022-01-11 RX ADMIN — SODIUM CHLORIDE, PRESERVATIVE FREE 10 ML: 5 INJECTION INTRAVENOUS at 08:13

## 2022-01-11 RX ADMIN — HYDROMORPHONE HYDROCHLORIDE 0.5 MG: 10 INJECTION INTRAMUSCULAR; INTRAVENOUS; SUBCUTANEOUS at 22:46

## 2022-01-11 RX ADMIN — FENTANYL CITRATE 50 MCG: 50 INJECTION INTRAMUSCULAR; INTRAVENOUS at 06:42

## 2022-01-11 RX ADMIN — POTASSIUM CHLORIDE 20 MEQ: 400 INJECTION, SOLUTION INTRAVENOUS at 08:52

## 2022-01-11 RX ADMIN — SODIUM CHLORIDE, PRESERVATIVE FREE 10 ML: 5 INJECTION INTRAVENOUS at 20:10

## 2022-01-11 RX ADMIN — SODIUM CHLORIDE, PRESERVATIVE FREE 10 ML: 5 INJECTION INTRAVENOUS at 08:14

## 2022-01-11 RX ADMIN — TAZOBACTAM SODIUM AND PIPERACILLIN SODIUM 3.38 G: 375; 3 INJECTION, SOLUTION INTRAVENOUS at 02:13

## 2022-01-11 RX ADMIN — INSULIN GLARGINE-YFGN 15 UNITS: 100 INJECTION, SOLUTION SUBCUTANEOUS at 09:38

## 2022-01-12 ENCOUNTER — APPOINTMENT (OUTPATIENT)
Dept: CT IMAGING | Facility: HOSPITAL | Age: 62
End: 2022-01-12

## 2022-01-12 LAB
ALBUMIN SERPL-MCNC: 2.4 G/DL (ref 3.5–5.2)
ALBUMIN/GLOB SERPL: 0.5 G/DL
ALP SERPL-CCNC: 105 U/L (ref 39–117)
ALT SERPL W P-5'-P-CCNC: 29 U/L (ref 1–41)
ANION GAP SERPL CALCULATED.3IONS-SCNC: 11.9 MMOL/L (ref 5–15)
AST SERPL-CCNC: 63 U/L (ref 1–40)
BILIRUB SERPL-MCNC: 0.7 MG/DL (ref 0–1.2)
BUN SERPL-MCNC: 22 MG/DL (ref 8–23)
BUN/CREAT SERPL: 29.3 (ref 7–25)
CALCIUM SPEC-SCNC: 8.3 MG/DL (ref 8.6–10.5)
CHLORIDE SERPL-SCNC: 96 MMOL/L (ref 98–107)
CO2 SERPL-SCNC: 23.1 MMOL/L (ref 22–29)
CREAT SERPL-MCNC: 0.75 MG/DL (ref 0.76–1.27)
DEPRECATED RDW RBC AUTO: 47.4 FL (ref 37–54)
ERYTHROCYTE [DISTWIDTH] IN BLOOD BY AUTOMATED COUNT: 15.7 % (ref 12.3–15.4)
GFR SERPL CREATININE-BSD FRML MDRD: 106 ML/MIN/1.73
GLOBULIN UR ELPH-MCNC: 5.1 GM/DL
GLUCOSE BLDC GLUCOMTR-MCNC: 125 MG/DL (ref 70–130)
GLUCOSE BLDC GLUCOMTR-MCNC: 134 MG/DL (ref 70–130)
GLUCOSE BLDC GLUCOMTR-MCNC: 140 MG/DL (ref 70–130)
GLUCOSE BLDC GLUCOMTR-MCNC: 146 MG/DL (ref 70–130)
GLUCOSE SERPL-MCNC: 136 MG/DL (ref 65–99)
HCT VFR BLD AUTO: 26.3 % (ref 37.5–51)
HGB BLD-MCNC: 8.4 G/DL (ref 13–17.7)
INR PPP: 1.9 (ref 0.8–1.2)
MAGNESIUM SERPL-MCNC: 1.9 MG/DL (ref 1.6–2.4)
MCH RBC QN AUTO: 27.4 PG (ref 26.6–33)
MCHC RBC AUTO-ENTMCNC: 31.9 G/DL (ref 31.5–35.7)
MCV RBC AUTO: 85.7 FL (ref 79–97)
PHOSPHATE SERPL-MCNC: 2.6 MG/DL (ref 2.5–4.5)
PLATELET # BLD AUTO: 291 10*3/MM3 (ref 140–450)
PMV BLD AUTO: 11.6 FL (ref 6–12)
POTASSIUM SERPL-SCNC: 3.7 MMOL/L (ref 3.5–5.2)
PROT SERPL-MCNC: 7.5 G/DL (ref 6–8.5)
PROTHROMBIN TIME: 21.6 SECONDS (ref 12.8–15.2)
RBC # BLD AUTO: 3.07 10*6/MM3 (ref 4.14–5.8)
SODIUM SERPL-SCNC: 131 MMOL/L (ref 136–145)
WBC NRBC COR # BLD: 9.45 10*3/MM3 (ref 3.4–10.8)

## 2022-01-12 PROCEDURE — 75989 ABSCESS DRAINAGE UNDER X-RAY: CPT

## 2022-01-12 PROCEDURE — 82962 GLUCOSE BLOOD TEST: CPT

## 2022-01-12 PROCEDURE — 97530 THERAPEUTIC ACTIVITIES: CPT

## 2022-01-12 PROCEDURE — 0W9G30Z DRAINAGE OF PERITONEAL CAVITY WITH DRAINAGE DEVICE, PERCUTANEOUS APPROACH: ICD-10-PCS | Performed by: RADIOLOGY

## 2022-01-12 PROCEDURE — 87015 SPECIMEN INFECT AGNT CONCNTJ: CPT | Performed by: SURGERY

## 2022-01-12 PROCEDURE — 25010000002 ENOXAPARIN PER 10 MG: Performed by: INTERNAL MEDICINE

## 2022-01-12 PROCEDURE — 97110 THERAPEUTIC EXERCISES: CPT

## 2022-01-12 PROCEDURE — 87077 CULTURE AEROBIC IDENTIFY: CPT | Performed by: SURGERY

## 2022-01-12 PROCEDURE — 74177 CT ABD & PELVIS W/CONTRAST: CPT

## 2022-01-12 PROCEDURE — 25010000002 HYDROMORPHONE PER 4 MG: Performed by: INTERNAL MEDICINE

## 2022-01-12 PROCEDURE — 84100 ASSAY OF PHOSPHORUS: CPT | Performed by: INTERNAL MEDICINE

## 2022-01-12 PROCEDURE — 25010000002 FLUCONAZOLE PER 200 MG: Performed by: INTERNAL MEDICINE

## 2022-01-12 PROCEDURE — 25010000002 PIPERACILLIN SOD-TAZOBACTAM PER 1 G: Performed by: INTERNAL MEDICINE

## 2022-01-12 PROCEDURE — 25010000002 ONDANSETRON PER 1 MG: Performed by: RADIOLOGY

## 2022-01-12 PROCEDURE — 25010000002 CALCIUM GLUCONATE PER 10 ML: Performed by: SURGERY

## 2022-01-12 PROCEDURE — 87186 SC STD MICRODIL/AGAR DIL: CPT | Performed by: SURGERY

## 2022-01-12 PROCEDURE — 49406 IMAGE CATH FLUID PERI/RETRO: CPT

## 2022-01-12 PROCEDURE — 0 LIDOCAINE 1 % SOLUTION: Performed by: RADIOLOGY

## 2022-01-12 PROCEDURE — 80053 COMPREHEN METABOLIC PANEL: CPT | Performed by: INTERNAL MEDICINE

## 2022-01-12 PROCEDURE — 99233 SBSQ HOSP IP/OBS HIGH 50: CPT | Performed by: INTERNAL MEDICINE

## 2022-01-12 PROCEDURE — 25010000002 MAGNESIUM SULFATE PER 500 MG OF MAGNESIUM: Performed by: SURGERY

## 2022-01-12 PROCEDURE — 83735 ASSAY OF MAGNESIUM: CPT | Performed by: INTERNAL MEDICINE

## 2022-01-12 PROCEDURE — 87205 SMEAR GRAM STAIN: CPT | Performed by: SURGERY

## 2022-01-12 PROCEDURE — 0 DIATRIZOATE MEGLUMINE & SODIUM PER 1 ML: Performed by: SURGERY

## 2022-01-12 PROCEDURE — 25010000002 IOPAMIDOL 61 % SOLUTION: Performed by: SURGERY

## 2022-01-12 PROCEDURE — 85027 COMPLETE CBC AUTOMATED: CPT | Performed by: INTERNAL MEDICINE

## 2022-01-12 PROCEDURE — 99024 POSTOP FOLLOW-UP VISIT: CPT | Performed by: SURGERY

## 2022-01-12 PROCEDURE — 87070 CULTURE OTHR SPECIMN AEROBIC: CPT | Performed by: SURGERY

## 2022-01-12 PROCEDURE — 85610 PROTHROMBIN TIME: CPT

## 2022-01-12 RX ORDER — ONDANSETRON 2 MG/ML
4 INJECTION INTRAMUSCULAR; INTRAVENOUS ONCE
Status: COMPLETED | OUTPATIENT
Start: 2022-01-12 | End: 2022-01-12

## 2022-01-12 RX ORDER — LIDOCAINE HYDROCHLORIDE 10 MG/ML
20 INJECTION, SOLUTION INFILTRATION; PERINEURAL ONCE
Status: COMPLETED | OUTPATIENT
Start: 2022-01-12 | End: 2022-01-12

## 2022-01-12 RX ADMIN — HYDROCODONE BITARTRATE AND ACETAMINOPHEN 1 TABLET: 10; 325 TABLET ORAL at 17:49

## 2022-01-12 RX ADMIN — SODIUM CHLORIDE, PRESERVATIVE FREE 10 ML: 5 INJECTION INTRAVENOUS at 08:33

## 2022-01-12 RX ADMIN — HYDROMORPHONE HYDROCHLORIDE 0.5 MG: 10 INJECTION INTRAMUSCULAR; INTRAVENOUS; SUBCUTANEOUS at 11:26

## 2022-01-12 RX ADMIN — ONDANSETRON HYDROCHLORIDE 4 MG: 2 SOLUTION INTRAMUSCULAR; INTRAVENOUS at 14:41

## 2022-01-12 RX ADMIN — FAMOTIDINE: 10 INJECTION, SOLUTION INTRAVENOUS at 17:50

## 2022-01-12 RX ADMIN — LIDOCAINE HYDROCHLORIDE 20 ML: 10 INJECTION, SOLUTION INFILTRATION; PERINEURAL at 15:00

## 2022-01-12 RX ADMIN — INSULIN GLARGINE-YFGN 15 UNITS: 100 INJECTION, SOLUTION SUBCUTANEOUS at 21:22

## 2022-01-12 RX ADMIN — TAZOBACTAM SODIUM AND PIPERACILLIN SODIUM 3.38 G: 375; 3 INJECTION, SOLUTION INTRAVENOUS at 12:08

## 2022-01-12 RX ADMIN — SMOFLIPID 100 ML: 6; 6; 5; 3 INJECTION, EMULSION INTRAVENOUS at 18:39

## 2022-01-12 RX ADMIN — ACETAMINOPHEN 650 MG: 325 TABLET, FILM COATED ORAL at 12:08

## 2022-01-12 RX ADMIN — FLUCONAZOLE 400 MG: 2 INJECTION, SOLUTION INTRAVENOUS at 08:32

## 2022-01-12 RX ADMIN — ENOXAPARIN SODIUM 40 MG: 40 INJECTION SUBCUTANEOUS at 18:01

## 2022-01-12 RX ADMIN — HYDROMORPHONE HYDROCHLORIDE 0.5 MG: 10 INJECTION INTRAMUSCULAR; INTRAVENOUS; SUBCUTANEOUS at 14:39

## 2022-01-12 RX ADMIN — TAZOBACTAM SODIUM AND PIPERACILLIN SODIUM 3.38 G: 375; 3 INJECTION, SOLUTION INTRAVENOUS at 18:01

## 2022-01-12 RX ADMIN — SODIUM CHLORIDE, PRESERVATIVE FREE 10 ML: 5 INJECTION INTRAVENOUS at 21:22

## 2022-01-12 RX ADMIN — MIDODRINE HYDROCHLORIDE 10 MG: 5 TABLET ORAL at 17:49

## 2022-01-12 RX ADMIN — HYDROMORPHONE HYDROCHLORIDE 0.5 MG: 10 INJECTION INTRAMUSCULAR; INTRAVENOUS; SUBCUTANEOUS at 06:11

## 2022-01-12 RX ADMIN — CARVEDILOL 12.5 MG: 12.5 TABLET, FILM COATED ORAL at 17:49

## 2022-01-12 RX ADMIN — IOPAMIDOL 100 ML: 612 INJECTION, SOLUTION INTRAVENOUS at 10:18

## 2022-01-12 RX ADMIN — INSULIN GLARGINE-YFGN 15 UNITS: 100 INJECTION, SOLUTION SUBCUTANEOUS at 10:00

## 2022-01-12 RX ADMIN — MIDODRINE HYDROCHLORIDE 10 MG: 5 TABLET ORAL at 06:47

## 2022-01-12 RX ADMIN — SODIUM CHLORIDE, PRESERVATIVE FREE 10 ML: 5 INJECTION INTRAVENOUS at 14:39

## 2022-01-12 RX ADMIN — TAZOBACTAM SODIUM AND PIPERACILLIN SODIUM 3.38 G: 375; 3 INJECTION, SOLUTION INTRAVENOUS at 03:15

## 2022-01-12 RX ADMIN — MIDODRINE HYDROCHLORIDE 10 MG: 5 TABLET ORAL at 11:26

## 2022-01-12 RX ADMIN — DIATRIZOATE MEGLUMINE AND DIATRIZOATE SODIUM 30 ML: 600; 100 SOLUTION ORAL; RECTAL at 08:40

## 2022-01-13 LAB
GLUCOSE BLDC GLUCOMTR-MCNC: 112 MG/DL (ref 70–130)
GLUCOSE BLDC GLUCOMTR-MCNC: 136 MG/DL (ref 70–130)
GLUCOSE BLDC GLUCOMTR-MCNC: 139 MG/DL (ref 70–130)
GLUCOSE BLDC GLUCOMTR-MCNC: 92 MG/DL (ref 70–130)
PHOSPHATE SERPL-MCNC: 2.7 MG/DL (ref 2.5–4.5)

## 2022-01-13 PROCEDURE — 82962 GLUCOSE BLOOD TEST: CPT

## 2022-01-13 PROCEDURE — 25010000002 FLUCONAZOLE PER 200 MG: Performed by: INTERNAL MEDICINE

## 2022-01-13 PROCEDURE — 25010000002 ENOXAPARIN PER 10 MG: Performed by: INTERNAL MEDICINE

## 2022-01-13 PROCEDURE — 25010000002 HYDROMORPHONE PER 4 MG: Performed by: INTERNAL MEDICINE

## 2022-01-13 PROCEDURE — 25010000002 PIPERACILLIN SOD-TAZOBACTAM PER 1 G: Performed by: INTERNAL MEDICINE

## 2022-01-13 PROCEDURE — 99232 SBSQ HOSP IP/OBS MODERATE 35: CPT | Performed by: INTERNAL MEDICINE

## 2022-01-13 PROCEDURE — 97110 THERAPEUTIC EXERCISES: CPT

## 2022-01-13 PROCEDURE — 99024 POSTOP FOLLOW-UP VISIT: CPT | Performed by: SURGERY

## 2022-01-13 PROCEDURE — 92526 ORAL FUNCTION THERAPY: CPT

## 2022-01-13 PROCEDURE — 84100 ASSAY OF PHOSPHORUS: CPT | Performed by: INTERNAL MEDICINE

## 2022-01-13 RX ORDER — MIDODRINE HYDROCHLORIDE 2.5 MG/1
10 TABLET ORAL
Status: DISCONTINUED | OUTPATIENT
Start: 2022-01-13 | End: 2022-01-19

## 2022-01-13 RX ORDER — INSULIN LISPRO 100 [IU]/ML
0-7 INJECTION, SOLUTION INTRAVENOUS; SUBCUTANEOUS
Status: DISCONTINUED | OUTPATIENT
Start: 2022-01-13 | End: 2022-01-14

## 2022-01-13 RX ADMIN — HYDROMORPHONE HYDROCHLORIDE 0.5 MG: 10 INJECTION INTRAMUSCULAR; INTRAVENOUS; SUBCUTANEOUS at 02:59

## 2022-01-13 RX ADMIN — SODIUM CHLORIDE, PRESERVATIVE FREE 10 ML: 5 INJECTION INTRAVENOUS at 09:23

## 2022-01-13 RX ADMIN — HYDROCODONE BITARTRATE AND ACETAMINOPHEN 1 TABLET: 10; 325 TABLET ORAL at 00:20

## 2022-01-13 RX ADMIN — SODIUM CHLORIDE, PRESERVATIVE FREE 10 ML: 5 INJECTION INTRAVENOUS at 20:20

## 2022-01-13 RX ADMIN — MIDODRINE HYDROCHLORIDE 5 MG: 5 TABLET ORAL at 09:15

## 2022-01-13 RX ADMIN — CARVEDILOL 12.5 MG: 12.5 TABLET, FILM COATED ORAL at 18:21

## 2022-01-13 RX ADMIN — TAZOBACTAM SODIUM AND PIPERACILLIN SODIUM 3.38 G: 375; 3 INJECTION, SOLUTION INTRAVENOUS at 11:36

## 2022-01-13 RX ADMIN — FLUCONAZOLE 400 MG: 2 INJECTION, SOLUTION INTRAVENOUS at 09:16

## 2022-01-13 RX ADMIN — SODIUM CHLORIDE, PRESERVATIVE FREE 10 ML: 5 INJECTION INTRAVENOUS at 09:31

## 2022-01-13 RX ADMIN — MIDODRINE HYDROCHLORIDE 10 MG: 5 TABLET ORAL at 06:21

## 2022-01-13 RX ADMIN — TAZOBACTAM SODIUM AND PIPERACILLIN SODIUM 3.38 G: 375; 3 INJECTION, SOLUTION INTRAVENOUS at 02:59

## 2022-01-13 RX ADMIN — ENOXAPARIN SODIUM 40 MG: 40 INJECTION SUBCUTANEOUS at 18:20

## 2022-01-13 RX ADMIN — HYDROCODONE BITARTRATE AND ACETAMINOPHEN 1 TABLET: 10; 325 TABLET ORAL at 09:30

## 2022-01-13 RX ADMIN — MIDODRINE HYDROCHLORIDE 10 MG: 5 TABLET ORAL at 11:36

## 2022-01-13 RX ADMIN — SODIUM CHLORIDE, PRESERVATIVE FREE 10 ML: 5 INJECTION INTRAVENOUS at 20:19

## 2022-01-13 RX ADMIN — MIDODRINE HYDROCHLORIDE 10 MG: 5 TABLET ORAL at 18:20

## 2022-01-13 RX ADMIN — TAZOBACTAM SODIUM AND PIPERACILLIN SODIUM 3.38 G: 375; 3 INJECTION, SOLUTION INTRAVENOUS at 18:25

## 2022-01-14 LAB
ANION GAP SERPL CALCULATED.3IONS-SCNC: 12 MMOL/L (ref 5–15)
BUN SERPL-MCNC: 23 MG/DL (ref 8–23)
BUN/CREAT SERPL: 23 (ref 7–25)
CALCIUM SPEC-SCNC: 8.2 MG/DL (ref 8.6–10.5)
CHLORIDE SERPL-SCNC: 100 MMOL/L (ref 98–107)
CO2 SERPL-SCNC: 22 MMOL/L (ref 22–29)
CREAT SERPL-MCNC: 1 MG/DL (ref 0.76–1.27)
GFR SERPL CREATININE-BSD FRML MDRD: 76 ML/MIN/1.73
GLUCOSE BLDC GLUCOMTR-MCNC: 120 MG/DL (ref 70–130)
GLUCOSE BLDC GLUCOMTR-MCNC: 80 MG/DL (ref 70–130)
GLUCOSE BLDC GLUCOMTR-MCNC: 80 MG/DL (ref 70–130)
GLUCOSE BLDC GLUCOMTR-MCNC: 81 MG/DL (ref 70–130)
GLUCOSE SERPL-MCNC: 75 MG/DL (ref 65–99)
MAGNESIUM SERPL-MCNC: 2.2 MG/DL (ref 1.6–2.4)
PHOSPHATE SERPL-MCNC: 2.9 MG/DL (ref 2.5–4.5)
POTASSIUM SERPL-SCNC: 4.1 MMOL/L (ref 3.5–5.2)
SODIUM SERPL-SCNC: 134 MMOL/L (ref 136–145)

## 2022-01-14 PROCEDURE — 80048 BASIC METABOLIC PNL TOTAL CA: CPT | Performed by: SURGERY

## 2022-01-14 PROCEDURE — 25010000002 PIPERACILLIN SOD-TAZOBACTAM PER 1 G: Performed by: INTERNAL MEDICINE

## 2022-01-14 PROCEDURE — 25010000002 MAGNESIUM SULFATE PER 500 MG OF MAGNESIUM: Performed by: SURGERY

## 2022-01-14 PROCEDURE — 25010000002 FLUCONAZOLE PER 200 MG: Performed by: INTERNAL MEDICINE

## 2022-01-14 PROCEDURE — 84100 ASSAY OF PHOSPHORUS: CPT | Performed by: INTERNAL MEDICINE

## 2022-01-14 PROCEDURE — 99024 POSTOP FOLLOW-UP VISIT: CPT | Performed by: SURGERY

## 2022-01-14 PROCEDURE — 25010000002 ENOXAPARIN PER 10 MG: Performed by: INTERNAL MEDICINE

## 2022-01-14 PROCEDURE — 83735 ASSAY OF MAGNESIUM: CPT | Performed by: SURGERY

## 2022-01-14 PROCEDURE — 63710000001 INSULIN REGULAR HUMAN PER 5 UNITS: Performed by: SURGERY

## 2022-01-14 PROCEDURE — 99232 SBSQ HOSP IP/OBS MODERATE 35: CPT | Performed by: INTERNAL MEDICINE

## 2022-01-14 PROCEDURE — 25010000002 HYDROMORPHONE PER 4 MG: Performed by: INTERNAL MEDICINE

## 2022-01-14 PROCEDURE — 25010000002 CALCIUM GLUCONATE PER 10 ML: Performed by: SURGERY

## 2022-01-14 PROCEDURE — 97110 THERAPEUTIC EXERCISES: CPT

## 2022-01-14 PROCEDURE — 82962 GLUCOSE BLOOD TEST: CPT

## 2022-01-14 RX ADMIN — MIDODRINE HYDROCHLORIDE 10 MG: 5 TABLET ORAL at 17:48

## 2022-01-14 RX ADMIN — TAZOBACTAM SODIUM AND PIPERACILLIN SODIUM 3.38 G: 375; 3 INJECTION, SOLUTION INTRAVENOUS at 10:44

## 2022-01-14 RX ADMIN — CARVEDILOL 12.5 MG: 12.5 TABLET, FILM COATED ORAL at 17:48

## 2022-01-14 RX ADMIN — TAZOBACTAM SODIUM AND PIPERACILLIN SODIUM 3.38 G: 375; 3 INJECTION, SOLUTION INTRAVENOUS at 21:52

## 2022-01-14 RX ADMIN — HYDROMORPHONE HYDROCHLORIDE 0.5 MG: 10 INJECTION INTRAMUSCULAR; INTRAVENOUS; SUBCUTANEOUS at 10:44

## 2022-01-14 RX ADMIN — SODIUM CHLORIDE, PRESERVATIVE FREE 10 ML: 5 INJECTION INTRAVENOUS at 21:52

## 2022-01-14 RX ADMIN — SODIUM CHLORIDE, PRESERVATIVE FREE 10 ML: 5 INJECTION INTRAVENOUS at 09:00

## 2022-01-14 RX ADMIN — TAZOBACTAM SODIUM AND PIPERACILLIN SODIUM 3.38 G: 375; 3 INJECTION, SOLUTION INTRAVENOUS at 03:55

## 2022-01-14 RX ADMIN — FAMOTIDINE: 10 INJECTION, SOLUTION INTRAVENOUS at 17:48

## 2022-01-14 RX ADMIN — SMOFLIPID 100 ML: 6; 6; 5; 3 INJECTION, EMULSION INTRAVENOUS at 17:52

## 2022-01-14 RX ADMIN — ENOXAPARIN SODIUM 40 MG: 40 INJECTION SUBCUTANEOUS at 21:52

## 2022-01-14 RX ADMIN — MIDODRINE HYDROCHLORIDE 10 MG: 5 TABLET ORAL at 06:33

## 2022-01-14 RX ADMIN — FLUCONAZOLE 400 MG: 2 INJECTION, SOLUTION INTRAVENOUS at 08:31

## 2022-01-15 LAB
ALBUMIN SERPL-MCNC: 2.4 G/DL (ref 3.5–5.2)
ALBUMIN/GLOB SERPL: 0.4 G/DL
ALP SERPL-CCNC: 95 U/L (ref 39–117)
ALT SERPL W P-5'-P-CCNC: 27 U/L (ref 1–41)
ANION GAP SERPL CALCULATED.3IONS-SCNC: 9 MMOL/L (ref 5–15)
AST SERPL-CCNC: 53 U/L (ref 1–40)
BILIRUB SERPL-MCNC: 0.5 MG/DL (ref 0–1.2)
BUN SERPL-MCNC: 24 MG/DL (ref 8–23)
BUN/CREAT SERPL: 30.4 (ref 7–25)
CALCIUM SPEC-SCNC: 8.1 MG/DL (ref 8.6–10.5)
CHLORIDE SERPL-SCNC: 102 MMOL/L (ref 98–107)
CO2 SERPL-SCNC: 23 MMOL/L (ref 22–29)
CREAT SERPL-MCNC: 0.79 MG/DL (ref 0.76–1.27)
GFR SERPL CREATININE-BSD FRML MDRD: 99 ML/MIN/1.73
GLOBULIN UR ELPH-MCNC: 5.4 GM/DL
GLUCOSE BLDC GLUCOMTR-MCNC: 143 MG/DL (ref 70–130)
GLUCOSE BLDC GLUCOMTR-MCNC: 153 MG/DL (ref 70–130)
GLUCOSE BLDC GLUCOMTR-MCNC: 154 MG/DL (ref 70–130)
GLUCOSE BLDC GLUCOMTR-MCNC: 155 MG/DL (ref 70–130)
GLUCOSE BLDC GLUCOMTR-MCNC: 196 MG/DL (ref 70–130)
GLUCOSE SERPL-MCNC: 166 MG/DL (ref 65–99)
MAGNESIUM SERPL-MCNC: 2.4 MG/DL (ref 1.6–2.4)
PHOSPHATE SERPL-MCNC: 2.7 MG/DL (ref 2.5–4.5)
POTASSIUM SERPL-SCNC: 4.1 MMOL/L (ref 3.5–5.2)
PROT SERPL-MCNC: 7.8 G/DL (ref 6–8.5)
SODIUM SERPL-SCNC: 134 MMOL/L (ref 136–145)

## 2022-01-15 PROCEDURE — 84100 ASSAY OF PHOSPHORUS: CPT | Performed by: INTERNAL MEDICINE

## 2022-01-15 PROCEDURE — 82962 GLUCOSE BLOOD TEST: CPT

## 2022-01-15 PROCEDURE — 25010000002 CALCIUM GLUCONATE PER 10 ML: Performed by: SURGERY

## 2022-01-15 PROCEDURE — 99024 POSTOP FOLLOW-UP VISIT: CPT | Performed by: SURGERY

## 2022-01-15 PROCEDURE — 63710000001 INSULIN REGULAR HUMAN PER 5 UNITS: Performed by: SURGERY

## 2022-01-15 PROCEDURE — 25010000002 ENOXAPARIN PER 10 MG: Performed by: INTERNAL MEDICINE

## 2022-01-15 PROCEDURE — 25010000002 FLUCONAZOLE PER 200 MG: Performed by: INTERNAL MEDICINE

## 2022-01-15 PROCEDURE — 25010000002 MAGNESIUM SULFATE PER 500 MG OF MAGNESIUM: Performed by: SURGERY

## 2022-01-15 PROCEDURE — 25010000002 PIPERACILLIN SOD-TAZOBACTAM PER 1 G: Performed by: INTERNAL MEDICINE

## 2022-01-15 PROCEDURE — 25010000002 ONDANSETRON PER 1 MG: Performed by: INTERNAL MEDICINE

## 2022-01-15 PROCEDURE — 80053 COMPREHEN METABOLIC PANEL: CPT | Performed by: SURGERY

## 2022-01-15 PROCEDURE — 83735 ASSAY OF MAGNESIUM: CPT | Performed by: SURGERY

## 2022-01-15 RX ADMIN — SODIUM CHLORIDE, PRESERVATIVE FREE 10 ML: 5 INJECTION INTRAVENOUS at 20:12

## 2022-01-15 RX ADMIN — INSULIN HUMAN 2 UNITS: 100 INJECTION, SOLUTION PARENTERAL at 13:19

## 2022-01-15 RX ADMIN — TAZOBACTAM SODIUM AND PIPERACILLIN SODIUM 3.38 G: 375; 3 INJECTION, SOLUTION INTRAVENOUS at 10:08

## 2022-01-15 RX ADMIN — FLUCONAZOLE 400 MG: 2 INJECTION, SOLUTION INTRAVENOUS at 09:08

## 2022-01-15 RX ADMIN — CARVEDILOL 12.5 MG: 12.5 TABLET, FILM COATED ORAL at 17:36

## 2022-01-15 RX ADMIN — TAZOBACTAM SODIUM AND PIPERACILLIN SODIUM 3.38 G: 375; 3 INJECTION, SOLUTION INTRAVENOUS at 20:12

## 2022-01-15 RX ADMIN — MIDODRINE HYDROCHLORIDE 10 MG: 5 TABLET ORAL at 07:39

## 2022-01-15 RX ADMIN — SMOFLIPID 100 ML: 6; 6; 5; 3 INJECTION, EMULSION INTRAVENOUS at 17:56

## 2022-01-15 RX ADMIN — FAMOTIDINE: 10 INJECTION, SOLUTION INTRAVENOUS at 17:43

## 2022-01-15 RX ADMIN — ONDANSETRON 4 MG: 2 INJECTION INTRAMUSCULAR; INTRAVENOUS at 23:04

## 2022-01-15 RX ADMIN — ONDANSETRON 4 MG: 2 INJECTION INTRAMUSCULAR; INTRAVENOUS at 13:20

## 2022-01-15 RX ADMIN — ENOXAPARIN SODIUM 40 MG: 40 INJECTION SUBCUTANEOUS at 20:12

## 2022-01-15 RX ADMIN — SODIUM CHLORIDE, PRESERVATIVE FREE 10 ML: 5 INJECTION INTRAVENOUS at 00:40

## 2022-01-15 RX ADMIN — ACETAMINOPHEN 650 MG: 325 TABLET, FILM COATED ORAL at 00:41

## 2022-01-15 RX ADMIN — MIDODRINE HYDROCHLORIDE 10 MG: 5 TABLET ORAL at 17:36

## 2022-01-15 RX ADMIN — TAZOBACTAM SODIUM AND PIPERACILLIN SODIUM 3.38 G: 375; 3 INJECTION, SOLUTION INTRAVENOUS at 04:09

## 2022-01-16 LAB
ALBUMIN SERPL-MCNC: 2.6 G/DL (ref 3.5–5.2)
ALBUMIN/GLOB SERPL: 0.5 G/DL
ALP SERPL-CCNC: 113 U/L (ref 39–117)
ALT SERPL W P-5'-P-CCNC: 28 U/L (ref 1–41)
ANION GAP SERPL CALCULATED.3IONS-SCNC: 10.8 MMOL/L (ref 5–15)
AST SERPL-CCNC: 47 U/L (ref 1–40)
BACTERIA FLD CULT: ABNORMAL
BACTERIA SPEC AEROBE CULT: NORMAL
BACTERIA SPEC AEROBE CULT: NORMAL
BILIRUB SERPL-MCNC: 0.5 MG/DL (ref 0–1.2)
BUN SERPL-MCNC: 23 MG/DL (ref 8–23)
BUN/CREAT SERPL: 28.4 (ref 7–25)
CALCIUM SPEC-SCNC: 8 MG/DL (ref 8.6–10.5)
CHLORIDE SERPL-SCNC: 103 MMOL/L (ref 98–107)
CO2 SERPL-SCNC: 21.2 MMOL/L (ref 22–29)
CREAT SERPL-MCNC: 0.81 MG/DL (ref 0.76–1.27)
GFR SERPL CREATININE-BSD FRML MDRD: 97 ML/MIN/1.73
GLOBULIN UR ELPH-MCNC: 5 GM/DL
GLUCOSE BLDC GLUCOMTR-MCNC: 130 MG/DL (ref 70–130)
GLUCOSE BLDC GLUCOMTR-MCNC: 130 MG/DL (ref 70–130)
GLUCOSE BLDC GLUCOMTR-MCNC: 136 MG/DL (ref 70–130)
GLUCOSE BLDC GLUCOMTR-MCNC: 155 MG/DL (ref 70–130)
GLUCOSE BLDC GLUCOMTR-MCNC: 179 MG/DL (ref 70–130)
GLUCOSE SERPL-MCNC: 148 MG/DL (ref 65–99)
GRAM STN SPEC: ABNORMAL
GRAM STN SPEC: ABNORMAL
MAGNESIUM SERPL-MCNC: 2.4 MG/DL (ref 1.6–2.4)
PHOSPHATE SERPL-MCNC: 3.2 MG/DL (ref 2.5–4.5)
PLATELET # BLD AUTO: 280 10*3/MM3 (ref 140–450)
POTASSIUM SERPL-SCNC: 4.8 MMOL/L (ref 3.5–5.2)
PROT SERPL-MCNC: 7.6 G/DL (ref 6–8.5)
SODIUM SERPL-SCNC: 135 MMOL/L (ref 136–145)

## 2022-01-16 PROCEDURE — 25010000002 HYDROMORPHONE PER 4 MG: Performed by: INTERNAL MEDICINE

## 2022-01-16 PROCEDURE — 99024 POSTOP FOLLOW-UP VISIT: CPT | Performed by: SURGERY

## 2022-01-16 PROCEDURE — 25010000002 MAGNESIUM SULFATE PER 500 MG OF MAGNESIUM: Performed by: SURGERY

## 2022-01-16 PROCEDURE — 83735 ASSAY OF MAGNESIUM: CPT | Performed by: SURGERY

## 2022-01-16 PROCEDURE — 25010000002 ENOXAPARIN PER 10 MG: Performed by: INTERNAL MEDICINE

## 2022-01-16 PROCEDURE — 80053 COMPREHEN METABOLIC PANEL: CPT | Performed by: SURGERY

## 2022-01-16 PROCEDURE — 25010000002 PIPERACILLIN SOD-TAZOBACTAM PER 1 G: Performed by: INTERNAL MEDICINE

## 2022-01-16 PROCEDURE — 25010000002 FLUCONAZOLE PER 200 MG: Performed by: INTERNAL MEDICINE

## 2022-01-16 PROCEDURE — 97110 THERAPEUTIC EXERCISES: CPT

## 2022-01-16 PROCEDURE — 97530 THERAPEUTIC ACTIVITIES: CPT

## 2022-01-16 PROCEDURE — 82962 GLUCOSE BLOOD TEST: CPT

## 2022-01-16 PROCEDURE — 63710000001 INSULIN REGULAR HUMAN PER 5 UNITS: Performed by: SURGERY

## 2022-01-16 PROCEDURE — 25010000002 CALCIUM GLUCONATE PER 10 ML: Performed by: SURGERY

## 2022-01-16 PROCEDURE — 85049 AUTOMATED PLATELET COUNT: CPT | Performed by: SURGERY

## 2022-01-16 PROCEDURE — 84100 ASSAY OF PHOSPHORUS: CPT | Performed by: INTERNAL MEDICINE

## 2022-01-16 RX ADMIN — SMOFLIPID 100 ML: 6; 6; 5; 3 INJECTION, EMULSION INTRAVENOUS at 19:59

## 2022-01-16 RX ADMIN — SODIUM CHLORIDE, PRESERVATIVE FREE 10 ML: 5 INJECTION INTRAVENOUS at 20:28

## 2022-01-16 RX ADMIN — INSULIN HUMAN 0 UNITS: 100 INJECTION, SOLUTION PARENTERAL at 11:53

## 2022-01-16 RX ADMIN — ENOXAPARIN SODIUM 40 MG: 40 INJECTION SUBCUTANEOUS at 18:28

## 2022-01-16 RX ADMIN — HYDROMORPHONE HYDROCHLORIDE 0.5 MG: 10 INJECTION INTRAMUSCULAR; INTRAVENOUS; SUBCUTANEOUS at 16:45

## 2022-01-16 RX ADMIN — SODIUM CHLORIDE, PRESERVATIVE FREE 10 ML: 5 INJECTION INTRAVENOUS at 20:29

## 2022-01-16 RX ADMIN — TAZOBACTAM SODIUM AND PIPERACILLIN SODIUM 3.38 G: 375; 3 INJECTION, SOLUTION INTRAVENOUS at 05:01

## 2022-01-16 RX ADMIN — CARVEDILOL 12.5 MG: 12.5 TABLET, FILM COATED ORAL at 16:40

## 2022-01-16 RX ADMIN — FLUCONAZOLE 400 MG: 2 INJECTION, SOLUTION INTRAVENOUS at 08:00

## 2022-01-16 RX ADMIN — FAMOTIDINE: 10 INJECTION, SOLUTION INTRAVENOUS at 18:11

## 2022-01-16 RX ADMIN — HYDROMORPHONE HYDROCHLORIDE 0.5 MG: 10 INJECTION INTRAMUSCULAR; INTRAVENOUS; SUBCUTANEOUS at 21:25

## 2022-01-16 RX ADMIN — ACETAMINOPHEN 650 MG: 325 TABLET, FILM COATED ORAL at 05:19

## 2022-01-16 RX ADMIN — HYDROCODONE BITARTRATE AND ACETAMINOPHEN 1 TABLET: 10; 325 TABLET ORAL at 07:40

## 2022-01-16 RX ADMIN — TAZOBACTAM SODIUM AND PIPERACILLIN SODIUM 3.38 G: 375; 3 INJECTION, SOLUTION INTRAVENOUS at 11:53

## 2022-01-16 RX ADMIN — HYDROMORPHONE HYDROCHLORIDE 0.5 MG: 10 INJECTION INTRAMUSCULAR; INTRAVENOUS; SUBCUTANEOUS at 12:09

## 2022-01-16 RX ADMIN — MIDODRINE HYDROCHLORIDE 10 MG: 5 TABLET ORAL at 16:40

## 2022-01-16 RX ADMIN — MIDODRINE HYDROCHLORIDE 10 MG: 5 TABLET ORAL at 07:40

## 2022-01-17 ENCOUNTER — APPOINTMENT (OUTPATIENT)
Dept: ULTRASOUND IMAGING | Facility: HOSPITAL | Age: 62
End: 2022-01-17

## 2022-01-17 ENCOUNTER — APPOINTMENT (OUTPATIENT)
Dept: CT IMAGING | Facility: HOSPITAL | Age: 62
End: 2022-01-17

## 2022-01-17 LAB
ALBUMIN SERPL-MCNC: 2.9 G/DL (ref 3.5–5.2)
ALBUMIN/GLOB SERPL: 0.6 G/DL
ALP SERPL-CCNC: 106 U/L (ref 39–117)
ALT SERPL W P-5'-P-CCNC: 26 U/L (ref 1–41)
ANION GAP SERPL CALCULATED.3IONS-SCNC: 13.5 MMOL/L (ref 5–15)
AST SERPL-CCNC: 40 U/L (ref 1–40)
B PARAPERT DNA SPEC QL NAA+PROBE: NOT DETECTED
B PERT DNA SPEC QL NAA+PROBE: NOT DETECTED
BILIRUB SERPL-MCNC: 0.5 MG/DL (ref 0–1.2)
BUN SERPL-MCNC: 29 MG/DL (ref 8–23)
BUN/CREAT SERPL: 27.9 (ref 7–25)
C PNEUM DNA NPH QL NAA+NON-PROBE: NOT DETECTED
CALCIUM SPEC-SCNC: 8.5 MG/DL (ref 8.6–10.5)
CHLORIDE SERPL-SCNC: 103 MMOL/L (ref 98–107)
CO2 SERPL-SCNC: 21.5 MMOL/L (ref 22–29)
CREAT SERPL-MCNC: 1.04 MG/DL (ref 0.76–1.27)
FLUAV SUBTYP SPEC NAA+PROBE: NOT DETECTED
FLUBV RNA ISLT QL NAA+PROBE: NOT DETECTED
GFR SERPL CREATININE-BSD FRML MDRD: 72 ML/MIN/1.73
GLOBULIN UR ELPH-MCNC: 5 GM/DL
GLUCOSE BLDC GLUCOMTR-MCNC: 136 MG/DL (ref 70–130)
GLUCOSE BLDC GLUCOMTR-MCNC: 140 MG/DL (ref 70–130)
GLUCOSE BLDC GLUCOMTR-MCNC: 146 MG/DL (ref 70–130)
GLUCOSE BLDC GLUCOMTR-MCNC: 148 MG/DL (ref 70–130)
GLUCOSE BLDC GLUCOMTR-MCNC: 159 MG/DL (ref 70–130)
GLUCOSE SERPL-MCNC: 137 MG/DL (ref 65–99)
HADV DNA SPEC NAA+PROBE: NOT DETECTED
HCOV 229E RNA SPEC QL NAA+PROBE: NOT DETECTED
HCOV HKU1 RNA SPEC QL NAA+PROBE: NOT DETECTED
HCOV NL63 RNA SPEC QL NAA+PROBE: NOT DETECTED
HCOV OC43 RNA SPEC QL NAA+PROBE: NOT DETECTED
HMPV RNA NPH QL NAA+NON-PROBE: NOT DETECTED
HPIV1 RNA ISLT QL NAA+PROBE: NOT DETECTED
HPIV2 RNA SPEC QL NAA+PROBE: NOT DETECTED
HPIV3 RNA NPH QL NAA+PROBE: NOT DETECTED
HPIV4 P GENE NPH QL NAA+PROBE: NOT DETECTED
M PNEUMO IGG SER IA-ACNC: NOT DETECTED
MAGNESIUM SERPL-MCNC: 2.5 MG/DL (ref 1.6–2.4)
PHOSPHATE SERPL-MCNC: 3.5 MG/DL (ref 2.5–4.5)
POTASSIUM SERPL-SCNC: 5.2 MMOL/L (ref 3.5–5.2)
PROT SERPL-MCNC: 7.9 G/DL (ref 6–8.5)
RHINOVIRUS RNA SPEC NAA+PROBE: NOT DETECTED
RSV RNA NPH QL NAA+NON-PROBE: NOT DETECTED
SARS-COV-2 RNA NPH QL NAA+NON-PROBE: NOT DETECTED
SODIUM SERPL-SCNC: 138 MMOL/L (ref 136–145)

## 2022-01-17 PROCEDURE — 0202U NFCT DS 22 TRGT SARS-COV-2: CPT | Performed by: SURGERY

## 2022-01-17 PROCEDURE — 25010000002 IOPAMIDOL 61 % SOLUTION: Performed by: SURGERY

## 2022-01-17 PROCEDURE — 99232 SBSQ HOSP IP/OBS MODERATE 35: CPT | Performed by: INTERNAL MEDICINE

## 2022-01-17 PROCEDURE — 25010000002 MAGNESIUM SULFATE PER 500 MG OF MAGNESIUM: Performed by: SURGERY

## 2022-01-17 PROCEDURE — 99024 POSTOP FOLLOW-UP VISIT: CPT | Performed by: SURGERY

## 2022-01-17 PROCEDURE — 74177 CT ABD & PELVIS W/CONTRAST: CPT

## 2022-01-17 PROCEDURE — 0W9B3ZZ DRAINAGE OF LEFT PLEURAL CAVITY, PERCUTANEOUS APPROACH: ICD-10-PCS | Performed by: RADIOLOGY

## 2022-01-17 PROCEDURE — 25010000002 ONDANSETRON PER 1 MG: Performed by: INTERNAL MEDICINE

## 2022-01-17 PROCEDURE — 84100 ASSAY OF PHOSPHORUS: CPT | Performed by: INTERNAL MEDICINE

## 2022-01-17 PROCEDURE — 25010000002 HYDROMORPHONE PER 4 MG: Performed by: INTERNAL MEDICINE

## 2022-01-17 PROCEDURE — 0 DIATRIZOATE MEGLUMINE & SODIUM PER 1 ML: Performed by: SURGERY

## 2022-01-17 PROCEDURE — 82962 GLUCOSE BLOOD TEST: CPT

## 2022-01-17 PROCEDURE — 97530 THERAPEUTIC ACTIVITIES: CPT

## 2022-01-17 PROCEDURE — 80053 COMPREHEN METABOLIC PANEL: CPT | Performed by: SURGERY

## 2022-01-17 PROCEDURE — 83735 ASSAY OF MAGNESIUM: CPT | Performed by: SURGERY

## 2022-01-17 PROCEDURE — 25010000002 ENOXAPARIN PER 10 MG: Performed by: INTERNAL MEDICINE

## 2022-01-17 PROCEDURE — 25010000002 CALCIUM GLUCONATE PER 10 ML: Performed by: SURGERY

## 2022-01-17 PROCEDURE — 63710000001 INSULIN REGULAR HUMAN PER 5 UNITS: Performed by: SURGERY

## 2022-01-17 RX ADMIN — MIDODRINE HYDROCHLORIDE 10 MG: 5 TABLET ORAL at 06:47

## 2022-01-17 RX ADMIN — HYDROCODONE BITARTRATE AND ACETAMINOPHEN 1 TABLET: 10; 325 TABLET ORAL at 00:13

## 2022-01-17 RX ADMIN — HYDROMORPHONE HYDROCHLORIDE 0.5 MG: 10 INJECTION INTRAMUSCULAR; INTRAVENOUS; SUBCUTANEOUS at 12:22

## 2022-01-17 RX ADMIN — SODIUM CHLORIDE, PRESERVATIVE FREE 10 ML: 5 INJECTION INTRAVENOUS at 08:39

## 2022-01-17 RX ADMIN — SODIUM CHLORIDE, PRESERVATIVE FREE 10 ML: 5 INJECTION INTRAVENOUS at 20:15

## 2022-01-17 RX ADMIN — HYDROCODONE BITARTRATE AND ACETAMINOPHEN 1 TABLET: 10; 325 TABLET ORAL at 11:23

## 2022-01-17 RX ADMIN — SMOFLIPID 100 ML: 6; 6; 5; 3 INJECTION, EMULSION INTRAVENOUS at 18:19

## 2022-01-17 RX ADMIN — CARVEDILOL 12.5 MG: 12.5 TABLET, FILM COATED ORAL at 18:01

## 2022-01-17 RX ADMIN — DIATRIZOATE MEGLUMINE AND DIATRIZOATE SODIUM 30 ML: 600; 100 SOLUTION ORAL; RECTAL at 08:39

## 2022-01-17 RX ADMIN — ENOXAPARIN SODIUM 40 MG: 40 INJECTION SUBCUTANEOUS at 18:20

## 2022-01-17 RX ADMIN — IOPAMIDOL 85 ML: 612 INJECTION, SOLUTION INTRAVENOUS at 10:40

## 2022-01-17 RX ADMIN — FAMOTIDINE: 10 INJECTION, SOLUTION INTRAVENOUS at 18:24

## 2022-01-17 RX ADMIN — ONDANSETRON 4 MG: 2 INJECTION INTRAMUSCULAR; INTRAVENOUS at 10:11

## 2022-01-17 RX ADMIN — SODIUM CHLORIDE, PRESERVATIVE FREE 10 ML: 5 INJECTION INTRAVENOUS at 20:16

## 2022-01-17 RX ADMIN — MIDODRINE HYDROCHLORIDE 10 MG: 5 TABLET ORAL at 18:01

## 2022-01-18 ENCOUNTER — APPOINTMENT (OUTPATIENT)
Dept: ULTRASOUND IMAGING | Facility: HOSPITAL | Age: 62
End: 2022-01-18

## 2022-01-18 LAB
ALBUMIN SERPL-MCNC: 2.5 G/DL (ref 3.5–5.2)
ALBUMIN SERPL-MCNC: 2.6 G/DL (ref 3.5–5.2)
ALBUMIN/GLOB SERPL: 0.5 G/DL
ALBUMIN/GLOB SERPL: 0.5 G/DL
ALP SERPL-CCNC: 100 U/L (ref 39–117)
ALP SERPL-CCNC: 102 U/L (ref 39–117)
ALT SERPL W P-5'-P-CCNC: 22 U/L (ref 1–41)
ALT SERPL W P-5'-P-CCNC: 26 U/L (ref 1–41)
ANION GAP SERPL CALCULATED.3IONS-SCNC: 12.4 MMOL/L (ref 5–15)
ANION GAP SERPL CALCULATED.3IONS-SCNC: 8.5 MMOL/L (ref 5–15)
AST SERPL-CCNC: 37 U/L (ref 1–40)
AST SERPL-CCNC: 53 U/L (ref 1–40)
BILIRUB SERPL-MCNC: 0.5 MG/DL (ref 0–1.2)
BILIRUB SERPL-MCNC: 0.5 MG/DL (ref 0–1.2)
BUN SERPL-MCNC: 30 MG/DL (ref 8–23)
BUN SERPL-MCNC: 32 MG/DL (ref 8–23)
BUN/CREAT SERPL: 36.6 (ref 7–25)
BUN/CREAT SERPL: 40.5 (ref 7–25)
CALCIUM SPEC-SCNC: 8.3 MG/DL (ref 8.6–10.5)
CALCIUM SPEC-SCNC: 8.6 MG/DL (ref 8.6–10.5)
CHLORIDE SERPL-SCNC: 101 MMOL/L (ref 98–107)
CHLORIDE SERPL-SCNC: 102 MMOL/L (ref 98–107)
CO2 SERPL-SCNC: 18.6 MMOL/L (ref 22–29)
CO2 SERPL-SCNC: 21.5 MMOL/L (ref 22–29)
CREAT SERPL-MCNC: 0.79 MG/DL (ref 0.76–1.27)
CREAT SERPL-MCNC: 0.82 MG/DL (ref 0.76–1.27)
GFR SERPL CREATININE-BSD FRML MDRD: 95 ML/MIN/1.73
GFR SERPL CREATININE-BSD FRML MDRD: 99 ML/MIN/1.73
GLOBULIN UR ELPH-MCNC: 5.4 GM/DL
GLOBULIN UR ELPH-MCNC: 5.5 GM/DL
GLUCOSE BLDC GLUCOMTR-MCNC: 130 MG/DL (ref 70–130)
GLUCOSE BLDC GLUCOMTR-MCNC: 136 MG/DL (ref 70–130)
GLUCOSE BLDC GLUCOMTR-MCNC: 140 MG/DL (ref 70–130)
GLUCOSE BLDC GLUCOMTR-MCNC: 143 MG/DL (ref 70–130)
GLUCOSE BLDC GLUCOMTR-MCNC: 149 MG/DL (ref 70–130)
GLUCOSE BLDC GLUCOMTR-MCNC: 152 MG/DL (ref 70–130)
GLUCOSE SERPL-MCNC: 140 MG/DL (ref 65–99)
GLUCOSE SERPL-MCNC: 156 MG/DL (ref 65–99)
MAGNESIUM SERPL-MCNC: 2.1 MG/DL (ref 1.6–2.4)
MAGNESIUM SERPL-MCNC: 2.4 MG/DL (ref 1.6–2.4)
PHOSPHATE SERPL-MCNC: 3.1 MG/DL (ref 2.5–4.5)
PHOSPHATE SERPL-MCNC: 3.6 MG/DL (ref 2.5–4.5)
POTASSIUM SERPL-SCNC: 4.6 MMOL/L (ref 3.5–5.2)
POTASSIUM SERPL-SCNC: 5.9 MMOL/L (ref 3.5–5.2)
PROT SERPL-MCNC: 8 G/DL (ref 6–8.5)
PROT SERPL-MCNC: 8 G/DL (ref 6–8.5)
SODIUM SERPL-SCNC: 132 MMOL/L (ref 136–145)
SODIUM SERPL-SCNC: 132 MMOL/L (ref 136–145)

## 2022-01-18 PROCEDURE — 97530 THERAPEUTIC ACTIVITIES: CPT

## 2022-01-18 PROCEDURE — 0 LIDOCAINE 1 % SOLUTION: Performed by: RADIOLOGY

## 2022-01-18 PROCEDURE — 99232 SBSQ HOSP IP/OBS MODERATE 35: CPT | Performed by: INTERNAL MEDICINE

## 2022-01-18 PROCEDURE — 87205 SMEAR GRAM STAIN: CPT | Performed by: SURGERY

## 2022-01-18 PROCEDURE — 63710000001 INSULIN REGULAR HUMAN PER 5 UNITS: Performed by: SURGERY

## 2022-01-18 PROCEDURE — 84100 ASSAY OF PHOSPHORUS: CPT | Performed by: INTERNAL MEDICINE

## 2022-01-18 PROCEDURE — 83735 ASSAY OF MAGNESIUM: CPT | Performed by: SURGERY

## 2022-01-18 PROCEDURE — 84100 ASSAY OF PHOSPHORUS: CPT | Performed by: SURGERY

## 2022-01-18 PROCEDURE — 80053 COMPREHEN METABOLIC PANEL: CPT | Performed by: SURGERY

## 2022-01-18 PROCEDURE — 25010000002 ENOXAPARIN PER 10 MG: Performed by: INTERNAL MEDICINE

## 2022-01-18 PROCEDURE — 82962 GLUCOSE BLOOD TEST: CPT

## 2022-01-18 PROCEDURE — 25010000002 CALCIUM GLUCONATE PER 10 ML: Performed by: SURGERY

## 2022-01-18 PROCEDURE — 87015 SPECIMEN INFECT AGNT CONCNTJ: CPT | Performed by: SURGERY

## 2022-01-18 PROCEDURE — 87070 CULTURE OTHR SPECIMN AEROBIC: CPT | Performed by: SURGERY

## 2022-01-18 PROCEDURE — 99024 POSTOP FOLLOW-UP VISIT: CPT | Performed by: SURGERY

## 2022-01-18 PROCEDURE — 25010000002 MAGNESIUM SULFATE PER 500 MG OF MAGNESIUM: Performed by: SURGERY

## 2022-01-18 PROCEDURE — 25010000002 ALTEPLASE PER 1 MG: Performed by: SURGERY

## 2022-01-18 RX ORDER — LIDOCAINE HYDROCHLORIDE 10 MG/ML
10 INJECTION, SOLUTION INFILTRATION; PERINEURAL ONCE
Status: COMPLETED | OUTPATIENT
Start: 2022-01-18 | End: 2022-01-18

## 2022-01-18 RX ADMIN — INSULIN HUMAN 2 UNITS: 100 INJECTION, SOLUTION PARENTERAL at 06:39

## 2022-01-18 RX ADMIN — ALTEPLASE: KIT at 23:05

## 2022-01-18 RX ADMIN — HYDROCODONE BITARTRATE AND ACETAMINOPHEN 1 TABLET: 10; 325 TABLET ORAL at 23:38

## 2022-01-18 RX ADMIN — FAMOTIDINE: 10 INJECTION, SOLUTION INTRAVENOUS at 17:37

## 2022-01-18 RX ADMIN — SODIUM CHLORIDE, PRESERVATIVE FREE 10 ML: 5 INJECTION INTRAVENOUS at 20:11

## 2022-01-18 RX ADMIN — ENOXAPARIN SODIUM 40 MG: 40 INJECTION SUBCUTANEOUS at 18:30

## 2022-01-18 RX ADMIN — HYDROCODONE BITARTRATE AND ACETAMINOPHEN 1 TABLET: 10; 325 TABLET ORAL at 04:09

## 2022-01-18 RX ADMIN — MIDODRINE HYDROCHLORIDE 10 MG: 5 TABLET ORAL at 12:16

## 2022-01-18 RX ADMIN — SODIUM CHLORIDE, PRESERVATIVE FREE 10 ML: 5 INJECTION INTRAVENOUS at 08:54

## 2022-01-18 RX ADMIN — CARVEDILOL 12.5 MG: 12.5 TABLET, FILM COATED ORAL at 17:32

## 2022-01-18 RX ADMIN — MIDODRINE HYDROCHLORIDE 10 MG: 5 TABLET ORAL at 17:32

## 2022-01-18 RX ADMIN — LIDOCAINE HYDROCHLORIDE 7 ML: 10 INJECTION, SOLUTION INFILTRATION; PERINEURAL at 10:18

## 2022-01-18 RX ADMIN — SMOFLIPID 100 ML: 6; 6; 5; 3 INJECTION, EMULSION INTRAVENOUS at 20:11

## 2022-01-19 LAB
ALBUMIN SERPL-MCNC: 2.7 G/DL (ref 3.5–5.2)
ALBUMIN/GLOB SERPL: 0.6 G/DL
ALP SERPL-CCNC: 103 U/L (ref 39–117)
ALT SERPL W P-5'-P-CCNC: 24 U/L (ref 1–41)
ANION GAP SERPL CALCULATED.3IONS-SCNC: 11.2 MMOL/L (ref 5–15)
AST SERPL-CCNC: 41 U/L (ref 1–40)
BILIRUB SERPL-MCNC: 0.5 MG/DL (ref 0–1.2)
BUN SERPL-MCNC: 30 MG/DL (ref 8–23)
BUN/CREAT SERPL: 40 (ref 7–25)
CALCIUM SPEC-SCNC: 8.5 MG/DL (ref 8.6–10.5)
CHLORIDE SERPL-SCNC: 100 MMOL/L (ref 98–107)
CO2 SERPL-SCNC: 18.8 MMOL/L (ref 22–29)
CREAT SERPL-MCNC: 0.75 MG/DL (ref 0.76–1.27)
DEPRECATED RDW RBC AUTO: 48.8 FL (ref 37–54)
ERYTHROCYTE [DISTWIDTH] IN BLOOD BY AUTOMATED COUNT: 16.7 % (ref 12.3–15.4)
GFR SERPL CREATININE-BSD FRML MDRD: 106 ML/MIN/1.73
GLOBULIN UR ELPH-MCNC: 4.8 GM/DL
GLUCOSE BLDC GLUCOMTR-MCNC: 127 MG/DL (ref 70–130)
GLUCOSE BLDC GLUCOMTR-MCNC: 134 MG/DL (ref 70–130)
GLUCOSE BLDC GLUCOMTR-MCNC: 137 MG/DL (ref 70–130)
GLUCOSE BLDC GLUCOMTR-MCNC: 151 MG/DL (ref 70–130)
GLUCOSE SERPL-MCNC: 140 MG/DL (ref 65–99)
HCT VFR BLD AUTO: 27 % (ref 37.5–51)
HGB BLD-MCNC: 8.4 G/DL (ref 13–17.7)
MAGNESIUM SERPL-MCNC: 2.1 MG/DL (ref 1.6–2.4)
MCH RBC QN AUTO: 26.8 PG (ref 26.6–33)
MCHC RBC AUTO-ENTMCNC: 31.1 G/DL (ref 31.5–35.7)
MCV RBC AUTO: 86 FL (ref 79–97)
PHOSPHATE SERPL-MCNC: 3.3 MG/DL (ref 2.5–4.5)
PLATELET # BLD AUTO: 201 10*3/MM3 (ref 140–450)
PMV BLD AUTO: 11.9 FL (ref 6–12)
POTASSIUM SERPL-SCNC: 4.1 MMOL/L (ref 3.5–5.2)
PROT SERPL-MCNC: 7.5 G/DL (ref 6–8.5)
RBC # BLD AUTO: 3.14 10*6/MM3 (ref 4.14–5.8)
SODIUM SERPL-SCNC: 130 MMOL/L (ref 136–145)
WBC NRBC COR # BLD: 9 10*3/MM3 (ref 3.4–10.8)

## 2022-01-19 PROCEDURE — 25010000002 ALTEPLASE PER 1 MG: Performed by: SURGERY

## 2022-01-19 PROCEDURE — 85027 COMPLETE CBC AUTOMATED: CPT | Performed by: SURGERY

## 2022-01-19 PROCEDURE — 82962 GLUCOSE BLOOD TEST: CPT

## 2022-01-19 PROCEDURE — 25010000002 MAGNESIUM SULFATE PER 500 MG OF MAGNESIUM: Performed by: SURGERY

## 2022-01-19 PROCEDURE — 83735 ASSAY OF MAGNESIUM: CPT | Performed by: SURGERY

## 2022-01-19 PROCEDURE — 99024 POSTOP FOLLOW-UP VISIT: CPT | Performed by: SURGERY

## 2022-01-19 PROCEDURE — 63710000001 INSULIN REGULAR HUMAN PER 5 UNITS: Performed by: SURGERY

## 2022-01-19 PROCEDURE — 80053 COMPREHEN METABOLIC PANEL: CPT | Performed by: SURGERY

## 2022-01-19 PROCEDURE — 25010000002 ENOXAPARIN PER 10 MG: Performed by: INTERNAL MEDICINE

## 2022-01-19 PROCEDURE — 99232 SBSQ HOSP IP/OBS MODERATE 35: CPT | Performed by: INTERNAL MEDICINE

## 2022-01-19 PROCEDURE — 97530 THERAPEUTIC ACTIVITIES: CPT

## 2022-01-19 PROCEDURE — 84100 ASSAY OF PHOSPHORUS: CPT | Performed by: INTERNAL MEDICINE

## 2022-01-19 PROCEDURE — 97166 OT EVAL MOD COMPLEX 45 MIN: CPT

## 2022-01-19 PROCEDURE — 25010000002 CALCIUM GLUCONATE PER 10 ML: Performed by: SURGERY

## 2022-01-19 RX ORDER — SIMETHICONE 80 MG
80 TABLET,CHEWABLE ORAL 4 TIMES DAILY PRN
Status: DISCONTINUED | OUTPATIENT
Start: 2022-01-19 | End: 2022-02-03 | Stop reason: HOSPADM

## 2022-01-19 RX ADMIN — HYDROCODONE BITARTRATE AND ACETAMINOPHEN 1 TABLET: 10; 325 TABLET ORAL at 14:25

## 2022-01-19 RX ADMIN — MIDODRINE HYDROCHLORIDE 10 MG: 5 TABLET ORAL at 06:17

## 2022-01-19 RX ADMIN — SODIUM CHLORIDE, PRESERVATIVE FREE 10 ML: 5 INJECTION INTRAVENOUS at 21:26

## 2022-01-19 RX ADMIN — SMOFLIPID 100 ML: 6; 6; 5; 3 INJECTION, EMULSION INTRAVENOUS at 19:37

## 2022-01-19 RX ADMIN — INSULIN HUMAN 2 UNITS: 100 INJECTION, SOLUTION PARENTERAL at 12:33

## 2022-01-19 RX ADMIN — SODIUM CHLORIDE, PRESERVATIVE FREE 10 ML: 5 INJECTION INTRAVENOUS at 09:47

## 2022-01-19 RX ADMIN — ACETAMINOPHEN 650 MG: 325 TABLET, FILM COATED ORAL at 23:50

## 2022-01-19 RX ADMIN — SIMETHICONE 80 MG: 80 TABLET, CHEWABLE ORAL at 23:50

## 2022-01-19 RX ADMIN — ALTEPLASE: KIT at 01:21

## 2022-01-19 RX ADMIN — ENOXAPARIN SODIUM 40 MG: 40 INJECTION SUBCUTANEOUS at 19:37

## 2022-01-19 RX ADMIN — CARVEDILOL 12.5 MG: 12.5 TABLET, FILM COATED ORAL at 17:46

## 2022-01-19 RX ADMIN — FAMOTIDINE: 10 INJECTION, SOLUTION INTRAVENOUS at 17:46

## 2022-01-20 LAB
ALBUMIN SERPL-MCNC: 2.5 G/DL (ref 3.5–5.2)
ALBUMIN/GLOB SERPL: 0.5 G/DL
ALP SERPL-CCNC: 117 U/L (ref 39–117)
ALT SERPL W P-5'-P-CCNC: 27 U/L (ref 1–41)
ANION GAP SERPL CALCULATED.3IONS-SCNC: 9.1 MMOL/L (ref 5–15)
AST SERPL-CCNC: 50 U/L (ref 1–40)
BILIRUB SERPL-MCNC: 0.5 MG/DL (ref 0–1.2)
BUN SERPL-MCNC: 29 MG/DL (ref 8–23)
BUN/CREAT SERPL: 35.8 (ref 7–25)
CALCIUM SPEC-SCNC: 8.4 MG/DL (ref 8.6–10.5)
CHLORIDE SERPL-SCNC: 102 MMOL/L (ref 98–107)
CO2 SERPL-SCNC: 22.9 MMOL/L (ref 22–29)
CREAT SERPL-MCNC: 0.81 MG/DL (ref 0.76–1.27)
GFR SERPL CREATININE-BSD FRML MDRD: 97 ML/MIN/1.73
GLOBULIN UR ELPH-MCNC: 5.4 GM/DL
GLUCOSE BLDC GLUCOMTR-MCNC: 105 MG/DL (ref 70–130)
GLUCOSE BLDC GLUCOMTR-MCNC: 136 MG/DL (ref 70–130)
GLUCOSE BLDC GLUCOMTR-MCNC: 150 MG/DL (ref 70–130)
GLUCOSE SERPL-MCNC: 115 MG/DL (ref 65–99)
MAGNESIUM SERPL-MCNC: 2.1 MG/DL (ref 1.6–2.4)
PHOSPHATE SERPL-MCNC: 3.2 MG/DL (ref 2.5–4.5)
POTASSIUM SERPL-SCNC: 4.7 MMOL/L (ref 3.5–5.2)
PROT SERPL-MCNC: 7.9 G/DL (ref 6–8.5)
SODIUM SERPL-SCNC: 134 MMOL/L (ref 136–145)

## 2022-01-20 PROCEDURE — 25010000002 MAGNESIUM SULFATE PER 500 MG OF MAGNESIUM: Performed by: SURGERY

## 2022-01-20 PROCEDURE — 82962 GLUCOSE BLOOD TEST: CPT

## 2022-01-20 PROCEDURE — 80053 COMPREHEN METABOLIC PANEL: CPT | Performed by: SURGERY

## 2022-01-20 PROCEDURE — 97110 THERAPEUTIC EXERCISES: CPT

## 2022-01-20 PROCEDURE — 25010000002 CALCIUM GLUCONATE PER 10 ML: Performed by: SURGERY

## 2022-01-20 PROCEDURE — 99024 POSTOP FOLLOW-UP VISIT: CPT | Performed by: SURGERY

## 2022-01-20 PROCEDURE — 97530 THERAPEUTIC ACTIVITIES: CPT

## 2022-01-20 PROCEDURE — 83735 ASSAY OF MAGNESIUM: CPT | Performed by: SURGERY

## 2022-01-20 PROCEDURE — 84100 ASSAY OF PHOSPHORUS: CPT | Performed by: INTERNAL MEDICINE

## 2022-01-20 PROCEDURE — 63710000001 INSULIN REGULAR HUMAN PER 5 UNITS: Performed by: SURGERY

## 2022-01-20 PROCEDURE — 25010000002 ENOXAPARIN PER 10 MG: Performed by: INTERNAL MEDICINE

## 2022-01-20 RX ADMIN — SODIUM CHLORIDE, PRESERVATIVE FREE 10 ML: 5 INJECTION INTRAVENOUS at 22:24

## 2022-01-20 RX ADMIN — SODIUM CHLORIDE, PRESERVATIVE FREE 10 ML: 5 INJECTION INTRAVENOUS at 08:32

## 2022-01-20 RX ADMIN — INSULIN HUMAN 2 UNITS: 100 INJECTION, SOLUTION PARENTERAL at 13:25

## 2022-01-20 RX ADMIN — HYDROCODONE BITARTRATE AND ACETAMINOPHEN 1 TABLET: 10; 325 TABLET ORAL at 08:46

## 2022-01-20 RX ADMIN — SMOFLIPID 100 ML: 6; 6; 5; 3 INJECTION, EMULSION INTRAVENOUS at 19:11

## 2022-01-20 RX ADMIN — SODIUM CHLORIDE, PRESERVATIVE FREE 10 ML: 5 INJECTION INTRAVENOUS at 08:34

## 2022-01-20 RX ADMIN — ENOXAPARIN SODIUM 40 MG: 40 INJECTION SUBCUTANEOUS at 19:11

## 2022-01-20 RX ADMIN — FAMOTIDINE: 10 INJECTION, SOLUTION INTRAVENOUS at 19:11

## 2022-01-20 RX ADMIN — CARVEDILOL 12.5 MG: 12.5 TABLET, FILM COATED ORAL at 19:11

## 2022-01-21 LAB
ALBUMIN SERPL-MCNC: 2.6 G/DL (ref 3.5–5.2)
ALBUMIN/GLOB SERPL: 0.5 G/DL
ALP SERPL-CCNC: 122 U/L (ref 39–117)
ALT SERPL W P-5'-P-CCNC: 29 U/L (ref 1–41)
ANION GAP SERPL CALCULATED.3IONS-SCNC: 9.5 MMOL/L (ref 5–15)
AST SERPL-CCNC: 47 U/L (ref 1–40)
BILIRUB SERPL-MCNC: 0.5 MG/DL (ref 0–1.2)
BUN SERPL-MCNC: 31 MG/DL (ref 8–23)
BUN/CREAT SERPL: 38.3 (ref 7–25)
CALCIUM SPEC-SCNC: 8.3 MG/DL (ref 8.6–10.5)
CHLORIDE SERPL-SCNC: 101 MMOL/L (ref 98–107)
CO2 SERPL-SCNC: 20.5 MMOL/L (ref 22–29)
CREAT SERPL-MCNC: 0.81 MG/DL (ref 0.76–1.27)
GFR SERPL CREATININE-BSD FRML MDRD: 97 ML/MIN/1.73
GLOBULIN UR ELPH-MCNC: 4.9 GM/DL
GLUCOSE BLDC GLUCOMTR-MCNC: 139 MG/DL (ref 70–130)
GLUCOSE BLDC GLUCOMTR-MCNC: 149 MG/DL (ref 70–130)
GLUCOSE BLDC GLUCOMTR-MCNC: 151 MG/DL (ref 70–130)
GLUCOSE BLDC GLUCOMTR-MCNC: 152 MG/DL (ref 70–130)
GLUCOSE BLDC GLUCOMTR-MCNC: 162 MG/DL (ref 70–130)
GLUCOSE SERPL-MCNC: 136 MG/DL (ref 65–99)
MAGNESIUM SERPL-MCNC: 2.1 MG/DL (ref 1.6–2.4)
MAGNESIUM SERPL-MCNC: 2.1 MG/DL (ref 1.6–2.4)
PHOSPHATE SERPL-MCNC: 3 MG/DL (ref 2.5–4.5)
POTASSIUM SERPL-SCNC: 4.1 MMOL/L (ref 3.5–5.2)
PROT SERPL-MCNC: 7.5 G/DL (ref 6–8.5)
QT INTERVAL: 365 MS
SODIUM SERPL-SCNC: 131 MMOL/L (ref 136–145)
TROPONIN T SERPL-MCNC: 0.04 NG/ML (ref 0–0.03)

## 2022-01-21 PROCEDURE — 97110 THERAPEUTIC EXERCISES: CPT

## 2022-01-21 PROCEDURE — 80053 COMPREHEN METABOLIC PANEL: CPT | Performed by: SURGERY

## 2022-01-21 PROCEDURE — 84484 ASSAY OF TROPONIN QUANT: CPT | Performed by: SURGERY

## 2022-01-21 PROCEDURE — 25010000002 ENOXAPARIN PER 10 MG: Performed by: INTERNAL MEDICINE

## 2022-01-21 PROCEDURE — 83735 ASSAY OF MAGNESIUM: CPT | Performed by: SURGERY

## 2022-01-21 PROCEDURE — 63710000001 INSULIN REGULAR HUMAN PER 5 UNITS: Performed by: SURGERY

## 2022-01-21 PROCEDURE — 93010 ELECTROCARDIOGRAM REPORT: CPT | Performed by: INTERNAL MEDICINE

## 2022-01-21 PROCEDURE — 97535 SELF CARE MNGMENT TRAINING: CPT

## 2022-01-21 PROCEDURE — 25010000002 MAGNESIUM SULFATE PER 500 MG OF MAGNESIUM: Performed by: SURGERY

## 2022-01-21 PROCEDURE — 93005 ELECTROCARDIOGRAM TRACING: CPT | Performed by: SURGERY

## 2022-01-21 PROCEDURE — 82962 GLUCOSE BLOOD TEST: CPT

## 2022-01-21 PROCEDURE — 25010000002 CALCIUM GLUCONATE PER 10 ML: Performed by: SURGERY

## 2022-01-21 PROCEDURE — 84100 ASSAY OF PHOSPHORUS: CPT | Performed by: INTERNAL MEDICINE

## 2022-01-21 RX ADMIN — FAMOTIDINE: 10 INJECTION, SOLUTION INTRAVENOUS at 18:47

## 2022-01-21 RX ADMIN — INSULIN HUMAN 2 UNITS: 100 INJECTION, SOLUTION PARENTERAL at 18:46

## 2022-01-21 RX ADMIN — SODIUM CHLORIDE, PRESERVATIVE FREE 10 ML: 5 INJECTION INTRAVENOUS at 10:05

## 2022-01-21 RX ADMIN — SMOFLIPID 100 ML: 6; 6; 5; 3 INJECTION, EMULSION INTRAVENOUS at 18:47

## 2022-01-21 RX ADMIN — HYDROCODONE BITARTRATE AND ACETAMINOPHEN 1 TABLET: 10; 325 TABLET ORAL at 14:41

## 2022-01-21 RX ADMIN — ENOXAPARIN SODIUM 40 MG: 40 INJECTION SUBCUTANEOUS at 18:46

## 2022-01-21 RX ADMIN — ACETAMINOPHEN 650 MG: 325 TABLET, FILM COATED ORAL at 22:27

## 2022-01-21 RX ADMIN — CARVEDILOL 12.5 MG: 12.5 TABLET, FILM COATED ORAL at 18:46

## 2022-01-21 RX ADMIN — INSULIN HUMAN 2 UNITS: 100 INJECTION, SOLUTION PARENTERAL at 10:05

## 2022-01-21 RX ADMIN — SODIUM CHLORIDE, PRESERVATIVE FREE 10 ML: 5 INJECTION INTRAVENOUS at 21:30

## 2022-01-22 LAB
ALBUMIN SERPL-MCNC: 2.7 G/DL (ref 3.5–5.2)
ALBUMIN/GLOB SERPL: 0.6 G/DL
ALP SERPL-CCNC: 113 U/L (ref 39–117)
ALT SERPL W P-5'-P-CCNC: 29 U/L (ref 1–41)
ANION GAP SERPL CALCULATED.3IONS-SCNC: 9.3 MMOL/L (ref 5–15)
AST SERPL-CCNC: 43 U/L (ref 1–40)
BILIRUB SERPL-MCNC: 0.5 MG/DL (ref 0–1.2)
BUN SERPL-MCNC: 32 MG/DL (ref 8–23)
BUN/CREAT SERPL: 40 (ref 7–25)
CALCIUM SPEC-SCNC: 7.9 MG/DL (ref 8.6–10.5)
CHLORIDE SERPL-SCNC: 101 MMOL/L (ref 98–107)
CO2 SERPL-SCNC: 22.7 MMOL/L (ref 22–29)
CREAT SERPL-MCNC: 0.8 MG/DL (ref 0.76–1.27)
GFR SERPL CREATININE-BSD FRML MDRD: 98 ML/MIN/1.73
GLOBULIN UR ELPH-MCNC: 4.9 GM/DL
GLUCOSE BLDC GLUCOMTR-MCNC: 132 MG/DL (ref 70–130)
GLUCOSE BLDC GLUCOMTR-MCNC: 134 MG/DL (ref 70–130)
GLUCOSE BLDC GLUCOMTR-MCNC: 135 MG/DL (ref 70–130)
GLUCOSE BLDC GLUCOMTR-MCNC: 137 MG/DL (ref 70–130)
GLUCOSE BLDC GLUCOMTR-MCNC: 156 MG/DL (ref 70–130)
GLUCOSE SERPL-MCNC: 118 MG/DL (ref 65–99)
MAGNESIUM SERPL-MCNC: 2.1 MG/DL (ref 1.6–2.4)
PHOSPHATE SERPL-MCNC: 3.7 MG/DL (ref 2.5–4.5)
POTASSIUM SERPL-SCNC: 4 MMOL/L (ref 3.5–5.2)
PROT SERPL-MCNC: 7.6 G/DL (ref 6–8.5)
SODIUM SERPL-SCNC: 133 MMOL/L (ref 136–145)

## 2022-01-22 PROCEDURE — 80053 COMPREHEN METABOLIC PANEL: CPT | Performed by: SURGERY

## 2022-01-22 PROCEDURE — 25010000002 CALCIUM GLUCONATE PER 10 ML: Performed by: SURGERY

## 2022-01-22 PROCEDURE — 25010000002 ONDANSETRON PER 1 MG: Performed by: INTERNAL MEDICINE

## 2022-01-22 PROCEDURE — 63710000001 INSULIN REGULAR HUMAN PER 5 UNITS: Performed by: SURGERY

## 2022-01-22 PROCEDURE — 97110 THERAPEUTIC EXERCISES: CPT

## 2022-01-22 PROCEDURE — 25010000002 MAGNESIUM SULFATE PER 500 MG OF MAGNESIUM: Performed by: SURGERY

## 2022-01-22 PROCEDURE — 82962 GLUCOSE BLOOD TEST: CPT

## 2022-01-22 PROCEDURE — 25010000002 MORPHINE PER 10 MG: Performed by: SURGERY

## 2022-01-22 PROCEDURE — 84100 ASSAY OF PHOSPHORUS: CPT | Performed by: INTERNAL MEDICINE

## 2022-01-22 PROCEDURE — 99252 IP/OBS CONSLTJ NEW/EST SF 35: CPT | Performed by: INTERNAL MEDICINE

## 2022-01-22 PROCEDURE — 25010000002 ENOXAPARIN PER 10 MG: Performed by: INTERNAL MEDICINE

## 2022-01-22 PROCEDURE — 83735 ASSAY OF MAGNESIUM: CPT | Performed by: SURGERY

## 2022-01-22 RX ORDER — MORPHINE SULFATE 2 MG/ML
4 INJECTION, SOLUTION INTRAMUSCULAR; INTRAVENOUS ONCE
Status: COMPLETED | OUTPATIENT
Start: 2022-01-22 | End: 2022-01-22

## 2022-01-22 RX ADMIN — ONDANSETRON 4 MG: 2 INJECTION INTRAMUSCULAR; INTRAVENOUS at 04:25

## 2022-01-22 RX ADMIN — ONDANSETRON 4 MG: 2 INJECTION INTRAMUSCULAR; INTRAVENOUS at 16:34

## 2022-01-22 RX ADMIN — MORPHINE SULFATE 4 MG: 2 INJECTION, SOLUTION INTRAMUSCULAR; INTRAVENOUS at 16:34

## 2022-01-22 RX ADMIN — CARVEDILOL 12.5 MG: 12.5 TABLET, FILM COATED ORAL at 16:34

## 2022-01-22 RX ADMIN — FAMOTIDINE: 10 INJECTION, SOLUTION INTRAVENOUS at 18:24

## 2022-01-22 RX ADMIN — SODIUM CHLORIDE, PRESERVATIVE FREE 10 ML: 5 INJECTION INTRAVENOUS at 09:43

## 2022-01-22 RX ADMIN — ENOXAPARIN SODIUM 40 MG: 40 INJECTION SUBCUTANEOUS at 18:24

## 2022-01-22 RX ADMIN — SODIUM CHLORIDE, PRESERVATIVE FREE 10 ML: 5 INJECTION INTRAVENOUS at 21:20

## 2022-01-22 RX ADMIN — SODIUM CHLORIDE, PRESERVATIVE FREE 10 ML: 5 INJECTION INTRAVENOUS at 09:40

## 2022-01-22 RX ADMIN — SMOFLIPID 100 ML: 6; 6; 5; 3 INJECTION, EMULSION INTRAVENOUS at 22:09

## 2022-01-22 RX ADMIN — ACETAMINOPHEN 650 MG: 325 TABLET, FILM COATED ORAL at 09:43

## 2022-01-22 RX ADMIN — SODIUM CHLORIDE, PRESERVATIVE FREE 10 ML: 5 INJECTION INTRAVENOUS at 21:19

## 2022-01-23 ENCOUNTER — APPOINTMENT (OUTPATIENT)
Dept: GENERAL RADIOLOGY | Facility: HOSPITAL | Age: 62
End: 2022-01-23

## 2022-01-23 LAB
ALBUMIN SERPL-MCNC: 2.7 G/DL (ref 3.5–5.2)
ALBUMIN/GLOB SERPL: 0.6 G/DL
ALP SERPL-CCNC: 116 U/L (ref 39–117)
ALT SERPL W P-5'-P-CCNC: 31 U/L (ref 1–41)
ANION GAP SERPL CALCULATED.3IONS-SCNC: 9.7 MMOL/L (ref 5–15)
AST SERPL-CCNC: 43 U/L (ref 1–40)
BACTERIA FLD CULT: NORMAL
BILIRUB SERPL-MCNC: 0.5 MG/DL (ref 0–1.2)
BUN SERPL-MCNC: 29 MG/DL (ref 8–23)
BUN/CREAT SERPL: 36.3 (ref 7–25)
CALCIUM SPEC-SCNC: 8.4 MG/DL (ref 8.6–10.5)
CHLORIDE SERPL-SCNC: 102 MMOL/L (ref 98–107)
CO2 SERPL-SCNC: 21.3 MMOL/L (ref 22–29)
CREAT SERPL-MCNC: 0.8 MG/DL (ref 0.76–1.27)
GFR SERPL CREATININE-BSD FRML MDRD: 98 ML/MIN/1.73
GLOBULIN UR ELPH-MCNC: 4.6 GM/DL
GLUCOSE BLDC GLUCOMTR-MCNC: 118 MG/DL (ref 70–130)
GLUCOSE BLDC GLUCOMTR-MCNC: 126 MG/DL (ref 70–130)
GLUCOSE BLDC GLUCOMTR-MCNC: 128 MG/DL (ref 70–130)
GLUCOSE BLDC GLUCOMTR-MCNC: 132 MG/DL (ref 70–130)
GLUCOSE BLDC GLUCOMTR-MCNC: 136 MG/DL (ref 70–130)
GLUCOSE SERPL-MCNC: 118 MG/DL (ref 65–99)
GRAM STN SPEC: NORMAL
GRAM STN SPEC: NORMAL
MAGNESIUM SERPL-MCNC: 2 MG/DL (ref 1.6–2.4)
NT-PROBNP SERPL-MCNC: 6058 PG/ML (ref 0–900)
PHOSPHATE SERPL-MCNC: 3.2 MG/DL (ref 2.5–4.5)
POTASSIUM SERPL-SCNC: 3.8 MMOL/L (ref 3.5–5.2)
PROT SERPL-MCNC: 7.3 G/DL (ref 6–8.5)
QT INTERVAL: 369 MS
SODIUM SERPL-SCNC: 133 MMOL/L (ref 136–145)
TROPONIN T SERPL-MCNC: 0.08 NG/ML (ref 0–0.03)

## 2022-01-23 PROCEDURE — 82962 GLUCOSE BLOOD TEST: CPT

## 2022-01-23 PROCEDURE — 83735 ASSAY OF MAGNESIUM: CPT | Performed by: SURGERY

## 2022-01-23 PROCEDURE — 25010000002 FUROSEMIDE PER 20 MG: Performed by: INTERNAL MEDICINE

## 2022-01-23 PROCEDURE — 80053 COMPREHEN METABOLIC PANEL: CPT | Performed by: SURGERY

## 2022-01-23 PROCEDURE — 84484 ASSAY OF TROPONIN QUANT: CPT | Performed by: INTERNAL MEDICINE

## 2022-01-23 PROCEDURE — 93005 ELECTROCARDIOGRAM TRACING: CPT | Performed by: INTERNAL MEDICINE

## 2022-01-23 PROCEDURE — 25010000002 MAGNESIUM SULFATE PER 500 MG OF MAGNESIUM: Performed by: SURGERY

## 2022-01-23 PROCEDURE — 71045 X-RAY EXAM CHEST 1 VIEW: CPT

## 2022-01-23 PROCEDURE — 25010000002 CALCIUM GLUCONATE PER 10 ML: Performed by: SURGERY

## 2022-01-23 PROCEDURE — 25010000002 ENOXAPARIN PER 10 MG: Performed by: INTERNAL MEDICINE

## 2022-01-23 PROCEDURE — 99233 SBSQ HOSP IP/OBS HIGH 50: CPT | Performed by: INTERNAL MEDICINE

## 2022-01-23 PROCEDURE — 93010 ELECTROCARDIOGRAM REPORT: CPT | Performed by: INTERNAL MEDICINE

## 2022-01-23 PROCEDURE — 84100 ASSAY OF PHOSPHORUS: CPT | Performed by: INTERNAL MEDICINE

## 2022-01-23 PROCEDURE — 83880 ASSAY OF NATRIURETIC PEPTIDE: CPT | Performed by: INTERNAL MEDICINE

## 2022-01-23 PROCEDURE — 63710000001 INSULIN REGULAR HUMAN PER 5 UNITS: Performed by: SURGERY

## 2022-01-23 RX ORDER — FUROSEMIDE 10 MG/ML
40 INJECTION INTRAMUSCULAR; INTRAVENOUS EVERY 12 HOURS
Status: DISCONTINUED | OUTPATIENT
Start: 2022-01-23 | End: 2022-01-23

## 2022-01-23 RX ORDER — FUROSEMIDE 10 MG/ML
40 INJECTION INTRAMUSCULAR; INTRAVENOUS EVERY 12 HOURS
Status: DISCONTINUED | OUTPATIENT
Start: 2022-01-23 | End: 2022-01-25

## 2022-01-23 RX ORDER — CARVEDILOL 25 MG/1
25 TABLET ORAL
Status: DISCONTINUED | OUTPATIENT
Start: 2022-01-23 | End: 2022-01-25

## 2022-01-23 RX ADMIN — FUROSEMIDE 40 MG: 10 INJECTION, SOLUTION INTRAMUSCULAR; INTRAVENOUS at 23:50

## 2022-01-23 RX ADMIN — CARVEDILOL 25 MG: 25 TABLET, FILM COATED ORAL at 17:12

## 2022-01-23 RX ADMIN — SODIUM CHLORIDE, PRESERVATIVE FREE 10 ML: 5 INJECTION INTRAVENOUS at 20:23

## 2022-01-23 RX ADMIN — ENOXAPARIN SODIUM 40 MG: 40 INJECTION SUBCUTANEOUS at 18:29

## 2022-01-23 RX ADMIN — FAMOTIDINE: 10 INJECTION, SOLUTION INTRAVENOUS at 17:12

## 2022-01-23 RX ADMIN — SMOFLIPID 100 ML: 6; 6; 5; 3 INJECTION, EMULSION INTRAVENOUS at 18:29

## 2022-01-23 RX ADMIN — SIMETHICONE 80 MG: 80 TABLET, CHEWABLE ORAL at 17:12

## 2022-01-23 RX ADMIN — ACETAMINOPHEN 650 MG: 325 TABLET, FILM COATED ORAL at 02:09

## 2022-01-23 RX ADMIN — ACETAMINOPHEN 650 MG: 325 TABLET, FILM COATED ORAL at 17:12

## 2022-01-23 RX ADMIN — SODIUM CHLORIDE, PRESERVATIVE FREE 10 ML: 5 INJECTION INTRAVENOUS at 08:44

## 2022-01-23 RX ADMIN — FUROSEMIDE 40 MG: 10 INJECTION, SOLUTION INTRAMUSCULAR; INTRAVENOUS at 10:41

## 2022-01-24 LAB
ALBUMIN SERPL-MCNC: 2.3 G/DL (ref 3.5–5.2)
ALBUMIN/GLOB SERPL: 0.5 G/DL
ALP SERPL-CCNC: 108 U/L (ref 39–117)
ALT SERPL W P-5'-P-CCNC: 36 U/L (ref 1–41)
ANION GAP SERPL CALCULATED.3IONS-SCNC: 11.7 MMOL/L (ref 5–15)
AST SERPL-CCNC: 58 U/L (ref 1–40)
BILIRUB SERPL-MCNC: 0.5 MG/DL (ref 0–1.2)
BUN SERPL-MCNC: 27 MG/DL (ref 8–23)
BUN/CREAT SERPL: 34.2 (ref 7–25)
CALCIUM SPEC-SCNC: 8 MG/DL (ref 8.6–10.5)
CHLORIDE SERPL-SCNC: 100 MMOL/L (ref 98–107)
CO2 SERPL-SCNC: 19.3 MMOL/L (ref 22–29)
CREAT SERPL-MCNC: 0.79 MG/DL (ref 0.76–1.27)
GFR SERPL CREATININE-BSD FRML MDRD: 99 ML/MIN/1.73
GLOBULIN UR ELPH-MCNC: 5.1 GM/DL
GLUCOSE BLDC GLUCOMTR-MCNC: 103 MG/DL (ref 70–130)
GLUCOSE BLDC GLUCOMTR-MCNC: 127 MG/DL (ref 70–130)
GLUCOSE BLDC GLUCOMTR-MCNC: 133 MG/DL (ref 70–130)
GLUCOSE BLDC GLUCOMTR-MCNC: 134 MG/DL (ref 70–130)
GLUCOSE BLDC GLUCOMTR-MCNC: 136 MG/DL (ref 70–130)
GLUCOSE SERPL-MCNC: 118 MG/DL (ref 65–99)
MAGNESIUM SERPL-MCNC: 2.2 MG/DL (ref 1.6–2.4)
PHOSPHATE SERPL-MCNC: 3.3 MG/DL (ref 2.5–4.5)
POTASSIUM SERPL-SCNC: 3.5 MMOL/L (ref 3.5–5.2)
PROT SERPL-MCNC: 7.4 G/DL (ref 6–8.5)
SODIUM SERPL-SCNC: 131 MMOL/L (ref 136–145)

## 2022-01-24 PROCEDURE — 99233 SBSQ HOSP IP/OBS HIGH 50: CPT | Performed by: INTERNAL MEDICINE

## 2022-01-24 PROCEDURE — 25010000002 ENOXAPARIN PER 10 MG: Performed by: INTERNAL MEDICINE

## 2022-01-24 PROCEDURE — 25010000002 FUROSEMIDE PER 20 MG: Performed by: INTERNAL MEDICINE

## 2022-01-24 PROCEDURE — 97110 THERAPEUTIC EXERCISES: CPT

## 2022-01-24 PROCEDURE — 84100 ASSAY OF PHOSPHORUS: CPT | Performed by: INTERNAL MEDICINE

## 2022-01-24 PROCEDURE — 97535 SELF CARE MNGMENT TRAINING: CPT

## 2022-01-24 PROCEDURE — 80053 COMPREHEN METABOLIC PANEL: CPT | Performed by: SURGERY

## 2022-01-24 PROCEDURE — 82962 GLUCOSE BLOOD TEST: CPT

## 2022-01-24 PROCEDURE — 25010000002 ONDANSETRON PER 1 MG: Performed by: INTERNAL MEDICINE

## 2022-01-24 PROCEDURE — 83735 ASSAY OF MAGNESIUM: CPT | Performed by: SURGERY

## 2022-01-24 RX ORDER — HYDROCODONE BITARTRATE AND ACETAMINOPHEN 5; 325 MG/1; MG/1
2 TABLET ORAL EVERY 8 HOURS PRN
Status: DISCONTINUED | OUTPATIENT
Start: 2022-01-24 | End: 2022-02-03 | Stop reason: HOSPADM

## 2022-01-24 RX ADMIN — FUROSEMIDE 40 MG: 10 INJECTION, SOLUTION INTRAMUSCULAR; INTRAVENOUS at 11:33

## 2022-01-24 RX ADMIN — ENOXAPARIN SODIUM 40 MG: 40 INJECTION SUBCUTANEOUS at 18:27

## 2022-01-24 RX ADMIN — SODIUM CHLORIDE, PRESERVATIVE FREE 10 ML: 5 INJECTION INTRAVENOUS at 08:43

## 2022-01-24 RX ADMIN — SODIUM CHLORIDE, PRESERVATIVE FREE 10 ML: 5 INJECTION INTRAVENOUS at 20:22

## 2022-01-24 RX ADMIN — CARVEDILOL 25 MG: 25 TABLET, FILM COATED ORAL at 18:28

## 2022-01-24 RX ADMIN — HYDROCODONE BITARTRATE AND ACETAMINOPHEN 2 TABLET: 5; 325 TABLET ORAL at 11:33

## 2022-01-24 RX ADMIN — SODIUM CHLORIDE, PRESERVATIVE FREE 10 ML: 5 INJECTION INTRAVENOUS at 20:21

## 2022-01-24 RX ADMIN — ONDANSETRON 4 MG: 2 INJECTION INTRAMUSCULAR; INTRAVENOUS at 11:39

## 2022-01-25 LAB
ALBUMIN SERPL-MCNC: 2.6 G/DL (ref 3.5–5.2)
ALBUMIN/GLOB SERPL: 0.6 G/DL
ALP SERPL-CCNC: 106 U/L (ref 39–117)
ALT SERPL W P-5'-P-CCNC: 38 U/L (ref 1–41)
ANION GAP SERPL CALCULATED.3IONS-SCNC: 9.7 MMOL/L (ref 5–15)
AST SERPL-CCNC: 56 U/L (ref 1–40)
BILIRUB SERPL-MCNC: 0.6 MG/DL (ref 0–1.2)
BUN SERPL-MCNC: 29 MG/DL (ref 8–23)
BUN/CREAT SERPL: 32.2 (ref 7–25)
CALCIUM SPEC-SCNC: 8.2 MG/DL (ref 8.6–10.5)
CHLORIDE SERPL-SCNC: 99 MMOL/L (ref 98–107)
CO2 SERPL-SCNC: 25.3 MMOL/L (ref 22–29)
CREAT SERPL-MCNC: 0.9 MG/DL (ref 0.76–1.27)
GFR SERPL CREATININE-BSD FRML MDRD: 86 ML/MIN/1.73
GLOBULIN UR ELPH-MCNC: 4.7 GM/DL
GLUCOSE BLDC GLUCOMTR-MCNC: 89 MG/DL (ref 70–130)
GLUCOSE SERPL-MCNC: 89 MG/DL (ref 65–99)
MAGNESIUM SERPL-MCNC: 1.9 MG/DL (ref 1.6–2.4)
PHOSPHATE SERPL-MCNC: 3.4 MG/DL (ref 2.5–4.5)
POTASSIUM SERPL-SCNC: 3.7 MMOL/L (ref 3.5–5.2)
PROT SERPL-MCNC: 7.3 G/DL (ref 6–8.5)
SODIUM SERPL-SCNC: 134 MMOL/L (ref 136–145)

## 2022-01-25 PROCEDURE — 99232 SBSQ HOSP IP/OBS MODERATE 35: CPT | Performed by: INTERNAL MEDICINE

## 2022-01-25 PROCEDURE — 97530 THERAPEUTIC ACTIVITIES: CPT

## 2022-01-25 PROCEDURE — 83735 ASSAY OF MAGNESIUM: CPT | Performed by: SURGERY

## 2022-01-25 PROCEDURE — 97110 THERAPEUTIC EXERCISES: CPT

## 2022-01-25 PROCEDURE — 25010000002 FUROSEMIDE PER 20 MG: Performed by: INTERNAL MEDICINE

## 2022-01-25 PROCEDURE — 80053 COMPREHEN METABOLIC PANEL: CPT | Performed by: SURGERY

## 2022-01-25 PROCEDURE — 25010000002 ENOXAPARIN PER 10 MG: Performed by: INTERNAL MEDICINE

## 2022-01-25 PROCEDURE — 82962 GLUCOSE BLOOD TEST: CPT

## 2022-01-25 PROCEDURE — 84100 ASSAY OF PHOSPHORUS: CPT | Performed by: INTERNAL MEDICINE

## 2022-01-25 RX ORDER — FUROSEMIDE 40 MG/1
40 TABLET ORAL
Status: DISCONTINUED | OUTPATIENT
Start: 2022-01-25 | End: 2022-01-26

## 2022-01-25 RX ORDER — CARVEDILOL 12.5 MG/1
12.5 TABLET ORAL
Status: DISCONTINUED | OUTPATIENT
Start: 2022-01-25 | End: 2022-02-03 | Stop reason: HOSPADM

## 2022-01-25 RX ADMIN — FUROSEMIDE 40 MG: 40 TABLET ORAL at 12:05

## 2022-01-25 RX ADMIN — SODIUM CHLORIDE, PRESERVATIVE FREE 10 ML: 5 INJECTION INTRAVENOUS at 08:58

## 2022-01-25 RX ADMIN — SODIUM CHLORIDE, PRESERVATIVE FREE 10 ML: 5 INJECTION INTRAVENOUS at 20:13

## 2022-01-25 RX ADMIN — CARVEDILOL 12.5 MG: 12.5 TABLET, FILM COATED ORAL at 17:20

## 2022-01-25 RX ADMIN — SODIUM CHLORIDE, PRESERVATIVE FREE 10 ML: 5 INJECTION INTRAVENOUS at 22:32

## 2022-01-25 RX ADMIN — HYDROCODONE BITARTRATE AND ACETAMINOPHEN 2 TABLET: 5; 325 TABLET ORAL at 08:57

## 2022-01-25 RX ADMIN — SODIUM CHLORIDE, PRESERVATIVE FREE 10 ML: 5 INJECTION INTRAVENOUS at 20:12

## 2022-01-25 RX ADMIN — FUROSEMIDE 40 MG: 40 TABLET ORAL at 17:20

## 2022-01-25 RX ADMIN — HYDROCODONE BITARTRATE AND ACETAMINOPHEN 2 TABLET: 5; 325 TABLET ORAL at 20:11

## 2022-01-25 RX ADMIN — ENOXAPARIN SODIUM 40 MG: 40 INJECTION SUBCUTANEOUS at 20:11

## 2022-01-26 LAB
ALBUMIN SERPL-MCNC: 2.7 G/DL (ref 3.5–5.2)
ALBUMIN/GLOB SERPL: 0.6 G/DL
ALP SERPL-CCNC: 100 U/L (ref 39–117)
ALT SERPL W P-5'-P-CCNC: 34 U/L (ref 1–41)
ANION GAP SERPL CALCULATED.3IONS-SCNC: 9.2 MMOL/L (ref 5–15)
AST SERPL-CCNC: 44 U/L (ref 1–40)
BILIRUB SERPL-MCNC: 0.7 MG/DL (ref 0–1.2)
BUN SERPL-MCNC: 26 MG/DL (ref 8–23)
BUN/CREAT SERPL: 27.1 (ref 7–25)
CALCIUM SPEC-SCNC: 8.1 MG/DL (ref 8.6–10.5)
CHLORIDE SERPL-SCNC: 97 MMOL/L (ref 98–107)
CO2 SERPL-SCNC: 26.8 MMOL/L (ref 22–29)
CREAT SERPL-MCNC: 0.96 MG/DL (ref 0.76–1.27)
DEPRECATED RDW RBC AUTO: 51.1 FL (ref 37–54)
ERYTHROCYTE [DISTWIDTH] IN BLOOD BY AUTOMATED COUNT: 16.6 % (ref 12.3–15.4)
GFR SERPL CREATININE-BSD FRML MDRD: 79 ML/MIN/1.73
GLOBULIN UR ELPH-MCNC: 4.7 GM/DL
GLUCOSE SERPL-MCNC: 88 MG/DL (ref 65–99)
HCT VFR BLD AUTO: 32.9 % (ref 37.5–51)
HGB BLD-MCNC: 10.4 G/DL (ref 13–17.7)
MAGNESIUM SERPL-MCNC: 1.9 MG/DL (ref 1.6–2.4)
MCH RBC QN AUTO: 26.9 PG (ref 26.6–33)
MCHC RBC AUTO-ENTMCNC: 31.6 G/DL (ref 31.5–35.7)
MCV RBC AUTO: 85.2 FL (ref 79–97)
NT-PROBNP SERPL-MCNC: 6216 PG/ML (ref 0–900)
PHOSPHATE SERPL-MCNC: 2.8 MG/DL (ref 2.5–4.5)
PLATELET # BLD AUTO: 108 10*3/MM3 (ref 140–450)
PMV BLD AUTO: 11.8 FL (ref 6–12)
POTASSIUM SERPL-SCNC: 3.4 MMOL/L (ref 3.5–5.2)
PROT SERPL-MCNC: 7.4 G/DL (ref 6–8.5)
RBC # BLD AUTO: 3.86 10*6/MM3 (ref 4.14–5.8)
SODIUM SERPL-SCNC: 133 MMOL/L (ref 136–145)
WBC NRBC COR # BLD: 8.64 10*3/MM3 (ref 3.4–10.8)

## 2022-01-26 PROCEDURE — 80053 COMPREHEN METABOLIC PANEL: CPT | Performed by: SURGERY

## 2022-01-26 PROCEDURE — 99024 POSTOP FOLLOW-UP VISIT: CPT | Performed by: SURGERY

## 2022-01-26 PROCEDURE — 25010000002 ENOXAPARIN PER 10 MG: Performed by: INTERNAL MEDICINE

## 2022-01-26 PROCEDURE — 85027 COMPLETE CBC AUTOMATED: CPT | Performed by: INTERNAL MEDICINE

## 2022-01-26 PROCEDURE — 97110 THERAPEUTIC EXERCISES: CPT

## 2022-01-26 PROCEDURE — 83735 ASSAY OF MAGNESIUM: CPT | Performed by: SURGERY

## 2022-01-26 PROCEDURE — 99232 SBSQ HOSP IP/OBS MODERATE 35: CPT | Performed by: NURSE PRACTITIONER

## 2022-01-26 PROCEDURE — 84100 ASSAY OF PHOSPHORUS: CPT | Performed by: INTERNAL MEDICINE

## 2022-01-26 PROCEDURE — 83880 ASSAY OF NATRIURETIC PEPTIDE: CPT | Performed by: INTERNAL MEDICINE

## 2022-01-26 RX ADMIN — CARVEDILOL 12.5 MG: 12.5 TABLET, FILM COATED ORAL at 17:44

## 2022-01-26 RX ADMIN — SODIUM CHLORIDE, PRESERVATIVE FREE 10 ML: 5 INJECTION INTRAVENOUS at 20:50

## 2022-01-26 RX ADMIN — SODIUM CHLORIDE, PRESERVATIVE FREE 10 ML: 5 INJECTION INTRAVENOUS at 09:31

## 2022-01-26 RX ADMIN — HYDROCODONE BITARTRATE AND ACETAMINOPHEN 2 TABLET: 5; 325 TABLET ORAL at 09:56

## 2022-01-26 RX ADMIN — SODIUM CHLORIDE, PRESERVATIVE FREE 10 ML: 5 INJECTION INTRAVENOUS at 20:51

## 2022-01-26 RX ADMIN — HYDROCODONE BITARTRATE AND ACETAMINOPHEN 2 TABLET: 5; 325 TABLET ORAL at 19:34

## 2022-01-26 RX ADMIN — FUROSEMIDE 40 MG: 40 TABLET ORAL at 09:29

## 2022-01-26 RX ADMIN — ENOXAPARIN SODIUM 40 MG: 40 INJECTION SUBCUTANEOUS at 20:50

## 2022-01-27 ENCOUNTER — APPOINTMENT (OUTPATIENT)
Dept: CARDIOLOGY | Facility: HOSPITAL | Age: 62
End: 2022-01-27

## 2022-01-27 LAB
ALBUMIN SERPL-MCNC: 2.6 G/DL (ref 3.5–5.2)
ALBUMIN/GLOB SERPL: 0.5 G/DL
ALP SERPL-CCNC: 95 U/L (ref 39–117)
ALT SERPL W P-5'-P-CCNC: 29 U/L (ref 1–41)
ANION GAP SERPL CALCULATED.3IONS-SCNC: 11.2 MMOL/L (ref 5–15)
AST SERPL-CCNC: 38 U/L (ref 1–40)
BH CV ECHO MEAS - BSA(HAYCOCK): 2.2 M^2
BH CV ECHO MEAS - BSA: 2.1 M^2
BH CV ECHO MEAS - BZI_BMI: 31.8 KILOGRAMS/M^2
BH CV ECHO MEAS - BZI_METRIC_HEIGHT: 172.7 CM
BH CV ECHO MEAS - BZI_METRIC_WEIGHT: 94.8 KG
BH CV ECHO MEAS - TR MAX VEL: 301.3 CM/SEC
BH CV XLRA - TDI S': 13.7 CM/SEC
BILIRUB SERPL-MCNC: 0.7 MG/DL (ref 0–1.2)
BUN SERPL-MCNC: 20 MG/DL (ref 8–23)
BUN/CREAT SERPL: 28.6 (ref 7–25)
CALCIUM SPEC-SCNC: 8.7 MG/DL (ref 8.6–10.5)
CHLORIDE SERPL-SCNC: 96 MMOL/L (ref 98–107)
CO2 SERPL-SCNC: 25.8 MMOL/L (ref 22–29)
CREAT SERPL-MCNC: 0.7 MG/DL (ref 0.76–1.27)
GFR SERPL CREATININE-BSD FRML MDRD: 114 ML/MIN/1.73
GLOBULIN UR ELPH-MCNC: 4.9 GM/DL
GLUCOSE SERPL-MCNC: 85 MG/DL (ref 65–99)
MAGNESIUM SERPL-MCNC: 2 MG/DL (ref 1.6–2.4)
MAXIMAL PREDICTED HEART RATE: 158 BPM
PHOSPHATE SERPL-MCNC: 2.8 MG/DL (ref 2.5–4.5)
POTASSIUM SERPL-SCNC: 3.4 MMOL/L (ref 3.5–5.2)
PROT SERPL-MCNC: 7.5 G/DL (ref 6–8.5)
SODIUM SERPL-SCNC: 133 MMOL/L (ref 136–145)
STRESS TARGET HR: 134 BPM

## 2022-01-27 PROCEDURE — 83735 ASSAY OF MAGNESIUM: CPT | Performed by: SURGERY

## 2022-01-27 PROCEDURE — 93308 TTE F-UP OR LMTD: CPT

## 2022-01-27 PROCEDURE — 97110 THERAPEUTIC EXERCISES: CPT

## 2022-01-27 PROCEDURE — 25010000002 ENOXAPARIN PER 10 MG: Performed by: INTERNAL MEDICINE

## 2022-01-27 PROCEDURE — 93325 DOPPLER ECHO COLOR FLOW MAPG: CPT | Performed by: INTERNAL MEDICINE

## 2022-01-27 PROCEDURE — 99232 SBSQ HOSP IP/OBS MODERATE 35: CPT | Performed by: NURSE PRACTITIONER

## 2022-01-27 PROCEDURE — 93325 DOPPLER ECHO COLOR FLOW MAPG: CPT

## 2022-01-27 PROCEDURE — 93308 TTE F-UP OR LMTD: CPT | Performed by: INTERNAL MEDICINE

## 2022-01-27 PROCEDURE — 93321 DOPPLER ECHO F-UP/LMTD STD: CPT

## 2022-01-27 PROCEDURE — 99024 POSTOP FOLLOW-UP VISIT: CPT | Performed by: SURGERY

## 2022-01-27 PROCEDURE — 84100 ASSAY OF PHOSPHORUS: CPT | Performed by: INTERNAL MEDICINE

## 2022-01-27 PROCEDURE — 93321 DOPPLER ECHO F-UP/LMTD STD: CPT | Performed by: INTERNAL MEDICINE

## 2022-01-27 PROCEDURE — 80053 COMPREHEN METABOLIC PANEL: CPT | Performed by: SURGERY

## 2022-01-27 RX ORDER — FUROSEMIDE 40 MG/1
40 TABLET ORAL DAILY
Status: DISCONTINUED | OUTPATIENT
Start: 2022-01-27 | End: 2022-02-03

## 2022-01-27 RX ORDER — LOPERAMIDE HYDROCHLORIDE 2 MG/1
2 CAPSULE ORAL
Status: DISCONTINUED | OUTPATIENT
Start: 2022-01-27 | End: 2022-02-01

## 2022-01-27 RX ADMIN — SODIUM CHLORIDE, PRESERVATIVE FREE 10 ML: 5 INJECTION INTRAVENOUS at 20:58

## 2022-01-27 RX ADMIN — SODIUM CHLORIDE, PRESERVATIVE FREE 10 ML: 5 INJECTION INTRAVENOUS at 20:57

## 2022-01-27 RX ADMIN — SODIUM CHLORIDE, PRESERVATIVE FREE 10 ML: 5 INJECTION INTRAVENOUS at 09:28

## 2022-01-27 RX ADMIN — CARVEDILOL 12.5 MG: 12.5 TABLET, FILM COATED ORAL at 16:33

## 2022-01-27 RX ADMIN — ENOXAPARIN SODIUM 40 MG: 40 INJECTION SUBCUTANEOUS at 18:42

## 2022-01-27 RX ADMIN — FUROSEMIDE 40 MG: 40 TABLET ORAL at 16:33

## 2022-01-27 RX ADMIN — HYDROCODONE BITARTRATE AND ACETAMINOPHEN 2 TABLET: 5; 325 TABLET ORAL at 09:31

## 2022-01-27 RX ADMIN — LOPERAMIDE HYDROCHLORIDE 2 MG: 2 CAPSULE ORAL at 20:57

## 2022-01-28 ENCOUNTER — APPOINTMENT (OUTPATIENT)
Dept: CT IMAGING | Facility: HOSPITAL | Age: 62
End: 2022-01-28

## 2022-01-28 LAB
ANION GAP SERPL CALCULATED.3IONS-SCNC: 7.2 MMOL/L (ref 5–15)
BUN SERPL-MCNC: 15 MG/DL (ref 8–23)
BUN/CREAT SERPL: 18.8 (ref 7–25)
CALCIUM SPEC-SCNC: 8.5 MG/DL (ref 8.6–10.5)
CHLORIDE SERPL-SCNC: 96 MMOL/L (ref 98–107)
CO2 SERPL-SCNC: 28.8 MMOL/L (ref 22–29)
CREAT SERPL-MCNC: 0.8 MG/DL (ref 0.76–1.27)
D DIMER PPP FEU-MCNC: 3.42 MCGFEU/ML (ref 0–0.49)
DEPRECATED RDW RBC AUTO: 47.5 FL (ref 37–54)
ERYTHROCYTE [DISTWIDTH] IN BLOOD BY AUTOMATED COUNT: 15.7 % (ref 12.3–15.4)
GFR SERPL CREATININE-BSD FRML MDRD: 98 ML/MIN/1.73
GLUCOSE SERPL-MCNC: 94 MG/DL (ref 65–99)
HCT VFR BLD AUTO: 33.5 % (ref 37.5–51)
HGB BLD-MCNC: 10.6 G/DL (ref 13–17.7)
MCH RBC QN AUTO: 26.4 PG (ref 26.6–33)
MCHC RBC AUTO-ENTMCNC: 31.6 G/DL (ref 31.5–35.7)
MCV RBC AUTO: 83.5 FL (ref 79–97)
PLATELET # BLD AUTO: 109 10*3/MM3 (ref 140–450)
PMV BLD AUTO: 11.3 FL (ref 6–12)
POTASSIUM SERPL-SCNC: 3.2 MMOL/L (ref 3.5–5.2)
RBC # BLD AUTO: 4.01 10*6/MM3 (ref 4.14–5.8)
SODIUM SERPL-SCNC: 132 MMOL/L (ref 136–145)
WBC NRBC COR # BLD: 7.09 10*3/MM3 (ref 3.4–10.8)

## 2022-01-28 PROCEDURE — 85027 COMPLETE CBC AUTOMATED: CPT | Performed by: SURGERY

## 2022-01-28 PROCEDURE — 97530 THERAPEUTIC ACTIVITIES: CPT

## 2022-01-28 PROCEDURE — 99232 SBSQ HOSP IP/OBS MODERATE 35: CPT | Performed by: NURSE PRACTITIONER

## 2022-01-28 PROCEDURE — 85379 FIBRIN DEGRADATION QUANT: CPT | Performed by: NURSE PRACTITIONER

## 2022-01-28 PROCEDURE — 0 IOPAMIDOL PER 1 ML: Performed by: SURGERY

## 2022-01-28 PROCEDURE — 80048 BASIC METABOLIC PNL TOTAL CA: CPT | Performed by: SURGERY

## 2022-01-28 PROCEDURE — 25010000002 ENOXAPARIN PER 10 MG: Performed by: INTERNAL MEDICINE

## 2022-01-28 PROCEDURE — 99024 POSTOP FOLLOW-UP VISIT: CPT | Performed by: SURGERY

## 2022-01-28 PROCEDURE — 71275 CT ANGIOGRAPHY CHEST: CPT

## 2022-01-28 PROCEDURE — 97110 THERAPEUTIC EXERCISES: CPT

## 2022-01-28 RX ORDER — POTASSIUM CHLORIDE 1.5 G/1.77G
40 POWDER, FOR SOLUTION ORAL 2 TIMES DAILY
Status: COMPLETED | OUTPATIENT
Start: 2022-01-28 | End: 2022-01-28

## 2022-01-28 RX ADMIN — SODIUM CHLORIDE, PRESERVATIVE FREE 10 ML: 5 INJECTION INTRAVENOUS at 21:59

## 2022-01-28 RX ADMIN — FUROSEMIDE 40 MG: 40 TABLET ORAL at 09:25

## 2022-01-28 RX ADMIN — LOPERAMIDE HYDROCHLORIDE 2 MG: 2 CAPSULE ORAL at 09:25

## 2022-01-28 RX ADMIN — HYDROCODONE BITARTRATE AND ACETAMINOPHEN 2 TABLET: 5; 325 TABLET ORAL at 23:34

## 2022-01-28 RX ADMIN — LOPERAMIDE HYDROCHLORIDE 2 MG: 2 CAPSULE ORAL at 21:58

## 2022-01-28 RX ADMIN — CARVEDILOL 12.5 MG: 12.5 TABLET, FILM COATED ORAL at 17:47

## 2022-01-28 RX ADMIN — POTASSIUM CHLORIDE 40 MEQ: 1.5 POWDER, FOR SOLUTION ORAL at 11:44

## 2022-01-28 RX ADMIN — ENOXAPARIN SODIUM 40 MG: 40 INJECTION SUBCUTANEOUS at 17:47

## 2022-01-28 RX ADMIN — LOPERAMIDE HYDROCHLORIDE 2 MG: 2 CAPSULE ORAL at 17:47

## 2022-01-28 RX ADMIN — SODIUM CHLORIDE, PRESERVATIVE FREE 10 ML: 5 INJECTION INTRAVENOUS at 09:27

## 2022-01-28 RX ADMIN — LOPERAMIDE HYDROCHLORIDE 2 MG: 2 CAPSULE ORAL at 11:46

## 2022-01-28 RX ADMIN — ENOXAPARIN SODIUM 40 MG: 40 INJECTION SUBCUTANEOUS at 21:58

## 2022-01-28 RX ADMIN — IOPAMIDOL 95 ML: 755 INJECTION, SOLUTION INTRAVENOUS at 15:33

## 2022-01-28 RX ADMIN — POTASSIUM CHLORIDE 40 MEQ: 1.5 POWDER, FOR SOLUTION ORAL at 21:58

## 2022-01-29 LAB
ANION GAP SERPL CALCULATED.3IONS-SCNC: 12.6 MMOL/L (ref 5–15)
BUN SERPL-MCNC: 15 MG/DL (ref 8–23)
BUN/CREAT SERPL: 23.1 (ref 7–25)
CALCIUM SPEC-SCNC: 8.5 MG/DL (ref 8.6–10.5)
CHLORIDE SERPL-SCNC: 98 MMOL/L (ref 98–107)
CO2 SERPL-SCNC: 21.4 MMOL/L (ref 22–29)
CREAT SERPL-MCNC: 0.65 MG/DL (ref 0.76–1.27)
GFR SERPL CREATININE-BSD FRML MDRD: 124 ML/MIN/1.73
GLUCOSE SERPL-MCNC: 90 MG/DL (ref 65–99)
POTASSIUM SERPL-SCNC: 3.8 MMOL/L (ref 3.5–5.2)
SODIUM SERPL-SCNC: 132 MMOL/L (ref 136–145)

## 2022-01-29 PROCEDURE — 99024 POSTOP FOLLOW-UP VISIT: CPT | Performed by: SURGERY

## 2022-01-29 PROCEDURE — 80048 BASIC METABOLIC PNL TOTAL CA: CPT | Performed by: SURGERY

## 2022-01-29 PROCEDURE — 99232 SBSQ HOSP IP/OBS MODERATE 35: CPT | Performed by: INTERNAL MEDICINE

## 2022-01-29 PROCEDURE — 25010000002 ENOXAPARIN PER 10 MG: Performed by: INTERNAL MEDICINE

## 2022-01-29 PROCEDURE — 97530 THERAPEUTIC ACTIVITIES: CPT

## 2022-01-29 RX ADMIN — LOPERAMIDE HYDROCHLORIDE 2 MG: 2 CAPSULE ORAL at 09:04

## 2022-01-29 RX ADMIN — HYDROCODONE BITARTRATE AND ACETAMINOPHEN 2 TABLET: 5; 325 TABLET ORAL at 09:27

## 2022-01-29 RX ADMIN — HYDROCODONE BITARTRATE AND ACETAMINOPHEN 2 TABLET: 5; 325 TABLET ORAL at 21:09

## 2022-01-29 RX ADMIN — SODIUM CHLORIDE, PRESERVATIVE FREE 10 ML: 5 INJECTION INTRAVENOUS at 09:00

## 2022-01-29 RX ADMIN — ENOXAPARIN SODIUM 40 MG: 40 INJECTION SUBCUTANEOUS at 21:09

## 2022-01-29 RX ADMIN — FUROSEMIDE 40 MG: 40 TABLET ORAL at 09:04

## 2022-01-29 RX ADMIN — LOPERAMIDE HYDROCHLORIDE 2 MG: 2 CAPSULE ORAL at 11:33

## 2022-01-29 RX ADMIN — LOPERAMIDE HYDROCHLORIDE 2 MG: 2 CAPSULE ORAL at 21:09

## 2022-01-29 RX ADMIN — LOPERAMIDE HYDROCHLORIDE 2 MG: 2 CAPSULE ORAL at 17:35

## 2022-01-29 RX ADMIN — CARVEDILOL 12.5 MG: 12.5 TABLET, FILM COATED ORAL at 17:35

## 2022-01-30 PROCEDURE — 99024 POSTOP FOLLOW-UP VISIT: CPT | Performed by: SURGERY

## 2022-01-30 PROCEDURE — 25010000002 ONDANSETRON PER 1 MG: Performed by: INTERNAL MEDICINE

## 2022-01-30 PROCEDURE — 25010000002 ENOXAPARIN PER 10 MG: Performed by: INTERNAL MEDICINE

## 2022-01-30 RX ORDER — HYDROMORPHONE HYDROCHLORIDE 1 MG/ML
0.25 INJECTION, SOLUTION INTRAMUSCULAR; INTRAVENOUS; SUBCUTANEOUS ONCE
Status: DISCONTINUED | OUTPATIENT
Start: 2022-01-30 | End: 2022-02-03

## 2022-01-30 RX ADMIN — FUROSEMIDE 40 MG: 40 TABLET ORAL at 08:24

## 2022-01-30 RX ADMIN — HYDROCODONE BITARTRATE AND ACETAMINOPHEN 2 TABLET: 5; 325 TABLET ORAL at 17:19

## 2022-01-30 RX ADMIN — ONDANSETRON 4 MG: 2 INJECTION INTRAMUSCULAR; INTRAVENOUS at 08:24

## 2022-01-30 RX ADMIN — HYDROCODONE BITARTRATE AND ACETAMINOPHEN 2 TABLET: 5; 325 TABLET ORAL at 08:24

## 2022-01-30 RX ADMIN — SODIUM CHLORIDE, PRESERVATIVE FREE 10 ML: 5 INJECTION INTRAVENOUS at 20:08

## 2022-01-30 RX ADMIN — LOPERAMIDE HYDROCHLORIDE 2 MG: 2 CAPSULE ORAL at 08:24

## 2022-01-30 RX ADMIN — SODIUM CHLORIDE, PRESERVATIVE FREE 10 ML: 5 INJECTION INTRAVENOUS at 09:04

## 2022-01-30 RX ADMIN — LOPERAMIDE HYDROCHLORIDE 2 MG: 2 CAPSULE ORAL at 11:19

## 2022-01-30 RX ADMIN — CARVEDILOL 12.5 MG: 12.5 TABLET, FILM COATED ORAL at 17:19

## 2022-01-30 RX ADMIN — ENOXAPARIN SODIUM 40 MG: 40 INJECTION SUBCUTANEOUS at 20:10

## 2022-01-30 RX ADMIN — LOPERAMIDE HYDROCHLORIDE 2 MG: 2 CAPSULE ORAL at 20:08

## 2022-01-30 RX ADMIN — SODIUM CHLORIDE, PRESERVATIVE FREE 10 ML: 5 INJECTION INTRAVENOUS at 20:09

## 2022-01-30 RX ADMIN — LOPERAMIDE HYDROCHLORIDE 2 MG: 2 CAPSULE ORAL at 17:19

## 2022-01-31 PROCEDURE — 99024 POSTOP FOLLOW-UP VISIT: CPT | Performed by: SURGERY

## 2022-01-31 PROCEDURE — 25010000002 ENOXAPARIN PER 10 MG: Performed by: INTERNAL MEDICINE

## 2022-01-31 PROCEDURE — 97110 THERAPEUTIC EXERCISES: CPT

## 2022-01-31 RX ADMIN — SODIUM CHLORIDE, PRESERVATIVE FREE 10 ML: 5 INJECTION INTRAVENOUS at 20:57

## 2022-01-31 RX ADMIN — LOPERAMIDE HYDROCHLORIDE 2 MG: 2 CAPSULE ORAL at 08:20

## 2022-01-31 RX ADMIN — HYDROCODONE BITARTRATE AND ACETAMINOPHEN 2 TABLET: 5; 325 TABLET ORAL at 17:52

## 2022-01-31 RX ADMIN — CARVEDILOL 12.5 MG: 12.5 TABLET, FILM COATED ORAL at 17:52

## 2022-01-31 RX ADMIN — FUROSEMIDE 40 MG: 40 TABLET ORAL at 08:20

## 2022-01-31 RX ADMIN — SODIUM CHLORIDE, PRESERVATIVE FREE 10 ML: 5 INJECTION INTRAVENOUS at 08:20

## 2022-01-31 RX ADMIN — LOPERAMIDE HYDROCHLORIDE 2 MG: 2 CAPSULE ORAL at 20:56

## 2022-01-31 RX ADMIN — LOPERAMIDE HYDROCHLORIDE 2 MG: 2 CAPSULE ORAL at 12:56

## 2022-01-31 RX ADMIN — ENOXAPARIN SODIUM 40 MG: 40 INJECTION SUBCUTANEOUS at 17:52

## 2022-01-31 RX ADMIN — HYDROCODONE BITARTRATE AND ACETAMINOPHEN 2 TABLET: 5; 325 TABLET ORAL at 10:17

## 2022-01-31 RX ADMIN — LOPERAMIDE HYDROCHLORIDE 2 MG: 2 CAPSULE ORAL at 17:52

## 2022-02-01 PROCEDURE — 25010000002 ONDANSETRON PER 1 MG: Performed by: INTERNAL MEDICINE

## 2022-02-01 PROCEDURE — 99024 POSTOP FOLLOW-UP VISIT: CPT | Performed by: SURGERY

## 2022-02-01 PROCEDURE — 25010000002 ENOXAPARIN PER 10 MG: Performed by: INTERNAL MEDICINE

## 2022-02-01 PROCEDURE — 97110 THERAPEUTIC EXERCISES: CPT

## 2022-02-01 PROCEDURE — 97530 THERAPEUTIC ACTIVITIES: CPT

## 2022-02-01 RX ORDER — GLYCOPYRROLATE 1 MG/1
2 TABLET ORAL 2 TIMES DAILY
Status: DISCONTINUED | OUTPATIENT
Start: 2022-02-01 | End: 2022-02-03 | Stop reason: HOSPADM

## 2022-02-01 RX ORDER — LOPERAMIDE HYDROCHLORIDE 2 MG/1
4 CAPSULE ORAL
Status: DISCONTINUED | OUTPATIENT
Start: 2022-02-01 | End: 2022-02-03 | Stop reason: HOSPADM

## 2022-02-01 RX ADMIN — SODIUM CHLORIDE, PRESERVATIVE FREE 10 ML: 5 INJECTION INTRAVENOUS at 08:09

## 2022-02-01 RX ADMIN — ONDANSETRON 4 MG: 2 INJECTION INTRAMUSCULAR; INTRAVENOUS at 03:23

## 2022-02-01 RX ADMIN — FUROSEMIDE 40 MG: 40 TABLET ORAL at 08:08

## 2022-02-01 RX ADMIN — SODIUM CHLORIDE, PRESERVATIVE FREE 10 ML: 5 INJECTION INTRAVENOUS at 08:08

## 2022-02-01 RX ADMIN — SODIUM CHLORIDE, PRESERVATIVE FREE 10 ML: 5 INJECTION INTRAVENOUS at 22:35

## 2022-02-01 RX ADMIN — HYDROCODONE BITARTRATE AND ACETAMINOPHEN 2 TABLET: 5; 325 TABLET ORAL at 22:35

## 2022-02-01 RX ADMIN — ENOXAPARIN SODIUM 40 MG: 40 INJECTION SUBCUTANEOUS at 18:23

## 2022-02-01 RX ADMIN — SODIUM CHLORIDE, PRESERVATIVE FREE 10 ML: 5 INJECTION INTRAVENOUS at 22:36

## 2022-02-01 RX ADMIN — LOPERAMIDE HYDROCHLORIDE 2 MG: 2 CAPSULE ORAL at 13:21

## 2022-02-01 RX ADMIN — CARVEDILOL 12.5 MG: 12.5 TABLET, FILM COATED ORAL at 18:23

## 2022-02-01 RX ADMIN — HYDROCODONE BITARTRATE AND ACETAMINOPHEN 2 TABLET: 5; 325 TABLET ORAL at 13:21

## 2022-02-01 RX ADMIN — LOPERAMIDE HYDROCHLORIDE 4 MG: 2 CAPSULE ORAL at 22:35

## 2022-02-01 RX ADMIN — LOPERAMIDE HYDROCHLORIDE 4 MG: 2 CAPSULE ORAL at 18:23

## 2022-02-01 RX ADMIN — GLYCOPYRROLATE 2 MG: 1 TABLET ORAL at 22:35

## 2022-02-01 RX ADMIN — LOPERAMIDE HYDROCHLORIDE 2 MG: 2 CAPSULE ORAL at 07:32

## 2022-02-01 NOTE — PROGRESS NOTES
[facility] Acute Rehab  Continuing to follow to determine if patient has acute rehab benefits. Wife is working with insurance company.   Per Dr. Pearson, if we do not hear from insurance company/  about Acute Rehab benefits by tomorrow, will have to sign off. CCP mable MikeRN  Acute Rehab Admission Nurse

## 2022-02-01 NOTE — PLAN OF CARE
Goal Outcome Evaluation:  Plan of Care Reviewed With: patient        Progress: no change  Outcome Summary: Pt sleeping in bed in NAD, fearful upon abrupt awakening (from knocking on door). Pt reluctantly agrees to participate in PT today. Pt req CGA for bed mobility and sit/stand from EOB. Pt with c/o discomfort B feet with WBing. Pt amb 140' with r wx and CGA/vc for posture, endurance improving and he did not req standing rest break today. Pt declined to sit UIC and requested to return to bed. Pt encourqaged to perofrm LE ther ex throughout the day. Cont PT to address functional deficit and prepare for d/c as tolerated.  Patient was intermittently wearing a face mask during this therapy encounter. Therapist used appropriate personal protective equipment including eye protection, mask, and gloves.  Mask used was standard procedure mask. Appropriate PPE was worn during the entire therapy session. Hand hygiene was completed before and after therapy session. Patient is not in enhanced droplet precautions. PT Julio Carmen

## 2022-02-01 NOTE — PLAN OF CARE
Goal Outcome Evaluation:  Plan of Care Reviewed With: patient        Progress: improving  Outcome Summary: Pt seen by OT this date for treatment. Upon arrival pt lying supine in bed, 7/10 pain in L shoulder, A&O . Pt completed rolling left/right with Min A this date and verbal cues for pericare after toileting task. Pt performed sup>sit transition with Supervision to sit EOB demonstrating improvement with bed mobility. Once EOB pt completed BUE AROM x10 reps as OT facilitated L UE stretching in shoulder to reduce soreness. Pt performed sit>stand transition with use of rolling walker and CGA to ambulate to sink to complete grooming task in standing at sink with S/U. Pt requesting to return back to bed, returned in supine, needs in reach, alarm on. Pt to continue with skilled OT services to address goals and deficits. OT wore mask, gloves, glasses, hand hygiene performed.

## 2022-02-01 NOTE — PROGRESS NOTES
He and his wife both indicate that he is overall eating better.  Mental status appears to be back at baseline good level.  Ostomy output is well controlled.  Afebrile vital signs stable.  Abdomen soft and nondistended.  Ostomy output 1150 last 24 hours.    · Increase Imodium and add Robinul to slow down ostomy output  · Check lab work in a.m.  · Shocking lack of clarity from patient's insurance regarding rehab

## 2022-02-01 NOTE — THERAPY TREATMENT NOTE
Patient Name: Arnie Calvo  : 1960    MRN: [id number]                              Today's Date: 2022       Admit Date: 2021    Visit Dx:     ICD-10-CM ICD-9-CM   1. Anastomotic leak of intestine  K91.89 997.49   2. Colitis  K52.9 558.9   3. Intestinal obstruction, unspecified cause, unspecified whether partial or complete (HCC)  K56.609 560.9   4. Bandemia  D72.825 288.66   5. Chronic anemia  D64.9 285.9   6. Colonic obstruction (HCC)  K56.609 560.9     Patient Active Problem List   Diagnosis   • Cardiomyopathy (HCC)   • Paroxysmal VT (HCC)   • Palpitations   • PVC's (premature ventricular contractions)   • Exacerbation of Crohn's disease of small intestine (HCC)   • Adjustment disorder with depressed mood   • Ankylosis of spine   • Crohn's disease with complication (HCC)   • Diabetes mellitus (HCC)   • Essential hypertension   • External hemorrhoids   • Genital herpes simplex   • Gout   • Insomnia   • Iron deficiency   • Prostate mass   • Recurrent aphthous ulcer   • Asteatosis cutis   • History of pneumonia   • Abnormal CXR (chest x-ray)   • RUQ abdominal pain   • Crohn's disease of small intestine with other complication (HCC)   • Right lower quadrant abdominal pain   • Enteritis   • Mass-like inflammation at terminal ileum   • Crohn's disease (HCC)   • Ileostomy in place (HCC)   • Paroxysmal ventricular tachycardia (HCC)   • Chronic systolic heart failure (HCC)   • Cardiomyopathy, nonischemic (HCC)   • Orthostasis   • Iron deficiency anemia   • Orthostatic hypotension   • Bowel obstruction (HCC)   • Crohn's disease of colon with complication ([facility])   • Dehisced intestinal anastomosis   • History of creation of ostomy ([facility])   • Hypokalemia   • Hypotension, chronic   • Malnutrition of moderate degree (HCC)   • S/P colectomy   • Fatty liver disease, nonalcoholic   • Cholecystitis   • Anastomotic leak of intestine     Past Medical History:   Diagnosis Date   • Acute pain of left hip    •  Adjustment disorder with depressed mood    • Anesthesia complication     SPINAL [id number] LOWER SPINE   • Ankylosing spondylitis of cervical region (HCC)    • Ankylosis of spine    • Arthritis    • Asthma    • Cardiomyopathy ([facility])     sees Dr. Reyes; NICM/ (-) cath, EF 25-35%; has ICD   • CHF (congestive heart failure) ([facility])    • Cholecystitis    • Crohn's disease ([facility])    • Diabetes mellitus ([facility])     Diet controlled.   • Dysthymic disorder 2012   • Dysuria    • Essential hypertension    • External hemorrhoids    • Genital herpes    • Gout    • History of MRSA infection 2000?    FINGERTIP-TX WITH ANTIBIOTICS-[facility]-NO CURRENT OPEN WOUND OR TREATMENT 12/3/21   • Ileostomy in place ([facility])    • Insomnia    • Iron deficiency    • Paroxysmal ventricular tachycardia ([facility])    • PONV (postoperative nausea and vomiting)    • Presence of combination internal cardiac defibrillator (ICD) and pacemaker    • Prostate nodule    • Recurrent canker sores    • Thoracic back pain    • Urinary hesitancy    • Xerosis cutis      Past Surgical History:   Procedure Laterality Date   • CARDIAC CATHETERIZATION     • CARDIAC DEFIBRILLATOR PLACEMENT      REPLACEMENT-Had Jude lead that was fractured.  Lead was tied off.  New lead and new device implanted.   • CARDIAC ELECTROPHYSIOLOGY PROCEDURE N/A 1/3/2019    Procedure: ICD DC generator change  [employer];  Surgeon: Zechariah Randolph MD;  Location: [facility] INVASIVE LOCATION;  Service: Cardiology   • CHOLECYSTECTOMY N/A 12/7/2021    Procedure: CHOLECYSTECTOMY;  Surgeon: Jeovany Mott MD;  Location: [facility] OR;  Service: General;  Laterality: N/A;   • COLON RESECTION N/A 12/29/2021    Procedure: PARTIAL COLECTOMY WITH OSTOMY;  Surgeon: Jeovany Mott MD;  Location: [facility] OR;  Service: General;  Laterality: N/A;   • COLON RESECTION WITH ILEOSTOMY N/A 2018   • COLONOSCOPY  04/03/2015    abnormal cecum, three polypoid masses, pre cancerous vs  crohns, IH, tics, hyperplastic polyp   • COLONOSCOPY N/A 3/17/2017    polypoid masses, tics, IH, polyp   • EXPLORATORY LAPAROTOMY W/ BOWEL RESECTION  07/2018    open resection of ileum with creation of end ileostomy   • ILEOSTOMY CLOSURE  07/2018   • ILEOSTOMY CLOSURE N/A 12/7/2021    Procedure: ILEOSTOMY TAKEDOWN WITH RESECTION, PARASTOMAL HERNIA REPAIR;  Surgeon: Jeovany Mott MD;  Location: [facility] MAIN OR;  Service: General;  Laterality: N/A;      General Information     Row Name 02/01/22 1602          Physical Therapy Time and Intention    Document Type therapy note (daily note)  -DJ     Mode of Treatment individual therapy; physical therapy  -DJ     Row Name 02/01/22 1602          General Information    Patient Profile Reviewed yes  -DJ     Existing Precautions/Restrictions fall  -DJ     Row Name 02/01/22 1602          Cognition    Orientation Status (Cognition) oriented x 3; other (see comments)  anxious about insurance and d/c placement  -DJ     Row Name 02/01/22 1602          Safety Issues, Functional Mobility    Comment, Safety Issues/Impairments (Mobility) gt belt, nonskid socks  -DJ           User Key  (r) = Recorded By, (t) = Taken By, (c) = Cosigned By    Initials Name Provider Type    DJ Nancy Carson, PT Physical Therapist               Mobility     Row Name 02/01/22 1603          Bed Mobility    Bed Mobility supine-sit; sit-supine  -DJ     Supine-Sit [place] (Bed Mobility) contact guard; verbal cues  -DJ     Sit-Supine [place] (Bed Mobility) contact guard; verbal cues  -DJ     Assistive Device (Bed Mobility) bed rails; head of bed elevated  -DJ     Comment (Bed Mobility) moves slowly, req encouragement  -DJ     Row Name 02/01/22 1603          Transfers    Comment (Transfers) sit/stand from EOB  -DJ     Row Name 02/01/22 1603          Bed-Chair Transfer    Bed-Chair [place] (Transfers) unable to assess  pt declined offer to sit UIC  -DJ     Row Name 02/01/22 1603          Sit-Stand  Transfer    Sit-Stand [place] (Transfers) contact guard; verbal cues  -DJ     Assistive Device (Sit-Stand Transfers) walker, 4-wheeled  -DJ     Row Name 02/01/22 1603          Gait/Stairs (Locomotion)    [place] Level (Gait) contact guard; verbal cues  -DJ     Assistive Device (Gait) walker, front-wheeled  -DJ     Distance in Feet (Gait) 140'  -DJ     Deviations/Abnormal Patterns (Gait) no decreased; stride length decreased; gait speed decreased  -DJ     Bilateral Gait Deviations forward flexed posture  -DJ     [place] Level (Stairs) not tested  -DJ     Comment (Gait/Stairs) Pt amb 140' with r wx and CGA/vc for posture; 0 standing rest break. Pt requested to return to bed due to fatigue. Pt c/o ankle discomfort/stiffness with amb/WBing today  -DJ           User Key  (r) = Recorded By, (t) = Taken By, (c) = Cosigned By    Initials Name Provider Type    Nancy Bunch, PT Physical Therapist               Obj/Interventions     Row Name 02/01/22 1607          Motor Skills    Therapeutic Exercise other (see comments)  AP, LAQ, seated hip flex encouraged  -DJ     Row Name 02/01/22 1607          Balance    Balance Assessment standing static balance; standing dynamic balance  -DJ     Static Standing Balance WFL; supported; standing  -DJ     Dynamic Standing Balance mild impairment; supported; standing  -DJ     Balance Interventions sitting; standing; sit to stand; supported; weight shifting activity  -DJ           User Key  (r) = Recorded By, (t) = Taken By, (c) = Cosigned By    Initials Name Provider Type    Nancy Bunch, PT Physical Therapist               Goals/Plan    No documentation.                Clinical Impression     Row Name 02/01/22 1608          Pain    Additional Documentation Pain Scale: Numbers Pre/Post-Treatment (Group)  -DJ     Row Name 02/01/22 1608          Pain Scale: Numbers Pre/Post-Treatment    Pain Location - Side Bilateral  -DJ     Pain Location foot  -DJ      Pre/Posttreatment Pain Comment Pt c/o discomfort/stiffness B feet  -DJ     Pain Intervention(s) Repositioned; Rest  -DJ     Row Name 02/01/22 1608          Plan of Care Review    Plan of Care Reviewed With patient  -DJ     Progress no change  -DJ     Outcome Summary Pt sleeping in bed in NAD, fearful upon abrupt awakening (from knocking on door). Pt reluctantly agrees to participate in PT today. Pt req CGA for bed mobility and sit/stand from EOB. Pt with c/o discomfort B feet with WBing. Pt amb 140' with r wx and CGA/vc for posture, endurance improving and he did not req standing rest break today. Pt declined to sit UIC and requested to return to bed. Pt encourqaged to perofrm LE ther ex throughout the day. Cont PT to address functional deficit and prepare for d/c as tolerated.  -DJ     Row Name 02/01/22 1608          Therapy Assessment/Plan (PT)    Criteria for Skilled Interventions Met (PT) skilled treatment is necessary  -DJ     Row Name 02/01/22 1608          Vital Signs    Pre Patient Position Supine  -DJ     Intra Patient Position Standing  -DJ     Post Patient Position Supine  -DJ     Row Name 02/01/22 1608          Positioning and Restraints    Pre-Treatment Position in bed  -DJ     Post Treatment Position bed  -DJ     In Bed notified nsg; supine; call light within reach; encouraged to call for assist; exit alarm on  -DJ           User Key  (r) = Recorded By, (t) = Taken By, (c) = Cosigned By    Initials Name Provider Type    Nancy Bunch, PT Physical Therapist               Outcome Measures     Row Name 02/01/22 1618          How much help from another person do you currently need...    Turning from your back to your side while in flat bed without using bedrails? 3  -DJ     Moving from lying on back to sitting on the side of a flat bed without bedrails? 3  -DJ     Moving to and from a bed to a chair (including a wheelchair)? 3  -DJ     Standing up from a chair using your arms (e.g., wheelchair, bedside  chair)? 3  -DJ     Climbing 3-5 steps with a railing? 3  -DJ     To walk in hospital room? 3  -DJ     AM-PAC 6 Clicks Score (PT) 18  -DJ     Row Name 02/01/22 1613          Functional Assessment    Outcome Measure Options AM-PAC 6 Clicks Basic Mobility (PT)  -           User Key  (r) = Recorded By, (t) = Taken By, (c) = Cosigned By    Initials Name Provider Type    Nancy Bunch, PT Physical Therapist                             Physical Therapy Education                 Title: PT OT SLP Therapies (In Progress)     Topic: Physical Therapy (In Progress)     Point: Mobility training (In Progress)     Learning Progress Summary           Patient Acceptance, E, NR by IVIS at 2/1/2022 1614   Family Acceptance, E,D, NR by BRITT at 1/21/2022 1718      Show all documentation for this point (20)                 Point: Home exercise program (In Progress)     Learning Progress Summary           Patient Acceptance, E, NR by IVIS at 2/1/2022 1614   Family Acceptance, E,D, NR by BRTIT at 1/21/2022 1718      Show all documentation for this point (17)                 Point: Body mechanics (In Progress)     Learning Progress Summary           Patient Acceptance, E, NR by IVIS at 2/1/2022 1614   Family Acceptance, E,D, NR by BRITT at 1/21/2022 1718      Show all documentation for this point (20)                 Point: Precautions (In Progress)     Learning Progress Summary           Patient Acceptance, E, NR by IVIS at 2/1/2022 1614   Family Acceptance, E,D, NR by BRITT at 1/21/2022 1718      Show all documentation for this point (19)                             User Key     Initials Effective Dates Name Provider Type Discipline    BRITT 03/07/18 -  Myranda Rodrigues PTA Physical Therapy Assistant PT    IVIS 10/25/19 -  Nancy Carson PT Physical Therapist PT              PT Recommendation and Plan     Plan of Care Reviewed With: patient  Progress: no change  Outcome Summary: Pt sleeping in bed in NAD, fearful upon abrupt awakening (from knocking on door).  Pt reluctantly agrees to participate in PT today. Pt req CGA for bed mobility and sit/stand from EOB. Pt with c/o discomfort B feet with WBing. Pt amb 140' with r wx and CGA/vc for posture, endurance improving and he did not req standing rest break today. Pt declined to sit UIC and requested to return to bed. Pt encourqaged to perofrm LE ther ex throughout the day. Cont PT to address functional deficit and prepare for d/c as tolerated.     Time Calculation:    PT Charges     Row Name 02/01/22 1615             Time Calculation    Start Time 1446  -DJ      Stop Time 1503  -DJ      Time Calculation (min) 17 min  -DJ      PT Non-Billable Time (min) 10 min  -DJ      PT Received On 02/01/22  -DJ      PT - Next Appointment 02/02/22  -DJ            User Key  (r) = Recorded By, (t) = Taken By, (c) = Cosigned By    Initials Name Provider Type    Nancy Bunch, SALLY Physical Therapist              Therapy Charges for Today     Code Description Service Date Service Provider Modifiers Qty    01598616699  PT THERAPEUTIC ACT EA 15 MIN 2/1/2022 Nancy Carson PT GP 2          PT G-Codes  Outcome Measure Options: AM-PAC 6 Clicks Basic Mobility (PT)  AM-PAC 6 Clicks Score (PT): 18  AM-PAC 6 Clicks Score (OT): 15    Nancy Carson PT  2/1/2022

## 2022-02-01 NOTE — PLAN OF CARE
Goal Outcome Evaluation:  Plan of Care Reviewed With: patient        Progress: improving  Outcome Summary: Pt on 2L NC, ST on monitor. Pt c/o nausea, treated with Zofran. Pt refused to be turned, c/o shoulder pain. VSS. Will continue to monitor.

## 2022-02-01 NOTE — CASE MANAGEMENT/SOCIAL WORK
Continued Stay Note  [facility]     Patient Name: Arnie Calvo  MRN: [id number]  Today's Date: 2/1/2022    Admit Date: 12/24/2021     Discharge Plan     Row Name 02/01/22 7191       Plan    Plan Comments Per Nova/, she spoke with Larisa and patient does not have any rehab benefits. He does have benefits for 20 visits of  with a 25.00/co-pay.  Will follow up tomorrow. Eugenie Salter RN    Row Name 02/01/22 8077       Plan    Plan BAR-pending patient has acute rehab and benefits and pre-cert is obtained    Plan Comments Spoke with wife by telephone.  She states that she has been on the phone most of the day and is still unsure if the patient has acute rehab benefits.  She spoke with the  again this afternoon and he was going to try and get answers for her. Eugenie Salter RN               Discharge Codes    No documentation.               Expected Discharge Date and Time     Expected Discharge Date Expected Discharge Time    Feb 3, 2022             Eugenie Salter RN

## 2022-02-01 NOTE — THERAPY TREATMENT NOTE
Patient Name: Arnie Calvo  : 1960    MRN: [id number]                              Today's Date: 2022       Admit Date: 2021    Visit Dx:     ICD-10-CM ICD-9-CM   1. Anastomotic leak of intestine  K91.89 997.49   2. Colitis  K52.9 558.9   3. Intestinal obstruction, unspecified cause, unspecified whether partial or complete (HCC)  K56.609 560.9   4. Bandemia  D72.825 288.66   5. Chronic anemia  D64.9 285.9   6. Colonic obstruction (HCC)  K56.609 560.9     Patient Active Problem List   Diagnosis   • Cardiomyopathy (HCC)   • Paroxysmal VT (HCC)   • Palpitations   • PVC's (premature ventricular contractions)   • Exacerbation of Crohn's disease of small intestine (HCC)   • Adjustment disorder with depressed mood   • Ankylosis of spine   • Crohn's disease with complication (HCC)   • Diabetes mellitus (HCC)   • Essential hypertension   • External hemorrhoids   • Genital herpes simplex   • Gout   • Insomnia   • Iron deficiency   • Prostate mass   • Recurrent aphthous ulcer   • Asteatosis cutis   • History of pneumonia   • Abnormal CXR (chest x-ray)   • RUQ abdominal pain   • Crohn's disease of small intestine with other complication (HCC)   • Right lower quadrant abdominal pain   • Enteritis   • Mass-like inflammation at terminal ileum   • Crohn's disease (HCC)   • Ileostomy in place (HCC)   • Paroxysmal ventricular tachycardia (HCC)   • Chronic systolic heart failure (HCC)   • Cardiomyopathy, nonischemic (HCC)   • Orthostasis   • Iron deficiency anemia   • Orthostatic hypotension   • Bowel obstruction (HCC)   • Crohn's disease of colon with complication ([facility])   • Dehisced intestinal anastomosis   • History of creation of ostomy ([facility])   • Hypokalemia   • Hypotension, chronic   • Malnutrition of moderate degree (HCC)   • S/P colectomy   • Fatty liver disease, nonalcoholic   • Cholecystitis   • Anastomotic leak of intestine     Past Medical History:   Diagnosis Date   • Acute pain of left hip    •  Adjustment disorder with depressed mood    • Anesthesia complication     SPINAL [id number] LOWER SPINE   • Ankylosing spondylitis of cervical region (HCC)    • Ankylosis of spine    • Arthritis    • Asthma    • Cardiomyopathy ([facility])     sees Dr. Reyes; NICM/ (-) cath, EF 25-35%; has ICD   • CHF (congestive heart failure) ([facility])    • Cholecystitis    • Crohn's disease ([facility])    • Diabetes mellitus ([facility])     Diet controlled.   • Dysthymic disorder 2012   • Dysuria    • Essential hypertension    • External hemorrhoids    • Genital herpes    • Gout    • History of MRSA infection 2000?    FINGERTIP-TX WITH ANTIBIOTICS-[facility]-NO CURRENT OPEN WOUND OR TREATMENT 12/3/21   • Ileostomy in place ([facility])    • Insomnia    • Iron deficiency    • Paroxysmal ventricular tachycardia ([facility])    • PONV (postoperative nausea and vomiting)    • Presence of combination internal cardiac defibrillator (ICD) and pacemaker    • Prostate nodule    • Recurrent canker sores    • Thoracic back pain    • Urinary hesitancy    • Xerosis cutis      Past Surgical History:   Procedure Laterality Date   • CARDIAC CATHETERIZATION     • CARDIAC DEFIBRILLATOR PLACEMENT      REPLACEMENT-Had Jude lead that was fractured.  Lead was tied off.  New lead and new device implanted.   • CARDIAC ELECTROPHYSIOLOGY PROCEDURE N/A 1/3/2019    Procedure: ICD DC generator change  [employer];  Surgeon: Zechariah Randolph MD;  Location: [facility] INVASIVE LOCATION;  Service: Cardiology   • CHOLECYSTECTOMY N/A 12/7/2021    Procedure: CHOLECYSTECTOMY;  Surgeon: Jeovany Mott MD;  Location: [facility] OR;  Service: General;  Laterality: N/A;   • COLON RESECTION N/A 12/29/2021    Procedure: PARTIAL COLECTOMY WITH OSTOMY;  Surgeon: Jeovany Mott MD;  Location: [facility] OR;  Service: General;  Laterality: N/A;   • COLON RESECTION WITH ILEOSTOMY N/A 2018   • COLONOSCOPY  04/03/2015    abnormal cecum, three polypoid masses, pre cancerous vs  crohns, IH, tics, hyperplastic polyp   • COLONOSCOPY N/A 3/17/2017    polypoid masses, tics, IH, polyp   • EXPLORATORY LAPAROTOMY W/ BOWEL RESECTION  07/2018    open resection of ileum with creation of end ileostomy   • ILEOSTOMY CLOSURE  07/2018   • ILEOSTOMY CLOSURE N/A 12/7/2021    Procedure: ILEOSTOMY TAKEDOWN WITH RESECTION, PARASTOMAL HERNIA REPAIR;  Surgeon: Jeovany Mott MD;  Location: [facility] MAIN OR;  Service: General;  Laterality: N/A;      General Information     Row Name 02/01/22 1242          OT Time and Intention    Document Type therapy note (daily note)  -     Mode of Treatment individual therapy; occupational therapy  -     Row Name 02/01/22 1242          General Information    Patient Profile Reviewed yes  -BL     Existing Precautions/Restrictions fall  -BL     Row Name 02/01/22 1242          Cognition    Orientation Status (Cognition) oriented x 3  -BL     Row Name 02/01/22 1242          Safety Issues, Functional Mobility    Safety Issues Affecting Function (Mobility) insight into deficits/self-awareness; awareness of need for assistance  -     Impairments Affecting Function (Mobility) endurance/activity tolerance; shortness of breath; pain; strength  -           User Key  (r) = Recorded By, (t) = Taken By, (c) = Cosigned By    Initials Name Provider Type     Maryam Avalos OT Occupational Therapist                 Mobility/ADL's     Row Name 02/01/22 1243          Bed Mobility    Bed Mobility supine-sit; sit-supine; rolling right; rolling left  -BL     Rolling Right [place] (Bed Mobility) minimum assist (75% patient effort); verbal cues  -BL     Scooting/Bridging [place] (Bed Mobility) minimum assist (75% patient effort); verbal cues  -BL     Supine-Sit [place] (Bed Mobility) supervision; verbal cues  -BL     Sit-Supine [place] (Bed Mobility) supervision; verbal cues  -BL     Assistive Device (Bed Mobility) bed rails; head of bed elevated  -     Row Name  02/01/22 1243          Transfers    Transfers sit-stand transfer  -     Sit-Stand [place] (Transfers) contact guard  -     Row Name 02/01/22 1243          Sit-Stand Transfer    Assistive Device (Sit-Stand Transfers) walker, front-wheeled  -     Row Name 02/01/22 1243          Activities of Daily Living    BADL Assessment/Intervention toileting; grooming  -     Row Name 02/01/22 1243          Grooming Assessment/Training    [place] Level (Grooming) hair care, combing/brushing; set up  -     Position (Grooming) sink side; supported standing  -     Row Name 02/01/22 1243          Toileting Assessment/Training    [place] Level (Toileting) change pad/brief; perform perineal hygiene; maximum assist (25% patient effort)  -     Position (Toileting) supine  -           User Key  (r) = Recorded By, (t) = Taken By, (c) = Cosigned By    Initials Name Provider Type     Maryam Avalos OT Occupational Therapist               Obj/Interventions     Row Name 02/01/22 1244          Shoulder (Therapeutic Exercise)    Shoulder (Therapeutic Exercise) isometric exercises  -     Shoulder Isometrics (Therapeutic Exercise) left; external rotation; internal rotation; aDduction; aBduction  -     Row Name 02/01/22 1244          Elbow/Forearm (Therapeutic Exercise)    Elbow/Forearm AROM (Therapeutic Exercise) bilateral; supination; pronation; 10 repetitions; sitting  -[facility] Name 02/01/22 1244          Hand (Therapeutic Exercise)    Hand AROM/AAROM (Therapeutic Exercise) bilateral; AROM (active range of motion); finger extension; finger aBduction; finger flexion; finger aDduction; 10 repetitions  -     Row Name 02/01/22 1244          Therapeutic Exercise    Therapeutic Exercise shoulder; elbow/forearm; hand  -           User Key  (r) = Recorded By, (t) = Taken By, (c) = Cosigned By    Initials Name Provider Type    BL Maryam Avalos OT Occupational Therapist               Goals/Plan    No  documentation.                Clinical Impression     Row Name 02/01/22 1247          Pain Assessment    Additional Documentation Pain Scale: Numbers Pre/Post-Treatment (Group)  -BL     Row Name 02/01/22 1247          Pain Scale: Numbers Pre/Post-Treatment    Pretreatment Pain Rating 7/10  -BL     Posttreatment Pain Rating 5/10  -BL     Pain Location - Side Left  -BL     Pain Location - Orientation upper  -BL     Pain Location shoulder  -BL     Pain Intervention(s) Repositioned  -BL     Row Name 02/01/22 1247          Plan of Care Review    Plan of Care Reviewed With patient  -BL     Progress improving  -BL     Outcome Summary Pt seen by OT this date for treatment. Upon arrival pt lying supine in bed, 7/10 pain in L shoulder, A&O . Pt completed rolling left/right with Min A this date and verbal cues for pericare after toileting task. Pt performed sup>sit transition with Supervision to sit EOB demonstrating improvement with bed mobility. Once EOB pt completed BUE AROM x10 reps as OT facilitated L UE stretching in shoulder to reduce soreness. Pt performed sit>stand transition with use of rolling walker and CGA to ambulate to sink to complete grooming task in standing at sink with S/U. Pt requesting to return back to bed, returned in supine, needs in reach, alarm on. Pt to continue with skilled OT services to address goals and deficits. OT wore mask, gloves, glasses, hand hygiene performed.  -BL     Row Name 02/01/22 1247          Vital Signs    Pre Patient Position Supine  -BL     Intra Patient Position Standing  -BL     Post Patient Position Supine  -BL     Row Name 02/01/22 1247          Positioning and Restraints    Pre-Treatment Position in bed  -BL     Post Treatment Position bed  -BL     In Bed supine; call light within reach; encouraged to call for assist; exit alarm on  -BL           User Key  (r) = Recorded By, (t) = Taken By, (c) = Cosigned By    Initials Name Provider Type    Maryam Sandoval, OT  Occupational Therapist               Outcome Measures    No documentation.                 Occupational Therapy Education                 Title: PT OT SLP Therapies (In Progress)     Topic: Occupational Therapy (In Progress)     Point: ADL training (In Progress)     Description:   Instruct learner(s) on proper safety adaptation and remediation techniques during self care or transfers.   Instruct in proper use of assistive devices.              Learning Progress Summary           Patient Acceptance, E, NR by TERRIE at 1/21/2022 1303      Show all documentation for this point (1)                 Point: Home exercise program (Not Started)     Description:   Instruct learner(s) on appropriate technique for monitoring, assisting and/or progressing therapeutic exercises/activities.              Learner Progress:  Not documented in this visit.          Point: Precautions (Not Started)     Description:   Instruct learner(s) on prescribed precautions during self-care and functional transfers.              Learner Progress:  Not documented in this visit.          Point: Body mechanics (Not Started)     Description:   Instruct learner(s) on proper positioning and spine alignment during self-care, functional mobility activities and/or exercises.              Learner Progress:  Not documented in this visit.                      User Key     Initials Effective Dates Name Provider Type Discipline     06/16/21 -  Meche Adorno OTR Occupational Therapist OT              OT Recommendation and Plan  Planned Therapy Interventions (OT): activity tolerance training, BADL retraining, IADL retraining, patient/caregiver education/training, transfer/mobility retraining, ROM/therapeutic exercise, strengthening exercise, occupation/activity based interventions  Therapy Frequency (OT): 5 times/wk  Plan of Care Review  Plan of Care Reviewed With: patient  Progress: improving  Outcome Summary: Pt seen by OT this date for treatment. Upon arrival pt  lying supine in bed, 7/10 pain in L shoulder, A&O . Pt completed rolling left/right with Min A this date and verbal cues for pericare after toileting task. Pt performed sup>sit transition with Supervision to sit EOB demonstrating improvement with bed mobility. Once EOB pt completed BUE AROM x10 reps as OT facilitated L UE stretching in shoulder to reduce soreness. Pt performed sit>stand transition with use of rolling walker and CGA to ambulate to sink to complete grooming task in standing at sink with S/U. Pt requesting to return back to bed, returned in supine, needs in reach, alarm on. Pt to continue with skilled OT services to address goals and deficits. OT wore mask, gloves, glasses, hand hygiene performed.     Time Calculation:    Time Calculation- OT     Row Name 02/01/22 1251             Time Calculation- OT    OT Start Time 1017  -BL      OT Stop Time 1048  -BL      OT Time Calculation (min) 31 min  -BL      Total Timed Code Minutes- OT 31 minute(s)  -BL      OT Received On 02/01/22  -BL      OT - Next Appointment 02/02/22  -BL              Timed Charges    34824 - OT Therapeutic Exercise Minutes 15  -BL      54221 - OT Therapeutic Activity Minutes 8  -BL              Total Minutes    Timed Charges Total Minutes 23  -BL       Total Minutes 23  -BL            User Key  (r) = Recorded By, (t) = Taken By, (c) = Cosigned By    Initials Name Provider Type    BL Maryam Avalos OT Occupational Therapist              Therapy Charges for Today     Code Description Service Date Service Provider Modifiers Qty    92213851736 HC OT THER PROC EA 15 MIN 2/1/2022 Maryam Avalos OT GO 1    43466088023 HC OT THERAPEUTIC ACT EA 15 MIN 2/1/2022 Maryam Avalos OT GO 1               Maryam Avalos OT  2/1/2022

## 2022-02-01 NOTE — CASE MANAGEMENT/SOCIAL WORK
Continued Stay Note  [facility]     Patient Name: Arnie Calvo  MRN: [id number]  Today's Date: 2/1/2022    Admit Date: 12/24/2021     Discharge Plan     Row Name 02/01/22 1505       Plan    Plan BAR-pending patient has acute rehab and benefits and pre-cert is obtained    Plan Comments Spoke with wife by telephone.  She states that she has been on the phone most of the day and is still unsure if the patient has acute rehab benefits.  She spoke with the  again this afternoon and he was going to try and get answers for her. Eugenie Salter RN               Discharge Codes    No documentation.               Expected Discharge Date and Time     Expected Discharge Date Expected Discharge Time    Feb 3, 2022             Eugenie Salter RN

## 2022-02-02 LAB
ALBUMIN SERPL-MCNC: 3 G/DL (ref 3.5–5.2)
ALBUMIN/GLOB SERPL: 0.6 G/DL
ALP SERPL-CCNC: 90 U/L (ref 39–117)
ALT SERPL W P-5'-P-CCNC: 22 U/L (ref 1–41)
ANION GAP SERPL CALCULATED.3IONS-SCNC: 6.5 MMOL/L (ref 5–15)
AST SERPL-CCNC: 28 U/L (ref 1–40)
BILIRUB SERPL-MCNC: 0.7 MG/DL (ref 0–1.2)
BUN SERPL-MCNC: 16 MG/DL (ref 8–23)
BUN/CREAT SERPL: 17.6 (ref 7–25)
CALCIUM SPEC-SCNC: 9 MG/DL (ref 8.6–10.5)
CHLORIDE SERPL-SCNC: 93 MMOL/L (ref 98–107)
CO2 SERPL-SCNC: 27.5 MMOL/L (ref 22–29)
CREAT SERPL-MCNC: 0.91 MG/DL (ref 0.76–1.27)
DEPRECATED RDW RBC AUTO: 46.2 FL (ref 37–54)
ERYTHROCYTE [DISTWIDTH] IN BLOOD BY AUTOMATED COUNT: 15.7 % (ref 12.3–15.4)
GFR SERPL CREATININE-BSD FRML MDRD: 84 ML/MIN/1.73
GLOBULIN UR ELPH-MCNC: 5.2 GM/DL
GLUCOSE SERPL-MCNC: 114 MG/DL (ref 65–99)
HCT VFR BLD AUTO: 35.2 % (ref 37.5–51)
HGB BLD-MCNC: 11.2 G/DL (ref 13–17.7)
MAGNESIUM SERPL-MCNC: 1.8 MG/DL (ref 1.6–2.4)
MCH RBC QN AUTO: 26.3 PG (ref 26.6–33)
MCHC RBC AUTO-ENTMCNC: 31.8 G/DL (ref 31.5–35.7)
MCV RBC AUTO: 82.6 FL (ref 79–97)
PHOSPHATE SERPL-MCNC: 3 MG/DL (ref 2.5–4.5)
PLATELET # BLD AUTO: 157 10*3/MM3 (ref 140–450)
PMV BLD AUTO: 10.3 FL (ref 6–12)
POTASSIUM SERPL-SCNC: 3.8 MMOL/L (ref 3.5–5.2)
PREALB SERPL-MCNC: 10.2 MG/DL (ref 20–40)
PROT SERPL-MCNC: 8.2 G/DL (ref 6–8.5)
RBC # BLD AUTO: 4.26 10*6/MM3 (ref 4.14–5.8)
SODIUM SERPL-SCNC: 127 MMOL/L (ref 136–145)
WBC NRBC COR # BLD: 9.61 10*3/MM3 (ref 3.4–10.8)

## 2022-02-02 PROCEDURE — 25010000002 ENOXAPARIN PER 10 MG: Performed by: INTERNAL MEDICINE

## 2022-02-02 PROCEDURE — 84134 ASSAY OF PREALBUMIN: CPT | Performed by: SURGERY

## 2022-02-02 PROCEDURE — 99024 POSTOP FOLLOW-UP VISIT: CPT | Performed by: SURGERY

## 2022-02-02 PROCEDURE — 84100 ASSAY OF PHOSPHORUS: CPT | Performed by: SURGERY

## 2022-02-02 PROCEDURE — 83735 ASSAY OF MAGNESIUM: CPT | Performed by: SURGERY

## 2022-02-02 PROCEDURE — 85027 COMPLETE CBC AUTOMATED: CPT | Performed by: SURGERY

## 2022-02-02 PROCEDURE — 80053 COMPREHEN METABOLIC PANEL: CPT | Performed by: SURGERY

## 2022-02-02 PROCEDURE — 97530 THERAPEUTIC ACTIVITIES: CPT

## 2022-02-02 PROCEDURE — 25010000002 ONDANSETRON PER 1 MG: Performed by: INTERNAL MEDICINE

## 2022-02-02 RX ADMIN — SODIUM CHLORIDE, PRESERVATIVE FREE 10 ML: 5 INJECTION INTRAVENOUS at 11:49

## 2022-02-02 RX ADMIN — LOPERAMIDE HYDROCHLORIDE 4 MG: 2 CAPSULE ORAL at 17:06

## 2022-02-02 RX ADMIN — ONDANSETRON 4 MG: 2 INJECTION INTRAMUSCULAR; INTRAVENOUS at 18:42

## 2022-02-02 RX ADMIN — GLYCERIN 1 DROP: .002; .002; .01 SOLUTION/ DROPS OPHTHALMIC at 11:50

## 2022-02-02 RX ADMIN — LOPERAMIDE HYDROCHLORIDE 4 MG: 2 CAPSULE ORAL at 20:13

## 2022-02-02 RX ADMIN — HYDROCODONE BITARTRATE AND ACETAMINOPHEN 2 TABLET: 5; 325 TABLET ORAL at 18:43

## 2022-02-02 RX ADMIN — GLYCOPYRROLATE 2 MG: 1 TABLET ORAL at 20:13

## 2022-02-02 RX ADMIN — FUROSEMIDE 40 MG: 40 TABLET ORAL at 09:49

## 2022-02-02 RX ADMIN — CARVEDILOL 12.5 MG: 12.5 TABLET, FILM COATED ORAL at 17:06

## 2022-02-02 RX ADMIN — SODIUM CHLORIDE, PRESERVATIVE FREE 10 ML: 5 INJECTION INTRAVENOUS at 20:13

## 2022-02-02 RX ADMIN — GLYCOPYRROLATE 2 MG: 1 TABLET ORAL at 09:49

## 2022-02-02 RX ADMIN — LOPERAMIDE HYDROCHLORIDE 4 MG: 2 CAPSULE ORAL at 11:48

## 2022-02-02 RX ADMIN — ENOXAPARIN SODIUM 40 MG: 40 INJECTION SUBCUTANEOUS at 20:13

## 2022-02-02 RX ADMIN — LOPERAMIDE HYDROCHLORIDE 4 MG: 2 CAPSULE ORAL at 06:31

## 2022-02-02 NOTE — THERAPY TREATMENT NOTE
Patient Name: Arnie Calvo  : 1960    MRN: [id number]                              Today's Date: 2022       Admit Date: 2021    Visit Dx:     ICD-10-CM ICD-9-CM   1. Anastomotic leak of intestine  K91.89 997.49   2. Colitis  K52.9 558.9   3. Intestinal obstruction, unspecified cause, unspecified whether partial or complete (HCC)  K56.609 560.9   4. Bandemia  D72.825 288.66   5. Chronic anemia  D64.9 285.9   6. Colonic obstruction (HCC)  K56.609 560.9     Patient Active Problem List   Diagnosis   • Cardiomyopathy (HCC)   • Paroxysmal VT (HCC)   • Palpitations   • PVC's (premature ventricular contractions)   • Exacerbation of Crohn's disease of small intestine (HCC)   • Adjustment disorder with depressed mood   • Ankylosis of spine   • Crohn's disease with complication (HCC)   • Diabetes mellitus (HCC)   • Essential hypertension   • External hemorrhoids   • Genital herpes simplex   • Gout   • Insomnia   • Iron deficiency   • Prostate mass   • Recurrent aphthous ulcer   • Asteatosis cutis   • History of pneumonia   • Abnormal CXR (chest x-ray)   • RUQ abdominal pain   • Crohn's disease of small intestine with other complication (HCC)   • Right lower quadrant abdominal pain   • Enteritis   • Mass-like inflammation at terminal ileum   • Crohn's disease (HCC)   • Ileostomy in place (HCC)   • Paroxysmal ventricular tachycardia (HCC)   • Chronic systolic heart failure (HCC)   • Cardiomyopathy, nonischemic (HCC)   • Orthostasis   • Iron deficiency anemia   • Orthostatic hypotension   • Bowel obstruction (HCC)   • Crohn's disease of colon with complication (HCC)   • Dehisced intestinal anastomosis   • History of creation of ostomy ([facility])   • Hypokalemia   • Hypotension, chronic   • Malnutrition of moderate degree (HCC)   • S/P colectomy   • Fatty liver disease, nonalcoholic   • Cholecystitis   • Anastomotic leak of intestine     Past Medical History:   Diagnosis Date   • Acute pain of left hip    •  Adjustment disorder with depressed mood    • Anesthesia complication     SPINAL [id number] LOWER SPINE   • Ankylosing spondylitis of cervical region (HCC)    • Ankylosis of spine    • Arthritis    • Asthma    • Cardiomyopathy ([facility])     sees Dr. Reyes; NICM/ (-) cath, EF 25-35%; has ICD   • CHF (congestive heart failure) ([facility])    • Cholecystitis    • Crohn's disease ([facility])    • Diabetes mellitus ([facility])     Diet controlled.   • Dysthymic disorder 2012   • Dysuria    • Essential hypertension    • External hemorrhoids    • Genital herpes    • Gout    • History of MRSA infection 2000?    FINGERTIP-TX WITH ANTIBIOTICS-[facility]-NO CURRENT OPEN WOUND OR TREATMENT 12/3/21   • Ileostomy in place ([facility])    • Insomnia    • Iron deficiency    • Paroxysmal ventricular tachycardia ([facility])    • PONV (postoperative nausea and vomiting)    • Presence of combination internal cardiac defibrillator (ICD) and pacemaker    • Prostate nodule    • Recurrent canker sores    • Thoracic back pain    • Urinary hesitancy    • Xerosis cutis      Past Surgical History:   Procedure Laterality Date   • CARDIAC CATHETERIZATION     • CARDIAC DEFIBRILLATOR PLACEMENT      REPLACEMENT-Had Jude lead that was fractured.  Lead was tied off.  New lead and new device implanted.   • CARDIAC ELECTROPHYSIOLOGY PROCEDURE N/A 1/3/2019    Procedure: ICD DC generator change  [employer];  Surgeon: Zechariah Randolph MD;  Location: [facility] INVASIVE LOCATION;  Service: Cardiology   • CHOLECYSTECTOMY N/A 12/7/2021    Procedure: CHOLECYSTECTOMY;  Surgeon: Jeovany Mott MD;  Location: [facility] OR;  Service: General;  Laterality: N/A;   • COLON RESECTION N/A 12/29/2021    Procedure: PARTIAL COLECTOMY WITH OSTOMY;  Surgeon: Jeovany Mott MD;  Location: [facility] OR;  Service: General;  Laterality: N/A;   • COLON RESECTION WITH ILEOSTOMY N/A 2018   • COLONOSCOPY  04/03/2015    abnormal cecum, three polypoid masses, pre cancerous vs  crohns, IH, tics, hyperplastic polyp   • COLONOSCOPY N/A 3/17/2017    polypoid masses, tics, IH, polyp   • EXPLORATORY LAPAROTOMY W/ BOWEL RESECTION  07/2018    open resection of ileum with creation of end ileostomy   • ILEOSTOMY CLOSURE  07/2018   • ILEOSTOMY CLOSURE N/A 12/7/2021    Procedure: ILEOSTOMY TAKEDOWN WITH RESECTION, PARASTOMAL HERNIA REPAIR;  Surgeon: Jeovany Mott MD;  Location: [facility] MAIN OR;  Service: General;  Laterality: N/A;      General Information     Row Name 02/02/22 1548          Physical Therapy Time and Intention    Document Type therapy note (daily note)  -DJ     Mode of Treatment individual therapy; physical therapy  -DJ     Row Name 02/02/22 1548          General Information    Patient Profile Reviewed yes  -DJ     Existing Precautions/Restrictions fall  -DJ     Row Name 02/02/22 1548          Cognition    Orientation Status (Cognition) oriented x 3; other (see comments)  very anxious about inpt rehab  -DJ     Row Name 02/02/22 1548          Safety Issues, Functional Mobility    Comment, Safety Issues/Impairments (Mobility) gt belt, nonskid socks  -DJ           User Key  (r) = Recorded By, (t) = Taken By, (c) = Cosigned By    Initials Name Provider Type    DJ Nancy Carson, PT Physical Therapist               Mobility     Row Name 02/02/22 1550          Bed Mobility    Bed Mobility supine-sit; sit-supine  -DJ     Supine-Sit [place] (Bed Mobility) contact guard; verbal cues  -DJ     Sit-Supine [place] (Bed Mobility) contact guard; verbal cues  -DJ     Assistive Device (Bed Mobility) bed rails; head of bed elevated  -DJ     Comment (Bed Mobility) vc for sequencing  -DJ     Row Name 02/02/22 1550          Transfers    Comment (Transfers) sit/stand from EOB  -DJ     Row Name 02/02/22 1550          Bed-Chair Transfer    Bed-Chair [place] (Transfers) not tested  -DJ     Row Name 02/02/22 1550          Sit-Stand Transfer    Sit-Stand [place] (Transfers) contact guard;  verbal cues  -DJ     Assistive Device (Sit-Stand Transfers) walker, 4-wheeled  -DJ     Row Name 02/02/22 1550          Gait/Stairs (Locomotion)    [place] Level (Gait) contact guard; verbal cues  -DJ     Assistive Device (Gait) walker, front-wheeled  -DJ     Distance in Feet (Gait) 140'  -DJ     Deviations/Abnormal Patterns (Gait) no decreased; stride length decreased; gait speed decreased  -DJ     Bilateral Gait Deviations forward flexed posture  -DJ     [place] Level (Stairs) not tested  -DJ     Comment (Gait/Stairs) Pt amb 140' with r wx and CGA/vc to stand straight; pace is quite slow, balance is fair with r wx, endurance is poor and limits further amb distance. Pt req 1 standing rest break 45-60 sec after 80'  -DJ           User Key  (r) = Recorded By, (t) = Taken By, (c) = Cosigned By    Initials Name Provider Type    Nancy Bunch, SALLY Physical Therapist               Obj/Interventions     Row Name 02/02/22 8857          Motor Skills    Therapeutic Exercise other (see comments)  AP, LAQ, seated hip flex, pillow squeezes 10x each; sit/stand 4x from EOB  -DJ     Row Name 02/02/22 1929          Balance    Balance Assessment standing static balance; standing dynamic balance  -DJ     Static Standing Balance WFL; supported; standing  -DJ     Dynamic Standing Balance WFL; supported; standing  -DJ     Balance Interventions sitting; standing; sit to stand; supported; weight shifting activity  -DJ     Comment, Balance dynamic balance is fair with r wx  -DJ           User Key  (r) = Recorded By, (t) = Taken By, (c) = Cosigned By    Initials Name Provider Type    Nancy Bunch, PT Physical Therapist               Goals/Plan    No documentation.                Clinical Impression     Row Name 02/02/22 8230          Pain    Additional Documentation Pain Scale: Numbers Pre/Post-Treatment (Group)  -DJ     Row Name 02/02/22 3291          Pain Scale: Numbers Pre/Post-Treatment    Pre/Posttreatment Pain  Comment c/o SOB after amb; otherwise withotu c/o  -DJ     Pain Intervention(s) Repositioned; Rest  -DJ     Row Name 02/02/22 8274          Plan of Care Review    Plan of Care Reviewed With patient  -DJ     Progress improving  -DJ     Outcome Summary Pt resting in bed, req encouragement to participate in PT. Pt very anxious about attending inpt rehab and has mult ?s - fearful of not being able to meet the criteria and tolerate full participation in rehab. Pt req CGA for bed mobility and sit/stand from EOB. Pt amb 140' with r wx and CGA/vc for posture. Pace is very slow, balance is fair, endurance is slowly improving but still limits further amb distance. Pt tolerated seated ther ex 10 reps and sit/stand EOB x 4 reps. Overall, pt is progressing slowly - limited by endurance and weakness. Cont PT to address functional deficits and progress as tolerated.  -DJ     Row Name 02/02/22 4885          Therapy Assessment/Plan (PT)    Criteria for Skilled Interventions Met (PT) skilled treatment is necessary  -DJ     Row Name 02/02/22 6752          Vital Signs    O2 Delivery Pre Treatment room air  -DJ     O2 Delivery Intra Treatment room air  -DJ     O2 Delivery Post Treatment room air  -DJ     Pre Patient Position Supine  -DJ     Intra Patient Position Standing  -DJ     Post Patient Position Supine  -DJ     Row Name 02/02/22 5479          Positioning and Restraints    Pre-Treatment Position in bed  -DJ     Post Treatment Position bed  -DJ     In Bed notified nsg; supine; call light within reach; encouraged to call for assist; exit alarm on  -DJ           User Key  (r) = Recorded By, (t) = Taken By, (c) = Cosigned By    Initials Name Provider Type    Nancy Bunch, PT Physical Therapist               Outcome Measures     Row Name 02/02/22 1663          How much help from another person do you currently need...    Turning from your back to your side while in flat bed without using bedrails? 4  -DJ     Moving from lying on back  to sitting on the side of a flat bed without bedrails? 3  -DJ     Moving to and from a bed to a chair (including a wheelchair)? 3  -DJ     Standing up from a chair using your arms (e.g., wheelchair, bedside chair)? 3  -DJ     Climbing 3-5 steps with a railing? 3  -DJ     To walk in hospital room? 3  -DJ     AM-PAC 6 Clicks Score (PT) 19  -DJ     Row Name 02/02/22 1606          Functional Assessment    Outcome Measure Options AM-PAC 6 Clicks Basic Mobility (PT)  -DJ           User Key  (r) = Recorded By, (t) = Taken By, (c) = Cosigned By    Initials Name Provider Type    Nancy Bunch, SALLY Physical Therapist                             Physical Therapy Education                 Title: PT OT SLP Therapies (In Progress)     Topic: Physical Therapy (In Progress)     Point: Mobility training (In Progress)     Learning Progress Summary           Patient Acceptance, E, VU,NR by IVIS at 2/2/2022 1606   Family Acceptance, E,D, NR by BRITT at 1/21/2022 1718      Show all documentation for this point (21)                 Point: Home exercise program (In Progress)     Learning Progress Summary           Patient Acceptance, E, VU,NR by IVIS at 2/2/2022 1606   Family Acceptance, E,D, NR by BRITT at 1/21/2022 1718      Show all documentation for this point (18)                 Point: Body mechanics (In Progress)     Learning Progress Summary           Patient Acceptance, E, VU,NR by IVIS at 2/2/2022 1606   Family Acceptance, E,D, NR by BRITT at 1/21/2022 1718      Show all documentation for this point (21)                 Point: Precautions (In Progress)     Learning Progress Summary           Patient Acceptance, E, VU,NR by IVIS at 2/2/2022 1606   Family Acceptance, E,D, NR by BRITT at 1/21/2022 1718      Show all documentation for this point (20)                             User Key     Initials Effective Dates Name Provider Type [facility]    BRITT 03/07/18 -  Myranda Rodrigues PTA Physical Therapy Assistant PT    IVIS 10/25/19 -  Nancy Carson PT  Physical Therapist PT              PT Recommendation and Plan     Plan of Care Reviewed With: patient  Progress: improving  Outcome Summary: Pt resting in bed, req encouragement to participate in PT. Pt very anxious about attending inpt rehab and has mult ?s - fearful of not being able to meet the criteria and tolerate full participation in rehab. Pt req CGA for bed mobility and sit/stand from EOB. Pt amb 140' with r wx and CGA/vc for posture. Pace is very slow, balance is fair, endurance is slowly improving but still limits further amb distance. Pt tolerated seated ther ex 10 reps and sit/stand EOB x 4 reps. Overall, pt is progressing slowly - limited by endurance and weakness. Cont PT to address functional deficits and progress as tolerated.     Time Calculation:    PT Charges     Row Name 02/02/22 1611             Time Calculation    Start Time 1523  -DJ      Stop Time 1549  -DJ      Time Calculation (min) 26 min  -DJ      PT Non-Billable Time (min) 10 min  -DJ      PT Received On 02/02/22  -DJ      PT - Next Appointment 02/03/22  -IVIS            User Key  (r) = Recorded By, (t) = Taken By, (c) = Cosigned By    Initials Name Provider Type    Nancy Bunch PT Physical Therapist              Therapy Charges for Today     Code Description Service Date Service Provider Modifiers Qty    05847765207 HC PT THERAPEUTIC ACT EA 15 MIN 2/1/2022 Nancy Carson, PT GP 2    73329571884 HC PT THERAPEUTIC ACT EA 15 MIN 2/2/2022 Nancy Carson PT GP 2          PT G-Codes  Outcome Measure Options: AM-PAC 6 Clicks Basic Mobility (PT)  AM-PAC 6 Clicks Score (PT): 19  AM-PAC 6 Clicks Score (OT): 15    Nancy Carson PT  2/2/2022

## 2022-02-02 NOTE — PLAN OF CARE
"Goal Outcome Evaluation: patient alert and oriented to person, place and the time.  Able to make all needs known to the staff.  Patient on RA.  States that he feels like he breathes \"weird\" at times.  Denies SOB and sats are maintaining in high 90's.  Colostomy in place and draining loose brown stool to BSC.  Dressings to abdomen in place with no drainage noted at this time.  Appetite remains poor; patient drinking water and gatorade.  Resting well at this time and call light in place.  WCTM.                 "

## 2022-02-02 NOTE — PLAN OF CARE
Goal Outcome Evaluation:  Plan of Care Reviewed With: patient        Progress: improving  Outcome Summary: Pt resting in bed, req encouragement to participate in PT. Pt very anxious about attending inpt rehab and has mult ?s - fearful of not being able to meet the criteria and tolerate full participation in rehab. Pt req CGA for bed mobility and sit/stand from EOB. Pt amb 140' with r wx and CGA/vc for posture. Pace is very slow, balance is fair, endurance is slowly improving but still limits further amb distance. Pt tolerated seated ther ex 10 reps and sit/stand EOB x 4 reps. Overall, pt is progressing slowly - limited by endurance and weakness. Cont PT to address functional deficits and progress as tolerated.  Patient was intermittently wearing a face mask during this therapy encounter. Therapist used appropriate personal protective equipment including eye protection, mask, and gloves.  Mask used was standard procedure mask. Appropriate PPE was worn during the entire therapy session. Hand hygiene was completed before and after therapy session. Patient is not in enhanced droplet precautions.

## 2022-02-02 NOTE — PLAN OF CARE
Goal Outcome Evaluation:              Outcome Summary: Pt AAOx4, RA, VSS. Tolerating turns well. Colostomy care upheld, surrounding skin assessments continued. Administered norco per MAR, relief noted. Skin care upheld. Pt resting well throughout night. All needs met. Will CTM.

## 2022-02-02 NOTE — PROGRESS NOTES
"[facility] Acute Rehab     [facility] from spouse, who communicated with a , Elizabet Rocha, who stated the following:     \"The benefit for services in an inpatient rehab facility is covered and process under the inpatient benefits and therapies would be covered\".     Called [employer] at [phone], and initiated precert with Elizabet Rocha's information.  Clinicals were faxed in to Lukas SAMANIEGO at [phone]. Await response.    Pao BRAGG, updated.    Will continue to follow.    Thanks,   Maki Rodrigues RN  Rehab Admission Nurse   [phone]  "

## 2022-02-02 NOTE — CASE MANAGEMENT/SOCIAL WORK
Continued Stay Note  [facility]     Patient Name: Arnie Calvo  MRN: [id number]  Today's Date: 2/2/2022    Admit Date: 12/24/2021     Discharge Plan     Row Name 02/02/22 1621       Plan    Plan [facility] - pending pre-cert    Plan Comments Per April/[facility], they are starting pre-cert.  CCP will follow.  BHumeniuk RN                       Expected Discharge Date and Time     Expected Discharge Date Expected Discharge Time    Feb 3, 2022             Becky S. Humeniuk, RN

## 2022-02-03 ENCOUNTER — HOSPITAL ENCOUNTER (INPATIENT)
Facility: HOSPITAL | Age: 62
LOS: 12 days | Discharge: HOME HEALTH | End: 2022-02-15
Attending: PHYSICAL MEDICINE & REHABILITATION | Admitting: PHYSICAL MEDICINE & REHABILITATION

## 2022-02-03 VITALS
DIASTOLIC BLOOD PRESSURE: 74 MMHG | HEART RATE: 111 BPM | WEIGHT: 178.5 LBS | BODY MASS INDEX: 26.44 KG/M2 | RESPIRATION RATE: 16 BRPM | TEMPERATURE: 98 F | HEIGHT: 69 IN | OXYGEN SATURATION: 95 % | SYSTOLIC BLOOD PRESSURE: 97 MMHG

## 2022-02-03 DIAGNOSIS — G47.01 INSOMNIA DUE TO MEDICAL CONDITION: Primary | ICD-10-CM

## 2022-02-03 DIAGNOSIS — R09.02 HYPOXIA: ICD-10-CM

## 2022-02-03 DIAGNOSIS — Z87.01 HISTORY OF PNEUMONIA: ICD-10-CM

## 2022-02-03 DIAGNOSIS — Z93.9 HISTORY OF CREATION OF OSTOMY: ICD-10-CM

## 2022-02-03 PROBLEM — R53.81 DEBILITY: Status: ACTIVE | Noted: 2022-02-03

## 2022-02-03 PROBLEM — K91.89 ANASTOMOTIC LEAK OF INTESTINE: Status: RESOLVED | Noted: 2021-12-24 | Resolved: 2022-02-03

## 2022-02-03 PROBLEM — K56.609 BOWEL OBSTRUCTION (HCC): Status: RESOLVED | Noted: 2018-07-15 | Resolved: 2022-02-03

## 2022-02-03 LAB
ANION GAP SERPL CALCULATED.3IONS-SCNC: 8.5 MMOL/L (ref 5–15)
BUN SERPL-MCNC: 17 MG/DL (ref 8–23)
BUN/CREAT SERPL: 17.5 (ref 7–25)
CALCIUM SPEC-SCNC: 9.1 MG/DL (ref 8.6–10.5)
CHLORIDE SERPL-SCNC: 93 MMOL/L (ref 98–107)
CO2 SERPL-SCNC: 28.5 MMOL/L (ref 22–29)
CREAT SERPL-MCNC: 0.97 MG/DL (ref 0.76–1.27)
GFR SERPL CREATININE-BSD FRML MDRD: 78 ML/MIN/1.73
GLUCOSE SERPL-MCNC: 109 MG/DL (ref 65–99)
POTASSIUM SERPL-SCNC: 3.9 MMOL/L (ref 3.5–5.2)
SARS-COV-2 RNA PNL SPEC NAA+PROBE: NOT DETECTED
SODIUM SERPL-SCNC: 130 MMOL/L (ref 136–145)

## 2022-02-03 PROCEDURE — 87635 SARS-COV-2 COVID-19 AMP PRB: CPT | Performed by: SURGERY

## 2022-02-03 PROCEDURE — 25010000002 ENOXAPARIN PER 10 MG: Performed by: PHYSICAL MEDICINE & REHABILITATION

## 2022-02-03 PROCEDURE — 25010000002 ONDANSETRON PER 1 MG: Performed by: INTERNAL MEDICINE

## 2022-02-03 PROCEDURE — 80048 BASIC METABOLIC PNL TOTAL CA: CPT | Performed by: PHYSICAL MEDICINE & REHABILITATION

## 2022-02-03 RX ORDER — HYDROCODONE BITARTRATE AND ACETAMINOPHEN 5; 325 MG/1; MG/1
2 TABLET ORAL EVERY 8 HOURS PRN
Status: CANCELLED | OUTPATIENT
Start: 2022-02-03 | End: 2022-02-11

## 2022-02-03 RX ORDER — LOPERAMIDE HYDROCHLORIDE 2 MG/1
4 CAPSULE ORAL
Qty: 240 CAPSULE | Refills: 2 | Status: ON HOLD | OUTPATIENT
Start: 2022-02-03 | End: 2022-02-03

## 2022-02-03 RX ORDER — SIMETHICONE 80 MG
80 TABLET,CHEWABLE ORAL 4 TIMES DAILY PRN
Status: CANCELLED | OUTPATIENT
Start: 2022-02-03

## 2022-02-03 RX ORDER — CARVEDILOL 12.5 MG/1
12.5 TABLET ORAL
Status: DISCONTINUED | OUTPATIENT
Start: 2022-02-03 | End: 2022-02-04

## 2022-02-03 RX ORDER — ACETAMINOPHEN 325 MG/1
650 TABLET ORAL EVERY 4 HOURS PRN
Status: CANCELLED | OUTPATIENT
Start: 2022-02-03

## 2022-02-03 RX ORDER — GLYCOPYRROLATE 1 MG/1
2 TABLET ORAL 2 TIMES DAILY
Status: CANCELLED | OUTPATIENT
Start: 2022-02-03

## 2022-02-03 RX ORDER — ACETAMINOPHEN 325 MG/1
650 TABLET ORAL EVERY 4 HOURS PRN
Status: DISCONTINUED | OUTPATIENT
Start: 2022-02-03 | End: 2022-02-15 | Stop reason: HOSPADM

## 2022-02-03 RX ORDER — SODIUM HYPOCHLORITE 1.25 MG/ML
SOLUTION TOPICAL AS NEEDED
Status: CANCELLED | OUTPATIENT
Start: 2022-02-03

## 2022-02-03 RX ORDER — SODIUM HYPOCHLORITE 1.25 MG/ML
SOLUTION TOPICAL AS NEEDED
Status: DISCONTINUED | OUTPATIENT
Start: 2022-02-03 | End: 2022-02-04

## 2022-02-03 RX ORDER — GLYCOPYRROLATE 1 MG/1
2 TABLET ORAL 2 TIMES DAILY
Status: DISCONTINUED | OUTPATIENT
Start: 2022-02-03 | End: 2022-02-05

## 2022-02-03 RX ORDER — GLYCOPYRROLATE 1 MG/1
2 TABLET ORAL 2 TIMES DAILY
Qty: 120 TABLET | Refills: 2 | Status: ON HOLD | OUTPATIENT
Start: 2022-02-03 | End: 2022-02-03

## 2022-02-03 RX ORDER — CARVEDILOL 12.5 MG/1
12.5 TABLET ORAL
Status: CANCELLED | OUTPATIENT
Start: 2022-02-03

## 2022-02-03 RX ORDER — LOPERAMIDE HYDROCHLORIDE 2 MG/1
4 CAPSULE ORAL
Status: CANCELLED | OUTPATIENT
Start: 2022-02-03

## 2022-02-03 RX ORDER — LOPERAMIDE HYDROCHLORIDE 2 MG/1
4 CAPSULE ORAL
Status: DISCONTINUED | OUTPATIENT
Start: 2022-02-03 | End: 2022-02-04

## 2022-02-03 RX ORDER — SIMETHICONE 80 MG
80 TABLET,CHEWABLE ORAL 4 TIMES DAILY PRN
Status: DISCONTINUED | OUTPATIENT
Start: 2022-02-03 | End: 2022-02-15

## 2022-02-03 RX ORDER — HYDROCODONE BITARTRATE AND ACETAMINOPHEN 5; 325 MG/1; MG/1
2 TABLET ORAL EVERY 8 HOURS PRN
Status: DISCONTINUED | OUTPATIENT
Start: 2022-02-03 | End: 2022-02-15 | Stop reason: HOSPADM

## 2022-02-03 RX ADMIN — HYDROCODONE BITARTRATE AND ACETAMINOPHEN 2 TABLET: 5; 325 TABLET ORAL at 19:41

## 2022-02-03 RX ADMIN — SODIUM CHLORIDE, PRESERVATIVE FREE 10 ML: 5 INJECTION INTRAVENOUS at 10:03

## 2022-02-03 RX ADMIN — LOPERAMIDE HYDROCHLORIDE 4 MG: 2 CAPSULE ORAL at 06:52

## 2022-02-03 RX ADMIN — GLYCOPYRROLATE 2 MG: 1 TABLET ORAL at 19:40

## 2022-02-03 RX ADMIN — ENOXAPARIN SODIUM 40 MG: 100 INJECTION SUBCUTANEOUS at 19:41

## 2022-02-03 RX ADMIN — LOPERAMIDE HYDROCHLORIDE 4 MG: 2 CAPSULE ORAL at 19:40

## 2022-02-03 RX ADMIN — GLYCOPYRROLATE 2 MG: 1 TABLET ORAL at 10:03

## 2022-02-03 RX ADMIN — ONDANSETRON 4 MG: 2 INJECTION INTRAMUSCULAR; INTRAVENOUS at 07:25

## 2022-02-03 RX ADMIN — CARVEDILOL 12.5 MG: 12.5 TABLET, FILM COATED ORAL at 17:29

## 2022-02-03 RX ADMIN — LOPERAMIDE HYDROCHLORIDE 4 MG: 2 CAPSULE ORAL at 17:29

## 2022-02-03 RX ADMIN — LOPERAMIDE HYDROCHLORIDE 4 MG: 2 CAPSULE ORAL at 12:46

## 2022-02-03 RX ADMIN — HYDROCODONE BITARTRATE AND ACETAMINOPHEN 2 TABLET: 5; 325 TABLET ORAL at 07:25

## 2022-02-03 RX ADMIN — SIMETHICONE 80 MG: 80 TABLET, CHEWABLE ORAL at 19:38

## 2022-02-03 NOTE — PROGRESS NOTES
SECTION GG    Eating Performance: Landrum sets up or cleans up; patient completes activity.  Landrum assists only prior to or following the activity.    Eating Discharge Goals: Patient completed the activities by him/herself with no  assistance from a helper.    Signed by: Lily Good RN

## 2022-02-03 NOTE — H&P
[facility]   HISTORY AND PHYSICAL    Patient Name: Arnie Calvo  : 1960  MRN: [id number]  Primary Care Physician:  Zechariah Fatima MD  Date of admission: (Not on file)    Subjective   Subjective     Chief Complaint: Immobility syndrome  Impaired mobility/impaired self-care/impaired endurance  Ileostomy takedown , cholecystectomy, incisional hernia repair 2021.  Exploratory laparotomy with end colostomy partial colon resection 2021  Ostomy-on Imodium/glycopyrrolate  Anxiety  Anemia  Hyponatremia  Congestive heart failure/tachycardia-on Coreg  Nonischemic cardiomyopathy ejection fraction 20%  DVT prophylaxis-Lovenox/SCDs  impaired nutritional status    History of Present Illness  61-year-old male previously independent, was hiking up to 7.5 miles in [facility], who had symptomatic cholelithiasis and moderately large parastomal hernia around ileostomy.  Underwent cholecystectomy and ileostomy takedown with repair of parastomal hernia first part of December.  He required readmission for persistent symptoms consistent with ileus versus obstruction.  Started on TPN.  Placed on antibiotics.  Inflammatory changes noted around the sigmoid colon.  Probable Crohn's related stricture in the sigmoid colon with partial bowel obstruction was felt to be likely source of his difficult postoperative course.  On  he underwent exploratory laparotomy with transverse colon resection and end colostomy.  Abdominal adhesions were severe.  Hospital course noted for postoperative sepsis requiring antibiotics and pressor supports.   Operative findings were notable for high-grade partial obstruction of the sigmoid colon and relatively dusky appearance of the small bowel.  He had interventional radiology drainage on  of intra-abdominal abscess, resolved acute hypoxic respiratory failure and acute kidney injury and shock.  Treated with Zosyn.  He had a wound VAC and ostomy  in place.  He had been on TPN for malnutrition and to minimize stool stoma output.  He had been on midodrine      Deconditioning with impairments with his mobility and self-care.  Wound ostomy care issues related to need for ostomy placement within the upper midline incision due to his abdominal adhesions.  Cardiology followed for baseline congestive heart failure and persistent tachycardia.  No clear etiology of his persistent tachycardia could be ascertained.  Primary issues at the time of discharge include parastomal wound healing and ostomy appliance management.  Deconditioning with need for physical therapy and Occupational Therapy.  Poor nutritional status with need for increased oral intake.  Prealbumin level of 10.2 on February 2.  Would recommend rechecking in about 2 weeks.  Chronic medical issues most significantly severe nonischemic cardiomyopathy with ejection fraction of 20%.    Patient presently comfortable at rest.  Does fatigue with activities.  Notes he gets short of air with activities.  Generalized weakness.  Has discomfort with the dressing changes.  Is having ostomy output.  Notes decreased appetite but is taking supplements more so than the meal trays.  He is contact-guard for bed mobility.  Ambulate 140 feet contact-guard with rolling walker, very slow.  Fair balance.  Endurance is slowly improving.    Given his functional impairments and comorbidities now admitted for acute inpatient rehabilitation    Review of Systems   10 point review of systems reviewed as per his HPI.  Denies any bladder complaints.  Personal History     Past Medical History:   Diagnosis Date   • Acute pain of left hip    • Adjustment disorder with depressed mood    • Anesthesia complication     SPINAL [id number]-[id number] LOWER SPINE   • Ankylosing spondylitis of cervical region (HCC)    • Ankylosis of spine    • Arthritis    • Asthma    • Cardiomyopathy (HCC)     sees Dr. Reyes; NICM/ (-) cath, EF 25-35%; has  ICD   • CHF (congestive heart failure) (HCC)    • Cholecystitis    • Crohn's disease (HCC)    • Diabetes mellitus (HCC)     Diet controlled.   • Dysthymic disorder 2012   • Dysuria    • Essential hypertension    • External hemorrhoids    • Genital herpes    • Gout    • History of MRSA infection 2000?    FINGERTIP-TX WITH ANTIBIOTICS-[facility] CARE-NO CURRENT OPEN WOUND OR TREATMENT 12/3/21   • Ileostomy in place ([facility])    • Insomnia    • Iron deficiency    • Paroxysmal ventricular tachycardia (HCC)    • PONV (postoperative nausea and vomiting)    • Presence of combination internal cardiac defibrillator (ICD) and pacemaker    • Prostate nodule    • Recurrent canker sores    • Thoracic back pain    • Urinary hesitancy    • Xerosis cutis        Past Surgical History:   Procedure Laterality Date   • CARDIAC CATHETERIZATION     • CARDIAC DEFIBRILLATOR PLACEMENT      REPLACEMENT-Had Jude lead that was fractured.  Lead was tied off.  New lead and new device implanted.   • CARDIAC ELECTROPHYSIOLOGY PROCEDURE N/A 1/3/2019    Procedure: ICD DC generator change  MEDTRONIC;  Surgeon: Zechariah Randolph MD;  Location: [facility] CATH INVASIVE LOCATION;  Service: Cardiology   • CHOLECYSTECTOMY N/A 12/7/2021    Procedure: CHOLECYSTECTOMY;  Surgeon: Jeovany Mott MD;  Location: [facility] OR;  Service: General;  Laterality: N/A;   • COLON RESECTION N/A 12/29/2021    Procedure: PARTIAL COLECTOMY WITH OSTOMY;  Surgeon: Jeovany Mott MD;  Location: [facility] OR;  Service: General;  Laterality: N/A;   • COLON RESECTION WITH ILEOSTOMY N/A 2018   • COLONOSCOPY  04/03/2015    abnormal cecum, three polypoid masses, pre cancerous vs crohns, IH, tics, hyperplastic polyp   • COLONOSCOPY N/A 3/17/2017    polypoid masses, tics, IH, polyp   • EXPLORATORY LAPAROTOMY W/ BOWEL RESECTION  07/2018    open resection of ileum with creation of end ileostomy   • ILEOSTOMY CLOSURE  07/2018   • ILEOSTOMY CLOSURE N/A 12/7/2021    Procedure:  ILEOSTOMY TAKEDOWN WITH RESECTION, PARASTOMAL HERNIA REPAIR;  Surgeon: Jeovany Mott MD;  Location: [facility] MAIN OR;  Service: General;  Laterality: N/A;       Family History: family history includes Colon cancer in his father; Colon polyps in his father; Diabetes in his mother; Gout in his father; Heart failure in his father; Hypertension in his mother. Otherwise pertinent FHx was reviewed and not pertinent to current issue.    Social History:  reports that he has never smoked. He has never used smokeless tobacco. He reports that he does not drink alcohol and does not use drugs.  .  Lives with his wife in a home with first-floor bedroom bathroom.  Home Medications:  Glycerin-Hypromellose-, HYDROcodone-acetaminophen, carvedilol, enoxaparin, glycopyrrolate, loperamide, ondansetron, spironolactone, and valACYclovir    Allergies:  Allergies   Allergen Reactions   • Sulfamethoxazole-Trimethoprim Hives and Rash     Blisters   • Trimethoprim Hives   • Adhesive Tape Rash   • Shellfish-Derived Products Nausea And Vomiting and GI Intolerance     Inter-facility transfer medications (From admission, onward)                        carvedilol (COREG) tablet 12.5 mg  Daily Before Supper                enoxaparin (LOVENOX) syringe 40 mg  (Enoxaparin - VTE Prophylaxis)  Every 24 Hours                glycopyrrolate (ROBINUL) tablet 2 mg  2 Times Daily                loperamide (IMODIUM) capsule 4 mg  4 Times Daily Before Meals & Nightly                acetaminophen (TYLENOL) tablet 650 mg  Every 4 Hours PRN                Glycerin-Hypromellose- (ARTIFICIAL TEARS) 0.2-0.2-1 % ophthalmic solution solution 1 drop  Every 3 Hours PRN                HYDROcodone-acetaminophen (NORCO) 5-325 MG per tablet 2 tablet  Every 8 Hours PRN                simethicone (MYLICON) chewable tablet 80 mg  4 Times Daily PRN                sodium hypochlorite (DAKIN'S 1/4 STRENGTH) 0.125 % topical solution 0.125% solution  As Needed                    Objective    Objective     Vitals:   Temp:  [98 °F (36.7 °C)-98.2 °F (36.8 °C)] 98 °F (36.7 °C)  Heart Rate:  [103-111] 111  Resp:  [16] 16  BP: (88-97)/(65-74) 97/74  Flow (L/min):  [1] 1    Physical Exam  MENTAL STATUS -  AWAKE / ALERT  HEENT- NCAT,  SCLERAE ANICTERIC, CONJUNCTIVAE PINK,  NO JVD, EARS UNREMARKABLE EXTERNALLY  LUNGS - CTA, NO WHEEZES, RALES OR RHONCHI  HEART-tachycardia 117  ABD - NORMOACTIVE BOWEL SOUNDS, SOFT, NT.  Ostomy with liquid brown output.  Dressings in place at right and left abdomen  EXT - NO EDEMA OR CYANOSIS  NEURO -oriented x4.  MOTOR EXAM - RUE/RLE 5/5. LUE/LLE 5/5.      Result Review    Result Review:  Results from last 7 days   Lab Units 02/02/22  1141 01/28/22  0728   WBC 10*3/mm3 9.61 7.09   HEMOGLOBIN g/dL 11.2* 10.6*   HEMATOCRIT % 35.2* 33.5*   PLATELETS 10*3/mm3 157 109*     Results from last 7 days   Lab Units 02/03/22  1213 02/02/22  1141 01/29/22  0535   SODIUM mmol/L 130* 127* 132*   POTASSIUM mmol/L 3.9 3.8 3.8   CHLORIDE mmol/L 93* 93* 98   CO2 mmol/L 28.5 27.5 21.4*   BUN mg/dL 17 16 15   CREATININE mg/dL 0.97 0.91 0.65*   CALCIUM mg/dL 9.1 9.0 8.5*   BILIRUBIN mg/dL  --  0.7  --    ALK PHOS U/L  --  90  --    ALT (SGPT) U/L  --  22  --    AST (SGOT) U/L  --  28  --    GLUCOSE mg/dL 109* 114* 90         Ref. Range 2/2/2022 11:41   Magnesium Latest Ref Range: 1.6 - 2.4 mg/dL 1.8   Prealbumin Latest Ref Range: 20.0 - 40.0 mg/dL 10.2 (L)       Assessment/Plan   Assessment / Plan      Chief Complaint: Immobility syndrome  Impaired mobility/impaired self-care/impaired endurance    Ileostomy takedown , cholecystectomy, incisional hernia repair December 7, 2021.  Exploratory laparotomy with end colostomy partial colon resection December 29, 2021    Ostomy-on Imodium/glycopyrrolate    Abdominal wound care    Anxiety    Anemia    Hyponatremia    Congestive heart failure/tachycardia-on Coreg    Nonischemic cardiomyopathy ejection fraction 20%    DVT  prophylaxis-Lovenox/SCDs    Impaired nutritional status-supplements per dietitian to follow.  Recheck prealbumin around February 16    Pulmonary- using  O2 2 L nasal cannula at night        Now admit for comprehensive acute inpatient rehabilitation .  This would be an interdisciplinary program with physical therapy 1.5 hour,  occupational therapy 1.5 hour,  5 days a week.  Rehabilitation nursing for carryover, monitoring of cardiac and intestinal   status, bowel and bladder, and skin  Ongoing physician follow-up.  Weekly team conferences.  Goals are to achieve a level of supervision modified independent with  mobility and self-care and improved endurance.   Rehabilitation prognosis fair.  Medical prognosis defer to general surgery.  Estimated length of stay is approximately 10 days, but is only an estimation.     The patient's functional status and clinical status is unchanged from preadmission assessment and the patient continues appropriate for acute inpatient rehabilitation.  Goal is for home with outpatient   therapies.  Barrier to discharge:.  Mobility and self-care endurance- work on condition, transfers, progressive ambulation, activity daily living to overcome.     Admission Status:  I believe this patient meets inpatient status.    Zechariah Pearson MD    During rounds, used appropriate personal protective equipment including mask and gloves.  Additional gown if indicated.  Mask used was standard procedure mask. Appropriate PPE was worn during the entire visit.  Hand hygiene was completed before and after.

## 2022-02-03 NOTE — CASE MANAGEMENT/SOCIAL WORK
Continued Stay Note  Psychiatric     Patient Name: Arnie Calvo  MRN: [id number]  Today's Date: 2/3/2022    Admit Date: 12/24/2021     Discharge Plan     Row Name 02/03/22 0910       Plan    Plan Comments Spoke with Aliza/TERRELL and pre-cert has been obtained.  Eugenie Salter RN               Discharge Codes    No documentation.               Expected Discharge Date and Time     Expected Discharge Date Expected Discharge Time    Feb 3, 2022             Eugenie Salter RN

## 2022-02-03 NOTE — CASE MANAGEMENT/SOCIAL WORK
Case Management Discharge Note      Final Note: DC'd to [facility] Acute Rehab. Eugenie Salter RN         Selected Continued Care - Discharged on 2/3/2022 Admission date: 12/24/2021 - Discharge disposition: Rehab Facility or Unit ([facility])    Destination Coordination complete.    Service Provider Selected Services Address Phone Fax Patient Preferred    [facility]  Inpatient Rehabilitation [address] [place] [id number] [phone] -- --          Durable Medical Equipment    No services have been selected for the patient.              Dialysis/Infusion    No services have been selected for the patient.              Home Medical Care    No services have been selected for the patient.              Therapy    No services have been selected for the patient.              Community Resources    No services have been selected for the patient.              Community & DME    No services have been selected for the patient.                       Final Discharge Disposition Code: 62 - inpatient rehab facility

## 2022-02-03 NOTE — PROGRESS NOTES
Inter-facility transfer medications (From admission, onward)             carvedilol (COREG) tablet 12.5 mg  Daily Before Supper             enoxaparin (LOVENOX) syringe 40 mg  (Enoxaparin - VTE Prophylaxis)  Every 24 Hours             glycopyrrolate (ROBINUL) tablet 2 mg  2 Times Daily             loperamide (IMODIUM) capsule 4 mg  4 Times Daily Before Meals & Nightly             acetaminophen (TYLENOL) tablet 650 mg  Every 4 Hours PRN             Glycerin-Hypromellose- (ARTIFICIAL TEARS) 0.2-0.2-1 % ophthalmic solution solution 1 drop  Every 3 Hours PRN             HYDROcodone-acetaminophen (NORCO) 5-325 MG per tablet 2 tablet  Every 8 Hours PRN             simethicone (MYLICON) chewable tablet 80 mg  4 Times Daily PRN             sodium hypochlorite (DAKIN'S 1/4 STRENGTH) 0.125 % topical solution 0.125% solution  As Needed

## 2022-02-03 NOTE — PAYOR COMM NOTE
"Leda Turner (61 y.o. Male)     PLEASE SEE ATTACHED DC SUMMARY    REF#CASE ID- [id number]      FELIX,    THANK YOU FOR ALL YOUR HELP WITH THIS CASE.      UNTIL NEXT TIME.    Marcos WELLER LPN [facility]               Date of Birth Social Security Number Address Home Phone MRN    1960  [id number] [facility] [address] [phone] [id number]    Christian Marital Status             Non-Latter day        Admission Date Admission Type Admitting Provider Attending Provider Department, Room/Bed    12/24/21 Emergency Jeovany Mott MD  [address], S[address]/1    Discharge Date Discharge Disposition Discharge Destination          2/3/2022 Rehab Facility or Unit ([facility])              Attending Provider: (none)   Allergies: Sulfamethoxazole-trimethoprim, Trimethoprim, Adhesive Tape, Shellfish-derived Products    Isolation: None   Infection: None   Code Status: CPR   Advance Care Planning Activity    Ht: 175 cm (68.9\")   Wt: 81 kg (178 lb 8 oz)    Admission Cmt: None   Principal Problem: Bowel obstruction (HCC) [K56.609]                 Active Insurance as of 12/24/2021     Primary Coverage     Payor Plan Insurance Group Employer/Plan Group    MISC PHCS MISC [id number]     Coverage Address Coverage Phone Number Coverage Fax Number Effective Dates    [address] [address] [phone]  11/1/2021 - None Entered    [facility] [id number]       Subscriber Name Subscriber Birth Date Member ID       TURNERLEDA NOYOLA JAZMÍN 1960 [id number]           Secondary Coverage     Payor Plan Insurance Group Employer/Plan Group    [employer]RAT[employer] BENEFIT ADMIN - [id number]/[id number]     Payor Plan Address Payor Plan Phone Number Payor Plan Fax Number Effective Dates    [address] [phone]  12/24/2021 - 1/27/2022    [facility] [address]       Subscriber Name Subscriber Birth Date Member ID       LEDA TURNER JAZMÍN 1960 [id number]           "       Emergency Contacts      (Rel.) Home Phone Work Phone Mobile Phone    Kiara Calvo (Spouse) [phone] -- [phone]               Discharge Summary      Jeovany Mott MD at 02/03/22 0923        DATE OF ADMIT: 12/24/2021    DATE OF DISCHARGE: 2/3/2022    DIAGNOSIS:  1. Distal colonic stricture  2. Crohn's disease  3. Cholelithiasis    FINAL PATHOLOGY:  1. Transverse colon resection 12/29/2021: Chronic colitis without activity  2. Ileostomy and gallbladder 12/7/2021: Ileocolic anastomosis and portion of colon without significant pathologic abnormality, chronic cholecystitis and cholelithiasis    PROCEDURES:  1. Ileostomy takedown with resection, cholecystectomy, and incisional hernia repair 12/7/2021 (previous admission)  2. Exploratory laparotomy with end colostomy and partial colon resection 12/29/2021    · SUMMARY OF HOSPITAL COURSE:   61-year-old gentleman with longstanding history of Crohn's disease whom I initially saw in September 2021 for what appeared to be symptomatic cholelithiasis. He also had a moderately large parastomal hernia around his right mid to upper abdomen and ileostomy.    · He underwent ileocolic resection with diverting loop ileostomy by Dr. Lisa on 4/23/2018. Ileostomy reversal performed on 7/9/2018. Due to postoperative anastomotic leak he then underwent laparotomy with resection and end ileostomy.    · After his initial visit with me in September 2021 and discussion of options, he elected to proceed with cholecystectomy and ileostomy takedown with repair of parastomal hernia. A previous water-soluble barium enema showed no evidence of stricture, obstructing mass, or contrast leak.    · He underwent ileostomy takedown, cholecystectomy, and incisional hernia repair on 12/7/2021. He had a postoperative course complicated by protracted ileus, but at the time of discharge appeared to be tolerating diet and was having bowel function. Unfortunately he required  readmission soon thereafter for persistent symptoms consistent with ileus versus obstruction.    · Following readmission, he was started on TPN due to inadequate oral intake and persistent signs and symptoms of ileus versus obstruction. Initial CT scan showed question of contained leak at the site of the anastomosis. He was placed on antibiotics and managed conservatively. He also had inflammatory change around the sigmoid colon and water-soluble barium enema was performed on 12/25/2021. There was narrowing along the sigmoid colon but no evidence of anastomotic leak or other problem. As he failed to improve, repeat CT scan on 12/28/2021 demonstrated long segment sigmoid colonic wall thickening causing at least a partial bowel obstruction in that region. Probable Crohn's related stricture in that region was felt to be the likely source of his difficult postoperative course and we elected to proceed with the second surgery described above. At the time of that operation, abdominal adhesions were severe and limited our ability to achieve adequate safe dissection. In the end, we were forced to bring out an end colostomy through his upper midline incision. In brief, his hospital course following that surgery was marked by intensive care unit stay due to postoperative sepsis requiring antibiotics and pressor support. Once he left the intensive care unit, his course was marked by expected weakness requiring physical and occupational therapy and wound/ostomy care issues related to need for ostomy placement within upper midline incision. The wound care/ostomy nurses did a very nice job of managing this wound and by the time of discharge to rehab, the wound and ostomy were in good order and standard ostomy appliance was being applied with expectation of secondary healing of the associated wound. Cardiology was involved in his postoperative course due to his baseline congestive heart failure and persistent tachycardia (no clear  understanding of the nature/etiology of his persistent tachycardia could be ascertained). Principal issues at time of discharge include:  · Parastomal wound healing and ostomy appliance management  · Deconditioning with need for physical and occupational therapy  · Ostomy output control to avoid dehydration  · Poor nutritional state with need for increased oral intake (prealbumin level of 10.2 on 2/2/2022, would recommend rechecking in about 2 weeks)  · Chronic medical issues, most significantly severe nonischemic cardiomyopathy with ejection fraction of 20%    DIET: Regular    ACTIVITY: Cleared for any and all activity as tolerated without restriction    MEDICATIONS:  · Coreg 12.5 mg p.o. daily with dinner  · Lovenox 40 mg subcutaneous daily for 30 days  · Robinul 2 mg p.o. twice daily  · Imodium 4 mg p.o. before every meal and nightly  · Hydrocodone as needed for pain  · Zofran as needed for nausea    FOLLOW-UP: I will continue to follow him in rehab as he is going to the [facility] rehabilitation facility    Jeovany Mott M.D.    Electronically signed by Jeovany Mott MD at 02/03/22 6582

## 2022-02-03 NOTE — DISCHARGE SUMMARY
DATE OF ADMIT: 12/24/2021    DATE OF DISCHARGE: 2/3/2022    DIAGNOSIS:  1. Distal colonic stricture  2. Crohn's disease  3. Cholelithiasis    FINAL PATHOLOGY:  1. Transverse colon resection 12/29/2021: Chronic colitis without activity  2. Ileostomy and gallbladder 12/7/2021: Ileocolic anastomosis and portion of colon without significant pathologic abnormality, chronic cholecystitis and cholelithiasis    PROCEDURES:  1. Ileostomy takedown with resection, cholecystectomy, and incisional hernia repair 12/7/2021 (previous admission)  2. Exploratory laparotomy with end colostomy and partial colon resection 12/29/2021    · SUMMARY OF HOSPITAL COURSE:   61-year-old gentleman with longstanding history of Crohn's disease whom I initially saw in September 2021 for what appeared to be symptomatic cholelithiasis. He also had a moderately large parastomal hernia around his right mid to upper abdomen and ileostomy.    · He underwent ileocolic resection with diverting loop ileostomy by Dr. Lisa on 4/23/2018. Ileostomy reversal performed on 7/9/2018. Due to postoperative anastomotic leak he then underwent laparotomy with resection and end ileostomy.    · After his initial visit with me in September 2021 and discussion of options, he elected to proceed with cholecystectomy and ileostomy takedown with repair of parastomal hernia. A previous water-soluble barium enema showed no evidence of stricture, obstructing mass, or contrast leak.    · He underwent ileostomy takedown, cholecystectomy, and incisional hernia repair on 12/7/2021. He had a postoperative course complicated by protracted ileus, but at the time of discharge appeared to be tolerating diet and was having bowel function. Unfortunately he required readmission soon thereafter for persistent symptoms consistent with ileus versus obstruction.    · Following readmission, he was started on TPN due to inadequate oral intake and persistent signs and symptoms of ileus versus  obstruction. Initial CT scan showed question of contained leak at the site of the anastomosis. He was placed on antibiotics and managed conservatively. He also had inflammatory change around the sigmoid colon and water-soluble barium enema was performed on 12/25/2021. There was narrowing along the sigmoid colon but no evidence of anastomotic leak or other problem. As he failed to improve, repeat CT scan on 12/28/2021 demonstrated long segment sigmoid colonic wall thickening causing at least a partial bowel obstruction in that region. Probable Crohn's related stricture in that region was felt to be the likely source of his difficult postoperative course and we elected to proceed with the second surgery described above. At the time of that operation, abdominal adhesions were severe and limited our ability to achieve adequate safe dissection. In the end, we were forced to bring out an end colostomy through his upper midline incision. In brief, his hospital course following that surgery was marked by intensive care unit stay due to postoperative sepsis requiring antibiotics and pressor support. Once he left the intensive care unit, his course was marked by expected weakness requiring physical and occupational therapy and wound/ostomy care issues related to need for ostomy placement within upper midline incision. The wound care/ostomy nurses did a very nice job of managing this wound and by the time of discharge to rehab, the wound and ostomy were in good order and standard ostomy appliance was being applied with expectation of secondary healing of the associated wound. Cardiology was involved in his postoperative course due to his baseline congestive heart failure and persistent tachycardia (no clear understanding of the nature/etiology of his persistent tachycardia could be ascertained). Principal issues at time of discharge include:  · Parastomal wound healing and ostomy appliance management  · Deconditioning with  need for physical and occupational therapy  · Ostomy output control to avoid dehydration  · Poor nutritional state with need for increased oral intake (prealbumin level of 10.2 on 2/2/2022, would recommend rechecking in about 2 weeks)  · Chronic medical issues, most significantly severe nonischemic cardiomyopathy with ejection fraction of 20%    DIET: Regular    ACTIVITY: Cleared for any and all activity as tolerated without restriction    MEDICATIONS:  · Coreg 12.5 mg p.o. daily with dinner  · Lovenox 40 mg subcutaneous daily for 30 days  · Robinul 2 mg p.o. twice daily  · Imodium 4 mg p.o. before every meal and nightly  · Hydrocodone as needed for pain  · Zofran as needed for nausea    FOLLOW-UP: I will continue to follow him in rehab as he is going to the [facility] rehabilitation facility    Jeovany Mott M.D.

## 2022-02-03 NOTE — PLAN OF CARE
Goal Outcome Evaluation:              Outcome Summary: Pt on 1 L at night. BP continues running low. Ostomy output continues to be liquid. Skin care upheld. Imodium 4 mg continued per MAR. Pt states anxiety regarding rehab, this RN provided comfort and therapeutic communication. Turns as tolerated by pt. Pt resting well throughout night. Will CTM.

## 2022-02-04 LAB
ANION GAP SERPL CALCULATED.3IONS-SCNC: 7.9 MMOL/L (ref 5–15)
BASOPHILS # BLD AUTO: 0.05 10*3/MM3 (ref 0–0.2)
BASOPHILS NFR BLD AUTO: 0.5 % (ref 0–1.5)
BUN SERPL-MCNC: 17 MG/DL (ref 8–23)
BUN/CREAT SERPL: 15.9 (ref 7–25)
CALCIUM SPEC-SCNC: 9 MG/DL (ref 8.6–10.5)
CHLORIDE SERPL-SCNC: 89 MMOL/L (ref 98–107)
CO2 SERPL-SCNC: 27.1 MMOL/L (ref 22–29)
CREAT SERPL-MCNC: 1.07 MG/DL (ref 0.76–1.27)
DEPRECATED RDW RBC AUTO: 47.2 FL (ref 37–54)
EOSINOPHIL # BLD AUTO: 0.12 10*3/MM3 (ref 0–0.4)
EOSINOPHIL NFR BLD AUTO: 1.2 % (ref 0.3–6.2)
ERYTHROCYTE [DISTWIDTH] IN BLOOD BY AUTOMATED COUNT: 15.7 % (ref 12.3–15.4)
GFR SERPL CREATININE-BSD FRML MDRD: 70 ML/MIN/1.73
GLUCOSE SERPL-MCNC: 118 MG/DL (ref 65–99)
HCT VFR BLD AUTO: 34.3 % (ref 37.5–51)
HGB BLD-MCNC: 10.9 G/DL (ref 13–17.7)
IMM GRANULOCYTES # BLD AUTO: 0.02 10*3/MM3 (ref 0–0.05)
IMM GRANULOCYTES NFR BLD AUTO: 0.2 % (ref 0–0.5)
LYMPHOCYTES # BLD AUTO: 4.16 10*3/MM3 (ref 0.7–3.1)
LYMPHOCYTES NFR BLD AUTO: 42.1 % (ref 19.6–45.3)
MCH RBC QN AUTO: 26.5 PG (ref 26.6–33)
MCHC RBC AUTO-ENTMCNC: 31.8 G/DL (ref 31.5–35.7)
MCV RBC AUTO: 83.3 FL (ref 79–97)
MONOCYTES # BLD AUTO: 0.93 10*3/MM3 (ref 0.1–0.9)
MONOCYTES NFR BLD AUTO: 9.4 % (ref 5–12)
NEUTROPHILS NFR BLD AUTO: 4.59 10*3/MM3 (ref 1.7–7)
NEUTROPHILS NFR BLD AUTO: 46.6 % (ref 42.7–76)
NRBC BLD AUTO-RTO: 0 /100 WBC (ref 0–0.2)
OSMOLALITY UR: 284 MOSM/KG
PLATELET # BLD AUTO: 167 10*3/MM3 (ref 140–450)
PMV BLD AUTO: 10.9 FL (ref 6–12)
POTASSIUM SERPL-SCNC: 4.1 MMOL/L (ref 3.5–5.2)
RBC # BLD AUTO: 4.12 10*6/MM3 (ref 4.14–5.8)
SODIUM SERPL-SCNC: 124 MMOL/L (ref 136–145)
SODIUM UR-SCNC: <20 MMOL/L
WBC NRBC COR # BLD: 9.87 10*3/MM3 (ref 3.4–10.8)

## 2022-02-04 PROCEDURE — 97116 GAIT TRAINING THERAPY: CPT

## 2022-02-04 PROCEDURE — 97166 OT EVAL MOD COMPLEX 45 MIN: CPT | Performed by: OCCUPATIONAL THERAPIST

## 2022-02-04 PROCEDURE — 25010000002 ENOXAPARIN PER 10 MG: Performed by: PHYSICAL MEDICINE & REHABILITATION

## 2022-02-04 PROCEDURE — 97162 PT EVAL MOD COMPLEX 30 MIN: CPT

## 2022-02-04 PROCEDURE — 80048 BASIC METABOLIC PNL TOTAL CA: CPT | Performed by: PHYSICAL MEDICINE & REHABILITATION

## 2022-02-04 PROCEDURE — 97110 THERAPEUTIC EXERCISES: CPT

## 2022-02-04 PROCEDURE — 85025 COMPLETE CBC W/AUTO DIFF WBC: CPT | Performed by: PHYSICAL MEDICINE & REHABILITATION

## 2022-02-04 PROCEDURE — 97530 THERAPEUTIC ACTIVITIES: CPT

## 2022-02-04 PROCEDURE — 84300 ASSAY OF URINE SODIUM: CPT | Performed by: INTERNAL MEDICINE

## 2022-02-04 PROCEDURE — 97535 SELF CARE MNGMENT TRAINING: CPT | Performed by: OCCUPATIONAL THERAPIST

## 2022-02-04 PROCEDURE — 83935 ASSAY OF URINE OSMOLALITY: CPT | Performed by: INTERNAL MEDICINE

## 2022-02-04 PROCEDURE — 96125 COGNITIVE TEST BY HC PRO: CPT

## 2022-02-04 RX ORDER — CARVEDILOL 12.5 MG/1
12.5 TABLET ORAL
Status: DISCONTINUED | OUTPATIENT
Start: 2022-02-04 | End: 2022-02-08

## 2022-02-04 RX ORDER — CARVEDILOL 6.25 MG/1
6.25 TABLET ORAL
Status: DISCONTINUED | OUTPATIENT
Start: 2022-02-04 | End: 2022-02-04

## 2022-02-04 RX ORDER — UREA 10 %
3 LOTION (ML) TOPICAL NIGHTLY
Status: DISCONTINUED | OUTPATIENT
Start: 2022-02-04 | End: 2022-02-15 | Stop reason: HOSPADM

## 2022-02-04 RX ORDER — LOPERAMIDE HYDROCHLORIDE 2 MG/1
2 CAPSULE ORAL
Status: DISCONTINUED | OUTPATIENT
Start: 2022-02-04 | End: 2022-02-15 | Stop reason: HOSPADM

## 2022-02-04 RX ORDER — SODIUM CHLORIDE 1000 MG
1 TABLET, SOLUBLE MISCELLANEOUS
Status: DISCONTINUED | OUTPATIENT
Start: 2022-02-04 | End: 2022-02-05

## 2022-02-04 RX ADMIN — ENOXAPARIN SODIUM 40 MG: 100 INJECTION SUBCUTANEOUS at 21:09

## 2022-02-04 RX ADMIN — LOPERAMIDE HYDROCHLORIDE 2 MG: 2 CAPSULE ORAL at 17:06

## 2022-02-04 RX ADMIN — HYDROCODONE BITARTRATE AND ACETAMINOPHEN 2 TABLET: 5; 325 TABLET ORAL at 21:10

## 2022-02-04 RX ADMIN — LOPERAMIDE HYDROCHLORIDE 2 MG: 2 CAPSULE ORAL at 08:43

## 2022-02-04 RX ADMIN — GLYCOPYRROLATE 2 MG: 1 TABLET ORAL at 08:43

## 2022-02-04 RX ADMIN — LOPERAMIDE HYDROCHLORIDE 2 MG: 2 CAPSULE ORAL at 12:02

## 2022-02-04 RX ADMIN — SODIUM CHLORIDE TAB 1 GM 1 G: 1 TAB at 13:56

## 2022-02-04 RX ADMIN — GLYCOPYRROLATE 2 MG: 1 TABLET ORAL at 21:10

## 2022-02-04 RX ADMIN — SODIUM CHLORIDE 500 ML: 9 INJECTION, SOLUTION INTRAVENOUS at 13:56

## 2022-02-04 RX ADMIN — LOPERAMIDE HYDROCHLORIDE 4 MG: 2 CAPSULE ORAL at 05:56

## 2022-02-04 RX ADMIN — SODIUM CHLORIDE TAB 1 GM 1 G: 1 TAB at 17:06

## 2022-02-04 RX ADMIN — LOPERAMIDE HYDROCHLORIDE 2 MG: 2 CAPSULE ORAL at 21:09

## 2022-02-04 RX ADMIN — HYDROCODONE BITARTRATE AND ACETAMINOPHEN 2 TABLET: 5; 325 TABLET ORAL at 12:51

## 2022-02-04 RX ADMIN — Medication 15 G: at 13:56

## 2022-02-04 NOTE — PLAN OF CARE
Problem: Malnutrition  Goal: Improved Nutritional Intake  Outcome: Ongoing, Progressing   Goal Outcome Evaluation:     Regular diet with supplements as ordered  Likes chocolate boost over ice (up to 4 daily)  Offer lactose free milk (cow's milk - GI upset)  Offer Boost pudding HS snack  Likes cereal, Gatorade

## 2022-02-04 NOTE — NURSING NOTE
02/04/22 1445   Wound 12/25/21 0809 abdomen Other (comment)   Placement Date/Time: (c) 12/25/21 0809   Location: abdomen  Primary Wound Type: (c) Other (comment)   Base clean; granulating; moist; pink   Periwound intact; dry   Periwound Temperature warm   Periwound Skin Turgor soft   Edges open   Wound Length (cm) 1.5 cm   Wound Width (cm) 3.5 cm   Wound Depth (cm) 0.5 cm   Drainage Characteristics/Odor serosanguineous; creamy; yellow   Drainage Amount moderate   Care, Wound cleansed with; sterile normal saline   Dressing Care dressing changed; collagen; silver impregnated; silicone; border dressing   Periwound Care barrier film applied   Wound 12/29/21 1518 midline abdomen Incision   Placement Date/Time: 12/29/21 1518   Orientation: midline  Location: abdomen  Primary Wound Type: Incision   Base clean; granulating; moist; pink  (stoma located in proximal wound bed)   Periwound excoriated; moist; redness  (redness and denudation contained up around the ostomy)   Periwound Temperature warm   Periwound Skin Turgor soft   Edges open   Wound Length (cm) 8 cm   Wound Width (cm) 3 cm   Wound Depth (cm) 0.8 cm   Tunneling [Depth (cm)/Location] 4cms at 1 o'clock   Drainage Characteristics/Odor green  (stool in wound with ostomy)   Drainage Amount moderate   Care, Wound cleansed with; sterile normal saline   Dressing Care dressing changed; silver impregnated; collagen  (nacho paste with nacho fistula pouch, nacho seals, joycelyn used in tunnel and protected with paste)   Periwound Care barrier film applied  (denuded skin crusted with stoma powder adn barrier spray, then [place] applied.)   Wound 12/07/21 0757 Right abdomen Incision   Placement Date/Time: 12/07/21 0757   Present on Hospital Admission: No  Side: Right  Location: abdomen  Primary Wound Type: Incision   Base clean; granulating; pink  (deeper area in center of wound)   Periwound intact; dry   Periwound Temperature warm   Periwound Skin Turgor soft   Edges open    Wound Length (cm) 1.8 cm   Wound Width (cm) 7 cm   Wound Depth (cm) 0.3 cm   Drainage Characteristics/Odor creamy; yellow   Drainage Amount moderate   Care, Wound cleansed with; sterile normal saline   Dressing Care dressing changed; silver impregnated; collagen; silicone; border dressing   Periwound Care barrier film applied     CWON note: pt seen for follow up dressing change/ pouch change. Nacho pouch that was placed Monday is dry and intact with excellent seal, but pt is c/o of burning around skin edges.      Continue the Nacho pouch /wound management pouch to midline wound; stoma in proximal portion of wound. Wound edges remain tender and red, but have improved with use of [place].  The tunnel at 1 o'clock was lightly packed with Caitlin and covered with nacho paste in an effort to avoid fecal contamination of the tunnel. Nacho rings were used along proximal wound edge and folded into the wound edge to try to better protect the edges from stool. The nacho pouch was placed with nacho paste applied encircling opening to enhance seal. Stool is beginning to thicken, so we eliminated the BSD bag, also this will allow pt to fully participate in therapy.      Wounds to R and L abdomen will be managed with Catilin and covered with optifoam, to be changed 2x week with pouch change. RLQ wound has a deeper central area where the yellow tissue has debrided, will continue to monitor this area

## 2022-02-04 NOTE — THERAPY EVALUATION
Inpatient Rehabilitation - Speech Language Pathology Initial Evaluation    [facility]     Patient Name: Arnie Calvo  : 1960  MRN: [id number]    Today's Date: 2022                   Admit Date: 2/3/2022       Visit Dx:    No diagnosis found.    Patient Active Problem List   Diagnosis   • Cardiomyopathy (HCC)   • Paroxysmal VT (HCC)   • Palpitations   • PVC's (premature ventricular contractions)   • Exacerbation of Crohn's disease of small intestine (HCC)   • Adjustment disorder with depressed mood   • Ankylosis of spine   • Crohn's disease with complication (HCC)   • Diabetes mellitus (HCC)   • Essential hypertension   • External hemorrhoids   • Genital herpes simplex   • Gout   • Insomnia   • Iron deficiency   • Prostate mass   • Recurrent aphthous ulcer   • Asteatosis cutis   • History of pneumonia   • Abnormal CXR (chest x-ray)   • RUQ abdominal pain   • Crohn's disease of small intestine with other complication (HCC)   • Right lower quadrant abdominal pain   • Enteritis   • Mass-like inflammation at terminal ileum   • Crohn's disease (HCC)   • Ileostomy in place (HCC)   • Paroxysmal ventricular tachycardia (HCC)   • Chronic systolic heart failure (HCC)   • Cardiomyopathy, nonischemic (HCC)   • Orthostasis   • Iron deficiency anemia   • Orthostatic hypotension   • Crohn's disease of colon with complication (HCC)   • Dehisced intestinal anastomosis   • History of creation of ostomy (HCC)   • Hypokalemia   • Hypotension, chronic   • Malnutrition of moderate degree (HCC)   • S/P colectomy   • Fatty liver disease, nonalcoholic   • Cholecystitis   • Debility       Past Medical History:   Diagnosis Date   • Acute pain of left hip    • Adjustment disorder with depressed mood    • Anesthesia complication     SPINAL [id number] LOWER SPINE   • Ankylosing spondylitis of cervical region (HCC)    • Ankylosis of spine    • Arthritis    • Asthma    • Cardiomyopathy (HCC)     sees Dr. Reyes;  NICM/ (-) cath, EF 25-35%; has ICD   • CHF (congestive heart failure) (HCC)    • Cholecystitis    • Crohn's disease (HCC)    • Depression    • Diabetes mellitus ([facility])     Diet controlled.   • Dysthymic disorder 2012   • Dysuria    • Essential hypertension    • External hemorrhoids    • Genital herpes    • Gout    • Herpes genitalia    • History of MRSA infection 2000?    FINGERTIP-TX WITH ANTIBIOTICS-[facility] CARE-NO CURRENT OPEN WOUND OR TREATMENT 12/3/21   • Ileostomy in place ([facility])    • Insomnia    • Iron deficiency    • Paroxysmal ventricular tachycardia (HCC)    • PONV (postoperative nausea and vomiting)    • Presence of combination internal cardiac defibrillator (ICD) and pacemaker    • Prostate nodule    • Recurrent canker sores    • Thoracic back pain    • Urinary hesitancy    • Xerosis cutis        Past Surgical History:   Procedure Laterality Date   • ABDOMINAL SURGERY     • CARDIAC CATHETERIZATION     • CARDIAC DEFIBRILLATOR PLACEMENT      REPLACEMENT-Had [place] lead that was fractured.  Lead was tied off.  New lead and new device implanted.   • CARDIAC ELECTROPHYSIOLOGY PROCEDURE N/A 1/3/2019    Procedure: ICD DC generator change  [employer];  Surgeon: Zechariah Randolph MD;  Location: [facility] INVASIVE LOCATION;  Service: Cardiology   • CHOLECYSTECTOMY N/A 12/7/2021    Procedure: CHOLECYSTECTOMY;  Surgeon: Jeovany Mott MD;  Location: [facility] OR;  Service: General;  Laterality: N/A;   • COLON RESECTION N/A 12/29/2021    Procedure: PARTIAL COLECTOMY WITH OSTOMY;  Surgeon: Jeovany Mott MD;  Location: [facility] OR;  Service: General;  Laterality: N/A;   • COLON RESECTION WITH ILEOSTOMY N/A 2018   • COLONOSCOPY  04/03/2015    abnormal cecum, three polypoid masses, pre cancerous vs crohns, IH, tics, hyperplastic polyp   • COLONOSCOPY N/A 3/17/2017    polypoid masses, tics, IH, polyp   • EXPLORATORY LAPAROTOMY W/ BOWEL RESECTION  07/2018    open resection of ileum with creation of end  ileostomy   • ILEOSTOMY CLOSURE  07/2018   • ILEOSTOMY CLOSURE N/A 12/7/2021    Procedure: ILEOSTOMY TAKEDOWN WITH RESECTION, PARASTOMAL HERNIA REPAIR;  Surgeon: Jeovany Mott MD;  Location: [facility] OR;  Service: General;  Laterality: N/A;   • PACEMAKER IMPLANTATION         SLP Recommendation and Plan    SLP Diagnosis: Mild-mod cognitive impairment (02/04/22 1400)     Rehab Potential/Prognosis: good (02/04/22 1400)     Anticipated Discharge Disposition (SLP): home with 24/7 care (02/04/22 1400)        Predicted Duration Therapy Intervention (Days): until discharge (02/04/22 1400)           Plan of Care Reviewed With: patient (02/04/22 1200)  Outcome Summary: Patient participated in the administration of the Cognitive Linguistic Quick Test (CLQT). He presented with mild-moderate cogntive deficits. Results of deficits include: Attention - Moderate; Memory - Moderate; Executive Function - Severe; Language - WNL; Visuospatial skills - Moderate. During the evaluation, patient c/o beging short of breath. Patient's scores may have been impacted by level of discomfort/pain. He said that he manages finances and medications at home. Cognitive therapy is recommended to target attention, executive function, and home management skills. (02/04/22 1200)             SLP EVALUATION (last 72 hours)     SLP SLC Evaluation     Row Name 02/04/22 1400          Document Type evaluation  -SR    Subjective Information complains of; pain  Pt was SOB during initial evaluation.  -SR    Patient Observations agree to therapy; cooperative  -SR    Patient Effort good  -SR               Patient Profile Reviewed yes  -SR    Pertinent History Of Current Problem Pt hx includes Ileostomy takedown , cholecystectomy, incisional hernia repair December 7, 2021.Exploratory laparotomy with end colostomy partial colon resection December 29, 2021. Admitted to inpatient rehab for debility. Exhibits impaired mobility/impaired self-care/impaired endurance   -SR    Precautions/Limitations, Vision WFL with corrective lenses  -SR    Precautions/Limitations, Hearing WFL  -SR    Prior Level of Function-Communication WFL  -SR    Plans/Goals Discussed with patient  -SR    Barriers to Rehab medically complex  -SR    Patient's Goals for Discharge return to home  -SR    Standardized Assessment Used CLQT  -SR               Pretreatment Pain Rating 7/10  -SR    Posttreatment Pain Rating 7/10  -SR               Attention Domain Score 100  -SR    Attention Severity Rating 2: Moderate  -SR    Memory Domain Score 138  -SR    Memory Severity Rating 2: Moderate  -SR    Executive Function Domain Score 15  -SR    Executive Function Severity Rating 1: Severe  -SR    Language Domain Score 30  -SR    Language Severity Rating 4: WNL  -SR    Visuospatial Domain Score 46  -SR    Visuospatial Severity Rating 2: Moderate  -SR    Clock Drawing Total Score 10  -SR    Clock Drawing Severity Rating Mild  -SR    Composite Severity Rating 2.2  -SR    Composite Severity Rating Range 2.4 - 1.5: Moderate  -SR    CLQT Comments Pt's level of pain during evaluation may have impacted overall scores on the CLQT. He c/o difficulty breathing during the first part of the assessment. An increase of pain with the incision impacted patient's ability to fully participate in each subtest. Memory skills and language were an overall strength. Patient experienced difficulty with executive function, attention, and visuospatial skills. Patient fully oriented to time, place, and year. Recalled recent and past events during informal assessment.  -SR               SLP Diagnosis Mild-mod cognitive impairment  -SR    Rehab Potential/Prognosis good  -SR    SLC Criteria for Skilled Therapy Interventions Met yes  -SR    Functional Impact unable to care for self; needs 24 hour supervision; difficulty completing home management task  -SR               Therapy Frequency (SLP SLC) 5 days per week  -SR    Predicted Duration Therapy  Intervention (Days) until discharge  -SR    Anticipated Discharge Disposition (SLP) home with 24/7 care  -SR               Attention Selection attention, SLP goal 1  -SR    Organizational Skills Selection organizational skills, SLP goal 1  -SR    Functional Math Skills Selection functional math skills, SLP goal 1  -SR    Executive Function Skills Selection executive function skills, SLP goal 1; executive function skills, SLP goal 2  -SR               Improve Attention by Goal 1 (SLP) attending to task; 100%; independently (over 90% accuracy)  -SR    Time Frame (Attention Goal 1, SLP) by discharge  -SR               Improve Thought Organization Through Goal 1 (SLP) completing mental manipulation task; completing a verbal sequencing task; 80%; with minimal cues (75-90%)  -SR    Time Frame (Thought Organization Skills Goal 1, SLP) short term goal (STG)  -SR               Improve Functional Math Skills Through Goal 1 (SLP) complete simple math problems; complete word problems involving time; complete checkbook task; 80%; with minimal cues (75-90%)  -SR    Time Frame (Functional Math Skills Goal 1, SLP) short term goal (STG)  -SR               Improve Executive Function Skills Goal 1 (SLP) organization/planning activity; time management activity; home management activity; 80%; with minimal cues (75-90%)  -SR    Time Frame (Executive Function Skills Goal 1, SLP) short term goal (STG)  -SR               Improve Executive Function Skills Goal 2 (SLP) organization/planning activity; time management activity; home management activity; 80%; with minimal cues (75-90%)  -SR    Time Frame (Executive Function Skills Goal 2, SLP) short term goal (STG)  -SR          User Key  (r) = Recorded By, (t) = Taken By, (c) = Cosigned By    Initials Name Effective Dates    SR Kristie Luis SLP 01/12/22 -                    EDUCATION    The patient has been educated in the following areas:       Cognitive Impairment.             SLP GOALS      Row Name 02/04/22 1400             Attention Goal 1 (SLP)    Improve Attention by Goal 1 (SLP) attending to task; 100%; independently (over 90% accuracy)  -SR      Time Frame (Attention Goal 1, SLP) by discharge  -SR              Organizational Skills Goal 1 (SLP)    Improve Thought Organization Through Goal 1 (SLP) completing mental manipulation task; completing a verbal sequencing task; 80%; with minimal cues (75-90%)  -SR      Time Frame (Thought Organization Skills Goal 1, SLP) short term goal (STG)  -SR              Functional Math Skills Goal 1 (SLP)    Improve Functional Math Skills Through Goal 1 (SLP) complete simple math problems; complete word problems involving time; complete checkbook task; 80%; with minimal cues (75-90%)  -SR      Time Frame (Functional Math Skills Goal 1, SLP) short term goal (STG)  -SR              Executive Functional Skills Goal 1 (SLP)    Improve Executive Function Skills Goal 1 (SLP) organization/planning activity; time management activity; home management activity; 80%; with minimal cues (75-90%)  -SR      Time Frame (Executive Function Skills Goal 1, SLP) short term goal (STG)  -SR              Executive Functional Skills Goal 2 (SLP)    Improve Executive Function Skills Goal 2 (SLP) organization/planning activity; time management activity; home management activity; 80%; with minimal cues (75-90%)  -SR      Time Frame (Executive Function Skills Goal 2, SLP) short term goal (STG)  -SR            User Key  (r) = Recorded By, (t) = Taken By, (c) = Cosigned By    Initials Name Provider Type    SR Kristie Luis SLP Speech and Language Pathologist                SLP Outcome Measures (last 72 hours)     SLP Outcome Measures     Row Name 02/04/22 1500             SLP Outcome Measures    Outcome Measure Used? Adult NOMS  -SR              Adult FCM Scores    FCM Chosen Attention  -SR      Attention FCM Score 5  -SR            User Key  (r) = Recorded By, (t) = Taken By, (c) =  Cosigned By    Initials Name Effective Dates    Kristie Gonzalez SLP 01/12/22 -                         Time Calculation:        Time Calculation- SLP     Row Name 02/04/22 1520 02/04/22 1030          Time Calculation- SLP    SLP Start Time 1200  -SR 1000  -SR     SLP Stop Time 1230  -SR 1030  -SR     SLP Time Calculation (min) 30 min  -SR 30 min  -SR     Total Timed Code Minutes-  minute(s)  4201-3180  -SR --           User Key  (r) = Recorded By, (t) = Taken By, (c) = Cosigned By    Initials Name Provider Type    SR Kristie Luis SLP Speech and Language Pathologist                  Therapy Charges for Today     Code Description Service Date Service Provider Modifiers Qty    [id number] HC ST STD COG PERF TEST PER HOUR 2/4/2022 Kristie Luis SLP GN 2            ADULT NOMS (last 72 hours)     Adult NOMS     Row Name 02/04/22 1500                   Adult FCM Scores    FCM Chosen Attention  -SR        Attention FCM Score 5  -SR              User Key  (r) = Recorded By, (t) = Taken By, (c) = Cosigned By    Initials Name Effective Kristie Bennett SR, SLP 01/12/22 -                            STEFANY Davison  2/4/2022

## 2022-02-04 NOTE — PROGRESS NOTES
LOS: 1 day   Patient Care Team:  Zechariah Fatima MD as PCP - General  PinaRichard alegria MD as PCP - Family Medicine (Pulmonary Disease)  John Bowles MD as Consulting Physician (Gastroenterology)  Hermes Shabazz MD as Consulting Physician (Urology)  Osmar Carranza MD as Consulting Physician (Cardiology)      LEDA JAZMÍN JOHNNY  1960    Chief Complaint: Immobility syndrome  Impaired mobility/impaired self-care/impaired endurance  Ileostomy takedown , cholecystectomy, incisional hernia repair December 7, 2021.  Exploratory laparotomy with end colostomy partial colon resection December 29, 2021  Ostomy-on Imodium/glycopyrrolate  Anxiety  Anemia  Hyponatremia        Subjective     Patient with lower blood pressure last evening.  Parameters written for Coreg.  Continues with baseline tachycardia.  He gets anxious at times.  O2 sats unremarkable.  Will wear oxygen at nighttime.  Complains of burning around the ostomy site.  Impaired sleep.    Objective     Vitals:    02/04/22 0422   BP: 90/59   Pulse: 109   Resp: 22   Temp:    SpO2: 98%       PHYSICAL EXAM:   MENTAL STATUS -  AWAKE / ALERT  HEENT-    LUNGS - CTA, NO WHEEZES, RALES OR RHONCHI  HEART-tachycardia    ABD - NORMOACTIVE BOWEL SOUNDS, SOFT, NT.  Ostomy with liquid brown output.  Dressings in place at right and left abdomen  EXT - NO EDEMA OR CYANOSIS  NEURO -oriented x4.  MOTOR EXAM - RUE/RLE 5/5. LUE/LLE 5/5.           MEDICATIONS  Scheduled Meds:carvedilol, 6.25 mg, Oral, Daily Before Supper  enoxaparin, 40 mg, Subcutaneous, Q24H  glycopyrrolate, 2 mg, Oral, BID  loperamide, 2 mg, Oral, 4x Daily AC & at Bedtime      Continuous Infusions:   PRN Meds:.•  acetaminophen  •  Glycerin-Hypromellose-  •  HYDROcodone-acetaminophen  •  simethicone  •  sodium hypochlorite      RESULTS  No results found for: POCGLU  Results from last 7 days   Lab Units 02/04/22  0811 02/02/22  1141   WBC 10*3/mm3 9.87 9.61   HEMOGLOBIN g/dL 10.9* 11.2*   HEMATOCRIT %  34.3* 35.2*   PLATELETS 10*3/mm3 167 157     Results from last 7 days   Lab Units 02/04/22  0811 02/03/22  1213 02/02/22  1141   SODIUM mmol/L 124* 130* 127*   POTASSIUM mmol/L 4.1 3.9 3.8   CHLORIDE mmol/L 89* 93* 93*   CO2 mmol/L 27.1 28.5 27.5   BUN mg/dL 17 17 16   CREATININE mg/dL 1.07 0.97 0.91   CALCIUM mg/dL 9.0 9.1 9.0   BILIRUBIN mg/dL  --   --  0.7   ALK PHOS U/L  --   --  90   ALT (SGPT) U/L  --   --  22   AST (SGOT) U/L  --   --  28   GLUCOSE mg/dL 118* 109* 114*           Ref. Range 2/2/2022 11:41   Magnesium Latest Ref Range: 1.6 - 2.4 mg/dL 1.8   Prealbumin Latest Ref Range: 20.0 - 40.0 mg/dL 10.2 (L)          Assessment/Plan     Debility      Chief Complaint: Immobility syndrome  Impaired mobility/impaired self-care/impaired endurance     Ileostomy takedown , cholecystectomy, incisional hernia repair December 7, 2021.  Exploratory laparotomy with end colostomy partial colon resection December 29, 2021     Ostomy-on Imodium/glycopyrrolate  February 4-continue Robinul twice daily for now, but decreased Imodium from 2 tablets to 1 tablet p.o. before every meal and at bedtime.     Abdominal wound care  February 4-the ostomy involves the midline wound.  His wound is overall getting smaller per the ostomy wound care team.  On Monday ostomy wound care team will try to get into smaller ostomy pouch to isolate the ostomy from the wound.     Anxiety-would presently hold on adding any benzodiazepine given his multiple medical issues.     Anemia     Hyponatremia  February 4-sodium was 127 on February 2, 130 on February 3, decreased 124 on February 4.  Will consult nephrology for evaluation     Congestive heart failure/tachycardia-on Coreg 12.5 mg for supper.  Blood pressure low at times.  Parameters added.       Nonischemic cardiomyopathy ejection fraction 20%     DVT prophylaxis-Lovenox/SCDs     Impaired nutritional status-supplements per dietitian to follow.  Recheck prealbumin around February  16     Pulmonary- using  O2 2 L nasal cannula at night    Insomnia-melatonin added           Now admit for comprehensive acute inpatient rehabilitation .  This would be an interdisciplinary program with physical therapy 1.5 hour,  occupational therapy 1.5 hour,  5 days a week.  Rehabilitation nursing for carryover, monitoring of cardiac and intestinal   status, bowel and bladder, and skin  Ongoing physician follow-up.  Weekly team conferences.  Goals are to achieve a level of supervision modified independent with  mobility and self-care and improved endurance.   Rehabilitation prognosis fair.  Medical prognosis defer to general surgery.  Estimated length of stay is approximately 10 days, but is only an estimation.      The patient's functional status and clinical status is unchanged from preadmission assessment and the patient continues appropriate for acute inpatient rehabilitation.  Goal is for home with outpatient   therapies.  Barrier to discharge:.  Mobility and self-care endurance- work on condition, transfers, progressive ambulation, activity daily living to overcome.            Zcehariah Pearson MD      During rounds, used appropriate personal protective equipment including mask and gloves.  Additional gown if indicated.  Mask used was standard procedure mask. Appropriate PPE was worn during the entire visit.  Hand hygiene was completed before and after.

## 2022-02-04 NOTE — PROGRESS NOTES
"Section B. Hearing, Speech, Vision: Expression of Ideas and Wants: Expresses  complex messages without difficulty and with speech that is clear and easy to  understand.  Understanding Verbal and Non-Verbal Content: Understands: Clear comprehension  without cues or repetitions.    Section C. Cognitive Patterns: Brief Interview for Mental Status (BIMS) was  conducted.  Repetition of Three Words: Three words  Able to report correct year: Correct  Able to report correct month: Accurate within 5 days  Able to report correct day of the week: Incorrect or no answer  Able to recall \"sock\": Yes, no cue required  Able to recall \"blue\": Yes, no cue required  Able to recall \"bed\": Yes, no cue required    BIMS SUMMARY SCORE: 14 Cognitively intact Patient was able to complete the Brief  Interview for Mental Status    Signed by: Kristie Luis, SLP    "

## 2022-02-04 NOTE — CONSULTS
[facility] Consult Note      Patient Name: Arnie Calvo  : 1960  MRN: [id number]  Primary Care Physician:  Zechariah Fatima MD  Referring Physician: Zechariah Pearson MD  Date of admission: 2/3/2022    Subjective     Reason for Consult:   Hyponatremia     HPI:   Arnie Calvo is a 61 y.o. male with past medical history of herpes, depression disorder, diabetes mellitus type 2 diet-controlled, Crohn's disease, dilated congestive heart failure with EF of 25%,.  Paroxysmal ventricular tachycardia, chronic normocytic anemia, chronic thoracic back pain.  Patient was admitted to outside facility with symptomatic cholelithiasis and large parastomal hernia around his ileostomy he underwent cholecystectomy and ileostomy takedown or repair of  parastomal hernia  in 2021 complicated with bowel obstruction , that required TPN, followed by exploratory laparotomy with transverse  bowel resection and end  colostomy with severe abdominal adhesions complicated with sepsis  .  In 2022 found to have a intra-abdominal abscess complicated with acute hypoxic respiratory failure acute kidney injury and shock.  After prolonged hospital stay patient has been transferred to acute rehab to continue medical care    Admission patient has noticed to have worsening hyponatremia    Review of Systems:   14 point review of systems is otherwise negative except for mentioned above on HPI    Personal History     Past Medical History:   Diagnosis Date   • Acute pain of left hip    • Adjustment disorder with depressed mood    • Anesthesia complication     SPINAL [id number] LOWER SPINE   • Ankylosing spondylitis of cervical region (HCC)    • Ankylosis of spine    • Arthritis    • Asthma    • Cardiomyopathy (HCC)     sees Dr. Reyes; NICM/ (-) cath, EF 25-35%; has ICD   • CHF (congestive heart failure) (formerly [facility])    • Cholecystitis    • Crohn's disease (formerly [facility])    • Depression    • Diabetes  mellitus (HCC)     Diet controlled.   • Dysthymic disorder 2012   • Dysuria    • Essential hypertension    • External hemorrhoids    • Genital herpes    • Gout    • Herpes genitalia    • History of MRSA infection 2000?    FINGERTIP-TX WITH ANTIBIOTICS-[facility] CARE-NO CURRENT OPEN WOUND OR TREATMENT 12/3/21   • Ileostomy in place (HCC)    • Insomnia    • Iron deficiency    • Paroxysmal ventricular tachycardia (HCC)    • PONV (postoperative nausea and vomiting)    • Presence of combination internal cardiac defibrillator (ICD) and pacemaker    • Prostate nodule    • Recurrent canker sores    • Thoracic back pain    • Urinary hesitancy    • Xerosis cutis        Past Surgical History:   Procedure Laterality Date   • ABDOMINAL SURGERY     • CARDIAC CATHETERIZATION     • CARDIAC DEFIBRILLATOR PLACEMENT      REPLACEMENT-Had Jude lead that was fractured.  Lead was tied off.  New lead and new device implanted.   • CARDIAC ELECTROPHYSIOLOGY PROCEDURE N/A 1/3/2019    Procedure: ICD DC generator change  [employer];  Surgeon: Zechariah Randolph MD;  Location: [facility] CATH INVASIVE LOCATION;  Service: Cardiology   • CHOLECYSTECTOMY N/A 12/7/2021    Procedure: CHOLECYSTECTOMY;  Surgeon: Jeovany Mott MD;  Location: [facility] OR;  Service: General;  Laterality: N/A;   • COLON RESECTION N/A 12/29/2021    Procedure: PARTIAL COLECTOMY WITH OSTOMY;  Surgeon: Jeovany Mott MD;  Location: [facility];  Service: General;  Laterality: N/A;   • COLON RESECTION WITH ILEOSTOMY N/A 2018   • COLONOSCOPY  04/03/2015    abnormal cecum, three polypoid masses, pre cancerous vs crohns, IH, tics, hyperplastic polyp   • COLONOSCOPY N/A 3/17/2017    polypoid masses, tics, IH, polyp   • EXPLORATORY LAPAROTOMY W/ BOWEL RESECTION  07/2018    open resection of ileum with creation of end ileostomy   • ILEOSTOMY CLOSURE  07/2018   • ILEOSTOMY CLOSURE N/A 12/7/2021    Procedure: ILEOSTOMY TAKEDOWN WITH RESECTION, PARASTOMAL HERNIA REPAIR;   Surgeon: Jeovany Mott MD;  Location: [facility] OR;  Service: General;  Laterality: N/A;   • PACEMAKER IMPLANTATION         Family History: family history includes Colon cancer in his father; Colon polyps in his father; Diabetes in his mother; Gout in his father; Heart failure in his father; Hypertension in his mother.    Social History:  reports that he has never smoked. He has never used smokeless tobacco. He reports that he does not drink alcohol and does not use drugs.    Home Medications:  Prior to Admission medications    Medication Sig Start Date End Date Taking? Authorizing Provider   carvedilol (COREG) 12.5 MG tablet Take 12.5 mg by mouth Every Evening. 12.5 every morning and 6.25 mg at night 8/9/21   Provider, MD Hailey       Allergies:  Allergies   Allergen Reactions   • Sulfamethoxazole-Trimethoprim Hives and Rash     Blisters   • Trimethoprim Hives   • Adhesive Tape Rash   • Shellfish-Derived Products Nausea And Vomiting and GI Intolerance       Objective     Vitals:   Temp:  [98 °F (36.7 °C)-98.5 °F (36.9 °C)] 98 °F (36.7 °C)  Heart Rate:  [109-118] 109  Resp:  [16-22] 22  BP: ()/(59-76) 90/59  Flow (L/min):  [2] 2    Intake/Output Summary (Last 24 hours) at 2/4/2022 1221  Last data filed at 2/4/2022 0610  Gross per 24 hour   Intake 240 ml   Output 200 ml   Net 40 ml       Physical Exam:   Constitutional: Awake, on supplemental oxygen chronically ill and deconditioned  HEENT: Sclera anicteric, no conjunctival injection  Neck: Supple, no thyromegaly, no lymphadenopathy, trachea at midline, no JVD  Respiratory: Clear to auscultation bilaterally, nonlabored respiration  Cardiovascular: RRR, CLARISSA grade III   Gastrointestinal: Positive bowel sounds, colostomy  bag in place  : No palpable bladder  Musculoskeletal: No edema, no clubbing or cyanosis  Psychiatric: Appropriate affect, cooperative  Neurologic: Oriented x3, moving all extremities, normal speech and mental status  Skin: Warm and  dry       Scheduled Meds:     carvedilol, 12.5 mg, Oral, Daily Before Supper  enoxaparin, 40 mg, Subcutaneous, Q24H  glycopyrrolate, 2 mg, Oral, BID  loperamide, 2 mg, Oral, 4x Daily AC & at Bedtime  sodium chloride, 500 mL, Intravenous, Once  sodium chloride, 1 g, Oral, TID With Meals  Urea, 15 g, Oral, BID      IV Meds:        Results Reviewed:   I have personally reviewed the results from the time of this admission to 2/4/2022 12:21 EST     Lab Results   Component Value Date    GLUCOSE 118 (H) 02/04/2022    CALCIUM 9.0 02/04/2022     (L) 02/04/2022    K 4.1 02/04/2022    CO2 27.1 02/04/2022    CL 89 (L) 02/04/2022    BUN 17 02/04/2022    CREATININE 1.07 02/04/2022    EGFRIFAFRI 90 08/13/2021    EGFRIFNONA 70 02/04/2022    BCR 15.9 02/04/2022    ANIONGAP 7.9 02/04/2022      Lab Results   Component Value Date    MG 1.8 02/02/2022    PHOS 3.0 02/02/2022    ALBUMIN 3.00 (L) 02/02/2022           Lab Results   Lab Value Date/Time     (L) 02/04/2022 0811     (L) 02/03/2022 1213     (L) 02/02/2022 1141     (L) 01/29/2022 0535     (L) 01/28/2022 0728     (L) 01/27/2022 0600     (L) 01/26/2022 0556     (L) 01/25/2022 0656     (L) 01/24/2022 0804     (L) 01/23/2022 0433     (L) 01/22/2022 0440     (L) 01/21/2022 0632     (L) 01/20/2022 0719     (L) 01/19/2022 0646     (L) 01/18/2022 1314     (L) 01/18/2022 0852     01/17/2022 1101     (L) 01/16/2022 1802     (L) 01/15/2022 0901     (L) 01/14/2022 0719     (L) 01/12/2022 0850     (L) 01/11/2022 0416     01/10/2022 0331     01/09/2022 0345     01/08/2022 0325     (H) 01/07/2022 0434     (H) 01/06/2022 0458         Assessment / Plan     ASSESSMENT:    -Acute on chronic  hyponatremia.  Patient looks normovolemic.  Does not look hypervolemic to be concerned about heart failure., and patient is hypotensive 88/66  with increased ostomy output ., on imodium .  And his creatinine has increased from 0.6 to 1.0 .  will order 500 ml of NSS .  Sodium chloride tablets 1 g 3 times a day and urea 15 g twice a day.  While awaiting to complete work-up including serum osmolarity, urine osmolarity, urine sodium, uric acid, cortisol ,  his TSH is normal  -Dilated cardiomyopathy w / EF 20 %  , w/o signs of decompensation ,  Currently stable . Starting sodium chloride tablets but likely will need to be adjusted based on signs of volume overload.  -Physical deconditioning.  Working w/ PT and OT     PLAN:  -Follow renal function closely  - TRIAL NSS bolus ,  UREA 15 gr BID and Sodiums Chloride 1 gr TID , while completing work up .   -Avoid nephrotoxins  -Adjust medications to GFR    Discussed with nursing staff      Thank you for involving us in the care of Arnie Calvo.  Please feel free to call with any questions.    Joselito Bates MD  02/04/22  12:21 [facility]    [facility]  [phone]

## 2022-02-04 NOTE — PROGRESS NOTES
Inpatient Rehabilitation Plan of Care Note    Plan of Care  Updated Problems/Interventions  Cognition    [ST] Attention(Active)  Current Status(02/04/2022): mild-moderate  Weekly Goal(02/11/2022): Patient will follow daily schedule to anticipate  therapies scheduled for the day.  Discharge Goal: Patient will improve attention to detail to increase  independence at next level of care    [ST] Executive Functions(Active)  Current Status(02/04/2022): mild-moderate  Weekly Goal(02/11/2022): Patient will organize materials needed for ADLs  Discharge Goal: Patient will improve organizational skills for increased  independence at next level of care    Signed by: Kristie Luis, SLP

## 2022-02-04 NOTE — THERAPY EVALUATION
Inpatient Rehabilitation - Occupational Therapy Initial Evaluation    [facility]     Patient Name: Arnie Calvo  : 1960  MRN: [id number]    Today's Date: 2022                 Admit Date: 2/3/2022       No diagnosis found.    Patient Active Problem List   Diagnosis   • Cardiomyopathy (HCC)   • Paroxysmal VT (HCC)   • Palpitations   • PVC's (premature ventricular contractions)   • Exacerbation of Crohn's disease of small intestine (HCC)   • Adjustment disorder with depressed mood   • Ankylosis of spine   • Crohn's disease with complication (HCC)   • Diabetes mellitus (HCC)   • Essential hypertension   • External hemorrhoids   • Genital herpes simplex   • Gout   • Insomnia   • Iron deficiency   • Prostate mass   • Recurrent aphthous ulcer   • Asteatosis cutis   • History of pneumonia   • Abnormal CXR (chest x-ray)   • RUQ abdominal pain   • Crohn's disease of small intestine with other complication (HCC)   • Right lower quadrant abdominal pain   • Enteritis   • Mass-like inflammation at terminal ileum   • Crohn's disease (HCC)   • Ileostomy in place (HCC)   • Paroxysmal ventricular tachycardia (HCC)   • Chronic systolic heart failure (HCC)   • Cardiomyopathy, nonischemic (HCC)   • Orthostasis   • Iron deficiency anemia   • Orthostatic hypotension   • Crohn's disease of colon with complication (HCC)   • Dehisced intestinal anastomosis   • History of creation of ostomy (HCC)   • Hypokalemia   • Hypotension, chronic   • Malnutrition of moderate degree (HCC)   • S/P colectomy   • Fatty liver disease, nonalcoholic   • Cholecystitis   • Debility       Past Medical History:   Diagnosis Date   • Acute pain of left hip    • Adjustment disorder with depressed mood    • Anesthesia complication     SPINAL [id number] LOWER SPINE   • Ankylosing spondylitis of cervical region (HCC)    • Ankylosis of spine    • Arthritis    • Asthma    • Cardiomyopathy (HCC)     sees Dr. Reyes; NICM/ (-) cath, EF  25-35%; has ICD   • CHF (congestive heart failure) (HCC)    • Cholecystitis    • Crohn's disease (HCC)    • Depression    • Diabetes mellitus ([facility])     Diet controlled.   • Dysthymic disorder 2012   • Dysuria    • Essential hypertension    • External hemorrhoids    • Genital herpes    • Gout    • Herpes genitalia    • History of MRSA infection 2000?    FINGERTIP-TX WITH ANTIBIOTICS-[facility] CARE-NO CURRENT OPEN WOUND OR TREATMENT 12/3/21   • Ileostomy in place ([facility])    • Insomnia    • Iron deficiency    • Paroxysmal ventricular tachycardia (HCC)    • PONV (postoperative nausea and vomiting)    • Presence of combination internal cardiac defibrillator (ICD) and pacemaker    • Prostate nodule    • Recurrent canker sores    • Thoracic back pain    • Urinary hesitancy    • Xerosis cutis        Past Surgical History:   Procedure Laterality Date   • ABDOMINAL SURGERY     • CARDIAC CATHETERIZATION     • CARDIAC DEFIBRILLATOR PLACEMENT      REPLACEMENT-Had Jude lead that was fractured.  Lead was tied off.  New lead and new device implanted.   • CARDIAC ELECTROPHYSIOLOGY PROCEDURE N/A 1/3/2019    Procedure: ICD DC generator change  [employer];  Surgeon: Zechariah Randolph MD;  Location: [facility] CATH INVASIVE LOCATION;  Service: Cardiology   • CHOLECYSTECTOMY N/A 12/7/2021    Procedure: CHOLECYSTECTOMY;  Surgeon: Jeovany Mott MD;  Location: [facility] OR;  Service: General;  Laterality: N/A;   • COLON RESECTION N/A 12/29/2021    Procedure: PARTIAL COLECTOMY WITH OSTOMY;  Surgeon: Jeovany Mott MD;  Location: [facility] OR;  Service: General;  Laterality: N/A;   • COLON RESECTION WITH ILEOSTOMY N/A 2018   • COLONOSCOPY  04/03/2015    abnormal cecum, three polypoid masses, pre cancerous vs crohns, IH, tics, hyperplastic polyp   • COLONOSCOPY N/A 3/17/2017    polypoid masses, tics, IH, polyp   • EXPLORATORY LAPAROTOMY W/ BOWEL RESECTION  07/2018    open resection of ileum with creation of end ileostomy   •  ILEOSTOMY CLOSURE  07/2018   • ILEOSTOMY CLOSURE N/A 12/7/2021    Procedure: ILEOSTOMY TAKEDOWN WITH RESECTION, PARASTOMAL HERNIA REPAIR;  Surgeon: Jeovany Mott MD;  Location: [facility] MAIN OR;  Service: General;  Laterality: N/A;   • PACEMAKER IMPLANTATION               IRF OT ASSESSMENT FLOWSHEET (last 12 hours)     IRF OT Evaluation and Treatment     Row Name 02/04/22 1345          OT Time and Intention    Document Type initial evaluation; daily treatment  -     Mode of Treatment individual therapy; occupational therapy  -     Patient Effort good  -KP     Symptoms Noted During/After Treatment increased pain; fatigue  in pm, and in am incr fatigue. pain upon arrival  -     Row Name 02/04/22 1345          General Information    Patient Profile Reviewed yes  -     Patient/Family/Caregiver Comments/Observations pt in wc in am, in bed in pm  -     Existing Precautions/Restrictions fall; oxygen therapy device and L/min; other (see comments)  ostomy bag, no O2 in pm  -     Row Name 02/04/22 1345          Vision Assessment/Intervention    Visual Impairment/Limitations WFL  -     Row Name 02/04/22 1345          Cognition/Psychosocial    Affect/Mental Status (Cognitive) [facility]     Orientation Status (Cognition) oriented x 3  -KP     Follows Commands (Cognition) follows one-step commands; over 90% accuracy; verbal cues/prompting required  -     Personal Safety Interventions fall prevention program maintained; gait belt; muscle strengthening facilitated; nonskid shoes/slippers when out of bed  -     Cognitive Function (Cognitive) [facility]     Row Name 02/04/22 1345          Pain Scale: Numbers Pre/Post-Treatment    Pretreatment Pain Rating 10/10  -KP     Posttreatment Pain Rating 10/10  -KP     Pain Location - Side Bilateral  -KP     Pain Location - Orientation incisional  -     Pain Location abdomen  -KP     Pre/Posttreatment Pain Comment pain incr in pm, in am pain about a 7. nsg aware and decr air  in bag  -     Pain Intervention(s) Repositioned  -     Row Name 02/04/22 1345          Range of Motion (ROM)    Range of Motion bilateral upper extremities  -     Row Name 02/04/22 1345          Range of Motion Comprehensive    General Range of Motion bilateral upper extremity ROM WNL  -     Row Name 02/04/22 1345          Strength (Manual Muscle Testing)    Strength (Manual Muscle Testing) bilateral upper extremities  -     Additional Documentation Hand  Strength Testing (Group)  -     Row Name 02/04/22 1345          Hand  Strength Testing    Left Hand, Setting 1 (Dynamometer Testing) 60  -KP     Right Hand, Setting 1 (Dynamometer Testing) 55  -KP     Left Hand: Lateral (Key) Pinch Strength (Pinch Dynamometer Testing) 15  -KP     Left Hand: [place] (Ede) Pinch Strength (Pinch Dynamometer Testing) 14  -KP     Right Hand: Lateral (Key) Pinch Strength (Pinch Dynamometer Testing) 12  -KP     Right Hand: [place] (Ede) Pinch Strength (Pinch Dynamometer Testing) 14  -[facility] Name 02/04/22 1345          Bed Mobility    Sit-Supine [place] (Bed Mobility) set up; minimum assist (75% patient effort)  [facility]     Comment (Bed Mobility) A to get LE back into bed  -[facility] Name 02/04/22 1345          Transfer Assessment/Treatment    Transfers chair-bed transfer; sit-stand transfer; stand-sit transfer; toilet transfer; shower transfer  -[facility] Name 02/04/22 1345          Transfers    Chair-Bed [place] (Transfers) set up; contact guard  -     Sit-Stand [place] (Transfers) set up; contact guard  -     Stand-Sit [place] (Transfers) set up; contact guard  [facility]     Assistive Device (Shower Transfer) --  est CGA  -     Row Name 02/04/22 1345          Chair-Bed Transfer    Assistive Device (Chair-Bed Transfers) wheelchair  -     Row Name 02/04/22 1345          Sit-Stand Transfer    Assistive Device (Sit-Stand Transfers) wheelchair  -     Row Name 02/04/22 1345           Stand-Sit Transfer    Assistive Device (Stand-Sit Transfers) wheelchair  -     Row Name 02/04/22 1345          Toilet Transfer    Type (Toilet Transfer) --  est CGA  -     Row Name 02/04/22 1345          Motor Skills    Motor Skills coordination; functional endurance; therapeutic exercise; motor control/coordination interventions  -     Coordination 9 Hole Peg Test of Fine Motor Coordination Results  -     Results, 9 Hole Peg Test of Fine Motor Coordination TBA  -     Functional Endurance fair +  -KP     Therapeutic Exercise shoulder; elbow/forearm; wrist; hand  -[facility] Name 02/04/22 1345          Shoulder (Therapeutic Exercise)    Shoulder AROM (Therapeutic Exercise) right; left; extension; flexion; supine; 10 repetitions  -[facility] Name 02/04/22 1345          Elbow/Forearm (Therapeutic Exercise)    Elbow/Forearm AROM (Therapeutic Exercise) left; right; flexion; extension; supination; pronation; supine; 10 repetitions  -[facility] Name 02/04/22 1345          Basic Activities of Daily Living (BADLs)    Basic Activities of Daily Living bathing; upper body dressing; lower body dressing; grooming; toileting  -[facility] Name 02/04/22 1345          Bathing    [place] (Bathing) bathing skills; set up; verbal cues; minimum assist (75% patient effort)  -     Position (Bathing) sink side; supported sitting; supported standing  -     Set-up Assistance (Bathing) obtain supplies  -     Comment (Bathing) A for feet  -     Row Name 02/04/22 1345          Upper Body Dressing    [place] (Upper Body Dressing) upper body dressing skills; doff; don; tomas/bairon; supervision; set up assistance  -     Position (Upper Body Dressing) supported sitting  -     Set-up Assistance (Upper Body Dressing) obtain clothing  -     Comment (Upper Body Dressing) pt only has a hospital gown, wife to bring in his clothes when weather/roads are better  -     Row Name 02/04/22 1345          Lower Body  Dressing    [place] Level (Lower Body Dressing) doff; don; socks; set up; dependent (less than 25% patient effort)  -     Position (Lower Body Dressing) supported sitting  -     Set-up Assistance (Lower Body Dressing) obtain clothing  -     Comment (Lower Body Dressing) did not have pants here yet.  -     Row Name 02/04/22 1345          Grooming    [place] Level (Grooming) grooming skills; oral care regimen; wash face, hands; set up; standby assist  -     Position (Grooming) sink side; supported sitting  -     Row Name 02/04/22 1345          Toileting    [place] Level (Toileting) toileting skills; adjust/manage clothing; perform perineal hygiene; set up assistance; minimum assist (75% patient effort)  -     Assistive Device Use (Toileting) grab bar/safety frame  -     Position (Toileting) supported standing; supported sitting  -     Set-up Assistance (Toileting) obtain supplies  -     Row Name 02/04/22 4035          IRF OT Goals    Transfer Goal Selection (OT-IRF) transfers, OT goal 1; transfers, OT goal 2  -     Bathing Goal Selection (OT-IRF) bathing, OT goal 1; bathing, OT goal 2  -     UB Dressing Goal Selection (OT-IRF) UB dressing, OT goal 1; UB dressing, OT goal 2  -     LB Dressing Goal Selection (OT-IRF) LB dressing, OT goal 1; LB dressing, OT goal 2  -     Grooming Goal Selection (OT-IRF) grooming, OT goal 1; grooming, OT goal 2  -     Toileting Goal Selection (OT-IRF) toileting, OT goal 1; toileting, OT goal 2  -     Strength Goal Selection (OT-IRF) strength, OT goal 1 (free text)  -     Balance Goal Selection (OT) balance, OT goal 1  -     Caregiver Training Goal Selection (OT-IRF) caregiver training, OT goal 1  -     Row Name 02/04/22 1345          Transfer Goal 1 (OT-IRF)    Activity/Assistive Device (Transfer Goal 1, OT-IRF) toilet; sit-to-stand/stand-to-sit; bed-to-chair/chair-to-bed; shower chair with a back  -     [place] Level (Transfer  Goal 1, OT-IRF) set-up required; standby assist  -KP     Time Frame (Transfer Goal 1, OT-IRF) short-term goal (STG); 1 week  -KP     Progress/Outcomes (Transfer Goal 1, OT-IRF) goal ongoing  [facility]     Row Name 02/04/22 1345          Transfer Goal 2 (OT-IRF)    Activity/Assistive Device (Transfer Goal 2, OT-IRF) sit-to-stand/stand-to-sit; bed-to-chair/chair-to-bed; toilet; walk-in shower; walker, rolling  -KP     [place] Level (Transfer Goal 2, OT-IRF) set-up required; standby assist  -KP     Time Frame (Transfer Goal 2, OT-IRF) long-term goal (LTG); by discharge; 1 week; 2 weeks  -KP     Progress/Outcomes (Transfer Goal 2, OT-IRF) goal ongoing  [facility]     Row Name 02/04/22 1345          Bathing Goal 1 (OT-IRF)    Activity/Device (Bathing Goal 1, OT-IRF) bathing skills, all; grab bar, tub/shower; hand-held shower spray hose; long-handled sponge; shower chair  -KP     [place] Level (Bathing Goal 1, OT-IRF) contact guard assist  -KP     Time Frame (Bathing Goal 1, OT-IRF) short-term goal (STG); 1 week  -KP     Progress/Outcomes (Bathing Goal 1, OT-IRF) goal ongoing  [facility]     Row Name 02/04/22 1345          Bathing Goal 2 (OT-IRF)    Activity/Device (Bathing Goal 2, OT-IRF) bathing skills, all; grab bar, tub/shower; hand-held shower spray hose; long-handled sponge; shower chair  -KP     [place] Level (Bathing Goal 2, OT-IRF) standby assist  -KP     Time Frame (Bathing Goal 2, OT-IRF) long-term goal (LTG); by discharge; 2 weeks; 1 week  -KP     Progress/Outcomes (Bathing Goal 2, OT-IRF) goal ongoing  [facility]     Row Name 02/04/22 1345          UB Dressing Goal 1 (OT-IRF)    Activity/Device (UB Dressing Goal 1, OT-IRF) upper body dressing  -KP     [place] (UB Dress Goal 1, OT-IRF) standby assist  -KP     Time Frame (UB Dressing Goal 1, OT-IRF) short-term goal (STG); 1 week  -     Progress/Outcomes (UB Dressing Goal 1, OT-IRF) goal ongoing  -     Row Name 02/04/22 1345          UB Dressing Goal 2 (OT-IRF)     Activity/Device (UB Dressing Goal 2, OT-IRF) upper body dressing  -KP     [place] (UB Dress Goal 2, OT-IRF) modified independence  -KP     Time Frame (UB Dressing Goal 2, OT-IRF) long-term goal (LTG); 1 week; 2 weeks; by discharge  -KP     Progress/Outcomes (UB Dressing Goal 2, OT-IRF) goal ongoing  -KP     Row Name 02/04/22 1345          LB Dressing Goal 1 (OT-IRF)    Activity/Device (LB Dressing Goal 1, OT-IRF) lower body dressing; reacher; sock aid  -KP     [place] (LB Dressing Goal 1, OT-IRF) minimum assist (75% or more patient effort)  -KP     Time Frame (LB Dressing Goal 1, OT-IRF) short-term goal (STG); 1 week  -KP     Progress/Outcomes (LB Dressing Goal 1, OT-IRF) goal ongoing  -KP     Row Name 02/04/22 1345          LB Dressing Goal 2 (OT-IRF)    Activity/Device (LB Dressing Goal 2, OT-IRF) lower body dressing; reacher; sock aid  -KP     [place] (LB Dressing Goal 2, OT-IRF) standby assist  -KP     Time Frame (LB Dressing Goal 2, OT-IRF) long-term goal (LTG); 1 week; 2 weeks; by discharge  -KP     Progress/Outcomes (LB Dressing Goal 2, OT-IRF) goal ongoing  -KP     Row Name 02/04/22 1345          Grooming Goal 1 (OT-IRF)    Activity/Device (Grooming Goal 1, OT-IRF) grooming skills, all  -KP     [place] (Grooming Goal 1, OT-IRF) set-up required  -KP     Time Frame (Grooming Goal 1, OT-IRF) short-term goal (STG); 1 week  -KP     Progress/Outcomes (Grooming Goal 1, OT-IRF) goal ongoing  -KP     Row Name 02/04/22 1345          Grooming Goal 2 (OT-IRF)    Activity/Device (Grooming Goal 2, OT-IRF) grooming skills, all  -KP     [place] (Grooming Goal 2, OT-IRF) modified independence  -KP     Time Frame (Grooming Goal 2, OT-IRF) long-term goal (LTG); by discharge; 2 weeks; 1 week  -KP     Progress/Outcomes (Grooming Goal 2, OT-IRF) goal ongoing  -KP     Row Name 02/04/22 1345          Toileting Goal 1 (OT-IRF)    Activity/Device (Toileting Goal 1, OT-IRF) toileting skills, all; grab  bar/safety frame  -KP     [place] Level (Toileting Goal 1, OT-IRF) set-up required; contact guard assist  -KP     Progress/Outcomes (Toileting Goal 1, OT-IRF) goal ongoing  -KP     Time Frame (Toileting Goal 1, OT-IRF) short-term goal (STG); 1 week  -KP     Row Name 02/04/22 5825          Toileting Goal 2 (OT-IRF)    Activity/Device (Toileting Goal 2, OT-IRF) toileting skills, all; grab bar/safety frame  -KP     [place] Level (Toileting Goal 2, OT-IRF) standby assist  -KP     Progress/Outcomes (Toileting Goal 2, OT-IRF) goal ongoing  -KP     Time Frame (Toileting Goal 2, OT-IRF) long-term goal (LTG); 1 week; 2 weeks; by discharge  -     Row Name 02/04/22 1345          Strength Goal 1 (OT-IRF)    Strength Goal 1 (OT-IRF) incr B UE to 4/5  -KP     Time Frame (Strength Goal 1, OT-IRF) long-term goal (LTG); by discharge; 2 weeks; 1 week  -KP     Progress/Outcomes (Strength Goal 1, OT-IRF) goal ongoing  -     Row Name 02/04/22 1347          Balance Goal 1 (OT)    Activity/Assistive Device (Balance Goal 1, OT) standing, dynamic  -KP     [place] Level/Cues Needed (Balance Goal 1, OT) supervision required  -KP     Time Frame (Balance Goal 1, OT) long term goal (LTG); by discharge  -     Progress/Outcomes (Balance Goal 1, OT) goal ongoing  -     Row Name 02/04/22 1348          Caregiver Training Goal 1 (OT-IRF)    Caregiver Training Goal 1 (OT-IRF) ed pt spouse on safety w ADLs and tsf, pt and spouse independent w safety w ADLs and tsf. pt I w HEP  -KP     Time Frame (Caregiver Training Goal 1, OT-IRF) long-term goal (LTG); by discharge; 1 week; 2 weeks  -KP     Progress/Outcomes (Caregiver Training Goal 1, OT-IRF) goal ongoing  [facility]     Row Name 02/04/22 1347          Positioning and Restraints    Pre-Treatment Position sitting in chair/recliner  in am, in pm in bed  -KP     Post Treatment Position bed  -KP     In Bed supine; call light within reach; encouraged to call for assist; exit alarm on  am and  pm  -     Row Name 02/04/22 1344          Therapy Assessment/Plan (OT)    Patient's Goals For Discharge take care of myself at home; return home  -     Row Name 02/04/22 3518          Therapy Assessment/Plan (OT)    Functional Level at Time of Evaluation (OT) pt requires CGA w tsf, but can then stand at sink w SBA while OT grabs wash cloth for pt. pt is SBA w UBD gown and dep w socks. pt SBA w grooming skills. pt did not have clothes to change into so will assess more w ADLs when clothew are here. pt very weak and fatigues quickly and incr pain in abdomen from incision and colostomy.  -     Rehab Potential/Prognosis (OT) good, to achieve stated therapy goals  -     Estimated Duration of Therapy (OT) 1 week; 2 weeks  -     Problem List (OT) problems related to; balance; mobility; strength; pain  -     Planned Therapy Interventions (OT) adaptive equipment training; BADL retraining; activity tolerance training; functional balance retraining; patient/caregiver education/training; ROM/therapeutic exercise; strengthening exercise; transfer/mobility retraining  -     Row Name 02/04/22 9579          Evaluation Complexity (OT)    Review Occupational Profile/Medical/Therapy History Complexity moderate complexity  -     Assessment, Occupational Performance/Identification of Deficit Complexity moderate complexity  -     Clinical Decision Making Complexity (OT) moderate complexity  -     Overall Complexity of Evaluation (OT) moderate complexity  -     Row Name 02/04/22 2868          Daily Progress Summary (OT)    Overall Progress Toward Functional Goals (OT) progressing toward functional goals as expected  -     Row Name 02/04/22 1342          Therapy Plan Review/Discharge Plan (OT)    Therapy Plan Review (OT) care plan/treatment goals reviewed; evaluation/treatment results reviewed; participants voiced agreement with care plan; patient; participants included  -     Anticipated Equipment Needs At  Discharge (OT) shower chair  -     Expected Discharge Disposition (OT) home with caregiver; home with home health care  -     Plan for Continued Service After Discharge (OT) HH OT  -           User Key  (r) = Recorded By, (t) = Taken By, (c) = Cosigned By    Initials Name Effective Dates     Lyndsey Zeng, OTR 06/16/21 -                  Occupational Therapy Education                 Title: PT OT SLP Therapies (Done)     Topic: Occupational Therapy (Done)     Point: ADL training (Done)     Description:   Instruct learner(s) on proper safety adaptation and remediation techniques during self care or transfers.   Instruct in proper use of assistive devices.              Learning Progress Summary           Patient Acceptance, E,TB,D, DU,VU by  at 2/4/2022 1455    Comment: ed pt on role of OT. benefit of therapy POC w. OT. ed on safety w. ADL and tsf. ed on UE ex.                   Point: Home exercise program (Done)     Description:   Instruct learner(s) on appropriate technique for monitoring, assisting and/or progressing therapeutic exercises/activities.              Learning Progress Summary           Patient Acceptance, E,TB,D, DU,VU by  at 2/4/2022 1455    Comment: ed pt on role of OT. benefit of therapy POC w. OT. ed on safety w. ADL and tsf. ed on UE ex.                   Point: Precautions (Done)     Description:   Instruct learner(s) on prescribed precautions during self-care and functional transfers.              Learning Progress Summary           Patient Acceptance, E,TB,D, DU,VU by  at 2/4/2022 1455    Comment: ed pt on role of OT. benefit of therapy POC w. OT. ed on safety w. ADL and tsf. ed on UE ex.                   Point: Body mechanics (Done)     Description:   Instruct learner(s) on proper positioning and spine alignment during self-care, functional mobility activities and/or exercises.              Learning Progress Summary           Patient Acceptance, E,TB,D, DU,VU by  at  2/4/2022 1455    Comment: ed pt on role of OT. benefit of therapy POC w. OT. ed on safety w. ADL and tsf. ed on UE ex.                               User Key     Initials Effective Dates Name Provider Type [facility] 06/16/21 -  Lyndsey Zeng OTR Occupational Therapist OT                    OT Recommendation and Plan    Planned Therapy Interventions (OT): adaptive equipment training, BADL retraining, activity tolerance training, functional balance retraining, patient/caregiver education/training, ROM/therapeutic exercise, strengthening exercise, transfer/mobility retraining       Daily Progress Summary (OT)  Overall Progress Toward Functional Goals (OT): progressing toward functional goals as expected            Time Calculation:      Time Calculation- OT     Row Name 02/04/22 1456 02/04/22 0900          Time Calculation- OT    OT Start Time 1300  -KP 0900  -     OT Stop Time 1330  -KP 0930  -     OT Time Calculation (min) 30 min  - 30 min  -           User Key  (r) = Recorded By, (t) = Taken By, (c) = Cosigned By    Initials Name Provider Type     Lyndsey Zeng OTR Occupational Therapist              Therapy Charges for Today     Code Description Service Date Service Provider Modifiers Qty    60210009234 HC OT SELF CARE/MGMT/TRAIN EA 15 MIN 2/4/2022 Lyndsey Zeng OTR GO 2    77622339310 HC OT EVAL MOD COMPLEXITY 3 2/4/2022 Lyndsey Zeng OTR GO 1                   JOSE Demarco  2/4/2022

## 2022-02-04 NOTE — PROGRESS NOTES
Discharge Planning Assessment  [facility]     Patient Name: Arnie Calvo  MRN: [id number]  Today's Date: 2/4/2022    Admit Date: 2/3/2022     Discharge Needs Assessment    No documentation.                Discharge Plan     Row Name 02/04/22 1423       Plan    Plan Patient will d/c home with wife. Will arrange follow up therapy as needed.    Patient/Family in Agreement with Plan yes    Plan Comments Completed assessment with patient and wife, by phone. Patient lives with wife in one story home with no steps to enter. Patient was active and independent prior to hospitalization. Patient works on weekends running [email or web address]. Patient stated he plans to retire due to his health issues. Patient stated he isn't sleeping well-feels he can't breathe. he acknowledges having anxiety due to all that has happened medically. He denies any prior history of depression or anxiety. D/C plan is home with wife. She works from home so will be home with him. Discussed SW role, team and family conference. Will assist with plans.              Continued Care and Services - Admitted Since 2/3/2022    Coordination has not been started for this encounter.     Selected Continued Care - Prior Encounters Includes selections from prior encounters from 11/5/2021 to 2/4/2022    Discharged on 2/3/2022 Admission date: 12/24/2021 - Discharge disposition: Rehab Facility or Unit (Formerly named [facility])    Destination     Service Provider Selected Services Address Phone Fax Patient Preferred    [facility]  Inpatient Rehabilitation [address] [address] [phone] -- --                       Demographic Summary    No documentation.                Functional Status     Row Name 02/04/22 1411       Functional Status    Usual Activity Tolerance good    Current Activity Tolerance poor    Functional Status Comments Patient was independent and active prior to hospitalization.       Functional Status, IADL     Medications independent    Meal Preparation assistive person    Housekeeping assistive person    Laundry assistive person    Shopping independent    IADL Comments Patient was independent prior to hospitalization.       Mental Status    General Appearance WDL WDL       Mental Status Summary    Recent Changes in Mental Status/Cognitive Functioning mood    Mental Status Comments Patient acknowledges feeling very anxious. Wife stated patient had ICU delirium but feels he has cleared from this.       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               Psychosocial     Row Name 02/04/22 1419       Values/Beliefs    Spiritual, Cultural Beliefs, Scientologist Practices, Values that Affect Care no       Behavior WDL    Behavior WDL interactions    Interactions appropriate to situation; eye contact appropriate       Emotion Mood WDL    Emotion/Mood/Affect WDL emotion mood; affect    Affect affect consistent with mood    Emotion/Mood anxious       Speech WDL    Speech WDL WDL       Perceptual State WDL    Perceptual State WDL WDL       Thought Process WDL    Thought Process WDL WDL       Intellectual Performance WDL    Intellectual Performance WDL WDL       Coping/Stress    Major Change/Loss/Stressor medical condition/diagnosis; loss of independence    Patient Personal Strengths expressive of needs; expressive of emotions; successful coping history; motivated    Sources of Support spouse    Techniques to [place] with Loss/Stress/Change diversional activities    Reaction to Health Status anxious; uncertainty; hopeful    Understanding of Condition and Treatment adequate understanding of medical condition; adequate understanding of treatment    Coping/Stress Comments Patient stated he is not sleeping well due to feeling like he can't breathe.  He stated they tell him his vitals are okay so may be related to his anxiety.       Developmental Stage (Eriksson's)    Developmental Stage Stage 7 (35-65 years/Middle Adulthood) Generativity vs. Stagnation               Abuse/Neglect    No documentation.                Legal     Row Name 02/04/22 1419       Financial/Legal    Source of Income salary/wages    Who Manages Finances if Patient Unable Wife    Finance Comments Per wife, owner of company works for has patient out on leave and is still paying insurance premiums. Discussed process of applying for SSDI               Substance Abuse    No documentation.                Patient Forms    No documentation.                   MONY Dyer

## 2022-02-04 NOTE — PROGRESS NOTES
SECTION GG    Self Care Performance:   Oral Hygiene: [place] sets up or cleans up; patient completes activity. [place]  assists only prior to or following the activity.   Toileting Hygiene: [place] provides verbal cues and/or touching/steadying and/or  contact guard assistance as patient completes activity.   Shower/Bathe Self: [place] does less than half the effort. [place] lifts, holds  or supports trunk or limbs but provides less than half the effort.   Upper Body Dressing: [place] provides verbal cues and/or touching/steadying  and/or contact guard assistance as patient completes activity.   Lower Body Dressing: [place] does less than half the effort. [place] lifts, holds  or supports trunk or limbs but provides less than half the effort.   Putting On/Taking Off Footwear: [place] does all of the effort. Patient does  none of the effort to complete the activity. Or, the assistance of 2 or more  helpers is required for the patient to complete the activity.    Self Care Discharge Goals:   Shower/Bathe Self: [place] sets up or cleans up; patient completes activity.  [place] assists only prior to or following the activity.    Mobility Toilet Transfer Performance: [place] provides verbal cues or  touching/steadying assistance as patient completes activity.    Mobility Toilet Transfer Discharge Goal: [place] sets up or cleans up; patient  completes activity. [place] assists only prior to or following the activity.    Signed by: JOSE Mccloud/JUVENCIO

## 2022-02-04 NOTE — PLAN OF CARE
Goal Outcome Evaluation:  Plan of Care Reviewed With: patient           Outcome Summary: Patient participated in the administration of the Cognitive Linguistic Quick Test (CLQT). He presented with mild-moderate cognitive deficits. Results of assessment include: Attention - Moderate; Memory - Moderate; Executive Function - Severe; Language - WNL; Visuospatial skills - Moderate. During the evaluation, patient c/o experiencing shortness of breath and increased discomfort. Patient's scores may have been impacted by level of discomfort/pain. He said that he manages finances and medications at home. Cognitive therapy is recommended to target attention, executive function, and home management skills. Patient exhibits strengths in his language and overall memory skills. He completed immediate and delayed recall tasks with additional cues. Attention skills may improve with decreased discomfort/pain.     Patient was not wearing a face mask during this therapy encounter. Therapist used appropriate personal protective equipment including mask, eye protection and gloves.  Mask used was standard procedure mask. Appropriate PPE was worn during the entire therapy session. Hand hygiene was completed before and after therapy session. Patient is not in enhanced droplet precautions.

## 2022-02-04 NOTE — PROGRESS NOTES
Recreational Therapy Note    Patient Name: Arnie Calvo   MRN: [id number]    Therapeutic Recreation Eval and Treat (last 12 hours)     Therapeutic Recreation Eval & Treat     Row Name 02/04/22 1406       Lifestyle/Recreational History/Interest    Current Living Situation with family  -    Transportation Situation car, drives self  -SS    Row Name 02/04/22 1406       Recreational History and Interests    How Important is Recreation to You? high importance  -SS    Satisfied With Leisure Lifestyle? yes  -SS    Participate Regularly in Leisure Activity? yes  -SS    Current Hobbies/Interests outdoor activities  -    Outdoor Activities hiking; other (see comments)  -    Row Name 02/04/22 1406       Therapeutic Recreation Participation    Orientation to Therapeutic Recreation patient  -    Row Name 02/04/22 1406       Therapeutic Recreation Assessment/Plan    Recreation Therapy Goals/Objectives improve; functional leisure skills; strength and endurance  -    Recreation Plan structured leisure participation  -          User Key  (r) = Recorded By, (t) = Taken By, (c) = Cosigned By    Initials Name Provider Type    SS Kiara Snyder, CTRS Recreational Therapist                  CARL Aguayo  2/4/2022

## 2022-02-04 NOTE — PROGRESS NOTES
Case Management  Inpatient Rehabilitation Plan of Care and Discharge Plan Note    Rehabilitation Diagnosis:  Debility s/p GI sx  Date of Onset:  12/29/21    Medical Summary:  61-year-old male previously dependent, was hiking up to 7.5  miles in [facility], who had symptomatic cholelithiasis and  moderately large parastomal hernia around ileostomy.  Underwent cholecystectomy  and ileostomy takedown with repair of parastomal hernia first part of December.  He required readmission for persistent symptoms consistent with ileus versus  obstruction.  Started on TPN.  Placed on antibiotics.  Inflammatory changes  noted around the sigmoid colon.  Probable Crohn's related stricture in the  sigmoid colon with partial bowel obstruction was felt to be likely source of his  difficult postoperative course.  On December 29 he underwent exploratory  laparotomy with transverse colon resection and end colostomy.  Abdominal  adhesions were severe.  Hospital course noted for postoperative sepsis requiring  antibiotics and pressor supports.  Operative findings were notable for high-grade partial obstruction of the  sigmoid colon and relatively dusky appearance of the small bowel. ?He had  interventional radiology drainage on January 12 of intra-abdominal abscess,  resolved acute hypoxic respiratory failure and acute kidney injury and shock.  Treated with Zosyn. ?He had a wound VAC and ostomy in place. ?He had been on TPN  for malnutrition and to minimize stool stoma output. ?He had been on midodrine  ?  Deconditioning with impairments with his mobility and self-care.  Wound ostomy  care issues related to need for ostomy placement within the upper midline  incision due to his abdominal adhesions.  Cardiology followed for baseline  congestive heart failure and persistent tachycardia.  No clear etiology of his  persistent tachycardia could be ascertained.  Primary issues at the time of discharge include parastomal wound  healing and  ostomy appliance management.  Deconditioning with need for physical therapy and  Occupational Therapy.  Poor nutritional status with need for increased oral  intake.  Prealbumin level of 10.2 on February 2.  Would recommend rechecking in  about 2 weeks.  Chronic medical issues most significantly severe nonischemic  cardiomyopathy with ejection fraction of 20%.  ?  Patient presently comfortable at rest.  Does fatigue with activities.  Notes he  gets short of air with activities.  Generalized weakness.  Has discomfort with  the dressing changes.  Is having ostomy output.  Notes decreased appetite but is  taking supplements more so than the meal trays.  He is contact-guard for bed mobility.  Ambulate 140 feet contact-guard with  rolling walker, very slow.  Fair balance.  Endurance is slowly improving.  Past Medical History: Past Medical History:  DiagnosisDate  ?Acute pain of left hip?  ?Adjustment disorder with depressed mood?  ?Anesthesia complication?  ?SPINAL [id number] LOWER SPINE  ?Ankylosing spondylitis of cervical region (HCC)?  ?Ankylosis of spine?  ?Arthritis?  ?Asthma?  ?Cardiomyopathy (HCC)?  ?sees Dr. Reyes; NICM/ (-) cath, EF 25-35%; has ICD  ?CHF (congestive heart failure) (HCC)?  ?Cholecystitis?  ?Crohn's disease (HCC)?  ?Diabetes mellitus (HCC)?  ?Diet controlled.  ?Dysthymic [id number]  ?Dysuria?  ?Essential hypertension?  ?External hemorrhoids?  ?Genital herpes?  ?Gout?  ?History of MRSA [id number]?  ?FINGERTIP-TX WITH ANTIBIOTICS-[facility]-NO CURRENT OPEN WOUND OR  TREATMENT 12/3/21  ?Ileostomy in place (HCC)?  ?Insomnia?  ?Iron deficiency?  ?Paroxysmal ventricular tachycardia (HCC)?  ?PONV (postoperative nausea and vomiting)?  ?Presence of combination internal cardiac defibrillator (ICD) and pacemaker?  ?Prostate nodule?  ?Recurrent canker sores?  ?Thoracic back pain?  ?Urinary hesitancy?  ?Xerosis cutis?    ?  ?  Surgical HistoryExpand by  Default  Past Surgical History:  ProcedureLateralityDate  ?CARDIAC CATHETERIZATION??  ?CARDIAC DEFIBRILLATOR PLACEMENT??  ?REPLACEMENT-Had Trimble lead that was fractured.  Lead was tied off.  New lead  and new device implanted.  ?CARDIAC ELECTROPHYSIOLOGY PROCEDUREN/A1/3/2019  ?Procedure: ICD DC generator change  MEDTRONIC;  Surgeon: Zechariah Randolph MD;  Location: [facility] CATH INVASIVE LOCATION;  Service: Cardiology  ?CHOLECYSTECTOMYN/A12/7/2021  ?Procedure: CHOLECYSTECTOMY;  Surgeon: Jeovany Mott MD;  Location: [facility]  MAIN OR;  Service: General;  Laterality: N/A;  ?COLON RESECTIONN/A12/29/2021  ?Procedure: PARTIAL COLECTOMY WITH OSTOMY;  Surgeon: Jeovany Mott MD;  Location: [facility] OR;  Service: General;  Laterality: N/A;  ?COLON RESECTION WITH ILEOSTOMYN/  ?COLONOSCOPY?04/03/2015  ?abnormal cecum, three polypoid masses, pre cancerous vs crohns, IH, tics,  hyperplastic polyp  ?COLONOSCOPYN/A3/17/2017  ?polypoid masses, tics, IH, polyp  ?EXPLORATORY LAPAROTOMY W/ BOWEL RESECTION?07/2018  ?open resection of ileum with creation of end ileostomy  ?ILEOSTOMY CLOSURE?07/2018  ?ILEOSTOMY CLOSUREN/A12/7/2021  ?Procedure: ILEOSTOMY TAKEDOWN WITH RESECTION, PARASTOMAL HERNIA REPAIR;  Surgeon: Jeovany Mott MD;  Location: [facility];  Service: General;  Laterality: N/A;    Plan of Care  Updated Problems/Interventions      Expected Intensity:  Average of 3 hours of therapy 5 days/week.  Interdisciplinary Team:  Interdisciplinary Team: Medical Supervision and 24 Hour Rehabilitation Nursing.,  Physical Therapy:, Occupational Therapy:, Speech and Language Therapy:, Social  Work, Therapeutic Recreation., Psychology.  Physical Therapy Intensity/Duration: 1 hour/day 5 days/week for approximately 8  - 10 days  Occupational Therapy Intensity/Duration: 1 hour/day 5 days/week for  approximately 8 - 10 days  Speech Language Pathology  Intensity/Duration: 1 hour/day 5 days/week for  approximately 8 - 10  days  Estimated Length of Stay/Anticipated Discharge Date: ELOS: 10 days  Anticipated Discharge Destination:  Anticipated discharge destination from inpatient rehabilitation is community  discharge with assistance. Home with spouse      Based on the patient's medical and functional status, their prognosis and  expected level of functional improvement is:  Goals are to achieve a level of  supervision modified independent with  mobility and self-care and improved  endurance.   Rehabilitation prognosis fair.  Medical prognosis defer to general  surgery.    Signed by: Shahriar Petersen RN

## 2022-02-04 NOTE — PROGRESS NOTES
Ostomy output quite low.  We will continue Robinul twice daily for now, but decreased Imodium from 2 tablets to 1 tablet p.o. before every meal and at bedtime.  We will continue to monitor ostomy output closely and adjust medications as needed.

## 2022-02-04 NOTE — PROGRESS NOTES
Physical Medicine and Rehabilitation  Inpatient Rehabilitation Interdisciplinary Plan of Care    Demographics            Age: 61Y            Gender: Male    Admission Date: 2/3/2022 3:03:00 PM  Rehabilitation Diagnosis:  Debility s/p GI sx  Chief Complaint: Immobility syndrome  Impaired mobility/impaired self-care/impaired endurance    Ileostomy takedown , cholecystectomy, incisional hernia repair December 7, 2021.  Exploratory laparotomy with end colostomy partial colon resection December 29, 2021    Ostomy-on Imodium/glycopyrrolate  February 4-continue Robinul twice daily for now, but decreased Imodium from 2  tablets to 1 tablet p.o. before every meal and at bedtime.    Abdominal wound care  February 4-the ostomy involves the midline wound.  His wound is overall getting  smaller per the ostomy wound care team.  On Monday ostomy wound care team will  try to get into smaller ostomy pouch to isolate the ostomy from the wound.    Anxiety-would presently hold on adding any benzodiazepine given his multiple  medical issues.    Anemia    Hyponatremia  February 4-sodium was 127 on February 2, 130 on February 3, decreased 124 on  February 4.  Will consult nephrology for evaluation    Congestive heart failure/tachycardia-on Coreg 12.5 mg for supper.  Blood  pressure low at times.  Parameters added.      Nonischemic cardiomyopathy ejection fraction 20%    DVT prophylaxis-Lovenox/SCDs    Impaired nutritional status-supplements per dietitian to follow.  Recheck  prealbumin around February 16    Pulmonary- using  O2 2 L nasal cannula at night    Insomnia-melatonin added    Plan of Care  Anticipated Discharge Date/Estimated Length of Stay: ELOS: 10 days  Anticipated Discharge Destination: Community discharge with assistance  Discharge Plan : Home with spouse  Medical Necessity Expected Level Rationale: Goals are to achieve a level of  supervision modified independent with  mobility and self-care and improved  endurance.    Rehabilitation prognosis fair.  Medical prognosis defer to general  surgery.  Intensity and Duration: an average of 3 hours/5 days per week  Medical Supervision and 24 Hour Rehab Nursing: x  Physical Therapy: x  PT Intensity/Duration: 1 hour/day 5 days/week for approximately 8 - 10 days  Occupational Therapy: x  OT Intensity/Duration: 1 hour/day 5 days/week for approximately 8 - 10 days  Speech and Language Therapy: x  SLP Intensity/Duration: 1 hour/day 5 days/week for approximately 8 - 10 days  Social Work: x  Therapeutic Recreation: x  Psychology: x  Updated (if changes indicated)  No changes to plan.    Based on the patient's medical and functional status, their prognosis and  expected level of functional improvement is: Goals are to achieve a level of  supervision modified independent with  mobility and self-care and improved  endurance.   Rehabilitation prognosis fair.  Medical prognosis defer to general  surgery.    Interdisciplinary Problem/Goals/Status  Cognition    [ST] Attention(Active)  Current Status(02/04/2022): mild-moderate  Weekly Goal(02/11/2022): Patient will follow daily schedule to anticipate  therapies scheduled for the day.  Discharge Goal: Patient will improve attention to detail to increase  independence at next level of care    [ST] Executive Functions(Active)  Current Status(02/04/2022): mild-moderate  Weekly Goal(02/11/2022): Patient will organize materials needed for ADLs  Discharge Goal: Patient will improve organizational skills for increased  independence at next level of care        Mobility    [OT] Toilet Transfers(Active)  Current Status(02/04/2022): est CGA  Weekly Goal(02/11/2022): SBA  Discharge Goal: SBA    [OT] Tub/Shower Transfers(Active)  Current Status(02/04/2022): est CGA  Weekly Goal(02/11/2022): SBA  Discharge Goal: SBA        Psychosocial    [RN] Coping/Adjustment(Active)  Current Status(02/03/2022): Pt at risk for coping issues  Weekly Goal(02/10/2022): Pt will make  staff aware of concerns  Discharge Goal: NO coping problems        Safety    [RN] Potential for Injury(Active)  Current Status(02/03/2022): Pt is at risk for falls.  Weekly Goal(02/10/2022): Will use call bell 100% of the time  Discharge Goal: No injury        Self Care    [OT] Bathing(Active)  Current Status(02/04/2022): Min  Weekly Goal(02/11/2022): CGA  Discharge Goal: SBA    [OT] Dressing (Lower)(Active)  Current Status(02/04/2022): dep socks, didn't have pants  Weekly Goal(02/11/2022): min  Discharge Goal: SBA    [OT] Dressing (Upper)(Active)  Current Status(02/04/2022): SBA gown  Weekly Goal(02/11/2022): SBA  Discharge Goal: mod I    [OT] Grooming(Active)  Current Status(02/04/2022): SBA  Weekly Goal(02/11/2022): SBA  Discharge Goal: mod I    [OT] Toileting(Active)  Current Status(02/04/2022): Min  Weekly Goal(02/11/2022): CGA  Discharge Goal: SBA        Sphincter Control    [RN] Bladder Management(Active)  Current Status(02/03/2022): Pt cont of urine upon admission  Weekly Goal(02/10/2022): Remain cont  Discharge Goal: 100% cont of urine    [RN] Bowel Management(Active)  Current Status(02/03/2022): Pt has an ostomy in which skin RNs change  Weekly Goal(02/10/2022): Ostomy is fuctional  Discharge Goal: Pt aware of how to manage ostomy      Comments:    Signed by: Zechariah Pearson MD

## 2022-02-04 NOTE — PLAN OF CARE
Problem: Skin Injury Risk Increased  Goal: Skin Health and Integrity  Outcome: Ongoing, Progressing  Intervention: Optimize Skin Protection  Recent Flowsheet Documentation  Taken 2/4/2022 0400 by Javier Presley RN  Head of Bed (HOB): HOB at 20-30 degrees  Taken 2/4/2022 0200 by Javier Presley RN  Head of Bed (HOB): HOB at 20-30 degrees  Taken 2/4/2022 0000 by Javier Presley RN  Head of Bed (HOB): HOB at 20-30 degrees  Taken 2/3/2022 2200 by Javier Presley RN  Head of Bed (HOB): HOB at 30-45 degrees  Taken 2/3/2022 2008 by Javier Presley RN  Pressure Reduction Techniques:   frequent weight shift encouraged   heels elevated off bed  Head of Bed (HOB): HOB at 30-45 degrees  Pressure Reduction Devices:   pressure-redistributing mattress utilized   positioning supports utilized   specialty bed utilized  Skin Protection:   adhesive use limited   incontinence pads utilized   tubing/devices free from skin contact     Problem: Fall Injury Risk  Goal: Absence of Fall and Fall-Related Injury  Outcome: Ongoing, Progressing  Intervention: Identify and Manage Contributors to Fall Injury Risk  Recent Flowsheet Documentation  Taken 2/3/2022 2008 by Javier Presley RN  Medication Review/Management: medications reviewed  Self-Care Promotion: independence encouraged  Intervention: Promote Injury-Free Environment  Recent Flowsheet Documentation  Taken 2/4/2022 0400 by Javier Presley RN  Safety Promotion/Fall Prevention:   assistive device/personal items within reach   clutter free environment maintained   fall prevention program maintained   gait belt   nonskid shoes/slippers when out of bed   room organization consistent   safety round/check completed  Taken 2/4/2022 0200 by Javier Presley RN  Safety Promotion/Fall Prevention:   assistive device/personal items within reach   clutter free environment maintained   fall prevention program maintained   gait belt   nonskid shoes/slippers when out of bed   room organization consistent    safety round/check completed  Taken 2/4/2022 0000 by Javier Presley RN  Safety Promotion/Fall Prevention:   assistive device/personal items within reach   clutter free environment maintained   fall prevention program maintained   gait belt   nonskid shoes/slippers when out of bed   room organization consistent   safety round/check completed  Taken 2/3/2022 2200 by Javier Presley RN  Safety Promotion/Fall Prevention:   assistive device/personal items within reach   clutter free environment maintained   fall prevention program maintained   gait belt   nonskid shoes/slippers when out of bed   room organization consistent   safety round/check completed  Taken 2/3/2022 2008 by Javier Presley RN  Safety Promotion/Fall Prevention:   assistive device/personal items within reach   clutter free environment maintained   fall prevention program maintained   gait belt   nonskid shoes/slippers when out of bed   room organization consistent   safety round/check completed     Problem: Adjustment to Surgery (Colostomy)  Goal: Psychosocial Adjustment Initiation  Outcome: Ongoing, Progressing  Intervention: Support Psychosocial Response to Surgery and Ostomy  Recent Flowsheet Documentation  Taken 2/3/2022 2008 by Javier Presley RN  Supportive Measures:   active listening utilized   verbalization of feelings encouraged     Problem: Pain (Colostomy)  Goal: Acceptable Pain Control  Outcome: Ongoing, Progressing     Problem: Postoperative Nausea and Vomiting (Colostomy)  Goal: Nausea and Vomiting Relief  Outcome: Ongoing, Progressing     Problem: Mobility Impairment  Goal: Optimal Mobility [place] and Safety  Outcome: Ongoing, Progressing     Problem: Rehabilitation (IRF) Plan of Care  Goal: Plan of Care Review  Outcome: Ongoing, Progressing  Flowsheets (Taken 2/4/2022 0511)  Progress: improving  Outcome Summary: Mr. Calvo is a cooperative patient on all encounters, but anxious at times. Patient admitted by dayshift nurse, has been  resting in bed over night. Ostomy device patent and draining. Patient incontinent of bladder. Patient complaining of shortness of breath once during shift, vitals stable, on talking to patient, symptoms self-resolve and patient mentions that this has happened several times in the past and changing the room temperature helps. Room temperature lowered per patient request. Patient resistent to anxiety meds. Patient supplied with humidified oxygen, patient requires no other interventions at this time. No safety issues overnight. Patient stable, showing no signs or symptoms of distress. Call light within reach. No needs expressed by patient at this time. Patient takes pills whole with thin liquids.  Plan of Care Reviewed With: patient  Goal: Patient-Specific Goal (Individualized)  Outcome: Ongoing, Progressing  Goal: Absence of New-Onset Illness or Injury  Outcome: Ongoing, Progressing  Intervention: Prevent Fall and Fall Injury  Recent Flowsheet Documentation  Taken 2/4/2022 0400 by Javier Presley RN  Safety Promotion/Fall Prevention:   assistive device/personal items within reach   clutter free environment maintained   fall prevention program maintained   gait belt   nonskid shoes/slippers when out of bed   room organization consistent   safety round/check completed  Taken 2/4/2022 0200 by Javier Presley RN  Safety Promotion/Fall Prevention:   assistive device/personal items within reach   clutter free environment maintained   fall prevention program maintained   gait belt   nonskid shoes/slippers when out of bed   room organization consistent   safety round/check completed  Taken 2/4/2022 0000 by Javier Presley, RN  Safety Promotion/Fall Prevention:   assistive device/personal items within reach   clutter free environment maintained   fall prevention program maintained   gait belt   nonskid shoes/slippers when out of bed   room organization consistent   safety round/check completed  Taken 2/3/2022 2200 by Fernandez  BREE Herrera  Safety Promotion/Fall Prevention:   assistive device/personal items within reach   clutter free environment maintained   fall prevention program maintained   gait belt   nonskid shoes/slippers when out of bed   room organization consistent   safety round/check completed  Taken 2/3/2022 2008 by Javier Presley RN  Safety Promotion/Fall Prevention:   assistive device/personal items within reach   clutter free environment maintained   fall prevention program maintained   gait belt   nonskid shoes/slippers when out of bed   room organization consistent   safety round/check completed  Intervention: Prevent Infection  Recent Flowsheet Documentation  Taken 2/4/2022 0400 by Javier Presley RN  Infection Prevention:   environmental surveillance performed   hand hygiene promoted   rest/sleep promoted   single patient room provided   visitors restricted/screened  Taken 2/4/2022 0200 by Javier Presley RN  Infection Prevention:   environmental surveillance performed   hand hygiene promoted   rest/sleep promoted   single patient room provided   visitors restricted/screened  Taken 2/4/2022 0000 by Javier Presley RN  Infection Prevention:   environmental surveillance performed   hand hygiene promoted   rest/sleep promoted   single patient room provided   visitors restricted/screened  Taken 2/3/2022 2200 by Javier Presley RN  Infection Prevention:   environmental surveillance performed   hand hygiene promoted   rest/sleep promoted   single patient room provided   visitors restricted/screened  Taken 2/3/2022 2008 by Javier Presley RN  Infection Prevention:   environmental surveillance performed   hand hygiene promoted   rest/sleep promoted   single patient room provided   visitors restricted/screened  Intervention: Prevent VTE (Venous Thromboembolism)  Recent Flowsheet Documentation  Taken 2/3/2022 2008 by Javier Presley RN  VTE Prevention/Management: (scd pump ordered)   bilateral   dorsiflexion/plantar flexion performed    sequential compression devices off  Goal: Optimal Comfort and Wellbeing  Outcome: Ongoing, Progressing  Goal: Home and Community Transition Plan Established  Outcome: Ongoing, Progressing   Goal Outcome Evaluation:  Plan of Care Reviewed With: patient        Progress: improving  Outcome Summary: Mr. Calvo is a cooperative patient on all encounters, but anxious at times. Patient admitted by dayshift nurse, has been resting in bed over night. Ostomy device patent and draining. Patient incontinent of bladder. Patient complaining of shortness of breath once during shift, vitals stable, on talking to patient, symptoms self-resolve and patient mentions that this has happened several times in the past and changing the room temperature helps. Room temperature lowered per patient request. Patient resistent to anxiety meds. Patient supplied with humidified oxygen, patient requires no other interventions at this time. No safety issues overnight. Patient stable, showing no signs or symptoms of distress. Call light within reach. No needs expressed by patient at this time. Patient takes pills whole with thin liquids.

## 2022-02-04 NOTE — CONSULTS
Adult Nutrition  Assessment/PES    Patient Name:  Arnie Calvo  YOB: 1960  MRN: [id number]  Admit Date:  2/3/2022    Assessment Date:  2/4/2022    Comments:  Rehab admission assessment    Transfers from our acute side for debility following lengthy stay after ileostomy takedown surgery, cholecystectomy, incisional hernia repair; then later, exploratory lap with end colostomy and partial colon resection. These surgeries were in December 2021. Pertinent PMH: Crohn's disease/IBD, Diabetes, Heart failure. Currently has hyponatremia with serum Na 124. Pre-Albumin 10.2 (low). This will be rechecked Q 2 weeks per MD note.     Pt was followed closely by team of RDs on acute side. He has battled with poor appetite, early satiety and weight loss (adm weight ~210 lb, standing weight on 2/3 178 lb). Pt had required TPN support for several weeks due to ileus vs obstruction post-operatively. Intake ~25% at meals. Likes chocolate boost and will drink 3-4 of these per day poured over ice. Also likes Gatorade, cereals, popsicles. We had been trying lactose free milk as cow's milk seemed to cause GI upset. Will continue to work with pt on oral intake, preferences.          Reason for Assessment     Row Name 02/04/22 8842          Reason for Assessment    Reason For Assessment physician consult; nurse/nurse practitioner consult     Diagnosis gastrointestinal disease; surgery/postoperative complications  Ileostomy takedown , cholecystectomy, incisional hernia repair December 7, 2021; Exploratory laparotomy with end colostomy partial colon resection December 29, 2021     Identified At Risk by Screening Criteria reduced oral intake over the last month                Nutrition/Diet History     Row Name 02/04/22 0976          Nutrition/Diet History    Typical Food/Fluid Intake Reviewed extensive list of RD notes from acute side - pt had been on TPN for some time post-operatively for persistent ileus vs. obstruction.  "Gradually tolerating PO diet, but intake of supplements > meals.     Food Preferences Boost Plus over ice, Gatorade; cereal, banana, popsicles. Cow's milk had been upsetting his stomach so offering lactose-free milk.     Supplemental Drinks/Foods/Additives chocolate boost     Factors Affecting Nutritional Intake altered gastrointestinal function; early satiety                Anthropometrics     Row Name 02/04/22 0935          Anthropometrics    Height 175.3 cm (69\")            Admit Weight    Admit Weight Method measured     Admit Weight 80.7 kg (178 lb)  standing weight 2/3/22            Ideal Body Weight (IBW)    Ideal Body Weight (IBW) (kg) 73.69            Usual Body Weight (UBW)    Usual Body Weight 95.3 kg (210 lb)  when admitted on acute (12/13/21)     Weight Loss unintentional     Weight Loss Time Frame down ~30 lb x 2 months (15%)            Body Mass Index (BMI)    BMI Assessment BMI 25-29.9: overweight                Labs/Tests/Procedures/Meds     Row Name 02/04/22 0937          Labs/Procedures/Meds    Lab Results Reviewed reviewed, pertinent     Lab Results Comments Na 124, Cl 89, Glu 118, Hgb 10.9            Diagnostic Tests/Procedures    Diagnostic Test/Procedure Reviewed reviewed, pertinent     Diagnostic Test/Procedures Comments Transverse colon resection 12/29/2021; Ileostomy and gallbladder 12/7/2021: Ileocolic anastomosis and portion of colon without significant pathologic abnormality, chronic cholecystitis and cholelithiasis            Medications    Pertinent Medications Reviewed reviewed, pertinent     Pertinent Medications Comments imodium, coreg, lovenox, mylicon, robinul                Physical Findings     Row Name 02/04/22 0951          Physical Findings    Overall Physical Appearance on oxygen therapy     Gastrointestinal colostomy     Skin surgical incision; non-healing wound(s)  abdomen                Estimated/Assessed Needs     Row Name 02/04/22 0935          Calculation Measurements " "   Weight Used For Calculations 80.7 kg (178 lb)     Height 175.3 cm (69\")            Estimated/Assessed Needs    Additional Documentation Calorie Requirements (Group); Fluid Requirements (Group); KCAL/KG (Group); Protein Requirements (Group)            Calorie Requirements    Estimated Calorie Requirement Comment [id number]            KCAL/KG    KCAL/KG 30 Kcal/Kg (kcal); 25 Kcal/Kg (kcal)     25 Kcal/Kg (kcal) 2018.5     30 Kcal/Kg (kcal) 2422.2            Protein Requirements    Weight Used For Protein Calculations 80.7 kg (178 lb)     Est Protein Requirement Amount (gms/kg) 1.5 gm protein     Estimated Protein Requirements (gms/day) 121.11            Fluid Requirements    Fluid Requirements (mL/day) 2000     Estimated Fluid Requirement Method RDA Method     RDA Method (mL) 2000                Nutrition Prescription Ordered     Row Name 02/04/22 0952          Nutrition Prescription PO    Current PO Diet Regular     Supplement Boost Plus (Ensure Enlive, Ensure Plus)     Supplement Frequency 3 times a day; Snack time     Snack Times Bedtime  boost pudding                Evaluation of Received Nutrient/Fluid Intake     Row Name 02/04/22 0935          PO Evaluation    % PO Intake ~25% of meals; drinks supplements                     Problem/Interventions:   Problem 1     Row Name 02/04/22 1007          Nutrition Diagnoses Problem 1    Problem 1 Unintended Weight Loss     Etiology (related to) Medical Diagnosis     Gastrointestinal Bowel resection; Colostomy; Ileus     Signs/Symptoms (evidenced by) Report of Mnimal PO Intake; PN Intake Delivery; Report/Observation; Unintended Weight Change     Unintended Weight Change Loss     Number of Pounds Lost est 30 lb     Weight loss time period < 2 months     Other Comment Early satiety; on TPN for extended period of time while on acute                      Intervention Goal     Row Name 02/04/22 1011          Intervention Goal    General Disease management/therapy; " Reduce/improve symptoms; Improved nutrition related lab(s); Meet nutritional needs for age/condition     PO Increase intake; Tolerate PO; Meet estimated needs     PO Intake % 75 %     Weight Appropriate weight gain                Nutrition Intervention     Row Name 02/04/22 1014          Nutrition Intervention    RD/Tech Action Advise alternate selection; Menu provided; Encourage intake; Supplement provided; Follow Tx progress; Care plan reviewd                  Education/Evaluation     Row Name 02/04/22 1015          Education    Education Will Instruct as appropriate            Monitor/Evaluation    Monitor Per protocol; I&O; PO intake; Supplement intake; Pertinent labs; Skin status; Weight; Symptoms                 Electronically signed by:  Nancy Lee RD  02/04/22 10:35 EST

## 2022-02-04 NOTE — PROGRESS NOTES
Inpatient Rehabilitation Plan of Care Note    Plan of Care  Care Plan Reviewed - No updates at this time.    Safety    Performed Intervention(s)  Falls protocal  Yellow socks      Sphincter Control    Performed Intervention(s)  I and O  Btrm schedule  Wound ostomy consult for RNs      Psychosocial    Performed Intervention(s)  [place] consult encouraged  Provide emotional support    Signed by: Lily Good RN

## 2022-02-04 NOTE — PROGRESS NOTES
SECTION GG    Mobility Performance:     Roll Left and Right: [place] provides verbal cues and/or touching/steadying  and/or contact guard assistance as patient completes activity. Assistance may be  provided throughout the activity or intermittently.   Sit to Lying: [place] provides verbal cues and/or touching/steadying and/or  contact guard assistance as patient completes activity. Assistance may be  provided throughout the activity or intermittently.   Lying to Sitting on Side of Bed: [place] provides verbal cues and/or  touching/steadying and/or contact guard assistance as patient completes  activity. Assistance may be provided throughout the activity or intermittently.   Sit to Stand: [place] provides verbal cues and/or touching/steadying and/or  contact guard assistance as patient completes activity. Assistance may be  provided throughout the activity or intermittently.   Chair/Bed to Chair Transfer: [place] provides verbal cues and/or  touching/steadying and/or contact guard assistance as patient completes  activity. Assistance may be provided throughout the activity or intermittently.   Car Transfer: Not attempted due to medical or safety concerns.   Walk 10 Feet:   [place] provides verbal cues and/or touching/steadying and/or  contact guard assistance as patient completes activity. Assistance may be  provided throughout the activity or intermittently.  Walk 50 Feet with 2 Turns:   [place] provides verbal cues and/or  touching/steadying and/or contact guard assistance as patient completes  activity. Assistance may be provided throughout the activity or intermittently.  Walk 150 Feet:   Not attempted due to medical or safety concerns.  Walking 10 Feet on Uneven Surfaces:   Not attempted due to medical or safety  concerns.  1 Step Over Curb or Up/Down Stair:   Not attempted due to medical or safety  concerns.  Picking up an Object:   Not attempted due to medical or safety concerns.  Uses Wheelchair/Scooter:  No    Mobility Discharge Goals:   Sit to Stand: Patient completed the activities by him/herself with no  assistance from a helper.    Signed by: Wendy Ojeda DPT

## 2022-02-04 NOTE — PROGRESS NOTES
Inpatient Rehabilitation Functional Measures Assessment and Plan of Care    Plan of Care  Updated Problems/Interventions  Mobility    [OT] Toilet Transfers(Active)  Current Status(02/04/2022): est CGA  Weekly Goal(02/11/2022): SBA  Discharge Goal: SBA    [OT] Tub/Shower Transfers(Active)  Current Status(02/04/2022): est CGA  Weekly Goal(02/11/2022): SBA  Discharge Goal: SBA        Self Care    [OT] Bathing(Active)  Current Status(02/04/2022): Min  Weekly Goal(02/11/2022): CGA  Discharge Goal: SBA    [OT] Dressing (Lower)(Active)  Current Status(02/04/2022): dep socks, didn't have pants  Weekly Goal(02/11/2022): min  Discharge Goal: SBA    [OT] Dressing (Upper)(Active)  Current Status(02/04/2022): SBA gown  Weekly Goal(02/11/2022): SBA  Discharge Goal: mod I    [OT] Grooming(Active)  Current Status(02/04/2022): SBA  Weekly Goal(02/11/2022): SBA  Discharge Goal: mod I    [OT] Toileting(Active)  Current Status(02/04/2022): Min  Weekly Goal(02/11/2022): CGA  Discharge Goal: SBA    Functional Measures  ROSE Eating:  Branch  ROSE Grooming: Branch  ROSE Bathing:  Branch  ROSE Upper Body Dressing:  Branch  ROSE Lower Body Dressing:  Branch  ROSE Toileting:  Branch    ROSE Bladder Management  Level of Assistance:  Branch  Frequency/Number of Accidents this Shift:  Branch    ROSE Bowel Management  Level of Assistance: Branch  Frequency/Number of Accidents this Shift: Branch    ROSE Bed/Chair/Wheelchair Transfer:  Branch  ROSE Toilet Transfer:  Branch  ROSE Tub/Shower Transfer:  Branch    Previously Documented Mode of Locomotion at Discharge: Field  ROSE Expected Mode of Locomotion at Discharge: Branch  ROSE Walk/Wheelchair:  Branch  ROSE Stairs:  Branch    ROSE Comprehension:  Branch  ROSE Expression:  Branch  ROSE Social Interaction:  Branch  [facility] Problem Solving:  Branch  ROSE Memory:  Branch    Therapy Mode Minutes  Occupational Therapy: Branch  Physical Therapy: Branch  Speech Language Pathology:  Branch    Signed by: Lyndsey Zeng  OTR/L

## 2022-02-04 NOTE — THERAPY EVALUATION
Inpatient Rehabilitation - Physical Therapy Initial Evaluation       [facility]     Patient Name: Arnie Calvo  : 1960  MRN: [id number]    Today's Date: 2022                    Admit Date: 2/3/2022      Visit Dx:   No diagnosis found.    Patient Active Problem List   Diagnosis   • Cardiomyopathy (HCC)   • Paroxysmal VT (HCC)   • Palpitations   • PVC's (premature ventricular contractions)   • Exacerbation of Crohn's disease of small intestine (HCC)   • Adjustment disorder with depressed mood   • Ankylosis of spine   • Crohn's disease with complication (HCC)   • Diabetes mellitus (HCC)   • Essential hypertension   • External hemorrhoids   • Genital herpes simplex   • Gout   • Insomnia   • Iron deficiency   • Prostate mass   • Recurrent aphthous ulcer   • Asteatosis cutis   • History of pneumonia   • Abnormal CXR (chest x-ray)   • RUQ abdominal pain   • Crohn's disease of small intestine with other complication (HCC)   • Right lower quadrant abdominal pain   • Enteritis   • Mass-like inflammation at terminal ileum   • Crohn's disease (HCC)   • Ileostomy in place (HCC)   • Paroxysmal ventricular tachycardia (HCC)   • Chronic systolic heart failure (HCC)   • Cardiomyopathy, nonischemic (HCC)   • Orthostasis   • Iron deficiency anemia   • Orthostatic hypotension   • Crohn's disease of colon with complication (HCC)   • Dehisced intestinal anastomosis   • History of creation of ostomy (HCC)   • Hypokalemia   • Hypotension, chronic   • Malnutrition of moderate degree (HCC)   • S/P colectomy   • Fatty liver disease, nonalcoholic   • Cholecystitis   • Debility       Past Medical History:   Diagnosis Date   • Acute pain of left hip    • Adjustment disorder with depressed mood    • Anesthesia complication     SPINAL [id number]-WYNCMY LOWER SPINE   • Ankylosing spondylitis of cervical region (HCC)    • Ankylosis of spine    • Arthritis    • Asthma    • Cardiomyopathy (HCC)     sees Dr. Reyes; [facility]/ (-)  cath, EF 25-35%; has ICD   • CHF (congestive heart failure) ([facility])    • Cholecystitis    • Crohn's disease ([facility])    • Depression    • Diabetes mellitus ([facility])     Diet controlled.   • Dysthymic disorder 2012   • Dysuria    • Essential hypertension    • External hemorrhoids    • Genital herpes    • Gout    • Herpes genitalia    • History of MRSA infection 2000?    FINGERTIP-TX WITH ANTIBIOTICS-[facility] CARE-NO CURRENT OPEN WOUND OR TREATMENT 12/3/21   • Ileostomy in place ([facility])    • Insomnia    • Iron deficiency    • Paroxysmal ventricular tachycardia ([facility])    • PONV (postoperative nausea and vomiting)    • Presence of combination internal cardiac defibrillator (ICD) and pacemaker    • Prostate nodule    • Recurrent canker sores    • Thoracic back pain    • Urinary hesitancy    • Xerosis cutis        Past Surgical History:   Procedure Laterality Date   • ABDOMINAL SURGERY     • CARDIAC CATHETERIZATION     • CARDIAC DEFIBRILLATOR PLACEMENT      REPLACEMENT-Had Jude lead that was fractured.  Lead was tied off.  New lead and new device implanted.   • CARDIAC ELECTROPHYSIOLOGY PROCEDURE N/A 1/3/2019    Procedure: ICD DC generator change  [employer];  Surgeon: Zechariah Randolph MD;  Location: [facility] CATH INVASIVE LOCATION;  Service: Cardiology   • CHOLECYSTECTOMY N/A 12/7/2021    Procedure: CHOLECYSTECTOMY;  Surgeon: Jeovany Mott MD;  Location: [facility] MAIN OR;  Service: General;  Laterality: N/A;   • COLON RESECTION N/A 12/29/2021    Procedure: PARTIAL COLECTOMY WITH OSTOMY;  Surgeon: Jeovany Mott MD;  Location: [facility] MAIN OR;  Service: General;  Laterality: N/A;   • COLON RESECTION WITH ILEOSTOMY N/A 2018   • COLONOSCOPY  04/03/2015    abnormal cecum, three polypoid masses, pre cancerous vs crohns, IH, tics, hyperplastic polyp   • COLONOSCOPY N/A 3/17/2017    polypoid masses, tics, IH, polyp   • EXPLORATORY LAPAROTOMY W/ BOWEL RESECTION  07/2018    open resection of ileum with creation of end ileostomy   •  ILEOSTOMY CLOSURE  07/2018   • ILEOSTOMY CLOSURE N/A 12/7/2021    Procedure: ILEOSTOMY TAKEDOWN WITH RESECTION, PARASTOMAL HERNIA REPAIR;  Surgeon: Jeovany Mott MD;  Location: [facility] OR;  Service: General;  Laterality: N/A;   • PACEMAKER IMPLANTATION         PT ASSESSMENT (last 12 hours)     IRF PT Evaluation and Treatment     Row Name 02/04/22 0845          PT Time and Intention    Document Type initial evaluation  -EE     Mode of Treatment physical therapy; individual therapy  -EE     Patient/Family/Caregiver Comments/Observations Pt supine in bed, agreeable to PT.  -EE     Row Name 02/04/22 0845          Relationship/Environment    Lives With spouse  -EE     Family Caregiver if Needed spouse  -EE     Row Name 02/04/22 0845          Resource/Environmental Concerns    Current Living Arrangements home/apartment/condo  -EE     Row Name 02/04/22 0845          General Information    Existing Precautions/Restrictions fall  -EE     Row Name 02/04/22 0845          Previous Level of Function/Home Environm    Household Ambulation, Premorbid Functional Level independent  -EE     Community Ambulation, Premorbid Functional Level independent  -EE     Activity/Exercise/Self-Care Comment very active prior, enjoyed hiking  -EE     Row Name 02/04/22 0845          Home Main Entrance    Number of Stairs, Main Entrance none  -EE     Row Name 02/04/22 0845          Home Use of Assistive/Adaptive Equipment    Equipment Currently Used at Home none  -EE     Row Name 02/04/22 0845          Sensory    Additional Documentation Sensory Assessment (Somatosensory) (Group)  -EE     Row Name 02/04/22 0845          Vision Assessment/Intervention    Visual Impairment/Limitations WFL  -EE     Row Name 02/04/22 0845          Sensory Assessment (Somatosensory)    Sensory Assessment (Somatosensory) bilateral LE; LE sensation intact  -EE     Bilateral LE Sensory Assessment general sensation; light touch awareness; light touch localization;  intact  -EE     Row Name 02/04/22 0845          Cognition/Psychosocial    Affect/Mental Status (Cognitive) WFL  -EE     Orientation Status (Cognition) oriented x 3  -EE     Follows Commands (Cognition) follows one-step commands; verbal cues/prompting required  -EE     Personal Safety Interventions fall prevention program maintained; gait belt; muscle strengthening facilitated; nonskid shoes/slippers when out of bed; supervised activity  -EE     Cognitive Function (Cognitive) WFL  -EE     Row Name 02/04/22 0845          Pain Scale: Numbers Pre/Post-Treatment    Pretreatment Pain Rating 7/10  -EE     Posttreatment Pain Rating 9/10  -EE     Pain Location - Side Right  -EE     Pain Location - Orientation incisional  -EE     Pain Location abdomen  -EE     Pain Intervention(s) Repositioned; Medication (See MAR); Rest  -EE     Row Name 02/04/22 0845          Range of Motion Comprehensive    General Range of Motion bilateral lower extremity ROM WFL  -EE     Row Name 02/04/22 0845          Strength Comprehensive (MMT)    General Manual Muscle Testing (MMT) Assessment lower extremity strength deficits identified  -EE     Comment, General Manual Muscle Testing (MMT) Assessment R LE grossly 4-/5, L LE 3+/5; pt reports L LE has been weaker side historically  -EE     Row Name 02/04/22 0845          Bed Mobility    Rolling Left [place] (Bed Mobility) standby assist  -EE     Rolling Right [place] (Bed Mobility) standby assist  -EE     Supine-Sit [place] (Bed Mobility) standby assist; contact guard  -EE     Sit-Supine [place] (Bed Mobility) standby assist  -EE     Assistive Device (Bed Mobility) bed rails; head of bed elevated  -EE     Row Name 02/04/22 0845          Transfer Assessment/Treatment    Transfers sit-stand transfer; stand-sit transfer; bed-chair transfer  -EE     Row Name 02/04/22 0845          Transfers    Bed-Chair [place] (Transfers) contact guard; verbal cues  -EE     Assistive Device  (Bed-Chair Transfers) walker, front-wheeled  -EE     Sit-Stand [place] (Transfers) contact guard; verbal cues  -EE     Stand-Sit [place] (Transfers) contact guard; verbal cues  -EE     Row Name 02/04/22 0845          Sit-Stand Transfer    Assistive Device (Sit-Stand Transfers) walker, front-wheeled  -EE     Row Name 02/04/22 08          Stand-Sit Transfer    Assistive Device (Stand-Sit Transfers) walker, front-wheeled  -EE     Row Name 02/04/22 08          Gait/Stairs (Locomotion)    [place] Level (Gait) contact guard; verbal cues  -EE     Assistive Device (Gait) walker, front-wheeled  -EE     Distance in Feet (Gait) 75' x 1  -EE     Deviations/Abnormal Patterns (Gait) no decreased; stride length decreased  -EE     Bilateral Gait Deviations forward flexed posture; weight shift ability decreased; heel strike decreased  -EE     Comment (Gait/Stairs) Fatigue limiting ambulation distance.  -EE     Row Name 02/04/22 0845          Safety Issues, Functional Mobility    Impairments Affecting Function (Mobility) balance; endurance/activity tolerance; strength; pain; postural/trunk control  -EE     Row Name 02/04/22 0845          Balance    Static Sitting Balance WFL; unsupported; sitting, edge of bed  -EE     Static Standing Balance WFL; supported; standing  -EE     Dynamic Standing Balance mild impairment; supported; standing  -EE     Row Name 02/04/22 0845          Motor Skills    Therapeutic Exercise hip; knee; ankle  -EE     Row Name 02/04/22 0845          Hip (Therapeutic Exercise)    Hip (Therapeutic Exercise) AROM (active range of motion)  -EE     Hip AROM (Therapeutic Exercise) supine; bilateral; aBduction; aDduction; 10 repetitions  -EE     Row Name 02/04/22 0845          Knee (Therapeutic Exercise)    Knee (Therapeutic Exercise) AROM (active range of motion); isometric exercises  -EE     Knee AROM (Therapeutic Exercise) supine; bilateral; heel slides; SAQ (short arc quad); 10 repetitions   -EE     Knee Isometrics (Therapeutic Exercise) supine; bilateral; quad sets; 10 second hold  -EE     Row Name 02/04/22 0845          Ankle (Therapeutic Exercise)    Ankle (Therapeutic Exercise) AROM (active range of motion)  -EE     Ankle AROM (Therapeutic Exercise) supine; bilateral; dorsiflexion; plantarflexion; 10 repetitions  -EE     Row Name 02/04/22 0845          IRF PT Goals    Bed Mobility Goal Selection (PT-IRF) bed mobility, PT goal 1  -EE     Transfer Goal Selection (PT-IRF) transfers, PT goal 1; transfers, PT goal 2  -EE     Gait (Walking Locomotion) Goal Selection (PT-IRF) gait, PT goal 1  -EE     Stairs Goal Selection (PT-IRF) stairs, PT goal 1  -EE     Row Name 02/04/22 0845          Bed Mobility Goal 1 (PT-IRF)    Activity/Assistive Device (Bed Mobility Goal 1, PT-IRF) bed mobility activities, all  -EE     [place] Level (Bed Mobility Goal 1, PT-IRF) independent  -EE     Time Frame (Bed Mobility Goal 1, PT-IRF) 2 weeks; long-term goal (LTG)  -EE     Progress/Outcomes (Bed Mobility Goal 1, PT-IRF) goal ongoing  -EE     Row Name 02/04/22 0845          Transfer Goal 1 (PT-IRF)    Activity/Assistive Device (Transfer Goal 1, PT-IRF) sit-to-stand/stand-to-sit; bed-to-chair/chair-to-bed; walker, rolling  -EE     [place] Level (Transfer Goal 1, PT-IRF) modified independence  -EE     Time Frame (Transfer Goal 1, PT-IRF) 2 weeks; long-term goal (LTG)  -EE     Progress/Outcomes (Transfer Goal 1, PT-IRF) goal ongoing  -EE     Row Name 02/04/22 0845          Transfer Goal 2 (PT-IRF)    Activity/Assistive Device (Transfer Goal 2, PT-IRF) car transfer; walker, rolling  -EE     [place] Level (Transfer Goal 2, PT-IRF) supervision required  -EE     Time Frame (Transfer Goal 2, PT-IRF) 2 weeks; long-term goal (LTG)  -EE     Progress/Outcomes (Transfer Goal 2, PT-IRF) goal ongoing  -EE     Row Name 02/04/22 0845          Gait/Walking Locomotion Goal 1 (PT-IRF)    Activity/Assistive Device (Gait/Walking  Locomotion Goal 1, PT-IRF) gait (walking locomotion); walker, rolling  -EE     Gait/Walking Locomotion Distance Goal 1 (PT-IRF) 200'  -EE     [place] Level (Gait/Walking Locomotion Goal 1, PT-IRF) supervision required  -EE     Time Frame (Gait/Walking Locomotion Goal 1, PT-IRF) 2 weeks; long-term goal (LTG)  -EE     Progress/Outcomes (Gait/Walking Locomotion Goal 1, PT-IRF) goal ongoing  -EE     Row Name 02/04/22 0845          Stairs Goal 1 (PT-IRF)    Activity/Assistive Device (Stairs Goal 1, PT-IRF) ascending stairs; descending stairs; using handrail, left; using handrail, right  -EE     Number of Stairs (Stairs Goal 1, PT-IRF) 4  -EE     [place] Level (Stairs Goal 1, PT-IRF) contact guard assist  -EE     Time Frame (Stairs Goal 1, PT-IRF) 2 weeks; long-term goal (LTG)  -EE     Progress/Outcomes (Stairs Goal 1, PT-IRF) goal ongoing  -EE     Row Name 02/04/22 0845          Positioning and Restraints    Pre-Treatment Position in bed  -EE     Post Treatment Position bed  -EE     In Bed fowlers; call light within reach; encouraged to call for assist; exit alarm on; notified nsg  -EE     Row Name 02/04/22 0846          Therapy Assessment/Plan (PT)    Patient's Goals For Discharge return home; take care of myself at home  -EE     Row Name 02/04/22 0832          Therapy Assessment/Plan (PT)    Rehab Potential/Prognosis (PT) good, to achieve stated therapy goals  -EE     Frequency of Treatment (PT) 5 times per week; 60 minutes per session  -EE     Estimated Duration of Therapy (PT) 2 weeks  -EE     Problem List (PT) balance; mobility; strength; postural control; pain  -EE     Activity Limitations Related to Problem List (PT) unable to ambulate safely; unable to transfer safely  -EE     Comment, Therapy Assessment/Plan (PT) Pt is a 60 yo male who presents with immobility syndrome s/p ileostomy takedown, cholecystectomy, and incisional hernia repair on 12/7/21 and s/p exploratory laparotomy with end colostomy and  partial colon resection on 12/29/21. Prior to admission, pt was independent with all mobility and very active, often walking and hiking several miles at a time. Upon exam, pt demonstrates generalized LE weakness, impaired balance, decreased endurance, and post op pain limiting mobility. Pt currently requires assist x1 and use of walker for mobility and will benefit from ongoing PT to address impairments and increase independence with functional mobility.  -EE     Row Name 02/04/22 0845          Therapy Plan Review/Discharge Plan (PT)    Anticipated Equipment Needs at Discharge (PT Eval) front wheeled walker  -EE     Expected Discharge Disposition (PT Eval) home with home health care; home with outpatient services  -EE           User Key  (r) = Recorded By, (t) = Taken By, (c) = Cosigned By    Initials Name Provider Type    EE Wendy Ojeda, PT Physical Therapist              Wound 12/07/21 0757 Right abdomen Incision (Active)   Dressing Appearance dry; intact 02/03/22 2008   Closure [place] 02/04/22 0847   Base dressing in place, unable to visualize 02/04/22 0847   Periwound intact; dry 02/04/22 0847   Periwound Temperature warm 02/04/22 0847   Periwound Skin Turgor soft 02/04/22 0847       Wound 12/25/21 0809 abdomen Other (comment) (Active)   Dressing Appearance dry; intact 02/03/22 2008   Closure [place] 02/04/22 0847   Base dressing in place, unable to visualize 02/04/22 0847   Periwound intact; dry 02/04/22 0847   Periwound Temperature warm 02/04/22 0847   Periwound Skin Turgor soft 02/04/22 0847   Periwound Care dry periwound area maintained 02/03/22 2008       Wound 12/29/21 1518 midline abdomen Incision (Active)   Dressing Appearance dry; intact 02/03/22 2008   Closure [place] 02/04/22 0847   Base dressing in place, unable to visualize 02/04/22 0847   Periwound intact 02/04/22 0847   Periwound Temperature warm 02/04/22 0847   Periwound Skin Turgor soft 02/04/22 0847   Periwound Care dry periwound area maintained 02/03/22  2008     Physical Therapy Education                 Title: PT OT SLP Therapies (In Progress)     Topic: Physical Therapy (Done)     Point: Mobility training (Done)     Learning Progress Summary           Patient Acceptance, E,TB, VU,NR by EE at 2/4/2022 1408                   Point: Home exercise program (Done)     Learning Progress Summary           Patient Acceptance, E,TB, VU,NR by EE at 2/4/2022 1408                   Point: Body mechanics (Done)     Learning Progress Summary           Patient Acceptance, E,TB, VU,NR by EE at 2/4/2022 1408                   Point: Precautions (Done)     Learning Progress Summary           Patient Acceptance, E,TB, VU,NR by EE at 2/4/2022 1408                               User Key     Initials Effective Dates Name Provider Type Discipline    EE 06/16/21 -  Wendy Ojeda, SALLY Physical Therapist PT                PT Recommendation and Plan    Planned Therapy Interventions (PT): balance training, bed mobility training, gait training, home exercise program, patient/family education, ROM (range of motion), postural re-education, stair training, strengthening, stretching, transfer training  Frequency of Treatment (PT): 5 times per week, 60 minutes per session  Anticipated Equipment Needs at Discharge (PT Eval): front wheeled walker                  Time Calculation:      PT Charges     Row Name 02/04/22 1409 02/04/22 1408          Time Calculation    Start Time 1230  -EE 0830  -EE     Stop Time 1300  -EE 0900  -EE     Time Calculation (min) 30 min  -EE 30 min  -EE     PT Received On 02/04/22  -EE 02/04/22  -EE     PT - Next Appointment 02/05/22  -EE 02/04/22  -EE     PT Goal Re-Cert Due Date -- 02/11/22  -EE            Time Calculation- PT    Total Timed Code Minutes- PT 30 minute(s)  -EE 15 minute(s)  -EE           User Key  (r) = Recorded By, (t) = Taken By, (c) = Cosigned By    Initials Name Provider Type    Wendy Holcomb PT Physical Therapist                Therapy Charges for  Today     Code Description Service Date Service Provider Modifiers Qty    90820873578  PT EVAL MOD COMPLEXITY 2 2/4/2022 Wendy Ojeda, PT GP 1    [id number]  PT THERAPEUTIC ACT EA 15 MIN 2/4/2022 Wendy Ojeda, PT GP 1    [id number]  PT THER PROC EA 15 MIN 2/4/2022 Wendy Ojeda, PT GP 1    [id number]  GAIT TRAINING EA 15 MIN 2/4/2022 Wendy Ojeda, PT GP 1              Patient was intermittently wearing a face mask during this therapy encounter. Therapist used appropriate personal protective equipment including mask and gloves.  Mask used was standard procedure mask. Appropriate PPE was worn during the entire therapy session. Hand hygiene was completed before and after therapy session. Patient is not in enhanced droplet precautions.         Wendy Ojeda PT  2/4/2022

## 2022-02-04 NOTE — PROGRESS NOTES
Inpatient Rehabilitation Plan of Care Note    Plan of Care  Care Plan Reviewed - No updates at this time.    Psychosocial    [RN] Coping/Adjustment(Active)  Current Status(02/03/2022): Pt at risk for coping issues  Weekly Goal(02/10/2022): Pt will make staff aware of concerns  Discharge Goal: NO coping problems        Safety    [RN] Potential for Injury(Active)  Current Status(02/03/2022): Pt is at risk for falls.  Weekly Goal(02/10/2022): Will use call bell 100% of the time  Discharge Goal: No injury        Sphincter Control    [RN] Bladder Management(Active)  Current Status(02/03/2022): Pt cont of urine upon admission  Weekly Goal(02/10/2022): Remain cont  Discharge Goal: 100% cont of urine    [RN] Bowel Management(Active)  Current Status(02/03/2022): Pt has an ostomy in which skin RNs change  Weekly Goal(02/10/2022): Ostomy is fuctional  Discharge Goal: Pt aware of how to manage ostomy    Signed by: Lily Good RN

## 2022-02-05 LAB
ALBUMIN SERPL-MCNC: 3 G/DL (ref 3.5–5.2)
ANION GAP SERPL CALCULATED.3IONS-SCNC: 8.7 MMOL/L (ref 5–15)
BUN SERPL-MCNC: 16 MG/DL (ref 8–23)
BUN/CREAT SERPL: 18.2 (ref 7–25)
CALCIUM SPEC-SCNC: 9 MG/DL (ref 8.6–10.5)
CHLORIDE SERPL-SCNC: 90 MMOL/L (ref 98–107)
CO2 SERPL-SCNC: 27.3 MMOL/L (ref 22–29)
CORTIS SERPL-MCNC: 13.12 MCG/DL
CREAT SERPL-MCNC: 0.88 MG/DL (ref 0.76–1.27)
GFR SERPL CREATININE-BSD FRML MDRD: 88 ML/MIN/1.73
GLUCOSE SERPL-MCNC: 104 MG/DL (ref 65–99)
OSMOLALITY SERPL: 273 MOSM/KG (ref 280–301)
PHOSPHATE SERPL-MCNC: 3.7 MG/DL (ref 2.5–4.5)
POTASSIUM SERPL-SCNC: 4.1 MMOL/L (ref 3.5–5.2)
SODIUM SERPL-SCNC: 126 MMOL/L (ref 136–145)
URATE SERPL-MCNC: 6.4 MG/DL (ref 3.4–7)

## 2022-02-05 PROCEDURE — 92610 EVALUATE SWALLOWING FUNCTION: CPT

## 2022-02-05 PROCEDURE — 84550 ASSAY OF BLOOD/URIC ACID: CPT | Performed by: INTERNAL MEDICINE

## 2022-02-05 PROCEDURE — 97116 GAIT TRAINING THERAPY: CPT

## 2022-02-05 PROCEDURE — 97129 THER IVNTJ 1ST 15 MIN: CPT

## 2022-02-05 PROCEDURE — 97535 SELF CARE MNGMENT TRAINING: CPT

## 2022-02-05 PROCEDURE — 83930 ASSAY OF BLOOD OSMOLALITY: CPT | Performed by: INTERNAL MEDICINE

## 2022-02-05 PROCEDURE — 25010000002 ENOXAPARIN PER 10 MG: Performed by: PHYSICAL MEDICINE & REHABILITATION

## 2022-02-05 PROCEDURE — 80069 RENAL FUNCTION PANEL: CPT | Performed by: PHYSICAL MEDICINE & REHABILITATION

## 2022-02-05 PROCEDURE — 97110 THERAPEUTIC EXERCISES: CPT

## 2022-02-05 PROCEDURE — 82533 TOTAL CORTISOL: CPT | Performed by: INTERNAL MEDICINE

## 2022-02-05 RX ORDER — GLYCOPYRROLATE 1 MG/1
1 TABLET ORAL 2 TIMES DAILY
Status: DISCONTINUED | OUTPATIENT
Start: 2022-02-05 | End: 2022-02-15 | Stop reason: HOSPADM

## 2022-02-05 RX ORDER — AMILORIDE HYDROCHLORIDE 5 MG/1
5 TABLET ORAL DAILY
Status: DISCONTINUED | OUTPATIENT
Start: 2022-02-05 | End: 2022-02-06

## 2022-02-05 RX ADMIN — Medication 15 G: at 09:14

## 2022-02-05 RX ADMIN — Medication 3 MG: at 01:06

## 2022-02-05 RX ADMIN — LOPERAMIDE HYDROCHLORIDE 2 MG: 2 CAPSULE ORAL at 19:57

## 2022-02-05 RX ADMIN — AMILORIDE HYDROCLORIDE 5 MG: 5 TABLET ORAL at 11:40

## 2022-02-05 RX ADMIN — GLYCOPYRROLATE 1 MG: 1 TABLET ORAL at 19:57

## 2022-02-05 RX ADMIN — LOPERAMIDE HYDROCHLORIDE 2 MG: 2 CAPSULE ORAL at 17:10

## 2022-02-05 RX ADMIN — HYDROCODONE BITARTRATE AND ACETAMINOPHEN 2 TABLET: 5; 325 TABLET ORAL at 18:46

## 2022-02-05 RX ADMIN — LOPERAMIDE HYDROCHLORIDE 2 MG: 2 CAPSULE ORAL at 11:41

## 2022-02-05 RX ADMIN — GLYCOPYRROLATE 1 MG: 1 TABLET ORAL at 09:14

## 2022-02-05 RX ADMIN — ENOXAPARIN SODIUM 40 MG: 100 INJECTION SUBCUTANEOUS at 19:58

## 2022-02-05 RX ADMIN — LOPERAMIDE HYDROCHLORIDE 2 MG: 2 CAPSULE ORAL at 09:13

## 2022-02-05 RX ADMIN — Medication 3 MG: at 19:57

## 2022-02-05 NOTE — THERAPY EVALUATION
Inpatient Rehabilitation - Speech Language Pathology   Swallow Initial Evaluation [facility]     Patient Name: Arnie Calvo  : 1960  MRN: [id number]  Today's Date: 2022               Admit Date: 2/3/2022    Visit Dx:   No diagnosis found.  Patient Active Problem List   Diagnosis   • Cardiomyopathy (HCC)   • Paroxysmal VT (HCC)   • Palpitations   • PVC's (premature ventricular contractions)   • Exacerbation of Crohn's disease of small intestine (HCC)   • Adjustment disorder with depressed mood   • Ankylosis of spine   • Crohn's disease with complication (HCC)   • Diabetes mellitus (HCC)   • Essential hypertension   • External hemorrhoids   • Genital herpes simplex   • Gout   • Insomnia   • Iron deficiency   • Prostate mass   • Recurrent aphthous ulcer   • Asteatosis cutis   • History of pneumonia   • Abnormal CXR (chest x-ray)   • RUQ abdominal pain   • Crohn's disease of small intestine with other complication (HCC)   • Right lower quadrant abdominal pain   • Enteritis   • Mass-like inflammation at terminal ileum   • Crohn's disease (HCC)   • Ileostomy in place (HCC)   • Paroxysmal ventricular tachycardia (HCC)   • Chronic systolic heart failure (HCC)   • Cardiomyopathy, nonischemic (HCC)   • Orthostasis   • Iron deficiency anemia   • Orthostatic hypotension   • Crohn's disease of colon with complication (HCC)   • Dehisced intestinal anastomosis   • History of creation of ostomy (HCC)   • Hypokalemia   • Hypotension, chronic   • Malnutrition of moderate degree (HCC)   • S/P colectomy   • Fatty liver disease, nonalcoholic   • Cholecystitis   • Debility     Past Medical History:   Diagnosis Date   • Acute pain of left hip    • Adjustment disorder with depressed mood    • Anesthesia complication     SPINAL [id number]-[id number]-[id number] LOWER SPINE   • Ankylosing spondylitis of cervical region (HCC)    • Ankylosis of spine    • Arthritis    • Asthma    • Cardiomyopathy (HCC)     sees Dr. Reyes; [facility]/ (-)  cath, EF 25-35%; has ICD   • CHF (congestive heart failure) ([facility])    • Cholecystitis    • Crohn's disease (Formerly [facility])    • Depression    • Diabetes mellitus (Formerly [facility])     Diet controlled.   • Dysthymic disorder 2012   • Dysuria    • Essential hypertension    • External hemorrhoids    • Genital herpes    • Gout    • Herpes genitalia    • History of MRSA infection 2000?    FINGERTIP-TX WITH ANTIBIOTICS-[facility] CARE-NO CURRENT OPEN WOUND OR TREATMENT 12/3/21   • Ileostomy in place ([facility]ly [facility])    • Insomnia    • Iron deficiency    • Paroxysmal ventricular tachycardia ([facility]ly [facility])    • PONV (postoperative nausea and vomiting)    • Presence of combination internal cardiac defibrillator (ICD) and pacemaker    • Prostate nodule    • Recurrent canker sores    • Thoracic back pain    • Urinary hesitancy    • Xerosis cutis      Past Surgical History:   Procedure Laterality Date   • ABDOMINAL SURGERY     • CARDIAC CATHETERIZATION     • CARDIAC DEFIBRILLATOR PLACEMENT      REPLACEMENT-Had Jude lead that was fractured.  Lead was tied off.  New lead and new device implanted.   • CARDIAC ELECTROPHYSIOLOGY PROCEDURE N/A 1/3/2019    Procedure: ICD DC generator change  [employer];  Surgeon: Zechariah Randolph MD;  Location: [facility] CATH INVASIVE LOCATION;  Service: Cardiology   • CHOLECYSTECTOMY N/A 12/7/2021    Procedure: CHOLECYSTECTOMY;  Surgeon: Jeovany Mott MD;  Location: [facility] MAIN OR;  Service: General;  Laterality: N/A;   • COLON RESECTION N/A 12/29/2021    Procedure: PARTIAL COLECTOMY WITH OSTOMY;  Surgeon: Jeovany Mott MD;  Location: [facility] MAIN OR;  Service: General;  Laterality: N/A;   • COLON RESECTION WITH ILEOSTOMY N/A 2018   • COLONOSCOPY  04/03/2015    abnormal cecum, three polypoid masses, pre cancerous vs crohns, IH, tics, hyperplastic polyp   • COLONOSCOPY N/A 3/17/2017    polypoid masses, tics, IH, polyp   • EXPLORATORY LAPAROTOMY W/ BOWEL RESECTION  07/2018    open resection of ileum with creation of end ileostomy   •  ILEOSTOMY CLOSURE  07/2018   • ILEOSTOMY CLOSURE N/A 12/7/2021    Procedure: ILEOSTOMY TAKEDOWN WITH RESECTION, PARASTOMAL HERNIA REPAIR;  Surgeon: Jeovany Mott MD;  Location: [facility];  Service: General;  Laterality: N/A;   • PACEMAKER IMPLANTATION         SLP Recommendation and Plan  SLP Swallowing Diagnosis: swallow WFL, esophageal dysphagia, other (see comments) (possible esophageal dysphagia) (02/05/22 1156)  SLP Diet Recommendation: regular textures, thin liquids (02/05/22 1156)  Recommended Precautions and Strategies: upright posture during/after eating, small bites of food and sips of liquid, reflux precautions, alternate between small bites of food and sips of liquid (02/05/22 1156)  SLP Rec. for Method of Medication Administration: meds whole, meds crushed, as tolerated (02/05/22 1156)     Monitor for Signs of Aspiration: yes, notify SLP if any concerns (02/05/22 1156)           Rehab Potential/Prognosis, Swallowing: good, to achieve stated therapy goals (02/05/22 1156)  Therapy Frequency (Swallow): PRN (02/05/22 1156)  Predicted Duration Therapy Intervention (Days): until discharge (02/05/22 1156)  Demonstrates Need for Referral to Another Service: gastroenterology (02/05/22 1156)                               Plan of Care Reviewed With: patient      SWALLOW EVALUATION (last 72 hours)     SLP Adult Swallow Evaluation     Row Name 02/05/22 1156                   Rehab Evaluation    Document Type evaluation  -CP        Subjective Information complains of; pain  -CP        Patient Observations alert; cooperative  -CP        Patient Effort good  -CP        Symptoms Noted During/After Treatment none  -CP                  General Information    Patient Profile Reviewed yes  -CP        Pertinent History Of Current Problem See hx from SLE  -CP        Current Method of Nutrition regular textures; thin liquids  -CP        Precautions/Limitations, Vision WFL  -CP        Precautions/Limitations, Hearing  WFL  -CP        Prior Level of Function-Communication WFL  -CP        Prior Level of Function-Swallowing no diet consistency restrictions  -CP        Plans/Goals Discussed with patient; agreed upon  -CP        Barriers to Rehab medically complex  -CP        Patient's Goals for Discharge return to all previous roles/activities  -CP                  Pain Scale: Numbers Pre/Post-Treatment    Pretreatment Pain Rating 7/10  -CP        Posttreatment Pain Rating 7/10  -CP        Pain Location abdomen; epigastrium  -CP        Pain Intervention(s) Emotional support  -CP                  Oral Motor Structure and Function    Oral Lesions or Structural Abnormalities and/or variants lingual ulcerations  -CP        Dentition Assessment missing teeth  -CP        Secretion Management WNL/WFL  -CP        Mucosal Quality dry  -CP        Volitional Swallow weak; other (see comments)  d/t abd pain with effort  -CP                  Oral Musculature and Cranial Nerve Assessment    Oral Motor General Assessment WFL  -CP        Oral Motor, Comment Cranial nerve exam unremarkable  -CP                  General Eating/Swallowing Observations    Respiratory Support Currently in Use room air  -CP        Eating/Swallowing Skills self-fed  -CP        Positioning During Eating upright in chair  -CP        Utensils Used spoon; cup; straw  -CP        Consistencies Trialed thin liquids; pureed; regular textures  -CP                  Clinical Swallow Eval    Clinical Swallow Evaluation Summary Clinical swallow eval completed. Pt has reportedly had issues with SOA and productive cough with discolored sputum. He reports this often occurs at night when supine. EAT-10 was administered, with score of 12/40, indicating possible swallowing impairment. Upon probing into responses, however, most items he scored highly were due to either his lifelong difficulty swallowing pills, issues with early satiety and stomach discomfort, and/or xerostomia. No clinical  "patterns of oropharyngeal dysphagia were observed. He did appear to have increased tension/accessory muscle activation during swallowing, followed by grimacing. Both of these things he attributed to stomach discomfort or \"fullness\" when eating or drinking rather than pain or discomfort with swallowing itself. The patient reports episodes of sudden coughing after meals, as well as burping and episodic regurgitation \"out of nowhere.\" Given history of chronic GI issues and recent extensive bowel surgeries, strongly suspect pt has significant reflux, which could be leading to some aspiration. The patient was educated on reflux precautions, with particular emphasis on small, frequent meals and remaining upright at least 2 hours after eating. MD to advise on need for further assessment of reflux and/or medical management. OK to continue current diet of regular/thin liquids. Meds whole as tolerated, or crushed as needed per pt preference. Frequent drinking during meals to compensate for xerostomia. Will continue to monitor need for VFSS.  -CP                  SLP Evaluation Clinical Impression    SLP Swallowing Diagnosis swallow WFL; esophageal dysphagia; other (see comments)  possible esophageal dysphagia  -CP        Functional Impact risk of aspiration/pneumonia; risk of malnutrition  -CP        Rehab Potential/Prognosis, Swallowing good, to achieve stated therapy goals  -CP                  Recommendations    Therapy Frequency (Swallow) PRN  -CP        Predicted Duration Therapy Intervention (Days) until discharge  -CP        SLP Diet Recommendation regular textures; thin liquids  -CP        Recommended Precautions and Strategies upright posture during/after eating; small bites of food and sips of liquid; reflux precautions; alternate between small bites of food and sips of liquid  -CP        Oral Care Recommendations Oral Care before breakfast, after meals and PRN; Toothbrush  -CP        SLP Rec. for Method of " Medication Administration meds whole; meds crushed; as tolerated  -CP        Monitor for Signs of Aspiration yes; notify SLP if any concerns  -CP        Demonstrates Need for Referral to Another Service gastroenterology  -CP                  Oral Nutrition/Hydration Goal 1 (SLP)    Oral Nutrition/Hydration Goal 1, SLP Pt will state reflux precautions without need for cues  -CP        Time Frame (Oral Nutrition/Hydration Goal 1, SLP) by discharge  -CP              User Key  (r) = Recorded By, (t) = Taken By, (c) = Cosigned By    Initials Name Effective Dates    CP Priyanka Yeung MS CCC-SLP 06/16/21 -                 EDUCATION  The patient has been educated in the following areas:   Dysphagia (Swallowing Impairment).        SLP GOALS     Row Name 02/05/22 1200 02/05/22 1156 02/04/22 1400       Oral Nutrition/Hydration Goal 1 (SLP)    Oral Nutrition/Hydration Goal 1, SLP -- Pt will state reflux precautions without need for cues  -CP --    Time Frame (Oral Nutrition/Hydration Goal 1, SLP) -- by discharge  -CP --       Attention Goal 1 (SLP)    Improve Attention by Goal 1 (SLP) -- -- attending to task; 100%; independently (over 90% accuracy)  -SR    Time Frame (Attention Goal 1, SLP) -- -- by discharge  -SR    Progress (Attention Goal 1, SLP) 60%; independently (over 90% accuracy); 100%; with moderate cues (50-74%)  -CP -- --    Progress/Outcomes (Attention Goal 1, SLP) goal ongoing  -CP -- --    Comment (Attention Goal 1, SLP) alternating attn  -CP -- --       Organizational Skills Goal 1 (SLP)    Improve Thought Organization Through Goal 1 (SLP) -- -- completing mental manipulation task; completing a verbal sequencing task; 80%; with minimal cues (75-90%)  -SR    Time Frame (Thought Organization Skills Goal 1, SLP) -- -- short term goal (STG)  -SR       Functional Math Skills Goal 1 (SLP)    Improve Functional Math Skills Through Goal 1 (SLP) -- -- complete simple math problems; complete word problems involving  "time; complete checkbook task; 80%; with minimal cues (75-90%)  -SR    Time Frame (Functional Math Skills Goal 1, SLP) -- -- short term goal (STG)  -SR       Executive Functional Skills Goal 1 (SLP)    Improve Executive Function Skills Goal 1 (SLP) -- -- organization/planning activity; time management activity; home management activity; 80%; with minimal cues (75-90%)  -SR    Time Frame (Executive Function Skills Goal 1, SLP) -- -- short term goal (STG)  -SR    Progress (Executive Function Skills Goal 1, SLP) 50%; independently (over 90% accuracy); 100%; with minimal cues (75-90%)  -CP -- --    Progress/Outcomes (Executive Function Skills Goal 1, SLP) goal ongoing  -CP -- --    Comment (Executive Function Skills Goal 1, SLP) understanding written schedule  -CP -- --       Executive Functional Skills Goal 2 (SLP)    Improve Executive Function Skills Goal 2 (SLP) -- -- organization/planning activity; time management activity; home management activity; 80%; with minimal cues (75-90%)  -SR    Time Frame (Executive Function Skills Goal 2, SLP) -- -- short term goal (STG)  -SR    Comment (Executive Function Skills Goal 2, SLP) During tx, the patient became emotional over deficits, stating they make him feel embarrassed. He also got tearful and indicated that he felt \"this whole situation is my fault. I did this to myself.\" SLP listened attentively to patient's thoughts/feelings, validated his emotional response (for which he was very apologetic), and attempted to comfort and encourage him, ensuring him that this is not his fault, though he may feel that way. Psychological support was recommended, which he did not feel he needed, though he expressed interest in talking with a . Request to be sent.  -CP -- --          User Key  (r) = Recorded By, (t) = Taken By, (c) = Cosigned By    Initials Name Provider Type    Priyanka Thorpe, MS CCC-SLP Speech and Language Pathologist    Kristie Gonzalez, SLP Speech and " Language Pathologist              SLP Outcome Measures (last 72 hours)     SLP Outcome Measures     Row Name 02/05/22 1200 02/04/22 1500          SLP Outcome Measures    Outcome Measure Used? Adult NOMS  -CP Adult NOMS  -SR            Adult FCM Scores    FCM Chosen Swallowing  -CP Attention  -SR     Swallowing FCM Score 6  -CP --     Attention FCM Score -- 5  -SR           User Key  (r) = Recorded By, (t) = Taken By, (c) = Cosigned By    Initials Name Effective Dates    CP Priyanka Yeung MS CCC-SLP 06/16/21 -     SR Kristie Luis SLP 01/12/22 -                  Time Calculation:    Time Calculation- SLP     Row Name 02/05/22 1223             Time Calculation- SLP    SLP Start Time 1030  -CP      SLP Stop Time 1130  -CP      SLP Time Calculation (min) 60 min  -CP              Timed Charges    -Azqlehjwd Skills Development Minutes  15  -CP              Untimed Charges    SLP Eval/Re-eval  ST Eval Oral Pharyng Swallow - 82017  -CP      72553-CG Eval Oral Pharyng Swallow Minutes 45  -CP              Total Minutes    Timed Charges Total Minutes 15  -CP      Untimed Charges Total Minutes 45  -CP       Total Minutes 60  -CP            User Key  (r) = Recorded By, (t) = Taken By, (c) = Cosigned By    Initials Name Provider Type    Priyanka Thorpe MS CCC-SLP Speech and Language Pathologist                Therapy Charges for Today     Code Description Service Date Service Provider Modifiers Qty    58353680619 HC ST DEV OF COGN SKILLS INITIAL 15 MIN 2/5/2022 Priyanka Yeung MS CCC-SLP  1    [id number] HC ST EVAL ORAL PHARYNG SWALLOW 3 2/5/2022 Priyanka Yeung MS CCC-STEFANY GN 1        Patient was not wearing a face mask during this therapy encounter. Therapist used appropriate personal protective equipment including mask, eye protection and gloves.  Mask used was standard procedure mask. Appropriate PPE was worn during the entire therapy session. Hand hygiene was completed before and after therapy  session. Patient is not in enhanced droplet precautions.            Priyanka Yeung, MS CCC-SLP  2/5/2022

## 2022-02-05 NOTE — THERAPY TREATMENT NOTE
Inpatient Rehabilitation - Occupational Therapy Treatment Note    [facility]     Patient Name: Arnie Calvo  : 1960  MRN: [id number]    Today's Date: 2022                 Admit Date: 2/3/2022       No diagnosis found.    Patient Active Problem List   Diagnosis   • Cardiomyopathy (HCC)   • Paroxysmal VT (HCC)   • Palpitations   • PVC's (premature ventricular contractions)   • Exacerbation of Crohn's disease of small intestine (HCC)   • Adjustment disorder with depressed mood   • Ankylosis of spine   • Crohn's disease with complication (HCC)   • Diabetes mellitus (HCC)   • Essential hypertension   • External hemorrhoids   • Genital herpes simplex   • Gout   • Insomnia   • Iron deficiency   • Prostate mass   • Recurrent aphthous ulcer   • Asteatosis cutis   • History of pneumonia   • Abnormal CXR (chest x-ray)   • RUQ abdominal pain   • Crohn's disease of small intestine with other complication (HCC)   • Right lower quadrant abdominal pain   • Enteritis   • Mass-like inflammation at terminal ileum   • Crohn's disease (HCC)   • Ileostomy in place (HCC)   • Paroxysmal ventricular tachycardia (HCC)   • Chronic systolic heart failure (HCC)   • Cardiomyopathy, nonischemic (HCC)   • Orthostasis   • Iron deficiency anemia   • Orthostatic hypotension   • Crohn's disease of colon with complication (HCC)   • Dehisced intestinal anastomosis   • History of creation of ostomy (HCC)   • Hypokalemia   • Hypotension, chronic   • Malnutrition of moderate degree (HCC)   • S/P colectomy   • Fatty liver disease, nonalcoholic   • Cholecystitis   • Debility       Past Medical History:   Diagnosis Date   • Acute pain of left hip    • Adjustment disorder with depressed mood    • Anesthesia complication     SPINAL [id number] LOWER SPINE   • Ankylosing spondylitis of cervical region (HCC)    • Ankylosis of spine    • Arthritis    • Asthma    • Cardiomyopathy (HCC)     sees Dr. Reyes; NICM/ (-) cath, EF 25-35%;  has ICD   • CHF (congestive heart failure) ([facility])    • Cholecystitis    • Crohn's disease (HCC)    • Depression    • Diabetes mellitus ([facility])     Diet controlled.   • Dysthymic disorder 2012   • Dysuria    • Essential hypertension    • External hemorrhoids    • Genital herpes    • Gout    • Herpes genitalia    • History of MRSA infection 2000?    FINGERTIP-TX WITH ANTIBIOTICS-[facility] CARE-NO CURRENT OPEN WOUND OR TREATMENT 12/3/21   • Ileostomy in place ([facility])    • Insomnia    • Iron deficiency    • Paroxysmal ventricular tachycardia ([facility])    • PONV (postoperative nausea and vomiting)    • Presence of combination internal cardiac defibrillator (ICD) and pacemaker    • Prostate nodule    • Recurrent canker sores    • Thoracic back pain    • Urinary hesitancy    • Xerosis cutis        Past Surgical History:   Procedure Laterality Date   • ABDOMINAL SURGERY     • CARDIAC CATHETERIZATION     • CARDIAC DEFIBRILLATOR PLACEMENT      REPLACEMENT-Had [place] lead that was fractured.  Lead was tied off.  New lead and new device implanted.   • CARDIAC ELECTROPHYSIOLOGY PROCEDURE N/A 1/3/2019    Procedure: ICD DC generator change  [employer];  Surgeon: Zechariah Randolph MD;  Location: [facility] CATH INVASIVE LOCATION;  Service: Cardiology   • CHOLECYSTECTOMY N/A 12/7/2021    Procedure: CHOLECYSTECTOMY;  Surgeon: Jeovany Mott MD;  Location: [facility] OR;  Service: General;  Laterality: N/A;   • COLON RESECTION N/A 12/29/2021    Procedure: PARTIAL COLECTOMY WITH OSTOMY;  Surgeon: Jeovany Mott MD;  Location: [facility] OR;  Service: General;  Laterality: N/A;   • COLON RESECTION WITH ILEOSTOMY N/A 2018   • COLONOSCOPY  04/03/2015    abnormal cecum, three polypoid masses, pre cancerous vs crohns, IH, tics, hyperplastic polyp   • COLONOSCOPY N/A 3/17/2017    polypoid masses, tics, IH, polyp   • EXPLORATORY LAPAROTOMY W/ BOWEL RESECTION  07/2018    open resection of ileum with creation of end ileostomy   • ILEOSTOMY  CLOSURE  07/2018   • ILEOSTOMY CLOSURE N/A 12/7/2021    Procedure: ILEOSTOMY TAKEDOWN WITH RESECTION, PARASTOMAL HERNIA REPAIR;  Surgeon: Jeovany Mott MD;  Location: [facility] OR;  Service: General;  Laterality: N/A;   • PACEMAKER IMPLANTATION               IRF OT ASSESSMENT FLOWSHEET (last 12 hours)     IRF OT Evaluation and Treatment     Row Name 02/05/22 1140          OT Time and Intention    Document Type daily treatment  -MW     Mode of Treatment individual therapy; occupational therapy  -MW     Patient Effort good  -MW     Symptoms Noted During/After Treatment increased pain  stomach pain  -MW     Row Name 02/05/22 1140          General Information    Patient Profile Reviewed yes  -MW     Patient/Family/Caregiver Comments/Observations pt supine in bed, NAD  -MW     Existing Precautions/Restrictions fall  ostmoy bag, no O2  -MW     Row Name 02/05/22 1140          Cognition/Psychosocial    Affect/Mental Status (Cognitive) WFL  -     Orientation Status (Cognition) oriented x 3  -MW     Follows Commands (Cognition) follows one-step commands; over 90% accuracy; verbal cues/prompting required  -MW     Personal Safety Interventions fall prevention program maintained; gait belt; nonskid shoes/slippers when out of bed; supervised activity  -MW     Cognitive Function (Cognitive) WFL  -MW     Row Name 02/05/22 1140          Pain Scale: Numbers Pre/Post-Treatment    Pretreatment Pain Rating 7/10  -MW     Posttreatment Pain Rating 7/10  -MW     Row Name 02/05/22 1140          Bed Mobility    Supine-Sit [place] (Bed Mobility) standby assist; contact guard  -     Row Name 02/05/22 1140          Transfer Assessment/Treatment    Transfers bed-chair transfer; sit-stand transfer  -     Row Name 02/05/22 1140          Transfers    Bed-Chair [place] (Transfers) contact guard; verbal cues  -     Assistive Device (Bed-Chair Transfers) walker, front-wheeled  -     Sit-Stand [place] (Transfers) set up;  contact guard  -     Row Name 02/05/22 1140          Sit-Stand Transfer    Assistive Device (Sit-Stand Transfers) wheelchair  -     Row Name 02/05/22 1140          Bathing    [place] (Bathing) bathing skills; set up; verbal cues; minimum assist (75% patient effort)  -     Position (Bathing) sink side; supported sitting; supported standing  -     Set-up Assistance (Bathing) obtain supplies; adjust water temperature  -     Comment (Bathing) --  pt able to demo figure 4 tech to assist in washing feet while sitting supported in w/c  -     Row Name 02/05/22 1140          Upper Body Dressing    [place] (Upper Body Dressing) upper body dressing skills; don; front opening garment; set up assistance; supervision  -     Position (Upper Body Dressing) supported sitting  -     Set-up Assistance (Upper Body Dressing) obtain clothing  -     Comment (Upper Body Dressing) 2 hospital gowns donned  -     Row Name 02/05/22 1140          Lower Body Dressing    [place] (Lower Body Dressing) doff; don; socks; minimum assist (75% patient effort)  -     Position (Lower Body Dressing) supported sitting  -     Comment (Lower Body Dressing) no pants here still  -     Row Name 02/05/22 1140          Grooming    [place] (Grooming) grooming skills; oral care regimen; wash face, hands; set up; standby assist  -     Position (Grooming) sink side; supported sitting  -     Row Name 02/05/22 1140          Toileting    Comment (Toileting) --  pt required nursing assist to empty ostomy bag this date seated EOB  -     Row Name 02/05/22 1140          Positioning and Restraints    Pre-Treatment Position in bed  -     Post Treatment Position wheelchair  -MW     In Wheelchair encouraged to call for assist; exit alarm on; call light within reach; sitting  -           User Key  (r) = Recorded By, (t) = Taken By, (c) = Cosigned By    Initials Name Effective Dates    ROX Rod  NEGIN Tolbert 08/20/21 -                  Occupational Therapy Education                 Title: PT OT SLP Therapies (Done)     Topic: Occupational Therapy (Done)     Point: ADL training (Done)     Description:   Instruct learner(s) on proper safety adaptation and remediation techniques during self care or transfers.   Instruct in proper use of assistive devices.              Learning Progress Summary           Patient Acceptance, E,TB,D, DU,VU by  at 2/4/2022 1455    Comment: ed pt on role of OT. benefit of therapy POC w. OT. ed on safety w. ADL and tsf. ed on UE ex.                   Point: Home exercise program (Done)     Description:   Instruct learner(s) on appropriate technique for monitoring, assisting and/or progressing therapeutic exercises/activities.              Learning Progress Summary           Patient Acceptance, E,TB,D, DU,VU by  at 2/4/2022 1455    Comment: ed pt on role of OT. benefit of therapy POC w. OT. ed on safety w. ADL and tsf. ed on UE ex.                   Point: Precautions (Done)     Description:   Instruct learner(s) on prescribed precautions during self-care and functional transfers.              Learning Progress Summary           Patient Acceptance, E,TB,D, DU,VU by  at 2/4/2022 1455    Comment: ed pt on role of OT. benefit of therapy POC w. OT. ed on safety w. ADL and tsf. ed on UE ex.                   Point: Body mechanics (Done)     Description:   Instruct learner(s) on proper positioning and spine alignment during self-care, functional mobility activities and/or exercises.              Learning Progress Summary           Patient Acceptance, E,TB,D, DU,VU by  at 2/4/2022 1455    Comment: ed pt on role of OT. benefit of therapy POC w. OT. ed on safety w. ADL and tsf. ed on UE ex.                               User Key     Initials Effective Dates Name Provider Type Discipline     06/16/21 -  Lyndsey Zeng OTHAILEY Occupational Therapist OT                    OT  Recommendation and Plan                         Time Calculation:      Time Calculation- OT     Row Name 02/05/22 1000             Time Calculation- OT    OT Start Time 1000  -MW      OT Stop Time 1030  -MW      OT Time Calculation (min) 30 min  -MW            User Key  (r) = Recorded By, (t) = Taken By, (c) = Cosigned By    Initials Name Provider Type    Didi Conte OT Occupational Therapist              Therapy Charges for Today     Code Description Service Date Service Provider Modifiers Qty    [id number] HC OT SELF CARE/MGMT/TRAIN EA 15 MIN 2/5/2022 Didi Rod OT GO 2                   Didi Rod OT  2/5/2022

## 2022-02-05 NOTE — PLAN OF CARE
"Goal Outcome Evaluation:  Plan of Care Reviewed With: patient             Problem: Rehabilitation (IRF) Plan of Care  Goal: Plan of Care Review  Flowsheets (Taken 2/5/2022 1220)  Outcome Summary: Clinical swallow eval completed. Pt has reportedly had issues with SOA and productive cough with discolored sputum. He reports this often occurs at night when supine. EAT-10 was administered, with score of 12/40, indicating possible swallowing impairment. Upon probing into responses, however, most items he scored highly were due to either his lifelong difficulty swallowing pills, issues with early satiety and stomach discomfort, and/or xerostomia. No clinical patterns of oropharyngeal dysphagia were observed. He did appear to have increased tension/accessory muscle activation during swallowing, followed by grimacing. Both of these things he attributed to stomach discomfort or \"fullness\" when eating or drinking rather than pain or discomfort with swallowing itself. The patient reports episodes of sudden coughing after meals, as well as burping and episodic regurgitation \"out of nowhere.\"   Given history of chronic GI issues and recent extensive bowel surgeries, strongly suspect pt has significant reflux, which could be leading to some aspiration. The patient was educated on reflux precautions, with particular emphasis on small, frequent meals and remaining upright at least 2 hours after eating.   MD to advise on need for further assessment of reflux and/or medical management- GI consult recommended.   OK to continue current diet of regular/thin liquids.   Meds whole as tolerated, or crushed as needed per pt preference.   Frequent drinking during meals to compensate for xerostomia.   Will continue to monitor need for VFSS and educate on reflux precautions.  Plan of Care Reviewed With: patient     " English

## 2022-02-05 NOTE — THERAPY TREATMENT NOTE
Inpatient Rehabilitation - Occupational Therapy Treatment Note    [facility]     Patient Name: Arnie Calvo  : 1960  MRN: [id number]    Today's Date: 2022                 Admit Date: 2/3/2022       No diagnosis found.    Patient Active Problem List   Diagnosis   • Cardiomyopathy (HCC)   • Paroxysmal VT (HCC)   • Palpitations   • PVC's (premature ventricular contractions)   • Exacerbation of Crohn's disease of small intestine (HCC)   • Adjustment disorder with depressed mood   • Ankylosis of spine   • Crohn's disease with complication (HCC)   • Diabetes mellitus (HCC)   • Essential hypertension   • External hemorrhoids   • Genital herpes simplex   • Gout   • Insomnia   • Iron deficiency   • Prostate mass   • Recurrent aphthous ulcer   • Asteatosis cutis   • History of pneumonia   • Abnormal CXR (chest x-ray)   • RUQ abdominal pain   • Crohn's disease of small intestine with other complication (HCC)   • Right lower quadrant abdominal pain   • Enteritis   • Mass-like inflammation at terminal ileum   • Crohn's disease (HCC)   • Ileostomy in place (HCC)   • Paroxysmal ventricular tachycardia (HCC)   • Chronic systolic heart failure (HCC)   • Cardiomyopathy, nonischemic (HCC)   • Orthostasis   • Iron deficiency anemia   • Orthostatic hypotension   • Crohn's disease of colon with complication (HCC)   • Dehisced intestinal anastomosis   • History of creation of ostomy (HCC)   • Hypokalemia   • Hypotension, chronic   • Malnutrition of moderate degree (HCC)   • S/P colectomy   • Fatty liver disease, nonalcoholic   • Cholecystitis   • Debility       Past Medical History:   Diagnosis Date   • Acute pain of left hip    • Adjustment disorder with depressed mood    • Anesthesia complication     SPINAL [id number] LOWER SPINE   • Ankylosing spondylitis of cervical region (HCC)    • Ankylosis of spine    • Arthritis    • Asthma    • Cardiomyopathy (HCC)     sees Dr. Reyes; NICM/ (-) cath, EF 25-35%;  has ICD   • CHF (congestive heart failure) ([facility])    • Cholecystitis    • Crohn's disease (HCC)    • Depression    • Diabetes mellitus ([facility])     Diet controlled.   • Dysthymic disorder 2012   • Dysuria    • Essential hypertension    • External hemorrhoids    • Genital herpes    • Gout    • Herpes genitalia    • History of MRSA infection 2000?    FINGERTIP-TX WITH ANTIBIOTICS-[facility] CARE-NO CURRENT OPEN WOUND OR TREATMENT 12/3/21   • Ileostomy in place ([facility])    • Insomnia    • Iron deficiency    • Paroxysmal ventricular tachycardia ([facility])    • PONV (postoperative nausea and vomiting)    • Presence of combination internal cardiac defibrillator (ICD) and pacemaker    • Prostate nodule    • Recurrent canker sores    • Thoracic back pain    • Urinary hesitancy    • Xerosis cutis        Past Surgical History:   Procedure Laterality Date   • ABDOMINAL SURGERY     • CARDIAC CATHETERIZATION     • CARDIAC DEFIBRILLATOR PLACEMENT      REPLACEMENT-Had [place] lead that was fractured.  Lead was tied off.  New lead and new device implanted.   • CARDIAC ELECTROPHYSIOLOGY PROCEDURE N/A 1/3/2019    Procedure: ICD DC generator change  [employer];  Surgeon: Zechariah Randolph MD;  Location: [facility] CATH INVASIVE LOCATION;  Service: Cardiology   • CHOLECYSTECTOMY N/A 12/7/2021    Procedure: CHOLECYSTECTOMY;  Surgeon: Jeovany Mott MD;  Location: [facility] OR;  Service: General;  Laterality: N/A;   • COLON RESECTION N/A 12/29/2021    Procedure: PARTIAL COLECTOMY WITH OSTOMY;  Surgeon: Jeovany Mott MD;  Location: [facility] OR;  Service: General;  Laterality: N/A;   • COLON RESECTION WITH ILEOSTOMY N/A 2018   • COLONOSCOPY  04/03/2015    abnormal cecum, three polypoid masses, pre cancerous vs crohns, IH, tics, hyperplastic polyp   • COLONOSCOPY N/A 3/17/2017    polypoid masses, tics, IH, polyp   • EXPLORATORY LAPAROTOMY W/ BOWEL RESECTION  07/2018    open resection of ileum with creation of end ileostomy   • ILEOSTOMY  CLOSURE  07/2018   • ILEOSTOMY CLOSURE N/A 12/7/2021    Procedure: ILEOSTOMY TAKEDOWN WITH RESECTION, PARASTOMAL HERNIA REPAIR;  Surgeon: Jeovany Mott MD;  Location: [facility] MAIN OR;  Service: General;  Laterality: N/A;   • PACEMAKER IMPLANTATION               IRF OT ASSESSMENT FLOWSHEET (last 12 hours)     IRF OT Evaluation and Treatment     Row Name 02/05/22 1406 02/05/22 1140       OT Time and Intention    Document Type daily treatment  -MW daily treatment  -MW    Mode of Treatment occupational therapy  -MW individual therapy; occupational therapy  -MW    Patient Effort good  -MW good  -MW    Symptoms Noted During/After Treatment increased pain  -MW increased pain  stomach pain  -MW    Row Name 02/05/22 1406 02/05/22 1140       General Information    Patient Profile Reviewed yes  -MW yes  -MW    Patient/Family/Caregiver Comments/Observations pt lying in bed with spouse present, c/o of fatigue and stomach pain  -MW pt supine in bed, NAD  -MW    Existing Precautions/Restrictions fall; other (see comments)  ostomy bag  -MW fall  ostmoy bag, no O2  -MW    Row Name 02/05/22 1406 02/05/22 1140       Cognition/Psychosocial    Affect/Mental Status (Cognitive) WFL  -MW WFL  -MW    Orientation Status (Cognition) oriented x 3  -MW oriented x 3  -MW    Follows Commands (Cognition) follows one-step commands; over 90% accuracy; verbal cues/prompting required  -MW follows one-step commands; over 90% accuracy; verbal cues/prompting required  -MW    Personal Safety Interventions fall prevention program maintained; supervised activity  -MW fall prevention program maintained; gait belt; nonskid shoes/slippers when out of bed; supervised activity  -MW    Cognitive Function (Cognitive) WFL  -MW WFL  -MW    Row Name 02/05/22 1406 02/05/22 1140       Pain Scale: Numbers Pre/Post-Treatment    Pretreatment Pain Rating 6/10  -MW 7/10  -MW    Posttreatment Pain Rating 7/10  -MW 7/10  -MW    Pain Location - Side Bilateral  -MW --     Pain Location - Orientation incisional  - --    Pain Location abdomen  - --    Row Name 02/05/22 1140          Bed Mobility    Supine-Sit [place] (Bed Mobility) standby assist; contact guard  -     Row Name 02/05/22 1140          Transfer Assessment/Treatment    Transfers bed-chair transfer; sit-stand transfer  -     Row Name 02/05/22 1140          Transfers    Bed-Chair [place] (Transfers) contact guard; verbal cues  -     Assistive Device (Bed-Chair Transfers) walker, front-wheeled  -     Sit-Stand [place] (Transfers) set up; contact guard  -     Row Name 02/05/22 1140          Sit-Stand Transfer    Assistive Device (Sit-Stand Transfers) wheelchair  -     Row Name 02/05/22 1406          Shoulder (Therapeutic Exercise)    Shoulder (Therapeutic Exercise) strengthening exercise  -     Shoulder AROM (Therapeutic Exercise) left; extension; flexion; aBduction; aDduction; 10 repetitions; 2 sets  -     Shoulder Strengthening (Therapeutic Exercise) right; flexion; aBduction; extension; aDduction; horizontal aBduction/aDduction; sitting; 2 lb free weight; 10 repetitions; 2 sets  -     Row Name 02/05/22 1406          Elbow/Forearm (Therapeutic Exercise)    Elbow/Forearm (Therapeutic Exercise) strengthening exercise  -     Elbow/Forearm Strengthening (Therapeutic Exercise) bilateral; flexion; extension; supination; pronation; sitting; 2 lb free weight; 10 repetitions; 3 sets  -[facility] Name 02/05/22 1406          Wrist (Therapeutic Exercise)    Wrist (Therapeutic Exercise) strengthening exercise  -     Wrist Strengthening (Therapeutic Exercise) bilateral; flexion; extension; 2 lb free weight; 2 sets; 10 repetitions  -     Row Name 02/05/22 1140          Bathing    [place] Level (Bathing) bathing skills; set up; verbal cues; minimum assist (75% patient effort)  -     Position (Bathing) sink side; supported sitting; supported standing  -     Set-up Assistance (Bathing)  obtain supplies; adjust water temperature  -     Comment (Bathing) --  pt able to demo figure 4 tech to assist in washing feet while sitting supported in w/c  -     Row Name 02/05/22 1140          Upper Body Dressing    Jones Level (Upper Body Dressing) upper body dressing skills; don; front opening garment; set up assistance; supervision  -     Position (Upper Body Dressing) supported sitting  -     Set-up Assistance (Upper Body Dressing) obtain clothing  -     Comment (Upper Body Dressing) 2 hospital gowns donned  -     Row Name 02/05/22 1140          Lower Body Dressing    Jones Level (Lower Body Dressing) doff; don; socks; minimum assist (75% patient effort)  -     Position (Lower Body Dressing) supported sitting  -     Comment (Lower Body Dressing) no pants here still  -     Row Name 02/05/22 1140          Grooming    [place] Level (Grooming) grooming skills; oral care regimen; wash face, hands; set up; standby assist  -     Position (Grooming) sink side; supported sitting  -     Row Name 02/05/22 1140          Toileting    Comment (Toileting) --  pt required nursing assist to empty ostomy bag this date seated EOB  -     Row Name 02/05/22 1406 02/05/22 1140       Positioning and Restraints    Pre-Treatment Position in bed  - in bed  -    Post Treatment Position bed  -MW wheelchair  -MW    In Bed supine; call light within reach; encouraged to call for assist; exit alarm on; patient within staff view  - --    In Wheelchair -- encouraged to call for assist; exit alarm on; call light within reach; sitting  -          User Key  (r) = Recorded By, (t) = Taken By, (c) = Cosigned By    Initials Name Effective Dates     Crowomayra NEGIN Tolbert 08/20/21 -                  Occupational Therapy Education                 Title: PT OT SLP Therapies (Done)     Topic: Occupational Therapy (Done)     Point: ADL training (Done)     Description:   Instruct learner(s) on proper  safety adaptation and remediation techniques during self care or transfers.   Instruct in proper use of assistive devices.              Learning Progress Summary           Patient Acceptance, E,TB,D, DU,VU by  at 2/4/2022 1455    Comment: ed pt on role of OT. benefit of therapy POC w. OT. ed on safety w. ADL and tsf. ed on UE ex.                   Point: Home exercise program (Done)     Description:   Instruct learner(s) on appropriate technique for monitoring, assisting and/or progressing therapeutic exercises/activities.              Learning Progress Summary           Patient Acceptance, E,TB,D, DU,VU by  at 2/4/2022 1455    Comment: ed pt on role of OT. benefit of therapy POC w. OT. ed on safety w. ADL and tsf. ed on UE ex.                   Point: Precautions (Done)     Description:   Instruct learner(s) on prescribed precautions during self-care and functional transfers.              Learning Progress Summary           Patient Acceptance, E,TB,D, DU,VU by  at 2/4/2022 1455    Comment: ed pt on role of OT. benefit of therapy POC w. OT. ed on safety w. ADL and tsf. ed on UE ex.                   Point: Body mechanics (Done)     Description:   Instruct learner(s) on proper positioning and spine alignment during self-care, functional mobility activities and/or exercises.              Learning Progress Summary           Patient Acceptance, E,TB,D, DU,VU by  at 2/4/2022 1455    Comment: ed pt on role of OT. benefit of therapy POC w. OT. ed on safety w. ADL and tsf. ed on UE ex.                               User Key     Initials Effective Dates Name Provider Type Discipline     06/16/21 -  Lyndsey Zeng OTHAILEY Occupational Therapist OT                    OT Recommendation and Plan                         Time Calculation:      Time Calculation- OT     Row Name 02/05/22 1230 02/05/22 1000          Time Calculation- OT    OT Start Time 1230  -MW 1000  -MW     OT Stop Time 1300  -MW 1030  -MW     OT  Time Calculation (min) 30 min  -MW 30 min  -MW           User Key  (r) = Recorded By, (t) = Taken By, (c) = Cosigned By    Initials Name Provider Type    Didi Conte OT Occupational Therapist              Therapy Charges for Today     Code Description Service Date Service Provider Modifiers Qty    15682366832 HC OT SELF CARE/MGMT/TRAIN EA 15 MIN 2/5/2022 Didi Rod OT GO 2    [id number]  OT THER PROC EA 15 MIN 2/5/2022 Didi Rod OT GO 2                   Didi Rod OT  2/5/2022

## 2022-02-05 NOTE — THERAPY TREATMENT NOTE
Inpatient Rehabilitation - Physical Therapy Treatment Note       [facility]     Patient Name: Arnie Calvo  : 1960  MRN: [id number]    Today's Date: 2022                    Admit Date: 2/3/2022      Visit Dx:   No diagnosis found.    Patient Active Problem List   Diagnosis   • Cardiomyopathy (HCC)   • Paroxysmal VT (HCC)   • Palpitations   • PVC's (premature ventricular contractions)   • Exacerbation of Crohn's disease of small intestine (HCC)   • Adjustment disorder with depressed mood   • Ankylosis of spine   • Crohn's disease with complication (HCC)   • Diabetes mellitus (HCC)   • Essential hypertension   • External hemorrhoids   • Genital herpes simplex   • Gout   • Insomnia   • Iron deficiency   • Prostate mass   • Recurrent aphthous ulcer   • Asteatosis cutis   • History of pneumonia   • Abnormal CXR (chest x-ray)   • RUQ abdominal pain   • Crohn's disease of small intestine with other complication (HCC)   • Right lower quadrant abdominal pain   • Enteritis   • Mass-like inflammation at terminal ileum   • Crohn's disease (HCC)   • Ileostomy in place (HCC)   • Paroxysmal ventricular tachycardia (HCC)   • Chronic systolic heart failure (HCC)   • Cardiomyopathy, nonischemic (HCC)   • Orthostasis   • Iron deficiency anemia   • Orthostatic hypotension   • Crohn's disease of colon with complication (HCC)   • Dehisced intestinal anastomosis   • History of creation of ostomy (HCC)   • Hypokalemia   • Hypotension, chronic   • Malnutrition of moderate degree (HCC)   • S/P colectomy   • Fatty liver disease, nonalcoholic   • Cholecystitis   • Debility       Past Medical History:   Diagnosis Date   • Acute pain of left hip    • Adjustment disorder with depressed mood    • Anesthesia complication     SPINAL [id number] LOWER SPINE   • Ankylosing spondylitis of cervical region (HCC)    • Ankylosis of spine    • Arthritis    • Asthma    • Cardiomyopathy (HCC)     sees Dr. Reyes; [facility]/ (-)  cath, EF 25-35%; has ICD   • CHF (congestive heart failure) ([facility])    • Cholecystitis    • Crohn's disease ([facility])    • Depression    • Diabetes mellitus ([facility])     Diet controlled.   • Dysthymic disorder 2012   • Dysuria    • Essential hypertension    • External hemorrhoids    • Genital herpes    • Gout    • Herpes genitalia    • History of MRSA infection 2000?    FINGERTIP-TX WITH ANTIBIOTICS-[facility] CARE-NO CURRENT OPEN WOUND OR TREATMENT 12/3/21   • Ileostomy in place ([facility])    • Insomnia    • Iron deficiency    • Paroxysmal ventricular tachycardia ([facility])    • PONV (postoperative nausea and vomiting)    • Presence of combination internal cardiac defibrillator (ICD) and pacemaker    • Prostate nodule    • Recurrent canker sores    • Thoracic back pain    • Urinary hesitancy    • Xerosis cutis        Past Surgical History:   Procedure Laterality Date   • ABDOMINAL SURGERY     • CARDIAC CATHETERIZATION     • CARDIAC DEFIBRILLATOR PLACEMENT      REPLACEMENT-Had Jude lead that was fractured.  Lead was tied off.  New lead and new device implanted.   • CARDIAC ELECTROPHYSIOLOGY PROCEDURE N/A 1/3/2019    Procedure: ICD DC generator change  [employer];  Surgeon: Zechariah Randolph MD;  Location: [facility] CATH INVASIVE LOCATION;  Service: Cardiology   • CHOLECYSTECTOMY N/A 12/7/2021    Procedure: CHOLECYSTECTOMY;  Surgeon: Jeovany Mott MD;  Location: [facility] MAIN OR;  Service: General;  Laterality: N/A;   • COLON RESECTION N/A 12/29/2021    Procedure: PARTIAL COLECTOMY WITH OSTOMY;  Surgeon: Jeovany Mott MD;  Location: [facility] MAIN OR;  Service: General;  Laterality: N/A;   • COLON RESECTION WITH ILEOSTOMY N/A 2018   • COLONOSCOPY  04/03/2015    abnormal cecum, three polypoid masses, pre cancerous vs crohns, IH, tics, hyperplastic polyp   • COLONOSCOPY N/A 3/17/2017    polypoid masses, tics, IH, polyp   • EXPLORATORY LAPAROTOMY W/ BOWEL RESECTION  07/2018    open resection of ileum with creation of end ileostomy   •  ILEOSTOMY CLOSURE  07/2018   • ILEOSTOMY CLOSURE N/A 12/7/2021    Procedure: ILEOSTOMY TAKEDOWN WITH RESECTION, PARASTOMAL HERNIA REPAIR;  Surgeon: Jeovany Mott MD;  Location: [facility];  Service: General;  Laterality: N/A;   • PACEMAKER IMPLANTATION         PT ASSESSMENT (last 12 hours)     IRF PT Evaluation and Treatment     Row Name 02/05/22 6806          PT Time and Intention    Document Type daily treatment  -KIRSTEN     Mode of Treatment physical therapy  -KIRSTEN     Patient/Family/Caregiver Comments/Observations Pt lying in bed, wife present. Pt c/o fatigue.  Pt could only tolerate 45 minutes of PT this pm due to pain in abdomen increased.  -KIRSTEN     Row Name 02/05/22 1330          General Information    Existing Precautions/Restrictions fall; other (see comments)  ostomy bag  -KIRSTEN     Row Name 02/05/22 1330          Cognition/Psychosocial    Affect/Mental Status (Cognitive) WFL  -     Orientation Status (Cognition) oriented x 3  -KIRSTEN     Follows Commands (Cognition) follows one-step commands; over 90% accuracy; verbal cues/prompting required  -KIRSTEN     Personal Safety Interventions gait belt; muscle strengthening facilitated; nonskid shoes/slippers when out of bed  -KIRSTEN     Cognitive Function (Cognitive) WFL  -KIRSTEN     Row Name 02/05/22 2227          Pain Scale: Numbers Pre/Post-Treatment    Pretreatment Pain Rating 6/10  -KIRSTNE     Posttreatment Pain Rating 6/10  -KIRSTEN     Pain Location - Side Bilateral  -KIRSTEN     Pain Location abdomen  -KIRSTEN     Pre/Posttreatment Pain Comment Pt held hand to abdomen due to pain which worsened some during session.  -KIRSTEN     Row Name 02/05/22 7761          Bed Mobility    Supine-Sit [place] (Bed Mobility) contact guard  -KIRSTEN     Sit-Supine [place] (Bed Mobility) verbal cues; minimum assist (75% patient effort); contact guard  -KIRSTEN     Assistive Device (Bed Mobility) bed rails; head of bed elevated  -KIRSTEN     Row Name 02/05/22 1339          Toilet Transfer    Type (Toilet Transfer) --   Pt stood to urinate. CG and grab bar.  -     Row Name 02/05/22 1330          Balance    Comment, Balance Sidestep 8' each way at hemibar with CG; he refused to do more due to pain.  -     Row Name 02/05/22 1330          Hip (Therapeutic Exercise)    Hip AROM (Therapeutic Exercise) left; right; flexion; sitting; 10 repetitions  -     Row Name 02/05/22 1330          Knee (Therapeutic Exercise)    Knee AROM (Therapeutic Exercise) left; right; SAQ (short arc quad); sitting; 10 repetitions  -     Row Name 02/05/22 1330          Ankle (Therapeutic Exercise)    Ankle AROM (Therapeutic Exercise) bilateral; dorsiflexion; plantarflexion; 10 repetitions; sitting  -     Row Name 02/05/22 1330          Positioning and Restraints    Pre-Treatment Position in bed  -     Post Treatment Position bed  -     In Bed supine; call light within reach; with family/caregiver; exit alarm on  -           User Key  (r) = Recorded By, (t) = Taken By, (c) = Cosigned By    Initials Name Provider Type     Emily Watkins, PT Physical Therapist              Wound 12/07/21 0757 Right abdomen Incision (Active)   Dressing Appearance dry; intact 02/05/22 0921   Closure AUGUSTO 02/05/22 0921   Base dressing in place, unable to visualize 02/05/22 0921   Periwound intact; dry 02/04/22 1445   Periwound Temperature warm 02/04/22 1445   Periwound Skin Turgor soft 02/04/22 1445   Edges open 02/04/22 1445   Wound Length (cm) 1.8 cm 02/04/22 1445   Wound Width (cm) 7 cm 02/04/22 1445   Wound Depth (cm) 0.3 cm 02/04/22 1445   Drainage Characteristics/Odor creamy; yellow 02/04/22 1445   Drainage Amount moderate 02/04/22 1445   Care, Wound cleansed with; sterile normal saline 02/04/22 1445   Dressing Care dressing changed; silver impregnated; collagen; silicone; border dressing 02/04/22 1445   Periwound Care barrier film applied 02/04/22 1445       Wound 12/25/21 0809 abdomen Other (comment) (Active)   Dressing Appearance dry; intact 02/05/22 0921    Closure AUGUSTO 02/05/22 0921   Base dressing in place, unable to visualize 02/05/22 0921   Periwound intact; dry 02/04/22 1445   Periwound Temperature warm 02/04/22 1445   Periwound Skin Turgor soft 02/04/22 1445   Edges open 02/04/22 1445   Wound Length (cm) 1.5 cm 02/04/22 1445   Wound Width (cm) 3.5 cm 02/04/22 1445   Wound Depth (cm) 0.5 cm 02/04/22 1445   Drainage Characteristics/Odor serosanguineous; creamy; yellow 02/04/22 1445   Drainage Amount moderate 02/04/22 1445   Care, Wound cleansed with; sterile normal saline 02/04/22 1445   Dressing Care dressing changed; collagen; silver impregnated; silicone; border dressing 02/04/22 1445   Periwound Care barrier film applied 02/04/22 1445       Wound 12/29/21 1518 midline abdomen Incision (Active)   Dressing Appearance dry; intact 02/05/22 0921   Closure AUGUSTO 02/05/22 0921   Base dressing in place, unable to visualize 02/05/22 0921   Periwound excoriated; moist; redness 02/04/22 1445   Periwound Temperature warm 02/04/22 1445   Periwound Skin Turgor soft 02/04/22 1445   Edges open 02/04/22 1445   Wound Length (cm) 8 cm 02/04/22 1445   Wound Width (cm) 3 cm 02/04/22 1445   Wound Depth (cm) 0.8 cm 02/04/22 1445   Tunneling [Depth (cm)/Location] 4cms at 1 o'clock 02/04/22 1445   Drainage Characteristics/Odor green 02/04/22 1445   Drainage Amount moderate 02/04/22 1445   Care, Wound cleansed with; sterile normal saline 02/04/22 1445   Dressing Care dressing changed; silver impregnated; collagen 02/04/22 1445   Periwound Care barrier film applied 02/04/22 1445     Physical Therapy Education                 Title: PT OT SLP Therapies (Done)     Topic: Physical Therapy (Done)     Point: Mobility training (Done)     Learning Progress Summary           Patient Acceptance, E,TB, VU,NR by KIRSTEN at 2/5/2022 1430    Acceptance, E,TB, VU,NR by REE at 2/4/2022 5711                   Point: Home exercise program (Done)     Learning Progress Summary           Patient Acceptance, E,TB,  VU,NR by  at 2/4/2022 1408                   Point: Body mechanics (Done)     Learning Progress Summary           Patient Acceptance, E,TB, VU,NR by  at 2/4/2022 1408                   Point: Precautions (Done)     Learning Progress Summary           Patient Acceptance, E,TB, VU,NR by  at 2/4/2022 1408                               User Key     Initials Effective Dates Name Provider Type Discipline     06/16/21 -  Emily Watkins, PT Physical Therapist PT    EE 06/16/21 -  Wendy Ojeda PT Physical Therapist PT                PT Recommendation and Plan                          Time Calculation:      PT Charges     Row Name 02/05/22 1430             Time Calculation    Start Time 1330  -KIRSTEN      Stop Time 1420  Pt in too much pain and could not tolerate anymore PT  -KIRSTEN      Time Calculation (min) 50 min  -KIRSTEN              Time Calculation- PT    Total Timed Code Minutes- PT 50 minute(s)  -KIRSTEN            User Key  (r) = Recorded By, (t) = Taken By, (c) = Cosigned By    Initials Name Provider Type    KIRSTEN Emily Watkins, PT Physical Therapist                Therapy Charges for Today     Code Description Service Date Service Provider Modifiers Qty    93392676589 HC PT THER PROC EA 15 MIN 2/5/2022 Emily Watkins, PT GP 1    08963917053 HC GAIT TRAINING EA 15 MIN 2/5/2022 Emily Watkins, PT GP 2               Patient was wearing a face mask during this therapy encounter. Therapist used appropriate personal protective equipment including mask and gloves.  Mask used was standard procedure mask. Appropriate PPE was worn during the entire therapy session. Hand hygiene was completed before and after therapy session. Patient is not in enhanced droplet precautions.         Emily Watkins, SALLY  2/5/2022

## 2022-02-05 NOTE — PLAN OF CARE
Problem: Rehabilitation (IRF) Plan of Care  Goal: Plan of Care Review  Outcome: Ongoing, Progressing  Flowsheets (Taken 2/5/2022 0256)  Progress: improving  Outcome Summary: Patient A&Ox4, irritable and flat affect. C/O pain to abdomen, pain medication given at bedtime. pt coughin with productive tan thick sputum, c/o that sometimes secretion gets stuck in his throat. Meciations crushed in AS, c/o how it tastes and makes him nauseated. Incontinent of bladder. O2 via NC at 2L at HS. Melatonin given late per patient request. Sleeping on and off tonight.   Plan of Care Reviewed With: patient

## 2022-02-05 NOTE — PROGRESS NOTES
Inpatient Rehabilitation Plan of Care Note    Plan of Care  Care Plan Reviewed - No updates at this time.    Safety    Performed Intervention(s)  Falls protocal  Yellow socks      Sphincter Control    Performed Intervention(s)  I and O  Btrm schedule  Wound ostomy consult for RNs      Psychosocial    Performed Intervention(s)  [place] consult encouraged  Provide emotional support    Signed by: Sony Giles RN

## 2022-02-05 NOTE — PROGRESS NOTES
[facility] Progress Note      Patient Name: Arnie Calvo  : 1960  MRN: [id number]  Primary Care Physician:  Zechariah Fatima MD  Date of admission: 2/3/2022    Subjective     Interval History:     Patient lying in bed  Comfortable  Overnight complaint  No fevers or chills  Urinating spontaneously    Review of Systems:   As noted above    Objective     Vitals:   Temp:  [97.8 °F (36.6 °C)-99.3 °F (37.4 °C)] 98.2 °F (36.8 °C)  Heart Rate:  [100-110] 100  Resp:  [18-20] 18  BP: ()/(67-70) 101/70  Flow (L/min):  [2] 2    Intake/Output Summary (Last 24 hours) at 2022 0903  Last data filed at 2022 1927  Gross per 24 hour   Intake 500 ml   Output 600 ml   Net -100 ml       Physical Exam:      Constitutional: Awake, on supplemental oxygen chronically ill and deconditioned  HEENT: Sclera anicteric, no conjunctival injection  Neck: Supple, no thyromegaly, no lymphadenopathy, trachea at midline, no JVD  Respiratory: Clear to auscultation bilaterally, nonlabored respiration  Cardiovascular: RRR, CLARISSA grade III   Gastrointestinal: Positive bowel sounds, colostomy  bag in place  : No palpable bladder  Musculoskeletal: No edema, no clubbing or cyanosis  Psychiatric: Appropriate affect, cooperative  Neurologic: Oriented x3, moving all extremities, normal speech and mental status  Skin: Warm and dry         Scheduled Meds:     aMILoride, 5 mg, Oral, Daily  carvedilol, 12.5 mg, Oral, Daily Before Supper  enoxaparin, 40 mg, Subcutaneous, Q24H  glycopyrrolate, 1 mg, Oral, BID  loperamide, 2 mg, Oral, 4x Daily AC & at Bedtime  melatonin, 3 mg, Oral, Nightly  Urea, 15 g, Oral, Daily      IV Meds:        Results Reviewed:   I have personally reviewed the results from the time of this admission to 2022 09:03 EST     Results from last 7 days   Lab Units 22  0745 22  0811 22  1213 22  1141 22  1141   SODIUM mmol/L 126* 124* 130*   < > 127*   POTASSIUM  mmol/L 4.1 4.1 3.9   < > 3.8   CHLORIDE mmol/L 90* 89* 93*   < > 93*   CO2 mmol/L 27.3 27.1 28.5   < > 27.5   BUN mg/dL 16 17 17   < > 16   CREATININE mg/dL 0.88 1.07 0.97   < > 0.91   CALCIUM mg/dL 9.0 9.0 9.1   < > 9.0   BILIRUBIN mg/dL  --   --   --   --  0.7   ALK PHOS U/L  --   --   --   --  90   ALT (SGPT) U/L  --   --   --   --  22   AST (SGOT) U/L  --   --   --   --  28   GLUCOSE mg/dL 104* 118* 109*   < > 114*    < > = values in this interval not displayed.       Estimated Creatinine Clearance: 96.6 mL/min (by C-G formula based on SCr of 0.88 mg/dL).    Results from last 7 days   Lab Units 02/05/22  0745 02/02/22  1141   MAGNESIUM mg/dL  --  1.8   PHOSPHORUS mg/dL 3.7 3.0       Results from last 7 days   Lab Units 02/05/22  0745   URIC ACID mg/dL 6.4       Results from last 7 days   Lab Units 02/04/22  0811 02/02/22  1141   WBC 10*3/mm3 9.87 9.61   HEMOGLOBIN g/dL 10.9* 11.2*   PLATELETS 10*3/mm3 167 157             Assessment / Plan     ASSESSMENT:    -Acute on chronic hyponatremia.  Cortisol 13 . TSH normal , Urine Osmo 284. Urine Sodium < 20 . Serum osmo  ( pending ) Patient looks normovolemic. Will D/C NaCL tablets due to CHF. Continue UREA 15 gr daily and placed on fluid restriction 1.5 liters . TRIAL amiloride 5 mg daily ,   -Dilated cardiomyopathy w / EF 20 %  , w/o signs of decompensation ,  Currently stable . Starting sodium chloride tablets but likely will need to be adjusted based on signs of volume overload.  -Physical deconditioning.  Working w/ PT and OT     PLAN:    -Follow renal function closely  -TRIAL  UREA 15 gr daily and Fluid restriction 1.5 liters   - Started on AMILORIDE 5 mg daily , allergic to SULFAS that limits use of some diuretics   -D/c sodium chloride due to CHF   -Avoid nephrotoxins  -Adjust medications to GFR     Discussed with nursing staff    Thank you for involving us in the care of Arnie Calvo.  Please feel free to call with any questions.    Joselito Bates,  MD  02/05/22  09:03 [facility]    [facility]  [phone]

## 2022-02-06 ENCOUNTER — APPOINTMENT (OUTPATIENT)
Dept: GENERAL RADIOLOGY | Facility: HOSPITAL | Age: 62
End: 2022-02-06

## 2022-02-06 LAB
ALBUMIN SERPL-MCNC: 2.8 G/DL (ref 3.5–5.2)
ANION GAP SERPL CALCULATED.3IONS-SCNC: 7.1 MMOL/L (ref 5–15)
BUN SERPL-MCNC: 17 MG/DL (ref 8–23)
BUN/CREAT SERPL: 19.1 (ref 7–25)
CALCIUM SPEC-SCNC: 9.1 MG/DL (ref 8.6–10.5)
CHLORIDE SERPL-SCNC: 91 MMOL/L (ref 98–107)
CO2 SERPL-SCNC: 27.9 MMOL/L (ref 22–29)
CREAT SERPL-MCNC: 0.89 MG/DL (ref 0.76–1.27)
GFR SERPL CREATININE-BSD FRML MDRD: 87 ML/MIN/1.73
GLUCOSE SERPL-MCNC: 102 MG/DL (ref 65–99)
OSMOLALITY SERPL: 276 MOSM/KG (ref 280–301)
PHOSPHATE SERPL-MCNC: 3.6 MG/DL (ref 2.5–4.5)
POTASSIUM SERPL-SCNC: 4.3 MMOL/L (ref 3.5–5.2)
SODIUM SERPL-SCNC: 126 MMOL/L (ref 136–145)

## 2022-02-06 PROCEDURE — 83930 ASSAY OF BLOOD OSMOLALITY: CPT | Performed by: INTERNAL MEDICINE

## 2022-02-06 PROCEDURE — 25010000002 ENOXAPARIN PER 10 MG: Performed by: PHYSICAL MEDICINE & REHABILITATION

## 2022-02-06 PROCEDURE — 94799 UNLISTED PULMONARY SVC/PX: CPT

## 2022-02-06 PROCEDURE — 94640 AIRWAY INHALATION TREATMENT: CPT

## 2022-02-06 PROCEDURE — 80069 RENAL FUNCTION PANEL: CPT | Performed by: PHYSICAL MEDICINE & REHABILITATION

## 2022-02-06 PROCEDURE — 71045 X-RAY EXAM CHEST 1 VIEW: CPT

## 2022-02-06 RX ORDER — CEFDINIR 300 MG/1
300 CAPSULE ORAL EVERY 12 HOURS SCHEDULED
Status: COMPLETED | OUTPATIENT
Start: 2022-02-06 | End: 2022-02-11

## 2022-02-06 RX ORDER — AMILORIDE HYDROCHLORIDE 5 MG/1
10 TABLET ORAL DAILY
Status: DISCONTINUED | OUTPATIENT
Start: 2022-02-07 | End: 2022-02-09

## 2022-02-06 RX ORDER — IPRATROPIUM BROMIDE AND ALBUTEROL SULFATE 2.5; .5 MG/3ML; MG/3ML
3 SOLUTION RESPIRATORY (INHALATION) ONCE
Status: COMPLETED | OUTPATIENT
Start: 2022-02-06 | End: 2022-02-06

## 2022-02-06 RX ADMIN — HYDROCODONE BITARTRATE AND ACETAMINOPHEN 2 TABLET: 5; 325 TABLET ORAL at 20:35

## 2022-02-06 RX ADMIN — LOPERAMIDE HYDROCHLORIDE 2 MG: 2 CAPSULE ORAL at 11:27

## 2022-02-06 RX ADMIN — AMILORIDE HYDROCLORIDE 5 MG: 5 TABLET ORAL at 09:15

## 2022-02-06 RX ADMIN — IPRATROPIUM BROMIDE AND ALBUTEROL SULFATE 3 ML: .5; 3 SOLUTION RESPIRATORY (INHALATION) at 19:11

## 2022-02-06 RX ADMIN — GLYCOPYRROLATE 1 MG: 1 TABLET ORAL at 09:15

## 2022-02-06 RX ADMIN — Medication 15 G: at 09:15

## 2022-02-06 RX ADMIN — LOPERAMIDE HYDROCHLORIDE 2 MG: 2 CAPSULE ORAL at 17:07

## 2022-02-06 RX ADMIN — HYDROCODONE BITARTRATE AND ACETAMINOPHEN 2 TABLET: 5; 325 TABLET ORAL at 03:14

## 2022-02-06 RX ADMIN — LOPERAMIDE HYDROCHLORIDE 2 MG: 2 CAPSULE ORAL at 20:36

## 2022-02-06 RX ADMIN — LOPERAMIDE HYDROCHLORIDE 2 MG: 2 CAPSULE ORAL at 09:15

## 2022-02-06 RX ADMIN — GLYCOPYRROLATE 1 MG: 1 TABLET ORAL at 20:36

## 2022-02-06 RX ADMIN — ENOXAPARIN SODIUM 40 MG: 100 INJECTION SUBCUTANEOUS at 20:36

## 2022-02-06 RX ADMIN — CEFDINIR 300 MG: 300 CAPSULE ORAL at 23:15

## 2022-02-06 RX ADMIN — HYDROCODONE BITARTRATE AND ACETAMINOPHEN 2 TABLET: 5; 325 TABLET ORAL at 12:03

## 2022-02-06 NOTE — PLAN OF CARE
"Goal Outcome Evaluation:  Plan of Care Reviewed With: patient        Progress: no change  Outcome Summary: AAOx4. Cooperative mostly, but needs lots of encouragement. Refused most meals today, but snacked inbetween. Ate 2 orders of Fast food chicken brought by wife. Meds with water at times, crushed in applesauce at other times. VS stable, BP rather low at baseline during this stay. Coreg parameters, held this evening. C/O \"burning\" around stoma this evening. Ostomy bag rinsed by nightshift RN, [place] given . Patient threatened to \"rip off\" bag if no relief. Resting at this time.  "

## 2022-02-06 NOTE — PLAN OF CARE
Goal Outcome Evaluation:  Plan of Care Reviewed With: patient        Progress: no change  Outcome Summary: A&Ox4. Assist x1-2. Incontinent. Meds crushed in applesauce. 1200mL FR. Burning pain to abdominal ostomy site treated. Ostomy clean/dry/intact. Rinsed bag twice to try and help burning and patient said it helped and no movement helps also. BP low, HR tachycardic. Q2hr turn. Bottom red/blanchable. No unsafe behaviors. Encourage patient to eat protein and fiber to try and help stool form a little but patient states he is scared more stool will make things worse. Stool is still liquid at this time.

## 2022-02-06 NOTE — PROGRESS NOTES
[facility] Progress Note      Patient Name: Arnie Calvo  : 1960  MRN: [id number]  Primary Care Physician:  Zechariah Fatima MD  Date of admission: 2/3/2022    Subjective     Interval History:     Patient lying in bed  Complaining about caleb-ostomy  burning sensation  Denies chest pain, shortness of palpitations  Urinating spontaneously  Yes or chills    Review of Systems:   As noted above    Objective     Vitals:   Temp:  [97.1 °F (36.2 °C)-98 °F (36.7 °C)] 97.2 °F (36.2 °C)  Heart Rate:  [103-110] 104  Resp:  [18] 18  BP: (87-95)/(60-66) 92/66  Flow (L/min):  [2] 2    Intake/Output Summary (Last 24 hours) at 2022 1116  Last data filed at 2022 0909  Gross per 24 hour   Intake 970 ml   Output 555 ml   Net 415 ml       Physical Exam:      Constitutional: Awake, on supplemental oxygen chronically ill and deconditioned  HEENT: Sclera anicteric, no conjunctival injection  Neck: Supple, no thyromegaly, no lymphadenopathy, trachea at midline, no JVD  Respiratory: Clear to auscultation bilaterally, nonlabored respiration  Cardiovascular: RRR, CLARISSA grade III   Gastrointestinal: Positive bowel sounds, colostomy  bag in place  : No palpable bladder  Musculoskeletal: No edema, no clubbing or cyanosis  Psychiatric: Appropriate affect, cooperative  Neurologic: Oriented x3, moving all extremities, normal speech and mental status  Skin: Warm and dry         Scheduled Meds:     [START ON 2022] aMILoride, 10 mg, Oral, Daily  carvedilol, 12.5 mg, Oral, Daily Before Supper  enoxaparin, 40 mg, Subcutaneous, Q24H  glycopyrrolate, 1 mg, Oral, BID  loperamide, 2 mg, Oral, 4x Daily AC & at Bedtime  melatonin, 3 mg, Oral, Nightly  Urea, 15 g, Oral, Daily      IV Meds:        Results Reviewed:   I have personally reviewed the results from the time of this admission to 2022 11:16 EST     Results from last 7 days   Lab Units 22  0445 22  0745 22  0811 22  1213  02/02/22  1141   SODIUM mmol/L 126* 126* 124*   < > 127*   POTASSIUM mmol/L 4.3 4.1 4.1   < > 3.8   CHLORIDE mmol/L 91* 90* 89*   < > 93*   CO2 mmol/L 27.9 27.3 27.1   < > 27.5   BUN mg/dL 17 16 17   < > 16   CREATININE mg/dL 0.89 0.88 1.07   < > 0.91   CALCIUM mg/dL 9.1 9.0 9.0   < > 9.0   BILIRUBIN mg/dL  --   --   --   --  0.7   ALK PHOS U/L  --   --   --   --  90   ALT (SGPT) U/L  --   --   --   --  22   AST (SGOT) U/L  --   --   --   --  28   GLUCOSE mg/dL 102* 104* 118*   < > 114*    < > = values in this interval not displayed.       Estimated Creatinine Clearance: 95.6 mL/min (by C-G formula based on SCr of 0.89 mg/dL).    Results from last 7 days   Lab Units 02/06/22  0445 02/05/22  0745 02/02/22  1141   MAGNESIUM mg/dL  --   --  1.8   PHOSPHORUS mg/dL 3.6 3.7 3.0       Results from last 7 days   Lab Units 02/05/22  0745   URIC ACID mg/dL 6.4       Results from last 7 days   Lab Units 02/04/22  0811 02/02/22  1141   WBC 10*3/mm3 9.87 9.61   HEMOGLOBIN g/dL 10.9* 11.2*   PLATELETS 10*3/mm3 167 157             Assessment / Plan     ASSESSMENT:    -Acute on chronic hypotonic hyponatremia.  Cortisol 13 . TSH normal , Urine Osmo 284. Urine Sodium < 20 . Serum osmo  (273) , likely from CHF based on studies   Continue UREA 15 gr daily and placed on fluid restriction 1.5 liters . TRIAL amiloride 10  mg daily ,  ( increased today )   -Dilated cardiomyopathy w / EF 20 %  , w/o signs of decompensation ,  Currently stable . Starting sodium chloride tablets but likely will need to be adjusted based on signs of volume overload.  -Physical deconditioning.  Working w/ PT and OT     PLAN:    -Follow renal function closely  -TRIAL  UREA 15 gr daily and Fluid restriction 1.5 liters   increased AMILORIDE 10 mg daily , allergic to SULFAS that limits use of some diuretics   -Avoid nephrotoxins  -Adjust medications to GFR     Discussed with nursing staff    Thank you for involving us in the care of Arnie Calvo.  Please feel free  to call with any questions.    Joselito Bates MD  02/06/22  11:16 [facility]    [facility]  [phone]

## 2022-02-06 NOTE — PROGRESS NOTES
LOS: 3 days   Patient Care Team:  Zechariah Fatima MD as PCP - General  Richard Heller MD as PCP - Family Medicine (Pulmonary Disease)  John Bowles MD as Consulting Physician (Gastroenterology)  Hermes Shabazz MD as Consulting Physician (Urology)  Osmar Carranza MD as Consulting Physician (Cardiology)      LEDA TURNER  1960    Chief Complaint: Immobility syndrome  Impaired mobility/impaired self-care/impaired endurance  Ileostomy takedown , cholecystectomy, incisional hernia repair December 7, 2021.  Exploratory laparotomy with end colostomy partial colon resection December 29, 2021  Ostomy-on Imodium/glycopyrrolate  Anxiety  Anemia  Hyponatremia        Subjective     Patient seen and examined. No acute events overnight. Denies CP, SOA, N/V, F/C.     Objective     Vitals:    02/06/22 0500   BP: 92/66   Pulse: 104   Resp: 18   Temp: 97.2 °F (36.2 °C)   SpO2: 96%       PHYSICAL EXAM:   MENTAL STATUS -  AWAKE / ALERT  HEENT-    LUNGS - CTA, NO WHEEZES, RALES OR RHONCHI  HEART-tachycardia    ABD - NORMOACTIVE BOWEL SOUNDS, SOFT, NT.  Ostomy with liquid brown output.  Dressings in place at right and left abdomen  EXT - NO EDEMA OR CYANOSIS  NEURO -oriented x4.  MOTOR EXAM - RUE/RLE 5/5. LUE/LLE 5/5.           MEDICATIONS  Scheduled Meds:[START ON 2/7/2022] aMILoride, 10 mg, Oral, Daily  carvedilol, 12.5 mg, Oral, Daily Before Supper  enoxaparin, 40 mg, Subcutaneous, Q24H  glycopyrrolate, 1 mg, Oral, BID  loperamide, 2 mg, Oral, 4x Daily AC & at Bedtime  melatonin, 3 mg, Oral, Nightly  Urea, 15 g, Oral, Daily      Continuous Infusions:   PRN Meds:.•  acetaminophen  •  Glycerin-Hypromellose-  •  HYDROcodone-acetaminophen  •  simethicone      RESULTS  No results found for: POCGLU  Results from last 7 days   Lab Units 02/04/22  0811 02/02/22  1141   WBC 10*3/mm3 9.87 9.61   HEMOGLOBIN g/dL 10.9* 11.2*   HEMATOCRIT % 34.3* 35.2*   PLATELETS 10*3/mm3 167 157     Results from last 7 days   Lab  Units 02/06/22  0445 02/05/22  0745 02/04/22  0811 02/03/22  1213 02/02/22  1141   SODIUM mmol/L 126* 126* 124*   < > 127*   POTASSIUM mmol/L 4.3 4.1 4.1   < > 3.8   CHLORIDE mmol/L 91* 90* 89*   < > 93*   CO2 mmol/L 27.9 27.3 27.1   < > 27.5   BUN mg/dL 17 16 17   < > 16   CREATININE mg/dL 0.89 0.88 1.07   < > 0.91   CALCIUM mg/dL 9.1 9.0 9.0   < > 9.0   BILIRUBIN mg/dL  --   --   --   --  0.7   ALK PHOS U/L  --   --   --   --  90   ALT (SGPT) U/L  --   --   --   --  22   AST (SGOT) U/L  --   --   --   --  28   GLUCOSE mg/dL 102* 104* 118*   < > 114*    < > = values in this interval not displayed.           Ref. Range 2/2/2022 11:41   Magnesium Latest Ref Range: 1.6 - 2.4 mg/dL 1.8   Prealbumin Latest Ref Range: 20.0 - 40.0 mg/dL 10.2 (L)          Assessment/Plan     Debility      Chief Complaint: Immobility syndrome  Impaired mobility/impaired self-care/impaired endurance     Ileostomy takedown , cholecystectomy, incisional hernia repair December 7, 2021.  Exploratory laparotomy with end colostomy partial colon resection December 29, 2021     Ostomy-on Imodium/glycopyrrolate  February 4-continue Robinul twice daily for now, but decreased Imodium from 2 tablets to 1 tablet p.o. before every meal and at bedtime.     Abdominal wound care  February 4-the ostomy involves the midline wound.  His wound is overall getting smaller per the ostomy wound care team.  On Monday ostomy wound care team will try to get into smaller ostomy pouch to isolate the ostomy from the wound.     Anxiety-would presently hold on adding any benzodiazepine given his multiple medical issues.     Anemia     Hyponatremia  February 4-sodium was 127 on February 2, 130 on February 3, decreased 124 on February 4.  Will consult nephrology for evaluation.  February 5- Na 126. Renal following.   February 6 - Na 126. Stable.    Congestive heart failure/tachycardia-on Coreg 12.5 mg for supper.  Blood pressure low at times.  Parameters  added.       Nonischemic cardiomyopathy ejection fraction 20%     DVT prophylaxis-Lovenox/SCDs     Impaired nutritional status-supplements per dietitian to follow.  Recheck prealbumin around February 16     Pulmonary- using  O2 2 L nasal cannula at night    Insomnia-melatonin added           Now admit for comprehensive acute inpatient rehabilitation .  This would be an interdisciplinary program with physical therapy 1.5 hour,  occupational therapy 1.5 hour,  5 days a week.  Rehabilitation nursing for carryover, monitoring of cardiac and intestinal   status, bowel and bladder, and skin  Ongoing physician follow-up.  Weekly team conferences.  Goals are to achieve a level of supervision modified independent with  mobility and self-care and improved endurance.   Rehabilitation prognosis fair.  Medical prognosis defer to general surgery.  Estimated length of stay is approximately 10 days, but is only an estimation.      The patient's functional status and clinical status is unchanged from preadmission assessment and the patient continues appropriate for acute inpatient rehabilitation.  Goal is for home with outpatient   therapies.  Barrier to discharge:.  Mobility and self-care endurance- work on condition, transfers, progressive ambulation, activity daily living to overcome.        2/6/2022:    Reviewed medications, vital signs, and recent lab work. Progressing well. Continue comprehensive inpatient rehabilitation program.      Arnie Rodrigues MD      During rounds, used appropriate personal protective equipment including mask and gloves.  Additional gown if indicated.  Mask used was standard procedure mask. Appropriate PPE was worn during the entire visit.  Hand hygiene was completed before and after.

## 2022-02-06 NOTE — PROGRESS NOTES
Inpatient Rehabilitation Plan of Care Note    Plan of Care  Care Plan Reviewed - No updates at this time.    Safety    Performed Intervention(s)  Falls protocal  Yellow socks      Sphincter Control    Performed Intervention(s)  I and O  Btrm schedule  Wound ostomy consult for RNs      Psychosocial    Performed Intervention(s)  [place] consult encouraged  Provide emotional support    Signed by: Laxmi Cristobal RN

## 2022-02-06 NOTE — PLAN OF CARE
Problem: Rehabilitation (IRF) Plan of Care  Goal: Plan of Care Review  Outcome: Ongoing, Progressing  Flowsheets (Taken 2/6/2022 0233)  Progress: no change  Outcome Summary: Patient A&Ox4, irritable because of 'burning' pain to abdominal ostomy site. Dressing appreared to be intact, peaked at one side of abdominal wound to make sure no BM is leaking through and appeared to be clean. Pt threatened to rip off ostomy bag because of the pain, this RN offered to peak under one side of ostomy bag to reassure no leakage but pt refused got afraid it would hurt more. Pain medication given and rinsed bag to see if it would give relief. BP running low, HR tachycardic which is his baseline. Pt refused to be turned.  Plan of Care Reviewed With: patient

## 2022-02-06 NOTE — PROGRESS NOTES
Inpatient Rehabilitation Plan of Care Note    Plan of Care  Care Plan Reviewed - No updates at this time.    Safety    Performed Intervention(s)  Falls protocal  Yellow socks      Sphincter Control    Performed Intervention(s)  I and O  Btrm schedule  Wound ostomy consult for RNs      Psychosocial    Performed Intervention(s)  [place] consult encouraged  Provide emotional support    Signed by: Sony Giles RN

## 2022-02-07 LAB
ALBUMIN SERPL-MCNC: 2.9 G/DL (ref 3.5–5.2)
ANION GAP SERPL CALCULATED.3IONS-SCNC: 8.6 MMOL/L (ref 5–15)
BASOPHILS # BLD AUTO: 0.04 10*3/MM3 (ref 0–0.2)
BASOPHILS NFR BLD AUTO: 0.6 % (ref 0–1.5)
BUN SERPL-MCNC: 15 MG/DL (ref 8–23)
BUN/CREAT SERPL: 16.5 (ref 7–25)
CALCIUM SPEC-SCNC: 9 MG/DL (ref 8.6–10.5)
CHLORIDE SERPL-SCNC: 88 MMOL/L (ref 98–107)
CO2 SERPL-SCNC: 28.4 MMOL/L (ref 22–29)
CREAT SERPL-MCNC: 0.91 MG/DL (ref 0.76–1.27)
DEPRECATED RDW RBC AUTO: 47.2 FL (ref 37–54)
EOSINOPHIL # BLD AUTO: 0.05 10*3/MM3 (ref 0–0.4)
EOSINOPHIL NFR BLD AUTO: 0.7 % (ref 0.3–6.2)
ERYTHROCYTE [DISTWIDTH] IN BLOOD BY AUTOMATED COUNT: 15.7 % (ref 12.3–15.4)
GFR SERPL CREATININE-BSD FRML MDRD: 84 ML/MIN/1.73
GLUCOSE SERPL-MCNC: 133 MG/DL (ref 65–99)
HCT VFR BLD AUTO: 33.1 % (ref 37.5–51)
HGB BLD-MCNC: 10.3 G/DL (ref 13–17.7)
IMM GRANULOCYTES # BLD AUTO: 0.03 10*3/MM3 (ref 0–0.05)
IMM GRANULOCYTES NFR BLD AUTO: 0.4 % (ref 0–0.5)
LYMPHOCYTES # BLD AUTO: 2.2 10*3/MM3 (ref 0.7–3.1)
LYMPHOCYTES NFR BLD AUTO: 31.2 % (ref 19.6–45.3)
MCH RBC QN AUTO: 26.2 PG (ref 26.6–33)
MCHC RBC AUTO-ENTMCNC: 31.1 G/DL (ref 31.5–35.7)
MCV RBC AUTO: 84.2 FL (ref 79–97)
MONOCYTES # BLD AUTO: 0.94 10*3/MM3 (ref 0.1–0.9)
MONOCYTES NFR BLD AUTO: 13.3 % (ref 5–12)
NEUTROPHILS NFR BLD AUTO: 3.8 10*3/MM3 (ref 1.7–7)
NEUTROPHILS NFR BLD AUTO: 53.8 % (ref 42.7–76)
NRBC BLD AUTO-RTO: 0 /100 WBC (ref 0–0.2)
PHOSPHATE SERPL-MCNC: 3.4 MG/DL (ref 2.5–4.5)
PLATELET # BLD AUTO: 163 10*3/MM3 (ref 140–450)
PMV BLD AUTO: 10.4 FL (ref 6–12)
POTASSIUM SERPL-SCNC: 4.2 MMOL/L (ref 3.5–5.2)
RBC # BLD AUTO: 3.93 10*6/MM3 (ref 4.14–5.8)
SODIUM SERPL-SCNC: 125 MMOL/L (ref 136–145)
WBC NRBC COR # BLD: 7.06 10*3/MM3 (ref 3.4–10.8)

## 2022-02-07 PROCEDURE — 97530 THERAPEUTIC ACTIVITIES: CPT

## 2022-02-07 PROCEDURE — 97130 THER IVNTJ EA ADDL 15 MIN: CPT

## 2022-02-07 PROCEDURE — 94799 UNLISTED PULMONARY SVC/PX: CPT

## 2022-02-07 PROCEDURE — 97129 THER IVNTJ 1ST 15 MIN: CPT

## 2022-02-07 PROCEDURE — 97116 GAIT TRAINING THERAPY: CPT

## 2022-02-07 PROCEDURE — 97535 SELF CARE MNGMENT TRAINING: CPT | Performed by: OCCUPATIONAL THERAPIST

## 2022-02-07 PROCEDURE — 25010000002 ENOXAPARIN PER 10 MG: Performed by: PHYSICAL MEDICINE & REHABILITATION

## 2022-02-07 PROCEDURE — 97110 THERAPEUTIC EXERCISES: CPT | Performed by: OCCUPATIONAL THERAPIST

## 2022-02-07 PROCEDURE — 80069 RENAL FUNCTION PANEL: CPT | Performed by: PHYSICAL MEDICINE & REHABILITATION

## 2022-02-07 PROCEDURE — 97110 THERAPEUTIC EXERCISES: CPT

## 2022-02-07 PROCEDURE — 85025 COMPLETE CBC W/AUTO DIFF WBC: CPT | Performed by: PHYSICAL MEDICINE & REHABILITATION

## 2022-02-07 RX ORDER — HYDROCODONE BITARTRATE AND ACETAMINOPHEN 5; 325 MG/1; MG/1
1 TABLET ORAL ONCE
Status: COMPLETED | OUTPATIENT
Start: 2022-02-07 | End: 2022-02-07

## 2022-02-07 RX ORDER — IPRATROPIUM BROMIDE AND ALBUTEROL SULFATE 2.5; .5 MG/3ML; MG/3ML
3 SOLUTION RESPIRATORY (INHALATION) ONCE
Status: COMPLETED | OUTPATIENT
Start: 2022-02-07 | End: 2022-02-07

## 2022-02-07 RX ADMIN — HYDROCODONE BITARTRATE AND ACETAMINOPHEN 2 TABLET: 5; 325 TABLET ORAL at 05:55

## 2022-02-07 RX ADMIN — IPRATROPIUM BROMIDE AND ALBUTEROL SULFATE 3 ML: .5; 3 SOLUTION RESPIRATORY (INHALATION) at 14:42

## 2022-02-07 RX ADMIN — GLYCOPYRROLATE 1 MG: 1 TABLET ORAL at 08:29

## 2022-02-07 RX ADMIN — CEFDINIR 300 MG: 300 CAPSULE ORAL at 08:29

## 2022-02-07 RX ADMIN — LOPERAMIDE HYDROCHLORIDE 2 MG: 2 CAPSULE ORAL at 20:03

## 2022-02-07 RX ADMIN — ENOXAPARIN SODIUM 40 MG: 100 INJECTION SUBCUTANEOUS at 20:03

## 2022-02-07 RX ADMIN — GLYCOPYRROLATE 1 MG: 1 TABLET ORAL at 20:03

## 2022-02-07 RX ADMIN — LOPERAMIDE HYDROCHLORIDE 2 MG: 2 CAPSULE ORAL at 11:18

## 2022-02-07 RX ADMIN — Medication 15 G: at 08:29

## 2022-02-07 RX ADMIN — HYDROCODONE BITARTRATE AND ACETAMINOPHEN 1 TABLET: 5; 325 TABLET ORAL at 11:20

## 2022-02-07 RX ADMIN — LOPERAMIDE HYDROCHLORIDE 2 MG: 2 CAPSULE ORAL at 16:44

## 2022-02-07 RX ADMIN — AMILORIDE HYDROCLORIDE 10 MG: 5 TABLET ORAL at 08:29

## 2022-02-07 RX ADMIN — LOPERAMIDE HYDROCHLORIDE 2 MG: 2 CAPSULE ORAL at 08:29

## 2022-02-07 RX ADMIN — Medication 3 MG: at 00:46

## 2022-02-07 RX ADMIN — HYDROCODONE BITARTRATE AND ACETAMINOPHEN 2 TABLET: 5; 325 TABLET ORAL at 19:04

## 2022-02-07 RX ADMIN — CEFDINIR 300 MG: 300 CAPSULE ORAL at 20:03

## 2022-02-07 NOTE — PROGRESS NOTES
Inpatient Rehabilitation Plan of Care Note    Plan of Care  Updated Problems/Interventions  Mobility    [PT] Stairs(Active)  Current Status(02/07/2022): TBA  Weekly Goal(02/15/2022): PT only  Discharge Goal: 4, CGA, rails    [PT] Walk(Active)  Current Status(02/07/2022): 80'-160' CGA RWX  Weekly Goal(02/15/2022): SBA to BR, RWX  Discharge Goal: 200' Supervision RWX    [PT] Bed/Chair/Wheelchair(Active)  Current Status(02/07/2022): CGA RWX  Weekly Goal(02/15/2022): SBA RWX  Discharge Goal: Supervision/Mod I    [PT] Bed Mobility(Active)  Current Status(02/07/2022): SBA w/rails  Weekly Goal(02/15/2022): SBA no rails  Discharge Goal: Indep.    Signed by: Wendy Ojeda DPT

## 2022-02-07 NOTE — PROGRESS NOTES
Inpatient Rehabilitation Plan of Care Note    Plan of Care  Care Plan Reviewed - No updates at this time.    Safety    Performed Intervention(s)  Falls protocal  Yellow socks      Sphincter Control    Performed Intervention(s)  I and O  Btrm schedule  Wound ostomy consult for RNs      Psychosocial    Performed Intervention(s)  [place] consult encouraged  Provide emotional support    Signed by: Kathryn Peña RN

## 2022-02-07 NOTE — NURSING NOTE
Dr. Arnie Rodirgues was called to give CXR report from patient (Rohan Calvo). Orders received. Dr. Rodrigues needs CXR clarified from the radiology report. I talked to Nancy in Radiology # [id number], & She said that Dr. Eric Busch was already gone for the day, but that Dr. Lesly Aguirre was on call. Nancy talked to Dr. Aguirre, but she said that hasn't read the report, so she can't clarify what was seen, & to have Dr. Busch clarify report in am. I asked Nancy to leave a message for him in the morning.  Maren GIRON

## 2022-02-07 NOTE — PROGRESS NOTES
Adult Nutrition  Assessment/PES    Patient Name:  Arnie Calvo  YOB: 1960  MRN: [id number]  Admit Date:  2/3/2022    Assessment Date:  2/7/2022    Comments:  Intake ~50% at meals. Still dealing with some early satiety. Supplements TID with meals + at HS. Now on 1500 cc fluid restriction d/t Na level. Cont to monitor.     Reason for Assessment     Row Name 02/07/22 1412          Reason for Assessment    Reason For Assessment follow-up protocol                Nutrition/Diet History     Row Name 02/07/22 1412          Nutrition/Diet History    Typical Food/Fluid Intake PO ~50%. Drinks maureen boost. Burning at stoma site noted.                  Labs/Tests/Procedures/Meds     Row Name 02/07/22 1412          Labs/Procedures/Meds    Lab Results Reviewed reviewed     Lab Results Comments Na 125, Glu 133, Hgb 10.3            Diagnostic Tests/Procedures    Diagnostic Test/Procedure Reviewed reviewed     Diagnostic Test/Procedures Comments chest xray: 2/7 - L lung base increased density            Medications    Pertinent Medications Reviewed reviewed     Pertinent Medications Comments abx, lovenox, robinul, imodium, urea                Physical Findings     Row Name 02/07/22 1414          Physical Findings    Overall Physical Appearance on oxygen therapy     Gastrointestinal colostomy     Skin surgical incision; non-healing wound(s)  abdomen                  Nutrition Prescription Ordered     Row Name 02/07/22 1415          Nutrition Prescription PO    Current PO Diet Regular     Supplement Boost Plus (Ensure Enlive, Ensure Plus)     Supplement Frequency 3 times a day; Snack time     Snack Times Bedtime  boost pudding     Common Modifiers Fluid Restriction     Fluid Restriction mL per Day 1500 mL                Evaluation of Received Nutrient/Fluid Intake     Row Name 02/07/22 1415          PO Evaluation    % PO Intake 50%                     Problem/Interventions:           Intervention Goal     Row Name  02/07/22 1416          Intervention Goal    General Disease management/therapy; Reduce/improve symptoms; Meet nutritional needs for age/condition     PO Tolerate PO; Increase intake; PO intake (%)     PO Intake % 75 %     Weight Appropriate weight gain                Nutrition Intervention     Row Name 02/07/22 1416          Nutrition Intervention    RD/Tech Action Advise alternate selection; Interview for preference; Menu provided; Encourage intake; Supplement provided; Follow Tx progress; Care plan reviewd                  Education/Evaluation     Row Name 02/07/22 1416          Monitor/Evaluation    Monitor Per protocol; I&O; PO intake; Supplement intake; Pertinent labs; Skin status; Symptoms                 Electronically signed by:  Nancy Lee RD  02/07/22 15:08 EST

## 2022-02-07 NOTE — PROGRESS NOTES
LOS: 4 days   Patient Care Team:  Zechariah Fatima MD as PCP - General  Richard Heller MD as PCP - Family Medicine (Pulmonary Disease)  John Bowles MD as Consulting Physician (Gastroenterology)  Hermes Shabazz MD as Consulting Physician (Urology)  Osmar Carranza MD as Consulting Physician (Cardiology)      LEDA TURNER  1960    Chief Complaint: Immobility syndrome  Impaired mobility/impaired self-care/impaired endurance  Ileostomy takedown , cholecystectomy, incisional hernia repair December 7, 2021.  Exploratory laparotomy with end colostomy partial colon resection December 29, 2021  Ostomy-on Imodium/glycopyrrolate  Anxiety  Anemia  Hyponatremia        Subjective     Started on cefdinir and given breathing treatment yesterday.     Objective     Vitals:    02/07/22 0610   BP: 101/73   Pulse: 72   Resp: 18   Temp: 98.2 °F (36.8 °C)   SpO2: 93%       PHYSICAL EXAM:   MENTAL STATUS -  AWAKE / ALERT  HEENT-    LUNGS - CTA, NO WHEEZES, RALES OR RHONCHI  HEART-tachycardia    ABD - NORMOACTIVE BOWEL SOUNDS, SOFT, NT.  Ostomy with liquid brown output.  Dressings in place at right and left abdomen  EXT - NO EDEMA OR CYANOSIS  NEURO -oriented x4.  MOTOR EXAM - RUE/RLE 5/5. LUE/LLE 5/5.           MEDICATIONS  Scheduled Meds:aMILoride, 10 mg, Oral, Daily  carvedilol, 12.5 mg, Oral, Daily Before Supper  cefdinir, 300 mg, Oral, Q12H  enoxaparin, 40 mg, Subcutaneous, Q24H  glycopyrrolate, 1 mg, Oral, BID  loperamide, 2 mg, Oral, 4x Daily AC & at Bedtime  melatonin, 3 mg, Oral, Nightly  Urea, 15 g, Oral, Daily      Continuous Infusions:   PRN Meds:.•  acetaminophen  •  Glycerin-Hypromellose-  •  HYDROcodone-acetaminophen  •  simethicone      RESULTS  No results found for: POCGLU  Results from last 7 days   Lab Units 02/07/22  0656 02/04/22  0811 02/02/22  1141   WBC 10*3/mm3 7.06 9.87 9.61   HEMOGLOBIN g/dL 10.3* 10.9* 11.2*   HEMATOCRIT % 33.1* 34.3* 35.2*   PLATELETS 10*3/mm3 163 167 157     Results  from last 7 days   Lab Units 02/07/22  0656 02/06/22  0445 02/05/22  0745 02/03/22  1213 02/02/22  1141   SODIUM mmol/L 125* 126* 126*   < > 127*   POTASSIUM mmol/L 4.2 4.3 4.1   < > 3.8   CHLORIDE mmol/L 88* 91* 90*   < > 93*   CO2 mmol/L 28.4 27.9 27.3   < > 27.5   BUN mg/dL 15 17 16   < > 16   CREATININE mg/dL 0.91 0.89 0.88   < > 0.91   CALCIUM mg/dL 9.0 9.1 9.0   < > 9.0   BILIRUBIN mg/dL  --   --   --   --  0.7   ALK PHOS U/L  --   --   --   --  90   ALT (SGPT) U/L  --   --   --   --  22   AST (SGOT) U/L  --   --   --   --  28   GLUCOSE mg/dL 133* 102* 104*   < > 114*    < > = values in this interval not displayed.           Ref. Range 2/2/2022 11:41   Magnesium Latest Ref Range: 1.6 - 2.4 mg/dL 1.8   Prealbumin Latest Ref Range: 20.0 - 40.0 mg/dL 10.2 (L)      Chest x-ray February 6  XR CHEST 1 VW-  02/06/2022     HISTORY: Shortness of breath.     Heart size is mildly enlarged. The left hemidiaphragm is partially  obscured likely from small pleural effusion with some mild atelectasis  and/or pneumonia. Left upper lung and right lung appear clear.     Cardiac pacemaker seen in good position.     IMPRESSION:  1. Mild cardiomegaly.  2. Increased density in the left lung base may represent combination of  small amount of left pleural effusion with some mild atelectasis and/or  pneumonia. The left base may have cleared slightly since the 01/23/2022  study.         Assessment/Plan     Debility      Chief Complaint: Immobility syndrome  Impaired mobility/impaired self-care/impaired endurance     Ileostomy takedown , cholecystectomy, incisional hernia repair December 7, 2021.  Exploratory laparotomy with end colostomy partial colon resection December 29, 2021     Ostomy-on Imodium/glycopyrrolate  February 4-continue Robinul twice daily for now, but decreased Imodium from 2 tablets to 1 tablet p.o. before every meal and at bedtime.     Abdominal wound care  February 4-the ostomy involves the midline wound.  His wound  is overall getting smaller per the ostomy wound care team.  On Monday ostomy wound care team will try to get into smaller ostomy pouch to isolate the ostomy from the wound.     Anxiety-would presently hold on adding any benzodiazepine given his multiple medical issues.     Anemia     Hyponatremia  February 4-sodium was 127 on February 2, 130 on February 3, decreased 124 on February 4.  Will consult nephrology for evaluation  Feb 7 -  Continue UREA 15 gr daily and placed on fluid restriction 1.5 liters . TRIAL amiloride 10  mg daily ,      Congestive heart failure/tachycardia-on Coreg 12.5 mg for supper.  Blood pressure low at times.  Parameters added.       Nonischemic cardiomyopathy ejection fraction 20%     DVT prophylaxis-Lovenox/SCDs     Impaired nutritional status-supplements per dietitian to follow.  Recheck prealbumin around February 16     Pulmonary- using  O2 2 L nasal cannula at night  Feb 7 - Started on cefdinir and given  breathing treatment yesterday.  He feels breathing treatment help bring up thick secretions.  He is on glycopyrrolate which probably thickens at secretions.  Tachycardia yesterday did not appear to correlate with the time of the breathing treatment as his pulse actually went down after the breathing treatment.  Will give another breathing treatment today    Insomnia-melatonin added           Now admit for comprehensive acute inpatient rehabilitation .  This would be an interdisciplinary program with physical therapy 1.5 hour,  occupational therapy 1.5 hour,  5 days a week.  Rehabilitation nursing for carryover, monitoring of cardiac and intestinal   status, bowel and bladder, and skin  Ongoing physician follow-up.  Weekly team conferences.  Goals are to achieve a level of supervision modified independent with  mobility and self-care and improved endurance.   Rehabilitation prognosis fair.  Medical prognosis defer to general surgery.  Estimated length of stay is approximately 10 days,  but is only an estimation.      The patient's functional status and clinical status is unchanged from preadmission assessment and the patient continues appropriate for acute inpatient rehabilitation.  Goal is for home with outpatient   therapies.  Barrier to discharge:.  Mobility and self-care endurance- work on condition, transfers, progressive ambulation, activity daily living to overcome.            Zechariah Pearson MD      During rounds, used appropriate personal protective equipment including mask and gloves.  Additional gown if indicated.  Mask used was standard procedure mask. Appropriate PPE was worn during the entire visit.  Hand hygiene was completed before and after.

## 2022-02-07 NOTE — PROGRESS NOTES
[facility] Progress Note      Patient Name: Arnie Calvo  : 1960  MRN: [id number]  Primary Care Physician:  Zechariah Fatima MD  Date of admission: 2/3/2022    Subjective     Interval History:     Patient lying in bed  Having some SOB   Complaining about caleb-ostomy  burning sensation managed with narcotics .   Patient had CXR done and started on cefdinir   Urinating spontaneously    Review of Systems:   As noted above    Objective     Vitals:   Temp:  [97.8 °F (36.6 °C)-99.2 °F (37.3 °C)] 98.2 °F (36.8 °C)  Heart Rate:  [] 72  Resp:  [18-20] 18  BP: ()/(73-78) 101/73  Flow (L/min):  [2] 2    Intake/Output Summary (Last 24 hours) at 2022 0754  Last data filed at 2022 0554  Gross per 24 hour   Intake 920 ml   Output 575 ml   Net 345 ml       Physical Exam:      Constitutional: Awake, on supplemental oxygen chronically ill and deconditioned  HEENT: Sclera anicteric, no conjunctival injection  Neck: Supple, no thyromegaly, no lymphadenopathy, trachea at midline, no JVD  Respiratory: Clear to auscultation bilaterally, nonlabored respiration, w occasional crackles   Cardiovascular: RRR, CLARISSA grade III   Gastrointestinal: Positive bowel sounds, colostomy  bag in place  : No palpable bladder  Musculoskeletal: No edema, no clubbing or cyanosis  Psychiatric: Appropriate affect, cooperative  Neurologic: Oriented x3, moving all extremities, normal speech and mental status  Skin: Warm and dry         Scheduled Meds:     aMILoride, 10 mg, Oral, Daily  carvedilol, 12.5 mg, Oral, Daily Before Supper  cefdinir, 300 mg, Oral, Q12H  enoxaparin, 40 mg, Subcutaneous, Q24H  glycopyrrolate, 1 mg, Oral, BID  loperamide, 2 mg, Oral, 4x Daily AC & at Bedtime  melatonin, 3 mg, Oral, Nightly  Urea, 15 g, Oral, Daily      IV Meds:        Results Reviewed:   I have personally reviewed the results from the time of this admission to 2022 07:54 EST     Results from last 7 days   Lab  Units 02/06/22  0445 02/05/22  0745 02/04/22  0811 02/03/22  1213 02/02/22  1141   SODIUM mmol/L 126* 126* 124*   < > 127*   POTASSIUM mmol/L 4.3 4.1 4.1   < > 3.8   CHLORIDE mmol/L 91* 90* 89*   < > 93*   CO2 mmol/L 27.9 27.3 27.1   < > 27.5   BUN mg/dL 17 16 17   < > 16   CREATININE mg/dL 0.89 0.88 1.07   < > 0.91   CALCIUM mg/dL 9.1 9.0 9.0   < > 9.0   BILIRUBIN mg/dL  --   --   --   --  0.7   ALK PHOS U/L  --   --   --   --  90   ALT (SGPT) U/L  --   --   --   --  22   AST (SGOT) U/L  --   --   --   --  28   GLUCOSE mg/dL 102* 104* 118*   < > 114*    < > = values in this interval not displayed.       Estimated Creatinine Clearance: 95.8 mL/min (by C-G formula based on SCr of 0.89 mg/dL).    Results from last 7 days   Lab Units 02/06/22  0445 02/05/22  0745 02/02/22  1141   MAGNESIUM mg/dL  --   --  1.8   PHOSPHORUS mg/dL 3.6 3.7 3.0       Results from last 7 days   Lab Units 02/05/22  0745   URIC ACID mg/dL 6.4       Results from last 7 days   Lab Units 02/04/22  0811 02/02/22  1141   WBC 10*3/mm3 9.87 9.61   HEMOGLOBIN g/dL 10.9* 11.2*   PLATELETS 10*3/mm3 167 157     Images     XR CHEST 1 VW-  02/06/2022     HISTORY: Shortness of breath.     Heart size is mildly enlarged. The left hemidiaphragm is partially  obscured likely from small pleural effusion with some mild atelectasis  and/or pneumonia. Left upper lung and right lung appear clear.     Cardiac pacemaker seen in good position.     IMPRESSION:  1. Mild cardiomegaly.  2. Please density in the left lung base may represent combination of small amount of left pleural effusion with some mild atelectasis and/or pneumonia. The left base may have cleared slightly since the 01/23/2022  study.        Assessment / Plan     ASSESSMENT:    -Acute on chronic hypotonic hyponatremia.  Cortisol 13 . TSH normal , Urine Osmo 284. Urine Sodium < 20 . Serum osmo  (273) , likely from CHF based on studies   Continue UREA 15 gr daily and placed on fluid restriction 1.5 liters  ". TRIAL amiloride 10  mg daily ,  ( increased today )   -Dilated cardiomyopathy w / EF 20 %  , w/o signs of decompensation ,  Currently stable . Starting sodium chloride tablets but likely will need to be adjusted based on signs of volume overload.  -Physical deconditioning.  Working w/ PT and OT   - Pneumonia . Started on cefnidir .CXR \"   left mild atelectasis and/or pneumonia. \"    PLAN:    -Follow renal function closely  -Continue   UREA 15 gr daily and Fluid restriction 1.5 liters   increased AMILORIDE 10 mg daily , allergic to SULFAS that limits use of some diuretics   - Awaiting lab work today , to be finalized   - Order incentive spirometer for atelectasis .   -Avoid nephrotoxins  -Adjust medications to GFR     Discussed with nursing staff    Thank you for involving us in the care of Arnie JAZMÍN Calvo.  Please feel free to call with any questions.    Joselito Bates MD  02/07/22  07:54 EST    [facility]  [phone]                 "

## 2022-02-07 NOTE — NURSING NOTE
02/07/22 0951   Wound 12/25/21 0809 abdomen Other (comment)   Placement Date/Time: (c) 12/25/21 0809   Location: abdomen  Primary Wound Type: (c) Other (comment)   Base moist; pink  (cobblestone appearance of tissue, clear stitch in wound bed)   Periwound intact; dry   Periwound Temperature warm   Periwound Skin Turgor soft   Edges open   Drainage Characteristics/Odor creamy; yellow   Drainage Amount small   Care, Wound cleansed with; sterile normal saline   Dressing Care dressing changed; silver impregnated; hydrofiber; silicone; border dressing   Periwound Care barrier film applied   Wound 12/29/21 1518 midline abdomen Incision   Placement Date/Time: 12/29/21 1518   Orientation: midline  Location: abdomen  Primary Wound Type: Incision   Base moist; pink  (pale pink tissue, stoma located in proximal wound)   Periwound excoriated; moist  (proximal wound edges around stoma are denuded and painful)   Periwound Temperature warm   Periwound Skin Turgor soft   Edges open   Tunneling [Depth (cm)/Location] 4cms at 1 o'clock   Drainage Characteristics/Odor green  (stool in wound with fistula pouch)   Drainage Amount other (see comments)  (stool)   Care, Wound cleansed with; sterile normal saline  (denuded wound edges soaked with domeboros solution)   Dressing Care dressing changed; silver impregnated; hydrofiber; silicone; border dressing  (attempted to isolate the stoma and do a dressing to the wound itself, tunnel was packed with joycelyn and covered with nacho paste)   Periwound Care topical treatment applied; barrier film applied  (crusted with stoma powder and barrier spray, marathon applied to denuded skin)   Wound 12/07/21 0757 Right abdomen Incision   Placement Date/Time: 12/07/21 0757   Present on Hospital Admission: No  Side: Right  Location: abdomen  Primary Wound Type: Incision   Base moist; pink  (cobblestone appearance to wound bed)   Periwound intact; dry   Periwound Temperature warm   Periwound Skin Turgor  soft   Edges open   Drainage Characteristics/Odor creamy; yellow   Drainage Amount moderate   Care, Wound cleansed with; sterile normal saline   Dressing Care dressing changed; silver impregnated; hydrofiber; silicone; border dressing   Periwound Care barrier film applied     CWON note: pt seen for follow up dressing change/ pouch change. Nacho pouch that was placed Friday is dry and intact with excellent seal, but pt is c/o of burning around skin edges.      Today we attempted to isolate the stoma in the proximal midline wound bed and place a dressing to the wound, while pouching the stoma.  Wound edges remain tender and red, today we applied Domeboro soaks x15 minutes, prior to crusting with stoma powder, barrier spray and then applying marathon.  The tunnel at 1 o'clock was lightly packed with Caitlin and covered with nacho paste in an effort to avoid fecal contamination of the tunnel. Half of an adapt convex barrier ring with nacho paste was used in the wound base at distal stoma edge in an attempt to create a pouching surface at the distal aspect of the stoma.  Stoma is below skin level and is causing peristomal skin to be denuded and painful. After the skin was treated (as stated above), a 2-piece soft convex red barrier and seal were placed over the stoma with a drainable pouch and stoma belt. It appeared that we were able to get a good seal. The remaining midline wound was packed with Opticel ag and covered with optifoam (this was done prior to placing the ostomy appliance). Pt may require a proton pump inhibitor or H2 receptor antagonist to help with the burning pain around the stoma. I discussed this with the RN and she will ask MD.      Wounds to R and L abdomen will be managed with Opticel ag  and covered with optifoam, to be changed 2x week with pouch change. RLQ wound has a deeper central area where the yellow tissue has debrided, will continue to monitor this area

## 2022-02-07 NOTE — PROGRESS NOTES
Case Management  Inpatient Rehabilitation Plan of Care and Discharge Plan Note    Rehabilitation Diagnosis:  Branch  Date of Onset:  Michelle    Medical Summary:  Branch  Past Medical History: Branch    Plan of Care  Updated Problems/Interventions  Field    Expected Intensity:  Branch  Interdisciplinary Team:  Michelle  Estimated Length of Stay/Anticipated Discharge Date: Branch  Anticipated Discharge Destination:  Anticipated discharge destination from inpatient rehabilitation is community  discharge with assistance. Patient lives with wife in one story home. No step to  enter.  D/C plan is home with wife. Wife works from home.      Based on the patient's medical and functional status, their prognosis and  expected level of functional improvement is:  Michelle    Signed by: ARYA Hoover

## 2022-02-07 NOTE — PROGRESS NOTES
Inpatient Rehabilitation Functional Measures Assessment and Plan of Care    Plan of Care  Updated Problems/Interventions  Mobility    [OT] Toilet Transfers(Active)  Current Status(02/07/2022): est CGA  Weekly Goal(02/11/2022): SBA  Discharge Goal: SBA    [OT] Tub/Shower Transfers(Active)  Current Status(02/07/2022): est CGA  Weekly Goal(02/11/2022): SBA  Discharge Goal: SBA        Self Care    [OT] Bathing(Active)  Current Status(02/07/2022): Min  Weekly Goal(02/11/2022): CGA  Discharge Goal: SBA    [OT] Dressing (Lower)(Active)  Current Status(02/07/2022): dep socks, didn't have pants  Weekly Goal(02/11/2022): min  Discharge Goal: SBA    [OT] Dressing (Upper)(Active)  Current Status(02/07/2022): SBA gown  Weekly Goal(02/11/2022): SBA  Discharge Goal: mod I    [OT] Grooming(Active)  Current Status(02/07/2022): SBA  Weekly Goal(02/11/2022): SBA  Discharge Goal: mod I    [OT] Toileting(Active)  Current Status(02/07/2022): Min  Weekly Goal(02/11/2022): CGA  Discharge Goal: SBA    Functional Measures  ROSE Eating:  Branch  ROSE Grooming: Branch  ROSE Bathing:  Branch  ROSE Upper Body Dressing:  Branch  ROSE Lower Body Dressing:  Branch  ROSE Toileting:  Branch    ROSE Bladder Management  Level of Assistance:  Branch  Frequency/Number of Accidents this Shift:  Branch    ROSE Bowel Management  Level of Assistance: Branch  Frequency/Number of Accidents this Shift: Branch    ROSE Bed/Chair/Wheelchair Transfer:  Branch  ROSE Toilet Transfer:  Branch  ROSE Tub/Shower Transfer:  Branch    Previously Documented Mode of Locomotion at Discharge: Field  ROSE Expected Mode of Locomotion at Discharge: Branch  ROSE Walk/Wheelchair:  Branch  ROSE Stairs:  Branch    ROSE Comprehension:  Branch  ROSE Expression:  Branch  ROSE Social Interaction:  Branch  [facility] Problem Solving:  Branch  ROSE Memory:  Branch    Therapy Mode Minutes  Occupational Therapy: Branch  Physical Therapy: Branch  Speech Language Pathology:  Branch    Signed by: Lyndsey Zeng  OTR/L

## 2022-02-07 NOTE — PROGRESS NOTES
Inpatient Rehabilitation Plan of Care Note    Plan of Care  Care Plan Reviewed - No updates at this time.    Psychosocial    [RN] Coping/Adjustment(Active)  Current Status(02/07/2022): Pt at risk for coping issues  Weekly Goal(02/15/2022): Pt will make staff aware of concerns  Discharge Goal: NO coping problems    Performed Intervention(s)  Mimi consult encouraged  Provide emotional support      Safety    [RN] Potential for Injury(Active)  Current Status(02/07/2022): Pt is at risk for falls.  Weekly Goal(02/15/2022): Will use call bell 100% of the time  Discharge Goal: No injury    Performed Intervention(s)  Falls protocal  Yellow socks      Sphincter Control    [RN] Bladder Management(Active)  Current Status(02/07/2022): Pt cont of urine upon admission  Weekly Goal(02/15/2022): Remain cont  Discharge Goal: 100% cont of urine    [RN] Bowel Management(Active)  Current Status(02/07/2022): Pt has an ostomy in which skin RNs change  Weekly Goal(02/15/2022): Ostomy is fuctional  Discharge Goal: Pt aware of how to manage ostomy    Performed Intervention(s)  I and O  Btrm schedule  Wound ostomy consult for RNs    Signed by: Cecile Apodaca RN

## 2022-02-07 NOTE — PAYOR COMM NOTE
"Good afternoon!    AUTH # [id number]    NPI # [id number]    Included are current therapy notes. The patient's first team conference is planned for tomorrow, 2/8. The patient is participating and progressing with therapies. Please review for authorization of continued stay.    Thank you!    Shahriar Petersen RN  p   f     Leda Turner (61 y.o. Male)             Date of Birth Social Security Number Address Home Phone MRN    1960  [id number] [address] [facility] [id number] [phone] [id number]    Catholic Marital Status             Non-Taoism        Admission Date Admission Type Admitting Provider Attending Provider Department, Room/Bed    2/3/22 Elective Zechariah Pearson MD Gormley, John Michael, MD [facility] REHABILITATION, 4403/1    Discharge Date Discharge Disposition Discharge Destination                         Attending Provider: Zechariah Pearson MD    Allergies: Sulfamethoxazole-trimethoprim, Trimethoprim, Adhesive Tape, Shellfish-derived Products    Isolation: None   Infection: None   Code Status: CPR   Advance Care Planning Activity    Ht: 175.3 cm (69\")   Wt: 90.6 kg (199 lb 11.8 oz)    Admission Cmt: None   Principal Problem: None                Active Insurance as of 2/3/2022     Primary Coverage     Payor Plan Insurance Group Employer/Plan Group    MISC PHCS MISC [id number]     Coverage Address Coverage Phone Number Coverage Fax Number Effective Dates    [address] [address] [phone]  11/1/2021 - None Entered    [facility] [id number]       Subscriber Name Subscriber Birth Date Member ID       LEDA TURNER 1960 [id number]                 Emergency Contacts      (Rel.) Home Phone Work Phone Mobile Phone    Kiara Turner (Spouse) [phone] -- [phone]               Physical Therapy Notes (most recent note)      Wendy Ojeda, PT at 02/07/22 1300  Version 1 of 1         Inpatient Rehabilitation - Physical Therapy " Treatment Note       [facility]     Patient Name: Arnie Calvo  : 1960  MRN: [id number]    Today's Date: 2022                    Admit Date: 2/3/2022      Visit Dx:   No diagnosis found.    Patient Active Problem List   Diagnosis   • Cardiomyopathy (HCC)   • Paroxysmal VT (HCC)   • Palpitations   • PVC's (premature ventricular contractions)   • Exacerbation of Crohn's disease of small intestine (HCC)   • Adjustment disorder with depressed mood   • Ankylosis of spine   • Crohn's disease with complication (HCC)   • Diabetes mellitus (HCC)   • Essential hypertension   • External hemorrhoids   • Genital herpes simplex   • Gout   • Insomnia   • Iron deficiency   • Prostate mass   • Recurrent aphthous ulcer   • Asteatosis cutis   • History of pneumonia   • Abnormal CXR (chest x-ray)   • RUQ abdominal pain   • Crohn's disease of small intestine with other complication (HCC)   • Right lower quadrant abdominal pain   • Enteritis   • Mass-like inflammation at terminal ileum   • Crohn's disease (HCC)   • Ileostomy in place (HCC)   • Paroxysmal ventricular tachycardia (HCC)   • Chronic systolic heart failure (HCC)   • Cardiomyopathy, nonischemic (HCC)   • Orthostasis   • Iron deficiency anemia   • Orthostatic hypotension   • Crohn's disease of colon with complication (HCC)   • Dehisced intestinal anastomosis   • History of creation of ostomy (HCC)   • Hypokalemia   • Hypotension, chronic   • Malnutrition of moderate degree (HCC)   • S/P colectomy   • Fatty liver disease, nonalcoholic   • Cholecystitis   • Debility       Past Medical History:   Diagnosis Date   • Acute pain of left hip    • Adjustment disorder with depressed mood    • Anesthesia complication     SPINAL [id number] LOWER SPINE   • Ankylosing spondylitis of cervical region (HCC)    • Ankylosis of spine    • Arthritis    • Asthma    • Cardiomyopathy (HCC)     sees Dr. Reyes; NICM/ (-) cath, EF 25-35%; has ICD   • CHF (congestive  heart failure) (HCC)    • Cholecystitis    • Crohn's disease (HCC)    • Depression    • Diabetes mellitus (HCC)     Diet controlled.   • Dysthymic disorder 2012   • Dysuria    • Essential hypertension    • External hemorrhoids    • Genital herpes    • Gout    • Herpes genitalia    • History of MRSA infection 2000?    FINGERTIP-TX WITH ANTIBIOTICS-[facility]-NO CURRENT OPEN WOUND OR TREATMENT 12/3/21   • Ileostomy in place (HCC)    • Insomnia    • Iron deficiency    • Paroxysmal ventricular tachycardia (HCC)    • PONV (postoperative nausea and vomiting)    • Presence of combination internal cardiac defibrillator (ICD) and pacemaker    • Prostate nodule    • Recurrent canker sores    • Thoracic back pain    • Urinary hesitancy    • Xerosis cutis        Past Surgical History:   Procedure Laterality Date   • ABDOMINAL SURGERY     • CARDIAC CATHETERIZATION     • CARDIAC DEFIBRILLATOR PLACEMENT      REPLACEMENT-Had [place] lead that was fractured.  Lead was tied off.  New lead and new device implanted.   • CARDIAC ELECTROPHYSIOLOGY PROCEDURE N/A 1/3/2019    Procedure: ICD DC generator change  MEDTRONIC;  Surgeon: Zechariah Randolph MD;  Location: [facility] CATH INVASIVE LOCATION;  Service: Cardiology   • CHOLECYSTECTOMY N/A 12/7/2021    Procedure: CHOLECYSTECTOMY;  Surgeon: Jeovany Mott MD;  Location: [facility] OR;  Service: General;  Laterality: N/A;   • COLON RESECTION N/A 12/29/2021    Procedure: PARTIAL COLECTOMY WITH OSTOMY;  Surgeon: Jeovany Mott MD;  Location: [facility] MAIN OR;  Service: General;  Laterality: N/A;   • COLON RESECTION WITH ILEOSTOMY N/A 2018   • COLONOSCOPY  04/03/2015    abnormal cecum, three polypoid masses, pre cancerous vs crohns, IH, tics, hyperplastic polyp   • COLONOSCOPY N/A 3/17/2017    polypoid masses, tics, IH, polyp   • EXPLORATORY LAPAROTOMY W/ BOWEL RESECTION  07/2018    open resection of ileum with creation of end ileostomy   • ILEOSTOMY CLOSURE  07/2018   • ILEOSTOMY  CLOSURE N/A 12/7/2021    Procedure: ILEOSTOMY TAKEDOWN WITH RESECTION, PARASTOMAL HERNIA REPAIR;  Surgeon: Jeovany Mott MD;  Location: [facility] MAIN OR;  Service: General;  Laterality: N/A;   • PACEMAKER IMPLANTATION         PT ASSESSMENT (last 12 hours)     IRF PT Evaluation and Treatment     Row Name 02/07/22 1115          PT Time and Intention    Document Type daily treatment  -EE     Mode of Treatment physical therapy; individual therapy  -EE     Patient/Family/Caregiver Comments/Observations Pt supine in bed in AM, agreeable to bed exercises but declined OOB activity due to pain at ostomy site. Pt agreeable to OOB activity in PM.  -EE     Row Name 02/07/22 1115          General Information    Existing Precautions/Restrictions fall  -EE     Row Name 02/07/22 1115          Cognition/Psychosocial    Affect/Mental Status (Cognitive) WFL  -EE     Orientation Status (Cognition) oriented x 3  -EE     Follows Commands (Cognition) follows one-step commands; over 90% accuracy; verbal cues/prompting required  -EE     Personal Safety Interventions fall prevention program maintained; gait belt; muscle strengthening facilitated; nonskid shoes/slippers when out of bed; supervised activity  -EE     Cognitive Function (Cognitive) WFL  -EE     Row Name 02/07/22 1115          Pain Scale: Numbers Pre/Post-Treatment    Pretreatment Pain Rating 8/10  -EE     Posttreatment Pain Rating 8/10  -EE     Pain Location - Orientation incisional  -EE     Pain Location abdomen  -EE     Pain Intervention(s) Emotional support; Medication (See MAR); Rest  -EE     Row Name 02/07/22 1115          Bed Mobility    Supine-Sit [place] (Bed Mobility) standby assist  -EE     Assistive Device (Bed Mobility) bed rails; head of bed elevated  -EE     Row Name 02/07/22 1115          Transfers    Bed-Chair [place] (Transfers) contact guard  -EE     Assistive Device (Bed-Chair Transfers) walker, front-wheeled  -EE     Sit-Stand [place]  (Transfers) contact guard; verbal cues  -EE     Stand-Sit [place] (Transfers) contact guard; verbal cues  -EE     Row Name 02/07/22 1115          Sit-Stand Transfer    Assistive Device (Sit-Stand Transfers) walker, front-wheeled  -EE     Row Name 02/07/22 1115          Stand-Sit Transfer    Assistive Device (Stand-Sit Transfers) walker, front-wheeled  -EE     Row Name 02/07/22 1115          Gait/Stairs (Locomotion)    [place] Level (Gait) contact guard; standby assist; verbal cues  -EE     Assistive Device (Gait) walker, front-wheeled  -EE     Distance in Feet (Gait) 160' x 1, 80' x 2  -EE     Pattern (Gait) step-through  -EE     Deviations/Abnormal Patterns (Gait) no decreased; stride length decreased  -EE     Bilateral Gait Deviations forward flexed posture; heel strike decreased  -EE     Row Name 02/07/22 1115          Safety Issues, Functional Mobility    Impairments Affecting Function (Mobility) balance; endurance/activity tolerance; pain; strength  -EE     Row Name 02/07/22 1115          Hip (Therapeutic Exercise)    Hip (Therapeutic Exercise) isometric exercises  -EE     Hip Isometrics (Therapeutic Exercise) bilateral; supine; gluteal sets; 10 repetitions; 2 sets  -EE     Hip Strengthening (Therapeutic Exercise) bilateral; supine; aBduction; aDduction; 10 repetitions  -EE     Row Name 02/07/22 1115          Knee (Therapeutic Exercise)    Knee AROM (Therapeutic Exercise) bilateral; supine; SAQ (short arc quad); heel slides; 10 repetitions  -EE     Knee Isometrics (Therapeutic Exercise) bilateral; supine; quad sets; 10 repetitions; 2 sets  -EE     Row Name 02/07/22 1115          Ankle (Therapeutic Exercise)    Ankle AROM (Therapeutic Exercise) bilateral; supine; dorsiflexion; plantarflexion; 10 repetitions; 2 sets  -EE     Row Name 02/07/22 1115          Positioning and Restraints    Pre-Treatment Position in bed  -EE     Post Treatment Position wheelchair  -EE     In Wheelchair sitting; exit  alarm on; with OT  -EE           User Key  (r) = Recorded By, (t) = Taken By, (c) = Cosigned By    Initials Name Provider Type    Wendy Holcomb, PT Physical Therapist              Wound 12/07/21 0757 Right abdomen Incision (Active)   Dressing Appearance dry; intact 02/07/22 1005   Closure [place] 02/07/22 1005   Base dressing in place, unable to visualize 02/07/22 1005   Periwound intact; dry 02/07/22 0951   Periwound Temperature warm 02/07/22 0951   Periwound Skin Turgor soft 02/07/22 0951   Edges open 02/07/22 0951   Drainage Characteristics/Odor creamy; yellow 02/07/22 0951   Drainage Amount moderate 02/07/22 0951   Care, Wound cleansed with; sterile normal saline 02/07/22 0951   Dressing Care dressing changed; silver impregnated; hydrofiber; silicone; border dressing 02/07/22 0951   Periwound Care barrier film applied 02/07/22 0951       Wound 12/25/21 0809 abdomen Other (comment) (Active)   Dressing Appearance dry; intact 02/07/22 1005   Closure [place] 02/07/22 1005   Base dressing in place, unable to visualize 02/07/22 1005   Periwound intact; dry 02/07/22 0951   Periwound Temperature warm 02/07/22 0951   Periwound Skin Turgor soft 02/07/22 0951   Edges open 02/07/22 0951   Drainage Characteristics/Odor creamy; yellow 02/07/22 0951   Drainage Amount small 02/07/22 0951   Care, Wound cleansed with; sterile normal saline 02/07/22 0951   Dressing Care dressing changed; silver impregnated; hydrofiber; silicone; border dressing 02/07/22 0951   Periwound Care barrier film applied 02/07/22 0951       Wound 12/29/21 1518 midline abdomen Incision (Active)   Dressing Appearance dry; intact 02/07/22 1005   Closure [place] 02/07/22 1005   Base dressing in place, unable to visualize 02/07/22 1005   Periwound excoriated; moist 02/07/22 0951   Periwound Temperature warm 02/07/22 0951   Periwound Skin Turgor soft 02/07/22 0951   Edges open 02/07/22 0951   Tunneling [Depth (cm)/Location] 4cms at 1 o'clock 02/07/22 0951   Drainage  Characteristics/Odor green 02/07/22 0951   Drainage Amount other (see comments) 02/07/22 0951   Care, Wound cleansed with; sterile normal saline 02/07/22 0951   Dressing Care dressing changed; silver impregnated; hydrofiber; silicone; border dressing 02/07/22 0951   Periwound Care topical treatment applied; barrier film applied 02/07/22 0951     Physical Therapy Education                 Title: PT OT SLP Therapies (Done)     Topic: Physical Therapy (Done)     Point: Mobility training (Done)     Learning Progress Summary           Patient Acceptance, E,TB, VU,NR by EE at 2/7/2022 1142    Acceptance, E,TB, VU,NR by  at 2/5/2022 1430    Acceptance, E,TB, VU,NR by EE at 2/4/2022 1408                   Point: Home exercise program (Done)     Learning Progress Summary           Patient Acceptance, E,TB, VU,NR by EE at 2/7/2022 1142    Acceptance, E,TB, VU,NR by EE at 2/4/2022 1408                   Point: Body mechanics (Done)     Learning Progress Summary           Patient Acceptance, E,TB, VU,NR by EE at 2/7/2022 1142    Acceptance, E,TB, VU,NR by EE at 2/4/2022 1408                   Point: Precautions (Done)     Learning Progress Summary           Patient Acceptance, E,TB, VU,NR by EE at 2/7/2022 1142    Acceptance, E,TB, VU,NR by EE at 2/4/2022 1408                               User Key     Initials Effective Dates Name Provider Type Discipline     06/16/21 -  Emily Watkins, PT Physical Therapist PT     06/16/21 -  Wendy Ojeda PT Physical Therapist PT                PT Recommendation and Plan    Planned Therapy Interventions (PT): balance training, bed mobility training, gait training, home exercise program, patient/family education, ROM (range of motion), postural re-education, stair training, strengthening, stretching, transfer training  Frequency of Treatment (PT): 5 times per week, 60 minutes per session  Anticipated Equipment Needs at Discharge (PT Eval): front wheeled walker                  Time  Calculation:      PT Charges     Row Name 22 1252 22 1142          Time Calculation    Start Time 1230  -EE 1100  -EE     Stop Time 1300  -EE 1130  -EE     Time Calculation (min) 30 min  -EE 30 min  -EE     PT Received On 22  -EE 22  -EE     PT - Next Appointment 22  -EE 22  -EE            Time Calculation- PT    Total Timed Code Minutes- PT 30 minute(s)  -EE 30 minute(s)  -EE           User Key  (r) = Recorded By, (t) = Taken By, (c) = Cosigned By    Initials Name Provider Type    EE Wendy Ojeda, PT Physical Therapist                Therapy Charges for Today     Code Description Service Date Service Provider Modifiers Qty    57863675929 HC GAIT TRAINING EA 15 MIN 2022 Wendy Ojeda, PT GP 1    [id number]  PT THERAPEUTIC ACT EA 15 MIN 2022 Wendy Ojeda, PT GP 1    [id number]  PT THER PROC EA 15 MIN 2022 Wendy Ojeda, PT GP 2              Patient was intermittently wearing a face mask during this therapy encounter. Therapist used appropriate personal protective equipment including mask and gloves.  Mask used was standard procedure mask. Appropriate PPE was worn during the entire therapy session. Hand hygiene was completed before and after therapy session. Patient is not in enhanced droplet precautions.         Wendy Ojeda PT  2022      Electronically signed by Wendy Ojeda PT at 22 1300          Occupational Therapy Notes (most recent note)      Didi Rod, OT at 22 1410          Inpatient Rehabilitation - Occupational Therapy Treatment Note    [facility]     Patient Name: Arnie Calvo  : 1960  MRN: [id number]    Today's Date: 2022                 Admit Date: 2/3/2022       No diagnosis found.    Patient Active Problem List   Diagnosis   • Cardiomyopathy (HCC)   • Paroxysmal VT (HCC)   • Palpitations   • PVC's (premature ventricular contractions)   • Exacerbation of Crohn's disease of small intestine (HCC)   • Adjustment  disorder with depressed mood   • Ankylosis of spine   • Crohn's disease with complication (HCC)   • Diabetes mellitus (HCC)   • Essential hypertension   • External hemorrhoids   • Genital herpes simplex   • Gout   • Insomnia   • Iron deficiency   • Prostate mass   • Recurrent aphthous ulcer   • Asteatosis cutis   • History of pneumonia   • Abnormal CXR (chest x-ray)   • RUQ abdominal pain   • Crohn's disease of small intestine with other complication (HCC)   • Right lower quadrant abdominal pain   • Enteritis   • Mass-like inflammation at terminal ileum   • Crohn's disease (HCC)   • Ileostomy in place (HCC)   • Paroxysmal ventricular tachycardia (HCC)   • Chronic systolic heart failure (HCC)   • Cardiomyopathy, nonischemic (HCC)   • Orthostasis   • Iron deficiency anemia   • Orthostatic hypotension   • Crohn's disease of colon with complication (HCC)   • Dehisced intestinal anastomosis   • History of creation of ostomy (HCC)   • Hypokalemia   • Hypotension, chronic   • Malnutrition of moderate degree (HCC)   • S/P colectomy   • Fatty liver disease, nonalcoholic   • Cholecystitis   • Debility       Past Medical History:   Diagnosis Date   • Acute pain of left hip    • Adjustment disorder with depressed mood    • Anesthesia complication     SPINAL [id number] LOWER SPINE   • Ankylosing spondylitis of cervical region (HCC)    • Ankylosis of spine    • Arthritis    • Asthma    • Cardiomyopathy (HCC)     sees Dr. Reyes; NICM/ (-) cath, EF 25-35%; has ICD   • CHF (congestive heart failure) (HCC)    • Cholecystitis    • Crohn's disease (HCC)    • Depression    • Diabetes mellitus (HCC)     Diet controlled.   • Dysthymic disorder 2012   • Dysuria    • Essential hypertension    • External hemorrhoids    • Genital herpes    • Gout    • Herpes genitalia    • History of MRSA infection 2000?    FINGERTIP-TX WITH ANTIBIOTICS-[facility]-NO CURRENT OPEN WOUND OR TREATMENT 12/3/21   • Ileostomy in place  (HCC)    • Insomnia    • Iron deficiency    • Paroxysmal ventricular tachycardia (HCC)    • PONV (postoperative nausea and vomiting)    • Presence of combination internal cardiac defibrillator (ICD) and pacemaker    • Prostate nodule    • Recurrent canker sores    • Thoracic back pain    • Urinary hesitancy    • Xerosis cutis        Past Surgical History:   Procedure Laterality Date   • ABDOMINAL SURGERY     • CARDIAC CATHETERIZATION     • CARDIAC DEFIBRILLATOR PLACEMENT      REPLACEMENT-Had [place] lead that was fractured.  Lead was tied off.  New lead and new device implanted.   • CARDIAC ELECTROPHYSIOLOGY PROCEDURE N/A 1/3/2019    Procedure: ICD DC generator change  [employer]TRONIC;  Surgeon: Zechariah Randolph MD;  Location: [facility] CATH INVASIVE LOCATION;  Service: Cardiology   • CHOLECYSTECTOMY N/A 12/7/2021    Procedure: CHOLECYSTECTOMY;  Surgeon: Jeovany Mott MD;  Location: [facility];  Service: General;  Laterality: N/A;   • COLON RESECTION N/A 12/29/2021    Procedure: PARTIAL COLECTOMY WITH OSTOMY;  Surgeon: Jeovany Mott MD;  Location: [facility];  Service: General;  Laterality: N/A;   • COLON RESECTION WITH ILEOSTOMY N/A 2018   • COLONOSCOPY  04/03/2015    abnormal cecum, three polypoid masses, pre cancerous vs crohns, IH, tics, hyperplastic polyp   • COLONOSCOPY N/A 3/17/2017    polypoid masses, tics, IH, polyp   • EXPLORATORY LAPAROTOMY W/ BOWEL RESECTION  07/2018    open resection of ileum with creation of end ileostomy   • ILEOSTOMY CLOSURE  07/2018   • ILEOSTOMY CLOSURE N/A 12/7/2021    Procedure: ILEOSTOMY TAKEDOWN WITH RESECTION, PARASTOMAL HERNIA REPAIR;  Surgeon: Jeovany Mott MD;  Location: [facility];  Service: General;  Laterality: N/A;   • PACEMAKER IMPLANTATION               IRF OT ASSESSMENT FLOWSHEET (last 12 hours)     IRF OT Evaluation and Treatment     Row Name 02/05/22 1406 02/05/22 1140       OT Time and Intention    Document Type daily treatment  -MW daily treatment   -MW    Mode of Treatment occupational therapy  -MW individual therapy; occupational therapy  -MW    Patient Effort good  -MW good  -MW    Symptoms Noted During/After Treatment increased pain  -MW increased pain  stomach pain  -MW    Row Name 02/05/22 1406 02/05/22 1140       General Information    Patient Profile Reviewed yes  -MW yes  -MW    Patient/Family/Caregiver Comments/Observations pt lying in bed with spouse present, c/o of fatigue and stomach pain  -MW pt supine in bed, NAD  -MW    Existing Precautions/Restrictions fall; other (see comments)  ostomy bag  -MW fall  ostmoy bag, no O2  -MW    Row Name 02/05/22 1406 02/05/22 1140       Cognition/Psychosocial    Affect/Mental Status (Cognitive) WFL  -MW WFL  -MW    Orientation Status (Cognition) oriented x 3  -MW oriented x 3  -MW    Follows Commands (Cognition) follows one-step commands; over 90% accuracy; verbal cues/prompting required  -MW follows one-step commands; over 90% accuracy; verbal cues/prompting required  -MW    Personal Safety Interventions fall prevention program maintained; supervised activity  -MW fall prevention program maintained; gait belt; nonskid shoes/slippers when out of bed; supervised activity  -MW    Cognitive Function (Cognitive) WFL  -MW WFL  -MW    Row Name 02/05/22 1406 02/05/22 1140       Pain Scale: Numbers Pre/Post-Treatment    Pretreatment Pain Rating 6/10  -MW 7/10  -MW    Posttreatment Pain Rating 7/10  -MW 7/10  -MW    Pain Location - Side Bilateral  -MW --    Pain Location - Orientation incisional  -MW --    Pain Location abdomen  -MW --    Row Name 02/05/22 1140          Bed Mobility    Supine-Sit [place] (Bed Mobility) standby assist; contact guard  -     Row Name 02/05/22 1140          Transfer Assessment/Treatment    Transfers bed-chair transfer; sit-stand transfer  -     Row Name 02/05/22 1140          Transfers    Bed-Chair [place] (Transfers) contact guard; verbal cues  -MW     Assistive Device  (Bed-Chair Transfers) walker, front-wheeled  -     Sit-Stand [place] (Transfers) set up; contact guard  -     Row Name 02/05/22 1140          Sit-Stand Transfer    Assistive Device (Sit-Stand Transfers) wheelchair  -     Row Name 02/05/22 1406          Shoulder (Therapeutic Exercise)    Shoulder (Therapeutic Exercise) strengthening exercise  -     Shoulder AROM (Therapeutic Exercise) left; extension; flexion; aBduction; aDduction; 10 repetitions; 2 sets  -     Shoulder Strengthening (Therapeutic Exercise) right; flexion; aBduction; extension; aDduction; horizontal aBduction/aDduction; sitting; 2 lb free weight; 10 repetitions; 2 sets  -     Row Name 02/05/22 1406          Elbow/Forearm (Therapeutic Exercise)    Elbow/Forearm (Therapeutic Exercise) strengthening exercise  -     Elbow/Forearm Strengthening (Therapeutic Exercise) bilateral; flexion; extension; supination; pronation; sitting; 2 lb free weight; 10 repetitions; 3 sets  -     Row Name 02/05/22 1406          Wrist (Therapeutic Exercise)    Wrist (Therapeutic Exercise) strengthening exercise  -     Wrist Strengthening (Therapeutic Exercise) bilateral; flexion; extension; 2 lb free weight; 2 sets; 10 repetitions  -     Row Name 02/05/22 1140          Bathing    [place] Level (Bathing) bathing skills; set up; verbal cues; minimum assist (75% patient effort)  -     Position (Bathing) sink side; supported sitting; supported standing  -     Set-up Assistance (Bathing) obtain supplies; adjust water temperature  -     Comment (Bathing) --  pt able to demo figure 4 tech to assist in washing feet while sitting supported in w/c  -     Row Name 02/05/22 1140          Upper Body Dressing    [place] Level (Upper Body Dressing) upper body dressing skills; don; front opening garment; set up assistance; supervision  -     Position (Upper Body Dressing) supported sitting  -     Set-up Assistance (Upper Body Dressing) obtain  clothing  -     Comment (Upper Body Dressing) 2 hospital gowns donned  -     Row Name 02/05/22 1140          Lower Body Dressing    [place] Level (Lower Body Dressing) doff; don; socks; minimum assist (75% patient effort)  -MW     Position (Lower Body Dressing) supported sitting  -     Comment (Lower Body Dressing) no pants here still  -MW     Row Name 02/05/22 1140          Grooming    [place] Level (Grooming) grooming skills; oral care regimen; wash face, hands; set up; standby assist  -     Position (Grooming) sink side; supported sitting  -MW     Row Name 02/05/22 1140          Toileting    Comment (Toileting) --  pt required nursing assist to empty ostomy bag this date seated EOB  -     Row Name 02/05/22 1406 02/05/22 1140       Positioning and Restraints    Pre-Treatment Position in bed  -MW in bed  -MW    Post Treatment Position bed  -MW wheelchair  -MW    In Bed supine; call light within reach; encouraged to call for assist; exit alarm on; patient within staff view  - --    In Wheelchair -- encouraged to call for assist; exit alarm on; call light within reach; sitting  -MW          User Key  (r) = Recorded By, (t) = Taken By, (c) = Cosigned By    Initials Name Effective Dates    MW Didi Rod OT 08/20/21 -                  Occupational Therapy Education                 Title: PT OT SLP Therapies (Done)     Topic: Occupational Therapy (Done)     Point: ADL training (Done)     Description:   Instruct learner(s) on proper safety adaptation and remediation techniques during self care or transfers.   Instruct in proper use of assistive devices.              Learning Progress Summary           Patient Acceptance, E,TB,D, DU,VU by  at 2/4/2022 9597    Comment: ed pt on role of OT. benefit of therapy POC w. OT. ed on safety w. ADL and tsf. ed on UE ex.                   Point: Home exercise program (Done)     Description:   Instruct learner(s) on appropriate technique for monitoring,  assisting and/or progressing therapeutic exercises/activities.              Learning Progress Summary           Patient Acceptance, E,TB,D, DU,VU by  at 2/4/2022 1455    Comment: ed pt on role of OT. benefit of therapy POC w. OT. ed on safety w. ADL and tsf. ed on UE ex.                   Point: Precautions (Done)     Description:   Instruct learner(s) on prescribed precautions during self-care and functional transfers.              Learning Progress Summary           Patient Acceptance, E,TB,D, DU,VU by  at 2/4/2022 1455    Comment: ed pt on role of OT. benefit of therapy POC w. OT. ed on safety w. ADL and tsf. ed on UE ex.                   Point: Body mechanics (Done)     Description:   Instruct learner(s) on proper positioning and spine alignment during self-care, functional mobility activities and/or exercises.              Learning Progress Summary           Patient Acceptance, E,TB,D, DU,VU by  at 2/4/2022 1455    Comment: ed pt on role of OT. benefit of therapy POC w. OT. ed on safety w. ADL and tsf. ed on UE ex.                               User Key     Initials Effective Dates Name Provider Type Discipline     06/16/21 -  Lyndsey Zeng OTR Occupational Therapist OT                    OT Recommendation and Plan                         Time Calculation:      Time Calculation- OT     Row Name 02/05/22 1230 02/05/22 1000          Time Calculation- OT    OT Start Time 1230  -MW 1000  -MW     OT Stop Time 1300  -MW 1030  -MW     OT Time Calculation (min) 30 min  -MW 30 min  -MW           User Key  (r) = Recorded By, (t) = Taken By, (c) = Cosigned By    Initials Name Provider Type     Didi Rod OT Occupational Therapist              Therapy Charges for Today     Code Description Service Date Service Provider Modifiers Qty    47550009915 HC OT SELF CARE/MGMT/TRAIN EA 15 MIN 2/5/2022 Didi Rod OT GO 2    64900849708 HC OT THER PROC EA 15 MIN 2/5/2022 Didi Rod OT GO 2                    Didi Rod OT  2022    Electronically signed by Didi Rod OT at 22 1410          Speech Language Pathology Notes (most recent note)      Kristie Luis, SLP at 22 1241          Inpatient Rehabilitation - Speech Language Pathology Treatment Note    [facility]     Patient Name: Arnie Calvo  : 1960  MRN: [id number]    Today's Date: 2022                   Admit Date: 2/3/2022       Visit Dx:    No diagnosis found.    Patient Active Problem List   Diagnosis   • Cardiomyopathy (HCC)   • Paroxysmal VT (HCC)   • Palpitations   • PVC's (premature ventricular contractions)   • Exacerbation of Crohn's disease of small intestine (HCC)   • Adjustment disorder with depressed mood   • Ankylosis of spine   • Crohn's disease with complication (HCC)   • Diabetes mellitus (HCC)   • Essential hypertension   • External hemorrhoids   • Genital herpes simplex   • Gout   • Insomnia   • Iron deficiency   • Prostate mass   • Recurrent aphthous ulcer   • Asteatosis cutis   • History of pneumonia   • Abnormal CXR (chest x-ray)   • RUQ abdominal pain   • Crohn's disease of small intestine with other complication (HCC)   • Right lower quadrant abdominal pain   • Enteritis   • Mass-like inflammation at terminal ileum   • Crohn's disease (HCC)   • Ileostomy in place (HCC)   • Paroxysmal ventricular tachycardia (HCC)   • Chronic systolic heart failure (HCC)   • Cardiomyopathy, nonischemic (HCC)   • Orthostasis   • Iron deficiency anemia   • Orthostatic hypotension   • Crohn's disease of colon with complication (HCC)   • Dehisced intestinal anastomosis   • History of creation of ostomy (HCC)   • Hypokalemia   • Hypotension, chronic   • Malnutrition of moderate degree (HCC)   • S/P colectomy   • Fatty liver disease, nonalcoholic   • Cholecystitis   • Debility       Past Medical History:   Diagnosis Date   • Acute pain of left hip    • Adjustment disorder with depressed mood    •  Anesthesia complication     SPINAL [id number] LOWER SPINE   • Ankylosing spondylitis of cervical region ([facility])    • Ankylosis of spine    • Arthritis    • Asthma    • Cardiomyopathy ([facility])     sees Dr. Reyes; NICM/ (-) cath, EF 25-35%; has ICD   • CHF (congestive heart failure) ([facility])    • Cholecystitis    • Crohn's disease ([facility])    • Depression    • Diabetes mellitus ([facility])     Diet controlled.   • Dysthymic disorder 2012   • Dysuria    • Essential hypertension    • External hemorrhoids    • Genital herpes    • Gout    • Herpes genitalia    • History of MRSA infection 2000?    FINGERTIP-TX WITH ANTIBIOTICS-[facility]-NO CURRENT OPEN WOUND OR TREATMENT 12/3/21   • Ileostomy in place ([facility])    • Insomnia    • Iron deficiency    • Paroxysmal ventricular tachycardia ([facility])    • PONV (postoperative nausea and vomiting)    • Presence of combination internal cardiac defibrillator (ICD) and pacemaker    • Prostate nodule    • Recurrent canker sores    • Thoracic back pain    • Urinary hesitancy    • Xerosis cutis        Past Surgical History:   Procedure Laterality Date   • ABDOMINAL SURGERY     • CARDIAC CATHETERIZATION     • CARDIAC DEFIBRILLATOR PLACEMENT      REPLACEMENT-Had Jude lead that was fractured.  Lead was tied off.  New lead and new device implanted.   • CARDIAC ELECTROPHYSIOLOGY PROCEDURE N/A 1/3/2019    Procedure: ICD DC generator change  [employer];  Surgeon: Zechariah Randolph MD;  Location: [facility] INVASIVE LOCATION;  Service: Cardiology   • CHOLECYSTECTOMY N/A 12/7/2021    Procedure: CHOLECYSTECTOMY;  Surgeon: Jeovany Mott MD;  Location: [facility] OR;  Service: General;  Laterality: N/A;   • COLON RESECTION N/A 12/29/2021    Procedure: PARTIAL COLECTOMY WITH OSTOMY;  Surgeon: Jeovany Mott MD;  Location: [facility] OR;  Service: General;  Laterality: N/A;   • COLON RESECTION WITH ILEOSTOMY N/A 2018   • COLONOSCOPY  04/03/2015    abnormal cecum, three polypoid masses,  pre cancerous vs crohns, IH, tics, hyperplastic polyp   • COLONOSCOPY N/A 3/17/2017    polypoid masses, tics, IH, polyp   • EXPLORATORY LAPAROTOMY W/ BOWEL RESECTION  07/2018    open resection of ileum with creation of end ileostomy   • ILEOSTOMY CLOSURE  07/2018   • ILEOSTOMY CLOSURE N/A 12/7/2021    Procedure: ILEOSTOMY TAKEDOWN WITH RESECTION, PARASTOMAL HERNIA REPAIR;  Surgeon: Jeovany Mott MD;  Location: [facility];  Service: General;  Laterality: N/A;   • PACEMAKER IMPLANTATION         SLP Recommendation and Plan                                                   SLP EVALUATION (last 72 hours)     SLP SLC Evaluation     Row Name 02/07/22 1200 02/07/22 1100 02/04/22 1400             Communication Assessment/Intervention    Document Type therapy note (daily note)  -SR therapy note (daily note)  -SR evaluation  -SR      Subjective Information complains of; pain  -SR complains of; pain  -SR complains of; pain  Pt was SOB during initial evaluation.  -SR      Patient Observations -- agree to therapy; cooperative  -SR agree to therapy; cooperative  -SR      Patient Effort good  -SR adequate  -SR good  -SR              General Information    Patient Profile Reviewed -- -- yes  -SR      Pertinent History Of Current Problem -- -- Pt hx includes Ileostomy takedown , cholecystectomy, incisional hernia repair December 7, 2021.Exploratory laparotomy with end colostomy partial colon resection December 29, 2021. Admitted to inpatient rehab for debility. Exhibits impaired mobility/impaired self-care/impaired endurance  -SR      Precautions/Limitations, Vision -- -- WFL with corrective lenses  -SR      Precautions/Limitations, Hearing -- -- WFL  -SR      Prior Level of Function-Communication -- -- WFL  -SR      Plans/Goals Discussed with -- -- patient  -SR      Barriers to Rehab -- -- medically complex  -SR      Patient's Goals for Discharge -- -- return to home  -SR      Standardized Assessment Used -- -- CLQT  -SR               Pain Scale: Numbers Pre/Post-Treatment    Pretreatment Pain Rating 6/10  -SR 8/10  -SR 7/10  -SR      Posttreatment Pain Rating 6/10  -SR 8/10  -SR 7/10  -SR              CLQT (The Cognitive Linguistic Quick Test)    Attention Domain Score -- -- 100  -SR      Attention Severity Rating -- -- 2: Moderate  -SR      Memory Domain Score -- -- 138  -SR      Memory Severity Rating -- -- 2: Moderate  -SR      Executive Function Domain Score -- -- 15  -SR      Executive Function Severity Rating -- -- 1: Severe  -SR      Language Domain Score -- -- 30  -SR      Language Severity Rating -- -- 4: WNL  -SR      Visuospatial Domain Score -- -- 46  -SR      Visuospatial Severity Rating -- -- 2: Moderate  -SR      Clock Drawing Total Score -- -- 10  -SR      Clock Drawing Severity Rating -- -- Mild  -SR      Composite Severity Rating -- -- 2.2  -SR      Composite Severity Rating Range -- -- 2.4 - 1.5: Moderate  -SR      CLQT Comments -- -- Pt's level of pain during evaluation may have impacted overall scores on the CLQT. He c/o difficulty breathing during the first part of the assessment. An increase of pain with the incision impacted patient's ability to fully participate in each subtest. Memory skills and language were an overall strength. Patient experienced difficulty with executive function, attention, and visuospatial skills. Patient fully oriented to time, place, and year. Recalled recent and past events during informal assessment.  -SR              SLP Evaluation Clinical Impressions    SLP Diagnosis -- -- Mild-mod cognitive impairment  -SR      Rehab Potential/Prognosis -- -- good  -SR      SLC Criteria for Skilled Therapy Interventions Met -- -- yes  -SR      Functional Impact -- -- unable to care for self; needs 24 hour supervision; difficulty completing home management task  -SR              Recommendations    Therapy Frequency (SLP SLC) -- -- 5 days per week  -SR      Predicted Duration Therapy Intervention  (Days) -- -- until discharge  -SR      Anticipated Discharge Disposition (SLP) -- -- home with 24/7 care  -SR              Cognitive Linguistic Treatment Objectives    Attention Selection -- -- attention, SLP goal 1  -SR      Organizational Skills Selection -- -- organizational skills, SLP goal 1  -SR      Functional Math Skills Selection -- -- functional math skills, SLP goal 1  -SR      Executive Function Skills Selection -- -- executive function skills, SLP goal 1; executive function skills, SLP goal 2  -SR              Attention Goal 1 (SLP)    Improve Attention by Goal 1 (SLP) -- -- attending to task; 100%; independently (over 90% accuracy)  -SR      Time Frame (Attention Goal 1, SLP) -- -- by discharge  -SR              Organizational Skills Goal 1 (SLP)    Improve Thought Organization Through Goal 1 (SLP) -- -- completing mental manipulation task; completing a verbal sequencing task; 80%; with minimal cues (75-90%)  -SR      Time Frame (Thought Organization Skills Goal 1, SLP) -- -- short term goal (STG)  -SR              Functional Math Skills Goal 1 (SLP)    Improve Functional Math Skills Through Goal 1 (SLP) -- -- complete simple math problems; complete word problems involving time; complete checkbook task; 80%; with minimal cues (75-90%)  -SR      Time Frame (Functional Math Skills Goal 1, SLP) -- -- short term goal (STG)  -SR              Executive Functional Skills Goal 1 (SLP)    Improve Executive Function Skills Goal 1 (SLP) -- -- organization/planning activity; time management activity; home management activity; 80%; with minimal cues (75-90%)  -SR      Time Frame (Executive Function Skills Goal 1, SLP) -- -- short term goal (STG)  -SR              Executive Functional Skills Goal 2 (SLP)    Improve Executive Function Skills Goal 2 (SLP) -- -- organization/planning activity; time management activity; home management activity; 80%; with minimal cues (75-90%)  -SR      Time Frame (Executive Function  Skills Goal 2, SLP) -- -- short term goal (STG)  -SR            User Key  (r) = Recorded By, (t) = Taken By, (c) = Cosigned By    Initials Name Effective Dates    SR Kristie Luis, SLP 01/12/22 -                    EDUCATION    The patient has been educated in the following areas:       Cognitive Impairment.             SLP GOALS     Row Name 02/07/22 1200 02/07/22 1100 02/05/22 1200       Attention Goal 1 (SLP)    Barriers (Attention Goal 1, SLP) -- Pt c/o of pain throughout session. Took appropriate breaks and completed breathing excercises to manage pain.  -SR --    Progress (Attention Goal 1, SLP) -- 80%; with minimal cues (75-90%)  -SR 60%; independently (over 90% accuracy); 100%; with moderate cues (50-74%)  -CP    Progress/Outcomes (Attention Goal 1, SLP) -- goal ongoing  -SR goal ongoing  -CP    Comment (Attention Goal 1, SLP) -- sustained attn  -SR alternating attn  -CP       Organizational Skills Goal 1 (SLP)    Progress (Thought Organization Skills Goal 1, SLP) 60%; independently (over 90% accuracy)  -SR -- --    Progress/Outcomes (Thought Organization Skills Goal 1, SLP) goal ongoing  -SR -- --    Comment (Thought Organization Skills Goal 1, SLP) Patient recalled number sequence in reverse order with 60% acc independently. During sequencing task, patient provided 4 steps verbally with 90% acc independently.  -SR -- --       Functional Math Skills Goal 1 (SLP)    Progress (Functional Math Skills Goal 1, SLP) -- 80%; with moderate cues (50-74%)  -SR --    Progress/Outcomes (Functional Math Skills Goal 1, SLP) -- goal ongoing  -SR --    Comment (Functional Math Skills Goal 1, SLP) -- Pt completed word problems involving time with 80% acc given mod cues.  -SR --       Executive Functional Skills Goal 1 (SLP)    Progress (Executive Function Skills Goal 1, SLP) -- -- 50%; independently (over 90% accuracy); 100%; with minimal cues (75-90%)  -CP    Progress/Outcomes (Executive Function Skills Goal 1, SLP) --  "-- goal ongoing  -CP    Comment (Executive Function Skills Goal 1, SLP) -- -- understanding written schedule  -CP       Executive Functional Skills Goal 2 (SLP)    Comment (Executive Function Skills Goal 2, SLP) SLP administered SLUMS to further asssess pt's congitive abilities. Pt scored 22/30 indicative of mild cognitive deficit. Therapy goals will continue to target organization, mental manipulation, and attention.  -SR SLP reviewed goals for speech therapy. Provided strategies for word finding during conversations. Pt explained that his wife has a difficult time following his conversation when talking on the phone.  -SR During tx, the patient became emotional over deficits, stating they make him feel embarrassed. He also got tearful and indicated that he felt \"this whole situation is my fault. I did this to myself.\" SLP listened attentively to patient's thoughts/feelings, validated his emotional response (for which he was very apologetic), and attempted to comfort and encourage him, ensuring him that this is not his fault, though he may feel that way. Psychological support was recommended, which he did not feel he needed, though he expressed interest in talking with a . Request to be sent.  -CP    Row Name 02/05/22 1156 02/04/22 1400          Oral Nutrition/Hydration Goal 1 (SLP)    Oral Nutrition/Hydration Goal 1, SLP Pt will state reflux precautions without need for cues  -CP --     Time Frame (Oral Nutrition/Hydration Goal 1, SLP) by discharge  -CP --            Attention Goal 1 (SLP)    Improve Attention by Goal 1 (SLP) -- attending to task; 100%; independently (over 90% accuracy)  -SR     Time Frame (Attention Goal 1, SLP) -- by discharge  -SR            Organizational Skills Goal 1 (SLP)    Improve Thought Organization Through Goal 1 (SLP) -- completing mental manipulation task; completing a verbal sequencing task; 80%; with minimal cues (75-90%)  -SR     Time Frame (Thought Organization Skills " Goal 1, SLP) -- short term goal (STG)  -SR            Functional Math Skills Goal 1 (SLP)    Improve Functional Math Skills Through Goal 1 (SLP) -- complete simple math problems; complete word problems involving time; complete checkbook task; 80%; with minimal cues (75-90%)  -SR     Time Frame (Functional Math Skills Goal 1, SLP) -- short term goal (STG)  -SR            Executive Functional Skills Goal 1 (SLP)    Improve Executive Function Skills Goal 1 (SLP) -- organization/planning activity; time management activity; home management activity; 80%; with minimal cues (75-90%)  -SR     Time Frame (Executive Function Skills Goal 1, SLP) -- short term goal (STG)  -SR            Executive Functional Skills Goal 2 (SLP)    Improve Executive Function Skills Goal 2 (SLP) -- organization/planning activity; time management activity; home management activity; 80%; with minimal cues (75-90%)  -SR     Time Frame (Executive Function Skills Goal 2, SLP) -- short term goal (STG)  -SR           User Key  (r) = Recorded By, (t) = Taken By, (c) = Cosigned By    Initials Name Provider Type    Priyanka Thorpe MS CCC-SLP Speech and Language Pathologist    SR Kristie Luis, SLP Speech and Language Pathologist                SLP Outcome Measures (last 72 hours)     SLP Outcome Measures     Row Name 02/05/22 1200 02/04/22 1500          SLP Outcome Measures    Outcome Measure Used? Adult NOMS  -CP Adult NOMS  -SR            Adult FCM Scores    FCM Chosen Swallowing  -CP Attention  -SR     Swallowing FCM Score 6  -CP --     Attention FCM Score -- 5  -SR           User Key  (r) = Recorded By, (t) = Taken By, (c) = Cosigned By    Initials Name Effective Dates    Priyanka Thorpe MS CCC-SLP 06/16/21 -     SR Kristie Luis SLP 01/12/22 -                         Time Calculation:        Time Calculation- SLP     Row Name 02/07/22 1241 02/07/22 1118          Time Calculation- SLP    SLP Start Time 1200  -SR 1000  -SR     SLP Stop  Time 1230  -SR 1030  -SR     SLP Time Calculation (min) 30 min  -SR 30 min  -SR           User Key  (r) = Recorded By, (t) = Taken By, (c) = Cosigned By    Initials Name Provider Type    SR Kristie Luis SLP Speech and Language Pathologist                  Therapy Charges for Today     Code Description Service Date Service Provider Modifiers Qty    68494173780 HC ST DEV OF COGN SKILLS INITIAL 15 MIN 2/7/2022 Kristie Luis SLP  1    02009225756 HC ST DEV OF COGN SKILLS EACH ADDT'L 15 MIN 2/7/2022 Kristie Luis SLP  3            ADULT NOMS (last 72 hours)     Adult NOMS     Row Name 02/05/22 1200 02/04/22 1500                Adult FCM Scores    FCM Chosen Swallowing  -CP Attention  -SR       Swallowing FCM Score 6  -CP --       Attention FCM Score -- 5  -SR             User Key  (r) = Recorded By, (t) = Taken By, (c) = Cosigned By    Initials Name Effective Dates    CP Priyanka Yeung, MS CCC-SLP 06/16/21 -     Kristie Gonzalez SLP 01/12/22 -                            STEFANY Davison  2/7/2022      Electronically signed by Kristie Luis SLP at 02/07/22 1242

## 2022-02-07 NOTE — PLAN OF CARE
Goal Outcome Evaluation:     Slept fair. Meds crushed & put in applesauce. Ileostomy emptied several times tonight, per request. O2 2L/NC. Called CXR report to Dr. Arnie Rodrigues & orders received, started on omnicef tonight, & paper work given about new medication. Norco given for abdominal pain, with some relief. Anxious at times. C/o shortness of breath at times, but O2 sats WNL. Encourage use of I.S., & doing it. Drinks Boost chocolate tonight.               16-May-2019 20:43

## 2022-02-07 NOTE — THERAPY TREATMENT NOTE
Inpatient Rehabilitation - Speech Language Pathology Treatment Note    [facility]     Patient Name: Arnie Calvo  : 1960  MRN: [id number]    Today's Date: 2022                   Admit Date: 2/3/2022       Visit Dx:    No diagnosis found.    Patient Active Problem List   Diagnosis   • Cardiomyopathy (HCC)   • Paroxysmal VT (HCC)   • Palpitations   • PVC's (premature ventricular contractions)   • Exacerbation of Crohn's disease of small intestine (HCC)   • Adjustment disorder with depressed mood   • Ankylosis of spine   • Crohn's disease with complication (HCC)   • Diabetes mellitus (HCC)   • Essential hypertension   • External hemorrhoids   • Genital herpes simplex   • Gout   • Insomnia   • Iron deficiency   • Prostate mass   • Recurrent aphthous ulcer   • Asteatosis cutis   • History of pneumonia   • Abnormal CXR (chest x-ray)   • RUQ abdominal pain   • Crohn's disease of small intestine with other complication (HCC)   • Right lower quadrant abdominal pain   • Enteritis   • Mass-like inflammation at terminal ileum   • Crohn's disease (HCC)   • Ileostomy in place (HCC)   • Paroxysmal ventricular tachycardia (HCC)   • Chronic systolic heart failure (HCC)   • Cardiomyopathy, nonischemic (HCC)   • Orthostasis   • Iron deficiency anemia   • Orthostatic hypotension   • Crohn's disease of colon with complication (HCC)   • Dehisced intestinal anastomosis   • History of creation of ostomy (HCC)   • Hypokalemia   • Hypotension, chronic   • Malnutrition of moderate degree (HCC)   • S/P colectomy   • Fatty liver disease, nonalcoholic   • Cholecystitis   • Debility       Past Medical History:   Diagnosis Date   • Acute pain of left hip    • Adjustment disorder with depressed mood    • Anesthesia complication     SPINAL [id number] LOWER SPINE   • Ankylosing spondylitis of cervical region (HCC)    • Ankylosis of spine    • Arthritis    • Asthma    • Cardiomyopathy (HCC)     sees Dr. Reyes; [facility]/  (-) cath, EF 25-35%; has ICD   • CHF (congestive heart failure) (HCC)    • Cholecystitis    • Crohn's disease (HCC)    • Depression    • Diabetes mellitus (HCC)     Diet controlled.   • Dysthymic disorder 2012   • Dysuria    • Essential hypertension    • External hemorrhoids    • Genital herpes    • Gout    • Herpes genitalia    • History of MRSA infection 2000?    FINGERTIP-TX WITH ANTIBIOTICS-[facility] CARE-NO CURRENT OPEN WOUND OR TREATMENT 12/3/21   • Ileostomy in place ([facility])    • Insomnia    • Iron deficiency    • Paroxysmal ventricular tachycardia (HCC)    • PONV (postoperative nausea and vomiting)    • Presence of combination internal cardiac defibrillator (ICD) and pacemaker    • Prostate nodule    • Recurrent canker sores    • Thoracic back pain    • Urinary hesitancy    • Xerosis cutis        Past Surgical History:   Procedure Laterality Date   • ABDOMINAL SURGERY     • CARDIAC CATHETERIZATION     • CARDIAC DEFIBRILLATOR PLACEMENT      REPLACEMENT-Had [place] lead that was fractured.  Lead was tied off.  New lead and new device implanted.   • CARDIAC ELECTROPHYSIOLOGY PROCEDURE N/A 1/3/2019    Procedure: ICD DC generator change  [employer];  Surgeon: Zechariah Randolph MD;  Location: [facility] CATH INVASIVE LOCATION;  Service: Cardiology   • CHOLECYSTECTOMY N/A 12/7/2021    Procedure: CHOLECYSTECTOMY;  Surgeon: Jeovany Mott MD;  Location: [facility] MAIN OR;  Service: General;  Laterality: N/A;   • COLON RESECTION N/A 12/29/2021    Procedure: PARTIAL COLECTOMY WITH OSTOMY;  Surgeon: Jeovany Mott MD;  Location: [facility] MAIN OR;  Service: General;  Laterality: N/A;   • COLON RESECTION WITH ILEOSTOMY N/A 2018   • COLONOSCOPY  04/03/2015    abnormal cecum, three polypoid masses, pre cancerous vs crohns, IH, tics, hyperplastic polyp   • COLONOSCOPY N/A 3/17/2017    polypoid masses, tics, IH, polyp   • EXPLORATORY LAPAROTOMY W/ BOWEL RESECTION  07/2018    open resection of ileum with creation of end ileostomy    • ILEOSTOMY CLOSURE  07/2018   • ILEOSTOMY CLOSURE N/A 12/7/2021    Procedure: ILEOSTOMY TAKEDOWN WITH RESECTION, PARASTOMAL HERNIA REPAIR;  Surgeon: Jeovany Mott MD;  Location: [facility] OR;  Service: General;  Laterality: N/A;   • PACEMAKER IMPLANTATION         SLP Recommendation and Plan                                                   SLP EVALUATION (last 72 hours)     SLP SLC Evaluation     Row Name 02/07/22 1200 02/07/22 1100 02/04/22 1400             Communication Assessment/Intervention    Document Type therapy note (daily note)  -SR therapy note (daily note)  -SR evaluation  -SR      Subjective Information complains of; pain  -SR complains of; pain  -SR complains of; pain  Pt was SOB during initial evaluation.  -SR      Patient Observations -- agree to therapy; cooperative  -SR agree to therapy; cooperative  -SR      Patient Effort good  -SR adequate  -SR good  -SR              General Information    Patient Profile Reviewed -- -- yes  -SR      Pertinent History Of Current Problem -- -- Pt hx includes Ileostomy takedown , cholecystectomy, incisional hernia repair December 7, 2021.Exploratory laparotomy with end colostomy partial colon resection December 29, 2021. Admitted to inpatient rehab for debility. Exhibits impaired mobility/impaired self-care/impaired endurance  -SR      Precautions/Limitations, Vision -- -- WFL with corrective lenses  -SR      Precautions/Limitations, Hearing -- -- WFL  -SR      Prior Level of Function-Communication -- -- WFL  -SR      Plans/Goals Discussed with -- -- patient  -SR      Barriers to Rehab -- -- medically complex  -SR      Patient's Goals for Discharge -- -- return to home  -SR      Standardized Assessment Used -- -- CLQT  -SR              Pain Scale: Numbers Pre/Post-Treatment    Pretreatment Pain Rating 6/10  -SR 8/10  -SR 7/10  -SR      Posttreatment Pain Rating 6/10  -SR 8/10  -SR 7/10  -SR              CLQT (The Cognitive Linguistic Quick Test)     Attention Domain Score -- -- 100  -SR      Attention Severity Rating -- -- 2: Moderate  -SR      Memory Domain Score -- -- 138  -SR      Memory Severity Rating -- -- 2: Moderate  -SR      Executive Function Domain Score -- -- 15  -SR      Executive Function Severity Rating -- -- 1: Severe  -SR      Language Domain Score -- -- 30  -SR      Language Severity Rating -- -- 4: WNL  -SR      Visuospatial Domain Score -- -- 46  -SR      Visuospatial Severity Rating -- -- 2: Moderate  -SR      Clock Drawing Total Score -- -- 10  -SR      Clock Drawing Severity Rating -- -- Mild  -SR      Composite Severity Rating -- -- 2.2  -SR      Composite Severity Rating Range -- -- 2.4 - 1.5: Moderate  -SR      CLQT Comments -- -- Pt's level of pain during evaluation may have impacted overall scores on the CLQT. He c/o difficulty breathing during the first part of the assessment. An increase of pain with the incision impacted patient's ability to fully participate in each subtest. Memory skills and language were an overall strength. Patient experienced difficulty with executive function, attention, and visuospatial skills. Patient fully oriented to time, place, and year. Recalled recent and past events during informal assessment.  -SR              SLP Evaluation Clinical Impressions    SLP Diagnosis -- -- Mild-mod cognitive impairment  -SR      Rehab Potential/Prognosis -- -- good  -SR      SLC Criteria for Skilled Therapy Interventions Met -- -- yes  -SR      Functional Impact -- -- unable to care for self; needs 24 hour supervision; difficulty completing home management task  -SR              Recommendations    Therapy Frequency (SLP SLC) -- -- 5 days per week  -SR      Predicted Duration Therapy Intervention (Days) -- -- until discharge  -SR      Anticipated Discharge Disposition (SLP) -- -- home with 24/7 care  -SR              Cognitive Linguistic Treatment Objectives    Attention Selection -- -- attention, SLP goal 1  -SR       Organizational Skills Selection -- -- organizational skills, SLP goal 1  -SR      Functional Math Skills Selection -- -- functional math skills, SLP goal 1  -SR      Executive Function Skills Selection -- -- executive function skills, SLP goal 1; executive function skills, SLP goal 2  -SR              Attention Goal 1 (SLP)    Improve Attention by Goal 1 (SLP) -- -- attending to task; 100%; independently (over 90% accuracy)  -SR      Time Frame (Attention Goal 1, SLP) -- -- by discharge  -SR              Organizational Skills Goal 1 (SLP)    Improve Thought Organization Through Goal 1 (SLP) -- -- completing mental manipulation task; completing a verbal sequencing task; 80%; with minimal cues (75-90%)  -SR      Time Frame (Thought Organization Skills Goal 1, SLP) -- -- short term goal (STG)  -SR              Functional Math Skills Goal 1 (SLP)    Improve Functional Math Skills Through Goal 1 (SLP) -- -- complete simple math problems; complete word problems involving time; complete checkbook task; 80%; with minimal cues (75-90%)  -SR      Time Frame (Functional Math Skills Goal 1, SLP) -- -- short term goal (STG)  -SR              Executive Functional Skills Goal 1 (SLP)    Improve Executive Function Skills Goal 1 (SLP) -- -- organization/planning activity; time management activity; home management activity; 80%; with minimal cues (75-90%)  -SR      Time Frame (Executive Function Skills Goal 1, SLP) -- -- short term goal (STG)  -SR              Executive Functional Skills Goal 2 (SLP)    Improve Executive Function Skills Goal 2 (SLP) -- -- organization/planning activity; time management activity; home management activity; 80%; with minimal cues (75-90%)  -SR      Time Frame (Executive Function Skills Goal 2, SLP) -- -- short term goal (STG)  -SR            User Key  (r) = Recorded By, (t) = Taken By, (c) = Cosigned By    Initials Name Effective Dates    Kristie Gonzalez, STEFANY 01/12/22 -                     EDUCATION    The patient has been educated in the following areas:       Cognitive Impairment.             SLP GOALS     Row Name 02/07/22 1200 02/07/22 1100 02/05/22 1200       Attention Goal 1 (SLP)    Barriers (Attention Goal 1, SLP) -- Pt c/o of pain throughout session. Took appropriate breaks and completed breathing excercises to manage pain.  -SR --    Progress (Attention Goal 1, SLP) -- 80%; with minimal cues (75-90%)  -SR 60%; independently (over 90% accuracy); 100%; with moderate cues (50-74%)  -CP    Progress/Outcomes (Attention Goal 1, SLP) -- goal ongoing  -SR goal ongoing  -CP    Comment (Attention Goal 1, SLP) -- sustained attn  -SR alternating attn  -CP       Organizational Skills Goal 1 (SLP)    Progress (Thought Organization Skills Goal 1, SLP) 60%; independently (over 90% accuracy)  -SR -- --    Progress/Outcomes (Thought Organization Skills Goal 1, SLP) goal ongoing  -SR -- --    Comment (Thought Organization Skills Goal 1, SLP) Patient recalled number sequence in reverse order with 60% acc independently. During sequencing task, patient provided 4 steps verbally with 90% acc independently.  -SR -- --       Functional Math Skills Goal 1 (SLP)    Progress (Functional Math Skills Goal 1, SLP) -- 80%; with moderate cues (50-74%)  -SR --    Progress/Outcomes (Functional Math Skills Goal 1, SLP) -- goal ongoing  -SR --    Comment (Functional Math Skills Goal 1, SLP) -- Pt completed word problems involving time with 80% acc given mod cues.  -SR --       Executive Functional Skills Goal 1 (SLP)    Progress (Executive Function Skills Goal 1, SLP) -- -- 50%; independently (over 90% accuracy); 100%; with minimal cues (75-90%)  -CP    Progress/Outcomes (Executive Function Skills Goal 1, SLP) -- -- goal ongoing  -CP    Comment (Executive Function Skills Goal 1, SLP) -- -- understanding written schedule  -CP       Executive Functional Skills Goal 2 (SLP)    Comment (Executive Function Skills Goal 2, SLP)  "SLP administered SLUMS to further asssess pt's congitive abilities. Pt scored 22/30 indicative of mild cognitive deficit. Therapy goals will continue to target organization, mental manipulation, and attention.  -SR SLP reviewed goals for speech therapy. Provided strategies for word finding during conversations. Pt explained that his wife has a difficult time following his conversation when talking on the phone.  -SR During tx, the patient became emotional over deficits, stating they make him feel embarrassed. He also got tearful and indicated that he felt \"this whole situation is my fault. I did this to myself.\" SLP listened attentively to patient's thoughts/feelings, validated his emotional response (for which he was very apologetic), and attempted to comfort and encourage him, ensuring him that this is not his fault, though he may feel that way. Psychological support was recommended, which he did not feel he needed, though he expressed interest in talking with a . Request to be sent.  -CP    Row Name 02/05/22 1156 02/04/22 1400          Oral Nutrition/Hydration Goal 1 (SLP)    Oral Nutrition/Hydration Goal 1, SLP Pt will state reflux precautions without need for cues  -CP --     Time Frame (Oral Nutrition/Hydration Goal 1, SLP) by discharge  -CP --            Attention Goal 1 (SLP)    Improve Attention by Goal 1 (SLP) -- attending to task; 100%; independently (over 90% accuracy)  -SR     Time Frame (Attention Goal 1, SLP) -- by discharge  -SR            Organizational Skills Goal 1 (SLP)    Improve Thought Organization Through Goal 1 (SLP) -- completing mental manipulation task; completing a verbal sequencing task; 80%; with minimal cues (75-90%)  -SR     Time Frame (Thought Organization Skills Goal 1, SLP) -- short term goal (STG)  -SR            Functional Math Skills Goal 1 (SLP)    Improve Functional Math Skills Through Goal 1 (SLP) -- complete simple math problems; complete word problems involving " time; complete checkbook task; 80%; with minimal cues (75-90%)  -SR     Time Frame (Functional Math Skills Goal 1, SLP) -- short term goal (STG)  -SR            Executive Functional Skills Goal 1 (SLP)    Improve Executive Function Skills Goal 1 (SLP) -- organization/planning activity; time management activity; home management activity; 80%; with minimal cues (75-90%)  -SR     Time Frame (Executive Function Skills Goal 1, SLP) -- short term goal (STG)  -SR            Executive Functional Skills Goal 2 (SLP)    Improve Executive Function Skills Goal 2 (SLP) -- organization/planning activity; time management activity; home management activity; 80%; with minimal cues (75-90%)  -SR     Time Frame (Executive Function Skills Goal 2, SLP) -- short term goal (STG)  -SR           User Key  (r) = Recorded By, (t) = Taken By, (c) = Cosigned By    Initials Name Provider Type    Priyanka Thorpe MS CCC-SLP Speech and Language Pathologist    SR Kristie Luis SLP Speech and Language Pathologist                SLP Outcome Measures (last 72 hours)     SLP Outcome Measures     Row Name 02/05/22 1200 02/04/22 1500          SLP Outcome Measures    Outcome Measure Used? Adult NOMS  -CP Adult NOMS  -SR            Adult FCM Scores    FCM Chosen Swallowing  -CP Attention  -SR     Swallowing FCM Score 6  -CP --     Attention FCM Score -- 5  -SR           User Key  (r) = Recorded By, (t) = Taken By, (c) = Cosigned By    Initials Name Effective Dates    Priyanka Thorpe MS CCC-SLP 06/16/21 -     SR Kristie Luis SLP 01/12/22 -                         Time Calculation:        Time Calculation- SLP     Row Name 02/07/22 1241 02/07/22 1118          Time Calculation- SLP    SLP Start Time 1200  -SR 1000  -SR     SLP Stop Time 1230  -SR 1030  -SR     SLP Time Calculation (min) 30 min  -SR 30 min  -SR           User Key  (r) = Recorded By, (t) = Taken By, (c) = Cosigned By    Initials Name Provider Type    SR Kristie Luis SLP  Speech and Language Pathologist                  Therapy Charges for Today     Code Description Service Date Service Provider Modifiers Qty    40716361467 HC ST DEV OF COGN SKILLS INITIAL 15 MIN 2/7/2022 Kristie Luis SLP  1    06248517453 HC ST DEV OF COGN SKILLS EACH ADDT'L 15 MIN 2/7/2022 Kristie Luis SLP  3            ADULT NOMS (last 72 hours)     Adult NOMS     Row Name 02/05/22 1200 02/04/22 1500                Adult FCM Scores    FCM Chosen Swallowing  -CP Attention  -SR       Swallowing FCM Score 6  -CP --       Attention FCM Score -- 5  -SR             User Key  (r) = Recorded By, (t) = Taken By, (c) = Cosigned By    Initials Name Effective Dates    CP Priyanka Yeung, MS CCC-SLP 06/16/21 -     SR Kristie Luis SLP 01/12/22 -                            STEFANY Davison  2/7/2022

## 2022-02-07 NOTE — THERAPY TREATMENT NOTE
Inpatient Rehabilitation - Occupational Therapy Treatment Note    [facility]     Patient Name: Arnie Calvo  : 1960  MRN: [id number]    Today's Date: 2022                 Admit Date: 2/3/2022       No diagnosis found.    Patient Active Problem List   Diagnosis   • Cardiomyopathy (HCC)   • Paroxysmal VT (HCC)   • Palpitations   • PVC's (premature ventricular contractions)   • Exacerbation of Crohn's disease of small intestine (HCC)   • Adjustment disorder with depressed mood   • Ankylosis of spine   • Crohn's disease with complication (HCC)   • Diabetes mellitus (HCC)   • Essential hypertension   • External hemorrhoids   • Genital herpes simplex   • Gout   • Insomnia   • Iron deficiency   • Prostate mass   • Recurrent aphthous ulcer   • Asteatosis cutis   • History of pneumonia   • Abnormal CXR (chest x-ray)   • RUQ abdominal pain   • Crohn's disease of small intestine with other complication (HCC)   • Right lower quadrant abdominal pain   • Enteritis   • Mass-like inflammation at terminal ileum   • Crohn's disease (HCC)   • Ileostomy in place (HCC)   • Paroxysmal ventricular tachycardia (HCC)   • Chronic systolic heart failure (HCC)   • Cardiomyopathy, nonischemic (HCC)   • Orthostasis   • Iron deficiency anemia   • Orthostatic hypotension   • Crohn's disease of colon with complication (HCC)   • Dehisced intestinal anastomosis   • History of creation of ostomy (HCC)   • Hypokalemia   • Hypotension, chronic   • Malnutrition of moderate degree (HCC)   • S/P colectomy   • Fatty liver disease, nonalcoholic   • Cholecystitis   • Debility       Past Medical History:   Diagnosis Date   • Acute pain of left hip    • Adjustment disorder with depressed mood    • Anesthesia complication     SPINAL [id number] LOWER SPINE   • Ankylosing spondylitis of cervical region (HCC)    • Ankylosis of spine    • Arthritis    • Asthma    • Cardiomyopathy (HCC)     sees Dr. Reyes; NICM/ (-) cath, EF 25-35%;  has ICD   • CHF (congestive heart failure) ([facility])    • Cholecystitis    • Crohn's disease (HCC)    • Depression    • Diabetes mellitus ([facility])     Diet controlled.   • Dysthymic disorder 2012   • Dysuria    • Essential hypertension    • External hemorrhoids    • Genital herpes    • Gout    • Herpes genitalia    • History of MRSA infection 2000?    FINGERTIP-TX WITH ANTIBIOTICS-[facility] CARE-NO CURRENT OPEN WOUND OR TREATMENT 12/3/21   • Ileostomy in place ([facility])    • Insomnia    • Iron deficiency    • Paroxysmal ventricular tachycardia ([facility])    • PONV (postoperative nausea and vomiting)    • Presence of combination internal cardiac defibrillator (ICD) and pacemaker    • Prostate nodule    • Recurrent canker sores    • Thoracic back pain    • Urinary hesitancy    • Xerosis cutis        Past Surgical History:   Procedure Laterality Date   • ABDOMINAL SURGERY     • CARDIAC CATHETERIZATION     • CARDIAC DEFIBRILLATOR PLACEMENT      REPLACEMENT-Had [place] lead that was fractured.  Lead was tied off.  New lead and new device implanted.   • CARDIAC ELECTROPHYSIOLOGY PROCEDURE N/A 1/3/2019    Procedure: ICD DC generator change  [employer];  Surgeon: Zechariah Randolph MD;  Location: [facility] CATH INVASIVE LOCATION;  Service: Cardiology   • CHOLECYSTECTOMY N/A 12/7/2021    Procedure: CHOLECYSTECTOMY;  Surgeon: Jeovany Mott MD;  Location: [facility] OR;  Service: General;  Laterality: N/A;   • COLON RESECTION N/A 12/29/2021    Procedure: PARTIAL COLECTOMY WITH OSTOMY;  Surgeon: Jeovany Mott MD;  Location: [facility] OR;  Service: General;  Laterality: N/A;   • COLON RESECTION WITH ILEOSTOMY N/A 2018   • COLONOSCOPY  04/03/2015    abnormal cecum, three polypoid masses, pre cancerous vs crohns, IH, tics, hyperplastic polyp   • COLONOSCOPY N/A 3/17/2017    polypoid masses, tics, IH, polyp   • EXPLORATORY LAPAROTOMY W/ BOWEL RESECTION  07/2018    open resection of ileum with creation of end ileostomy   • ILEOSTOMY  CLOSURE  07/2018   • ILEOSTOMY CLOSURE N/A 12/7/2021    Procedure: ILEOSTOMY TAKEDOWN WITH RESECTION, PARASTOMAL HERNIA REPAIR;  Surgeon: Jeovany Mott MD;  Location: [facility] OR;  Service: General;  Laterality: N/A;   • PACEMAKER IMPLANTATION               IRF OT ASSESSMENT FLOWSHEET (last 12 hours)     IRF OT Evaluation and Treatment     Row Name 02/07/22 1504          OT Time and Intention    Document Type daily treatment  -KP     Mode of Treatment individual therapy; occupational therapy  -KP     Patient Effort good  -KP     Symptoms Noted During/After Treatment fatigue  -KP     Row Name 02/07/22 1504          General Information    Patient/Family/Caregiver Comments/Observations pt in wc first session,inbed second session  -KP     Existing Precautions/Restrictions fall  -KP     Row Name 02/07/22 1504          Cognition/Psychosocial    Affect/Mental Status (Cognitive) WFL  -     Orientation Status (Cognition) oriented x 3  -KP     Follows Commands (Cognition) follows one-step commands; over 90% accuracy  -KP     Personal Safety Interventions fall prevention program maintained; gait belt; muscle strengthening facilitated; nonskid shoes/slippers when out of bed  -KP     Cognitive Function (Cognitive) WFL  -KP     Row Name 02/07/22 1504          Pain Scale: Numbers Pre/Post-Treatment    Pretreatment Pain Rating 3/10  -KP     Posttreatment Pain Rating 3/10  -KP     Pain Location - Side Bilateral  -KP     Pain Location - Orientation incisional  -KP     Pain Location abdomen  -KP     Row Name 02/07/22 1504          Bed Mobility    Sit-Supine [place] (Bed Mobility) set up; standby assist  -KP     Row Name 02/07/22 1504          Transfers    Chair-Bed [place] (Transfers) set up; standby assist; contact guard  -KP     Sit-Stand [place] (Transfers) set up; standby assist; contact guard  -KP     Stand-Sit [place] (Transfers) set up; standby assist; contact guard  -KP     Row Name 02/07/22 1501           Chair-Bed Transfer    Assistive Device (Chair-Bed Transfers) wheelchair  -[facility] Name 02/07/22 1504          Sit-Stand Transfer    Assistive Device (Sit-Stand Transfers) wheelchair  -[facility] Name 02/07/22 1504          Stand-Sit Transfer    Assistive Device (Stand-Sit Transfers) wheelchair  -[facility] Name 02/07/22 1504          Motor Skills    Functional Endurance fair +  -[facility] Name 02/07/22 1504          Shoulder (Therapeutic Exercise)    Shoulder AROM (Therapeutic Exercise) left; right; flexion; extension; aDduction; aBduction; 10 repetitions; 3 sets; internal rotation; external rotation  -     Shoulder Strengthening (Therapeutic Exercise) right; flexion; extension; sitting; 1 lb free weight; 10 repetitions; 2 sets  -[facility] Name 02/07/22 1504          Elbow/Forearm (Therapeutic Exercise)    Elbow/Forearm (Therapeutic Exercise) strengthening exercise  -     Elbow/Forearm Strengthening (Therapeutic Exercise) left; right; supination; extension; flexion; pronation; sitting; 1 lb free weight; 10 repetitions; 3 sets  -[facility] Name 02/07/22 1504          Wrist (Therapeutic Exercise)    Wrist (Therapeutic Exercise) strengthening exercise  -     Wrist Strengthening (Therapeutic Exercise) left; right; extension; flexion; 1 lb free weight; 10 repetitions; 3 sets  -[facility] Name 02/07/22 1504          Hand (Therapeutic Exercise)    Hand (Therapeutic Exercise) strengthening exercise  -     Hand Strengthening (Therapeutic Exercise) left; right; finger extension; finger aBduction; hand gripper; 10 repetitions; 3 sets  -[facility] Name 02/07/22 1504          Bathing    Comment (Bathing) already washed up,in am too much pain. in pm already cleanedup and dressed  -[facility] Name 02/07/22 1504          Upper Body Dressing    Comment (Upper Body Dressing) pt already dressed in pm session  -[facility] Name 02/07/22 1504          Lower Body Dressing    Comment (Lower Body Dressing) pt already   dressed in pm session.  -     Row Name 02/07/22 1504          Grooming    [place] Level (Grooming) grooming skills; hair care, combing/brushing; wash face, hands; set up; standby assist; oral care regimen  -     Position (Grooming) sitting up in bed  -     Set-up Assistance (Grooming) obtain supplies  -     Row Name 02/07/22 1504          Toileting    Comment (Toileting) pt did not need to use BR this pm  -     Row Name 02/07/22 1504          Positioning and Restraints    Pre-Treatment Position sitting in chair/recliner  1 st pm session,second onein bed.  -     Post Treatment Position bed  -     In Bed supine; call light within reach; encouraged to call for assist; exit alarm on  bothpmsessions  -           User Key  (r) = Recorded By, (t) = Taken By, (c) = Cosigned By    Initials Name Effective Dates     [facility] Lyndsey Rodriguez, OTR 06/16/21 -                  Occupational Therapy Education                 Title: PT OT SLP Therapies (Done)     Topic: Occupational Therapy (Done)     Point: ADL training (Done)     Description:   Instruct learner(s) on proper safety adaptation and remediation techniques during self care or transfers.   Instruct in proper use of assistive devices.              Learning Progress Summary           Patient Acceptance, E,TB,D, DU,VU by  at 2/4/2022 1455    Comment: ed pt on role of OT. benefit of therapy POC w. OT. ed on safety w. ADL and tsf. ed on UE ex.                   Point: Home exercise program (Done)     Description:   Instruct learner(s) on appropriate technique for monitoring, assisting and/or progressing therapeutic exercises/activities.              Learning Progress Summary           Patient Acceptance, E,TB,D, DU,VU by  at 2/4/2022 1455    Comment: ed pt on role of OT. benefit of therapy POC w. OT. ed on safety w. ADL and tsf. ed on UE ex.                   Point: Precautions (Done)     Description:   Instruct learner(s) on prescribed precautions  during self-care and functional transfers.              Learning Progress Summary           Patient Acceptance, E,TB,D, DU,VU by  at 2/4/2022 1455    Comment: ed pt on role of OT. benefit of therapy POC w. OT. ed on safety w. ADL and tsf. ed on UE ex.                   Point: Body mechanics (Done)     Description:   Instruct learner(s) on proper positioning and spine alignment during self-care, functional mobility activities and/or exercises.              Learning Progress Summary           Patient Acceptance, E,TB,D, DU,VU by  at 2/4/2022 1455    Comment: ed pt on role of OT. benefit of therapy POC w. OT. ed on safety w. ADL and tsf. ed on UE ex.                               User Key     Initials Effective Dates Name Provider Type [facility] 06/16/21 -  Lyndsey Zeng OTR Occupational Therapist OT                    OT Recommendation and Plan    Planned Therapy Interventions (OT): adaptive equipment training, BADL retraining, activity tolerance training, functional balance retraining, patient/caregiver education/training, ROM/therapeutic exercise, strengthening exercise, transfer/mobility retraining       Daily Progress Summary (OT)  Overall Progress Toward Functional Goals (OT): progressing toward functional goals as expected            Time Calculation:      Time Calculation- OT     Row Name 02/07/22 1533 02/07/22 1300          Time Calculation- OT    OT Start Time 1500  - 1300  -     OT Stop Time 1530  - 1330  -     OT Time Calculation (min) 30 min  - 30 min  -           User Key  (r) = Recorded By, (t) = Taken By, (c) = Cosigned By    Initials Name Provider Type     Lyndsey Zeng OTR Occupational Therapist              Therapy Charges for Today     Code Description Service Date Service Provider Modifiers Qty    32565873164 HC OT THER PROC EA 15 MIN 2/7/2022 Lyndsey Zeng OTR GO 3    00533805646 HC OT SELF CARE/MGMT/TRAIN EA 15 MIN 2/7/2022 Lyndsey Zeng  Michael, OTR GO 1                   Lyndsey Zeng, OTR  2/7/2022

## 2022-02-07 NOTE — THERAPY TREATMENT NOTE
Inpatient Rehabilitation - Physical Therapy Treatment Note       [facility]     Patient Name: Arnie Calvo  : 1960  MRN: [id number]    Today's Date: 2022                    Admit Date: 2/3/2022      Visit Dx:   No diagnosis found.    Patient Active Problem List   Diagnosis   • Cardiomyopathy (HCC)   • Paroxysmal VT (HCC)   • Palpitations   • PVC's (premature ventricular contractions)   • Exacerbation of Crohn's disease of small intestine (HCC)   • Adjustment disorder with depressed mood   • Ankylosis of spine   • Crohn's disease with complication (HCC)   • Diabetes mellitus (HCC)   • Essential hypertension   • External hemorrhoids   • Genital herpes simplex   • Gout   • Insomnia   • Iron deficiency   • Prostate mass   • Recurrent aphthous ulcer   • Asteatosis cutis   • History of pneumonia   • Abnormal CXR (chest x-ray)   • RUQ abdominal pain   • Crohn's disease of small intestine with other complication (HCC)   • Right lower quadrant abdominal pain   • Enteritis   • Mass-like inflammation at terminal ileum   • Crohn's disease (HCC)   • Ileostomy in place (HCC)   • Paroxysmal ventricular tachycardia (HCC)   • Chronic systolic heart failure (HCC)   • Cardiomyopathy, nonischemic (HCC)   • Orthostasis   • Iron deficiency anemia   • Orthostatic hypotension   • Crohn's disease of colon with complication (HCC)   • Dehisced intestinal anastomosis   • History of creation of ostomy (HCC)   • Hypokalemia   • Hypotension, chronic   • Malnutrition of moderate degree (HCC)   • S/P colectomy   • Fatty liver disease, nonalcoholic   • Cholecystitis   • Debility       Past Medical History:   Diagnosis Date   • Acute pain of left hip    • Adjustment disorder with depressed mood    • Anesthesia complication     SPINAL [id number] LOWER SPINE   • Ankylosing spondylitis of cervical region (HCC)    • Ankylosis of spine    • Arthritis    • Asthma    • Cardiomyopathy (HCC)     sees Dr. Reyes; [facility]/ (-)  cath, EF 25-35%; has ICD   • CHF (congestive heart failure) (Formerly [facility])    • Cholecystitis    • Crohn's disease (Formerly [facility])    • Depression    • Diabetes mellitus (Formerly [facility])     Diet controlled.   • Dysthymic disorder 2012   • Dysuria    • Essential hypertension    • External hemorrhoids    • Genital herpes    • Gout    • Herpes genitalia    • History of MRSA infection 2000?    FINGERTIP-TX WITH ANTIBIOTICS-[facility] CARE-NO CURRENT OPEN WOUND OR TREATMENT 12/3/21   • Ileostomy in place (Formerly [facility])    • Insomnia    • Iron deficiency    • Paroxysmal ventricular tachycardia (Formerly [facility])    • PONV (postoperative nausea and vomiting)    • Presence of combination internal cardiac defibrillator (ICD) and pacemaker    • Prostate nodule    • Recurrent canker sores    • Thoracic back pain    • Urinary hesitancy    • Xerosis cutis        Past Surgical History:   Procedure Laterality Date   • ABDOMINAL SURGERY     • CARDIAC CATHETERIZATION     • CARDIAC DEFIBRILLATOR PLACEMENT      REPLACEMENT-Had Jude lead that was fractured.  Lead was tied off.  New lead and new device implanted.   • CARDIAC ELECTROPHYSIOLOGY PROCEDURE N/A 1/3/2019    Procedure: ICD DC generator change  [employer];  Surgeon: Zechariah Randolph MD;  Location: [facility] CATH INVASIVE LOCATION;  Service: Cardiology   • CHOLECYSTECTOMY N/A 12/7/2021    Procedure: CHOLECYSTECTOMY;  Surgeon: Jeovany Mott MD;  Location: [facility] MAIN OR;  Service: General;  Laterality: N/A;   • COLON RESECTION N/A 12/29/2021    Procedure: PARTIAL COLECTOMY WITH OSTOMY;  Surgeon: Jeovany Mott MD;  Location: [facility] MAIN OR;  Service: General;  Laterality: N/A;   • COLON RESECTION WITH ILEOSTOMY N/A 2018   • COLONOSCOPY  04/03/2015    abnormal cecum, three polypoid masses, pre cancerous vs crohns, IH, tics, hyperplastic polyp   • COLONOSCOPY N/A 3/17/2017    polypoid masses, tics, IH, polyp   • EXPLORATORY LAPAROTOMY W/ BOWEL RESECTION  07/2018    open resection of ileum with creation of end ileostomy   •  ILEOSTOMY CLOSURE  07/2018   • ILEOSTOMY CLOSURE N/A 12/7/2021    Procedure: ILEOSTOMY TAKEDOWN WITH RESECTION, PARASTOMAL HERNIA REPAIR;  Surgeon: Jeovany Mott MD;  Location: [facility];  Service: General;  Laterality: N/A;   • PACEMAKER IMPLANTATION         PT ASSESSMENT (last 12 hours)     [facility] PT Evaluation and Treatment     Row Name 02/07/22 1115          PT Time and Intention    Document Type daily treatment  -EE     Mode of Treatment physical therapy; individual therapy  -EE     Patient/Family/Caregiver Comments/Observations Pt supine in bed in AM, agreeable to bed exercises but declined OOB activity due to pain at ostomy site. Pt agreeable to OOB activity in PM.  -EE     Row Name 02/07/22 1115          General Information    Existing Precautions/Restrictions fall  -EE     Row Name 02/07/22 1115          Cognition/Psychosocial    Affect/Mental Status (Cognitive) WFL  -EE     Orientation Status (Cognition) oriented x 3  -EE     Follows Commands (Cognition) follows one-step commands; over 90% accuracy; verbal cues/prompting required  -EE     Personal Safety Interventions fall prevention program maintained; gait belt; muscle strengthening facilitated; nonskid shoes/slippers when out of bed; supervised activity  -EE     Cognitive Function (Cognitive) WFL  -EE     Row Name 02/07/22 1115          Pain Scale: Numbers Pre/Post-Treatment    Pretreatment Pain Rating 8/10  -EE     Posttreatment Pain Rating 8/10  -EE     Pain Location - Orientation incisional  -EE     Pain Location abdomen  -EE     Pain Intervention(s) Emotional support; Medication (See MAR); Rest  -EE     Row Name 02/07/22 1115          Bed Mobility    Supine-Sit [place] (Bed Mobility) standby assist  -EE     Assistive Device (Bed Mobility) bed rails; head of bed elevated  -EE     Row Name 02/07/22 1115          Transfers    Bed-Chair [place] (Transfers) contact guard  -EE     Assistive Device (Bed-Chair Transfers) walker,  front-wheeled  -EE     Sit-Stand [place] (Transfers) contact guard; verbal cues  -EE     Stand-Sit [place] (Transfers) contact guard; verbal cues  -EE     Row Name 02/07/22 1115          Sit-Stand Transfer    Assistive Device (Sit-Stand Transfers) walker, front-wheeled  -EE     Row Name 02/07/22 1115          Stand-Sit Transfer    Assistive Device (Stand-Sit Transfers) walker, front-wheeled  -EE     Row Name 02/07/22 1115          Gait/Stairs (Locomotion)    [place] Level (Gait) contact guard; standby assist; verbal cues  -EE     Assistive Device (Gait) walker, front-wheeled  -EE     Distance in Feet (Gait) 160' x 1, 80' x 2  -EE     Pattern (Gait) step-through  -EE     Deviations/Abnormal Patterns (Gait) no decreased; stride length decreased  -EE     Bilateral Gait Deviations forward flexed posture; heel strike decreased  -EE     Row Name 02/07/22 1115          Safety Issues, Functional Mobility    Impairments Affecting Function (Mobility) balance; endurance/activity tolerance; pain; strength  -EE     Row Name 02/07/22 1115          Hip (Therapeutic Exercise)    Hip (Therapeutic Exercise) isometric exercises  -EE     Hip Isometrics (Therapeutic Exercise) bilateral; supine; gluteal sets; 10 repetitions; 2 sets  -EE     Hip Strengthening (Therapeutic Exercise) bilateral; supine; aBduction; aDduction; 10 repetitions  -EE     Row Name 02/07/22 1115          Knee (Therapeutic Exercise)    Knee AROM (Therapeutic Exercise) bilateral; supine; SAQ (short arc quad); heel slides; 10 repetitions  -EE     Knee Isometrics (Therapeutic Exercise) bilateral; supine; quad sets; 10 repetitions; 2 sets  -EE     Row Name 02/07/22 1115          Ankle (Therapeutic Exercise)    Ankle AROM (Therapeutic Exercise) bilateral; supine; dorsiflexion; plantarflexion; 10 repetitions; 2 sets  -EE     Row Name 02/07/22 1115          Positioning and Restraints    Pre-Treatment Position in bed  -EE     Post Treatment Position  wheelchair  -EE     In Wheelchair sitting; exit alarm on; with OT  -EE           User Key  (r) = Recorded By, (t) = Taken By, (c) = Cosigned By    Initials Name Provider Type    Wendy Holcomb, PT Physical Therapist              Wound 12/07/21 0757 Right abdomen Incision (Active)   Dressing Appearance dry; intact 02/07/22 1005   Closure [place] 02/07/22 1005   Base dressing in place, unable to visualize 02/07/22 1005   Periwound intact; dry 02/07/22 0951   Periwound Temperature warm 02/07/22 0951   Periwound Skin Turgor soft 02/07/22 0951   Edges open 02/07/22 0951   Drainage Characteristics/Odor creamy; yellow 02/07/22 0951   Drainage Amount moderate 02/07/22 0951   Care, Wound cleansed with; sterile normal saline 02/07/22 0951   Dressing Care dressing changed; silver impregnated; hydrofiber; silicone; border dressing 02/07/22 0951   Periwound Care barrier film applied 02/07/22 0951       Wound 12/25/21 0809 abdomen Other (comment) (Active)   Dressing Appearance dry; intact 02/07/22 1005   Closure [place] 02/07/22 1005   Base dressing in place, unable to visualize 02/07/22 1005   Periwound intact; dry 02/07/22 0951   Periwound Temperature warm 02/07/22 0951   Periwound Skin Turgor soft 02/07/22 0951   Edges open 02/07/22 0951   Drainage Characteristics/Odor creamy; yellow 02/07/22 0951   Drainage Amount small 02/07/22 0951   Care, Wound cleansed with; sterile normal saline 02/07/22 0951   Dressing Care dressing changed; silver impregnated; hydrofiber; silicone; border dressing 02/07/22 0951   Periwound Care barrier film applied 02/07/22 0951       Wound 12/29/21 1518 midline abdomen Incision (Active)   Dressing Appearance dry; intact 02/07/22 1005   Closure [place] 02/07/22 1005   Base dressing in place, unable to visualize 02/07/22 1005   Periwound excoriated; moist 02/07/22 0951   Periwound Temperature warm 02/07/22 0951   Periwound Skin Turgor soft 02/07/22 0951   Edges open 02/07/22 0951   Tunneling [Depth (cm)/Location]  4cms at 1 o'clock 02/07/22 0951   Drainage Characteristics/Odor green 02/07/22 0951   Drainage Amount other (see comments) 02/07/22 0951   Care, Wound cleansed with; sterile normal saline 02/07/22 0951   Dressing Care dressing changed; silver impregnated; hydrofiber; silicone; border dressing 02/07/22 0951   Periwound Care topical treatment applied; barrier film applied 02/07/22 0951     Physical Therapy Education                 Title: PT OT SLP Therapies (Done)     Topic: Physical Therapy (Done)     Point: Mobility training (Done)     Learning Progress Summary           Patient Acceptance, E,TB, VU,NR by EE at 2/7/2022 1142    Acceptance, E,TB, VU,NR by  at 2/5/2022 1430    Acceptance, E,TB, VU,NR by  at 2/4/2022 1408                   Point: Home exercise program (Done)     Learning Progress Summary           Patient Acceptance, E,TB, VU,NR by EE at 2/7/2022 1142    Acceptance, E,TB, VU,NR by EE at 2/4/2022 1408                   Point: Body mechanics (Done)     Learning Progress Summary           Patient Acceptance, E,TB, VU,NR by EE at 2/7/2022 1142    Acceptance, E,TB, VU,NR by EE at 2/4/2022 1408                   Point: Precautions (Done)     Learning Progress Summary           Patient Acceptance, E,TB, VU,NR by EE at 2/7/2022 1142    Acceptance, E,TB, VU,NR by EE at 2/4/2022 1408                               User Key     Initials Effective Dates Name Provider Type Discipline     06/16/21 -  Emily Watkins, PT Physical Therapist PT     06/16/21 -  Wendy Ojeda PT Physical Therapist PT                PT Recommendation and Plan    Planned Therapy Interventions (PT): balance training, bed mobility training, gait training, home exercise program, patient/family education, ROM (range of motion), postural re-education, stair training, strengthening, stretching, transfer training  Frequency of Treatment (PT): 5 times per week, 60 minutes per session  Anticipated Equipment Needs at Discharge (PT Eval):  front wheeled walker                  Time Calculation:      PT Charges     Row Name 02/07/22 1252 02/07/22 1142          Time Calculation    Start Time 1230  -EE 1100  -EE     Stop Time 1300  -EE 1130  -EE     Time Calculation (min) 30 min  -EE 30 min  -EE     PT Received On 02/07/22  -EE 02/07/22  -EE     PT - Next Appointment 02/08/22  -EE 02/07/22  -EE            Time Calculation- PT    Total Timed Code Minutes- PT 30 minute(s)  -EE 30 minute(s)  -EE           User Key  (r) = Recorded By, (t) = Taken By, (c) = Cosigned By    Initials Name Provider Type    EE Wendy Ojeda, SALLY Physical Therapist                Therapy Charges for Today     Code Description Service Date Service Provider Modifiers Qty    60434146773  GAIT TRAINING EA 15 MIN 2/7/2022 Wendy Ojeda, PT GP 1    [id number]  PT THERAPEUTIC ACT EA 15 MIN 2/7/2022 Wendy Ojeda, PT GP 1    [id number]  PT THER PROC EA 15 MIN 2/7/2022 Wendy Ojeda, PT GP 2              Patient was intermittently wearing a face mask during this therapy encounter. Therapist used appropriate personal protective equipment including mask and gloves.  Mask used was standard procedure mask. Appropriate PPE was worn during the entire therapy session. Hand hygiene was completed before and after therapy session. Patient is not in enhanced droplet precautions.         Wendy Ojeda PT  2/7/2022

## 2022-02-07 NOTE — PLAN OF CARE
"Goal Outcome Evaluation:  Plan of Care Reviewed With: patient        Progress: improving  Outcome Summary: Patient is cooperative. Anxious at times. PRN Norco given. Ileostomy bag and abdominal dressing changed by wound care nurses. Complaining of LUE spasm and  \"feels like a pulling\". Dr Pearson notified and received order to apply ice to area. Patient participated in therapy. Will continue to monitor.  "

## 2022-02-08 ENCOUNTER — APPOINTMENT (OUTPATIENT)
Dept: GENERAL RADIOLOGY | Facility: HOSPITAL | Age: 62
End: 2022-02-08

## 2022-02-08 LAB
ALBUMIN SERPL-MCNC: 3 G/DL (ref 3.5–5.2)
ANION GAP SERPL CALCULATED.3IONS-SCNC: 11.8 MMOL/L (ref 5–15)
BUN SERPL-MCNC: 15 MG/DL (ref 8–23)
BUN/CREAT SERPL: 17.6 (ref 7–25)
CALCIUM SPEC-SCNC: 9.4 MG/DL (ref 8.6–10.5)
CHLORIDE SERPL-SCNC: 90 MMOL/L (ref 98–107)
CO2 SERPL-SCNC: 26.2 MMOL/L (ref 22–29)
CREAT SERPL-MCNC: 0.85 MG/DL (ref 0.76–1.27)
GFR SERPL CREATININE-BSD FRML MDRD: 91 ML/MIN/1.73
GLUCOSE SERPL-MCNC: 107 MG/DL (ref 65–99)
PHOSPHATE SERPL-MCNC: 3.5 MG/DL (ref 2.5–4.5)
POTASSIUM SERPL-SCNC: 4.9 MMOL/L (ref 3.5–5.2)
SODIUM SERPL-SCNC: 128 MMOL/L (ref 136–145)

## 2022-02-08 PROCEDURE — 97110 THERAPEUTIC EXERCISES: CPT | Performed by: OCCUPATIONAL THERAPIST

## 2022-02-08 PROCEDURE — 97535 SELF CARE MNGMENT TRAINING: CPT | Performed by: OCCUPATIONAL THERAPIST

## 2022-02-08 PROCEDURE — 97530 THERAPEUTIC ACTIVITIES: CPT

## 2022-02-08 PROCEDURE — 80069 RENAL FUNCTION PANEL: CPT | Performed by: PHYSICAL MEDICINE & REHABILITATION

## 2022-02-08 PROCEDURE — 97129 THER IVNTJ 1ST 15 MIN: CPT

## 2022-02-08 PROCEDURE — 97130 THER IVNTJ EA ADDL 15 MIN: CPT

## 2022-02-08 PROCEDURE — 74018 RADEX ABDOMEN 1 VIEW: CPT

## 2022-02-08 PROCEDURE — 97116 GAIT TRAINING THERAPY: CPT

## 2022-02-08 PROCEDURE — 63710000001 ONDANSETRON ODT 4 MG TABLET DISPERSIBLE: Performed by: PHYSICAL MEDICINE & REHABILITATION

## 2022-02-08 PROCEDURE — 25010000002 ENOXAPARIN PER 10 MG: Performed by: PHYSICAL MEDICINE & REHABILITATION

## 2022-02-08 PROCEDURE — 97110 THERAPEUTIC EXERCISES: CPT

## 2022-02-08 RX ORDER — CARVEDILOL 6.25 MG/1
6.25 TABLET ORAL
Status: DISCONTINUED | OUTPATIENT
Start: 2022-02-08 | End: 2022-02-15 | Stop reason: HOSPADM

## 2022-02-08 RX ORDER — ONDANSETRON 4 MG/1
4 TABLET, ORALLY DISINTEGRATING ORAL EVERY 6 HOURS PRN
Status: DISCONTINUED | OUTPATIENT
Start: 2022-02-08 | End: 2022-02-15 | Stop reason: HOSPADM

## 2022-02-08 RX ORDER — SODIUM CHLORIDE 1000 MG
1 TABLET, SOLUBLE MISCELLANEOUS
Status: DISPENSED | OUTPATIENT
Start: 2022-02-08 | End: 2022-02-09

## 2022-02-08 RX ADMIN — GLYCOPYRROLATE 1 MG: 1 TABLET ORAL at 21:46

## 2022-02-08 RX ADMIN — ONDANSETRON 4 MG: 4 TABLET, ORALLY DISINTEGRATING ORAL at 11:16

## 2022-02-08 RX ADMIN — SODIUM CHLORIDE TAB 1 GM 1 G: 1 TAB at 11:35

## 2022-02-08 RX ADMIN — AMILORIDE HYDROCLORIDE 10 MG: 5 TABLET ORAL at 09:35

## 2022-02-08 RX ADMIN — CEFDINIR 300 MG: 300 CAPSULE ORAL at 21:46

## 2022-02-08 RX ADMIN — Medication 15 G: at 09:35

## 2022-02-08 RX ADMIN — LOPERAMIDE HYDROCHLORIDE 2 MG: 2 CAPSULE ORAL at 21:46

## 2022-02-08 RX ADMIN — LOPERAMIDE HYDROCHLORIDE 2 MG: 2 CAPSULE ORAL at 11:35

## 2022-02-08 RX ADMIN — CARVEDILOL 6.25 MG: 12.5 TABLET, FILM COATED ORAL at 17:19

## 2022-02-08 RX ADMIN — GLYCOPYRROLATE 1 MG: 1 TABLET ORAL at 07:25

## 2022-02-08 RX ADMIN — SIMETHICONE 80 MG: 80 TABLET, CHEWABLE ORAL at 19:55

## 2022-02-08 RX ADMIN — ENOXAPARIN SODIUM 40 MG: 100 INJECTION SUBCUTANEOUS at 21:46

## 2022-02-08 RX ADMIN — Medication 3 MG: at 21:46

## 2022-02-08 RX ADMIN — LOPERAMIDE HYDROCHLORIDE 2 MG: 2 CAPSULE ORAL at 07:28

## 2022-02-08 RX ADMIN — CEFDINIR 300 MG: 300 CAPSULE ORAL at 07:25

## 2022-02-08 RX ADMIN — LOPERAMIDE HYDROCHLORIDE 2 MG: 2 CAPSULE ORAL at 17:17

## 2022-02-08 RX ADMIN — SODIUM CHLORIDE TAB 1 GM 1 G: 1 TAB at 17:19

## 2022-02-08 NOTE — PLAN OF CARE
Goal Outcome Evaluation:  Plan of Care Reviewed With: patient        Progress: improving    Patient complaining of nausea this morning. PRN zofran given with positive result. Patient asked for a urinal and to leave the brief off because his skin felt irritated. Noted blanchable redness on right buttock. Skin intact. Encouraged to turn side-to-side. Repositioning patient every 2 hours. Will continue to monitor.

## 2022-02-08 NOTE — THERAPY TREATMENT NOTE
Inpatient Rehabilitation - Physical Therapy Treatment Note       [facility]     Patient Name: Arnie Calvo  : 1960  MRN: [id number]    Today's Date: 2022                    Admit Date: 2/3/2022      Visit Dx:   No diagnosis found.    Patient Active Problem List   Diagnosis   • Cardiomyopathy (HCC)   • Paroxysmal VT (HCC)   • Palpitations   • PVC's (premature ventricular contractions)   • Exacerbation of Crohn's disease of small intestine (HCC)   • Adjustment disorder with depressed mood   • Ankylosis of spine   • Crohn's disease with complication (HCC)   • Diabetes mellitus (HCC)   • Essential hypertension   • External hemorrhoids   • Genital herpes simplex   • Gout   • Insomnia   • Iron deficiency   • Prostate mass   • Recurrent aphthous ulcer   • Asteatosis cutis   • History of pneumonia   • Abnormal CXR (chest x-ray)   • RUQ abdominal pain   • Crohn's disease of small intestine with other complication (HCC)   • Right lower quadrant abdominal pain   • Enteritis   • Mass-like inflammation at terminal ileum   • Crohn's disease (HCC)   • Ileostomy in place (HCC)   • Paroxysmal ventricular tachycardia (HCC)   • Chronic systolic heart failure (HCC)   • Cardiomyopathy, nonischemic (HCC)   • Orthostasis   • Iron deficiency anemia   • Orthostatic hypotension   • Crohn's disease of colon with complication (HCC)   • Dehisced intestinal anastomosis   • History of creation of ostomy (HCC)   • Hypokalemia   • Hypotension, chronic   • Malnutrition of moderate degree (HCC)   • S/P colectomy   • Fatty liver disease, nonalcoholic   • Cholecystitis   • Debility       Past Medical History:   Diagnosis Date   • Acute pain of left hip    • Adjustment disorder with depressed mood    • Anesthesia complication     SPINAL [id number] LOWER SPINE   • Ankylosing spondylitis of cervical region (HCC)    • Ankylosis of spine    • Arthritis    • Asthma    • Cardiomyopathy (HCC)     sees Dr. Reyes; [facility]/ (-)  cath, EF 25-35%; has ICD   • CHF (congestive heart failure) ([facility])    • Cholecystitis    • Crohn's disease ([facility])    • Depression    • Diabetes mellitus ([facility])     Diet controlled.   • Dysthymic disorder 2012   • Dysuria    • Essential hypertension    • External hemorrhoids    • Genital herpes    • Gout    • Herpes genitalia    • History of MRSA infection 2000?    FINGERTIP-TX WITH ANTIBIOTICS-[facility] CARE-NO CURRENT OPEN WOUND OR TREATMENT 12/3/21   • Ileostomy in place ([facility])    • Insomnia    • Iron deficiency    • Paroxysmal ventricular tachycardia ([facility])    • PONV (postoperative nausea and vomiting)    • Presence of combination internal cardiac defibrillator (ICD) and pacemaker    • Prostate nodule    • Recurrent canker sores    • Thoracic back pain    • Urinary hesitancy    • Xerosis cutis        Past Surgical History:   Procedure Laterality Date   • ABDOMINAL SURGERY     • CARDIAC CATHETERIZATION     • CARDIAC DEFIBRILLATOR PLACEMENT      REPLACEMENT-Had Jude lead that was fractured.  Lead was tied off.  New lead and new device implanted.   • CARDIAC ELECTROPHYSIOLOGY PROCEDURE N/A 1/3/2019    Procedure: ICD DC generator change  [employer];  Surgeon: Zechariah Randolph MD;  Location: [facility] CATH INVASIVE LOCATION;  Service: Cardiology   • CHOLECYSTECTOMY N/A 12/7/2021    Procedure: CHOLECYSTECTOMY;  Surgeon: Jeovany Mott MD;  Location: [facility] MAIN OR;  Service: General;  Laterality: N/A;   • COLON RESECTION N/A 12/29/2021    Procedure: PARTIAL COLECTOMY WITH OSTOMY;  Surgeon: Jeovany Mott MD;  Location: [facility] MAIN OR;  Service: General;  Laterality: N/A;   • COLON RESECTION WITH ILEOSTOMY N/A 2018   • COLONOSCOPY  04/03/2015    abnormal cecum, three polypoid masses, pre cancerous vs crohns, IH, tics, hyperplastic polyp   • COLONOSCOPY N/A 3/17/2017    polypoid masses, tics, IH, polyp   • EXPLORATORY LAPAROTOMY W/ BOWEL RESECTION  07/2018    open resection of ileum with creation of end ileostomy   •  ILEOSTOMY CLOSURE  07/2018   • ILEOSTOMY CLOSURE N/A 12/7/2021    Procedure: ILEOSTOMY TAKEDOWN WITH RESECTION, PARASTOMAL HERNIA REPAIR;  Surgeon: Jeovany Mott MD;  Location: [facility] MAIN OR;  Service: General;  Laterality: N/A;   • PACEMAKER IMPLANTATION         PT ASSESSMENT (last 12 hours)     IRF PT Evaluation and Treatment     Row Name 02/08/22 1013          PT Time and Intention    Document Type daily treatment  -EE     Mode of Treatment physical therapy; individual therapy  -EE     Patient/Family/Caregiver Comments/Observations Pt sitting up in WC, c/o nausea in AM but agreeable to PT.  -EE     Row Name 02/08/22 1013          General Information    Existing Precautions/Restrictions fall  -EE     Row Name 02/08/22 1013          Cognition/Psychosocial    Affect/Mental Status (Cognitive) anxious  -EE     Orientation Status (Cognition) oriented x 3  -EE     Follows Commands (Cognition) follows one-step commands; verbal cues/prompting required  -EE     Personal Safety Interventions fall prevention program maintained; gait belt; muscle strengthening facilitated; nonskid shoes/slippers when out of bed; supervised activity  -EE     Cognitive Function (Cognitive) WFL  -EE     Row Name 02/08/22 1013          Pain Scale: Numbers Pre/Post-Treatment    Pretreatment Pain Rating 5/10  -EE     Posttreatment Pain Rating 5/10  -EE     Pain Location - Orientation incisional  -EE     Pain Location abdomen  -EE     Pain Intervention(s) Emotional support; Rest  -EE     Row Name 02/08/22 1013          Bed Mobility    Sit-Supine [place] (Bed Mobility) supervision  performed x 2  -EE     Row Name 02/08/22 1013          Transfers    Chair-Bed [place] (Transfers) standby assist  performed x 2  -EE     Sit-Stand [place] (Transfers) standby assist; verbal cues  performed x 5  -EE     Stand-Sit [place] (Transfers) standby assist; verbal cues  -EE     Row Name 02/08/22 1013          Chair-Bed Transfer    Assistive  Device (Chair-Bed Transfers) other (see comments)  holding onto bedrail during stand pivot transfer  -EE     Row Name 02/08/22 1013          Sit-Stand Transfer    Assistive Device (Sit-Stand Transfers) walker, front-wheeled  -EE     Row Name 02/08/22 1013          Stand-Sit Transfer    Assistive Device (Stand-Sit Transfers) walker, front-wheeled  -EE     Row Name 02/08/22 1013          Gait/Stairs (Locomotion)    [place] Level (Gait) standby assist; contact guard; verbal cues  -EE     Assistive Device (Gait) walker, front-wheeled  -EE     Distance in Feet (Gait) 160' x 3  -EE     Pattern (Gait) step-through  -EE     Deviations/Abnormal Patterns (Gait) no decreased; stride length decreased  -EE     Bilateral Gait Deviations forward flexed posture; heel strike decreased  -EE     Row Name 02/08/22 1013          Safety Issues, Functional Mobility    Impairments Affecting Function (Mobility) endurance/activity tolerance; pain; strength  -EE     Row Name 02/08/22 1013          Hip (Therapeutic Exercise)    Hip Strengthening (Therapeutic Exercise) bilateral; sitting; marching while seated; 10 repetitions  -EE     Row Name 02/08/22 1013          Knee (Therapeutic Exercise)    Knee (Therapeutic Exercise) strengthening exercise  -EE     Knee Strengthening (Therapeutic Exercise) bilateral; sitting; LAQ (long arc quad); 10 repetitions  -EE     Row Name 02/08/22 1013          Ankle (Therapeutic Exercise)    Ankle AROM (Therapeutic Exercise) bilateral; sitting; dorsiflexion; plantarflexion; 10 repetitions  -EE     Row Name 02/08/22 1013          Positioning and Restraints    Pre-Treatment Position sitting in chair/recliner  -EE     Post Treatment Position bed  -EE     In Bed fowlers; side lying left; call light within reach; encouraged to call for assist; exit alarm on  -EE           User Key  (r) = Recorded By, (t) = Taken By, (c) = Cosigned By    Initials Name Provider Type    EE Wendy Ojeda, PT Physical Therapist               Wound 12/07/21 0757 Right abdomen Incision (Active)   Dressing Appearance dry; intact 02/08/22 0731   Closure [place] 02/08/22 0731   Base dressing in place, unable to visualize 02/08/22 0731       Wound 12/25/21 0809 abdomen Other (comment) (Active)   Dressing Appearance dry; intact 02/08/22 0731   Closure [place] 02/08/22 0731   Base dressing in place, unable to visualize 02/08/22 0731       Wound 12/29/21 1518 midline abdomen Incision (Active)   Dressing Appearance dry; intact 02/08/22 0731   Closure [place] 02/08/22 0731   Base dressing in place, unable to visualize 02/08/22 0731     Physical Therapy Education                 Title: PT OT SLP Therapies (Done)     Topic: Physical Therapy (Done)     Point: Mobility training (Done)     Learning Progress Summary           Patient Acceptance, E,TB, VU,NR by EE at 2/8/2022 1015    Acceptance, E,TB, VU,NR by  at 2/7/2022 1142    Acceptance, E,TB, VU,NR by  at 2/5/2022 1430    Acceptance, E,TB, VU,NR by  at 2/4/2022 1408                   Point: Home exercise program (Done)     Learning Progress Summary           Patient Acceptance, E,TB, VU,NR by EE at 2/8/2022 1015    Acceptance, E,TB, VU,NR by EE at 2/7/2022 1142    Acceptance, E,TB, VU,NR by  at 2/4/2022 1408                   Point: Body mechanics (Done)     Learning Progress Summary           Patient Acceptance, E,TB, VU,NR by EE at 2/8/2022 1015    Acceptance, E,TB, VU,NR by EE at 2/7/2022 1142    Acceptance, E,TB, VU,NR by EE at 2/4/2022 1408                   Point: Precautions (Done)     Learning Progress Summary           Patient Acceptance, E,TB, VU,NR by EE at 2/8/2022 1015    Acceptance, E,TB, VU,NR by  at 2/7/2022 1142    Acceptance, E,TB, VU,NR by  at 2/4/2022 1408                               User Key     Initials Effective Dates Name Provider Type Discipline    KIRSTEN 06/16/21 -  Emily Watkins, PT Physical Therapist PT    EE 06/16/21 -  Wendy Ojeda, PT Physical Therapist PT                 PT Recommendation and Plan    Planned Therapy Interventions (PT): balance training, bed mobility training, gait training, home exercise program, patient/family education, ROM (range of motion), postural re-education, stair training, strengthening, stretching, transfer training  Frequency of Treatment (PT): 5 times per week, 60 minutes per session  Anticipated Equipment Needs at Discharge (PT Eval): front wheeled walker                  Time Calculation:      PT Charges     Row Name 02/08/22 1440 02/08/22 1031          Time Calculation    Start Time 1400  -EE 1000  -EE     Stop Time 1430  -EE 1030  -EE     Time Calculation (min) 30 min  -EE 30 min  -EE     PT Received On 02/08/22  -EE 02/08/22  -EE     PT - Next Appointment 02/09/22  -EE 02/08/22  -EE            Time Calculation- PT    Total Timed Code Minutes- PT 30 minute(s)  -EE 30 minute(s)  -EE           User Key  (r) = Recorded By, (t) = Taken By, (c) = Cosigned By    Initials Name Provider Type    EE Wendy Ojeda, PT Physical Therapist                Therapy Charges for Today     Code Description Service Date Service Provider Modifiers Qty    00878818388 HC GAIT TRAINING EA 15 MIN 2/7/2022 Wendy Ojeda, PT GP 1    [id number]  PT THERAPEUTIC ACT EA 15 MIN 2/7/2022 Wendy Ojeda, PT GP 1    [id number]  PT THER PROC EA 15 MIN 2/7/2022 Wendy Ojeda, PT GP 2    [id number] HC GAIT TRAINING EA 15 MIN 2/8/2022 Wendy Ojeda, PT GP 2    [id number]  PT THER PROC EA 15 MIN 2/8/2022 Wendy Ojeda, PT GP 1    [id number]  PT THERAPEUTIC ACT EA 15 MIN 2/8/2022 Wendy Ojeda, PT GP 1              Patient was intermittently wearing a face mask during this therapy encounter. Therapist used appropriate personal protective equipment including mask and gloves.  Mask used was standard procedure mask. Appropriate PPE was worn during the entire therapy session. Hand hygiene was completed before and after therapy session. Patient is not in enhanced droplet precautions.          Wendy Ojeda, PT  2/8/2022

## 2022-02-08 NOTE — THERAPY TREATMENT NOTE
Inpatient Rehabilitation - Occupational Therapy Treatment Note    [facility]     Patient Name: Arnie Calvo  : 1960  MRN: [id number]    Today's Date: 2022                 Admit Date: 2/3/2022       No diagnosis found.    Patient Active Problem List   Diagnosis   • Cardiomyopathy (HCC)   • Paroxysmal VT (HCC)   • Palpitations   • PVC's (premature ventricular contractions)   • Exacerbation of Crohn's disease of small intestine (HCC)   • Adjustment disorder with depressed mood   • Ankylosis of spine   • Crohn's disease with complication (HCC)   • Diabetes mellitus (HCC)   • Essential hypertension   • External hemorrhoids   • Genital herpes simplex   • Gout   • Insomnia   • Iron deficiency   • Prostate mass   • Recurrent aphthous ulcer   • Asteatosis cutis   • History of pneumonia   • Abnormal CXR (chest x-ray)   • RUQ abdominal pain   • Crohn's disease of small intestine with other complication (HCC)   • Right lower quadrant abdominal pain   • Enteritis   • Mass-like inflammation at terminal ileum   • Crohn's disease (HCC)   • Ileostomy in place (HCC)   • Paroxysmal ventricular tachycardia (HCC)   • Chronic systolic heart failure (HCC)   • Cardiomyopathy, nonischemic (HCC)   • Orthostasis   • Iron deficiency anemia   • Orthostatic hypotension   • Crohn's disease of colon with complication (HCC)   • Dehisced intestinal anastomosis   • History of creation of ostomy (HCC)   • Hypokalemia   • Hypotension, chronic   • Malnutrition of moderate degree (HCC)   • S/P colectomy   • Fatty liver disease, nonalcoholic   • Cholecystitis   • Debility       Past Medical History:   Diagnosis Date   • Acute pain of left hip    • Adjustment disorder with depressed mood    • Anesthesia complication     SPINAL OSYSCFJRIHI-CEPRBLWDS-KCGYUP LOWER SPINE   • Ankylosing spondylitis of cervical region (HCC)    • Ankylosis of spine    • Arthritis    • Asthma    • Cardiomyopathy (HCC)     sees Dr. Reyes; NICM/ (-) cath, EF 25-35%;  has ICD   • CHF (congestive heart failure) ([facility])    • Cholecystitis    • Crohn's disease (HCC)    • Depression    • Diabetes mellitus ([facility])     Diet controlled.   • Dysthymic disorder 2012   • Dysuria    • Essential hypertension    • External hemorrhoids    • Genital herpes    • Gout    • Herpes genitalia    • History of MRSA infection 2000?    FINGERTIP-TX WITH ANTIBIOTICS-[facility] CARE-NO CURRENT OPEN WOUND OR TREATMENT 12/3/21   • Ileostomy in place ([facility])    • Insomnia    • Iron deficiency    • Paroxysmal ventricular tachycardia ([facility])    • PONV (postoperative nausea and vomiting)    • Presence of combination internal cardiac defibrillator (ICD) and pacemaker    • Prostate nodule    • Recurrent canker sores    • Thoracic back pain    • Urinary hesitancy    • Xerosis cutis        Past Surgical History:   Procedure Laterality Date   • ABDOMINAL SURGERY     • CARDIAC CATHETERIZATION     • CARDIAC DEFIBRILLATOR PLACEMENT      REPLACEMENT-Had [place] lead that was fractured.  Lead was tied off.  New lead and new device implanted.   • CARDIAC ELECTROPHYSIOLOGY PROCEDURE N/A 1/3/2019    Procedure: ICD DC generator change  [employer];  Surgeon: Zechariah Randolph MD;  Location: [facility] CATH INVASIVE LOCATION;  Service: Cardiology   • CHOLECYSTECTOMY N/A 12/7/2021    Procedure: CHOLECYSTECTOMY;  Surgeon: Jeovany Mott MD;  Location: [facility] OR;  Service: General;  Laterality: N/A;   • COLON RESECTION N/A 12/29/2021    Procedure: PARTIAL COLECTOMY WITH OSTOMY;  Surgeon: Jeovany Mott MD;  Location: [facility] OR;  Service: General;  Laterality: N/A;   • COLON RESECTION WITH ILEOSTOMY N/A 2018   • COLONOSCOPY  04/03/2015    abnormal cecum, three polypoid masses, pre cancerous vs crohns, IH, tics, hyperplastic polyp   • COLONOSCOPY N/A 3/17/2017    polypoid masses, tics, IH, polyp   • EXPLORATORY LAPAROTOMY W/ BOWEL RESECTION  07/2018    open resection of ileum with creation of end ileostomy   • ILEOSTOMY  CLOSURE  07/2018   • ILEOSTOMY CLOSURE N/A 12/7/2021    Procedure: ILEOSTOMY TAKEDOWN WITH RESECTION, PARASTOMAL HERNIA REPAIR;  Surgeon: Jeovany Mott MD;  Location: [facility] MAIN OR;  Service: General;  Laterality: N/A;   • PACEMAKER IMPLANTATION               IRF OT ASSESSMENT FLOWSHEET (last 12 hours)     IRF OT Evaluation and Treatment     Row Name 02/08/22 1527 02/08/22 1204       OT Time and Intention    Document Type daily treatment  -KP daily treatment  -KP    Mode of Treatment individual therapy; occupational therapy  -KP individual therapy; occupational therapy  -KP    Patient Effort good  -KP good  -KP    Symptoms Noted During/After Treatment fatigue  -KP fatigue  -KP    Row Name 02/08/22 1527 02/08/22 1204       General Information    Patient/Family/Caregiver Comments/Observations pt supine in bed upon OT arrival  -KP pt supine in bed.  -KP    Existing Precautions/Restrictions fall  -KP fall  -KP    Row Name 02/08/22 1527 02/08/22 1204       Cognition/Psychosocial    Orientation Status (Cognition) oriented x 4  -KP oriented x 4  -KP    Follows Commands (Cognition) follows one-step commands; over 90% accuracy  -KP follows one-step commands; over 90% accuracy  -KP    Personal Safety Interventions fall prevention program maintained; gait belt; muscle strengthening facilitated; nonskid shoes/slippers when out of bed  -KP fall prevention program maintained; gait belt; muscle strengthening facilitated; nonskid shoes/slippers when out of bed  -KP    Cognitive Function (Cognitive) WNL  -KP WFL  -KP    Row Name 02/08/22 1527 02/08/22 1204       Pain Scale: Numbers Pre/Post-Treatment    Pretreatment Pain Rating 4/10  -KP 6/10  -KP    Posttreatment Pain Rating 4/10  -KP 6/10  -KP    Pain Location - Side Bilateral  -KP Bilateral  -KP    Pain Location - Orientation incisional  -KP incisional  -KP    Pain Location abdomen  -KP abdomen  -KP    Pain Intervention(s) -- Repositioned; Emotional support  -KP    Row Name  02/08/22 1527 02/08/22 1204       Bed Mobility    Supine-Sit Butts (Bed Mobility) set up; standby assist; modified independence  - set up; standby assist  -    Sit-Supine Butts (Bed Mobility) -- not tested  -[facility] Name 02/08/22 1527 02/08/22 1204       Transfers    Bed-Chair Butts (Transfers) set up; standby assist  - standby assist; set up  -    Assistive Device (Bed-Chair Transfers) wheelchair  - wheelchair  -KP    Sit-Stand Butts (Transfers) set up; standby assist  -KP set up; standby assist  -KP    Stand-Sit Butts (Transfers) set up; standby assist  -KP set up; standby assist  -    Assistive Device (Shower Transfer) -- --  pt declines, states too nauseated today and weak to try shower  -[facility] Name 02/08/22 1527 02/08/22 1204       Sit-Stand Transfer    Assistive Device (Sit-Stand Transfers) wheelchair  - wheelchair  -[facility] Name 02/08/22 1527 02/08/22 1204       Stand-Sit Transfer    Assistive Device (Stand-Sit Transfers) wheelchair  - wheelchair  -[facility] Name 02/08/22 1527          Shoulder (Therapeutic Exercise)    Shoulder AROM (Therapeutic Exercise) right; flexion; extension; aBduction; aDduction; sitting; 10 repetitions; 2 sets  -[facility] Name 02/08/22 1527          Elbow/Forearm (Therapeutic Exercise)    Elbow/Forearm Strengthening (Therapeutic Exercise) right; flexion; extension; supination; pronation; sitting; 2 lb free weight; 10 repetitions; 3 sets  -[facility] Name 02/08/22 1527          Wrist (Therapeutic Exercise)    Wrist Strengthening (Therapeutic Exercise) right; 2 lb free weight; 10 repetitions; 3 sets; flexion; extension  -[facility] Name 02/08/22 1527          Hand (Therapeutic Exercise)    Hand Strengthening (Therapeutic Exercise) left; right; finger flexion; finger extension; 10 repetitions; 3 sets; hand gripper  -[facility] Name 02/08/22 1204          Bathing    [place] Level (Bathing) bathing skills; set up; standby  assist; contact guard assist  -     Assistive Device (Bathing) grab bar/tub rail  -     Position (Bathing) sink side; supported sitting; supported standing  -KP     Set-up Assistance (Bathing) obtain supplies  -     Row Name 02/08/22 1204          Upper Body Dressing    [facility] [place] Level (Upper Body Dressing) upper body dressing skills; don; doff; pull over garment; set up assistance; supervision  -KP     Position (Upper Body Dressing) supported sitting  -     Set-up Assistance (Upper Body Dressing) obtain clothing  -     Row Name 02/08/22 1204          Lower Body Dressing    [facility] Level (Lower Body Dressing) doff; don; pants/bottoms; set up; standby assist; contact guard assist  -KP     Position (Lower Body Dressing) supported sitting; supported standing  -KP     Set-up Assistance (Lower Body Dressing) obtain clothing  -     Row Name 02/08/22 1204          Grooming    [facility] Level (Grooming) grooming skills; deodorant application; oral care regimen; wash face, hands; set up; standby assist  -     Position (Grooming) sink side  -     Set-up Assistance (Grooming) obtain supplies  -     Row Name 02/08/22 1527 02/08/22 1204       Toileting    Comment (Toileting) used urinal in bed set up  - already completed today, didn't have to go and caleb/buttocks already cleaned  -    Row Name 02/08/22 1527 02/08/22 1204       Positioning and Restraints    Pre-Treatment Position in bed  -KP in bed  -KP    Post Treatment Position wheelchair  -KP wheelchair  -KP    In Wheelchair sitting; exit alarm on; with SLP  -KP sitting; call light within reach; encouraged to call for assist; exit alarm on; with nsg  about to start speech session  -    Row Name 02/08/22 1527          Daily Progress Summary (OT)    Overall Progress Toward Functional Goals (OT) progressing toward functional goals as expected  -           User Key  (r) = Recorded By, (t) = Taken By, (c) = Cosigned By    Initials Name Effective  Dates     Lyndsey Zeng, JOSE 06/16/21 -                  Occupational Therapy Education                 Title: PT OT SLP Therapies (Done)     Topic: Occupational Therapy (Done)     Point: ADL training (Done)     Description:   Instruct learner(s) on proper safety adaptation and remediation techniques during self care or transfers.   Instruct in proper use of assistive devices.              Learning Progress Summary           Patient Acceptance, E,TB,D, DU,VU by  at 2/4/2022 1455    Comment: ed pt on role of OT. benefit of therapy POC w. OT. ed on safety w. ADL and tsf. ed on UE ex.                   Point: Home exercise program (Done)     Description:   Instruct learner(s) on appropriate technique for monitoring, assisting and/or progressing therapeutic exercises/activities.              Learning Progress Summary           Patient Acceptance, E,TB,D, DU,VU by  at 2/4/2022 1455    Comment: ed pt on role of OT. benefit of therapy POC w. OT. ed on safety w. ADL and tsf. ed on UE ex.                   Point: Precautions (Done)     Description:   Instruct learner(s) on prescribed precautions during self-care and functional transfers.              Learning Progress Summary           Patient Acceptance, E,TB,D, DU,VU by  at 2/4/2022 1455    Comment: ed pt on role of OT. benefit of therapy POC w. OT. ed on safety w. ADL and tsf. ed on UE ex.                   Point: Body mechanics (Done)     Description:   Instruct learner(s) on proper positioning and spine alignment during self-care, functional mobility activities and/or exercises.              Learning Progress Summary           Patient Acceptance, E,TB,D, DU,VU by  at 2/4/2022 1455    Comment: ed pt on role of OT. benefit of therapy POC w. OT. ed on safety w. ADL and tsf. ed on UE ex.                               User Key     Initials Effective Dates Name Provider Type Discipline     06/16/21 -  Lyndsey Zeng OTR Occupational Therapist OT                     OT Recommendation and Plan    Planned Therapy Interventions (OT): adaptive equipment training, BADL retraining, activity tolerance training, functional balance retraining, patient/caregiver education/training, ROM/therapeutic exercise, strengthening exercise, transfer/mobility retraining       Daily Progress Summary (OT)  Overall Progress Toward Functional Goals (OT): progressing toward functional goals as expected            Time Calculation:      Time Calculation- OT     Row Name 02/08/22 1531 02/08/22 1209          Time Calculation- OT    OT Start Time 1300  - 0900  -     OT Stop Time 1330  - 0930  -     OT Time Calculation (min) 30 min  - 30 min  -           User Key  (r) = Recorded By, (t) = Taken By, (c) = Cosigned By    Initials Name Provider Type    Lyndsey Melo OTR Occupational Therapist              Therapy Charges for Today     Code Description Service Date Service Provider Modifiers Qty    87576300972 HC OT THER PROC EA 15 MIN 2/7/2022 Lyndsey Zeng OTR GO 3    46476069058 HC OT SELF CARE/MGMT/TRAIN EA 15 MIN 2/7/2022 Lyndsey Zeng OTR GO 1    91717391236 HC OT SELF CARE/MGMT/TRAIN EA 15 MIN 2/8/2022 Lyndsey Zeng OTR GO 2    71383376613 HC OT THER PROC EA 15 MIN 2/8/2022 Lyndsey Zeng OTR GO 2                   JOSE Demarco  2/8/2022

## 2022-02-08 NOTE — PROGRESS NOTES
Inpatient Rehabilitation Plan of Care Note    Plan of Care  Care Plan Reviewed - No updates at this time.    Safety    Performed Intervention(s)  Falls protocal  Yellow socks      Sphincter Control    Performed Intervention(s)  I and O  Btrm schedule  Wound ostomy consult for RNs      Psychosocial    Performed Intervention(s)  Mimi consult encouraged  Provide emotional support    Signed by: Cecile Apodaca RN

## 2022-02-08 NOTE — PROGRESS NOTES
[facility] Progress Note      Patient Name: Arnie Calvo  : 1960  MRN: [id number]  Primary Care Physician:  Zechariah Fatima MD  Date of admission: 2/3/2022    Subjective     Interval History:     Patient lying in bed  Patient denies any chest pain palpitations  Tolerating oral intake  Attempting to participate with physical therapy  Urinating spontaneously       Review of Systems:   As noted above    Objective     Vitals:   Temp:  [97.8 °F (36.6 °C)-98.3 °F (36.8 °C)] 97.8 °F (36.6 °C)  Heart Rate:  [108-120] 120  Resp:  [16-18] 18  BP: ()/(65-76) 97/67  Flow (L/min):  [2-3] 3    Intake/Output Summary (Last 24 hours) at 2022 0839  Last data filed at 2022 0701  Gross per 24 hour   Intake 720 ml   Output 595 ml   Net 125 ml       Physical Exam:      Constitutional: Awake, on supplemental oxygen chronically ill and deconditioned  HEENT: Sclera anicteric, no conjunctival injection  Neck: Supple, no thyromegaly, no lymphadenopathy, trachea at midline, no JVD  Respiratory: Clear to auscultation bilaterally, nonlabored respiration, w occasional crackles   Cardiovascular: RRR, CLARISSA grade III   Gastrointestinal: Positive bowel sounds, colostomy  bag in place  : No palpable bladder  Musculoskeletal: No edema, no clubbing or cyanosis  Psychiatric: Appropriate affect, cooperative  Neurologic: Oriented x3, moving all extremities, normal speech and mental status  Skin: Warm and dry         Scheduled Meds:     aMILoride, 10 mg, Oral, Daily  carvedilol, 12.5 mg, Oral, Daily Before Supper  cefdinir, 300 mg, Oral, Q12H  enoxaparin, 40 mg, Subcutaneous, Q24H  glycopyrrolate, 1 mg, Oral, BID  loperamide, 2 mg, Oral, 4x Daily AC & at Bedtime  melatonin, 3 mg, Oral, Nightly  sodium chloride, 1 g, Oral, TID With Meals  Urea, 15 g, Oral, Daily      IV Meds:        Results Reviewed:   I have personally reviewed the results from the time of this admission to 2022 08:39 EST      Results from last 7 days   Lab Units 02/08/22  0506 02/07/22  0656 02/06/22  0445 02/03/22  1213 02/02/22  1141   SODIUM mmol/L 128* 125* 126*   < > 127*   POTASSIUM mmol/L 4.9 4.2 4.3   < > 3.8   CHLORIDE mmol/L 90* 88* 91*   < > 93*   CO2 mmol/L 26.2 28.4 27.9   < > 27.5   BUN mg/dL 15 15 17   < > 16   CREATININE mg/dL 0.85 0.91 0.89   < > 0.91   CALCIUM mg/dL 9.4 9.0 9.1   < > 9.0   BILIRUBIN mg/dL  --   --   --   --  0.7   ALK PHOS U/L  --   --   --   --  90   ALT (SGPT) U/L  --   --   --   --  22   AST (SGOT) U/L  --   --   --   --  28   GLUCOSE mg/dL 107* 133* 102*   < > 114*    < > = values in this interval not displayed.       Estimated Creatinine Clearance: 99.8 mL/min (by C-G formula based on SCr of 0.85 mg/dL).    Results from last 7 days   Lab Units 02/08/22  0506 02/07/22  0656 02/06/22  0445 02/05/22  0745 02/02/22  1141   MAGNESIUM mg/dL  --   --   --   --  1.8   PHOSPHORUS mg/dL 3.5 3.4 3.6   < > 3.0    < > = values in this interval not displayed.       Results from last 7 days   Lab Units 02/05/22  0745   URIC ACID mg/dL 6.4       Results from last 7 days   Lab Units 02/07/22  0656 02/04/22  0811 02/02/22  1141   WBC 10*3/mm3 7.06 9.87 9.61   HEMOGLOBIN g/dL 10.3* 10.9* 11.2*   PLATELETS 10*3/mm3 163 167 157     Images     XR CHEST 1 VW-  02/06/2022     HISTORY: Shortness of breath.     Heart size is mildly enlarged. The left hemidiaphragm is partially  obscured likely from small pleural effusion with some mild atelectasis  and/or pneumonia. Left upper lung and right lung appear clear.     Cardiac pacemaker seen in good position.     IMPRESSION:  1. Mild cardiomegaly.  2. Please density in the left lung base may represent combination of small amount of left pleural effusion with some mild atelectasis and/or pneumonia. The left base may have cleared slightly since the 01/23/2022  study.        Assessment / Plan     ASSESSMENT:    -Acute on chronic hypotonic hyponatremia.  Cortisol 13 . TSH normal  ", Urine Osmo 284. Urine Sodium < 20 . Serum osmo  (273) , likely from CHF based on studies   Continue UREA 15 gr daily and placed on fluid restriction 1.5 liters . TRIAL amiloride 10  mg daily ,  ( increased today )   -Dilated cardiomyopathy w / EF 20 %  , w/o signs of decompensation ,  Currently stable . Starting sodium chloride tablets but likely will need to be adjusted based on signs of volume overload.  -Physical deconditioning.  Working w/ PT and OT   - Pneumonia . Started on cefnidir .CXR \"   left mild atelectasis and/or pneumonia. \"    PLAN:    -Follow renal function closely  -Continue   UREA 15 gr daily and Fluid restriction 1.5 liters   - Continue AMILORIDE 10 mg daily , allergic to SULFAS that limits use of some diuretics   - Order Sodium chloride 1 gr TID x 4 doses only ,   -Avoid nephrotoxins  -Adjust medications to GFR     Discussed with nursing staff    Thank you for involving us in the care of Arnie Calvo.  Please feel free to call with any questions.    Joselito Bates MD  02/08/22  08:39 EST    [facility]  [phone]                 "

## 2022-02-08 NOTE — NURSING NOTE
CWON note: ostomy pouch remains intact with excellent seal, all abdominal dressings intact. Pt reports pain is greatly improved with new pouching system. Will plan to change tomorrow or Thursday.

## 2022-02-08 NOTE — THERAPY TREATMENT NOTE
Inpatient Rehabilitation - Occupational Therapy Treatment Note    [facility]     Patient Name: Arnie Calvo  : 1960  MRN: [id number]    Today's Date: 2022                 Admit Date: 2/3/2022       No diagnosis found.    Patient Active Problem List   Diagnosis   • Cardiomyopathy (HCC)   • Paroxysmal VT (HCC)   • Palpitations   • PVC's (premature ventricular contractions)   • Exacerbation of Crohn's disease of small intestine (HCC)   • Adjustment disorder with depressed mood   • Ankylosis of spine   • Crohn's disease with complication (HCC)   • Diabetes mellitus (HCC)   • Essential hypertension   • External hemorrhoids   • Genital herpes simplex   • Gout   • Insomnia   • Iron deficiency   • Prostate mass   • Recurrent aphthous ulcer   • Asteatosis cutis   • History of pneumonia   • Abnormal CXR (chest x-ray)   • RUQ abdominal pain   • Crohn's disease of small intestine with other complication (HCC)   • Right lower quadrant abdominal pain   • Enteritis   • Mass-like inflammation at terminal ileum   • Crohn's disease (HCC)   • Ileostomy in place (HCC)   • Paroxysmal ventricular tachycardia (HCC)   • Chronic systolic heart failure (HCC)   • Cardiomyopathy, nonischemic (HCC)   • Orthostasis   • Iron deficiency anemia   • Orthostatic hypotension   • Crohn's disease of colon with complication (HCC)   • Dehisced intestinal anastomosis   • History of creation of ostomy (HCC)   • Hypokalemia   • Hypotension, chronic   • Malnutrition of moderate degree (HCC)   • S/P colectomy   • Fatty liver disease, nonalcoholic   • Cholecystitis   • Debility       Past Medical History:   Diagnosis Date   • Acute pain of left hip    • Adjustment disorder with depressed mood    • Anesthesia complication     SPINAL ELYZAJAYTOS-[id number] LOWER SPINE   • Ankylosing spondylitis of cervical region (HCC)    • Ankylosis of spine    • Arthritis    • Asthma    • Cardiomyopathy (HCC)     sees Dr. Reyes; NICM/ (-) cath, EF 25-35%;  has ICD   • CHF (congestive heart failure) ([facility])    • Cholecystitis    • Crohn's disease (HCC)    • Depression    • Diabetes mellitus ([facility])     Diet controlled.   • Dysthymic disorder 2012   • Dysuria    • Essential hypertension    • External hemorrhoids    • Genital herpes    • Gout    • Herpes genitalia    • History of MRSA infection 2000?    FINGERTIP-TX WITH ANTIBIOTICS-[facility] CARE-NO CURRENT OPEN WOUND OR TREATMENT 12/3/21   • Ileostomy in place ([facility])    • Insomnia    • Iron deficiency    • Paroxysmal ventricular tachycardia ([facility])    • PONV (postoperative nausea and vomiting)    • Presence of combination internal cardiac defibrillator (ICD) and pacemaker    • Prostate nodule    • Recurrent canker sores    • Thoracic back pain    • Urinary hesitancy    • Xerosis cutis        Past Surgical History:   Procedure Laterality Date   • ABDOMINAL SURGERY     • CARDIAC CATHETERIZATION     • CARDIAC DEFIBRILLATOR PLACEMENT      REPLACEMENT-Had [place] lead that was fractured.  Lead was tied off.  New lead and new device implanted.   • CARDIAC ELECTROPHYSIOLOGY PROCEDURE N/A 1/3/2019    Procedure: ICD DC generator change  [employer];  Surgeon: Zechariah Randolph MD;  Location: [facility] CATH INVASIVE LOCATION;  Service: Cardiology   • CHOLECYSTECTOMY N/A 12/7/2021    Procedure: CHOLECYSTECTOMY;  Surgeon: Jeovany Mott MD;  Location: [facility] OR;  Service: General;  Laterality: N/A;   • COLON RESECTION N/A 12/29/2021    Procedure: PARTIAL COLECTOMY WITH OSTOMY;  Surgeon: Jeovany Mott MD;  Location: [facility] OR;  Service: General;  Laterality: N/A;   • COLON RESECTION WITH ILEOSTOMY N/A 2018   • COLONOSCOPY  04/03/2015    abnormal cecum, three polypoid masses, pre cancerous vs crohns, IH, tics, hyperplastic polyp   • COLONOSCOPY N/A 3/17/2017    polypoid masses, tics, IH, polyp   • EXPLORATORY LAPAROTOMY W/ BOWEL RESECTION  07/2018    open resection of ileum with creation of end ileostomy   • ILEOSTOMY  CLOSURE  07/2018   • ILEOSTOMY CLOSURE N/A 12/7/2021    Procedure: ILEOSTOMY TAKEDOWN WITH RESECTION, PARASTOMAL HERNIA REPAIR;  Surgeon: Jeovany Mott MD;  Location: [facility] MAIN OR;  Service: General;  Laterality: N/A;   • PACEMAKER IMPLANTATION               IRF OT ASSESSMENT FLOWSHEET (last 12 hours)     IRF OT Evaluation and Treatment     Row Name 02/08/22 1204          OT Time and Intention    Document Type daily treatment  -     Mode of Treatment individual therapy; occupational therapy  -KP     Patient Effort good  -KP     Symptoms Noted During/After Treatment fatigue  -KP     Row Name 02/08/22 1204          General Information    Patient/Family/Caregiver Comments/Observations pt supine in bed.  -KP     Existing Precautions/Restrictions fall  -KP     Row Name 02/08/22 1204          Cognition/Psychosocial    Orientation Status (Cognition) oriented x 4  -KP     Follows Commands (Cognition) follows one-step commands; over 90% accuracy  -     Personal Safety Interventions fall prevention program maintained; gait belt; muscle strengthening facilitated; nonskid shoes/slippers when out of bed  -     Cognitive Function (Cognitive) WFL  -KP     Row Name 02/08/22 1204          Pain Scale: Numbers Pre/Post-Treatment    Pretreatment Pain Rating 6/10  -KP     Posttreatment Pain Rating 6/10  -KP     Pain Location - Side Bilateral  -KP     Pain Location - Orientation incisional  -     Pain Location abdomen  -     Pain Intervention(s) Repositioned; Emotional support  -     Row Name 02/08/22 1204          Bed Mobility    Supine-Sit [place] (Bed Mobility) set up; standby assist  -     Sit-Supine [place] (Bed Mobility) not tested  -     Row Name 02/08/22 1204          Transfers    Bed-Chair [place] (Transfers) standby assist; set up  -     Assistive Device (Bed-Chair Transfers) wheelchair  -     Sit-Stand [place] (Transfers) set up; standby assist  -     Stand-Sit [place]  (Transfers) set up; standby assist  -     Assistive Device (Shower Transfer) --  pt declines, states too nauseated today and weak to try shower  -KP     Row Name 02/08/22 1204          Sit-Stand Transfer    Assistive Device (Sit-Stand Transfers) wheelchair  -     Row Name 02/08/22 1204          Stand-Sit Transfer    Assistive Device (Stand-Sit Transfers) wheelchair  -     Row Name 02/08/22 1204          Bathing    [place] Level (Bathing) bathing skills; set up; standby assist; contact guard assist  -     Assistive Device (Bathing) grab bar/tub rail  -     Position (Bathing) sink side; supported sitting; supported standing  -     Set-up Assistance (Bathing) obtain supplies  -     Row Name 02/08/22 1204          Upper Body Dressing    [place] Level (Upper Body Dressing) upper body dressing skills; don; doff; pull over garment; set up assistance; supervision  -     Position (Upper Body Dressing) supported sitting  -     Set-up Assistance (Upper Body Dressing) obtain clothing  -     Row Name 02/08/22 1204          Lower Body Dressing    [place] Level (Lower Body Dressing) doff; don; pants/bottoms; set up; standby assist; contact guard assist  -     Position (Lower Body Dressing) supported sitting; supported standing  -     Set-up Assistance (Lower Body Dressing) obtain clothing  -     Row Name 02/08/22 1204          Grooming    [place] Level (Grooming) grooming skills; deodorant application; oral care regimen; wash face, hands; set up; standby assist  -     Position (Grooming) sink side  -     Set-up Assistance (Grooming) obtain supplies  [facility]     Row Name 02/08/22 1204          Toileting    Comment (Toileting) already completed today, didn't have to go and caleb/buttocks already cleaned  -     Row Name 02/08/22 1204          Positioning and Restraints    Pre-Treatment Position in bed  -     Post Treatment Position wheelchair  -     In Wheelchair sitting; call light  within reach; encouraged to call for assist; exit alarm on; with nsg  about to start speech session  -           User Key  (r) = Recorded By, (t) = Taken By, (c) = Cosigned By    Initials Name Effective Dates     Azucena Lyndsey Rodriguez, OTR 06/16/21 -                  Occupational Therapy Education                 Title: PT OT SLP Therapies (Done)     Topic: Occupational Therapy (Done)     Point: ADL training (Done)     Description:   Instruct learner(s) on proper safety adaptation and remediation techniques during self care or transfers.   Instruct in proper use of assistive devices.              Learning Progress Summary           Patient Acceptance, E,TB,D, DU,VU by  at 2/4/2022 1455    Comment: ed pt on role of OT. benefit of therapy POC w. OT. ed on safety w. ADL and tsf. ed on UE ex.                   Point: Home exercise program (Done)     Description:   Instruct learner(s) on appropriate technique for monitoring, assisting and/or progressing therapeutic exercises/activities.              Learning Progress Summary           Patient Acceptance, E,TB,D, DU,VU by  at 2/4/2022 1455    Comment: ed pt on role of OT. benefit of therapy POC w. OT. ed on safety w. ADL and tsf. ed on UE ex.                   Point: Precautions (Done)     Description:   Instruct learner(s) on prescribed precautions during self-care and functional transfers.              Learning Progress Summary           Patient Acceptance, E,TB,D, DU,VU by  at 2/4/2022 1455    Comment: ed pt on role of OT. benefit of therapy POC w. OT. ed on safety w. ADL and tsf. ed on UE ex.                   Point: Body mechanics (Done)     Description:   Instruct learner(s) on proper positioning and spine alignment during self-care, functional mobility activities and/or exercises.              Learning Progress Summary           Patient Acceptance, E,TB,D, DU,VU by  at 2/4/2022 1455    Comment: ed pt on role of OT. benefit of therapy POC w. OT. ed on  safety w. ADL and tsf. ed on UE ex.                               User Key     Initials Effective Dates Name Provider Type [facility] 06/16/21 -  Lyndsey Zeng OTR Occupational Therapist OT                    OT Recommendation and Plan    Planned Therapy Interventions (OT): adaptive equipment training, BADL retraining, activity tolerance training, functional balance retraining, patient/caregiver education/training, ROM/therapeutic exercise, strengthening exercise, transfer/mobility retraining       Daily Progress Summary (OT)  Overall Progress Toward Functional Goals (OT): progressing toward functional goals as expected            Time Calculation:      Time Calculation- OT     Row Name 02/08/22 1209             Time Calculation- OT    OT Start Time 0900  -      OT Stop Time 0930  -      OT Time Calculation (min) 30 min  -            User Key  (r) = Recorded By, (t) = Taken By, (c) = Cosigned By    Initials Name Provider Type     Lyndsey Zeng OTR Occupational Therapist              Therapy Charges for Today     Code Description Service Date Service Provider Modifiers Qty    72008065057 HC OT THER PROC EA 15 MIN 2/7/2022 Lyndsey Zeng OTR GO 3    69797681484 HC OT SELF CARE/MGMT/TRAIN EA 15 MIN 2/7/2022 Lyndsey Zeng OTR GO 1    45963941054 HC OT SELF CARE/MGMT/TRAIN EA 15 MIN 2/8/2022 Lyndsey Zeng OTR GO 2                   JOSE Demarco  2/8/2022

## 2022-02-08 NOTE — PROGRESS NOTES
Continues tachycardic. Pulse today 120 - 113. SBP range 96-97.    Consult Cardiology for recommendation on dose and parameters for Coreg. Was on 12.5 mg q supper each evening on acute care, had parameter added Feb 3 to hold if SBP < 110 or DBP < 70 but had not received since with parameters.   Will adjust parameter to hold if SBP < 100 and decrease dose to 6.25 mg q supper pending updated input from Cardiology to re-assess for parameters. Amiloride 5 mg daily added on Feb 5 and increased to 10 mg daily on Feb 7 by Nephrology and on Urea 15 gm daiy and 1500 cc fluid restriction. Na 128 today.     Add:  5:30 - reviewed with Cardiology service - agree with above changes in Coreg today and will re-assess regimen on rounds in AM. At 17:19 - pulse 94 and /74.      Zechariah Pearson MD

## 2022-02-08 NOTE — THERAPY TREATMENT NOTE
Inpatient Rehabilitation - Speech Language Pathology Treatment Note    [facility]     Patient Name: Arnie Calvo  : 1960  MRN: [id number]    Today's Date: 2022                   Admit Date: 2/3/2022       Visit Dx:    No diagnosis found.    Patient Active Problem List   Diagnosis   • Cardiomyopathy (HCC)   • Paroxysmal VT (HCC)   • Palpitations   • PVC's (premature ventricular contractions)   • Exacerbation of Crohn's disease of small intestine (HCC)   • Adjustment disorder with depressed mood   • Ankylosis of spine   • Crohn's disease with complication (HCC)   • Diabetes mellitus (HCC)   • Essential hypertension   • External hemorrhoids   • Genital herpes simplex   • Gout   • Insomnia   • Iron deficiency   • Prostate mass   • Recurrent aphthous ulcer   • Asteatosis cutis   • History of pneumonia   • Abnormal CXR (chest x-ray)   • RUQ abdominal pain   • Crohn's disease of small intestine with other complication (HCC)   • Right lower quadrant abdominal pain   • Enteritis   • Mass-like inflammation at terminal ileum   • Crohn's disease (HCC)   • Ileostomy in place (HCC)   • Paroxysmal ventricular tachycardia (HCC)   • Chronic systolic heart failure (HCC)   • Cardiomyopathy, nonischemic (HCC)   • Orthostasis   • Iron deficiency anemia   • Orthostatic hypotension   • Crohn's disease of colon with complication (HCC)   • Dehisced intestinal anastomosis   • History of creation of ostomy (HCC)   • Hypokalemia   • Hypotension, chronic   • Malnutrition of moderate degree (HCC)   • S/P colectomy   • Fatty liver disease, nonalcoholic   • Cholecystitis   • Debility       Past Medical History:   Diagnosis Date   • Acute pain of left hip    • Adjustment disorder with depressed mood    • Anesthesia complication     SPINAL SFOYCXHQECJ-[id number] LOWER SPINE   • Ankylosing spondylitis of cervical region (HCC)    • Ankylosis of spine    • Arthritis    • Asthma    • Cardiomyopathy (HCC)     sees Dr. Reyes; [facility]/  (-) cath, EF 25-35%; has ICD   • CHF (congestive heart failure) (HCC)    • Cholecystitis    • Crohn's disease (HCC)    • Depression    • Diabetes mellitus (HCC)     Diet controlled.   • Dysthymic disorder 2012   • Dysuria    • Essential hypertension    • External hemorrhoids    • Genital herpes    • Gout    • Herpes genitalia    • History of MRSA infection 2000?    FINGERTIP-TX WITH ANTIBIOTICS-[facility] CARE-NO CURRENT OPEN WOUND OR TREATMENT 12/3/21   • Ileostomy in place ([facility])    • Insomnia    • Iron deficiency    • Paroxysmal ventricular tachycardia (HCC)    • PONV (postoperative nausea and vomiting)    • Presence of combination internal cardiac defibrillator (ICD) and pacemaker    • Prostate nodule    • Recurrent canker sores    • Thoracic back pain    • Urinary hesitancy    • Xerosis cutis        Past Surgical History:   Procedure Laterality Date   • ABDOMINAL SURGERY     • CARDIAC CATHETERIZATION     • CARDIAC DEFIBRILLATOR PLACEMENT      REPLACEMENT-Had [place] lead that was fractured.  Lead was tied off.  New lead and new device implanted.   • CARDIAC ELECTROPHYSIOLOGY PROCEDURE N/A 1/3/2019    Procedure: ICD DC generator change  [employer]TR[employer]IC;  Surgeon: Zechariah Randolph MD;  Location: [facility] CATH INVASIVE LOCATION;  Service: Cardiology   • CHOLECYSTECTOMY N/A 12/7/2021    Procedure: CHOLECYSTECTOMY;  Surgeon: Jeovany Mott MD;  Location: [facility] MAIN OR;  Service: General;  Laterality: N/A;   • COLON RESECTION N/A 12/29/2021    Procedure: PARTIAL COLECTOMY WITH OSTOMY;  Surgeon: Jeovany Mott MD;  Location: [facility] MAIN OR;  Service: General;  Laterality: N/A;   • COLON RESECTION WITH ILEOSTOMY N/A 2018   • COLONOSCOPY  04/03/2015    abnormal cecum, three polypoid masses, pre cancerous vs crohns, IH, tics, hyperplastic polyp   • COLONOSCOPY N/A 3/17/2017    polypoid masses, tics, IH, polyp   • EXPLORATORY LAPAROTOMY W/ BOWEL RESECTION  07/2018    open resection of ileum with creation of end ileostomy    • ILEOSTOMY CLOSURE  07/2018   • ILEOSTOMY CLOSURE N/A 12/7/2021    Procedure: ILEOSTOMY TAKEDOWN WITH RESECTION, PARASTOMAL HERNIA REPAIR;  Surgeon: Jeovany Mott MD;  Location: [facility] OR;  Service: General;  Laterality: N/A;   • PACEMAKER IMPLANTATION         SLP Recommendation and Plan                                                   SLP EVALUATION (last 72 hours)     SLP SLC Evaluation     Row Name 02/08/22 1300 02/08/22 1000 02/07/22 1200 02/07/22 1100          Communication Assessment/Intervention    Document Type therapy note (daily note)  -SR therapy note (daily note)  -SR therapy note (daily note)  -SR therapy note (daily note)  -SR     Subjective Information no complaints  -SR complains of; nausea/vomiting  -SR complains of; pain  -SR complains of; pain  -SR     Patient Observations alert; cooperative; agree to therapy  -SR cooperative; agree to therapy  -SR -- agree to therapy; cooperative  -SR     Patient Effort good  -SR good  -SR good  -SR adequate  -SR            Pain Scale: Numbers Pre/Post-Treatment    Pretreatment Pain Rating 0/10 - no pain  -SR 0/10 - no pain  -SR 6/10  -SR 8/10  -SR     Posttreatment Pain Rating 0/10 - no pain  -SR 0/10 - no pain  -SR 6/10  -SR 8/10  -SR           User Key  (r) = Recorded By, (t) = Taken By, (c) = Cosigned By    Initials Name Effective Dates    SR Kristie Luis, STEFANY 01/12/22 -                    EDUCATION    The patient has been educated in the following areas:       Cognitive Impairment.             SLP GOALS     Row Name 02/08/22 1300 02/08/22 1000 02/07/22 1200       Attention Goal 1 (SLP)    Barriers (Attention Goal 1, SLP) -- Pt c/o nausea throughout session. Impacted ability to attend to task  -SR --    Progress (Attention Goal 1, SLP) 90%; independently (over 90% accuracy)  -SR 80%; with minimal cues (75-90%)  -SR --    Progress/Outcomes (Attention Goal 1, SLP) goal partially met  -SR goal ongoing  -SR --    Comment (Attention Goal 1, SLP)  Sustained attn task  -SR Pt completed alternating attention task with 80% acc given min cues.  -SR --       Organizational Skills Goal 1 (SLP)    Progress (Thought Organization Skills Goal 1, SLP) -- 60%; independently (over 90% accuracy); 70%; with minimal cues (75-90%)  -SR 60%; independently (over 90% accuracy)  -SR    Progress/Outcomes (Thought Organization Skills Goal 1, SLP) -- goal ongoing  -SR goal ongoing  -SR    Comment (Thought Organization Skills Goal 1, SLP) -- Mental manipulation task recalling words in alphabetical order; 60% acc independently; 70% given min cues  -SR Patient recalled number sequence in reverse order with 60% acc independently. During sequencing task, patient provided 4 steps verbally with 90% acc independently.  -SR       Functional Math Skills Goal 1 (SLP)    Progress (Functional Math Skills Goal 1, SLP) 60%; independently (over 90% accuracy); 80%; with minimal cues (75-90%)  -SR -- --    Progress/Outcomes (Functional Math Skills Goal 1, SLP) goal partially met  -SR -- --    Comment (Functional Math Skills Goal 1, SLP) Pt completed word problems involving time with 60% acc independently; 80% given min cues  -SR -- --       Executive Functional Skills Goal 1 (SLP)    Progress (Executive Function Skills Goal 1, SLP) 60%; with moderate cues (50-74%)  -SR 30%; independently (over 90% accuracy); 50%; with minimal cues (75-90%)  -SR --    Progress/Outcomes (Executive Function Skills Goal 1, SLP) goal ongoing  -SR goal ongoing  -SR --    Comment (Executive Function Skills Goal 1, SLP) Pt answered questions related to a visual calendar with 60% accuracy given mod cues  -SR Pt completed 5 step written sequencing task with 33% acc indepdendently; 50% given min cues.  -SR --       Executive Functional Skills Goal 2 (SLP)    Comment (Executive Function Skills Goal 2, SLP) -- -- SLP administered SLUMS to further asssess pt's congitive abilities. Pt scored 22/30 indicative of mild cognitive  deficit. Therapy goals will continue to target organization, mental manipulation, and attention.  -SR    Row Name 02/07/22 1100             Attention Goal 1 (SLP)    Barriers (Attention Goal 1, SLP) Pt c/o of pain throughout session. Took appropriate breaks and completed breathing excercises to manage pain.  -SR      Progress (Attention Goal 1, SLP) 80%; with minimal cues (75-90%)  -SR      Progress/Outcomes (Attention Goal 1, SLP) goal ongoing  -SR      Comment (Attention Goal 1, SLP) sustained attn  -SR              Functional Math Skills Goal 1 (SLP)    Progress (Functional Math Skills Goal 1, SLP) 80%; with moderate cues (50-74%)  -SR      Progress/Outcomes (Functional Math Skills Goal 1, SLP) goal ongoing  -SR      Comment (Functional Math Skills Goal 1, SLP) Pt completed word problems involving time with 80% acc given mod cues.  -SR              Executive Functional Skills Goal 2 (SLP)    Comment (Executive Function Skills Goal 2, SLP) SLP reviewed goals for speech therapy. Provided strategies for word finding during conversations. Pt explained that his wife has a difficult time following his conversation when talking on the phone.  -SR            User Key  (r) = Recorded By, (t) = Taken By, (c) = Cosigned By    Initials Name Provider Type    Kristie Gonzalez SLP Speech and Language Pathologist                            Time Calculation:        Time Calculation- SLP     Row Name 02/08/22 1403 02/08/22 1012          Time Calculation- SLP    SLP Start Time 1330  -SR 0930  -SR     SLP Stop Time 1400  -SR 1000  -SR     SLP Time Calculation (min) 30 min  -SR 30 min  -SR           User Key  (r) = Recorded By, (t) = Taken By, (c) = Cosigned By    Initials Name Provider Type    Kristie Gonzalez SLP Speech and Language Pathologist                  Therapy Charges for Today     Code Description Service Date Service Provider Modifiers Qty    89473631407  ST DEV OF COGN SKILLS INITIAL 15 MIN 2/7/2022 Janelle  STEFANY Flowers  1    [id number]  ST DEV OF COGN SKILLS EACH ADDT'L 15 MIN 2/7/2022 Kristie Luis, STEFANY  3    [id number] HC ST DEV OF COGN SKILLS INITIAL 15 MIN 2/8/2022 Kristie Luis SLP  1    [id number] HC ST DEV OF COGN SKILLS EACH ADDT'L 15 MIN 2/8/2022 Kristie Luis SLP  3                           STEFANY Davison  2/8/2022

## 2022-02-08 NOTE — PLAN OF CARE
Problem: Rehabilitation (IRF) Plan of Care  Goal: Plan of Care Review  Outcome: Ongoing, Progressing  Flowsheets (Taken 2/8/2022 0318)  Progress: no change  Outcome Summary: Patient A&Ox4, cooperative but anxious most of the time. Colostomy bag intact and was changed yesterday monday. C/O abdominal wound pain, pain medication given PRN. Refused to take melatonin last night. Needs assistance with turning but refused to be woken up when sleeping. Wears O2 at 2L at HS. C/O LUE spasm, ICE compress applied and helped. No safety issues, uses call light appropriately.  Plan of Care Reviewed With: patient

## 2022-02-08 NOTE — PROGRESS NOTES
LOS: 5 days   Patient Care Team:  Zechariah Fatima MD as PCP - General  Richard Heller MD as PCP - Family Medicine (Pulmonary Disease)  John Bowles MD as Consulting Physician (Gastroenterology)  Hermes Shabazz MD as Consulting Physician (Urology)  Osmar Carranza MD as Consulting Physician (Cardiology)      LEDA JAMZÍN JOHNNY  1960    Chief Complaint: Immobility syndrome  Impaired mobility/impaired self-care/impaired endurance  Ileostomy takedown , cholecystectomy, incisional hernia repair December 7, 2021.  Exploratory laparotomy with end colostomy partial colon resection December 29, 2021  Ostomy-on Imodium/glycopyrrolate  Anxiety  Anemia  Hyponatremia        Subjective   Continues with weakness and fatigue.  Did walk further today.  Has had some nausea.       Objective     Vitals:    02/08/22 0515   BP: 97/67   Pulse: 120   Resp: 18   Temp: 97.8 °F (36.6 °C)   SpO2: 100%       PHYSICAL EXAM:   MENTAL STATUS -  AWAKE / ALERT  HEENT-    LUNGS - CTA, NO WHEEZES, RALES OR RHONCHI  HEART-tachycardia    ABD - NORMOACTIVE BOWEL SOUNDS, SOFT, NT.  Ostomy with liquid brown output.  Dressings in place at right and left abdomen  EXT - NO EDEMA OR CYANOSIS  NEURO -oriented x4.  MOTOR EXAM - RUE/RLE 5/5. LUE/LLE 5/5.           MEDICATIONS  Scheduled Meds:aMILoride, 10 mg, Oral, Daily  carvedilol, 12.5 mg, Oral, Daily Before Supper  cefdinir, 300 mg, Oral, Q12H  enoxaparin, 40 mg, Subcutaneous, Q24H  glycopyrrolate, 1 mg, Oral, BID  loperamide, 2 mg, Oral, 4x Daily AC & at Bedtime  melatonin, 3 mg, Oral, Nightly  Urea, 15 g, Oral, BID      Continuous Infusions:   PRN Meds:.•  acetaminophen  •  Glycerin-Hypromellose-  •  HYDROcodone-acetaminophen  •  simethicone      RESULTS  No results found for: POCGLU  Results from last 7 days   Lab Units 02/07/22  0656 02/04/22  0811 02/02/22  1141   WBC 10*3/mm3 7.06 9.87 9.61   HEMOGLOBIN g/dL 10.3* 10.9* 11.2*   HEMATOCRIT % 33.1* 34.3* 35.2*   PLATELETS 10*3/mm3  163 167 157     Results from last 7 days   Lab Units 02/08/22  0506 02/07/22  0656 02/06/22  0445 02/03/22  1213 02/02/22  1141   SODIUM mmol/L 128* 125* 126*   < > 127*   POTASSIUM mmol/L 4.9 4.2 4.3   < > 3.8   CHLORIDE mmol/L 90* 88* 91*   < > 93*   CO2 mmol/L 26.2 28.4 27.9   < > 27.5   BUN mg/dL 15 15 17   < > 16   CREATININE mg/dL 0.85 0.91 0.89   < > 0.91   CALCIUM mg/dL 9.4 9.0 9.1   < > 9.0   BILIRUBIN mg/dL  --   --   --   --  0.7   ALK PHOS U/L  --   --   --   --  90   ALT (SGPT) U/L  --   --   --   --  22   AST (SGOT) U/L  --   --   --   --  28   GLUCOSE mg/dL 107* 133* 102*   < > 114*    < > = values in this interval not displayed.     Results from last 7 days   Lab Units 02/07/22  0656 02/04/22  0811 02/02/22  1141   WBC 10*3/mm3 7.06 9.87 9.61   HEMOGLOBIN g/dL 10.3* 10.9* 11.2*   HEMATOCRIT % 33.1* 34.3* 35.2*   PLATELETS 10*3/mm3 163 167 157           Ref. Range 2/2/2022 11:41   Magnesium Latest Ref Range: 1.6 - 2.4 mg/dL 1.8   Prealbumin Latest Ref Range: 20.0 - 40.0 mg/dL 10.2 (L)      Chest x-ray February 6  XR CHEST 1 VW-  02/06/2022     HISTORY: Shortness of breath.     Heart size is mildly enlarged. The left hemidiaphragm is partially  obscured likely from small pleural effusion with some mild atelectasis  and/or pneumonia. Left upper lung and right lung appear clear.     Cardiac pacemaker seen in good position.     IMPRESSION:  1. Mild cardiomegaly.  2. Increased density in the left lung base may represent combination of  small amount of left pleural effusion with some mild atelectasis and/or  pneumonia. The left base may have cleared slightly since the 01/23/2022  study.         Assessment/Plan     Debility      Chief Complaint: Immobility syndrome  Impaired mobility/impaired self-care/impaired endurance     Ileostomy takedown , cholecystectomy, incisional hernia repair December 7, 2021.  Exploratory laparotomy with end colostomy partial colon resection December 29, 2021     Ostomy-on  Imodium/glycopyrrolate  February 4-continue Robinul twice daily for now, but decreased Imodium from 2 tablets to 1 tablet p.o. before every meal and at bedtime.     Abdominal wound care  February 4-the ostomy involves the midline wound.  His wound is overall getting smaller per the ostomy wound care team.  On Monday ostomy wound care team will try to get into smaller ostomy pouch to isolate the ostomy from the wound.     Anxiety-would presently hold on adding any benzodiazepine given his multiple medical issues.     Anemia     Hyponatremia  February 4-sodium was 127 on February 2, 130 on February 3, decreased 124 on February 4.  Will consult nephrology for evaluation  Feb 7 -  per Nephrology -[Continue UREA 15 gr daily and placed on fluid restriction 1.5 liters . TRIAL amiloride 10  mg daily ]      Congestive heart failure/tachycardia-on Coreg 12.5 mg for supper.  Blood pressure low at times.  Parameters added evening Feb 3 - Hold if systolic is <110 and diastolic <70  .  Feb 8 - has not received Coreg 12.5 q supper since parameters added on Feb 3 after BP dropped to 88/66. He received every evening on acute care stay .  Will adjust parameter to hold if SBP < 100 and decrease dose to 6.25 mg q supper pending updated input from Cardiology to re-assess for parameters       Nonischemic cardiomyopathy ejection fraction 20%     DVT prophylaxis-Lovenox/SCDs     Impaired nutritional status-supplements per dietitian to follow.  Recheck prealbumin around February 16     Pulmonary- using  O2 2 L nasal cannula at night  Feb 7 - Started on cefdinir and given  breathing treatment yesterday.  He feels breathing treatment help bring up thick secretions.  He is on glycopyrrolate which probably thickens at secretions.  Tachycardia yesterday did not appear to correlate with the time of the breathing treatment as his pulse actually went down after the breathing treatment.  Will give another breathing treatment  today    Insomnia-melatonin added     TEAM CONF - FEB 8 - WEAK, POOR ENDURANCE. ANXIETY.SATS 96% ON ROOM AIR DURING THE DAY.  BED SBA. TRANSFERS CTG RW. GAIT 8-=160 FEET CTG RW. TOILET TRANSFERS CTG. BATH MIN. LBD DEP FOR SOCKS. UBD SBA GOWN. GROOMING SBA.  SWALLOW - ESOPHAGEAL REFLUX. COGNITION - MILD DEFICITS, IMPAIRED ATTENTION DUE TO DISCOMFORT. WOUND CARE, OSTOMY CARE. HYPONATREMIA. FLUID RESTRICTIONS. REC THERAPY LOOKING AT ACTIVITIES DIRECTED TOWARD ANXIETY. ASK PSYCHOLOGY TO SEE.   ELOS - ONE WEEK.      REHAB - admit for comprehensive acute inpatient rehabilitation .  This would be an interdisciplinary program with physical therapy 1.5 hour,  occupational therapy 1.5 hour,  5 days a week.  Rehabilitation nursing for carryover, monitoring of cardiac and intestinal   status, bowel and bladder, and skin  Ongoing physician follow-up.  Weekly team conferences.   The patient's functional status and clinical status is unchanged from preadmission assessment and the patient continues appropriate for acute inpatient rehabilitation.  Goal is for home with outpatient   therapies.  Barrier to discharge:.  Mobility and self-care endurance- worked on condition, transfers, progressive ambulation, activity daily living to overcome.            Zechariah Pearson MD      During rounds, used appropriate personal protective equipment including mask and gloves.  Additional gown if indicated.  Mask used was standard procedure mask. Appropriate PPE was worn during the entire visit.  Hand hygiene was completed before and after.

## 2022-02-08 NOTE — PROGRESS NOTES
Case Management  Inpatient Rehabilitation Team Conference    Conference Date/Time: 2/8/2022 7:41:17 AM    Team Conference Attendees:  Yudelka Page, Pharmacist  Kiara Contreras, ARYA Ojeda, PT  Lynsdey Zeng, OT  Kiara Snyder, CTRS  Nancy Lee RD, LD  Shahriar Petersen, RN  Cecile Apodaca, RN   Kristie Luis, SLP    Demographics            Age: 61Y            Gender: Male    Admission Date: 2/3/2022 3:03:00 PM  Rehabilitation Diagnosis:  Debility s/p GI sx  Past Medical History: Past Medical History:  DiagnosisDate  ?Acute pain of left hip?  ?Adjustment disorder with depressed mood?  ?Anesthesia complication?  ?SPINAL [id number]-[id number] LOWER SPINE  ?Ankylosing spondylitis of cervical region (HCC)?  ?Ankylosis of spine?  ?Arthritis?  ?Asthma?  ?Cardiomyopathy (HCC)?  ?sees Dr. Reyes; NICM/ (-) cath, EF 25-35%; has ICD  ?CHF (congestive heart failure) (HCC)?  ?Cholecystitis?  ?Crohn's disease (HCC)?  ?Diabetes mellitus (HCC)?  ?Diet controlled.  ?Dysthymic [id number]  ?Dysuria?  ?Essential hypertension?  ?External hemorrhoids?  ?Genital herpes?  ?Gout?  ?History of MRSA [id number]?  ?FINGERTIP-TX WITH ANTIBIOTICS-[facility]-NO CURRENT OPEN WOUND OR  TREATMENT 12/3/21  ?Ileostomy in place (HCC)?  ?Insomnia?  ?Iron deficiency?  ?Paroxysmal ventricular tachycardia (HCC)?  ?PONV (postoperative nausea and vomiting)?  ?Presence of combination internal cardiac defibrillator (ICD) and pacemaker?  ?Prostate nodule?  ?Recurrent canker sores?  ?Thoracic back pain?  ?Urinary hesitancy?  ?Xerosis cutis?    ?  ?  Surgical HistoryExpand by Default  Past Surgical History:  ProcedureLateralityDate  ?CARDIAC CATHETERIZATION??  ?CARDIAC DEFIBRILLATOR PLACEMENT??  ?REPLACEMENT-Had Jude lead that was fractured.  Lead was tied off.  New lead  and new device implanted.  ?CARDIAC ELECTROPHYSIOLOGY PROCEDUREN/A1/3/2019  ?Procedure: ICD DC generator change  [employer];   Surgeon: Zechariah Randolph MD;  Location: [facility] CATH INVASIVE LOCATION;  Service: Cardiology  ?CHOLECYSTECTOMYN/A12/7/2021  ?Procedure: CHOLECYSTECTOMY;  Surgeon: Jeovany Mott MD;  Location: [facility]  MAIN OR;  Service: General;  Laterality: N/A;  ?COLON RESECTIONN/A12/29/2021  ?Procedure: PARTIAL COLECTOMY WITH OSTOMY;  Surgeon: Jeovany Mott MD;  Location: [facility] MAIN OR;  Service: General;  Laterality: N/A;  ?COLON RESECTION WITH ILEOSTOMYN/  ?COLONOSCOPY?04/03/2015  ?abnormal cecum, three polypoid masses, pre cancerous vs crohns, IH, tics,  hyperplastic polyp  ?COLONOSCOPYN/A3/17/2017  ?polypoid masses, tics, IH, polyp  ?EXPLORATORY LAPAROTOMY W/ BOWEL RESECTION?07/2018  ?open resection of ileum with creation of end ileostomy  ?ILEOSTOMY CLOSURE?07/2018  ?ILEOSTOMY CLOSUREN/A12/7/2021  ?Procedure: ILEOSTOMY TAKEDOWN WITH RESECTION, PARASTOMAL HERNIA REPAIR;  Surgeon: Jeovany Mott MD;  Location: [facility] OR;  Service: General;  Laterality: N/A;      Plan of Care  Anticipated Discharge Date/Estimated Length of Stay: ELOS: 10 days  Anticipated Discharge Destination: Community discharge with assistance  Discharge Plan : Patient lives with wife in one story home. No step to enter.  D/C plan is home with wife. Wife works from home.  Medical Necessity Expected Level Rationale: Goals are to achieve a level of  supervision modified independent with  mobility and self-care and improved  endurance.   Rehabilitation prognosis fair.  Medical prognosis defer to general  surgery.  Intensity and Duration: an average of 3 hours/5 days per week  Medical Supervision and 24 Hour Rehab Nursing: x  Physical Therapy: x  PT Intensity/Duration: 1 hour/day 5 days/week for approximately 8 - 10 days  Occupational Therapy: x  OT Intensity/Duration: 1 hour/day 5 days/week for approximately 8 - 10 days  Speech and Language Therapy: x  SLP Intensity/Duration: 1 hour/day 5 days/week for approximately 8 - 10 days  Social Work:  x  Therapeutic Recreation: x  Psychology: x  Updated (if changes indicated)    Anticipated Discharge Date/Estimated Length of Stay:   ELOS: DC 2/15    Based on the patient's medical and functional status, their prognosis and  expected level of functional improvement is: Goals are to achieve a level of  supervision modified independent with  mobility and self-care and improved  endurance.   Rehabilitation prognosis fair.  Medical prognosis defer to general  surgery.      Interdisciplinary Problem/Goals/Status    All Rehab Problems:  Cognition    [ST] Attention(Active)  Current Status(02/04/2022): mild-moderate  Weekly Goal(02/11/2022): Patient will follow daily schedule to anticipate  therapies scheduled for the day.  Discharge Goal: Patient will improve attention to detail to increase  independence at next level of care    [ST] Executive Functions(Active)  Current Status(02/04/2022): mild-moderate  Weekly Goal(02/11/2022): Patient will organize materials needed for ADLs  Discharge Goal: Patient will improve organizational skills for increased  independence at next level of care        Mobility    [OT] Toilet Transfers(Active)  Current Status(02/07/2022): est CGA  Weekly Goal(02/11/2022): SBA  Discharge Goal: SBA    [OT] Tub/Shower Transfers(Active)  Current Status(02/07/2022): est CGA  Weekly Goal(02/11/2022): SBA  Discharge Goal: SBA    [PT] Stairs(Active)  Current Status(02/07/2022): TBA  Weekly Goal(02/15/2022): PT only  Discharge Goal: 4, CGA, rails    [PT] Walk(Active)  Current Status(02/07/2022): 80'-160' CGA RWX  Weekly Goal(02/15/2022): SBA to BR, RWX  Discharge Goal: 200' Supervision RWX    [PT] Bed/Chair/Wheelchair(Active)  Current Status(02/07/2022): CGA RWX  Weekly Goal(02/15/2022): SBA RWX  Discharge Goal: Supervision/Mod I    [PT] Bed Mobility(Active)  Current Status(02/07/2022): SBA w/rails  Weekly Goal(02/15/2022): SBA no rails  Discharge Goal: Indep.        Psychosocial    [RN]  Coping/Adjustment(Active)  Current Status(02/07/2022): Pt at risk for coping issues  Weekly Goal(02/15/2022): Pt will make staff aware of concerns  Discharge Goal: NO coping problems        Safety    [RN] Potential for Injury(Active)  Current Status(02/07/2022): Pt is at risk for falls.  Weekly Goal(02/15/2022): Will use call bell 100% of the time  Discharge Goal: No injury        Self Care    [OT] Bathing(Active)  Current Status(02/07/2022): Min  Weekly Goal(02/11/2022): CGA  Discharge Goal: SBA    [OT] Dressing (Lower)(Active)  Current Status(02/07/2022): dep socks, didn't have pants  Weekly Goal(02/11/2022): min  Discharge Goal: SBA    [OT] Dressing (Upper)(Active)  Current Status(02/07/2022): SBA gown  Weekly Goal(02/11/2022): SBA  Discharge Goal: mod I    [OT] Grooming(Active)  Current Status(02/07/2022): SBA  Weekly Goal(02/11/2022): SBA  Discharge Goal: mod I    [OT] Toileting(Active)  Current Status(02/07/2022): Min  Weekly Goal(02/11/2022): CGA  Discharge Goal: SBA        Sphincter Control    [RN] Bladder Management(Active)  Current Status(02/07/2022): Pt cont of urine upon admission  Weekly Goal(02/15/2022): Remain cont  Discharge Goal: 100% cont of urine    [RN] Bowel Management(Active)  Current Status(02/07/2022): Pt has an ostomy in which skin RNs change  Weekly Goal(02/15/2022): Ostomy is fuctional  Discharge Goal: Pt aware of how to manage ostomy        Comments: 2/8: c/o pain/burning at ostomy site, often a barrier; poor endurance;  sats okay but wearing O2 in room for anxiety; no shower yet r/t  endurance/weakness; neuropsych to eval for anxiety/mood;    Signed by: Shahriar Petersen RN    Physician CoSigned By: Zechariah Pearson 02/08/2022 08:40:21

## 2022-02-08 NOTE — PROGRESS NOTES
Discussed team conference report regarding current status/progress, d/c goals, and d/c date set for Tuesday, 2/15, with patient this morning and wife, by phone this afternoon. Patient stated he was feeling nauseated this morning but did go to PT. Wife stated she would like teaching with wound/ostomy nurse prior to d/c. Discussed scheduling family conference for end of week. Will contact ostomy nurse to see if can schedule teaching with wife on same day as family conference. Will assist with plans.

## 2022-02-09 PROBLEM — G47.01 INSOMNIA DUE TO MEDICAL CONDITION: Status: ACTIVE | Noted: 2017-04-18

## 2022-02-09 LAB
ALBUMIN SERPL-MCNC: 2.7 G/DL (ref 3.5–5.2)
ANION GAP SERPL CALCULATED.3IONS-SCNC: 17.9 MMOL/L (ref 5–15)
BUN SERPL-MCNC: 14 MG/DL (ref 8–23)
BUN/CREAT SERPL: 16.1 (ref 7–25)
CALCIUM SPEC-SCNC: 9.1 MG/DL (ref 8.6–10.5)
CHLORIDE SERPL-SCNC: 93 MMOL/L (ref 98–107)
CO2 SERPL-SCNC: 17.1 MMOL/L (ref 22–29)
CREAT SERPL-MCNC: 0.87 MG/DL (ref 0.76–1.27)
GFR SERPL CREATININE-BSD FRML MDRD: 89 ML/MIN/1.73
GLUCOSE SERPL-MCNC: 113 MG/DL (ref 65–99)
PHOSPHATE SERPL-MCNC: 3.8 MG/DL (ref 2.5–4.5)
POTASSIUM SERPL-SCNC: 5.1 MMOL/L (ref 3.5–5.2)
QT INTERVAL: 345 MS
SODIUM SERPL-SCNC: 128 MMOL/L (ref 136–145)

## 2022-02-09 PROCEDURE — 63710000001 ONDANSETRON ODT 4 MG TABLET DISPERSIBLE: Performed by: PHYSICAL MEDICINE & REHABILITATION

## 2022-02-09 PROCEDURE — 97110 THERAPEUTIC EXERCISES: CPT | Performed by: OCCUPATIONAL THERAPIST

## 2022-02-09 PROCEDURE — 25010000002 ENOXAPARIN PER 10 MG: Performed by: PHYSICAL MEDICINE & REHABILITATION

## 2022-02-09 PROCEDURE — 99232 SBSQ HOSP IP/OBS MODERATE 35: CPT | Performed by: NURSE PRACTITIONER

## 2022-02-09 PROCEDURE — 97130 THER IVNTJ EA ADDL 15 MIN: CPT

## 2022-02-09 PROCEDURE — 97535 SELF CARE MNGMENT TRAINING: CPT | Performed by: OCCUPATIONAL THERAPIST

## 2022-02-09 PROCEDURE — 93005 ELECTROCARDIOGRAM TRACING: CPT | Performed by: NURSE PRACTITIONER

## 2022-02-09 PROCEDURE — 97116 GAIT TRAINING THERAPY: CPT

## 2022-02-09 PROCEDURE — 93010 ELECTROCARDIOGRAM REPORT: CPT | Performed by: INTERNAL MEDICINE

## 2022-02-09 PROCEDURE — 97530 THERAPEUTIC ACTIVITIES: CPT

## 2022-02-09 PROCEDURE — 97129 THER IVNTJ 1ST 15 MIN: CPT

## 2022-02-09 PROCEDURE — 80069 RENAL FUNCTION PANEL: CPT | Performed by: PHYSICAL MEDICINE & REHABILITATION

## 2022-02-09 RX ORDER — AMILORIDE HYDROCHLORIDE 5 MG/1
10 TABLET ORAL DAILY
Status: DISCONTINUED | OUTPATIENT
Start: 2022-02-11 | End: 2022-02-10

## 2022-02-09 RX ORDER — SODIUM CHLORIDE 1000 MG
1 TABLET, SOLUBLE MISCELLANEOUS 2 TIMES DAILY WITH MEALS
Status: DISCONTINUED | OUTPATIENT
Start: 2022-02-09 | End: 2022-02-10

## 2022-02-09 RX ADMIN — AMILORIDE HYDROCLORIDE 10 MG: 5 TABLET ORAL at 08:26

## 2022-02-09 RX ADMIN — LOPERAMIDE HYDROCHLORIDE 2 MG: 2 CAPSULE ORAL at 11:29

## 2022-02-09 RX ADMIN — CEFDINIR 300 MG: 300 CAPSULE ORAL at 20:44

## 2022-02-09 RX ADMIN — ONDANSETRON 4 MG: 4 TABLET, ORALLY DISINTEGRATING ORAL at 08:41

## 2022-02-09 RX ADMIN — ENOXAPARIN SODIUM 40 MG: 100 INJECTION SUBCUTANEOUS at 20:44

## 2022-02-09 RX ADMIN — LOPERAMIDE HYDROCHLORIDE 2 MG: 2 CAPSULE ORAL at 20:44

## 2022-02-09 RX ADMIN — SIMETHICONE 80 MG: 80 TABLET, CHEWABLE ORAL at 08:41

## 2022-02-09 RX ADMIN — Medication 3 MG: at 20:44

## 2022-02-09 RX ADMIN — GLYCOPYRROLATE 1 MG: 1 TABLET ORAL at 20:44

## 2022-02-09 RX ADMIN — CARVEDILOL 6.25 MG: 12.5 TABLET, FILM COATED ORAL at 16:52

## 2022-02-09 RX ADMIN — GLYCOPYRROLATE 1 MG: 1 TABLET ORAL at 08:26

## 2022-02-09 RX ADMIN — LOPERAMIDE HYDROCHLORIDE 2 MG: 2 CAPSULE ORAL at 05:39

## 2022-02-09 RX ADMIN — LOPERAMIDE HYDROCHLORIDE 2 MG: 2 CAPSULE ORAL at 16:52

## 2022-02-09 RX ADMIN — SODIUM CHLORIDE TAB 1 GM 1 G: 1 TAB at 08:26

## 2022-02-09 RX ADMIN — SIMETHICONE 80 MG: 80 TABLET, CHEWABLE ORAL at 20:44

## 2022-02-09 RX ADMIN — CEFDINIR 300 MG: 300 CAPSULE ORAL at 08:27

## 2022-02-09 NOTE — PROGRESS NOTES
"OT reported this morning patient was stating he wanted to go home as he is tired of being here and feels he would rest better at home. OT reported patient was not feeling good this morning and only wanted to do bathing at bedside. Met with patient in room to discuss. Patient feeling very depressed over his medical situation and feels he was \"stupid\" for having surgery done as it has now altered his life greatly. He stated he has pain while in bed and also while standing or walking. Discussed trying to sit up more either in wheelchair or recliner to help increase his endurance as he is spending the majority of 24 hour period in bed. Acknowledged his feelings of pain and hopelessness, but discussed trying to work with therapies as best he can to achieve goals to go home. He had talked to wife also and stated she is not ready for him to come home and would be scared to have him home as he is right now. He understands he is not ready to go home. Discussed his anxiety and ways to manage. He finds it difficult to try any relaxation techniques. Discussed having family conference with he and wife to discuss recommendations for home and necessary teaching with wife prior to d/c. Offered support and encouragement.  Email sent to ostomy nurse regarding teaching with wife. She will be available on Friday and will just need to know time. Left wife voicemail regarding this and will call again tomorrow to schedule family conference and teaching.   "

## 2022-02-09 NOTE — THERAPY TREATMENT NOTE
Inpatient Rehabilitation - Physical Therapy Treatment Note       [facility]     Patient Name: Arnie Calvo  : 1960  MRN: [id number]    Today's Date: 2022                    Admit Date: 2/3/2022      Visit Dx:   No diagnosis found.    Patient Active Problem List   Diagnosis   • Cardiomyopathy (HCC)   • Paroxysmal VT (HCC)   • Palpitations   • PVC's (premature ventricular contractions)   • Exacerbation of Crohn's disease of small intestine (HCC)   • Adjustment disorder with depressed mood   • Ankylosis of spine   • Crohn's disease with complication (HCC)   • Diabetes mellitus (HCC)   • Essential hypertension   • External hemorrhoids   • Genital herpes simplex   • Gout   • Insomnia   • Iron deficiency   • Prostate mass   • Recurrent aphthous ulcer   • Asteatosis cutis   • History of pneumonia   • Abnormal CXR (chest x-ray)   • RUQ abdominal pain   • Crohn's disease of small intestine with other complication (HCC)   • Right lower quadrant abdominal pain   • Enteritis   • Mass-like inflammation at terminal ileum   • Crohn's disease (HCC)   • Ileostomy in place (HCC)   • Paroxysmal ventricular tachycardia (HCC)   • Chronic systolic heart failure (HCC)   • Cardiomyopathy, nonischemic (HCC)   • Orthostasis   • Iron deficiency anemia   • Orthostatic hypotension   • Crohn's disease of colon with complication (HCC)   • Dehisced intestinal anastomosis   • History of creation of ostomy (HCC)   • Hypokalemia   • Hypotension, chronic   • Malnutrition of moderate degree (HCC)   • S/P colectomy   • Fatty liver disease, nonalcoholic   • Cholecystitis   • Debility       Past Medical History:   Diagnosis Date   • Acute pain of left hip    • Adjustment disorder with depressed mood    • Anesthesia complication     SPINAL [id number] LOWER SPINE   • Ankylosing spondylitis of cervical region (HCC)    • Ankylosis of spine    • Arthritis    • Asthma    • Cardiomyopathy (HCC)     sees Dr. Reyes; [facility]/ (-)  cath, EF 25-35%; has ICD   • CHF (congestive heart failure) ([facility])    • Cholecystitis    • Crohn's disease ([facility])    • Depression    • Diabetes mellitus ([facility])     Diet controlled.   • Dysthymic disorder 2012   • Dysuria    • Essential hypertension    • External hemorrhoids    • Genital herpes    • Gout    • Herpes genitalia    • History of MRSA infection 2000?    FINGERTIP-TX WITH ANTIBIOTICS-[facility] CARE-NO CURRENT OPEN WOUND OR TREATMENT 12/3/21   • Ileostomy in place ([facility])    • Insomnia    • Iron deficiency    • Paroxysmal ventricular tachycardia ([facility])    • PONV (postoperative nausea and vomiting)    • Presence of combination internal cardiac defibrillator (ICD) and pacemaker    • Prostate nodule    • Recurrent canker sores    • Thoracic back pain    • Urinary hesitancy    • Xerosis cutis        Past Surgical History:   Procedure Laterality Date   • ABDOMINAL SURGERY     • CARDIAC CATHETERIZATION     • CARDIAC DEFIBRILLATOR PLACEMENT      REPLACEMENT-Had Jude lead that was fractured.  Lead was tied off.  New lead and new device implanted.   • CARDIAC ELECTROPHYSIOLOGY PROCEDURE N/A 1/3/2019    Procedure: ICD DC generator change  [employer];  Surgeon: Zechariah Randolph MD;  Location: [facility] CATH INVASIVE LOCATION;  Service: Cardiology   • CHOLECYSTECTOMY N/A 12/7/2021    Procedure: CHOLECYSTECTOMY;  Surgeon: Jeovany Mott MD;  Location: [facility] MAIN OR;  Service: General;  Laterality: N/A;   • COLON RESECTION N/A 12/29/2021    Procedure: PARTIAL COLECTOMY WITH OSTOMY;  Surgeon: Jeovany Mott MD;  Location: [facility] MAIN OR;  Service: General;  Laterality: N/A;   • COLON RESECTION WITH ILEOSTOMY N/A 2018   • COLONOSCOPY  04/03/2015    abnormal cecum, three polypoid masses, pre cancerous vs crohns, IH, tics, hyperplastic polyp   • COLONOSCOPY N/A 3/17/2017    polypoid masses, tics, IH, polyp   • EXPLORATORY LAPAROTOMY W/ BOWEL RESECTION  07/2018    open resection of ileum with creation of end ileostomy   •  ILEOSTOMY CLOSURE  07/2018   • ILEOSTOMY CLOSURE N/A 12/7/2021    Procedure: ILEOSTOMY TAKEDOWN WITH RESECTION, PARASTOMAL HERNIA REPAIR;  Surgeon: Jeovany Mott MD;  Location: [facility] OR;  Service: General;  Laterality: N/A;   • PACEMAKER IMPLANTATION         PT ASSESSMENT (last 12 hours)     IRF PT Evaluation and Treatment     Row Name 02/09/22 1054          PT Time and Intention    Document Type daily treatment  -EE     Mode of Treatment physical therapy; individual therapy  -EE     Patient/Family/Caregiver Comments/Observations Pt supine in bed in AM, reports ongoing pain and nausea, but agreeable to PT. Pt sitting up in recliner in PM.  -EE     Row Name 02/09/22 1054          General Information    Existing Precautions/Restrictions fall  -EE     Row Name 02/09/22 1054          Cognition/Psychosocial    Affect/Mental Status (Cognitive) anxious  -EE     Orientation Status (Cognition) oriented x 4  -EE     Follows Commands (Cognition) follows one-step commands; over 90% accuracy  -EE     Personal Safety Interventions fall prevention program maintained; gait belt; muscle strengthening facilitated; nonskid shoes/slippers when out of bed; supervised activity  -EE     Cognitive Function (Cognitive) WNL  -EE     Row Name 02/09/22 1054          Pain Scale: Numbers Pre/Post-Treatment    Pretreatment Pain Rating 6/10  -EE     Posttreatment Pain Rating 6/10  -EE     Pain Location - Side Bilateral  -EE     Pain Location - Orientation incisional  -EE     Pain Location abdomen  -EE     Pain Intervention(s) Repositioned; Rest  -EE     Row Name 02/09/22 1054          Bed Mobility    Supine-Sit [place] (Bed Mobility) supervision  -EE     Sit-Supine [place] (Bed Mobility) supervision  -EE     Assistive Device (Bed Mobility) bed rails  -EE     Row Name 02/09/22 1054          Transfers    Bed-Chair [place] (Transfers) standby assist  -EE     Chair-Bed [place] (Transfers) standby assist  -EE     Assistive  Device (Bed-Chair Transfers) wheelchair  -EE     Sit-Stand [place] (Transfers) standby assist  -EE     Stand-Sit [place] (Transfers) standby assist  -EE     Row Name 02/09/22 1054          Chair-Bed Transfer    Assistive Device (Chair-Bed Transfers) wheelchair  -EE     Row Name 02/09/22 1054          Sit-Stand Transfer    Assistive Device (Sit-Stand Transfers) walker, front-wheeled  -EE     Row Name 02/09/22 1054          Stand-Sit Transfer    Assistive Device (Stand-Sit Transfers) walker, front-wheeled  -EE     Row Name 02/09/22 1054          Gait/Stairs (Locomotion)    Distance in Feet (Gait) 160' x1, 80' x 1, 50' x 1  -EE     Pattern (Gait) --  -EE     Deviations/Abnormal Patterns (Gait) no decreased; stride length decreased  -EE     Bilateral Gait Deviations forward flexed posture  -EE     Comment (Gait/Stairs) fatigue limiting  -EE     Row Name 02/09/22 1054          Safety Issues, Functional Mobility    Impairments Affecting Function (Mobility) endurance/activity tolerance; strength; pain  -EE     Row Name 02/09/22 1054          Balance    Comment, Balance Standing during ADLs 2 x 3 min with SBA.  -EE     Row Name 02/09/22 1054          Positioning and Restraints    Pre-Treatment Position in bed  -EE     Post Treatment Position bed  -EE     In Bed fowlers; call light within reach; encouraged to call for assist; exit alarm on  -EE           User Key  (r) = Recorded By, (t) = Taken By, (c) = Cosigned By    Initials Name Provider Type    EE Wendy Ojeda, SALLY Physical Therapist              Wound 12/07/21 0757 Right abdomen Incision (Active)   Dressing Appearance dry; intact 02/09/22 0841   Closure [place] 02/09/22 0841   Base dressing in place, unable to visualize 02/09/22 0841       Wound 12/25/21 0809 abdomen Other (comment) (Active)   Dressing Appearance dry; intact 02/09/22 0841   Closure [place] 02/09/22 0841   Base dressing in place, unable to visualize 02/09/22 0841       Wound 12/29/21 1518 midline  abdomen Incision (Active)   Dressing Appearance dry; intact 02/09/22 0841   Closure [place] 02/09/22 0841   Base dressing in place, unable to visualize 02/09/22 0841     Physical Therapy Education                 Title: PT OT SLP Therapies (Done)     Topic: Physical Therapy (Done)     Point: Mobility training (Done)     Learning Progress Summary           Patient Acceptance, E,TB, VU,NR by EE at 2/9/2022 1222    Acceptance, E,TB, VU,NR by EE at 2/8/2022 1015    Acceptance, E,TB, VU,NR by EE at 2/7/2022 1142    Acceptance, E,TB, VU,NR by KIRSTEN at 2/5/2022 1430    Acceptance, E,TB, VU,NR by EE at 2/4/2022 1408                   Point: Home exercise program (Done)     Learning Progress Summary           Patient Acceptance, E,TB, VU,NR by EE at 2/8/2022 1015    Acceptance, E,TB, VU,NR by EE at 2/7/2022 1142    Acceptance, E,TB, VU,NR by EE at 2/4/2022 1408                   Point: Body mechanics (Done)     Learning Progress Summary           Patient Acceptance, E,TB, VU,NR by EE at 2/8/2022 1015    Acceptance, E,TB, VU,NR by EE at 2/7/2022 1142    Acceptance, E,TB, VU,NR by EE at 2/4/2022 1408                   Point: Precautions (Done)     Learning Progress Summary           Patient Acceptance, E,TB, VU,NR by EE at 2/8/2022 1015    Acceptance, E,TB, VU,NR by EE at 2/7/2022 1142    Acceptance, E,TB, VU,NR by EE at 2/4/2022 1408                               User Key     Initials Effective Dates Name Provider Type Discipline     06/16/21 -  Emily Watkins, PT Physical Therapist PT    EE 06/16/21 -  Wendy Ojeda PT Physical Therapist PT                PT Recommendation and Plan    Planned Therapy Interventions (PT): balance training, bed mobility training, gait training, home exercise program, patient/family education, ROM (range of motion), postural re-education, stair training, strengthening, stretching, transfer training  Frequency of Treatment (PT): 5 times per week, 60 minutes per session  Anticipated Equipment Needs  at Discharge (PT Eval): front wheeled walker                  Time Calculation:      PT Charges     Row Name 02/09/22 1512 02/09/22 1222          Time Calculation    Start Time 1400  -EE 1030  -EE     Stop Time 1430  -EE 1100  -EE     Time Calculation (min) 30 min  -EE 30 min  -EE     PT Received On 02/09/22  -EE 02/09/22  -EE     PT - Next Appointment 02/10/22  -EE 02/09/22  -EE            Time Calculation- PT    Total Timed Code Minutes- PT 30 minute(s)  -EE 30 minute(s)  -EE           User Key  (r) = Recorded By, (t) = Taken By, (c) = Cosigned By    Initials Name Provider Type    EE Wendy Ojeda, PT Physical Therapist                Therapy Charges for Today     Code Description Service Date Service Provider Modifiers Qty    78679757481 HC GAIT TRAINING EA 15 MIN 2/8/2022 Wendy Ojeda, PT GP 2    03434133161 HC PT THER PROC EA 15 MIN 2/8/2022 Wendy Ojeda, PT GP 1    53871783046 HC PT THERAPEUTIC ACT EA 15 MIN 2/8/2022 Wendy Ojeda, PT GP 1    59772215551 HC GAIT TRAINING EA 15 MIN 2/9/2022 Wendy Ojeda, PT GP 2    37115749268 HC PT THERAPEUTIC ACT EA 15 MIN 2/9/2022 Wendy Ojeda, PT GP 2               Patient was intermittently wearing a face mask during this therapy encounter. Therapist used appropriate personal protective equipment including mask and gloves.  Mask used was standard procedure mask. Appropriate PPE was worn during the entire therapy session. Hand hygiene was completed before and after therapy session. Patient is not in enhanced droplet precautions.       Wendy Ojeda PT  2/9/2022

## 2022-02-09 NOTE — PLAN OF CARE
Goal Outcome Evaluation:  Plan of Care Reviewed With: patient        Progress: improving  Outcome Summary: Early in shift patient c/o dull pain in lower abdomen.  Offered prn simethicone.  and contacted dr balderas to patient  feeling heightened anxiety that something had gone wrong w/illestomy.  KUB ordered by Dr Rodrigues.  Pt began feeling better.  Meds crushed w/applesauce, except cefdinir.  Pt rested throughout shift.  No other c/o pain.  Pt turned every 2-3 hours.

## 2022-02-09 NOTE — THERAPY TREATMENT NOTE
Inpatient Rehabilitation - Occupational Therapy Treatment Note    [facility]     Patient Name: Arnie Calvo  : 1960  MRN: [id number]    Today's Date: 2022                 Admit Date: 2/3/2022         ICD-10-CM ICD-9-CM   1. Insomnia due to medical condition  G47.01 327.01       Patient Active Problem List   Diagnosis   • Cardiomyopathy (HCC)   • Paroxysmal VT (HCC)   • Palpitations   • PVC's (premature ventricular contractions)   • Exacerbation of Crohn's disease of small intestine (HCC)   • Adjustment disorder with depressed mood   • Ankylosis of spine   • Crohn's disease with complication (HCC)   • Diabetes mellitus (HCC)   • Essential hypertension   • External hemorrhoids   • Genital herpes simplex   • Gout   • Insomnia due to medical condition   • Iron deficiency   • Prostate mass   • Recurrent aphthous ulcer   • Asteatosis cutis   • History of pneumonia   • Abnormal CXR (chest x-ray)   • RUQ abdominal pain   • Crohn's disease of small intestine with other complication (HCC)   • Right lower quadrant abdominal pain   • Enteritis   • Mass-like inflammation at terminal ileum   • Crohn's disease (HCC)   • Ileostomy in place (HCC)   • Paroxysmal ventricular tachycardia (HCC)   • Chronic systolic heart failure (HCC)   • Cardiomyopathy, nonischemic (HCC)   • Orthostasis   • Iron deficiency anemia   • Orthostatic hypotension   • Crohn's disease of colon with complication (HCC)   • Dehisced intestinal anastomosis   • History of creation of ostomy (HCC)   • Hypokalemia   • Hypotension, chronic   • Malnutrition of moderate degree (HCC)   • S/P colectomy   • Fatty liver disease, nonalcoholic   • Cholecystitis   • Debility       Past Medical History:   Diagnosis Date   • Acute pain of left hip    • Adjustment disorder with depressed mood    • Anesthesia complication     SPINAL [id number]-ZEHHEB LOWER SPINE   • Ankylosing spondylitis of cervical region (HCC)    • Ankylosis of spine    • Arthritis     • Asthma    • Cardiomyopathy (HCC)     sees Dr. Reyes; NICM/ (-) cath, EF 25-35%; has ICD   • CHF (congestive heart failure) (HCC)    • Cholecystitis    • Crohn's disease (HCC)    • Depression    • Diabetes mellitus (HCC)     Diet controlled.   • Dysthymic disorder 2012   • Dysuria    • Essential hypertension    • External hemorrhoids    • Genital herpes    • Gout    • Herpes genitalia    • History of MRSA infection 2000?    FINGERTIP-TX WITH ANTIBIOTICS-[facility]-NO CURRENT OPEN WOUND OR TREATMENT 12/3/21   • Ileostomy in place (HCC)    • Insomnia    • Iron deficiency    • Paroxysmal ventricular tachycardia (HCC)    • PONV (postoperative nausea and vomiting)    • Presence of combination internal cardiac defibrillator (ICD) and pacemaker    • Prostate nodule    • Recurrent canker sores    • Thoracic back pain    • Urinary hesitancy    • Xerosis cutis        Past Surgical History:   Procedure Laterality Date   • ABDOMINAL SURGERY     • CARDIAC CATHETERIZATION     • CARDIAC DEFIBRILLATOR PLACEMENT      REPLACEMENT-Had [place] lead that was fractured.  Lead was tied off.  New lead and new device implanted.   • CARDIAC ELECTROPHYSIOLOGY PROCEDURE N/A 1/3/2019    Procedure: ICD DC generator change  [employer];  Surgeon: Zechariah Randolph MD;  Location: [facility] INVASIVE LOCATION;  Service: Cardiology   • CHOLECYSTECTOMY N/A 12/7/2021    Procedure: CHOLECYSTECTOMY;  Surgeon: Jeovany Mott MD;  Location: [facility] OR;  Service: General;  Laterality: N/A;   • COLON RESECTION N/A 12/29/2021    Procedure: PARTIAL COLECTOMY WITH OSTOMY;  Surgeon: Jeovany Mott MD;  Location: [facility] OR;  Service: General;  Laterality: N/A;   • COLON RESECTION WITH ILEOSTOMY N/A 2018   • COLONOSCOPY  04/03/2015    abnormal cecum, three polypoid masses, pre cancerous vs crohns, IH, tics, hyperplastic polyp   • COLONOSCOPY N/A 3/17/2017    polypoid masses, tics, IH, polyp   • EXPLORATORY LAPAROTOMY W/ BOWEL RESECTION   07/2018    open resection of ileum with creation of end ileostomy   • ILEOSTOMY CLOSURE  07/2018   • ILEOSTOMY CLOSURE N/A 12/7/2021    Procedure: ILEOSTOMY TAKEDOWN WITH RESECTION, PARASTOMAL HERNIA REPAIR;  Surgeon: Jeovany Mott MD;  Location: [facility] MAIN OR;  Service: General;  Laterality: N/A;   • PACEMAKER IMPLANTATION               IRF OT ASSESSMENT FLOWSHEET (last 12 hours)     IRF OT Evaluation and Treatment     Row Name 02/09/22 1552 02/09/22 1226       OT Time and Intention    Document Type daily treatment  -KP daily treatment  -KP    Mode of Treatment individual therapy; occupational therapy  -KP individual therapy; occupational therapy  -KP    Patient Effort good  -KP good  -KP    Symptoms Noted During/After Treatment fatigue  -KP fatigue; other (see comments)  pt down about situation and wanting to be home and not here  -KP    Row Name 02/09/22 1552 02/09/22 1226       General Information    Patient/Family/Caregiver Comments/Observations pt supine in bed upon OT arrival  -KP pt supine in bed upon OT arrival. pt tired, feels like he isn't getting enough sleep, wants to be home, get therapy at home. ed pt he is doing well mobility wise.  -KP    Existing Precautions/Restrictions fall  -KP fall  -KP    Row Name 02/09/22 1552 02/09/22 1226       Cognition/Psychosocial    Affect/Mental Status (Cognitive) -- other (see comments)  upset, not happy, wanting to be home, upset about situation of ostomy  -KP    Orientation Status (Cognition) oriented x 4  -KP oriented x 4  -KP    Follows Commands (Cognition) follows one-step commands; over 90% accuracy  -KP follows one-step commands; over 90% accuracy; verbal cues/prompting required  -KP    Personal Safety Interventions fall prevention program maintained; gait belt; muscle strengthening facilitated; nonskid shoes/slippers when out of bed  -KP fall prevention program maintained; gait belt; nonskid shoes/slippers when out of bed  -KP    Cognitive Function  (Cognitive) WFL  -KP WFL  -KP    Row Name 02/09/22 1552 02/09/22 1226       Pain Scale: Numbers Pre/Post-Treatment    Pretreatment Pain Rating 3/10  -KP 3/10  -KP    Posttreatment Pain Rating 3/10  -KP 3/10  -KP    Pain Location - Side Bilateral  -KP Bilateral  -KP    Pain Location - Orientation incisional  -KP incisional  -KP    Pain Location abdomen  -KP abdomen  -KP    Pain Intervention(s) Repositioned  -KP --    Row Name 02/09/22 1552 02/09/22 1226       Bed Mobility    Supine-Sit [place] (Bed Mobility) modified independence  -KP modified independence  -KP    Sit-Supine [place] (Bed Mobility) not tested  -KP modified independence  -KP    Row Name 02/09/22 1552          Functional Mobility    Functional Mobility- Comment steps to wc and chair SBA  -KP     Row Name 02/09/22 1552          Transfers    Bed-Chair [place] (Transfers) standby assist  -KP     Chair-Bed [place] (Transfers) standby assist  -     Assistive Device (Bed-Chair Transfers) wheelchair  -     Sit-Stand [place] (Transfers) standby assist  -     Stand-Sit [place] (Transfers) standby assist  -KP     Row Name 02/09/22 1552          Chair-Bed Transfer    Assistive Device (Chair-Bed Transfers) wheelchair  -KP     Row Name 02/09/22 1552          Sit-Stand Transfer    Assistive Device (Sit-Stand Transfers) wheelchair  -     Row Name 02/09/22 1552          Stand-Sit Transfer    Assistive Device (Stand-Sit Transfers) wheelchair  -     Row Name 02/09/22 1552          Shoulder (Therapeutic Exercise)    Shoulder Strengthening (Therapeutic Exercise) right; flexion; extension; aDduction; aBduction; 1 lb free weight; sitting; 10 repetitions; 2 sets  -     Row Name 02/09/22 1552          Elbow/Forearm (Therapeutic Exercise)    Elbow/Forearm (Therapeutic Exercise) strengthening exercise  -     Elbow/Forearm Strengthening (Therapeutic Exercise) left; right; flexion; extension; supination; pronation; sitting; 1 lb free  weight; 10 repetitions; 2 sets  -     Row Name 02/09/22 1552          Wrist (Therapeutic Exercise)    Wrist (Therapeutic Exercise) strengthening exercise  -     Wrist Strengthening (Therapeutic Exercise) left; right; extension; flexion; 1 lb free weight; 10 repetitions; 2 sets  -     Row Name 02/09/22 1552          Hand (Therapeutic Exercise)    Hand (Therapeutic Exercise) strengthening exercise  -     Hand Strengthening (Therapeutic Exercise) left; right; finger flexion; finger extension; hand gripper; 2 sets; 10 repetitions  pt worked on strength w pulling peel off orange  -     Row Name 02/09/22 1226          Bathing    [place] Level (Bathing) bathing skills; set up; standby assist  -     Position (Bathing) edge of bed sitting; sitting up in bed  -     Comment (Bathing) washed up in bed and EOB due to fatigue and not feeling well.  [facility]     Row Name 02/09/22 1226          Upper Body Dressing    [place] Level (Upper Body Dressing) upper body dressing skills; doff; don; pull over garment; supervision; set up assistance  -     Position (Upper Body Dressing) edge of bed sitting  -     Set-up Assistance (Upper Body Dressing) obtain clothing  [facility]     Row Name 02/09/22 1226          Lower Body Dressing    [place] Level (Lower Body Dressing) doff; don; pants/bottoms; set up; standby assist  -     Position (Lower Body Dressing) edge of bed sitting; supported standing  -     Set-up Assistance (Lower Body Dressing) obtain clothing  [facility]     Row Name 02/09/22 1226          Grooming    [place] Level (Grooming) grooming skills; wash face, hands; set up; standby assist; deodorant application  -     Position (Grooming) edge of bed sitting  -     Set-up Assistance (Grooming) obtain supplies  [facility]     Row Name 02/09/22 1226          Toileting    [place] Level (Toileting) toileting skills; adjust/manage clothing; perform perineal hygiene; set up assistance; supervision  [facility]      Assistive Device Use (Toileting) grab bar/safety frame  -     Position (Toileting) supported standing  -     Set-up Assistance (Toileting) obtain supplies  -     Row Name 02/09/22 1552 02/09/22 1226       Positioning and Restraints    Pre-Treatment Position in bed  -KP in bed  -KP    Post Treatment Position chair  -KP bed  -KP    In Bed -- supine; call light within reach; encouraged to call for assist; exit alarm on; with SLP  -KP    In Chair sitting; call light within reach; encouraged to call for assist; with SLP  -KP --    Row Name 02/09/22 1552 02/09/22 1226       Daily Progress Summary (OT)    Overall Progress Toward Functional Goals (OT) progressing toward functional goals as expected  -KP progressing toward functional goals as expected  -          User Key  (r) = Recorded By, (t) = Taken By, (c) = Cosigned By    Initials Name Effective Dates     Azucena Lyndsey Rodriguez, OTR 06/16/21 -                  Occupational Therapy Education                 Title: PT OT SLP Therapies (Done)     Topic: Occupational Therapy (Done)     Point: ADL training (Done)     Description:   Instruct learner(s) on proper safety adaptation and remediation techniques during self care or transfers.   Instruct in proper use of assistive devices.              Learning Progress Summary           Patient Acceptance, E,TB,D, DU,VU by  at 2/4/2022 1455    Comment: ed pt on role of OT. benefit of therapy POC w. OT. ed on safety w. ADL and tsf. ed on UE ex.                   Point: Home exercise program (Done)     Description:   Instruct learner(s) on appropriate technique for monitoring, assisting and/or progressing therapeutic exercises/activities.              Learning Progress Summary           Patient Acceptance, E,TB,D, DU,VU by  at 2/4/2022 1455    Comment: ed pt on role of OT. benefit of therapy POC w. OT. ed on safety w. ADL and tsf. ed on UE ex.                   Point: Precautions (Done)     Description:   Instruct  learner(s) on prescribed precautions during self-care and functional transfers.              Learning Progress Summary           Patient Acceptance, E,TB,D, DU,VU by  at 2/4/2022 1455    Comment: ed pt on role of OT. benefit of therapy POC w. OT. ed on safety w. ADL and tsf. ed on UE ex.                   Point: Body mechanics (Done)     Description:   Instruct learner(s) on proper positioning and spine alignment during self-care, functional mobility activities and/or exercises.              Learning Progress Summary           Patient Acceptance, E,TB,D, DU,VU by  at 2/4/2022 1455    Comment: ed pt on role of OT. benefit of therapy POC w. OT. ed on safety w. ADL and tsf. ed on UE ex.                               User Key     Initials Effective Dates Name Provider Type [facility] 06/16/21 -  Lyndsey Zeng OTR Occupational Therapist OT                    OT Recommendation and Plan    Planned Therapy Interventions (OT): adaptive equipment training, BADL retraining, activity tolerance training, functional balance retraining, patient/caregiver education/training, ROM/therapeutic exercise, strengthening exercise, transfer/mobility retraining       Daily Progress Summary (OT)  Overall Progress Toward Functional Goals (OT): progressing toward functional goals as expected            Time Calculation:      Time Calculation- OT     Row Name 02/09/22 1556 02/09/22 1231          Time Calculation- OT    OT Start Time 1300  - 0900  -     OT Stop Time 1330  - 0930  -     OT Time Calculation (min) 30 min  - 30 min  -           User Key  (r) = Recorded By, (t) = Taken By, (c) = Cosigned By    Initials Name Provider Type     Lyndsey Zeng OTR Occupational Therapist              Therapy Charges for Today     Code Description Service Date Service Provider Modifiers Qty    95561587932  OT SELF CARE/MGMT/TRAIN EA 15 MIN 2/8/2022 Lyndsey Zeng OTR GO 2    [id number]  OT THER PROC  EA 15 MIN 2/8/2022 Lyndsey Zeng, OTR GO 2    56442208251 HC OT SELF CARE/MGMT/TRAIN EA 15 MIN 2/9/2022 Lyndsey Zeng OTR GO 2    30326765374 HC OT THER PROC EA 15 MIN 2/9/2022 Lyndsey Zeng, OTR GO 2                   JOSE Demarco  2/9/2022

## 2022-02-09 NOTE — NURSING NOTE
CWON note: spoke to pt's RN, no issues with the ileostomy appliance today. Pt has not complained of any burning around the edges. Will plan to change appliance tomorrow or preferable Friday so the wife can learn while she is here for the discharge planning meeting.

## 2022-02-09 NOTE — PROGRESS NOTES
Inpatient Rehabilitation Plan of Care Note    Plan of Care  Care Plan Reviewed - No updates at this time.    Safety    Performed Intervention(s)  Falls protocal  Yellow socks      Sphincter Control    Performed Intervention(s)  I and O  Btrm schedule  Wound ostomy consult for RNs      Psychosocial    Performed Intervention(s)  Mimi consult encouraged  Provide emotional support    Signed by: Frederick Ochoa RN

## 2022-02-09 NOTE — PLAN OF CARE
Goal Outcome Evaluation:  Plan of Care Reviewed With: patient        Progress: improving  Outcome Summary: [facility]  Pt. is A&Ox4, but can be forgetful.  Flat affect noted.  Pt. is anxious.  Assist x 1 to wc or recliner.  PRN zofran and simethicone given this am.  Colostomy LUQ draining brown liquid.  Takes medications crushed in applesauce.  Takes capsules whole with thin liquids.  C/o abdominal pain off and on throughout shift but refuses pain medication.  Dsgs. intact to abdomen.  Wound/ostomy RN to see Thursday or Friday. Assistance with turning when in bed.  Uses call light for assistance.  No safety issues noted.

## 2022-02-09 NOTE — PROGRESS NOTES
LOS: 6 days   Patient Care Team:  Zechariah Fatima MD as PCP - General  PinaRichard alegria MD as PCP - Family Medicine (Pulmonary Disease)  John Bowles MD as Consulting Physician (Gastroenterology)  Hermes Shabazz MD as Consulting Physician (Urology)  Osmar Carranza MD as Consulting Physician (Cardiology)      LEDA JAZMÍN JOHNNY  1960    Chief Complaint: Immobility syndrome  Impaired mobility/impaired self-care/impaired endurance  Ileostomy takedown , cholecystectomy, incisional hernia repair December 7, 2021.  Exploratory laparotomy with end colostomy partial colon resection December 29, 2021  Ostomy-on Imodium/glycopyrrolate  Anxiety  Anemia  Hyponatremia        Subjective   Had some abdominal cramping left evening.  KUB was unremarkable.  Improved today.  Continues with fatigue.  He is spending more time up in a chair today.  Participating with therapies.  Reports tolerated lower dose of Coreg last evening without any lightheadedness.       Objective     Vitals:    02/09/22 1448   BP: 93/70   Pulse: 106   Resp: 18   Temp: 98.3 °F (36.8 °C)   SpO2: 100%       PHYSICAL EXAM:   MENTAL STATUS -  AWAKE / ALERT  HEENT-    LUNGS - CTA, NO WHEEZES, RALES OR RHONCHI  HEART-tachycardia    ABD - NORMOACTIVE BOWEL SOUNDS, SOFT, NT.  Ostomy with liquid brown output.  Dressings in place at right and left abdomen  EXT - NO EDEMA OR CYANOSIS  NEURO -oriented x4.  MOTOR EXAM - RUE/RLE 5/5. LUE/LLE 5/5.           MEDICATIONS  Scheduled Meds:aMILoride, 10 mg, Oral, Daily  carvedilol, 6.25 mg, Oral, Daily Before Supper  cefdinir, 300 mg, Oral, Q12H  enoxaparin, 40 mg, Subcutaneous, Q24H  glycopyrrolate, 1 mg, Oral, BID  loperamide, 2 mg, Oral, 4x Daily AC & at Bedtime  melatonin, 3 mg, Oral, Nightly  Urea, 15 g, Oral, Daily      Continuous Infusions:   PRN Meds:.•  acetaminophen  •  Glycerin-Hypromellose-  •  HYDROcodone-acetaminophen  •  ondansetron ODT  •  simethicone      RESULTS  No results found for:  POCGLU  Results from last 7 days   Lab Units 02/07/22  0656 02/04/22  0811   WBC 10*3/mm3 7.06 9.87   HEMOGLOBIN g/dL 10.3* 10.9*   HEMATOCRIT % 33.1* 34.3*   PLATELETS 10*3/mm3 163 167     Results from last 7 days   Lab Units 02/09/22  0815 02/08/22  0506 02/07/22  0656   SODIUM mmol/L 128* 128* 125*   POTASSIUM mmol/L 5.1 4.9 4.2   CHLORIDE mmol/L 93* 90* 88*   CO2 mmol/L 17.1* 26.2 28.4   BUN mg/dL 14 15 15   CREATININE mg/dL 0.87 0.85 0.91   CALCIUM mg/dL 9.1 9.4 9.0   GLUCOSE mg/dL 113* 107* 133*     Results from last 7 days   Lab Units 02/07/22  0656 02/04/22  0811   WBC 10*3/mm3 7.06 9.87   HEMOGLOBIN g/dL 10.3* 10.9*   HEMATOCRIT % 33.1* 34.3*   PLATELETS 10*3/mm3 163 167           Ref. Range 2/2/2022 11:41   Magnesium Latest Ref Range: 1.6 - 2.4 mg/dL 1.8   Prealbumin Latest Ref Range: 20.0 - 40.0 mg/dL 10.2 (L)      Chest x-ray February 6  XR CHEST 1 VW-  02/06/2022     HISTORY: Shortness of breath.     Heart size is mildly enlarged. The left hemidiaphragm is partially  obscured likely from small pleural effusion with some mild atelectasis  and/or pneumonia. Left upper lung and right lung appear clear.     Cardiac pacemaker seen in good position.     IMPRESSION:  1. Mild cardiomegaly.  2. Increased density in the left lung base may represent combination of  small amount of left pleural effusion with some mild atelectasis and/or  pneumonia. The left base may have cleared slightly since the 01/23/2022  study.         Assessment/Plan     Insomnia due to medical condition    Debility      Chief Complaint: Immobility syndrome  Impaired mobility/impaired self-care/impaired endurance     Ileostomy takedown , cholecystectomy, incisional hernia repair December 7, 2021.  Exploratory laparotomy with end colostomy partial colon resection December 29, 2021     Ostomy-on Imodium/glycopyrrolate  February 4-continue Robinul twice daily for now, but decreased Imodium from 2 tablets to 1 tablet p.o. before every meal and at  bedtime.     Abdominal wound care  February 4-the ostomy involves the midline wound.  His wound is overall getting smaller per the ostomy wound care team.  On Monday ostomy wound care team will try to get into smaller ostomy pouch to isolate the ostomy from the wound.     Anxiety-would presently hold on adding any benzodiazepine given his multiple medical issues.    Depression-February 9-given his hyponatremia, SSRI would not be the best option at this point.  Remeron may be a better option and will review with nephrology and with the patient.     Anemia     Hyponatremia  February 4-sodium was 127 on February 2, 130 on February 3, decreased 124 on February 4.  Will consult nephrology for evaluation  Feb 7 -  per Nephrology -[Continue UREA 15 gr daily and placed on fluid restriction 1.5 liters . TRIAL amiloride 10  mg daily ]      Congestive heart failure/tachycardia-on Coreg 12.5 mg for supper.  Blood pressure low at times.  Parameters added evening Feb 3 - Hold if systolic is <110 and diastolic <70  .  Feb 8 - has not received Coreg 12.5 q supper since parameters added on Feb 3 after BP dropped to 88/66. He received every evening on acute care stay .  Will adjust parameter to hold if SBP < 100 and decrease dose to 6.25 mg q supper pending updated input from Cardiology to re-assess for parameters  February 9-tolerated lower dose of Coreg       Nonischemic cardiomyopathy ejection fraction 20%     DVT prophylaxis-Lovenox/SCDs     Impaired nutritional status-supplements per dietitian to follow.  Recheck prealbumin around February 16     Pulmonary- using  O2 2 L nasal cannula at night  Feb 7 - Started on cefdinir and given  breathing treatment yesterday.  He feels breathing treatment help bring up thick secretions.  He is on glycopyrrolate which probably thickens at secretions.  Tachycardia yesterday did not appear to correlate with the time of the breathing treatment as his pulse actually went down after the breathing  treatment.  Will give another breathing treatment today    Insomnia-melatonin added  Plan for outpatient sleep study     TEAM CONF - FEB 8 - WEAK, POOR ENDURANCE. ANXIETY.SATS 96% ON ROOM AIR DURING THE DAY.  BED SBA. TRANSFERS CTG RW. GAIT 8-=160 FEET CTG RW. TOILET TRANSFERS CTG. BATH MIN. LBD DEP FOR SOCKS. UBD SBA GOWN. GROOMING SBA.  SWALLOW - ESOPHAGEAL REFLUX. COGNITION - MILD DEFICITS, IMPAIRED ATTENTION DUE TO DISCOMFORT. WOUND CARE, OSTOMY CARE. HYPONATREMIA. FLUID RESTRICTIONS. REC THERAPY LOOKING AT ACTIVITIES DIRECTED TOWARD ANXIETY. ASK PSYCHOLOGY TO SEE.   ELOS - ONE WEEK.      REHAB - admit for comprehensive acute inpatient rehabilitation .  This would be an interdisciplinary program with physical therapy 1.5 hour,  occupational therapy 1.5 hour,  5 days a week.  Rehabilitation nursing for carryover, monitoring of cardiac and intestinal   status, bowel and bladder, and skin  Ongoing physician follow-up.  Weekly team conferences.   The patient's functional status and clinical status is unchanged from preadmission assessment and the patient continues appropriate for acute inpatient rehabilitation.  Goal is for home with outpatient   therapies.  Barrier to discharge:.  Mobility and self-care endurance- worked on condition, transfers, progressive ambulation, activity daily living to overcome.            Zechariah Pearson MD      During rounds, used appropriate personal protective equipment including mask and gloves.  Additional gown if indicated.  Mask used was standard procedure mask. Appropriate PPE was worn during the entire visit.  Hand hygiene was completed before and after.

## 2022-02-09 NOTE — THERAPY TREATMENT NOTE
Inpatient Rehabilitation - Speech Language Pathology Treatment Note    [facility]     Patient Name: Arnie Calvo  : 1960  MRN: [id number]    Today's Date: 2022                   Admit Date: 2/3/2022       Visit Dx:    No diagnosis found.    Patient Active Problem List   Diagnosis   • Cardiomyopathy (HCC)   • Paroxysmal VT (HCC)   • Palpitations   • PVC's (premature ventricular contractions)   • Exacerbation of Crohn's disease of small intestine (HCC)   • Adjustment disorder with depressed mood   • Ankylosis of spine   • Crohn's disease with complication (HCC)   • Diabetes mellitus (HCC)   • Essential hypertension   • External hemorrhoids   • Genital herpes simplex   • Gout   • Insomnia   • Iron deficiency   • Prostate mass   • Recurrent aphthous ulcer   • Asteatosis cutis   • History of pneumonia   • Abnormal CXR (chest x-ray)   • RUQ abdominal pain   • Crohn's disease of small intestine with other complication (HCC)   • Right lower quadrant abdominal pain   • Enteritis   • Mass-like inflammation at terminal ileum   • Crohn's disease (HCC)   • Ileostomy in place (HCC)   • Paroxysmal ventricular tachycardia (HCC)   • Chronic systolic heart failure (HCC)   • Cardiomyopathy, nonischemic (HCC)   • Orthostasis   • Iron deficiency anemia   • Orthostatic hypotension   • Crohn's disease of colon with complication (HCC)   • Dehisced intestinal anastomosis   • History of creation of ostomy (HCC)   • Hypokalemia   • Hypotension, chronic   • Malnutrition of moderate degree (HCC)   • S/P colectomy   • Fatty liver disease, nonalcoholic   • Cholecystitis   • Debility       Past Medical History:   Diagnosis Date   • Acute pain of left hip    • Adjustment disorder with depressed mood    • Anesthesia complication     SPINAL [id number] LOWER SPINE   • Ankylosing spondylitis of cervical region (HCC)    • Ankylosis of spine    • Arthritis    • Asthma    • Cardiomyopathy (HCC)     sees Dr. Reyes; [facility]/  (-) cath, EF 25-35%; has ICD   • CHF (congestive heart failure) (HCC)    • Cholecystitis    • Crohn's disease (HCC)    • Depression    • Diabetes mellitus (HCC)     Diet controlled.   • Dysthymic disorder 2012   • Dysuria    • Essential hypertension    • External hemorrhoids    • Genital herpes    • Gout    • Herpes genitalia    • History of MRSA infection 2000?    FINGERTIP-TX WITH ANTIBIOTICS-[facility] CARE-NO CURRENT OPEN WOUND OR TREATMENT 12/3/21   • Ileostomy in place ([facility])    • Insomnia    • Iron deficiency    • Paroxysmal ventricular tachycardia (HCC)    • PONV (postoperative nausea and vomiting)    • Presence of combination internal cardiac defibrillator (ICD) and pacemaker    • Prostate nodule    • Recurrent canker sores    • Thoracic back pain    • Urinary hesitancy    • Xerosis cutis        Past Surgical History:   Procedure Laterality Date   • ABDOMINAL SURGERY     • CARDIAC CATHETERIZATION     • CARDIAC DEFIBRILLATOR PLACEMENT      REPLACEMENT-Had [place] lead that was fractured.  Lead was tied off.  New lead and new device implanted.   • CARDIAC ELECTROPHYSIOLOGY PROCEDURE N/A 1/3/2019    Procedure: ICD DC generator change  [employer];  Surgeon: Zechariah Randolph MD;  Location: [facility] CATH INVASIVE LOCATION;  Service: Cardiology   • CHOLECYSTECTOMY N/A 12/7/2021    Procedure: CHOLECYSTECTOMY;  Surgeon: Jeovany Mott MD;  Location: [facility] MAIN OR;  Service: General;  Laterality: N/A;   • COLON RESECTION N/A 12/29/2021    Procedure: PARTIAL COLECTOMY WITH OSTOMY;  Surgeon: Jeovany Mott MD;  Location: [facility] MAIN OR;  Service: General;  Laterality: N/A;   • COLON RESECTION WITH ILEOSTOMY N/A 2018   • COLONOSCOPY  04/03/2015    abnormal cecum, three polypoid masses, pre cancerous vs crohns, IH, tics, hyperplastic polyp   • COLONOSCOPY N/A 3/17/2017    polypoid masses, tics, IH, polyp   • EXPLORATORY LAPAROTOMY W/ BOWEL RESECTION  07/2018    open resection of ileum with creation of end ileostomy    • ILEOSTOMY CLOSURE  07/2018   • ILEOSTOMY CLOSURE N/A 12/7/2021    Procedure: ILEOSTOMY TAKEDOWN WITH RESECTION, PARASTOMAL HERNIA REPAIR;  Surgeon: Jeovany Mott MD;  Location: [facility];  Service: General;  Laterality: N/A;   • PACEMAKER IMPLANTATION         SLP Recommendation and Plan                                                   SLP EVALUATION (last 72 hours)     SLP SLC Evaluation     Row Name 02/09/22 1400 02/09/22 1100 02/08/22 1300 02/08/22 1000 02/07/22 1200       Communication Assessment/Intervention    Document Type therapy note (daily note)  -SR therapy note (daily note)  -SR therapy note (daily note)  -SR therapy note (daily note)  -SR therapy note (daily note)  -SR    Subjective Information complains of; fatigue  -SR complains of; pain; nausea/vomiting  -SR no complaints  -SR complains of; nausea/vomiting  -SR complains of; pain  -SR    Patient Observations cooperative; agree to therapy  -SR cooperative; agree to therapy  -SR alert; cooperative; agree to therapy  -SR cooperative; agree to therapy  -SR --    Patient Effort good  -SR good  -SR good  -SR good  -SR good  -SR    Symptoms Noted During/After Treatment -- none  -SR -- -- --       Pain Scale: Numbers Pre/Post-Treatment    Pretreatment Pain Rating 3/10  -SR 5/10  -SR 0/10 - no pain  -SR 0/10 - no pain  -SR 6/10  -SR    Posttreatment Pain Rating 3/10  -SR 5/10  -SR 0/10 - no pain  -SR 0/10 - no pain  -SR 6/10  -SR    Row Name 02/07/22 1100                   Communication Assessment/Intervention    Document Type therapy note (daily note)  -SR        Subjective Information complains of; pain  -SR        Patient Observations agree to therapy; cooperative  -SR        Patient Effort adequate  -SR                  Pain Scale: Numbers Pre/Post-Treatment    Pretreatment Pain Rating 8/10  -SR        Posttreatment Pain Rating 8/10  -SR              User Key  (r) = Recorded By, (t) = Taken By, (c) = Cosigned By    Initials Name Effective  "Dates    SR Janelle Kristie, SLP 01/12/22 -                    EDUCATION    The patient has been educated in the following areas:       Cognitive Impairment.             SLP GOALS     Row Name 02/09/22 1400 02/09/22 1100 02/08/22 1300       Attention Goal 1 (SLP)    Progress (Attention Goal 1, SLP) -- 90%; independently (over 90% accuracy)  -SR 90%; independently (over 90% accuracy)  -SR    Progress/Outcomes (Attention Goal 1, SLP) -- goal partially met  -SR goal partially met  -SR    Comment (Attention Goal 1, SLP) -- sustained attn task  -SR Sustained attn task  -SR       Organizational Skills Goal 1 (SLP)    Progress (Thought Organization Skills Goal 1, SLP) -- 90%; independently (over 90% accuracy); 80%; with minimal cues (75-90%)  -SR --    Progress/Outcomes (Thought Organization Skills Goal 1, SLP) -- goal partially met  -SR --    Comment (Thought Organization Skills Goal 1, SLP) -- Patient provided verbal explanation during 4-step sequencing task with 90% acc independently; Completed mental manipulation task with 80% acc given min cues  -SR --       Functional Math Skills Goal 1 (SLP)    Progress (Functional Math Skills Goal 1, SLP) 60%; with moderate cues (50-74%)  -SR -- 60%; independently (over 90% accuracy); 80%; with minimal cues (75-90%)  -SR    Progress/Outcomes (Functional Math Skills Goal 1, SLP) goal ongoing  -SR -- goal partially met  -SR    Comment (Functional Math Skills Goal 1, SLP) Completed check balance activity with 60% acc given mod cues. Patient often added too many numbers to calculation. During the activity he said, \"I used to be much better with a calculator before all of this.\"  -SR -- Pt completed word problems involving time with 60% acc independently; 80% given min cues  -SR       Executive Functional Skills Goal 1 (SLP)    Barriers (Executive Function Skills Goal 1, SLP) -- Patient explained that he is ready to go home and c/o pain and discomfort. Completed therapy upright in bed.  " -SR --    Progress (Executive Function Skills Goal 1, SLP) 50%; with moderate cues (50-74%)  -SR 80%; with minimal cues (75-90%)  -SR 60%; with moderate cues (50-74%)  -SR    Progress/Outcomes (Executive Function Skills Goal 1, SLP) continuing progress toward goal  -SR goal partially met  -SR goal ongoing  -SR    Comment (Executive Function Skills Goal 1, SLP) Patient completed higher level deduction activity with 57% acc given mod cues.  -SR Completed deduction task with 80% acc given min cues. Patient explained that his cognitive abilities appear WFL and are related to age. ST will continue therapy until d/c to monitor attention, executive function, and home management tasks.  -SR Pt answered questions related to a visual calendar with 60% accuracy given mod cues  -SR    Row Name 02/08/22 1000 02/07/22 1200 02/07/22 1100       Attention Goal 1 (SLP)    Barriers (Attention Goal 1, SLP) Pt c/o nausea throughout session. Impacted ability to attend to task  -SR -- Pt c/o of pain throughout session. Took appropriate breaks and completed breathing excercises to manage pain.  -SR    Progress (Attention Goal 1, SLP) 80%; with minimal cues (75-90%)  -SR -- 80%; with minimal cues (75-90%)  -SR    Progress/Outcomes (Attention Goal 1, SLP) goal ongoing  -SR -- goal ongoing  -SR    Comment (Attention Goal 1, SLP) Pt completed alternating attention task with 80% acc given min cues.  -SR -- sustained attn  -SR       Organizational Skills Goal 1 (SLP)    Progress (Thought Organization Skills Goal 1, SLP) 60%; independently (over 90% accuracy); 70%; with minimal cues (75-90%)  -SR 60%; independently (over 90% accuracy)  -SR --    Progress/Outcomes (Thought Organization Skills Goal 1, SLP) goal ongoing  -SR goal ongoing  -SR --    Comment (Thought Organization Skills Goal 1, SLP) Mental manipulation task recalling words in alphabetical order; 60% acc independently; 70% given min cues  -SR Patient recalled number sequence in reverse  order with 60% acc independently. During sequencing task, patient provided 4 steps verbally with 90% acc independently.  -SR --       Functional Math Skills Goal 1 (SLP)    Progress (Functional Math Skills Goal 1, SLP) -- -- 80%; with moderate cues (50-74%)  -SR    Progress/Outcomes (Functional Math Skills Goal 1, SLP) -- -- goal ongoing  -SR    Comment (Functional Math Skills Goal 1, SLP) -- -- Pt completed word problems involving time with 80% acc given mod cues.  -SR       Executive Functional Skills Goal 1 (SLP)    Progress (Executive Function Skills Goal 1, SLP) 30%; independently (over 90% accuracy); 50%; with minimal cues (75-90%)  -SR -- --    Progress/Outcomes (Executive Function Skills Goal 1, SLP) goal ongoing  -SR -- --    Comment (Executive Function Skills Goal 1, SLP) Pt completed 5 step written sequencing task with 33% acc indepdendently; 50% given min cues.  -SR -- --       Executive Functional Skills Goal 2 (SLP)    Comment (Executive Function Skills Goal 2, SLP) -- SLP administered SLUMS to further asssess pt's congitive abilities. Pt scored 22/30 indicative of mild cognitive deficit. Therapy goals will continue to target organization, mental manipulation, and attention.  -SR SLP reviewed goals for speech therapy. Provided strategies for word finding during conversations. Pt explained that his wife has a difficult time following his conversation when talking on the phone.  -SR          User Key  (r) = Recorded By, (t) = Taken By, (c) = Cosigned By    Initials Name Provider Type    Kristie Gonzalez, SLP Speech and Language Pathologist                            Time Calculation:        Time Calculation- SLP     Row Name 02/09/22 1415 02/09/22 1000          Time Calculation- SLP    SLP Start Time 1330  -SR 0930  -SR     SLP Stop Time 1400  -SR 1000  -SR     SLP Time Calculation (min) 30 min  -SR 30 min  -SR           User Key  (r) = Recorded By, (t) = Taken By, (c) = Cosigned By    Initials Name  Provider Type    Kristie Gonzalez SLP Speech and Language Pathologist                  Therapy Charges for Today     Code Description Service Date Service Provider Modifiers Qty    20578277213 HC ST DEV OF COGN SKILLS INITIAL 15 MIN 2/8/2022 Kristie Luis SLP  1    49117404352 HC ST DEV OF COGN SKILLS EACH ADDT'L 15 MIN 2/8/2022 Kristie Lusi SLP  3    47247325234 HC ST DEV OF COGN SKILLS INITIAL 15 MIN 2/9/2022 Kristie Luis SLP  1    65315258843 HC ST DEV OF COGN SKILLS EACH ADDT'L 15 MIN 2/9/2022 Kristie Luis SLP  3                           STEFANY Davison  2/9/2022

## 2022-02-09 NOTE — THERAPY TREATMENT NOTE
Inpatient Rehabilitation - Occupational Therapy Treatment Note    [facility]     Patient Name: Arnie Calvo  : 1960  MRN: [id number]    Today's Date: 2022                 Admit Date: 2/3/2022       No diagnosis found.    Patient Active Problem List   Diagnosis   • Cardiomyopathy (HCC)   • Paroxysmal VT (HCC)   • Palpitations   • PVC's (premature ventricular contractions)   • Exacerbation of Crohn's disease of small intestine (HCC)   • Adjustment disorder with depressed mood   • Ankylosis of spine   • Crohn's disease with complication (HCC)   • Diabetes mellitus (HCC)   • Essential hypertension   • External hemorrhoids   • Genital herpes simplex   • Gout   • Insomnia   • Iron deficiency   • Prostate mass   • Recurrent aphthous ulcer   • Asteatosis cutis   • History of pneumonia   • Abnormal CXR (chest x-ray)   • RUQ abdominal pain   • Crohn's disease of small intestine with other complication (HCC)   • Right lower quadrant abdominal pain   • Enteritis   • Mass-like inflammation at terminal ileum   • Crohn's disease (HCC)   • Ileostomy in place (HCC)   • Paroxysmal ventricular tachycardia (HCC)   • Chronic systolic heart failure (HCC)   • Cardiomyopathy, nonischemic (HCC)   • Orthostasis   • Iron deficiency anemia   • Orthostatic hypotension   • Crohn's disease of colon with complication (HCC)   • Dehisced intestinal anastomosis   • History of creation of ostomy (HCC)   • Hypokalemia   • Hypotension, chronic   • Malnutrition of moderate degree (HCC)   • S/P colectomy   • Fatty liver disease, nonalcoholic   • Cholecystitis   • Debility       Past Medical History:   Diagnosis Date   • Acute pain of left hip    • Adjustment disorder with depressed mood    • Anesthesia complication     SPINAL [id number] LOWER SPINE   • Ankylosing spondylitis of cervical region (HCC)    • Ankylosis of spine    • Arthritis    • Asthma    • Cardiomyopathy (HCC)     sees Dr. Reyes; NICM/ (-) cath, EF 25-35%;  has ICD   • CHF (congestive heart failure) (formerly [facility])    • Cholecystitis    • Crohn's disease (HCC)    • Depression    • Diabetes mellitus (formerly [facility])     Diet controlled.   • Dysthymic disorder 2012   • Dysuria    • Essential hypertension    • External hemorrhoids    • Genital herpes    • Gout    • Herpes genitalia    • History of MRSA infection 2000?    FINGERTIP-TX WITH ANTIBIOTICS-[facility] CARE-NO CURRENT OPEN WOUND OR TREATMENT 12/3/21   • Ileostomy in place (formerly [facility])    • Insomnia    • Iron deficiency    • Paroxysmal ventricular tachycardia (formerly [facility])    • PONV (postoperative nausea and vomiting)    • Presence of combination internal cardiac defibrillator (ICD) and pacemaker    • Prostate nodule    • Recurrent canker sores    • Thoracic back pain    • Urinary hesitancy    • Xerosis cutis        Past Surgical History:   Procedure Laterality Date   • ABDOMINAL SURGERY     • CARDIAC CATHETERIZATION     • CARDIAC DEFIBRILLATOR PLACEMENT      REPLACEMENT-Had [place] lead that was fractured.  Lead was tied off.  New lead and new device implanted.   • CARDIAC ELECTROPHYSIOLOGY PROCEDURE N/A 1/3/2019    Procedure: ICD DC generator change  [employer];  Surgeon: Zechariah Randolph MD;  Location: [facility] CATH INVASIVE LOCATION;  Service: Cardiology   • CHOLECYSTECTOMY N/A 12/7/2021    Procedure: CHOLECYSTECTOMY;  Surgeon: Jeovany Mott MD;  Location: [facility] OR;  Service: General;  Laterality: N/A;   • COLON RESECTION N/A 12/29/2021    Procedure: PARTIAL COLECTOMY WITH OSTOMY;  Surgeon: Jeovany Mott MD;  Location: [facility] OR;  Service: General;  Laterality: N/A;   • COLON RESECTION WITH ILEOSTOMY N/A 2018   • COLONOSCOPY  04/03/2015    abnormal cecum, three polypoid masses, pre cancerous vs crohns, IH, tics, hyperplastic polyp   • COLONOSCOPY N/A 3/17/2017    polypoid masses, tics, IH, polyp   • EXPLORATORY LAPAROTOMY W/ BOWEL RESECTION  07/2018    open resection of ileum with creation of end ileostomy   • ILEOSTOMY  CLOSURE  07/2018   • ILEOSTOMY CLOSURE N/A 12/7/2021    Procedure: ILEOSTOMY TAKEDOWN WITH RESECTION, PARASTOMAL HERNIA REPAIR;  Surgeon: Jeovany Mott MD;  Location: [facility] OR;  Service: General;  Laterality: N/A;   • PACEMAKER IMPLANTATION               IRF OT ASSESSMENT FLOWSHEET (last 12 hours)     IRF OT Evaluation and Treatment     Row Name 02/09/22 1226          OT Time and Intention    Document Type daily treatment  -KP     Mode of Treatment individual therapy; occupational therapy  -KP     Patient Effort good  -KP     Symptoms Noted During/After Treatment fatigue; other (see comments)  pt down about situation and wanting to be home and not here  -KP     Row Name 02/09/22 1226          General Information    Patient/Family/Caregiver Comments/Observations pt supine in bed upon OT arrival. pt tired, feels like he isn't getting enough sleep, wants to be home, get therapy at home. ed pt he is doing well mobility wise.  -KP     Existing Precautions/Restrictions fall  -KP     Row Name 02/09/22 1226          Cognition/Psychosocial    Affect/Mental Status (Cognitive) other (see comments)  upset, not happy, wanting to be home, upset about situation of ostomy  -KP     Orientation Status (Cognition) oriented x 4  -KP     Follows Commands (Cognition) follows one-step commands; over 90% accuracy; verbal cues/prompting required  -KP     Personal Safety Interventions fall prevention program maintained; gait belt; nonskid shoes/slippers when out of bed  -KP     Cognitive Function (Cognitive) WFL  -KP     Row Name 02/09/22 1226          Pain Scale: Numbers Pre/Post-Treatment    Pretreatment Pain Rating 3/10  -KP     Posttreatment Pain Rating 3/10  -KP     Pain Location - Side Bilateral  -KP     Pain Location - Orientation incisional  -KP     Pain Location abdomen  -KP     Row Name 02/09/22 1226          Bed Mobility    Supine-Sit [place] (Bed Mobility) modified independence  -KP     Sit-Supine [place] (Bed  Mobility) modified independence  -     Row Name 02/09/22 1226          Bathing    [place] Level (Bathing) bathing skills; set up; standby assist  -     Position (Bathing) edge of bed sitting; sitting up in bed  -     Comment (Bathing) washed up in bed and EOB due to fatigue and not feeling well.  -     Row Name 02/09/22 1226          Upper Body Dressing    [place] Level (Upper Body Dressing) upper body dressing skills; doff; don; pull over garment; supervision; set up assistance  -     Position (Upper Body Dressing) edge of bed sitting  -     Set-up Assistance (Upper Body Dressing) obtain clothing  [facility]     Row Name 02/09/22 1226          Lower Body Dressing    [place] Level (Lower Body Dressing) doff; don; pants/bottoms; set up; standby assist  -     Position (Lower Body Dressing) edge of bed sitting; supported standing  -     Set-up Assistance (Lower Body Dressing) obtain clothing  [facility]     Row Name 02/09/22 1226          Grooming    [place] Level (Grooming) grooming skills; wash face, hands; set up; standby assist; deodorant application  -     Position (Grooming) edge of bed sitting  -     Set-up Assistance (Grooming) obtain supplies  [facility]     Row Name 02/09/22 1226          Toileting    [place] Level (Toileting) toileting skills; adjust/manage clothing; perform perineal hygiene; set up assistance; supervision  [facility]     Assistive Device Use (Toileting) grab bar/safety frame  -     Position (Toileting) supported standing  [facility]     Set-up Assistance (Toileting) obtain supplies  [facility]     Row Name 02/09/22 1226          Positioning and Restraints    Pre-Treatment Position in bed  -     Post Treatment Position bed  -KP     In Bed supine; call light within reach; encouraged to call for assist; exit alarm on; with SLP  -     Row Name 02/09/22 1226          Daily Progress Summary (OT)    Overall Progress Toward Functional Goals (OT) progressing toward functional goals as  expected  -           User Key  (r) = Recorded By, (t) = Taken By, (c) = Cosigned By    Initials Name Effective Dates     Lyndsey Zeng, OTR 06/16/21 -                  Occupational Therapy Education                 Title: PT OT SLP Therapies (Done)     Topic: Occupational Therapy (Done)     Point: ADL training (Done)     Description:   Instruct learner(s) on proper safety adaptation and remediation techniques during self care or transfers.   Instruct in proper use of assistive devices.              Learning Progress Summary           Patient Acceptance, E,TB,D, DU,VU by  at 2/4/2022 1455    Comment: ed pt on role of OT. benefit of therapy POC w. OT. ed on safety w. ADL and tsf. ed on UE ex.                   Point: Home exercise program (Done)     Description:   Instruct learner(s) on appropriate technique for monitoring, assisting and/or progressing therapeutic exercises/activities.              Learning Progress Summary           Patient Acceptance, E,TB,D, DU,VU by  at 2/4/2022 1455    Comment: ed pt on role of OT. benefit of therapy POC w. OT. ed on safety w. ADL and tsf. ed on UE ex.                   Point: Precautions (Done)     Description:   Instruct learner(s) on prescribed precautions during self-care and functional transfers.              Learning Progress Summary           Patient Acceptance, E,TB,D, DU,VU by  at 2/4/2022 1455    Comment: ed pt on role of OT. benefit of therapy POC w. OT. ed on safety w. ADL and tsf. ed on UE ex.                   Point: Body mechanics (Done)     Description:   Instruct learner(s) on proper positioning and spine alignment during self-care, functional mobility activities and/or exercises.              Learning Progress Summary           Patient Acceptance, E,TB,D, DU,VU by  at 2/4/2022 1455    Comment: ed pt on role of OT. benefit of therapy POC w. OT. ed on safety w. ADL and tsf. ed on UE ex.                               User Key     Initials  Effective Dates Name Provider Type Discipline     06/16/21 -  Lyndsey Zeng OTR Occupational Therapist OT                    OT Recommendation and Plan    Planned Therapy Interventions (OT): adaptive equipment training, BADL retraining, activity tolerance training, functional balance retraining, patient/caregiver education/training, ROM/therapeutic exercise, strengthening exercise, transfer/mobility retraining       Daily Progress Summary (OT)  Overall Progress Toward Functional Goals (OT): progressing toward functional goals as expected            Time Calculation:      Time Calculation- OT     Row Name 02/09/22 1231             Time Calculation- OT    OT Start Time 0900  -      OT Stop Time 0930  -      OT Time Calculation (min) 30 min  -            User Key  (r) = Recorded By, (t) = Taken By, (c) = Cosigned By    Initials Name Provider Type     Lyndsey Zeng OTR Occupational Therapist              Therapy Charges for Today     Code Description Service Date Service Provider Modifiers Qty    46320336849 HC OT SELF CARE/MGMT/TRAIN EA 15 MIN 2/8/2022 Lyndsey Zeng OTR GO 2    16762900535 HC OT THER PROC EA 15 MIN 2/8/2022 Lyndsey Zeng OTR GO 2    78679405727 HC OT SELF CARE/MGMT/TRAIN EA 15 MIN 2/9/2022 Lyndsey Zeng OTR GO 2                   JOSE Demarco  2/9/2022

## 2022-02-09 NOTE — PROGRESS NOTES
Hospital Follow Up    LOS:  LOS: 6 days   Patient Name: Arnie Calvo  Age/Sex: 61 y.o. male  : 1960  MRN: [id number]    Day of Service: 22   Length of Stay: 6  Encounter Provider: ILIANA Arnett  Place of Service: [facility] CARDIOLOGY  Patient Care Team:  Zechariah Fatima MD as PCP - General  PinaiRchard bone MD as PCP - Family Medicine (Pulmonary Disease)  John Bowles MD as Consulting Physician (Gastroenterology)  Hermes Shabazz MD as Consulting Physician (Urology)  Osmar Carranza MD as Consulting Physician (Cardiology)    Subjective:     Chief Complaint: tachycardia    Interval History: complains of being tired and worn out. No palpitations    Objective:     Objective:  Temp:  [97.7 °F (36.5 °C)-98.7 °F (37.1 °C)] 97.7 °F (36.5 °C)  Heart Rate:  [] 112  Resp:  [18] 18  BP: ()/(64-74) 100/71     Intake/Output Summary (Last 24 hours) at 2022 1049  Last data filed at 2022 0900  Gross per 24 hour   Intake 838 ml   Output 1650 ml   Net -812 ml     Body mass index is 26.89 kg/m².      22  0610 22  0515 22  0552   Weight: 90.6 kg (199 lb 11.8 oz) 89.6 kg (197 lb 8.5 oz) 82.6 kg (182 lb 1.6 oz)     Weight change: -7 kg (-15 lb 6.9 oz)    Physical Exam:   General Appearance:    Awake alert and oriented in no acute distress.   Color:  Skin:  Neuro:  HEENT:    Lungs:     Pink  Warm and dry  No focal, motor or sensory deficits  Neck supple, pupils equal, round and reactive. No JVD, No Bruit  Clear to auscultation,respirations regular, even and                  unlabored    Heart:    Regular rate and rhythm, S1 and S2, no murmur, no gallop, no rub. No edema, DP/PT pulses are 2+   Chest Wall:    No abnormalities observed   Abdomen:     Normal bowel sounds, no masses, no organomegaly, soft        non-tender, non-distended, no guarding, no ascites noted   Extremities:   Moves all extremities well, no edema, no cyanosis, no  redness       Lab Review:   Results from last 7 days   Lab Units 02/08/22  0506 02/07/22  0656 02/03/22  1213 02/02/22  1141   SODIUM mmol/L 128* 125*   < > 127*   POTASSIUM mmol/L 4.9 4.2   < > 3.8   CHLORIDE mmol/L 90* 88*   < > 93*   CO2 mmol/L 26.2 28.4   < > 27.5   BUN mg/dL 15 15   < > 16   CREATININE mg/dL 0.85 0.91   < > 0.91   GLUCOSE mg/dL 107* 133*   < > 114*   CALCIUM mg/dL 9.4 9.0   < > 9.0   AST (SGOT) U/L  --   --   --  28   ALT (SGPT) U/L  --   --   --  22    < > = values in this interval not displayed.         Results from last 7 days   Lab Units 02/07/22  0656 02/04/22  0811   WBC 10*3/mm3 7.06 9.87   HEMOGLOBIN g/dL 10.3* 10.9*   HEMATOCRIT % 33.1* 34.3*   PLATELETS 10*3/mm3 163 167         Results from last 7 days   Lab Units 02/02/22  1141   MAGNESIUM mg/dL 1.8           Invalid input(s): LDLCALC          I reviewed the patient's new clinical results.  I personally viewed and interpreted the patient's EKG  Current Medications:   Scheduled Meds:aMILoride, 10 mg, Oral, Daily  carvedilol, 6.25 mg, Oral, Daily Before Supper  cefdinir, 300 mg, Oral, Q12H  enoxaparin, 40 mg, Subcutaneous, Q24H  glycopyrrolate, 1 mg, Oral, BID  loperamide, 2 mg, Oral, 4x Daily AC & at Bedtime  melatonin, 3 mg, Oral, Nightly  sodium chloride, 1 g, Oral, TID With Meals  Urea, 15 g, Oral, Daily      Continuous Infusions:     Allergies:  Allergies   Allergen Reactions   • Sulfamethoxazole-Trimethoprim Hives and Rash     Blisters   • Trimethoprim Hives   • Adhesive Tape Rash   • Shellfish-Derived Products Nausea And Vomiting and GI Intolerance       Assessment:       Debility    1. ICH  2. Chronic systolic CHF  3. Ventricular tachycardia/ICD  4. Abdominal wound with dehiscence  5. S/P colon resection    Plan:       Have been following patient throughout his hospital course. Sinus tach is not new for him. Some of it probably has to do with his deconditioning and extensive illness. He had a workup when he was in the hospital  2 weeks ago. Echo was stable with enlarged LV. Also had a ddimer that was elevated with no PE on Ct chest. Would continue with coreg. I will get an ekg to assess current rhythm. He seems very down and depressed. Discussed with nursing to take up with primary, may need antidepressant. Will follow.     ILIANA Arnett  02/09/22  10:49 EST  Electronically signed by ILIANA Arnett, 02/09/22, 10:49 AM EST.

## 2022-02-09 NOTE — PROGRESS NOTES
Inpatient Rehabilitation Plan of Care Note    Plan of Care  Care Plan Reviewed - Updates as Follows    Psychosocial    [RN] Coping/Adjustment(Active)  Current Status(02/09/2022): Pt at risk for coping issues  Weekly Goal(02/14/2022): Pt will make staff aware of concerns  Discharge Goal: NO coping problems    Performed Intervention(s)  Miim consult encouraged  Provide emotional support      Safety    [RN] Potential for Injury(Active)  Current Status(02/09/2022): Pt is at risk for falls.  Weekly Goal(02/14/2022): Will use call bell 100% of the time  Discharge Goal: No injury    Performed Intervention(s)  Falls protocal  Yellow socks  bed alarm/wc alarm      Sphincter Control    [RN] Bladder Management(Active)  Current Status(02/09/2022): Pt cont of urine upon admission  Weekly Goal(02/14/2022): Remain cont  Discharge Goal: 100% cont of urine    [RN] Bowel Management(Active)  Current Status(02/09/2022): Pt has an ostomy in which skin RNs change  Weekly Goal(02/14/2022): Ostomy is fuctional  Discharge Goal: Pt aware of how to manage ostomy    Performed Intervention(s)  I and O  Btrm schedule  Wound ostomy consult for RNs    Signed by: Cris Hurtado RN

## 2022-02-09 NOTE — PROGRESS NOTES
[facility] CM[facility] Coordinator  Inpatient Rehabilitation Admission    Ethnic Group: White.  Marital Status:  Marital Status: .    IRF Admission Date:  02/03/2022  Admission Class: Initial Rehab.  Admit From:  [facility], [place]    Pre-Hospital Living: Home. Pre-Hospital Living  With: (2) Family/Relatives.    Payment Sources: Primary: Not Listed.  Secondary: Not Listed.  Impairment Group: 16 Debility (non-cardiac, non-pulmonary)  Date of Onset of Impairment: 12/24/2021    Etiologic Diagnosis Code(s):  Rank Code      Description  1    K50.912   Crohns disease, unspecified, with intestinal                 obstruction    Comorbidities:      Are there any arthritis conditions recorded for Impairment Group, Etiologic  Diagnosis, or Comorbid Conditions that meet all of the regulatory requirements  for IRF classification (in 42 .29(b)(2)(x), (xi), and xii))? No    Presence of Pressure Ulcer:  No observed/documented pressure ulcers.    MEDICAL NEEDS  Height on Admission:  69 inches.  Weight on Admission:  182 pounds.    QUALITY INDICATORS  Prior Functioning:  Self Care: Patient completed the activities by him/herself, with or without an  assistive device, with no assistance from a helper.  Indoor Mobility: Patient completed the activities by him/herself, with or  without an assistive device, with no assistance from a helper.  Stairs: Patient completed the activities by him/herself, with or without an  assistive device, with no assistance from a helper.  Functional Cognition: Patient completed the activities by him/herself, with or  without an assistive device, with no assistance from a helper.  Prior Device Use: Patient does not use manual or motorized wheelchair or  scooter, mechanical lift, walker, or an orthotic/prosthesis.    Bladder and Bowel: Bladder Continence: Incontinent daily.  Bowel Continence: Not rated (patient had an ostomy or did not have a bowel  movement for the entire 3  days).  Swallowing/Nutritional Status: Regular food (solids and liquids swallowed safely  without supervision or modified food or liquid consistency).  Tube/parenteral feeding (used wholly or partially as a means of sustenance)  Special Conditions: Patient did not receive total parenteral nutrition treatment  at the time of admission.    Section I. Active Diagnosis: Comorbidities and Co-existing Conditions:  Diabetes Mellitus (DM) - e.g., diabetic retinopathy, nephropathy, and  neuropathy).  Section J. Health Conditions: Patient has not had any falls in the past year.  Patient has not had major surgery during the 100 days prior to admission.  Section M. Skin Conditions  Unhealed Pressure Ulcer/Injuries at Stage 1 or  Higher on Admission:  No.  Section N. Medication:  Potential Clinically Significant Medication Issues: No issues found during  review    Signed by: Shahriar Petersen RN

## 2022-02-10 LAB
ALBUMIN SERPL-MCNC: 3.1 G/DL (ref 3.5–5.2)
ANION GAP SERPL CALCULATED.3IONS-SCNC: 6.9 MMOL/L (ref 5–15)
BUN SERPL-MCNC: 16 MG/DL (ref 8–23)
BUN/CREAT SERPL: 13.4 (ref 7–25)
CALCIUM SPEC-SCNC: 9.3 MG/DL (ref 8.6–10.5)
CHLORIDE SERPL-SCNC: 92 MMOL/L (ref 98–107)
CO2 SERPL-SCNC: 28.1 MMOL/L (ref 22–29)
CREAT SERPL-MCNC: 1.19 MG/DL (ref 0.76–1.27)
GFR SERPL CREATININE-BSD FRML MDRD: 62 ML/MIN/1.73
GLUCOSE SERPL-MCNC: 100 MG/DL (ref 65–99)
PHOSPHATE SERPL-MCNC: 4.3 MG/DL (ref 2.5–4.5)
POTASSIUM SERPL-SCNC: 5.9 MMOL/L (ref 3.5–5.2)
SODIUM SERPL-SCNC: 127 MMOL/L (ref 136–145)

## 2022-02-10 PROCEDURE — 25010000002 ENOXAPARIN PER 10 MG: Performed by: PHYSICAL MEDICINE & REHABILITATION

## 2022-02-10 PROCEDURE — 97535 SELF CARE MNGMENT TRAINING: CPT | Performed by: OCCUPATIONAL THERAPIST

## 2022-02-10 PROCEDURE — 97530 THERAPEUTIC ACTIVITIES: CPT

## 2022-02-10 PROCEDURE — 97129 THER IVNTJ 1ST 15 MIN: CPT

## 2022-02-10 PROCEDURE — 97110 THERAPEUTIC EXERCISES: CPT

## 2022-02-10 PROCEDURE — 97110 THERAPEUTIC EXERCISES: CPT | Performed by: OCCUPATIONAL THERAPIST

## 2022-02-10 PROCEDURE — 99232 SBSQ HOSP IP/OBS MODERATE 35: CPT | Performed by: INTERNAL MEDICINE

## 2022-02-10 PROCEDURE — 94762 N-INVAS EAR/PLS OXIMTRY CONT: CPT

## 2022-02-10 PROCEDURE — 97116 GAIT TRAINING THERAPY: CPT

## 2022-02-10 PROCEDURE — 83935 ASSAY OF URINE OSMOLALITY: CPT | Performed by: INTERNAL MEDICINE

## 2022-02-10 PROCEDURE — 84300 ASSAY OF URINE SODIUM: CPT | Performed by: INTERNAL MEDICINE

## 2022-02-10 PROCEDURE — 80069 RENAL FUNCTION PANEL: CPT | Performed by: PHYSICAL MEDICINE & REHABILITATION

## 2022-02-10 PROCEDURE — 97130 THER IVNTJ EA ADDL 15 MIN: CPT

## 2022-02-10 RX ORDER — MIRTAZAPINE 15 MG/1
15 TABLET, FILM COATED ORAL NIGHTLY
Status: DISCONTINUED | OUTPATIENT
Start: 2022-02-17 | End: 2022-02-15 | Stop reason: HOSPADM

## 2022-02-10 RX ORDER — MIRTAZAPINE 15 MG/1
7.5 TABLET, FILM COATED ORAL NIGHTLY
Status: DISCONTINUED | OUTPATIENT
Start: 2022-02-10 | End: 2022-02-15 | Stop reason: HOSPADM

## 2022-02-10 RX ADMIN — LOPERAMIDE HYDROCHLORIDE 2 MG: 2 CAPSULE ORAL at 07:41

## 2022-02-10 RX ADMIN — LOPERAMIDE HYDROCHLORIDE 2 MG: 2 CAPSULE ORAL at 11:48

## 2022-02-10 RX ADMIN — LOPERAMIDE HYDROCHLORIDE 2 MG: 2 CAPSULE ORAL at 20:41

## 2022-02-10 RX ADMIN — LOPERAMIDE HYDROCHLORIDE 2 MG: 2 CAPSULE ORAL at 16:52

## 2022-02-10 RX ADMIN — SODIUM ZIRCONIUM CYCLOSILICATE 10 G: 10 POWDER, FOR SUSPENSION ORAL at 07:41

## 2022-02-10 RX ADMIN — CEFDINIR 300 MG: 300 CAPSULE ORAL at 07:41

## 2022-02-10 RX ADMIN — ENOXAPARIN SODIUM 40 MG: 100 INJECTION SUBCUTANEOUS at 20:41

## 2022-02-10 RX ADMIN — SODIUM CHLORIDE TAB 1 GM 1 G: 1 TAB at 07:41

## 2022-02-10 RX ADMIN — SODIUM ZIRCONIUM CYCLOSILICATE 10 G: 10 POWDER, FOR SUSPENSION ORAL at 16:52

## 2022-02-10 RX ADMIN — GLYCOPYRROLATE 1 MG: 1 TABLET ORAL at 20:41

## 2022-02-10 RX ADMIN — CEFDINIR 300 MG: 300 CAPSULE ORAL at 20:41

## 2022-02-10 RX ADMIN — GLYCOPYRROLATE 1 MG: 1 TABLET ORAL at 07:41

## 2022-02-10 RX ADMIN — MIRTAZAPINE 7.5 MG: 15 TABLET, FILM COATED ORAL at 20:41

## 2022-02-10 RX ADMIN — SODIUM ZIRCONIUM CYCLOSILICATE 10 G: 10 POWDER, FOR SUSPENSION ORAL at 20:41

## 2022-02-10 NOTE — THERAPY TREATMENT NOTE
Inpatient Rehabilitation - Occupational Therapy Treatment Note    [facility]     Patient Name: Arnie Calvo  : 1960  MRN: [id number]    Today's Date: 2/10/2022                 Admit Date: 2/3/2022         ICD-10-CM ICD-9-CM   1. Insomnia due to medical condition  G47.01 327.01       Patient Active Problem List   Diagnosis   • Cardiomyopathy (HCC)   • Paroxysmal VT (HCC)   • Palpitations   • PVC's (premature ventricular contractions)   • Exacerbation of Crohn's disease of small intestine (HCC)   • Adjustment disorder with depressed mood   • Ankylosis of spine   • Crohn's disease with complication (HCC)   • Diabetes mellitus (HCC)   • Essential hypertension   • External hemorrhoids   • Genital herpes simplex   • Gout   • Insomnia due to medical condition   • Iron deficiency   • Prostate mass   • Recurrent aphthous ulcer   • Asteatosis cutis   • History of pneumonia   • Abnormal CXR (chest x-ray)   • RUQ abdominal pain   • Crohn's disease of small intestine with other complication (HCC)   • Right lower quadrant abdominal pain   • Enteritis   • Mass-like inflammation at terminal ileum   • Crohn's disease (HCC)   • Ileostomy in place (HCC)   • Paroxysmal ventricular tachycardia (HCC)   • Chronic systolic heart failure (HCC)   • Cardiomyopathy, nonischemic (HCC)   • Orthostasis   • Iron deficiency anemia   • Orthostatic hypotension   • Crohn's disease of colon with complication (HCC)   • Dehisced intestinal anastomosis   • History of creation of ostomy (HCC)   • Hypokalemia   • Hypotension, chronic   • Malnutrition of moderate degree (HCC)   • S/P colectomy   • Fatty liver disease, nonalcoholic   • Cholecystitis   • Debility       Past Medical History:   Diagnosis Date   • Acute pain of left hip    • Adjustment disorder with depressed mood    • Anesthesia complication     SPINAL [id number] LOWER SPINE   • Ankylosing spondylitis of cervical region (HCC)    • Ankylosis of spine    • Arthritis     • Asthma    • Cardiomyopathy (HCC)     sees Dr. Reyes; NICM/ (-) cath, EF 25-35%; has ICD   • CHF (congestive heart failure) (HCC)    • Cholecystitis    • Crohn's disease (HCC)    • Depression    • Diabetes mellitus (HCC)     Diet controlled.   • Dysthymic disorder 2012   • Dysuria    • Essential hypertension    • External hemorrhoids    • Genital herpes    • Gout    • Herpes genitalia    • History of MRSA infection 2000?    FINGERTIP-TX WITH ANTIBIOTICS-[facility]-NO CURRENT OPEN WOUND OR TREATMENT 12/3/21   • Ileostomy in place (HCC)    • Insomnia    • Iron deficiency    • Paroxysmal ventricular tachycardia (HCC)    • PONV (postoperative nausea and vomiting)    • Presence of combination internal cardiac defibrillator (ICD) and pacemaker    • Prostate nodule    • Recurrent canker sores    • Thoracic back pain    • Urinary hesitancy    • Xerosis cutis        Past Surgical History:   Procedure Laterality Date   • ABDOMINAL SURGERY     • CARDIAC CATHETERIZATION     • CARDIAC DEFIBRILLATOR PLACEMENT      REPLACEMENT-Had [place] lead that was fractured.  Lead was tied off.  New lead and new device implanted.   • CARDIAC ELECTROPHYSIOLOGY PROCEDURE N/A 1/3/2019    Procedure: ICD DC generator change  [employer];  Surgeon: Zechariah Randolph MD;  Location: [facility] INVASIVE LOCATION;  Service: Cardiology   • CHOLECYSTECTOMY N/A 12/7/2021    Procedure: CHOLECYSTECTOMY;  Surgeon: Jeovany Mott MD;  Location: [facility] OR;  Service: General;  Laterality: N/A;   • COLON RESECTION N/A 12/29/2021    Procedure: PARTIAL COLECTOMY WITH OSTOMY;  Surgeon: Jeovany Mott MD;  Location: [facility] OR;  Service: General;  Laterality: N/A;   • COLON RESECTION WITH ILEOSTOMY N/A 2018   • COLONOSCOPY  04/03/2015    abnormal cecum, three polypoid masses, pre cancerous vs crohns, IH, tics, hyperplastic polyp   • COLONOSCOPY N/A 3/17/2017    polypoid masses, tics, IH, polyp   • EXPLORATORY LAPAROTOMY W/ BOWEL RESECTION   07/2018    open resection of ileum with creation of end ileostomy   • ILEOSTOMY CLOSURE  07/2018   • ILEOSTOMY CLOSURE N/A 12/7/2021    Procedure: ILEOSTOMY TAKEDOWN WITH RESECTION, PARASTOMAL HERNIA REPAIR;  Surgeon: Jeovany Mott MD;  Location: [facility] MAIN OR;  Service: General;  Laterality: N/A;   • PACEMAKER IMPLANTATION               IRF OT ASSESSMENT FLOWSHEET (last 12 hours)     IRF OT Evaluation and Treatment     Row Name 02/10/22 1533 02/10/22 1213       OT Time and Intention    Document Type daily treatment  -KP daily treatment  -KP    Mode of Treatment individual therapy; occupational therapy  -KP individual therapy; occupational therapy  -KP    Patient Effort good  -KP good  -KP    Symptoms Noted During/After Treatment fatigue  -KP fatigue  -KP    Row Name 02/10/22 1533 02/10/22 1213       General Information    Patient/Family/Caregiver Comments/Observations pt in bed upon OT arrival  -KP pt in bed upon OT arrival  -KP    Existing Precautions/Restrictions fall  -KP fall  -KP    Row Name 02/10/22 1533 02/10/22 1213       Cognition/Psychosocial    Orientation Status (Cognition) oriented x 4  -KP oriented x 4  -KP    Follows Commands (Cognition) follows one-step commands; over 90% accuracy  -KP follows one-step commands; over 90% accuracy  -KP    Personal Safety Interventions fall prevention program maintained; gait belt; muscle strengthening facilitated; nonskid shoes/slippers when out of bed  -KP fall prevention program maintained; gait belt; nonskid shoes/slippers when out of bed  -KP    Cognitive Function (Cognitive) WFL  -KP WFL  -KP    Row Name 02/10/22 1533 02/10/22 1213       Pain Scale: Numbers Pre/Post-Treatment    Pretreatment Pain Rating 5/10  -KP 5/10  -KP    Posttreatment Pain Rating 5/10  -KP 5/10  -KP    Pain Location - Side Bilateral  -KP Bilateral  -KP    Pain Location - Orientation incisional  -KP incisional  -KP    Pain Location abdomen  -KP abdomen  -KP    Pain Intervention(s)  Repositioned  - Repositioned  -    Row Name 02/10/22 1533 02/10/22 1213       Bed Mobility    Supine-Sit [place] (Bed Mobility) independent  - independent  -    Sit-Supine [place] (Bed Mobility) not tested  - independent  -    Row Name 02/10/22 1533          Functional Mobility    Functional Mobility- Ind. Level set up required; supervision required  -     Functional Mobility- Device rolling walker  -     Functional Mobility- Comment in room to recliner chair  -     Row Name 02/10/22 1533 02/10/22 1213       Transfers    Bed-Chair [place] (Transfers) set up; standby assist  - set up; standby assist  [facility]    Chair-Bed [place] (Transfers) -- standby assist; set up  -    Assistive Device (Bed-Chair Transfers) wheelchair  - wheelchair  -    Sit-Stand [place] (Transfers) set up; standby assist  - set up; standby assist  -    Stand-Sit [place] (Transfers) set up; standby assist  - set up; standby assist  [facility]    [place] Level (Toilet Transfer) -- --  pt refused shower, will only wash at sink  -    Row Name 02/10/22 1213          Chair-Bed Transfer    Assistive Device (Chair-Bed Transfers) wheelchair  -     Row Name 02/10/22 1533 02/10/22 1213       Sit-Stand Transfer    Assistive Device (Sit-Stand Transfers) walker, front-wheeled  - wheelchair  -    Row Name 02/10/22 1533 02/10/22 1213       Stand-Sit Transfer    Assistive Device (Stand-Sit Transfers) walker, front-wheeled  [facility] wheelchair  -    Row Name 02/10/22 1213          Toilet Transfer    Type (Toilet Transfer) --  pt SBA w nsg staff last night. pt states doesn't need to go now  -     Row Name 02/10/22 1533          Shoulder (Therapeutic Exercise)    Shoulder Strengthening (Therapeutic Exercise) right; flexion; extension; aBduction; aDduction; sitting; 1 lb free weight; 10 repetitions; 2 sets  -     Row Name 02/10/22 1533          Elbow/Forearm (Therapeutic Exercise)    Elbow/Forearm  Strengthening (Therapeutic Exercise) left; right; flexion; extension; supination; pronation; sitting; 1 lb free weight; 10 repetitions; 2 sets  -     Row Name 02/10/22 1533          Wrist (Therapeutic Exercise)    Wrist Strengthening (Therapeutic Exercise) right; left; flexion; extension; 1 lb free weight; 10 repetitions; 2 sets  -[facility] Name 02/10/22 1533          Hand (Therapeutic Exercise)    Hand Strengthening (Therapeutic Exercise) left; right; finger extension; finger flexion; hand gripper; 10 repetitions; 2 sets  -[facility] Name 02/10/22 1213          Bathing    [place] Level (Bathing) bathing skills; lower body; upper body; standby assist; set up  -     Position (Bathing) sink side; supported sitting; supported standing  -     Set-up Assistance (Bathing) obtain supplies  [facility] Name 02/10/22 1213          Upper Body Dressing    [place] Level (Upper Body Dressing) upper body dressing skills; don; doff; pull over garment; set up assistance; supervision  -     Position (Upper Body Dressing) supported sitting  -     Set-up Assistance (Upper Body Dressing) obtain clothing  [facility] Name 02/10/22 1213          Lower Body Dressing    [place] Level (Lower Body Dressing) doff; don; pants/bottoms; socks; set up; standby assist  -     Position (Lower Body Dressing) supported sitting; supported standing  -     Set-up Assistance (Lower Body Dressing) obtain clothing  -[facility] Name 02/10/22 1213          Grooming    [place] Level (Grooming) grooming skills; deodorant application; oral care regimen; wash face, hands; set up; standby assist  -     Position (Grooming) sink side; supported sitting  -     Row Name 02/10/22 1213          Toileting    [place] Level (Toileting) toileting skills; adjust/manage clothing; perform perineal hygiene; set up assistance; supervision  -     Assistive Device Use (Toileting) grab bar/safety frame  -     Position (Toileting)  supported sitting; supported standing  -     Row Name 02/10/22 1533          Transfer Goal 1 (OT-IRF)    Activity/Assistive Device (Transfer Goal 1, OT-IRF) toilet; sit-to-stand/stand-to-sit; bed-to-chair/chair-to-bed; shower chair with a back  -KP     [place] Level (Transfer Goal 1, OT-IRF) set-up required; standby assist  -KP     Time Frame (Transfer Goal 1, OT-IRF) short-term goal (STG); 1 week  -KP     Progress/Outcomes (Transfer Goal 1, OT-IRF) goal met  -     Row Name 02/10/22 1533          Transfer Goal 2 (OT-IRF)    Activity/Assistive Device (Transfer Goal 2, OT-IRF) sit-to-stand/stand-to-sit; bed-to-chair/chair-to-bed; toilet; walk-in shower; walker, rolling  -KP     [place] Level (Transfer Goal 2, OT-IRF) set-up required; standby assist  -KP     Time Frame (Transfer Goal 2, OT-IRF) long-term goal (LTG); by discharge; 1 week  -KP     Progress/Outcomes (Transfer Goal 2, OT-IRF) goal met; goal ongoing  -     Row Name 02/10/22 1533          Bathing Goal 1 (OT-IRF)    Activity/Device (Bathing Goal 1, OT-IRF) bathing skills, all; grab bar, tub/shower; hand-held shower spray hose; long-handled sponge; shower chair  -     [place] Level (Bathing Goal 1, OT-IRF) contact guard assist  -KP     Time Frame (Bathing Goal 1, OT-IRF) short-term goal (STG); 1 week  -KP     Progress/Outcomes (Bathing Goal 1, OT-IRF) goal met  -     Row Name 02/10/22 1533          Bathing Goal 2 (OT-IRF)    Activity/Device (Bathing Goal 2, OT-IRF) bathing skills, all; grab bar, tub/shower; hand-held shower spray hose; long-handled sponge; shower chair  -KP     [place] Level (Bathing Goal 2, OT-IRF) standby assist  -KP     Time Frame (Bathing Goal 2, OT-IRF) long-term goal (LTG); by discharge; 1 week  -KP     Progress/Outcomes (Bathing Goal 2, OT-IRF) goal met  -KP     Row Name 02/10/22 1533          UB Dressing Goal 1 (OT-IRF)    Activity/Device (UB Dressing Goal 1, OT-IRF) upper body dressing  -KP      [place] (UB Dress Goal 1, OT-IRF) standby assist  -KP     Time Frame (UB Dressing Goal 1, OT-IRF) short-term goal (STG); 1 week  -KP     Progress/Outcomes (UB Dressing Goal 1, OT-IRF) goal met  -KP     Row Name 02/10/22 1533          UB Dressing Goal 2 (OT-IRF)    Activity/Device (UB Dressing Goal 2, OT-IRF) upper body dressing  -KP     [place] (UB Dress Goal 2, OT-IRF) modified independence  -KP     Time Frame (UB Dressing Goal 2, OT-IRF) long-term goal (LTG); 1 week; by discharge  -KP     Progress/Outcomes (UB Dressing Goal 2, OT-IRF) good progress toward goal  -KP     Row Name 02/10/22 1533          LB Dressing Goal 1 (OT-IRF)    Activity/Device (LB Dressing Goal 1, OT-IRF) lower body dressing; reacher; sock aid  -KP     [place] (LB Dressing Goal 1, OT-IRF) minimum assist (75% or more patient effort)  -KP     Time Frame (LB Dressing Goal 1, OT-IRF) short-term goal (STG); 1 week  -KP     Progress/Outcomes (LB Dressing Goal 1, OT-IRF) goal met  -KP     Row Name 02/10/22 1533          LB Dressing Goal 2 (OT-IRF)    Activity/Device (LB Dressing Goal 2, OT-IRF) lower body dressing; reacher; sock aid  -KP     [place] (LB Dressing Goal 2, OT-IRF) standby assist  -KP     Time Frame (LB Dressing Goal 2, OT-IRF) long-term goal (LTG); 1 week; by discharge  -KP     Progress/Outcomes (LB Dressing Goal 2, OT-IRF) goal met; goal ongoing  -KP     Row Name 02/10/22 1533          Grooming Goal 1 (OT-IRF)    Activity/Device (Grooming Goal 1, OT-IRF) grooming skills, all  -KP     [place] (Grooming Goal 1, OT-IRF) set-up required  -KP     Time Frame (Grooming Goal 1, OT-IRF) short-term goal (STG); 1 week  -KP     Progress/Outcomes (Grooming Goal 1, OT-IRF) goal met  -KP     Row Name 02/10/22 1533          Grooming Goal 2 (OT-IRF)    Activity/Device (Grooming Goal 2, OT-IRF) grooming skills, all  -KP     [place] (Grooming Goal 2, OT-IRF) modified independence  -KP     Time Frame (Grooming Goal 2, OT-IRF)  long-term goal (LTG); by discharge; 1 week  -KP     Progress/Outcomes (Grooming Goal 2, OT-IRF) good progress toward goal  -KP     Row Name 02/10/22 1533          Toileting Goal 1 (OT-IRF)    Activity/Device (Toileting Goal 1, OT-IRF) toileting skills, all; grab bar/safety frame  -KP     [place] Level (Toileting Goal 1, OT-IRF) set-up required; contact guard assist  -KP     Progress/Outcomes (Toileting Goal 1, OT-IRF) goal met  -KP     Time Frame (Toileting Goal 1, OT-IRF) short-term goal (STG); 1 week  -     Row Name 02/10/22 1533          Toileting Goal 2 (OT-IRF)    Activity/Device (Toileting Goal 2, OT-IRF) toileting skills, all; grab bar/safety frame  -KP     [place] Level (Toileting Goal 2, OT-IRF) standby assist  -KP     Progress/Outcomes (Toileting Goal 2, OT-IRF) goal met; goal ongoing  -KP     Time Frame (Toileting Goal 2, OT-IRF) long-term goal (LTG); 1 week; by discharge  -     Row Name 02/10/22 1533          Strength Goal 1 (OT-IRF)    Strength Goal 1 (OT-IRF) incr B UE to 4/5  -KP     Time Frame (Strength Goal 1, OT-IRF) long-term goal (LTG); by discharge  -     Progress/Outcomes (Strength Goal 1, OT-IRF) continuing progress toward goal  -KP     Row Name 02/10/22 1533          Balance Goal 1 (OT)    Activity/Assistive Device (Balance Goal 1, OT) standing, dynamic  -KP     [place] Level/Cues Needed (Balance Goal 1, OT) supervision required  -KP     Time Frame (Balance Goal 1, OT) long term goal (LTG); by discharge  -     Progress/Outcomes (Balance Goal 1, OT) goal met  -     Row Name 02/10/22 1533          Caregiver Training Goal 1 (OT-IRF)    Caregiver Training Goal 1 (OT-IRF) ed pt spouse on safety w ADLs and tsf, pt and spouse independent w safety w ADLs and tsf. pt I w HEP  -KP     Time Frame (Caregiver Training Goal 1, OT-IRF) long-term goal (LTG); by discharge; 1 week; 2 weeks  -KP     Progress/Outcomes (Caregiver Training Goal 1, OT-IRF) goal ongoing  -KP     Row Name  02/10/22 1533 02/10/22 1213       Positioning and Restraints    Pre-Treatment Position in bed  -KP in bed  -KP    Post Treatment Position chair  -KP bed  -KP    In Bed -- supine; call light within reach; encouraged to call for assist; exit alarm on  -KP    In Chair sitting; call light within reach; encouraged to call for assist; with SLP  - --    Row Name 02/10/22 1533 02/10/22 1213       Daily Progress Summary (OT)    Overall Progress Toward Functional Goals (OT) progressing toward functional goals as expected  -KP progressing toward functional goals as expected  -KP          User Key  (r) = Recorded By, (t) = Taken By, (c) = Cosigned By    Initials Name Effective Dates     Azucena Lyndsey Michael, OTR 06/16/21 -                  Occupational Therapy Education                 Title: PT OT SLP Therapies (Done)     Topic: Occupational Therapy (Done)     Point: ADL training (Done)     Description:   Instruct learner(s) on proper safety adaptation and remediation techniques during self care or transfers.   Instruct in proper use of assistive devices.              Learning Progress Summary           Patient Acceptance, E,TB,D,H, DU,VU by  at 2/10/2022 1540    Comment: ed pt on HEP for UE ex and ROM ex. Pt demo and verbalize understanding. Pt HEP provided and left in room in top drawer with his clothes as he dc next week on 2/15.    Acceptance, E,TB,D, DU,VU by  at 2/4/2022 1455    Comment: ed pt on role of OT. benefit of therapy POC w. OT. ed on safety w. ADL and tsf. ed on UE ex.                   Point: Home exercise program (Done)     Description:   Instruct learner(s) on appropriate technique for monitoring, assisting and/or progressing therapeutic exercises/activities.              Learning Progress Summary           Patient Acceptance, E,TB,D,H, DU,VU by  at 2/10/2022 1540    Comment: ed pt on HEP for UE ex and ROM ex. Pt demo and verbalize understanding. Pt HEP provided and left in room in top drawer  with his clothes as he dc next week on 2/15.    Acceptance, E,TB,D, DU,VU by  at 2/4/2022 1455    Comment: ed pt on role of OT. benefit of therapy POC w. OT. ed on safety w. ADL and tsf. ed on UE ex.                   Point: Precautions (Done)     Description:   Instruct learner(s) on prescribed precautions during self-care and functional transfers.              Learning Progress Summary           Patient Acceptance, E,TB,D,H, DU,VU by  at 2/10/2022 1540    Comment: ed pt on HEP for UE ex and ROM ex. Pt demo and verbalize understanding. Pt HEP provided and left in room in top drawer with his clothes as he dc next week on 2/15.    Acceptance, E,TB,D, DU,VU by  at 2/4/2022 1455    Comment: ed pt on role of OT. benefit of therapy POC w. OT. ed on safety w. ADL and tsf. ed on UE ex.                   Point: Body mechanics (Done)     Description:   Instruct learner(s) on proper positioning and spine alignment during self-care, functional mobility activities and/or exercises.              Learning Progress Summary           Patient Acceptance, E,TB,D,H, DU,VU by  at 2/10/2022 1540    Comment: ed pt on HEP for UE ex and ROM ex. Pt demo and verbalize understanding. Pt HEP provided and left in room in top drawer with his clothes as he dc next week on 2/15.    Acceptance, E,TB,D, DU,VU by  at 2/4/2022 1455    Comment: ed pt on role of OT. benefit of therapy POC w. OT. ed on safety w. ADL and tsf. ed on UE ex.                               User Key     Initials Effective Dates Name Provider Type Discipline     06/16/21 -  Lyndsey Zeng, OTR Occupational Therapist OT                    OT Recommendation and Plan    Planned Therapy Interventions (OT): adaptive equipment training, BADL retraining, activity tolerance training, functional balance retraining, patient/caregiver education/training, ROM/therapeutic exercise, strengthening exercise, transfer/mobility retraining       Daily Progress Summary  (OT)  Overall Progress Toward Functional Goals (OT): progressing toward functional goals as expected            Time Calculation:      Time Calculation- OT     Row Name 02/10/22 1541 02/10/22 1217          Time Calculation- OT    OT Start Time 1300  -KP 0900  -     OT Stop Time 1330  - 0930  -     OT Time Calculation (min) 30 min  - 30 min  -           User Key  (r) = Recorded By, (t) = Taken By, (c) = Cosigned By    Initials Name Provider Type     Lyndsey Zeng OTR Occupational Therapist              Therapy Charges for Today     Code Description Service Date Service Provider Modifiers Qty    16381311295 HC OT SELF CARE/MGMT/TRAIN EA 15 MIN 2/9/2022 Lyndsey Zeng OTR GO 2    94297971035 HC OT THER PROC EA 15 MIN 2/9/2022 Lyndsey Zeng OTR GO 2    25996418696 HC OT SELF CARE/MGMT/TRAIN EA 15 MIN 2/10/2022 Lyndsey Zeng OTR GO 2    40383768627 HC OT THER PROC EA 15 MIN 2/10/2022 Lyndsey Zeng OTR GO 2                   JOSE Demarco  2/10/2022

## 2022-02-10 NOTE — PROGRESS NOTES
Inpatient Rehabilitation Plan of Care Note    Plan of Care  Care Plan Reviewed - No updates at this time.    Safety    Performed Intervention(s)  Falls protocal  Yellow socks  bed alarm/wc alarm      Sphincter Control    Performed Intervention(s)  I and O  Btrm schedule  Wound ostomy consult for RNs      Psychosocial    Performed Intervention(s)  Brainard consult encouraged  Provide emotional support    Signed by: Cris Hurtado RN

## 2022-02-10 NOTE — PROGRESS NOTES
[facility] Follow Up    Chief Complaint: Follow up CHF, sinus tachycardia    Interval History: He complains of shortness of breath and a feeling like he is smothering at night.  He states this prevents him from sleeping.  He is worried about the possibility of sleep apnea.  He denies any significant dyspnea on exertion.  He denies any chest pain.    Objective:     Objective:  Temp:  [97.6 °F (36.4 °C)-98.3 °F (36.8 °C)] 97.6 °F (36.4 °C)  Heart Rate:  [100-109] 100  Resp:  [18] 18  BP: ()/(64-74) 101/74     Intake/Output Summary (Last 24 hours) at 2/10/2022 0752  Last data filed at 2/10/2022 0746  Gross per 24 hour   Intake 960 ml   Output 1050 ml   Net -90 ml     Body mass index is 26.01 kg/m².      02/08/22  0515 02/09/22  0552 02/10/22  0711   Weight: 89.6 kg (197 lb 8.5 oz) 82.6 kg (182 lb 1.6 oz) 79.9 kg (176 lb 2.4 oz)     Weight change:       Physical Exam:   General : Alert, cooperative, in no acute distress.  Neuro: Alert,cooperative and oriented.  Lungs: CTAB. Normal respiratory effort and rate.  CV: Regular rate and rhythm, normal S1 and S2, no murmurs, gallops or rubs.  ABD: Soft, nontender, nondistended. Positive bowel sounds.  Extr: No edema or cyanosis, moves all extremities.    Lab Review:   Results from last 7 days   Lab Units 02/09/22  0815 02/08/22  0506   SODIUM mmol/L 128* 128*   POTASSIUM mmol/L 5.1 4.9   CHLORIDE mmol/L 93* 90*   CO2 mmol/L 17.1* 26.2   BUN mg/dL 14 15   CREATININE mg/dL 0.87 0.85   GLUCOSE mg/dL 113* 107*   CALCIUM mg/dL 9.1 9.4         Results from last 7 days   Lab Units 02/07/22  0656 02/04/22  0811   WBC 10*3/mm3 7.06 9.87   HEMOGLOBIN g/dL 10.3* 10.9*   HEMATOCRIT % 33.1* 34.3*   PLATELETS 10*3/mm3 163 167                   Invalid input(s): LDLCALC          I reviewed the patient's new clinical results.  I personally viewed and interpreted the patient's EKG  Current Medications:   Scheduled Meds:[START ON 2/11/2022] aMILoride, 10 mg, Oral,  Daily  carvedilol, 6.25 mg, Oral, Daily Before Supper  cefdinir, 300 mg, Oral, Q12H  enoxaparin, 40 mg, Subcutaneous, Q24H  glycopyrrolate, 1 mg, Oral, BID  loperamide, 2 mg, Oral, 4x Daily AC & at Bedtime  melatonin, 3 mg, Oral, Nightly  sodium chloride, 1 g, Oral, BID With Meals  sodium zirconium cyclosilicate, 10 g, Oral, Daily  Urea, 15 g, Oral, Daily      Continuous Infusions:     Allergies:  Allergies   Allergen Reactions   • Sulfamethoxazole-Trimethoprim Hives and Rash     Blisters   • Trimethoprim Hives   • Adhesive Tape Rash   • Shellfish-Derived Products Nausea And Vomiting and GI Intolerance       Assessment/Plan:     1. Chronic systolic congestive heart failure.  No evidence of volume overload on exam but he complains of symptoms concerning for orthopnea.  2. Sinus tachycardia. Persistent throughout admission.  Work up last month unremarkable.  EKG on 2/9 with new inferior ST changes.   3. Nonischemic cardiomyopathy. EF of 20%.   4. Status post sigmoid resection with end colostomy  5. Ventricular tachycardia status post ICD  6.  Hyponatremia.  Stable. Nephrology following.     -Check an overnight oximetry.  -Although he does not appear to be volume overloaded May need to consider diuretic therapy for her symptoms of orthopnea.  I will defer this to nephrology.  -Continue current dose of carvedilol.    Sanam Barroso MD  02/10/22  07:52 EST

## 2022-02-10 NOTE — THERAPY TREATMENT NOTE
Inpatient Rehabilitation - Physical Therapy Treatment Note       [facility]     Patient Name: Arnie Calvo  : 1960  MRN: [id number]    Today's Date: 2/10/2022                    Admit Date: 2/3/2022      Visit Dx:     ICD-10-CM ICD-9-CM   1. Insomnia due to medical condition  G47.01 327.01       Patient Active Problem List   Diagnosis   • Cardiomyopathy (HCC)   • Paroxysmal VT (HCC)   • Palpitations   • PVC's (premature ventricular contractions)   • Exacerbation of Crohn's disease of small intestine (HCC)   • Adjustment disorder with depressed mood   • Ankylosis of spine   • Crohn's disease with complication (HCC)   • Diabetes mellitus (HCC)   • Essential hypertension   • External hemorrhoids   • Genital herpes simplex   • Gout   • Insomnia due to medical condition   • Iron deficiency   • Prostate mass   • Recurrent aphthous ulcer   • Asteatosis cutis   • History of pneumonia   • Abnormal CXR (chest x-ray)   • RUQ abdominal pain   • Crohn's disease of small intestine with other complication (HCC)   • Right lower quadrant abdominal pain   • Enteritis   • Mass-like inflammation at terminal ileum   • Crohn's disease (HCC)   • Ileostomy in place (HCC)   • Paroxysmal ventricular tachycardia (HCC)   • Chronic systolic heart failure (HCC)   • Cardiomyopathy, nonischemic (HCC)   • Orthostasis   • Iron deficiency anemia   • Orthostatic hypotension   • Crohn's disease of colon with complication (HCC)   • Dehisced intestinal anastomosis   • History of creation of ostomy (HCC)   • Hypokalemia   • Hypotension, chronic   • Malnutrition of moderate degree (HCC)   • S/P colectomy   • Fatty liver disease, nonalcoholic   • Cholecystitis   • Debility       Past Medical History:   Diagnosis Date   • Acute pain of left hip    • Adjustment disorder with depressed mood    • Anesthesia complication     SPINAL [id number] LOWER SPINE   • Ankylosing spondylitis of cervical region (HCC)    • Ankylosis of spine     • Arthritis    • Asthma    • Cardiomyopathy (HCC)     sees Dr. Reyes; NICM/ (-) cath, EF 25-35%; has ICD   • CHF (congestive heart failure) (HCC)    • Cholecystitis    • Crohn's disease (HCC)    • Depression    • Diabetes mellitus (HCC)     Diet controlled.   • Dysthymic disorder 2012   • Dysuria    • Essential hypertension    • External hemorrhoids    • Genital herpes    • Gout    • Herpes genitalia    • History of MRSA infection 2000?    FINGERTIP-TX WITH ANTIBIOTICS-[facility] CARE-NO CURRENT OPEN WOUND OR TREATMENT 12/3/21   • Ileostomy in place ([facility])    • Insomnia    • Iron deficiency    • Paroxysmal ventricular tachycardia (HCC)    • PONV (postoperative nausea and vomiting)    • Presence of combination internal cardiac defibrillator (ICD) and pacemaker    • Prostate nodule    • Recurrent canker sores    • Thoracic back pain    • Urinary hesitancy    • Xerosis cutis        Past Surgical History:   Procedure Laterality Date   • ABDOMINAL SURGERY     • CARDIAC CATHETERIZATION     • CARDIAC DEFIBRILLATOR PLACEMENT      REPLACEMENT-Had Jude lead that was fractured.  Lead was tied off.  New lead and new device implanted.   • CARDIAC ELECTROPHYSIOLOGY PROCEDURE N/A 1/3/2019    Procedure: ICD DC generator change  [employer];  Surgeon: Zechariah Randolph MD;  Location: [facility] INVASIVE LOCATION;  Service: Cardiology   • CHOLECYSTECTOMY N/A 12/7/2021    Procedure: CHOLECYSTECTOMY;  Surgeon: Jeovany Mott MD;  Location: [facility] OR;  Service: General;  Laterality: N/A;   • COLON RESECTION N/A 12/29/2021    Procedure: PARTIAL COLECTOMY WITH OSTOMY;  Surgeon: Jeovany Mott MD;  Location: [facility] OR;  Service: General;  Laterality: N/A;   • COLON RESECTION WITH ILEOSTOMY N/A 2018   • COLONOSCOPY  04/03/2015    abnormal cecum, three polypoid masses, pre cancerous vs crohns, IH, tics, hyperplastic polyp   • COLONOSCOPY N/A 3/17/2017    polypoid masses, tics, IH, polyp   • EXPLORATORY LAPAROTOMY W/  BOWEL RESECTION  07/2018    open resection of ileum with creation of end ileostomy   • ILEOSTOMY CLOSURE  07/2018   • ILEOSTOMY CLOSURE N/A 12/7/2021    Procedure: ILEOSTOMY TAKEDOWN WITH RESECTION, PARASTOMAL HERNIA REPAIR;  Surgeon: Jeovany Mott MD;  Location: [facility] MAIN OR;  Service: General;  Laterality: N/A;   • PACEMAKER IMPLANTATION         PT ASSESSMENT (last 12 hours)     IRF PT Evaluation and Treatment     Row Name 02/10/22 1048          PT Time and Intention    Document Type daily treatment  -EE     Mode of Treatment physical therapy; individual therapy  -EE     Patient/Family/Caregiver Comments/Observations Pt supine in bed, agreeable to PT. C/o ongoing pain and burning at incisional site.  -EE     Row Name 02/10/22 1048          General Information    Existing Precautions/Restrictions fall  -EE     Row Name 02/10/22 1048          Cognition/Psychosocial    Affect/Mental Status (Cognitive) anxious  -EE     Orientation Status (Cognition) oriented x 4  -EE     Follows Commands (Cognition) follows one-step commands  -EE     Personal Safety Interventions fall prevention program maintained; gait belt; muscle strengthening facilitated; nonskid shoes/slippers when out of bed; supervised activity  -EE     Cognitive Function (Cognitive) WFL  -EE     Row Name 02/10/22 1048          Pain Scale: Numbers Pre/Post-Treatment    Pretreatment Pain Rating 6/10  -EE     Posttreatment Pain Rating 6/10  -EE     Pain Location - Orientation incisional  -EE     Pain Location abdomen  -EE     Pain Intervention(s) Repositioned; Ambulation/increased activity  -EE     Row Name 02/10/22 1048          Bed Mobility    Rolling Left [place] (Bed Mobility) independent  -EE     Rolling Right [place] (Bed Mobility) independent  -EE     Supine-Sit [place] (Bed Mobility) independent  -EE     Sit-Supine [place] (Bed Mobility) independent  -EE     Comment (Bed Mobility) HOB flat  -EE     Row Name 02/10/22 104           Transfer Assessment/Treatment    Transfers car transfer  -EE     Row Name 02/10/22 1048          Transfers    Bed-Chair [place] (Transfers) supervision; standby assist  -EE     Chair-Bed [place] (Transfers) supervision; standby assist  -EE     Assistive Device (Bed-Chair Transfers) walker, front-wheeled  -EE     Sit-Stand [place] (Transfers) supervision  -EE     Stand-Sit [place] (Transfers) supervision  -EE     Row Name 02/10/22 1048          Chair-Bed Transfer    Assistive Device (Chair-Bed Transfers) walker, front-wheeled  -EE     Row Name 02/10/22 1048          Sit-Stand Transfer    Assistive Device (Sit-Stand Transfers) walker, front-wheeled  -EE     Row Name 02/10/22 1048          Car Transfer    Type (Car Transfer) sit-stand; stand-sit  -EE     [place] Level (Car Transfer) standby assist  -EE     Assistive Device (Car Transfer) walker, front-wheeled  -EE     Row Name 02/10/22 1048          Gait/Stairs (Locomotion)    [place] Level (Gait) supervision; standby assist  -EE     Assistive Device (Gait) walker, front-wheeled  -EE     Distance in Feet (Gait) 160' x 1, 80' x 1, 50' x 1  -EE     Deviations/Abnormal Patterns (Gait) no decreased; stride length decreased  -EE     Bilateral Gait Deviations forward flexed posture  -EE     Row Name 02/10/22 1048          Safety Issues, Functional Mobility    Impairments Affecting Function (Mobility) balance; endurance/activity tolerance; strength; pain  -EE     Row Name 02/10/22 1048          Hip (Therapeutic Exercise)    Hip Strengthening (Therapeutic Exercise) bilateral; marching while seated; 10 repetitions; 2 sets  -EE     Row Name 02/10/22 1048          Knee (Therapeutic Exercise)    Knee Strengthening (Therapeutic Exercise) bilateral; sitting; LAQ (long arc quad); 10 repetitions; 2 sets  -EE     Row Name 02/10/22 1048          Ankle (Therapeutic Exercise)    Ankle AROM (Therapeutic Exercise) bilateral; dorsiflexion; plantarflexion;  10 repetitions; 2 sets  -EE     Row Name 02/10/22 1048          Positioning and Restraints    Pre-Treatment Position in bed  -EE     Post Treatment Position bed  -EE           User Key  (r) = Recorded By, (t) = Taken By, (c) = Cosigned By    Initials Name Provider Type    Wendy Holcomb, PT Physical Therapist              Wound 12/07/21 0757 Right abdomen Incision (Active)   Dressing Appearance dry; intact 02/10/22 0800   Closure [place] 02/10/22 0800   Base dressing in place, unable to visualize 02/10/22 0800       Wound 12/25/21 0809 abdomen Other (comment) (Active)   Dressing Appearance dry; intact 02/10/22 0800   Closure [place] 02/10/22 0800   Base dressing in place, unable to visualize 02/10/22 0800       Wound 12/29/21 1518 midline abdomen Incision (Active)   Dressing Appearance dry; intact 02/10/22 0800   Closure [place] 02/10/22 0800   Base dressing in place, unable to visualize 02/10/22 0800     Physical Therapy Education                 Title: PT OT SLP Therapies (Done)     Topic: Physical Therapy (Done)     Point: Mobility training (Done)     Learning Progress Summary           Patient Acceptance, E,TB, VU,NR by EE at 2/10/2022 1123    Acceptance, E,TB, VU,NR by EE at 2/9/2022 1222    Acceptance, E,TB, VU,NR by EE at 2/8/2022 1015    Acceptance, E,TB, VU,NR by EE at 2/7/2022 1142    Acceptance, E,TB, VU,NR by KIRTSEN at 2/5/2022 1430    Acceptance, E,TB, VU,NR by EE at 2/4/2022 1408                   Point: Home exercise program (Done)     Learning Progress Summary           Patient Acceptance, E,TB, VU,NR by EE at 2/10/2022 1123    Acceptance, E,TB, VU,NR by EE at 2/8/2022 1015    Acceptance, E,TB, VU,NR by EE at 2/7/2022 1142    Acceptance, E,TB, VU,NR by EE at 2/4/2022 1408                   Point: Body mechanics (Done)     Learning Progress Summary           Patient Acceptance, E,TB, VU,NR by EE at 2/10/2022 1123    Acceptance, E,TB, VU,NR by EE at 2/8/2022 1015    Acceptance, E,TB, VU,NR by EE at 2/7/2022 1142     Acceptance, E,TB, VU,NR by EE at 2/4/2022 1408                   Point: Precautions (Done)     Learning Progress Summary           Patient Acceptance, E,TB, VU,NR by EE at 2/10/2022 1123    Acceptance, E,TB, VU,NR by EE at 2/8/2022 1015    Acceptance, E,TB, VU,NR by EE at 2/7/2022 1142    Acceptance, E,TB, VU,NR by EE at 2/4/2022 1408                               User Key     Initials Effective Dates Name Provider Type Discipline    KIRSTEN 06/16/21 -  Emily Watkins, PT Physical Therapist PT     06/16/21 -  Wendy Ojeda PT Physical Therapist PT                PT Recommendation and Plan    Planned Therapy Interventions (PT): balance training, bed mobility training, gait training, home exercise program, patient/family education, ROM (range of motion), postural re-education, stair training, strengthening, stretching, transfer training  Frequency of Treatment (PT): 5 times per week, 60 minutes per session  Anticipated Equipment Needs at Discharge (PT Eval): front wheeled walker                  Time Calculation:      PT Charges     Row Name 02/10/22 1433 02/10/22 1123          Time Calculation    Start Time 1400  -EE 1030  -EE     Stop Time 1430  -EE 1100  -EE     Time Calculation (min) 30 min  -EE 30 min  -EE     PT Received On 02/10/22  -EE 02/10/22  -EE     PT - Next Appointment 02/11/22  -EE 02/10/22  -EE            Time Calculation- PT    Total Timed Code Minutes- PT 30 minute(s)  -EE 30 minute(s)  -EE           User Key  (r) = Recorded By, (t) = Taken By, (c) = Cosigned By    Initials Name Provider Type    EE Wenyd Ojeda, PT Physical Therapist                Therapy Charges for Today     Code Description Service Date Service Provider Modifiers Qty    73224246042 HC GAIT TRAINING EA 15 MIN 2/9/2022 Wendy Ojeda, PT GP 2    51106568087 HC PT THERAPEUTIC ACT EA 15 MIN 2/9/2022 Wendy Ojeda, PT GP 2    16017026249 HC GAIT TRAINING EA 15 MIN 2/10/2022 Wendy Ojeda, PT GP 2    49994976424 HC PT THERAPEUTIC ACT EA 15  MIN 2/10/2022 Wendy Ojeda, PT GP 1    [id number]  PT THER PROC EA 15 MIN 2/10/2022 Wendy Ojeda, PT GP 1              Patient was intermittently wearing a face mask during this therapy encounter. Therapist used appropriate personal protective equipment including mask and gloves.  Mask used was standard procedure mask. Appropriate PPE was worn during the entire therapy session. Hand hygiene was completed before and after therapy session. Patient is not in enhanced droplet precautions.         Wendy Ojeda PT  2/10/2022

## 2022-02-10 NOTE — THERAPY TREATMENT NOTE
Inpatient Rehabilitation - Occupational Therapy Treatment Note    [facility]     Patient Name: Arnie Calvo  : 1960  MRN: [id number]    Today's Date: 2/10/2022                 Admit Date: 2/3/2022         ICD-10-CM ICD-9-CM   1. Insomnia due to medical condition  G47.01 327.01       Patient Active Problem List   Diagnosis   • Cardiomyopathy (HCC)   • Paroxysmal VT (HCC)   • Palpitations   • PVC's (premature ventricular contractions)   • Exacerbation of Crohn's disease of small intestine (HCC)   • Adjustment disorder with depressed mood   • Ankylosis of spine   • Crohn's disease with complication (HCC)   • Diabetes mellitus (HCC)   • Essential hypertension   • External hemorrhoids   • Genital herpes simplex   • Gout   • Insomnia due to medical condition   • Iron deficiency   • Prostate mass   • Recurrent aphthous ulcer   • Asteatosis cutis   • History of pneumonia   • Abnormal CXR (chest x-ray)   • RUQ abdominal pain   • Crohn's disease of small intestine with other complication (HCC)   • Right lower quadrant abdominal pain   • Enteritis   • Mass-like inflammation at terminal ileum   • Crohn's disease (HCC)   • Ileostomy in place (HCC)   • Paroxysmal ventricular tachycardia (HCC)   • Chronic systolic heart failure (HCC)   • Cardiomyopathy, nonischemic (HCC)   • Orthostasis   • Iron deficiency anemia   • Orthostatic hypotension   • Crohn's disease of colon with complication (HCC)   • Dehisced intestinal anastomosis   • History of creation of ostomy (HCC)   • Hypokalemia   • Hypotension, chronic   • Malnutrition of moderate degree (HCC)   • S/P colectomy   • Fatty liver disease, nonalcoholic   • Cholecystitis   • Debility       Past Medical History:   Diagnosis Date   • Acute pain of left hip    • Adjustment disorder with depressed mood    • Anesthesia complication     SPINAL [id number] LOWER SPINE   • Ankylosing spondylitis of cervical region (HCC)    • Ankylosis of spine    • Arthritis     • Asthma    • Cardiomyopathy (HCC)     sees Dr. Reyes; NICM/ (-) cath, EF 25-35%; has ICD   • CHF (congestive heart failure) (HCC)    • Cholecystitis    • Crohn's disease (HCC)    • Depression    • Diabetes mellitus (HCC)     Diet controlled.   • Dysthymic disorder 2012   • Dysuria    • Essential hypertension    • External hemorrhoids    • Genital herpes    • Gout    • Herpes genitalia    • History of MRSA infection 2000?    FINGERTIP-TX WITH ANTIBIOTICS-[facility]-NO CURRENT OPEN WOUND OR TREATMENT 12/3/21   • Ileostomy in place (HCC)    • Insomnia    • Iron deficiency    • Paroxysmal ventricular tachycardia (HCC)    • PONV (postoperative nausea and vomiting)    • Presence of combination internal cardiac defibrillator (ICD) and pacemaker    • Prostate nodule    • Recurrent canker sores    • Thoracic back pain    • Urinary hesitancy    • Xerosis cutis        Past Surgical History:   Procedure Laterality Date   • ABDOMINAL SURGERY     • CARDIAC CATHETERIZATION     • CARDIAC DEFIBRILLATOR PLACEMENT      REPLACEMENT-Had [place] lead that was fractured.  Lead was tied off.  New lead and new device implanted.   • CARDIAC ELECTROPHYSIOLOGY PROCEDURE N/A 1/3/2019    Procedure: ICD DC generator change  [employer];  Surgeon: Zechariah Randolph MD;  Location: [facility] INVASIVE LOCATION;  Service: Cardiology   • CHOLECYSTECTOMY N/A 12/7/2021    Procedure: CHOLECYSTECTOMY;  Surgeon: Jeovany Mott MD;  Location: [facility] OR;  Service: General;  Laterality: N/A;   • COLON RESECTION N/A 12/29/2021    Procedure: PARTIAL COLECTOMY WITH OSTOMY;  Surgeon: Jeovany Mott MD;  Location: [facility] OR;  Service: General;  Laterality: N/A;   • COLON RESECTION WITH ILEOSTOMY N/A 2018   • COLONOSCOPY  04/03/2015    abnormal cecum, three polypoid masses, pre cancerous vs crohns, IH, tics, hyperplastic polyp   • COLONOSCOPY N/A 3/17/2017    polypoid masses, tics, IH, polyp   • EXPLORATORY LAPAROTOMY W/ BOWEL RESECTION   07/2018    open resection of ileum with creation of end ileostomy   • ILEOSTOMY CLOSURE  07/2018   • ILEOSTOMY CLOSURE N/A 12/7/2021    Procedure: ILEOSTOMY TAKEDOWN WITH RESECTION, PARASTOMAL HERNIA REPAIR;  Surgeon: Jeovany Mott MD;  Location: [facility] MAIN OR;  Service: General;  Laterality: N/A;   • PACEMAKER IMPLANTATION               IRF OT ASSESSMENT FLOWSHEET (last 12 hours)     IRF OT Evaluation and Treatment     Row Name 02/10/22 1213          OT Time and Intention    Document Type daily treatment  -     Mode of Treatment individual therapy; occupational therapy  -KP     Patient Effort good  -KP     Symptoms Noted During/After Treatment fatigue  -KP     Row Name 02/10/22 1213          General Information    Patient/Family/Caregiver Comments/Observations pt in bed upon OT arrival  -KP     Existing Precautions/Restrictions fall  -KP     Row Name 02/10/22 1213          Cognition/Psychosocial    Orientation Status (Cognition) oriented x 4  -KP     Follows Commands (Cognition) follows one-step commands; over 90% accuracy  -     Personal Safety Interventions fall prevention program maintained; gait belt; nonskid shoes/slippers when out of bed  -     Cognitive Function (Cognitive) WFL  -KP     Row Name 02/10/22 1213          Pain Scale: Numbers Pre/Post-Treatment    Pretreatment Pain Rating 5/10  -KP     Posttreatment Pain Rating 5/10  -KP     Pain Location - Side Bilateral  -KP     Pain Location - Orientation incisional  -     Pain Location abdomen  -     Pain Intervention(s) Repositioned  -KP     Row Name 02/10/22 1213          Bed Mobility    Supine-Sit [place] (Bed Mobility) independent  -     Sit-Supine [place] (Bed Mobility) independent  -     Row Name 02/10/22 1213          Transfers    Bed-Chair [place] (Transfers) set up; standby assist  -     Chair-Bed [place] (Transfers) standby assist; set up  -     Assistive Device (Bed-Chair Transfers) wheelchair  -      Sit-Stand [place] (Transfers) set up; standby assist  -     Stand-Sit [place] (Transfers) set up; standby assist  [facility] [place]     [place] Level (Toilet Transfer) --  pt refused shower, will only wash at sink  -     Row Name 02/10/22 1213          Chair-Bed Transfer    Assistive Device (Chair-Bed Transfers) wheelchair  -     Row Name 02/10/22 1213          Sit-Stand Transfer    Assistive Device (Sit-Stand Transfers) wheelchair  -     Row Name 02/10/22 1213          Stand-Sit Transfer    Assistive Device (Stand-Sit Transfers) wheelchair  [facility] Name 02/10/22 1213          Toilet Transfer    Type (Toilet Transfer) --  pt SBA w nsg staff last night. pt states doesn't need to go now  -[facility] Name 02/10/22 1213          Bathing    [place] Level (Bathing) bathing skills; lower body; upper body; standby assist; set up  -     Position (Bathing) sink side; supported sitting; supported standing  [facility] [place]     Set-up Assistance (Bathing) obtain supplies  [facility] Name 02/10/22 1213          Upper Body Dressing    [place] Level (Upper Body Dressing) upper body dressing skills; don; doff; pull over garment; set up assistance; supervision  -     Position (Upper Body Dressing) supported sitting  [facility] [place]     Set-up Assistance (Upper Body Dressing) obtain clothing  [facility] Name 02/10/22 1213          Lower Body Dressing    [place] Level (Lower Body Dressing) doff; don; pants/bottoms; socks; set up; standby assist  -     Position (Lower Body Dressing) supported sitting; supported standing  [facility] [place]     Set-up Assistance (Lower Body Dressing) obtain clothing  [facility] Name 02/10/22 1213          Grooming    [place] Level (Grooming) grooming skills; deodorant application; oral care regimen; wash face, hands; set up; standby assist  -     Position (Grooming) sink side; supported sitting  [facility] Name 02/10/22 1213          Toileting    [place] Level (Toileting) toileting skills;  adjust/manage clothing; perform perineal hygiene; set up assistance; supervision  -     Assistive Device Use (Toileting) grab bar/safety frame  -     Position (Toileting) supported sitting; supported standing  -     Row Name 02/10/22 1213          Positioning and Restraints    Pre-Treatment Position in bed  -     Post Treatment Position bed  -     In Bed supine; call light within reach; encouraged to call for assist; exit alarm on  -     Row Name 02/10/22 1213          Daily Progress Summary (OT)    Overall Progress Toward Functional Goals (OT) progressing toward functional goals as expected  -           User Key  (r) = Recorded By, (t) = Taken By, (c) = Cosigned By    Initials Name Effective Dates     Azucena Lyndsey Marialey, OTR 06/16/21 -                  Occupational Therapy Education                 Title: PT OT SLP Therapies (Done)     Topic: Occupational Therapy (Done)     Point: ADL training (Done)     Description:   Instruct learner(s) on proper safety adaptation and remediation techniques during self care or transfers.   Instruct in proper use of assistive devices.              Learning Progress Summary           Patient Acceptance, E,TB,D, DU,VU by  at 2/4/2022 1455    Comment: ed pt on role of OT. benefit of therapy POC w. OT. ed on safety w. ADL and tsf. ed on UE ex.                   Point: Home exercise program (Done)     Description:   Instruct learner(s) on appropriate technique for monitoring, assisting and/or progressing therapeutic exercises/activities.              Learning Progress Summary           Patient Acceptance, E,TB,D, DU,VU by  at 2/4/2022 1455    Comment: ed pt on role of OT. benefit of therapy POC w. OT. ed on safety w. ADL and tsf. ed on UE ex.                   Point: Precautions (Done)     Description:   Instruct learner(s) on prescribed precautions during self-care and functional transfers.              Learning Progress Summary           Patient Acceptance,  E,TB,D, DU,VU by  at 2/4/2022 1455    Comment: ed pt on role of OT. benefit of therapy POC w. OT. ed on safety w. ADL and tsf. ed on UE ex.                   Point: Body mechanics (Done)     Description:   Instruct learner(s) on proper positioning and spine alignment during self-care, functional mobility activities and/or exercises.              Learning Progress Summary           Patient Acceptance, E,TB,D, DU,VU by  at 2/4/2022 1455    Comment: ed pt on role of OT. benefit of therapy POC w. OT. ed on safety w. ADL and tsf. ed on UE ex.                               User Key     Initials Effective Dates Name Provider Type [facility] 06/16/21 -  Lyndsey Zeng OTR Occupational Therapist OT                    OT Recommendation and Plan    Planned Therapy Interventions (OT): adaptive equipment training, BADL retraining, activity tolerance training, functional balance retraining, patient/caregiver education/training, ROM/therapeutic exercise, strengthening exercise, transfer/mobility retraining       Daily Progress Summary (OT)  Overall Progress Toward Functional Goals (OT): progressing toward functional goals as expected            Time Calculation:      Time Calculation- OT     Row Name 02/10/22 1217             Time Calculation- OT    OT Start Time 0900  -      OT Stop Time 0930  -      OT Time Calculation (min) 30 min  -            User Key  (r) = Recorded By, (t) = Taken By, (c) = Cosigned By    Initials Name Provider Type     Lyndsey Zeng OTR Occupational Therapist              Therapy Charges for Today     Code Description Service Date Service Provider Modifiers Qty    31594510621 HC OT SELF CARE/MGMT/TRAIN EA 15 MIN 2/9/2022 Lyndsey Zeng OTR GO 2    26186127426 HC OT THER PROC EA 15 MIN 2/9/2022 Lyndsey Zeng OTR GO 2    86802674260 HC OT SELF CARE/MGMT/TRAIN EA 15 MIN 2/10/2022 Lyndsey Zeng OTR GO 2                   Lyndsey Zeng  OTR  2/10/2022

## 2022-02-10 NOTE — PROGRESS NOTES
Inpatient Rehabilitation Plan of Care Note    Plan of Care  Care Plan Reviewed - No updates at this time.    Psychosocial    [RN] Coping/Adjustment(Active)  Current Status(02/10/2022): Pt at risk for coping issues  Weekly Goal(02/15/2022): Pt will make staff aware of concerns  Discharge Goal: NO coping problems    Performed Intervention(s)  Mimi consult encouraged  Provide emotional support      Safety    [RN] Potential for Injury(Active)  Current Status(02/10/2022): Pt is at risk for falls.  Weekly Goal(02/15/2022): Will use call bell 100% of the time  Discharge Goal: No injury    Performed Intervention(s)  Falls protocal  Yellow socks  bed alarm/wc alarm      Sphincter Control    [RN] Bladder Management(Active)  Current Status(02/10/2022): Pt cont of urine upon admission  Weekly Goal(02/15/2022): Remain cont  Discharge Goal: 100% cont of urine    [RN] Bowel Management(Active)  Current Status(02/10/2022): Pt has an ostomy in which skin RNs change  Weekly Goal(02/15/2022): Ostomy is fuctional  Discharge Goal: Pt aware of how to manage ostomy    Performed Intervention(s)  I and O  Btrm schedule  Wound ostomy consult for RNs    Signed by: Sony Giles RN

## 2022-02-10 NOTE — PROGRESS NOTES
[facility] Progress Note      Patient Name: Arnie Calvo  : 1960  MRN: [id number]  Primary Care Physician:  Zechariah Fatima MD  Date of admission: 2/3/2022    Subjective     Interval History:     Patient lying in bed  She continues to feel out of breath and nighttime  No dysuria with chest pain palpitations  Weight has not changed  Decreased appetite  Feeling tired fatigue      Review of Systems:   As noted above    Objective     Vitals:   Temp:  [97.6 °F (36.4 °C)-98.3 °F (36.8 °C)] 97.6 °F (36.4 °C)  Heart Rate:  [100-109] 100  Resp:  [18] 18  BP: ()/(64-74) 101/74  Flow (L/min):  [2] 2    Intake/Output Summary (Last 24 hours) at 2/10/2022 1015  Last data filed at 2/10/2022 0830  Gross per 24 hour   Intake 600 ml   Output 1000 ml   Net -400 ml       Physical Exam:      Constitutional: Awake, on supplemental oxygen chronically ill and deconditioned  HEENT: Sclera anicteric, no conjunctival injection  Neck: Supple, no thyromegaly, no lymphadenopathy, trachea at midline, no JVD  Respiratory: Clear to auscultation bilaterally, nonlabored respiration, w occasional crackles   Cardiovascular: RRR, CLARISSA grade III   Gastrointestinal: Positive bowel sounds, colostomy  bag in place  : No palpable bladder  Musculoskeletal: No edema, no clubbing or cyanosis  Psychiatric: Appropriate affect, cooperative  Neurologic: Oriented x3, moving all extremities, normal speech and mental status  Skin: Warm and dry         Scheduled Meds:     carvedilol, 6.25 mg, Oral, Daily Before Supper  cefdinir, 300 mg, Oral, Q12H  enoxaparin, 40 mg, Subcutaneous, Q24H  glycopyrrolate, 1 mg, Oral, BID  loperamide, 2 mg, Oral, 4x Daily AC & at Bedtime  melatonin, 3 mg, Oral, Nightly  sodium zirconium cyclosilicate, 10 g, Oral, TID      IV Meds:        Results Reviewed:   I have personally reviewed the results from the time of this admission to 2/10/2022 10:15 EST     Results from last 7 days   Lab Units  02/10/22  0651 02/09/22  0815 02/08/22  0506   SODIUM mmol/L 127* 128* 128*   POTASSIUM mmol/L 5.9* 5.1 4.9   CHLORIDE mmol/L 92* 93* 90*   CO2 mmol/L 28.1 17.1* 26.2   BUN mg/dL 16 14 15   CREATININE mg/dL 1.19 0.87 0.85   CALCIUM mg/dL 9.3 9.1 9.4   GLUCOSE mg/dL 100* 113* 107*       Estimated Creatinine Clearance: 72.7 mL/min (by C-G formula based on SCr of 1.19 mg/dL).    Results from last 7 days   Lab Units 02/10/22  0651 02/09/22  0815 02/08/22  0506   PHOSPHORUS mg/dL 4.3 3.8 3.5       Results from last 7 days   Lab Units 02/05/22  0745   URIC ACID mg/dL 6.4       Results from last 7 days   Lab Units 02/07/22  0656 02/04/22  0811   WBC 10*3/mm3 7.06 9.87   HEMOGLOBIN g/dL 10.3* 10.9*   PLATELETS 10*3/mm3 163 167     Images     XR CHEST 1 VW-  02/06/2022     HISTORY: Shortness of breath.     Heart size is mildly enlarged. The left hemidiaphragm is partially  obscured likely from small pleural effusion with some mild atelectasis  and/or pneumonia. Left upper lung and right lung appear clear.     Cardiac pacemaker seen in good position.     IMPRESSION:  1. Mild cardiomegaly.  2. Please density in the left lung base may represent combination of small amount of left pleural effusion with some mild atelectasis and/or pneumonia. The left base may have cleared slightly since the 01/23/2022  study.        Assessment / Plan     ASSESSMENT:    -Acute on chronic hypotonic hyponatremia.  Cortisol 13 . TSH normal , Urine Osmo 284. Urine Sodium < 20 . Serum osmo  (273) , likely from CHF based on studies.  Sodium seems to plateau at 127-128  will hold urea. NaCl  and amiloride for 24 hours   -Dilated cardiomyopathy w / EF 20 %  , w/o signs of decompensation ,  Currently stable .Was on sodium chloride tablets but likely will need to be adjusted based on signs of volume overload.  His initial weight was 98.3 kg on 1/6/2022 currently down to 79.9  On 2/10/22  -Physical deconditioning.  Working w/ PT and OT   - Pneumonia .  "Started on cefnidir .CXR \"   left mild atelectasis and/or pneumonia. \"  -Hyperkalemia Lokelma 10 g every 8 hours    PLAN:    -Follow renal function closely  -Continue fluid restriction   -We'll hold NaCL and  AMILORIDE  , allergic to SULFAS that limits use of some diuretics   -TRIAL of LOKELMA 10 gr TID and change diet to low k  -Avoid nephrotoxins  -Adjust medications to GFR       Thank you for involving us in the care of Arnie Calvo.  Please feel free to call with any questions.    Joselito Bates MD  02/10/22  10:15 EST    [facility]  [phone]                 "

## 2022-02-10 NOTE — PLAN OF CARE
Problem: Rehabilitation (IRF) Plan of Care  Goal: Plan of Care Review  Outcome: Ongoing, Progressing  Flowsheets (Taken 2/10/2022 0239)  Progress: no change  Outcome Summary:   Patient unable to sleeping tonight. A&Ox4, c/o difficulty to catch breath after waking up at the beginning of the shift, VS was WNL and O2 sat was at 100% at that time patient already on continuous O2 at 2L via NC. Dr Rodrigues notified, no new orders except to monitor. C/O also of abdominal discomfort/gas and doesn't want any pain medication, simethicone given x1 time for gas. C/O not getting enough O2 from the nasal cannula requesting for O2 change from 2L to 3L, changed nasal cannula tubing into a new one for better flow. Needs assistance turning q 2-3hrs when awake skin intact to buttocks and heels, refuses to turn at times. Wound ostomy nurse to change Ostomy 2x/week. Ostomy intact.  Plan of Care Reviewed With: patient

## 2022-02-10 NOTE — PROGRESS NOTES
Spoke with patient's wife, by phone. Scheduled family conference for tomorrow, 2/11, at 12:30 p.m. and teaching with ostomy wound nurse at 1:00 p.m. Wife would like their friend, Yolande Torres, who is ICU RN, to be here with her for teaching as she will be assisting them at home with ostomy care as needed. Friend okayed to be here for conference and teaching per Ana Raymond, Rehab Director. Will assist with plans.

## 2022-02-10 NOTE — PLAN OF CARE
Goal Outcome Evaluation:  Plan of Care Reviewed With: patient        Progress: improving  Outcome Summary: Mr. Calvo attended therapies and is cooperative with staff.  Pt. is A&Ox4, can be forgetful at times.  Cont. bladder.  Colostomy LUQ.  Sat up in recliner for awhile this shift.  Family conference scheduled for tomorrow and wife to have teaching with ostomy RN.  Takes medications with thin liquids.  Assist x 1 to wc.  No safety issues noted.  Uses call light for assistance.

## 2022-02-10 NOTE — THERAPY TREATMENT NOTE
Inpatient Rehabilitation - Speech Language Pathology Treatment Note    [facility]     Patient Name: Arnie Calvo  : 1960  MRN: [id number]    Today's Date: 2/10/2022                   Admit Date: 2/3/2022       Visit Dx:      ICD-10-CM ICD-9-CM   1. Insomnia due to medical condition  G47.01 327.01       Patient Active Problem List   Diagnosis   • Cardiomyopathy (HCC)   • Paroxysmal VT (HCC)   • Palpitations   • PVC's (premature ventricular contractions)   • Exacerbation of Crohn's disease of small intestine (HCC)   • Adjustment disorder with depressed mood   • Ankylosis of spine   • Crohn's disease with complication (HCC)   • Diabetes mellitus (HCC)   • Essential hypertension   • External hemorrhoids   • Genital herpes simplex   • Gout   • Insomnia due to medical condition   • Iron deficiency   • Prostate mass   • Recurrent aphthous ulcer   • Asteatosis cutis   • History of pneumonia   • Abnormal CXR (chest x-ray)   • RUQ abdominal pain   • Crohn's disease of small intestine with other complication (HCC)   • Right lower quadrant abdominal pain   • Enteritis   • Mass-like inflammation at terminal ileum   • Crohn's disease (HCC)   • Ileostomy in place (HCC)   • Paroxysmal ventricular tachycardia (HCC)   • Chronic systolic heart failure (HCC)   • Cardiomyopathy, nonischemic (HCC)   • Orthostasis   • Iron deficiency anemia   • Orthostatic hypotension   • Crohn's disease of colon with complication (HCC)   • Dehisced intestinal anastomosis   • History of creation of ostomy (HCC)   • Hypokalemia   • Hypotension, chronic   • Malnutrition of moderate degree (HCC)   • S/P colectomy   • Fatty liver disease, nonalcoholic   • Cholecystitis   • Debility       Past Medical History:   Diagnosis Date   • Acute pain of left hip    • Adjustment disorder with depressed mood    • Anesthesia complication     SPINAL [id number]-[id number]-[id number] LOWER SPINE   • Ankylosing spondylitis of cervical region (HCC)    • Ankylosis of  spine    • Arthritis    • Asthma    • Cardiomyopathy (HCC)     sees Dr. Reyes; NICM/ (-) cath, EF 25-35%; has ICD   • CHF (congestive heart failure) (HCC)    • Cholecystitis    • Crohn's disease (HCC)    • Depression    • Diabetes mellitus (HCC)     Diet controlled.   • Dysthymic disorder 2012   • Dysuria    • Essential hypertension    • External hemorrhoids    • Genital herpes    • Gout    • Herpes genitalia    • History of MRSA infection 2000?    FINGERTIP-TX WITH ANTIBIOTICS-[facility]-NO CURRENT OPEN WOUND OR TREATMENT 12/3/21   • Ileostomy in place ([facility])    • Insomnia    • Iron deficiency    • Paroxysmal ventricular tachycardia ([facility])    • PONV (postoperative nausea and vomiting)    • Presence of combination internal cardiac defibrillator (ICD) and pacemaker    • Prostate nodule    • Recurrent canker sores    • Thoracic back pain    • Urinary hesitancy    • Xerosis cutis        Past Surgical History:   Procedure Laterality Date   • ABDOMINAL SURGERY     • CARDIAC CATHETERIZATION     • CARDIAC DEFIBRILLATOR PLACEMENT      REPLACEMENT-Had Jude lead that was fractured.  Lead was tied off.  New lead and new device implanted.   • CARDIAC ELECTROPHYSIOLOGY PROCEDURE N/A 1/3/2019    Procedure: ICD DC generator change  [employer];  Surgeon: Zechariah Randolph MD;  Location: [facility] INVASIVE LOCATION;  Service: Cardiology   • CHOLECYSTECTOMY N/A 12/7/2021    Procedure: CHOLECYSTECTOMY;  Surgeon: Jeovany Mott MD;  Location: [facility] OR;  Service: General;  Laterality: N/A;   • COLON RESECTION N/A 12/29/2021    Procedure: PARTIAL COLECTOMY WITH OSTOMY;  Surgeon: Jeovany Mott MD;  Location: [facility] OR;  Service: General;  Laterality: N/A;   • COLON RESECTION WITH ILEOSTOMY N/A 2018   • COLONOSCOPY  04/03/2015    abnormal cecum, three polypoid masses, pre cancerous vs crohns, IH, tics, hyperplastic polyp   • COLONOSCOPY N/A 3/17/2017    polypoid masses, tics, IH, polyp   • EXPLORATORY LAPAROTOMY  W/ BOWEL RESECTION  07/2018    open resection of ileum with creation of end ileostomy   • ILEOSTOMY CLOSURE  07/2018   • ILEOSTOMY CLOSURE N/A 12/7/2021    Procedure: ILEOSTOMY TAKEDOWN WITH RESECTION, PARASTOMAL HERNIA REPAIR;  Surgeon: Jeovany Mott MD;  Location: [facility] MAIN OR;  Service: General;  Laterality: N/A;   • PACEMAKER IMPLANTATION         SLP Recommendation and Plan                                                   SLP EVALUATION (last 72 hours)     SLP SLC Evaluation     Row Name 02/10/22 1400 02/09/22 1400 02/09/22 1100 02/08/22 1300 02/08/22 1000       Communication Assessment/Intervention    Document Type therapy note (daily note)  -SR therapy note (daily note)  -SR therapy note (daily note)  -SR therapy note (daily note)  -SR therapy note (daily note)  -SR    Subjective Information complains of; pain  -SR complains of; fatigue  -SR complains of; pain; nausea/vomiting  -SR no complaints  -SR complains of; nausea/vomiting  -SR    Patient Observations alert; cooperative; agree to therapy  -SR cooperative; agree to therapy  -SR cooperative; agree to therapy  -SR alert; cooperative; agree to therapy  -SR cooperative; agree to therapy  -SR    Patient Effort good  -SR good  -SR good  -SR good  -SR good  -SR    Symptoms Noted During/After Treatment none  -SR -- none  -SR -- --       Pain Scale: Numbers Pre/Post-Treatment    Pretreatment Pain Rating 5/10  -SR 3/10  -SR 5/10  -SR 0/10 - no pain  -SR 0/10 - no pain  -SR    Posttreatment Pain Rating 5/10  -SR 3/10  -SR 5/10  -SR 0/10 - no pain  -SR 0/10 - no pain  -SR          User Key  (r) = Recorded By, (t) = Taken By, (c) = Cosigned By    Initials Name Effective Dates    SR Kristie Luis, STEFANY 01/12/22 -                    EDUCATION    The patient has been educated in the following areas:       Cognitive Impairment.             SLP GOALS     Row Name 02/10/22 1400 02/10/22 1100 02/09/22 1400       Attention Goal 1 (SLP)    Progress (Attention Goal 1,  "SLP) -- 90%; independently (over 90% accuracy)  -SR --    Progress/Outcomes (Attention Goal 1, SLP) -- goal met  -SR --    Comment (Attention Goal 1, SLP) -- sustained attn task  -SR --       Organizational Skills Goal 1 (SLP)    Progress (Thought Organization Skills Goal 1, SLP) -- 80%; with minimal cues (75-90%)  -SR --    Progress/Outcomes (Thought Organization Skills Goal 1, SLP) -- goal partially met  -SR --    Comment (Thought Organization Skills Goal 1, SLP) -- Patient completed mental manipulation task with 80% acc given min cues.  -SR --       Functional Math Skills Goal 1 (SLP)    Progress (Functional Math Skills Goal 1, SLP) 60%; with moderate cues (50-74%)  -SR -- 60%; with moderate cues (50-74%)  -SR    Progress/Outcomes (Functional Math Skills Goal 1, SLP) goal ongoing  -SR -- goal ongoing  -SR    Comment (Functional Math Skills Goal 1, SLP) Completed check balance activity with 68% acc given mod cues.  -SR -- Completed check balance activity with 60% acc given mod cues. Patient often added too many numbers to calculation. During the activity he said, \"I used to be much better with a calculator before all of this.\"  -SR       Executive Functional Skills Goal 1 (SLP)    Progress (Executive Function Skills Goal 1, SLP) 70%; independently (over 90% accuracy)  -SR 50%; independently (over 90% accuracy); 70%; with minimal cues (75-90%)  -SR 50%; with moderate cues (50-74%)  -SR    Progress/Outcomes (Executive Function Skills Goal 1, SLP) goal partially met  -SR continuing progress toward goal  -SR continuing progress toward goal  -SR    Comment (Executive Function Skills Goal 1, SLP) Pt completed deduction activity with 70% acc independently; increased to 100% given min cues  -SR Completed 6 step sequencing task with 50% acc independently; 75% given mod cues. Reminders were provided throughout activity to scan through steps prior to completing sequence  -SR Patient completed higher level deduction activity " with 57% acc given mod cues.  -SR       Executive Functional Skills Goal 2 (SLP)    Progress (Executive Function Skills Goal 2, SLP) -- 80%; independently (over 90% accuracy)  -SR --    Progress/Outcomes (Executive Function Skills Goal 2, SLP) -- goal partially met  -SR --    Comment (Executive Function Skills Goal 2, SLP) -- Completed clock drawing activity with 83% acc independently.  -SR --    Row Name 02/09/22 1100 02/08/22 1300 02/08/22 1000       Attention Goal 1 (SLP)    Barriers (Attention Goal 1, SLP) -- -- Pt c/o nausea throughout session. Impacted ability to attend to task  -SR    Progress (Attention Goal 1, SLP) 90%; independently (over 90% accuracy)  -SR 90%; independently (over 90% accuracy)  -SR 80%; with minimal cues (75-90%)  -SR    Progress/Outcomes (Attention Goal 1, SLP) goal partially met  -SR goal partially met  -SR goal ongoing  -SR    Comment (Attention Goal 1, SLP) sustained attn task  -SR Sustained attn task  -SR Pt completed alternating attention task with 80% acc given min cues.  -SR       Organizational Skills Goal 1 (SLP)    Progress (Thought Organization Skills Goal 1, SLP) 90%; independently (over 90% accuracy); 80%; with minimal cues (75-90%)  -SR -- 60%; independently (over 90% accuracy); 70%; with minimal cues (75-90%)  -SR    Progress/Outcomes (Thought Organization Skills Goal 1, SLP) goal partially met  -SR -- goal ongoing  -SR    Comment (Thought Organization Skills Goal 1, SLP) Patient provided verbal explaination during 4-step sequencing task with 90% acc independently; Completed mental manipulation task with 80% acc given min cues  -SR -- Mental manipulation task recalling words in alphabetical order; 60% acc independently; 70% given min cues  -SR       Functional Math Skills Goal 1 (SLP)    Progress (Functional Math Skills Goal 1, SLP) -- 60%; independently (over 90% accuracy); 80%; with minimal cues (75-90%)  -SR --    Progress/Outcomes (Functional Math Skills Goal 1, SLP)  -- goal partially met  -SR --    Comment (Functional Math Skills Goal 1, SLP) -- Pt completed word problems involving time with 60% acc independently; 80% given min cues  -SR --       Executive Functional Skills Goal 1 (SLP)    Barriers (Executive Function Skills Goal 1, SLP) Patient explained that he is ready to go home and c/o pain and discomfort. Completed therapy upright in bed.  -SR -- --    Progress (Executive Function Skills Goal 1, SLP) 80%; with minimal cues (75-90%)  -SR 60%; with moderate cues (50-74%)  -SR 30%; independently (over 90% accuracy); 50%; with minimal cues (75-90%)  -SR    Progress/Outcomes (Executive Function Skills Goal 1, SLP) goal partially met  -SR goal ongoing  -SR goal ongoing  -SR    Comment (Executive Function Skills Goal 1, SLP) Completed deduction task with 80% acc given min cues. Patient explained that his cognitive abilities appear WFL and are related to age. ST will continue therapy until d/c to monitor attention, executive function, and home management tasks.  -SR Pt answered questions related to a visual calendar with 60% accuracy given mod cues  -SR Pt completed 5 step written sequencing task with 33% acc indepdendently; 50% given min cues.  -SR          User Key  (r) = Recorded By, (t) = Taken By, (c) = Cosigned By    Initials Name Provider Type    Kristie Gonzalez SLP Speech and Language Pathologist                            Time Calculation:        Time Calculation- SLP     Row Name 02/10/22 1404 02/10/22 1146          Time Calculation- SLP    SLP Start Time 1330  -SR 1000  -SR     SLP Stop Time 1400  -SR 1030  -SR     SLP Time Calculation (min) 30 min  -SR 30 min  -SR           User Key  (r) = Recorded By, (t) = Taken By, (c) = Cosigned By    Initials Name Provider Type    Kristie Gonzalez SLP Speech and Language Pathologist                  Therapy Charges for Today     Code Description Service Date Service Provider Modifiers Qty    07405124272 [facility] DEV OF COGN  SKILLS INITIAL 15 MIN 2/9/2022 Kristie Luis SLP  1    14914965425 [facility] ST DEV OF COGN SKILLS EACH ADDT'L 15 MIN 2/9/2022 Kristie Luis SLP  3    [id number] [facility] ST DEV OF COGN SKILLS INITIAL 15 MIN 2/10/2022 Krsitie Luis SLP  1    [id number] HC ST DEV OF COGN SKILLS EACH ADDT'L 15 MIN 2/10/2022 Kristie Luis SLP  3                           STEFANY Davison  2/10/2022

## 2022-02-10 NOTE — PROGRESS NOTES
[facility] Progress Note      Patient Name: Arnie Calvo  : 1960  MRN: [id number]  Primary Care Physician:  Zechariah Fatima MD  Date of admission: 2/3/2022    Subjective     Interval History:     Patient lying in bed  Stating that he feels out of breath at nighttime, despite supplemental oxygen.  Patient denies history of obstructive sleep apnea or use of  CPAP at home  Not associated with chest pain or palpitations  Feeling tired and fatigue  Made and spontaneously      Review of Systems:   As noted above    Objective     Vitals:   Temp:  [97.7 °F (36.5 °C)-98.3 °F (36.8 °C)] 98.1 °F (36.7 °C)  Heart Rate:  [106-112] 109  Resp:  [18] 18  BP: ()/(64-71) 95/64  Flow (L/min):  [2] 2    Intake/Output Summary (Last 24 hours) at 2022  Last data filed at 2022  Gross per 24 hour   Intake 660 ml   Output 1500 ml   Net -840 ml       Physical Exam:      Constitutional: Awake, on supplemental oxygen chronically ill and deconditioned  HEENT: Sclera anicteric, no conjunctival injection  Neck: Supple, no thyromegaly, no lymphadenopathy, trachea at midline, no JVD  Respiratory: Clear to auscultation bilaterally, nonlabored respiration, w occasional crackles   Cardiovascular: RRR, CLARISSA grade III   Gastrointestinal: Positive bowel sounds, colostomy  bag in place  : No palpable bladder  Musculoskeletal: No edema, no clubbing or cyanosis  Psychiatric: Appropriate affect, cooperative  Neurologic: Oriented x3, moving all extremities, normal speech and mental status  Skin: Warm and dry         Scheduled Meds:     aMILoride, 10 mg, Oral, Daily  carvedilol, 6.25 mg, Oral, Daily Before Supper  cefdinir, 300 mg, Oral, Q12H  enoxaparin, 40 mg, Subcutaneous, Q24H  glycopyrrolate, 1 mg, Oral, BID  loperamide, 2 mg, Oral, 4x Daily AC & at Bedtime  melatonin, 3 mg, Oral, Nightly  Urea, 15 g, Oral, Daily      IV Meds:        Results Reviewed:   I have personally reviewed the results  from the time of this admission to 2/9/2022 20:21 EST     Results from last 7 days   Lab Units 02/09/22  0815 02/08/22  0506 02/07/22  0656   SODIUM mmol/L 128* 128* 125*   POTASSIUM mmol/L 5.1 4.9 4.2   CHLORIDE mmol/L 93* 90* 88*   CO2 mmol/L 17.1* 26.2 28.4   BUN mg/dL 14 15 15   CREATININE mg/dL 0.87 0.85 0.91   CALCIUM mg/dL 9.1 9.4 9.0   GLUCOSE mg/dL 113* 107* 133*       Estimated Creatinine Clearance: 102.9 mL/min (by C-G formula based on SCr of 0.87 mg/dL).    Results from last 7 days   Lab Units 02/09/22  0815 02/08/22  0506 02/07/22  0656   PHOSPHORUS mg/dL 3.8 3.5 3.4       Results from last 7 days   Lab Units 02/05/22  0745   URIC ACID mg/dL 6.4       Results from last 7 days   Lab Units 02/07/22  0656 02/04/22  0811   WBC 10*3/mm3 7.06 9.87   HEMOGLOBIN g/dL 10.3* 10.9*   PLATELETS 10*3/mm3 163 167     Images     XR CHEST 1 VW-  02/06/2022     HISTORY: Shortness of breath.     Heart size is mildly enlarged. The left hemidiaphragm is partially  obscured likely from small pleural effusion with some mild atelectasis  and/or pneumonia. Left upper lung and right lung appear clear.     Cardiac pacemaker seen in good position.     IMPRESSION:  1. Mild cardiomegaly.  2. Please density in the left lung base may represent combination of small amount of left pleural effusion with some mild atelectasis and/or pneumonia. The left base may have cleared slightly since the 01/23/2022  study.        Assessment / Plan     ASSESSMENT:    -Acute on chronic hypotonic hyponatremia.  Cortisol 13 . TSH normal , Urine Osmo 284. Urine Sodium < 20 . Serum osmo  (273) , likely from CHF based on studies   Continue UREA 15 gr daily and placed on fluid restriction 1.5 liters . TRIAL amiloride 10  mg daily ,  ( increased today )   -Dilated cardiomyopathy w / EF 20 %  , w/o signs of decompensation ,  Currently stable . Starting sodium chloride tablets but likely will need to be adjusted based on signs of volume overload.  -Physical  "deconditioning.  Working w/ PT and OT   - Pneumonia . Started on cefnidir .CXR \"   left mild atelectasis and/or pneumonia. \"  -Borderline hyperkalemia we will start Lokelma 10 g daily    PLAN:    -Follow renal function closely  -Continue   UREA 15 gr daily and Fluid restriction 1.5 liters   - Continue AMILORIDE 10 mg daily , allergic to SULFAS that limits use of some diuretics   -We will hold amiloride due to borderline hyperkalemia for 24 hours and then resume daily ,   - Continue sodium chloride 1 gr BID   -Avoid nephrotoxins  -Adjust medications to GFR     Discussed with nursing staff    Thank you for involving us in the care of Arnie Calvo.  Please feel free to call with any questions.    Joselito Bates MD  02/09/22  20:21 EST    [facility]  [phone]                 "

## 2022-02-11 LAB
ALBUMIN SERPL-MCNC: 2.8 G/DL (ref 3.5–5.2)
ANION GAP SERPL CALCULATED.3IONS-SCNC: 9.2 MMOL/L (ref 5–15)
BASOPHILS # BLD AUTO: 0.04 10*3/MM3 (ref 0–0.2)
BASOPHILS NFR BLD AUTO: 0.5 % (ref 0–1.5)
BUN SERPL-MCNC: 14 MG/DL (ref 8–23)
BUN/CREAT SERPL: 13.9 (ref 7–25)
CALCIUM SPEC-SCNC: 9 MG/DL (ref 8.6–10.5)
CHLORIDE SERPL-SCNC: 89 MMOL/L (ref 98–107)
CO2 SERPL-SCNC: 27.8 MMOL/L (ref 22–29)
CREAT SERPL-MCNC: 1.01 MG/DL (ref 0.76–1.27)
DEPRECATED RDW RBC AUTO: 47.5 FL (ref 37–54)
EOSINOPHIL # BLD AUTO: 0.08 10*3/MM3 (ref 0–0.4)
EOSINOPHIL NFR BLD AUTO: 0.9 % (ref 0.3–6.2)
ERYTHROCYTE [DISTWIDTH] IN BLOOD BY AUTOMATED COUNT: 15.8 % (ref 12.3–15.4)
GFR SERPL CREATININE-BSD FRML MDRD: 75 ML/MIN/1.73
GLUCOSE SERPL-MCNC: 93 MG/DL (ref 65–99)
HCT VFR BLD AUTO: 33.3 % (ref 37.5–51)
HGB BLD-MCNC: 10.3 G/DL (ref 13–17.7)
IMM GRANULOCYTES # BLD AUTO: 0.04 10*3/MM3 (ref 0–0.05)
IMM GRANULOCYTES NFR BLD AUTO: 0.5 % (ref 0–0.5)
LYMPHOCYTES # BLD AUTO: 2.38 10*3/MM3 (ref 0.7–3.1)
LYMPHOCYTES NFR BLD AUTO: 27.2 % (ref 19.6–45.3)
MCH RBC QN AUTO: 25.8 PG (ref 26.6–33)
MCHC RBC AUTO-ENTMCNC: 30.9 G/DL (ref 31.5–35.7)
MCV RBC AUTO: 83.3 FL (ref 79–97)
MONOCYTES # BLD AUTO: 0.89 10*3/MM3 (ref 0.1–0.9)
MONOCYTES NFR BLD AUTO: 10.2 % (ref 5–12)
NEUTROPHILS NFR BLD AUTO: 5.31 10*3/MM3 (ref 1.7–7)
NEUTROPHILS NFR BLD AUTO: 60.7 % (ref 42.7–76)
NRBC BLD AUTO-RTO: 0 /100 WBC (ref 0–0.2)
OSMOLALITY SERPL: 272 MOSM/KG (ref 280–301)
OSMOLALITY UR: 231 MOSM/KG
PHOSPHATE SERPL-MCNC: 4 MG/DL (ref 2.5–4.5)
PLATELET # BLD AUTO: 163 10*3/MM3 (ref 140–450)
PMV BLD AUTO: 10 FL (ref 6–12)
POTASSIUM SERPL-SCNC: 3.9 MMOL/L (ref 3.5–5.2)
RBC # BLD AUTO: 4 10*6/MM3 (ref 4.14–5.8)
SODIUM SERPL-SCNC: 126 MMOL/L (ref 136–145)
SODIUM UR-SCNC: <20 MMOL/L
WBC NRBC COR # BLD: 8.74 10*3/MM3 (ref 3.4–10.8)

## 2022-02-11 PROCEDURE — 99232 SBSQ HOSP IP/OBS MODERATE 35: CPT | Performed by: NURSE PRACTITIONER

## 2022-02-11 PROCEDURE — 97129 THER IVNTJ 1ST 15 MIN: CPT

## 2022-02-11 PROCEDURE — 85025 COMPLETE CBC W/AUTO DIFF WBC: CPT | Performed by: PHYSICAL MEDICINE & REHABILITATION

## 2022-02-11 PROCEDURE — 97130 THER IVNTJ EA ADDL 15 MIN: CPT

## 2022-02-11 PROCEDURE — 80069 RENAL FUNCTION PANEL: CPT | Performed by: PHYSICAL MEDICINE & REHABILITATION

## 2022-02-11 PROCEDURE — 97110 THERAPEUTIC EXERCISES: CPT

## 2022-02-11 PROCEDURE — 83930 ASSAY OF BLOOD OSMOLALITY: CPT | Performed by: INTERNAL MEDICINE

## 2022-02-11 PROCEDURE — 97535 SELF CARE MNGMENT TRAINING: CPT

## 2022-02-11 PROCEDURE — 25010000002 ENOXAPARIN PER 10 MG: Performed by: PHYSICAL MEDICINE & REHABILITATION

## 2022-02-11 PROCEDURE — 97116 GAIT TRAINING THERAPY: CPT

## 2022-02-11 RX ORDER — FUROSEMIDE 20 MG/1
20 TABLET ORAL EVERY OTHER DAY
Status: DISCONTINUED | OUTPATIENT
Start: 2022-02-12 | End: 2022-02-15 | Stop reason: HOSPADM

## 2022-02-11 RX ORDER — CALCIUM ACETATE MONOHYDRATE AND ALUMINUM SULFATE TETRADECAHYDRATE 952; 1347 MG/2299MG; MG/2299MG
1 POWDER, FOR SOLUTION TOPICAL
Status: DISCONTINUED | OUTPATIENT
Start: 2022-02-11 | End: 2022-02-15 | Stop reason: HOSPADM

## 2022-02-11 RX ORDER — ETHACRYNIC ACID 25 MG/1
50 TABLET ORAL EVERY OTHER DAY
Status: DISCONTINUED | OUTPATIENT
Start: 2022-02-12 | End: 2022-02-11

## 2022-02-11 RX ADMIN — LOPERAMIDE HYDROCHLORIDE 2 MG: 2 CAPSULE ORAL at 08:11

## 2022-02-11 RX ADMIN — Medication 3 MG: at 20:44

## 2022-02-11 RX ADMIN — CEFDINIR 300 MG: 300 CAPSULE ORAL at 08:11

## 2022-02-11 RX ADMIN — SODIUM ZIRCONIUM CYCLOSILICATE 10 G: 10 POWDER, FOR SUSPENSION ORAL at 08:11

## 2022-02-11 RX ADMIN — HYDROCODONE BITARTRATE AND ACETAMINOPHEN 2 TABLET: 5; 325 TABLET ORAL at 11:49

## 2022-02-11 RX ADMIN — LOPERAMIDE HYDROCHLORIDE 2 MG: 2 CAPSULE ORAL at 16:44

## 2022-02-11 RX ADMIN — ENOXAPARIN SODIUM 40 MG: 100 INJECTION SUBCUTANEOUS at 20:43

## 2022-02-11 RX ADMIN — LOPERAMIDE HYDROCHLORIDE 2 MG: 2 CAPSULE ORAL at 11:49

## 2022-02-11 RX ADMIN — MIRTAZAPINE 7.5 MG: 15 TABLET, FILM COATED ORAL at 20:44

## 2022-02-11 RX ADMIN — GLYCOPYRROLATE 1 MG: 1 TABLET ORAL at 08:11

## 2022-02-11 RX ADMIN — LOPERAMIDE HYDROCHLORIDE 2 MG: 2 CAPSULE ORAL at 20:44

## 2022-02-11 RX ADMIN — GLYCOPYRROLATE 1 MG: 1 TABLET ORAL at 20:44

## 2022-02-11 RX ADMIN — Medication 1 PACKET: at 16:45

## 2022-02-11 NOTE — PROGRESS NOTES
LOS: 8 days   Patient Care Team:  Zechariah Fatima MD as PCP - General  PinaRichard alegria MD as PCP - Family Medicine (Pulmonary Disease)  John Bowles MD as Consulting Physician (Gastroenterology)  Hermes Shabazz MD as Consulting Physician (Urology)  Osmar Carranza MD as Consulting Physician (Cardiology)      LEDA JAZMÍN JOHNNY  1960    Chief Complaint: Immobility syndrome  Impaired mobility/impaired self-care/impaired endurance  Ileostomy takedown , cholecystectomy, incisional hernia repair December 7, 2021.  Exploratory laparotomy with end colostomy partial colon resection December 29, 2021  Ostomy-on Imodium/glycopyrrolate  Anxiety  Anemia  Hyponatremia        Subjective     Ostomy bag leaking this a.m.  Did not receive Coreg last evening as systolic blood pressure less than 100  He slept better with Remeron last night.    Overnight pulse oximetry showed sats %, average 97%, pulse , average 102.5.  Participating in therapies.    Family conference today.       Objective     Vitals:    02/11/22 0527   BP: 97/70   Pulse: 112   Resp: 16   Temp: 97.9 °F (36.6 °C)   SpO2: 98%       PHYSICAL EXAM:   MENTAL STATUS -  AWAKE / ALERT  HEENT-    LUNGS - CTA, NO WHEEZES, RALES OR RHONCHI  HEART-regular rate and rhythm  ABD - NORMOACTIVE BOWEL SOUNDS, SOFT, NT.  Ostomy with clean base to the wound inferior to the ostomy. Dressings in place at right and left abdomen  EXT - NO EDEMA OR CYANOSIS  NEURO -oriented x4.  MOTOR EXAM - RUE/RLE 5/5. LUE/LLE 5/5.           MEDICATIONS  Scheduled Meds:carvedilol, 6.25 mg, Oral, Daily Before Supper  enoxaparin, 40 mg, Subcutaneous, Q24H  glycopyrrolate, 1 mg, Oral, BID  loperamide, 2 mg, Oral, 4x Daily AC & at Bedtime  melatonin, 3 mg, Oral, Nightly  [START ON 2/17/2022] mirtazapine, 15 mg, Oral, Nightly  mirtazapine, 7.5 mg, Oral, Nightly  sodium zirconium cyclosilicate, 10 g, Oral, TID      Continuous Infusions:   PRN Meds:.•  acetaminophen  •   Glycerin-Hypromellose-  •  HYDROcodone-acetaminophen  •  ondansetron ODT  •  simethicone      RESULTS  No results found for: POCGLU  Results from last 7 days   Lab Units 02/11/22  0514 02/07/22  0656   WBC 10*3/mm3 8.74 7.06   HEMOGLOBIN g/dL 10.3* 10.3*   HEMATOCRIT % 33.3* 33.1*   PLATELETS 10*3/mm3 163 163     Results from last 7 days   Lab Units 02/11/22  0514 02/10/22  0651 02/09/22  0815   SODIUM mmol/L 126* 127* 128*   POTASSIUM mmol/L 3.9 5.9* 5.1   CHLORIDE mmol/L 89* 92* 93*   CO2 mmol/L 27.8 28.1 17.1*   BUN mg/dL 14 16 14   CREATININE mg/dL 1.01 1.19 0.87   CALCIUM mg/dL 9.0 9.3 9.1   GLUCOSE mg/dL 93 100* 113*     Results from last 7 days   Lab Units 02/11/22  0514 02/07/22  0656   WBC 10*3/mm3 8.74 7.06   HEMOGLOBIN g/dL 10.3* 10.3*   HEMATOCRIT % 33.3* 33.1*   PLATELETS 10*3/mm3 163 163           Ref. Range 2/2/2022 11:41   Magnesium Latest Ref Range: 1.6 - 2.4 mg/dL 1.8   Prealbumin Latest Ref Range: 20.0 - 40.0 mg/dL 10.2 (L)      Chest x-ray February 6  XR CHEST 1 VW-  02/06/2022     HISTORY: Shortness of breath.     Heart size is mildly enlarged. The left hemidiaphragm is partially  obscured likely from small pleural effusion with some mild atelectasis  and/or pneumonia. Left upper lung and right lung appear clear.     Cardiac pacemaker seen in good position.     IMPRESSION:  1. Mild cardiomegaly.  2. Increased density in the left lung base may represent combination of  small amount of left pleural effusion with some mild atelectasis and/or  pneumonia. The left base may have cleared slightly since the 01/23/2022  study.         Assessment/Plan     Insomnia due to medical condition    Debility      Chief Complaint: Immobility syndrome  Impaired mobility/impaired self-care/impaired endurance     Ileostomy takedown , cholecystectomy, incisional hernia repair December 7, 2021.  Exploratory laparotomy with end colostomy partial colon resection December 29, 2021     Ostomy-on  Imodium/glycopyrrolate  February 4-continue Robinul twice daily for now, but decreased Imodium from 2 tablets to 1 tablet p.o. before every meal and at bedtime.     Abdominal wound care  February 11-[place] soak.  Ongoing ostomy care.  Wounds are improving and smaller.     Anxiety-would presently hold on adding any benzodiazepine given his multiple medical issues.    Depression-February 9-given his hyponatremia, SSRI would not be the best option at this point.  Remeron may be a better option and will review with nephrology and with the patient.  February 10-reviewed with nephrology.  Discussed with patient.  Look at starting Remeron 7.5 mg nightly for 1 week then increase to 15 mg nightly     Anemia     Electrolytes-hyponatremia/hyperkalemia  February 4-sodium was 127 on February 2, 130 on February 3, decreased 124 on February 4.  Will consult nephrology for evaluation  Feb 7 -  per Nephrology -[Continue UREA 15 gr daily and placed on fluid restriction 1.5 liters . TRIAL amiloride 10  mg daily ]   February 9-Per nephrology-Borderline hyperkalemia and Nephrology started Lokelma 10 g daily.  Continue UREA 15 gr daily and Fluid restriction 1.5 liters, Hold amiloride due to borderline hyperkalemia for 24 hours and then resume daily ,  Continue sodium chloride 1 gr BID  February 11-off salt tablets.  Off urea.  Completed Lokelma.  Fluid restriction 1200 cc/day.     Congestive heart failure/tachycardia-on Coreg 12.5 mg for supper.  Blood pressure low at times.  Parameters added evening Feb 3 - Hold if systolic is <110 and diastolic <70  .  Feb 8 - has not received Coreg 12.5 q supper since parameters added on Feb 3 after BP dropped to 88/66. He received every evening on acute care stay .  Will adjust parameter to hold if SBP < 100 and decrease dose to 6.25 mg q supper pending updated input from Cardiology to re-assess for parameters  February 9-tolerated lower dose of Coreg  February 10-Tolerating lower dose of Coreg  6.25 mg with pulse ranging 100-106 and blood pressure systolic .       Nonischemic cardiomyopathy ejection fraction 20%     DVT prophylaxis-Lovenox/SCDs     Impaired nutritional status-supplements per dietitian to follow.  Recheck prealbumin around February 16     Pulmonary- using  O2 2 L nasal cannula at night  Feb 7 - Started on cefdinir and given  breathing treatment yesterday.  He feels breathing treatment help bring up thick secretions.  He is on glycopyrrolate which probably thickens at secretions.  Tachycardia yesterday did not appear to correlate with the time of the breathing treatment as his pulse actually went down after the breathing treatment.  Will give another breathing treatment today  February 9-reviewed with patient getting outpatient sleep study  February 10-He complains of feeling short of air at night.  Previously reviewed with patient ordering outpatient sleep study.  Wears O2 2 L at night, sats 95 to 100%.  Will check overnight pulse oximetry study on 2 L nasal cannula  February 11- Overnight pulse oximetry showed sats %, average 97%, pulse , average 102.5.      Insomnia-melatonin added  February 10-added Remeron  Plan for outpatient sleep study     TEAM CONF - FEB 8 - WEAK, POOR ENDURANCE. ANXIETY.SATS 96% ON ROOM AIR DURING THE DAY.  BED SBA. TRANSFERS CTG RW. GAIT 8-=160 FEET CTG RW. TOILET TRANSFERS CTG. BATH MIN. LBD DEP FOR SOCKS. UBD SBA GOWN. GROOMING SBA.  SWALLOW - ESOPHAGEAL REFLUX. COGNITION - MILD DEFICITS, IMPAIRED ATTENTION DUE TO DISCOMFORT. WOUND CARE, OSTOMY CARE. HYPONATREMIA. FLUID RESTRICTIONS. REC THERAPY LOOKING AT ACTIVITIES DIRECTED TOWARD ANXIETY. ASK PSYCHOLOGY TO SEE.   ELOS - ONE WEEK.      REHAB - admit for comprehensive acute inpatient rehabilitation .  This would be an interdisciplinary program with physical therapy 1.5 hour,  occupational therapy 1.5 hour,  5 days a week.  Rehabilitation nursing for carryover, monitoring of cardiac and  intestinal   status, bowel and bladder, and skin  Ongoing physician follow-up.  Weekly team conferences.   The patient's functional status and clinical status is unchanged from preadmission assessment and the patient continues appropriate for acute inpatient rehabilitation.  Goal is for home with outpatient   therapies.  Barrier to discharge:.  Mobility and self-care endurance- worked on condition, transfers, progressive ambulation, activity daily living to overcome.            Zechariah Pearson MD      During rounds, used appropriate personal protective equipment including mask and gloves.  Additional gown if indicated.  Mask used was standard procedure mask. Appropriate PPE was worn during the entire visit.  Hand hygiene was completed before and after.

## 2022-02-11 NOTE — NURSING NOTE
02/11/22 1300   Wound 12/25/21 0809 abdomen Other (comment)   Placement Date/Time: (c) 12/25/21 0809   Location: abdomen  Primary Wound Type: (c) Other (comment)   Base moist; pink  (pale pink, hypergranulated.)   Periwound intact; dry   Periwound Temperature warm   Periwound Skin Turgor soft   Edges open   Wound Length (cm) 1.5 cm   Wound Width (cm) 3.5 cm   Wound Depth (cm) 0.1 cm   Drainage Characteristics/Odor creamy; yellow   Drainage Amount small   Care, Wound cleansed with; sterile normal saline   Dressing Care dressing changed; silver impregnated; hydrofiber; silicone; border dressing   Periwound Care barrier film applied   Wound 12/29/21 1518 midline abdomen Incision   Placement Date/Time: 12/29/21 1518   Orientation: midline  Location: abdomen  Primary Wound Type: Incision   Base moist; pink  (stoma located in proximal portion of wound)   Periwound macerated; moist; redness  (red and denuded along proximal portion around the stoma.)   Periwound Temperature warm   Periwound Skin Turgor soft   Edges open   Wound Length (cm) 5 cm   Wound Width (cm) 4 cm   Wound Depth (cm) 1 cm   Tunneling [Depth (cm)/Location] 3cms at 1 o'clock   Drainage Characteristics/Odor green  (stool leaking into wound)   Drainage Amount moderate   Care, Wound cleansed with; sterile normal saline   Dressing Care dressing changed; collagen; silver impregnated; hydrofiber; silicone; border dressing   Periwound Care barrier film applied   Wound 12/07/21 0757 Right abdomen Incision   Placement Date/Time: 12/07/21 0757   Present on Hospital Admission: No  Side: Right  Location: abdomen  Primary Wound Type: Incision   Base granulating; moist; pink   Periwound intact; dry   Periwound Temperature warm   Periwound Skin Turgor soft   Wound Length (cm) 1 cm   Wound Width (cm) 6 cm   Wound Depth (cm) 0.2 cm   Drainage Characteristics/Odor creamy; yellow   Drainage Amount small   Care, Wound cleansed with; sterile normal saline   Dressing Care  dressing changed; silver impregnated; hydrofiber; silicone; border dressing   Periwound Care barrier film applied   Colostomy LUQ   Placement Date: 12/29/21   Inserted by: By Dr. Simpson in OR  Location: LUQ   Stomal Appliance 2 piece; Changed; Leaking   Stoma Appearance round; rosebud appearance; moist; pink; retracted below skin level; mucocutaneous separation  (open abdominal wound along distal stoma edge)   Peristomal Assessment Denuded; Excoriation; Painful  (see above)   Accessories/Skin Care cleansed with water; skin sealant; skin barrier powder; skin barrier paste; skin barrier ring; convex wafer; appliance belt   Stoma Function flatus; stool   Stool Color brown   Stool Consistency soft; loose   Treatment Bag change; Site care     CWON note: pt seen for follow up dressing change/ pouch change with wife and her friend at bedside. They observed and videoed pouch change today, as pt to go home early next week. I have typed up step by step instructions and a complete supply list for the pt's wife. This has been emailed to the  for the pt. Colostomy appliance was placed on Monday and began to leak earlier today (4 days)      We were successful in isolating the stoma in the proximal midline wound bed with an appliance and placing a dressing to the distal wound. Seal held for 3 day, with leakage starting on the 4th day.  Wound edges remain red but less tender, we crusting with stoma powder and barrier spray x2 coats, and then applyed marathon.  The tunnel at 1 o'clock was lightly packed with Caitlin and covered with nacho paste in an effort to avoid fecal contamination of the tunnel. Half of an adapt convex barrier ring with nacho paste was used in the wound base at distal stoma edge in an attempt to create a pouching surface at the distal aspect of the stoma.  Stoma is below skin level and is causing peristomal skin to be denuded and painful. After the skin was treated (as stated above), a 2-piece  soft convex red barrier and seal were placed over the stoma with a drainable pouch and stoma belt. It appeared that we were able to get a good seal. The remaining midline wound was packed with Opticel ag and covered with optifoam (this was done prior to placing the ostomy appliance).      Wounds to R and L abdomen will be managed with Opticel ag  and covered with optifoam, to be changed 2x week with pouch change. RLQ wound has a deeper central area where the yellow tissue has debrided, will continue to monitor this area. Left abdomina wound appears to be hypergranulating, will try some silver nitrate at next dressing change on Monday.     Samples have been requested from [facility] to be sent to pt's home, with his verbal consent.

## 2022-02-11 NOTE — THERAPY TREATMENT NOTE
Inpatient Rehabilitation - Occupational Therapy Treatment Note    [facility]     Patient Name: Arnie Calvo  : 1960  MRN: [id number]    Today's Date: 2022                 Admit Date: 2/3/2022         ICD-10-CM ICD-9-CM   1. Insomnia due to medical condition  G47.01 327.01       Patient Active Problem List   Diagnosis   • Cardiomyopathy (HCC)   • Paroxysmal VT (HCC)   • Palpitations   • PVC's (premature ventricular contractions)   • Exacerbation of Crohn's disease of small intestine (HCC)   • Adjustment disorder with depressed mood   • Ankylosis of spine   • Crohn's disease with complication (HCC)   • Diabetes mellitus (HCC)   • Essential hypertension   • External hemorrhoids   • Genital herpes simplex   • Gout   • Insomnia due to medical condition   • Iron deficiency   • Prostate mass   • Recurrent aphthous ulcer   • Asteatosis cutis   • History of pneumonia   • Abnormal CXR (chest x-ray)   • RUQ abdominal pain   • Crohn's disease of small intestine with other complication (HCC)   • Right lower quadrant abdominal pain   • Enteritis   • Mass-like inflammation at terminal ileum   • Crohn's disease (HCC)   • Ileostomy in place (HCC)   • Paroxysmal ventricular tachycardia (HCC)   • Chronic systolic heart failure (HCC)   • Cardiomyopathy, nonischemic (HCC)   • Orthostasis   • Iron deficiency anemia   • Orthostatic hypotension   • Crohn's disease of colon with complication (HCC)   • Dehisced intestinal anastomosis   • History of creation of ostomy (HCC)   • Hypokalemia   • Hypotension, chronic   • Malnutrition of moderate degree (HCC)   • S/P colectomy   • Fatty liver disease, nonalcoholic   • Cholecystitis   • Debility       Past Medical History:   Diagnosis Date   • Acute pain of left hip    • Adjustment disorder with depressed mood    • Anesthesia complication     SPINAL [id number] LOWER SPINE   • Ankylosing spondylitis of cervical region (HCC)    • Ankylosis of spine    • Arthritis     • Asthma    • Cardiomyopathy (HCC)     sees Dr. Reyes; NICM/ (-) cath, EF 25-35%; has ICD   • CHF (congestive heart failure) (HCC)    • Cholecystitis    • Crohn's disease (HCC)    • Depression    • Diabetes mellitus (HCC)     Diet controlled.   • Dysthymic disorder 2012   • Dysuria    • Essential hypertension    • External hemorrhoids    • Genital herpes    • Gout    • Herpes genitalia    • History of MRSA infection 2000?    FINGERTIP-TX WITH ANTIBIOTICS-[facility]-NO CURRENT OPEN WOUND OR TREATMENT 12/3/21   • Ileostomy in place (HCC)    • Insomnia    • Iron deficiency    • Paroxysmal ventricular tachycardia (HCC)    • PONV (postoperative nausea and vomiting)    • Presence of combination internal cardiac defibrillator (ICD) and pacemaker    • Prostate nodule    • Recurrent canker sores    • Thoracic back pain    • Urinary hesitancy    • Xerosis cutis        Past Surgical History:   Procedure Laterality Date   • ABDOMINAL SURGERY     • CARDIAC CATHETERIZATION     • CARDIAC DEFIBRILLATOR PLACEMENT      REPLACEMENT-Had [place] lead that was fractured.  Lead was tied off.  New lead and new device implanted.   • CARDIAC ELECTROPHYSIOLOGY PROCEDURE N/A 1/3/2019    Procedure: ICD DC generator change  [employer];  Surgeon: Zechariah Randolph MD;  Location: [facility] INVASIVE LOCATION;  Service: Cardiology   • CHOLECYSTECTOMY N/A 12/7/2021    Procedure: CHOLECYSTECTOMY;  Surgeon: Jeovany Mott MD;  Location: [facility] OR;  Service: General;  Laterality: N/A;   • COLON RESECTION N/A 12/29/2021    Procedure: PARTIAL COLECTOMY WITH OSTOMY;  Surgeon: Jeovany Mott MD;  Location: [facility] OR;  Service: General;  Laterality: N/A;   • COLON RESECTION WITH ILEOSTOMY N/A 2018   • COLONOSCOPY  04/03/2015    abnormal cecum, three polypoid masses, pre cancerous vs crohns, IH, tics, hyperplastic polyp   • COLONOSCOPY N/A 3/17/2017    polypoid masses, tics, IH, polyp   • EXPLORATORY LAPAROTOMY W/ BOWEL RESECTION   07/2018    open resection of ileum with creation of end ileostomy   • ILEOSTOMY CLOSURE  07/2018   • ILEOSTOMY CLOSURE N/A 12/7/2021    Procedure: ILEOSTOMY TAKEDOWN WITH RESECTION, PARASTOMAL HERNIA REPAIR;  Surgeon: Jeovany Mott MD;  Location: [facility] MAIN OR;  Service: General;  Laterality: N/A;   • PACEMAKER IMPLANTATION               IRF OT ASSESSMENT FLOWSHEET (last 12 hours)     IRF OT Evaluation and Treatment     Row Name 02/11/22 1139          OT Time and Intention    Document Type daily treatment  -MW     Mode of Treatment occupational therapy; individual therapy  -MW     Patient Effort good  -MW     Symptoms Noted During/After Treatment fatigue; increased pain  -MW     Row Name 02/11/22 1139          General Information    Patient Profile Reviewed yes  -MW     Patient/Family/Caregiver Comments/Observations pt supine in bed agreeable to sink side ADLs  -MW     Existing Precautions/Restrictions fall  -MW     Row Name 02/11/22 1139          Cognition/Psychosocial    Affect/Mental Status (Cognitive) anxious  -MW     Orientation Status (Cognition) oriented x 4  -MW     Follows Commands (Cognition) follows one-step commands; over 90% accuracy  -MW     Personal Safety Interventions fall prevention program maintained; gait belt; nonskid shoes/slippers when out of bed; supervised activity  -MW     Cognitive Function (Cognitive) WFL  -MW     Row Name 02/11/22 1139          Pain Scale: Numbers Pre/Post-Treatment    Pretreatment Pain Rating 6/10  -MW     Posttreatment Pain Rating 6/10  -MW     Pain Location - Side Bilateral  -MW     Pain Location - Orientation incisional  -MW     Row Name 02/11/22 1139          Transfers    Bed-Chair [place] (Transfers) set up; standby assist  -MW     Chair-Bed [place] (Transfers) standby assist; set up  -MW     Assistive Device (Bed-Chair Transfers) wheelchair  -MW     Sit-Stand [place] (Transfers) set up; standby assist  -MW     Stand-Sit [place] (Transfers)  set up; standby assist  -     Row Name 02/11/22 1139          Chair-Bed Transfer    Assistive Device (Chair-Bed Transfers) wheelchair  -     Row Name 02/11/22 1139          Sit-Stand Transfer    Assistive Device (Sit-Stand Transfers) walker, front-wheeled  -MW     Row Name 02/11/22 1139          Stand-Sit Transfer    Assistive Device (Stand-Sit Transfers) walker, front-wheeled  -MW     Row Name 02/11/22 1139          Bathing    [place] Level (Bathing) bathing skills; upper body; standby assist; set up  -     Position (Bathing) sink side; supported sitting  -     Set-up Assistance (Bathing) obtain supplies  -     Comment (Bathing) pt declined LB bathing this date, UB bathing completed and ostomy bag leaking, requiring increased time for bathing  -     Row Name 02/11/22 1139          Upper Body Dressing    [place] Level (Upper Body Dressing) upper body dressing skills; doff; don; pull over garment; set up assistance; supervision  -     Position (Upper Body Dressing) supported sitting  -     Set-up Assistance (Upper Body Dressing) obtain clothing  -     Row Name 02/11/22 1139          Grooming    [place] Level (Grooming) grooming skills; deodorant application; oral care regimen; wash face, hands; set up; standby assist  -     Position (Grooming) sink side; supported sitting  -     Set-up Assistance (Grooming) obtain supplies  -     Row Name 02/11/22 1139          Toileting    Comment (Toileting) Nurse present for portion of session to empty ostomy bag  -     Row Name 02/11/22 1139          Positioning and Restraints    Pre-Treatment Position in bed  -     Post Treatment Position bed  -MW     In Bed notified nsg; supine; call light within reach; encouraged to call for assist; exit alarm on  -           User Key  (r) = Recorded By, (t) = Taken By, (c) = Cosigned By    Initials Name Effective Dates    Didi Conte OT 08/20/21 -                  Occupational Therapy  Education                 Title: PT OT SLP Therapies (Done)     Topic: Occupational Therapy (Done)     Point: ADL training (Done)     Description:   Instruct learner(s) on proper safety adaptation and remediation techniques during self care or transfers.   Instruct in proper use of assistive devices.              Learning Progress Summary           Patient Acceptance, E,TB,D,H, DU,VU by  at 2/10/2022 1540    Comment: ed pt on HEP for UE ex and ROM ex. Pt demo and verbalize understanding. Pt HEP provided and left in room in top drawer with his clothes as he dc next week on 2/15.    Acceptance, E,TB,D, DU,VU by  at 2/4/2022 1455    Comment: ed pt on role of OT. benefit of therapy POC w. OT. ed on safety w. ADL and tsf. ed on UE ex.                   Point: Home exercise program (Done)     Description:   Instruct learner(s) on appropriate technique for monitoring, assisting and/or progressing therapeutic exercises/activities.              Learning Progress Summary           Patient Acceptance, E,TB,D,H, DU,VU by  at 2/10/2022 1540    Comment: ed pt on HEP for UE ex and ROM ex. Pt demo and verbalize understanding. Pt HEP provided and left in room in top drawer with his clothes as he dc next week on 2/15.    Acceptance, E,TB,D, DU,VU by  at 2/4/2022 1455    Comment: ed pt on role of OT. benefit of therapy POC w. OT. ed on safety w. ADL and tsf. ed on UE ex.                   Point: Precautions (Done)     Description:   Instruct learner(s) on prescribed precautions during self-care and functional transfers.              Learning Progress Summary           Patient Acceptance, E,TB,D,H, DU,VU by  at 2/10/2022 1540    Comment: ed pt on HEP for UE ex and ROM ex. Pt demo and verbalize understanding. Pt HEP provided and left in room in top drawer with his clothes as he dc next week on 2/15.    Acceptance, E,TB,D, DU,VU by  at 2/4/2022 1455    Comment: ed pt on role of OT. benefit of therapy POC w. OT. ed on safety  w. ADL and tsf. ed on UE ex.                   Point: Body mechanics (Done)     Description:   Instruct learner(s) on proper positioning and spine alignment during self-care, functional mobility activities and/or exercises.              Learning Progress Summary           Patient Acceptance, E,TB,D,H, DU,VU by  at 2/10/2022 1540    Comment: ed pt on HEP for UE ex and ROM ex. Pt demo and verbalize understanding. Pt HEP provided and left in room in top drawer with his clothes as he dc next week on 2/15.    Acceptance, E,TB,D, DU,VU by  at 2/4/2022 1455    Comment: ed pt on role of OT. benefit of therapy POC w. OT. ed on safety w. ADL and tsf. ed on UE ex.                               User Key     Initials Effective Dates Name Provider Type [facility] 06/16/21 -  Lyndsey Zeng OTR Occupational Therapist OT                    OT Recommendation and Plan                         Time Calculation:      Time Calculation- OT     Row Name 02/11/22 0900             Time Calculation- OT    OT Start Time 0900  -MW      OT Stop Time 0930  -MW      OT Time Calculation (min) 30 min  -MW            User Key  (r) = Recorded By, (t) = Taken By, (c) = Cosigned By    Initials Name Provider Type     Didi Rod OT Occupational Therapist              Therapy Charges for Today     Code Description Service Date Service Provider Modifiers Qty    [id number] HC OT SELF CARE/MGMT/TRAIN EA 15 MIN 2/11/2022 Didi Rod OT GO 2                   Didi Rod OT  2/11/2022

## 2022-02-11 NOTE — THERAPY PROGRESS REPORT/RE-CERT
Inpatient Rehabilitation - Physical Therapy Progress Note       [facility]     Patient Name: Arnie Calvo  : 1960  MRN: [id number]    Today's Date: 2022                    Admit Date: 2/3/2022      Visit Dx:     ICD-10-CM ICD-9-CM   1. Insomnia due to medical condition  G47.01 327.01       Patient Active Problem List   Diagnosis   • Cardiomyopathy (HCC)   • Paroxysmal VT (HCC)   • Palpitations   • PVC's (premature ventricular contractions)   • Exacerbation of Crohn's disease of small intestine (HCC)   • Adjustment disorder with depressed mood   • Ankylosis of spine   • Crohn's disease with complication (HCC)   • Diabetes mellitus (HCC)   • Essential hypertension   • External hemorrhoids   • Genital herpes simplex   • Gout   • Insomnia due to medical condition   • Iron deficiency   • Prostate mass   • Recurrent aphthous ulcer   • Asteatosis cutis   • History of pneumonia   • Abnormal CXR (chest x-ray)   • RUQ abdominal pain   • Crohn's disease of small intestine with other complication (HCC)   • Right lower quadrant abdominal pain   • Enteritis   • Mass-like inflammation at terminal ileum   • Crohn's disease (HCC)   • Ileostomy in place (HCC)   • Paroxysmal ventricular tachycardia (HCC)   • Chronic systolic heart failure (HCC)   • Cardiomyopathy, nonischemic (HCC)   • Orthostasis   • Iron deficiency anemia   • Orthostatic hypotension   • Crohn's disease of colon with complication (HCC)   • Dehisced intestinal anastomosis   • History of creation of ostomy (HCC)   • Hypokalemia   • Hypotension, chronic   • Malnutrition of moderate degree (HCC)   • S/P colectomy   • Fatty liver disease, nonalcoholic   • Cholecystitis   • Debility       Past Medical History:   Diagnosis Date   • Acute pain of left hip    • Adjustment disorder with depressed mood    • Anesthesia complication     SPINAL [id number] LOWER SPINE   • Ankylosing spondylitis of cervical region (HCC)    • Ankylosis of spine    •  Arthritis    • Asthma    • Cardiomyopathy (HCC)     sees Dr. Reyes; NICM/ (-) cath, EF 25-35%; has ICD   • CHF (congestive heart failure) (HCC)    • Cholecystitis    • Crohn's disease (HCC)    • Depression    • Diabetes mellitus (HCC)     Diet controlled.   • Dysthymic disorder 2012   • Dysuria    • Essential hypertension    • External hemorrhoids    • Genital herpes    • Gout    • Herpes genitalia    • History of MRSA infection 2000?    FINGERTIP-TX WITH ANTIBIOTICS-[facility] CARE-NO CURRENT OPEN WOUND OR TREATMENT 12/3/21   • Ileostomy in place ([facility])    • Insomnia    • Iron deficiency    • Paroxysmal ventricular tachycardia (HCC)    • PONV (postoperative nausea and vomiting)    • Presence of combination internal cardiac defibrillator (ICD) and pacemaker    • Prostate nodule    • Recurrent canker sores    • Thoracic back pain    • Urinary hesitancy    • Xerosis cutis        Past Surgical History:   Procedure Laterality Date   • ABDOMINAL SURGERY     • CARDIAC CATHETERIZATION     • CARDIAC DEFIBRILLATOR PLACEMENT      REPLACEMENT-Had [place] lead that was fractured.  Lead was tied off.  New lead and new device implanted.   • CARDIAC ELECTROPHYSIOLOGY PROCEDURE N/A 1/3/2019    Procedure: ICD DC generator change  [employer];  Surgeon: Zechariah Randolph MD;  Location: [facility] INVASIVE LOCATION;  Service: Cardiology   • CHOLECYSTECTOMY N/A 12/7/2021    Procedure: CHOLECYSTECTOMY;  Surgeon: Jeovany Mott MD;  Location: [facility] OR;  Service: General;  Laterality: N/A;   • COLON RESECTION N/A 12/29/2021    Procedure: PARTIAL COLECTOMY WITH OSTOMY;  Surgeon: Jeovany Mott MD;  Location: [facility] OR;  Service: General;  Laterality: N/A;   • COLON RESECTION WITH ILEOSTOMY N/A 2018   • COLONOSCOPY  04/03/2015    abnormal cecum, three polypoid masses, pre cancerous vs crohns, IH, tics, hyperplastic polyp   • COLONOSCOPY N/A 3/17/2017    polypoid masses, tics, IH, polyp   • EXPLORATORY LAPAROTOMY W/ BOWEL  RESECTION  07/2018    open resection of ileum with creation of end ileostomy   • ILEOSTOMY CLOSURE  07/2018   • ILEOSTOMY CLOSURE N/A 12/7/2021    Procedure: ILEOSTOMY TAKEDOWN WITH RESECTION, PARASTOMAL HERNIA REPAIR;  Surgeon: Jeovany Mott MD;  Location: [facility] MAIN OR;  Service: General;  Laterality: N/A;   • PACEMAKER IMPLANTATION         PT ASSESSMENT (last 12 hours)     IRF PT Evaluation and Treatment     Row Name 02/11/22 1045          PT Time and Intention    Document Type progress note  -EE     Mode of Treatment physical therapy; individual therapy  -EE     Patient/Family/Caregiver Comments/Observations Pt supine in bed in AM, declined OOB activity due to ostomy bag leaking. Agreeable to supine exercise. Spouse present during PM session.  -EE     Row Name 02/11/22 1045          General Information    Existing Precautions/Restrictions fall  -EE     Row Name 02/11/22 1045          Cognition/Psychosocial    Affect/Mental Status (Cognitive) anxious  -EE     Orientation Status (Cognition) oriented x 4  -EE     Follows Commands (Cognition) follows one-step commands; over 90% accuracy  -EE     Personal Safety Interventions fall prevention program maintained; gait belt; muscle strengthening facilitated; nonskid shoes/slippers when out of bed; supervised activity  -EE     Cognitive Function (Cognitive) WFL  -EE     Row Name 02/11/22 1045          Pain Scale: Numbers Pre/Post-Treatment    Pretreatment Pain Rating 6/10  -EE     Posttreatment Pain Rating 6/10  -EE     Pain Location - Orientation incisional  -EE     Pain Location abdomen  -EE     Pain Intervention(s) Repositioned; Medication (See MAR); Rest  -EE     Row Name 02/11/22 1045          Bed Mobility    Supine-Sit [place] (Bed Mobility) independent  -EE     Row Name 02/11/22 1045          Transfers    Bed-Chair [place] (Transfers) standby assist  -EE     Assistive Device (Bed-Chair Transfers) wheelchair  -EE     Sit-Stand [place]  (Transfers) supervision  -EE     Stand-Sit [place] (Transfers) supervision  -EE     Row Name 02/11/22 1045          Sit-Stand Transfer    Assistive Device (Sit-Stand Transfers) walker, front-wheeled  -EE     Row Name 02/11/22 1045          Stand-Sit Transfer    Assistive Device (Stand-Sit Transfers) walker, front-wheeled  -EE     Row Name 02/11/22 1045          Gait/Stairs (Locomotion)    [place] Level (Gait) supervision  -EE     Assistive Device (Gait) walker, front-wheeled  -EE     Distance in Feet (Gait) 200' x 2  -EE     Pattern (Gait) step-through  -EE     Deviations/Abnormal Patterns (Gait) no decreased; stride length decreased  -EE     Bilateral Gait Deviations forward flexed posture  -EE     Row Name 02/11/22 1045          Safety Issues, Functional Mobility    Impairments Affecting Function (Mobility) balance; endurance/activity tolerance; pain; strength  -EE     Row Name 02/11/22 1045          Hip (Therapeutic Exercise)    Hip Strengthening (Therapeutic Exercise) supine; bilateral; aBduction; aDduction; 10 repetitions; 2 sets  -EE     Row Name 02/11/22 1045          Knee (Therapeutic Exercise)    Knee Isometrics (Therapeutic Exercise) supine; bilateral; quad sets; 10 repetitions; 2 sets  -EE     Knee Strengthening (Therapeutic Exercise) supine; bilateral; heel slides; SAQ (short arc quad); 10 repetitions; 2 sets  -EE     Row Name 02/11/22 1045          Bed Mobility Goal 1 (PT-IRF)    Activity/Assistive Device (Bed Mobility Goal 1, PT-IRF) bed mobility activities, all  -EE     [place] Level (Bed Mobility Goal 1, PT-IRF) independent  -EE     Progress/Outcomes (Bed Mobility Goal 1, PT-IRF) goal met  -EE     Row Name 02/11/22 1045          Transfer Goal 1 (PT-IRF)    Activity/Assistive Device (Transfer Goal 1, PT-IRF) sit-to-stand/stand-to-sit; bed-to-chair/chair-to-bed; walker, rolling  -EE     [place] Level (Transfer Goal 1, PT-IRF) modified independence  -EE     Time Frame (Transfer  Goal 1, PT-IRF) 1 week; long-term goal (LTG)  -EE     Progress/Outcomes (Transfer Goal 1, PT-IRF) goal ongoing; good progress toward goal  -EE     Row Name 02/11/22 1045          Transfer Goal 2 (PT-IRF)    Activity/Assistive Device (Transfer Goal 2, PT-IRF) car transfer; walker, rolling  -EE     [place] Level (Transfer Goal 2, PT-IRF) supervision required  -EE     Time Frame (Transfer Goal 2, PT-IRF) 1 week; long-term goal (LTG)  -EE     Progress/Outcomes (Transfer Goal 2, PT-IRF) goal ongoing; good progress toward goal  -EE     Row Name 02/11/22 1045          Gait/Walking Locomotion Goal 1 (PT-IRF)    Activity/Assistive Device (Gait/Walking Locomotion Goal 1, PT-IRF) gait (walking locomotion); walker, rolling  -EE     Gait/Walking Locomotion Distance Goal 1 (PT-IRF) 200'  -EE     [place] Level (Gait/Walking Locomotion Goal 1, PT-IRF) supervision required  -EE     Time Frame (Gait/Walking Locomotion Goal 1, PT-IRF) 1 week; long-term goal (LTG)  -EE     Progress/Outcomes (Gait/Walking Locomotion Goal 1, PT-IRF) goal ongoing; good progress toward goal  -EE     Row Name 02/11/22 1045          Stairs Goal 1 (PT-IRF)    Activity/Assistive Device (Stairs Goal 1, PT-IRF) ascending stairs; descending stairs; using handrail, left; using handrail, right  -EE     Number of Stairs (Stairs Goal 1, PT-IRF) 4  -EE     [place] Level (Stairs Goal 1, PT-IRF) contact guard assist  -EE     Time Frame (Stairs Goal 1, PT-IRF) 1 week; long-term goal (LTG)  -EE     Progress/Outcomes (Stairs Goal 1, PT-IRF) goal ongoing; good progress toward goal  -EE     Row Name 02/11/22 1045          Positioning and Restraints    Pre-Treatment Position in bed  -EE     Post Treatment Position wheelchair  -EE     In Wheelchair sitting; exit alarm on; with OT  -EE     Row Name 02/11/22 1045          Weekly Progress Summary (PT)    Functional Goal Overall Progress (PT) progressing toward functional goals as expected  -EE     Weekly Progress  Summary (PT) Pt has demonstrated good progress with overall strength and endurance this week, as evidenced by tolerance of increased ambulation distance as well as increased activity tolerance. Pt has progressed with functional mobility and is more independent with transfers and ambulation, now requiring only setup/supervision and use of rolling walker. Spouse present during PM session today for teaching and to observe functional mobility. At this time, pt's biggest limitation is still decreased endurance; however, it has been steadily improving. Pt will continue to benefit from ongoing PT for continued strengthening and endurance training in order to maximize independence and achieve long term functional goals prior to DC home.  -EE     Impairments Still Limiting Function (PT) strength decreased; impaired functional activity tolerance  -EE           User Key  (r) = Recorded By, (t) = Taken By, (c) = Cosigned By    Initials Name Provider Type    EE Wendy Ojeda, PT Physical Therapist              Wound 12/07/21 0757 Right abdomen Incision (Active)   Dressing Appearance dry; intact 02/11/22 0815   Closure [place] 02/11/22 0815   Base granulating; moist; pink 02/11/22 1300   Periwound intact; dry 02/11/22 1300   Periwound Temperature warm 02/11/22 1300   Periwound Skin Turgor soft 02/11/22 1300   Wound Length (cm) 1 cm 02/11/22 1300   Wound Width (cm) 6 cm 02/11/22 1300   Wound Depth (cm) 0.2 cm 02/11/22 1300   Drainage Characteristics/Odor creamy; yellow 02/11/22 1300   Drainage Amount small 02/11/22 1300   Care, Wound cleansed with; sterile normal saline 02/11/22 1300   Dressing Care dressing changed; silver impregnated; hydrofiber; silicone; border dressing 02/11/22 1300   Periwound Care barrier film applied 02/11/22 1300       Wound 12/25/21 0809 abdomen Other (comment) (Active)   Dressing Appearance dry; intact 02/10/22 2050   Closure [place] 02/11/22 0815   Base moist; pink 02/11/22 1300   Periwound intact; dry 02/11/22  1300   Periwound Temperature warm 02/11/22 1300   Periwound Skin Turgor soft 02/11/22 1300   Edges open 02/11/22 1300   Wound Length (cm) 1.5 cm 02/11/22 1300   Wound Width (cm) 3.5 cm 02/11/22 1300   Wound Depth (cm) 0.1 cm 02/11/22 1300   Drainage Characteristics/Odor creamy; yellow 02/11/22 1300   Drainage Amount small 02/11/22 1300   Care, Wound cleansed with; sterile normal saline 02/11/22 1300   Dressing Care dressing changed; silver impregnated; hydrofiber; silicone; border dressing 02/11/22 1300   Periwound Care barrier film applied 02/11/22 1300       Wound 12/29/21 1518 midline abdomen Incision (Active)   Dressing Appearance dry; intact 02/11/22 0815   Closure [place] 02/11/22 0815   Base moist; pink 02/11/22 1300   Periwound macerated; moist; redness 02/11/22 1300   Periwound Temperature warm 02/11/22 1300   Periwound Skin Turgor soft 02/11/22 1300   Edges open 02/11/22 1300   Wound Length (cm) 5 cm 02/11/22 1300   Wound Width (cm) 4 cm 02/11/22 1300   Wound Depth (cm) 1 cm 02/11/22 1300   Tunneling [Depth (cm)/Location] 3cms at 1 o'clock 02/11/22 1300   Drainage Characteristics/Odor green 02/11/22 1300   Drainage Amount moderate 02/11/22 1300   Care, Wound cleansed with; sterile normal saline 02/11/22 1300   Dressing Care dressing changed; collagen; silver impregnated; hydrofiber; silicone; border dressing 02/11/22 1300   Periwound Care barrier film applied 02/11/22 1300     Physical Therapy Education                 Title: PT OT SLP Therapies (Done)     Topic: Physical Therapy (Done)     Point: Mobility training (Done)     Learning Progress Summary           Patient Acceptance, E,TB, VU,NR by EE at 2/11/2022 1102    Acceptance, E,TB, VU,NR by EE at 2/10/2022 1123    Acceptance, E,TB, VU,NR by EE at 2/9/2022 1222    Acceptance, E,TB, VU,NR by EE at 2/8/2022 1015    Acceptance, E,TB, VU,NR by EE at 2/7/2022 1142    Acceptance, E,TB, VU,NR by KIRSTEN at 2/5/2022 1430    Acceptance, E,TB, VU,NR by EE at 2/4/2022  1408                   Point: Home exercise program (Done)     Learning Progress Summary           Patient Acceptance, E,TB, VU,NR by EE at 2/11/2022 1102    Acceptance, E,TB, VU,NR by EE at 2/10/2022 1123    Acceptance, E,TB, VU,NR by EE at 2/8/2022 1015    Acceptance, E,TB, VU,NR by EE at 2/7/2022 1142    Acceptance, E,TB, VU,NR by EE at 2/4/2022 1408                   Point: Body mechanics (Done)     Learning Progress Summary           Patient Acceptance, E,TB, VU,NR by EE at 2/10/2022 1123    Acceptance, E,TB, VU,NR by EE at 2/8/2022 1015    Acceptance, E,TB, VU,NR by EE at 2/7/2022 1142    Acceptance, E,TB, VU,NR by EE at 2/4/2022 1408                   Point: Precautions (Done)     Learning Progress Summary           Patient Acceptance, E,TB, VU,NR by EE at 2/10/2022 1123    Acceptance, E,TB, VU,NR by EE at 2/8/2022 1015    Acceptance, E,TB, VU,NR by EE at 2/7/2022 1142    Acceptance, E,TB, VU,NR by EE at 2/4/2022 1408                               User Key     Initials Effective Dates Name Provider Type Discipline     06/16/21 -  Emily Watkins, PT Physical Therapist PT     06/16/21 -  Wendy Ojeda, PT Physical Therapist PT                PT Recommendation and Plan    Planned Therapy Interventions (PT): balance training, bed mobility training, gait training, home exercise program, patient/family education, ROM (range of motion), postural re-education, stair training, strengthening, stretching, transfer training  Frequency of Treatment (PT): 5 times per week, 60 minutes per session  Anticipated Equipment Needs at Discharge (PT Eval): front wheeled walker                  Time Calculation:      PT Charges     Row Name 02/11/22 1405 02/11/22 1103          Time Calculation    Start Time 1400  -EE 1030  -EE     Stop Time 1430  -EE 1100  -EE     Time Calculation (min) 30 min  -EE 30 min  -EE     PT Received On 02/11/22  -EE 02/11/22  -EE     PT - Next Appointment 02/12/22  -EE 02/11/22  -EE     PT Goal  Re-Cert Due Date 02/18/22  -EE --            Time Calculation- PT    Total Timed Code Minutes- PT 30 minute(s)  -EE 30 minute(s)  -EE           User Key  (r) = Recorded By, (t) = Taken By, (c) = Cosigned By    Initials Name Provider Type    Wendy Holcomb, SALLY Physical Therapist                Therapy Charges for Today     Code Description Service Date Service Provider Modifiers Qty    34918340863 HC GAIT TRAINING EA 15 MIN 2/10/2022 Wendy Ojeda, PT GP 2    79378331166 HC PT THERAPEUTIC ACT EA 15 MIN 2/10/2022 Wendy Ojeda, PT GP 1    69987294995 HC PT THER PROC EA 15 MIN 2/10/2022 Wendy Ojeda, PT GP 1    20321261393 HC GAIT TRAINING EA 15 MIN 2/11/2022 Wendy Ojeda, PT GP 2    45592823053 HC PT THER PROC EA 15 MIN 2/11/2022 Wendy Ojeda, PT GP 2               Patient was intermittently wearing a face mask during this therapy encounter. Therapist used appropriate personal protective equipment including mask and gloves.  Mask used was standard procedure mask. Appropriate PPE was worn during the entire therapy session. Hand hygiene was completed before and after therapy session. Patient is not in enhanced droplet precautions.       Wendy Ojeda PT  2/11/2022

## 2022-02-11 NOTE — PLAN OF CARE
Goal Outcome Evaluation:  Plan of Care Reviewed With: patient           Outcome Summary: Mr. Calvo is AOx4. Continent of bladder using urinal, colostomy to LUQ with liquid brown stool noted 2/10/2022. Meds taken crushed in applesauce, small capsules taken whole. Family conference 2/11/22, expected discharge 2/15/22. Dressings to be changed by [facility] nurse 2/11 with family teaching. Sleeping well through the night, declined scheduled melatonin, no PRN meds given. Overnight sleep study performed. No safety isseus noted, call light within reach.

## 2022-02-11 NOTE — PLAN OF CARE
Goal Outcome Evaluation:  Plan of Care Reviewed With: patient        Progress: improving    Problem: Rehabilitation (IRF) Plan of Care  Goal: Plan of Care Review  2/11/2022 1827 by Cecile Apodaca RN  Outcome: Ongoing, Progressing  Flowsheets  Taken 2/11/2022 1827  Outcome Summary: patient is anxious at times. prn pain medication given. wound nurse changed dressing and ostomy bag with the family watching. new order for 1,200 ml fluid restriction. will continue to monitor.  Plan of Care Reviewed With: patient  Taken 2/11/2022 1826  Progress: improving  2/11/2022 1826 by Cecile Apodaca RN  Outcome: Ongoing, Progressing  Flowsheets (Taken 2/11/2022 1826)  Progress: improving  Plan of Care Reviewed With: patient     Problem: Rehabilitation (IRF) Plan of Care  Goal: Plan of Care Review  2/11/2022 1827 by Cecile Apodaca RN  Outcome: Ongoing, Progressing  Flowsheets  Taken 2/11/2022 1827  Outcome Summary: patient is anxious at times. prn pain medication given. wound nurse changed dressing and ostomy bag with the family watching. new order for 1,200 ml fluid restriction. will continue to monitor.  Plan of Care Reviewed With: patient  Taken 2/11/2022 1826  Progress: improving  2/11/2022 1826 by Cecile Apodaca RN  Outcome: Ongoing, Progressing  Flowsheets (Taken 2/11/2022 1826)  Progress: improving  Plan of Care Reviewed With: patient     Problem: Rehabilitation (IRF) Plan of Care  Goal: Plan of Care Review  2/11/2022 1827 by Cecile Apodaca RN  Outcome: Ongoing, Progressing  Flowsheets  Taken 2/11/2022 1827  Outcome Summary: patient is anxious at times. prn pain medication given. wound nurse changed dressing and ostomy bag with the family watching. new order for 1,200 ml fluid restriction. will continue to monitor.  Plan of Care Reviewed With: patient  Taken 2/11/2022 1826  Progress: improving  2/11/2022 1826 by Cecile Apodaca RN  Outcome: Ongoing, Progressing  Flowsheets (Taken 2/11/2022 1826)  Progress: improving  Plan  of Care Reviewed With: patient

## 2022-02-11 NOTE — THERAPY TREATMENT NOTE
Inpatient Rehabilitation - Occupational Therapy Treatment Note    [facility]     Patient Name: Arnie Calvo  : 1960  MRN: [id number]    Today's Date: 2022                 Admit Date: 2/3/2022         ICD-10-CM ICD-9-CM   1. Insomnia due to medical condition  G47.01 327.01       Patient Active Problem List   Diagnosis   • Cardiomyopathy (HCC)   • Paroxysmal VT (HCC)   • Palpitations   • PVC's (premature ventricular contractions)   • Exacerbation of Crohn's disease of small intestine (HCC)   • Adjustment disorder with depressed mood   • Ankylosis of spine   • Crohn's disease with complication (HCC)   • Diabetes mellitus (HCC)   • Essential hypertension   • External hemorrhoids   • Genital herpes simplex   • Gout   • Insomnia due to medical condition   • Iron deficiency   • Prostate mass   • Recurrent aphthous ulcer   • Asteatosis cutis   • History of pneumonia   • Abnormal CXR (chest x-ray)   • RUQ abdominal pain   • Crohn's disease of small intestine with other complication (HCC)   • Right lower quadrant abdominal pain   • Enteritis   • Mass-like inflammation at terminal ileum   • Crohn's disease (HCC)   • Ileostomy in place (HCC)   • Paroxysmal ventricular tachycardia (HCC)   • Chronic systolic heart failure (HCC)   • Cardiomyopathy, nonischemic (HCC)   • Orthostasis   • Iron deficiency anemia   • Orthostatic hypotension   • Crohn's disease of colon with complication (HCC)   • Dehisced intestinal anastomosis   • History of creation of ostomy (HCC)   • Hypokalemia   • Hypotension, chronic   • Malnutrition of moderate degree (HCC)   • S/P colectomy   • Fatty liver disease, nonalcoholic   • Cholecystitis   • Debility       Past Medical History:   Diagnosis Date   • Acute pain of left hip    • Adjustment disorder with depressed mood    • Anesthesia complication     SPINAL [id number] LOWER SPINE   • Ankylosing spondylitis of cervical region (HCC)    • Ankylosis of spine    • Arthritis     • Asthma    • Cardiomyopathy (HCC)     sees Dr. Reyes; NICM/ (-) cath, EF 25-35%; has ICD   • CHF (congestive heart failure) (HCC)    • Cholecystitis    • Crohn's disease (HCC)    • Depression    • Diabetes mellitus (HCC)     Diet controlled.   • Dysthymic disorder 2012   • Dysuria    • Essential hypertension    • External hemorrhoids    • Genital herpes    • Gout    • Herpes genitalia    • History of MRSA infection 2000?    FINGERTIP-TX WITH ANTIBIOTICS-[facility]-NO CURRENT OPEN WOUND OR TREATMENT 12/3/21   • Ileostomy in place (HCC)    • Insomnia    • Iron deficiency    • Paroxysmal ventricular tachycardia (HCC)    • PONV (postoperative nausea and vomiting)    • Presence of combination internal cardiac defibrillator (ICD) and pacemaker    • Prostate nodule    • Recurrent canker sores    • Thoracic back pain    • Urinary hesitancy    • Xerosis cutis        Past Surgical History:   Procedure Laterality Date   • ABDOMINAL SURGERY     • CARDIAC CATHETERIZATION     • CARDIAC DEFIBRILLATOR PLACEMENT      REPLACEMENT-Had Garey lead that was fractured.  Lead was tied off.  New lead and new device implanted.   • CARDIAC ELECTROPHYSIOLOGY PROCEDURE N/A 1/3/2019    Procedure: ICD DC generator change  [employer];  Surgeon: Zechariah Randolph MD;  Location: [facility] INVASIVE LOCATION;  Service: Cardiology   • CHOLECYSTECTOMY N/A 12/7/2021    Procedure: CHOLECYSTECTOMY;  Surgeon: Jeovany Mott MD;  Location: [facility] OR;  Service: General;  Laterality: N/A;   • COLON RESECTION N/A 12/29/2021    Procedure: PARTIAL COLECTOMY WITH OSTOMY;  Surgeon: Jeovany Mott MD;  Location: [facility] OR;  Service: General;  Laterality: N/A;   • COLON RESECTION WITH ILEOSTOMY N/A 2018   • COLONOSCOPY  04/03/2015    abnormal cecum, three polypoid masses, pre cancerous vs crohns, IH, tics, hyperplastic polyp   • COLONOSCOPY N/A 3/17/2017    polypoid masses, tics, IH, polyp   • EXPLORATORY LAPAROTOMY W/ BOWEL RESECTION   07/2018    open resection of ileum with creation of end ileostomy   • ILEOSTOMY CLOSURE  07/2018   • ILEOSTOMY CLOSURE N/A 12/7/2021    Procedure: ILEOSTOMY TAKEDOWN WITH RESECTION, PARASTOMAL HERNIA REPAIR;  Surgeon: Jeovany Mott MD;  Location: [facility] MAIN OR;  Service: General;  Laterality: N/A;   • PACEMAKER IMPLANTATION               IRF OT ASSESSMENT FLOWSHEET (last 12 hours)     IRF OT Evaluation and Treatment     Row Name 02/11/22 1455 02/11/22 1139       OT Time and Intention    Document Type daily treatment  -MW daily treatment  -MW    Mode of Treatment occupational therapy; individual therapy  -MW occupational therapy; individual therapy  -MW    Patient Effort good  -MW good  -MW    Symptoms Noted During/After Treatment fatigue  -MW fatigue; increased pain  -MW    Row Name 02/11/22 1455 02/11/22 1139       General Information    Patient Profile Reviewed yes  -MW yes  -MW    Patient/Family/Caregiver Comments/Observations pt in gym following PT session  -MW pt supine in bed agreeable to sink side ADLs  -MW    Existing Precautions/Restrictions fall  -MW fall  -MW    Row Name 02/11/22 1455 02/11/22 1139       Cognition/Psychosocial    Affect/Mental Status (Cognitive) anxious  -MW anxious  -MW    Orientation Status (Cognition) oriented x 4  -MW oriented x 4  -MW    Follows Commands (Cognition) follows one-step commands; over 90% accuracy  -MW follows one-step commands; over 90% accuracy  -MW    Personal Safety Interventions fall prevention program maintained; gait belt; muscle strengthening facilitated; nonskid shoes/slippers when out of bed; supervised activity  -MW fall prevention program maintained; gait belt; nonskid shoes/slippers when out of bed; supervised activity  -MW    Cognitive Function (Cognitive) WFL  -MW WFL  -MW    Row Name 02/11/22 1455 02/11/22 1139       Pain Scale: Numbers Pre/Post-Treatment    Pretreatment Pain Rating 3/10  -MW 6/10  -MW    Posttreatment Pain Rating 3/10  -MW 6/10  -MW     Pain Location - Side Bilateral  -MW Bilateral  -MW    Pain Location - Orientation incisional  -MW incisional  -MW    Pain Location abdomen  -MW --    Row Name 02/11/22 1455 02/11/22 1139       Transfers    Bed-Chair Nye (Transfers) -- set up; standby assist  -MW    Chair-Bed Nye (Transfers) standby assist; set up  -MW standby assist; set up  -MW    Assistive Device (Bed-Chair Transfers) -- wheelchair  -MW    Sit-Stand Nye (Transfers) set up; standby assist  -MW set up; standby assist  -MW    Stand-Sit Nye (Transfers) set up; standby assist  -MW set up; standby assist  -MW    Row Name 02/11/22 1455 02/11/22 1139       Chair-Bed Transfer    Assistive Device (Chair-Bed Transfers) wheelchair  -MW wheelchair  -MW    Row Name 02/11/22 1455 02/11/22 1139       Sit-Stand Transfer    Assistive Device (Sit-Stand Transfers) walker, front-wheeled  -MW walker, front-wheeled  -MW    Row Name 02/11/22 1455 02/11/22 1139       Stand-Sit Transfer    Assistive Device (Stand-Sit Transfers) walker, front-wheeled  -MW walker, front-wheeled  -MW    Row Name 02/11/22 1455          Shoulder (Therapeutic Exercise)    Shoulder (Therapeutic Exercise) strengthening exercise  -     Shoulder AROM (Therapeutic Exercise) left; horizontal aBduction/aDduction; sitting; 10 repetitions; 3 sets  -MW     Shoulder Strengthening (Therapeutic Exercise) right; extension; flexion; aBduction; aDduction; horizontal aBduction/aDduction; sitting; 2 lb free weight; 10 repetitions; 3 sets  -     Row Name 02/11/22 1455          Elbow/Forearm (Therapeutic Exercise)    Elbow/Forearm (Therapeutic Exercise) strengthening exercise  -     Elbow/Forearm Strengthening (Therapeutic Exercise) bilateral; flexion; extension; supination; pronation; sitting; 2 lb free weight; 3 sets; 10 repetitions  -     Row Name 02/11/22 1455          Wrist (Therapeutic Exercise)    Wrist (Therapeutic Exercise) strengthening exercise  -      Wrist Strengthening (Therapeutic Exercise) bilateral; extension; flexion; 2 lb free weight; 10 repetitions; 3 sets  -MW     Row Name 02/11/22 1139          Bathing    [place] Level (Bathing) bathing skills; upper body; standby assist; set up  -     Position (Bathing) sink side; supported sitting  -     Set-up Assistance (Bathing) obtain supplies  -     Comment (Bathing) pt declined LB bathing this date, UB bathing completed and ostomy bag leaking, requiring increased time for bathing  -MW     Row Name 02/11/22 1139          Upper Body Dressing    [place] Level (Upper Body Dressing) upper body dressing skills; doff; don; pull over garment; set up assistance; supervision  -     Position (Upper Body Dressing) supported sitting  -     Set-up Assistance (Upper Body Dressing) obtain clothing  -     Row Name 02/11/22 1139          Grooming    [place] Level (Grooming) grooming skills; deodorant application; oral care regimen; wash face, hands; set up; standby assist  -     Position (Grooming) sink side; supported sitting  -     Set-up Assistance (Grooming) obtain supplies  -     Row Name 02/11/22 1139          Toileting    Comment (Toileting) Nurse present for portion of session to empty ostomy bag  -     Row Name 02/11/22 1455 02/11/22 1139       Positioning and Restraints    Pre-Treatment Position sitting in chair/recliner  -MW in bed  -MW    Post Treatment Position bed  -MW bed  -MW    In Bed notified nsg; supine; call light within reach; encouraged to call for assist; exit alarm on; side rails up x2  -MW notified nsg; supine; call light within reach; encouraged to call for assist; exit alarm on  -MW          User Key  (r) = Recorded By, (t) = Taken By, (c) = Cosigned By    Initials Name Effective Dates    Didi Conte OT 08/20/21 -                  Occupational Therapy Education                 Title: PT OT SLP Therapies (Done)     Topic: Occupational Therapy (Done)     Point:  ADL training (Done)     Description:   Instruct learner(s) on proper safety adaptation and remediation techniques during self care or transfers.   Instruct in proper use of assistive devices.              Learning Progress Summary           Patient Acceptance, E,TB,D,H, DU,VU by  at 2/10/2022 1540    Comment: ed pt on HEP for UE ex and ROM ex. Pt demo and verbalize understanding. Pt HEP provided and left in room in top drawer with his clothes as he dc next week on 2/15.    Acceptance, E,TB,D, DU,VU by  at 2/4/2022 1455    Comment: ed pt on role of OT. benefit of therapy POC w. OT. ed on safety w. ADL and tsf. ed on UE ex.                   Point: Home exercise program (Done)     Description:   Instruct learner(s) on appropriate technique for monitoring, assisting and/or progressing therapeutic exercises/activities.              Learning Progress Summary           Patient Acceptance, E,TB,D,H, DU,VU by  at 2/10/2022 1540    Comment: ed pt on HEP for UE ex and ROM ex. Pt demo and verbalize understanding. Pt HEP provided and left in room in top drawer with his clothes as he dc next week on 2/15.    Acceptance, E,TB,D, DU,VU by  at 2/4/2022 1455    Comment: ed pt on role of OT. benefit of therapy POC w. OT. ed on safety w. ADL and tsf. ed on UE ex.                   Point: Precautions (Done)     Description:   Instruct learner(s) on prescribed precautions during self-care and functional transfers.              Learning Progress Summary           Patient Acceptance, E,TB,D,H, DU,VU by  at 2/10/2022 1540    Comment: ed pt on HEP for UE ex and ROM ex. Pt demo and verbalize understanding. Pt HEP provided and left in room in top drawer with his clothes as he dc next week on 2/15.    Acceptance, E,TB,D, DU,VU by  at 2/4/2022 1455    Comment: ed pt on role of OT. benefit of therapy POC w. OT. ed on safety w. ADL and tsf. ed on UE ex.                   Point: Body mechanics (Done)     Description:   Instruct  learner(s) on proper positioning and spine alignment during self-care, functional mobility activities and/or exercises.              Learning Progress Summary           Patient Acceptance, E,TB,D,H, DU,VU by  at 2/10/2022 1540    Comment: ed pt on HEP for UE ex and ROM ex. Pt demo and verbalize understanding. Pt HEP provided and left in room in top drawer with his clothes as he dc next week on 2/15.    Acceptance, E,TB,D, DU,VU by  at 2/4/2022 1455    Comment: ed pt on role of OT. benefit of therapy POC w. OT. ed on safety w. ADL and tsf. ed on UE ex.                               User Key     Initials Effective Dates Name Provider Type Discipline     06/16/21 -  Lyndsey Zeng OTR Occupational Therapist OT                    OT Recommendation and Plan                         Time Calculation:      Time Calculation- OT     Row Name 02/11/22 1430 02/11/22 0900          Time Calculation- OT    OT Start Time 1430  -MW 0900  -MW     OT Stop Time 1500  -MW 0930  -MW     OT Time Calculation (min) 30 min  -MW 30 min  -MW           User Key  (r) = Recorded By, (t) = Taken By, (c) = Cosigned By    Initials Name Provider Type     Didi Rod OT Occupational Therapist              Therapy Charges for Today     Code Description Service Date Service Provider Modifiers Qty    57182668192 HC OT SELF CARE/MGMT/TRAIN EA 15 MIN 2/11/2022 Didi Rod OT GO 2    73049332818 HC OT THER PROC EA 15 MIN 2/11/2022 Didi Rod OT GO 2                   Didi Rod OT  2/11/2022

## 2022-02-11 NOTE — PROGRESS NOTES
Inpatient Rehabilitation Plan of Care Note    Plan of Care  Care Plan Reviewed - No updates at this time.    Safety    Performed Intervention(s)  Falls protocal  Yellow socks  bed alarm/wc alarm      Sphincter Control    Performed Intervention(s)  I and O  Btrm schedule  Wound ostomy consult for RNs      Psychosocial    Performed Intervention(s)  [place] consult encouraged  Provide emotional support    Signed by: Cheli Eduardo RN

## 2022-02-11 NOTE — THERAPY TREATMENT NOTE
Inpatient Rehabilitation - Speech Language Pathology Treatment Note    [facility]     Patient Name: Arnie Calvo  : 1960  MRN: [id number]    Today's Date: 2022                   Admit Date: 2/3/2022       Visit Dx:      ICD-10-CM ICD-9-CM   1. Insomnia due to medical condition  G47.01 327.01       Patient Active Problem List   Diagnosis   • Cardiomyopathy (HCC)   • Paroxysmal VT (HCC)   • Palpitations   • PVC's (premature ventricular contractions)   • Exacerbation of Crohn's disease of small intestine (HCC)   • Adjustment disorder with depressed mood   • Ankylosis of spine   • Crohn's disease with complication (HCC)   • Diabetes mellitus (HCC)   • Essential hypertension   • External hemorrhoids   • Genital herpes simplex   • Gout   • Insomnia due to medical condition   • Iron deficiency   • Prostate mass   • Recurrent aphthous ulcer   • Asteatosis cutis   • History of pneumonia   • Abnormal CXR (chest x-ray)   • RUQ abdominal pain   • Crohn's disease of small intestine with other complication (HCC)   • Right lower quadrant abdominal pain   • Enteritis   • Mass-like inflammation at terminal ileum   • Crohn's disease (HCC)   • Ileostomy in place (HCC)   • Paroxysmal ventricular tachycardia (HCC)   • Chronic systolic heart failure (HCC)   • Cardiomyopathy, nonischemic (HCC)   • Orthostasis   • Iron deficiency anemia   • Orthostatic hypotension   • Crohn's disease of colon with complication (HCC)   • Dehisced intestinal anastomosis   • History of creation of ostomy (HCC)   • Hypokalemia   • Hypotension, chronic   • Malnutrition of moderate degree (HCC)   • S/P colectomy   • Fatty liver disease, nonalcoholic   • Cholecystitis   • Debility       Past Medical History:   Diagnosis Date   • Acute pain of left hip    • Adjustment disorder with depressed mood    • Anesthesia complication     SPINAL [id number] LOWER SPINE   • Ankylosing spondylitis of cervical region (HCC)    • Ankylosis of  spine    • Arthritis    • Asthma    • Cardiomyopathy (HCC)     sees Dr. Reyes; NICM/ (-) cath, EF 25-35%; has ICD   • CHF (congestive heart failure) (HCC)    • Cholecystitis    • Crohn's disease (HCC)    • Depression    • Diabetes mellitus (HCC)     Diet controlled.   • Dysthymic disorder 2012   • Dysuria    • Essential hypertension    • External hemorrhoids    • Genital herpes    • Gout    • Herpes genitalia    • History of MRSA infection 2000?    FINGERTIP-TX WITH ANTIBIOTICS-[facility]-NO CURRENT OPEN WOUND OR TREATMENT 12/3/21   • Ileostomy in place ([facility])    • Insomnia    • Iron deficiency    • Paroxysmal ventricular tachycardia ([facility])    • PONV (postoperative nausea and vomiting)    • Presence of combination internal cardiac defibrillator (ICD) and pacemaker    • Prostate nodule    • Recurrent canker sores    • Thoracic back pain    • Urinary hesitancy    • Xerosis cutis        Past Surgical History:   Procedure Laterality Date   • ABDOMINAL SURGERY     • CARDIAC CATHETERIZATION     • CARDIAC DEFIBRILLATOR PLACEMENT      REPLACEMENT-Had Jude lead that was fractured.  Lead was tied off.  New lead and new device implanted.   • CARDIAC ELECTROPHYSIOLOGY PROCEDURE N/A 1/3/2019    Procedure: ICD DC generator change  [employer];  Surgeon: Zechariah Randolph MD;  Location: [facility] INVASIVE LOCATION;  Service: Cardiology   • CHOLECYSTECTOMY N/A 12/7/2021    Procedure: CHOLECYSTECTOMY;  Surgeon: Jeovany Mott MD;  Location: [facility] OR;  Service: General;  Laterality: N/A;   • COLON RESECTION N/A 12/29/2021    Procedure: PARTIAL COLECTOMY WITH OSTOMY;  Surgeon: Jeovany Mott MD;  Location: [facility] OR;  Service: General;  Laterality: N/A;   • COLON RESECTION WITH ILEOSTOMY N/A 2018   • COLONOSCOPY  04/03/2015    abnormal cecum, three polypoid masses, pre cancerous vs crohns, IH, tics, hyperplastic polyp   • COLONOSCOPY N/A 3/17/2017    polypoid masses, tics, IH, polyp   • EXPLORATORY LAPAROTOMY  W/ BOWEL RESECTION  07/2018    open resection of ileum with creation of end ileostomy   • ILEOSTOMY CLOSURE  07/2018   • ILEOSTOMY CLOSURE N/A 12/7/2021    Procedure: ILEOSTOMY TAKEDOWN WITH RESECTION, PARASTOMAL HERNIA REPAIR;  Surgeon: Jeovany Mott MD;  Location: [facility] OR;  Service: General;  Laterality: N/A;   • PACEMAKER IMPLANTATION         SLP Recommendation and Plan                                                   SLP EVALUATION (last 72 hours)     SLP SLC Evaluation     Row Name 02/11/22 1500 02/11/22 1100 02/10/22 1400 02/09/22 1400 02/09/22 1100       Communication Assessment/Intervention    Document Type therapy note (daily note)  -SR therapy note (daily note)  -SR therapy note (daily note)  -SR therapy note (daily note)  -SR therapy note (daily note)  -SR    Subjective Information complains of; fatigue  -SR complains of; pain  -SR complains of; pain  -SR complains of; fatigue  -SR complains of; pain; nausea/vomiting  -SR    Patient Observations alert; cooperative; agree to therapy  -SR cooperative; alert; agree to therapy  -SR alert; cooperative; agree to therapy  -SR cooperative; agree to therapy  -SR cooperative; agree to therapy  -SR    Patient/Family/Caregiver Comments/Observations Pt supine in bed; agreeable to therapy  -SR -- -- -- --    Patient Effort good  -SR good  -SR good  -SR good  -SR good  -SR    Symptoms Noted During/After Treatment -- -- none  -SR -- none  -SR       Pain Scale: Numbers Pre/Post-Treatment    Pretreatment Pain Rating 3/10  -SR 6/10  -SR 5/10  -SR 3/10  -SR 5/10  -SR    Posttreatment Pain Rating 3/10  -SR 6/10  -SR 5/10  -SR 3/10  -SR 5/10  -SR    Pain Location - Side Bilateral  -SR -- -- -- --    Pain Location - Orientation incisional  -SR -- -- -- --    Pain Location abdomen  -SR -- -- -- --          User Key  (r) = Recorded By, (t) = Taken By, (c) = Cosigned By    Initials Name Effective Dates    SR Kristie Luis, STEFANY 01/12/22 -                     EDUCATION    The patient has been educated in the following areas:       Cognitive Impairment.             SLP GOALS     Row Name 02/11/22 1500 02/11/22 1100 02/10/22 1400       Organizational Skills Goal 1 (SLP)    Progress (Thought Organization Skills Goal 1, SLP) -- 90%; with minimal cues (75-90%)  -SR --    Progress/Outcomes (Thought Organization Skills Goal 1, SLP) -- goal partially met  -SR --    Comment (Thought Organization Skills Goal 1, SLP) -- Completed mental manipulation task with 90% acc given min cues.  -SR --       Functional Math Skills Goal 1 (SLP)    Progress (Functional Math Skills Goal 1, SLP) 100%; independently (over 90% accuracy)  -SR -- 60%; with moderate cues (50-74%)  -SR    Progress/Outcomes (Functional Math Skills Goal 1, SLP) goal partially met  -SR -- goal ongoing  -SR    Comment (Functional Math Skills Goal 1, SLP) Pt completed simple math problems involving money by verbally answering questions with 100% acc independently. During a following activity, pt verbally answered questions regarding a billing statement with 100% acc independently.  -SR -- Completed check balance activity with 68% acc given mod cues.  -SR       Executive Functional Skills Goal 1 (SLP)    Progress (Executive Function Skills Goal 1, SLP) -- 70%; independently (over 90% accuracy); 60%; with minimal cues (75-90%)  -SR 70%; independently (over 90% accuracy)  -SR    Progress/Outcomes (Executive Function Skills Goal 1, SLP) -- continuing progress toward goal  -SR goal partially met  -SR    Comment (Executive Function Skills Goal 1, SLP) -- Pt completed simple deduction task with 75% acc independently. Completed higher level deduction task with 63% acc given mod cues. Patient c/o of pain and discomfort which may have impacted pt's attention and focus during therapy.  -SR Pt completed deduction activity with 70% acc independently; increased to 100% given min cues  -SR       Executive Functional Skills Goal 2 (SLP)  "   Progress/Outcomes (Executive Function Skills Goal 2, SLP) goal partially met  -SR -- --    Comment (Executive Function Skills Goal 2, SLP) Short term and long term goals discussed during therapy session. Pt divided goals into achieveable steps. Pt is motivated and willing to work hard following d/c next week.  -SR -- --    Row Name 02/10/22 1100 02/09/22 1400 02/09/22 1100       Attention Goal 1 (SLP)    Progress (Attention Goal 1, SLP) 90%; independently (over 90% accuracy)  -SR -- 90%; independently (over 90% accuracy)  -SR    Progress/Outcomes (Attention Goal 1, SLP) goal met  -SR -- goal partially met  -SR    Comment (Attention Goal 1, SLP) sustained attn task  -SR -- sustained attn task  -SR       Organizational Skills Goal 1 (SLP)    Progress (Thought Organization Skills Goal 1, SLP) 80%; with minimal cues (75-90%)  -SR -- 90%; independently (over 90% accuracy); 80%; with minimal cues (75-90%)  -SR    Progress/Outcomes (Thought Organization Skills Goal 1, SLP) goal partially met  -SR -- goal partially met  -SR    Comment (Thought Organization Skills Goal 1, SLP) Patient completed mental manipulation task with 80% acc given min cues.  -SR -- Patient provided verbal explaination during 4-step sequencing task with 90% acc independently; Completed mental manipulation task with 80% acc given min cues  -SR       Functional Math Skills Goal 1 (SLP)    Progress (Functional Math Skills Goal 1, SLP) -- 60%; with moderate cues (50-74%)  -SR --    Progress/Outcomes (Functional Math Skills Goal 1, SLP) -- goal ongoing  -SR --    Comment (Functional Math Skills Goal 1, SLP) -- Completed check balance activity with 60% acc given mod cues. Patient often added too many numbers to calculation. During the activity he said, \"I used to be much better with a calculator before all of this.\"  -SR --       Executive Functional Skills Goal 1 (SLP)    Barriers (Executive Function Skills Goal 1, SLP) -- -- Patient explained that he is " ready to go home and c/o pain and discomfort. Completed therapy upright in bed.  -SR    Progress (Executive Function Skills Goal 1, SLP) 50%; independently (over 90% accuracy); 70%; with minimal cues (75-90%)  -SR 50%; with moderate cues (50-74%)  -SR 80%; with minimal cues (75-90%)  -SR    Progress/Outcomes (Executive Function Skills Goal 1, SLP) continuing progress toward goal  -SR continuing progress toward goal  -SR goal partially met  -SR    Comment (Executive Function Skills Goal 1, SLP) Completed 6 step sequencing task with 50% acc independently; 75% given mod cues. Reminders were provided throughout activity to scan through steps prior to completing sequence  -SR Patient completed higher level deduction activity with 57% acc given mod cues.  -SR Completed deduction task with 80% acc given min cues. Patient explained that his cognitive abilities appear WFL and are related to age. ST will continue therapy until d/c to monitor attention, executive function, and home management tasks.  -SR       Executive Functional Skills Goal 2 (SLP)    Progress (Executive Function Skills Goal 2, SLP) 80%; independently (over 90% accuracy)  -SR -- --    Progress/Outcomes (Executive Function Skills Goal 2, SLP) goal partially met  -SR -- --    Comment (Executive Function Skills Goal 2, SLP) Completed clock drawing activity with 83% acc independently.  -SR -- --          User Key  (r) = Recorded By, (t) = Taken By, (c) = Cosigned By    Initials Name Provider Type    Kristie Gonzalez SLP Speech and Language Pathologist                            Time Calculation:        Time Calculation- SLP     Row Name 02/11/22 1549 02/11/22 1146          Time Calculation- SLP    SLP Start Time 1500  -SR 1000  -SR     SLP Stop Time 1530  -SR 1030  -SR     SLP Time Calculation (min) 30 min  -SR 30 min  -SR           User Key  (r) = Recorded By, (t) = Taken By, (c) = Cosigned By    Initials Name Provider Type    Kristie Gonzalez SLP Speech  and Language Pathologist                  Therapy Charges for Today     Code Description Service Date Service Provider Modifiers Qty    97931551365 HC ST DEV OF COGN SKILLS INITIAL 15 MIN 2/10/2022 Kristie Luis, STEFANY  1    23490911917 HC ST DEV OF COGN SKILLS EACH ADDT'L 15 MIN 2/10/2022 Kristie Luis, STEFANY  3    52204581546 HC ST DEV OF COGN SKILLS INITIAL 15 MIN 2/11/2022 Kristie Luis, STEFANY  1    22449449219 HC ST DEV OF COGN SKILLS EACH ADDT'L 15 MIN 2/11/2022 Kristie Luis, STEFANY  3                           STEFANY Davison  2/11/2022

## 2022-02-11 NOTE — PROGRESS NOTES
HOSPITAL FOLLOW UP NOTE    Patient Name: Arnie Calvo  Patient : 1960        Date of Service:22  Provider of Service: ILIANA Cortez  Place of Service: [facility]  Referral Provider: Zechariah Pearson, *          Follow Up: CHF, sinus tachycardia    Interval Hx: No complaints of SOA or dizziness. Ostomy bag leaking this am.      OBJECTIVE  Temp:  [97.9 °F (36.6 °C)-98.6 °F (37 °C)] 97.9 °F (36.6 °C)  Heart Rate:  [100-112] 112  Resp:  [16-18] 16  BP: (94-97)/(69-72) 97/70     Intake/Output Summary (Last 24 hours) at 2022 0754  Last data filed at 2/10/2022 2349  Gross per 24 hour   Intake 480 ml   Output 1575 ml   Net -1095 ml     Body mass index is 26.08 kg/m².      22  0552 02/10/22  0711 22  0530   Weight: 82.6 kg (182 lb 1.6 oz) 79.9 kg (176 lb 2.4 oz) 80.1 kg (176 lb 9.4 oz)         Physical Exam:     General Appearance:    Alert, cooperative, in no acute distress, flat affect           Neck:  no thyromegaly, no   carotid bruit, no JVD   Lungs:     Clear to auscultation,respirations regular, even and unlabored    Heart:    Regular rhythm and normal rate, normal S1 and S2, no murmur, no gallop, no rub, no click       Abdomen:     Normal bowel sounds, ostomy in place- leaking,soft, nontender, nondistended, no guarding, no rebound  tenderness   Extremities:   Moves all extremities well, no edema, no cyanosis, no redness   Pulses:   Pulses palpable and equal bilaterally.          CURRENT MEDS    Scheduled Meds:carvedilol, 6.25 mg, Oral, Daily Before Supper  cefdinir, 300 mg, Oral, Q12H  enoxaparin, 40 mg, Subcutaneous, Q24H  glycopyrrolate, 1 mg, Oral, BID  loperamide, 2 mg, Oral, 4x Daily AC & at Bedtime  melatonin, 3 mg, Oral, Nightly  [START ON 2022] mirtazapine, 15 mg, Oral, Nightly  mirtazapine, 7.5 mg, Oral, Nightly  sodium zirconium cyclosilicate, 10 g, Oral, TID      Continuous Infusions:       Lab Review:   Results from last 7 days   Lab  Units 02/11/22  0514 02/10/22  0651   SODIUM mmol/L 126* 127*   POTASSIUM mmol/L 3.9 5.9*   CHLORIDE mmol/L 89* 92*   CO2 mmol/L 27.8 28.1   BUN mg/dL 14 16   CREATININE mg/dL 1.01 1.19   GLUCOSE mg/dL 93 100*   CALCIUM mg/dL 9.0 9.3         Results from last 7 days   Lab Units 02/11/22  0514 02/07/22  0656   WBC 10*3/mm3 8.74 7.06   HEMOGLOBIN g/dL 10.3* 10.3*   HEMATOCRIT % 33.3* 33.1*   PLATELETS 10*3/mm3 163 163                               ASSESSMENT & PLAN    Insomnia due to medical condition    Debility    1.  Chronic systolic congestive heart failure: On Coreg.  No additional HF medications due to soft BP. Euvolemic.  2.  Sinus tachycardia: Resistant throughout admission.  Work-up last month unremarkable.  3.  Nonischemic dilated cardiomyopathy: EF 20%, ICD in place  4.  Status post sigmoid resection with end colostomy  5.  Ventricular tachycardia status post ICD  6.  Hyponatremia: Stable.  Nephrology following.  Defer diuretic therapy, if needed, to nephrology.        Kenya Pineda, APRN  02/11/22

## 2022-02-11 NOTE — PROGRESS NOTES
[facility] Progress Note      Patient Name: Arnie Calvo  : 1960  MRN: [id number]  Primary Care Physician:  Zechariah Fatima MD  Date of admission: 2/3/2022    Subjective     Interval History:   F/u hyponatremia    Review of Systems:   C/o dyspnea, quite winded upon return to room from garzon ambulation with PT    Objective     Vitals:   Temp:  [97.9 °F (36.6 °C)] 97.9 °F (36.6 °C)  Heart Rate:  [104-115] 115  Resp:  [16-18] 18  BP: (95-97)/(64-72) 97/64  Flow (L/min):  [2-3] 2    Intake/Output Summary (Last 24 hours) at 2022 1425  Last data filed at 2022 0956  Gross per 24 hour   Intake 240 ml   Output 1050 ml   Net -810 ml       Physical Exam:    General Appearance: frail WM winded and in mild resp distress edge of bed  Neck supple no JVD  Lungs CTA bilat no rales  CV RRR no m/g  abd soft NT/ND  vasc no pedal/ankle edema    Scheduled Meds:     aluminum sulfate-calcium acetate, 1 packet, Topical, Q3 Days  carvedilol, 6.25 mg, Oral, Daily Before Supper  enoxaparin, 40 mg, Subcutaneous, Q24H  glycopyrrolate, 1 mg, Oral, BID  loperamide, 2 mg, Oral, 4x Daily AC & at Bedtime  melatonin, 3 mg, Oral, Nightly  [START ON 2022] mirtazapine, 15 mg, Oral, Nightly  mirtazapine, 7.5 mg, Oral, Nightly  [START ON 2022] silver nitrate, 1 application, Topical, Once      IV Meds:        Results Reviewed:   I have personally reviewed the results from the time of this admission to 2022 14:25 EST     Results from last 7 days   Lab Units 22  0514 02/10/22  0651 22  0815   SODIUM mmol/L 126* 127* 128*   POTASSIUM mmol/L 3.9 5.9* 5.1   CHLORIDE mmol/L 89* 92* 93*   CO2 mmol/L 27.8 28.1 17.1*   BUN mg/dL 14 16 14   CREATININE mg/dL 1.01 1.19 0.87   CALCIUM mg/dL 9.0 9.3 9.1   GLUCOSE mg/dL 93 100* 113*     Estimated Creatinine Clearance: 85.9 mL/min (by C-G formula based on SCr of 1.01 mg/dL).  Results from last 7 days   Lab Units 22  0514 02/10/22  0651  "02/09/22  0815   PHOSPHORUS mg/dL 4.0 4.3 3.8     Results from last 7 days   Lab Units 02/05/22  0745   URIC ACID mg/dL 6.4     Results from last 7 days   Lab Units 02/11/22  0514 02/07/22  0656   WBC 10*3/mm3 8.74 7.06   HEMOGLOBIN g/dL 10.3* 10.3*   PLATELETS 10*3/mm3 163 163           Assessment / Plan     ASSESSMENT:  -Acute on chronic hypotonic hyponatremia.  Cortisol 13 . TSH normal, Urine Osmo 284. Urine Sodium < 20 . Serum osmo (273) , suspect from CHF based on studies.  Stopped salt & urea tabs.  Na down slightly 126 (been high 120s).    -Dilated cardiomyopathy w / EF 20 %.  No periph edema and weights appear unreliable but not trending up.  CXR few days ago suggested mild edil with possible small pleural effusion.  Suspect SHER moreso due to deconditioning then vol excess but will try very low dose of lasix QOD (he's concerned about excess urination, using urinal, etc).   -hyperkalemia - due to amiloride, K up to 5.9 yesterday, given lokelma, down to 3.9  -Physical deconditioning.  Working w/ PT and OT   -PNA. on cefnidir .CXR \" left mild atelectasis and/or pneumonia. \"  -Hypotension - due to CM, SBP predom 90s (and holding coreg for < 100)    PLAN:  DC lokelma and remove renal restrictions from diet  Tighten fluid restriction 1200 cc/day  Add low dose lasix 20mg QOD and assess tolerance, watch BP  D/W RN and wife.  Will repeat BMP SAT    Addendum: D/W pharmacist, lasix flagged for sulfa allergy but he tolerated daily dose of this 1/25 to 2/2 so ok to resume it.       Chidi Bedolla MD  02/11/22  14:25 [facility]    [facility]  [phone]  "

## 2022-02-11 NOTE — PROGRESS NOTES
Inpatient Rehabilitation Plan of Care Note    Plan of Care  Care Plan Reviewed - No updates at this time.    Safety    Performed Intervention(s)  Falls protocal  Yellow socks  bed alarm/wc alarm      Sphincter Control    Performed Intervention(s)  I and O  Btrm schedule  Wound ostomy consult for RNs      Psychosocial    Performed Intervention(s)  [place] consult encouraged  Provide emotional support    Signed by: Cecile Apodaca RN

## 2022-02-11 NOTE — PROGRESS NOTES
Family conference held today with patient, wife, and friend, Yolande. PT, ST, NSG, and SW present. OT sent written report. Discussed progress and d/c recommendations.  PT reported patient is Independent/SBA with bed mobility. Patient transfers bed-chair or car with SBA. Patient walking with rolling walker (to help with energy conservation)  feet with SBA. PT reported patient has decreased strength and endurance and recommended patient continue to work on home exercises at home. Patient has had no loss of balance. PT working on exercises to strengthen legs. PT reported patient's O2 sats have been in high 90% range with mobility without use of oxygen. Recommended SBA at home with mobility.  OT report given by SULLY. Patient doing bathing and dressing seated with SBA. Patient has not wanted to take shower. Commode transfers require SBA. OT has been working on UE strength and endurance. Patient using one pound weights. OT gave patient home exercise program to continue to work on at home. OT recommended wife provide SBA/supervision at home with ADL's. Discussed trying shower with OT while here or at least dry run to practice tub transfers.   ST reported working with patient on attention, problem solving and math tasks. Patient noted to have some trouble with math tasks so ST recommended supervision with any financial tasks. Otherwise, patient doing well cognitively.  NSG gave list of current medications and discussed. Watching patient's potassium and sodium levels. Patient takes pain medication PRN every 8 hours. Patient gets pain medication prior to changing ostomy as this causes him great pain. Wound/Ostomy nurse has been changing and will complete teaching with wife and friend today after family conference. Patient started on Remeron at night to help with sleep and depression.  Patient has been using oxygen at night as he feels he gets short of breath and also has increased anxiety. Overnight oximetry done last  night on room air. Patient's O2 sats stayed in 90% range for duration of test. Discussed sleep study to be done as outpatient. NSG discussed follow ups with PCP, surgeon, nephrologist, cardiologist, and possibly pulmonology. Appointments to be made prior to d/c.   Recommended home health NSG and PT. Referral to be made to [facility].  Discussed equipment for home. Will order rolling walker and BSC. Discussed options for shower chair or tub bench. Wife to order on line.   Discussed importance of trying to stay out of bed and increase his activity to help with strength and endurance at home. Discussed progress he has made and will continue to make once home.   D/C plan is home with wife on Tuesday, 2/15. Will assist with plans.

## 2022-02-12 LAB
ANION GAP SERPL CALCULATED.3IONS-SCNC: 10.6 MMOL/L (ref 5–15)
BUN SERPL-MCNC: 11 MG/DL (ref 8–23)
BUN/CREAT SERPL: 11.1 (ref 7–25)
CALCIUM SPEC-SCNC: 8.7 MG/DL (ref 8.6–10.5)
CHLORIDE SERPL-SCNC: 91 MMOL/L (ref 98–107)
CO2 SERPL-SCNC: 26.4 MMOL/L (ref 22–29)
CREAT SERPL-MCNC: 0.99 MG/DL (ref 0.76–1.27)
GFR SERPL CREATININE-BSD FRML MDRD: 77 ML/MIN/1.73
GLUCOSE SERPL-MCNC: 94 MG/DL (ref 65–99)
POTASSIUM SERPL-SCNC: 4.1 MMOL/L (ref 3.5–5.2)
SODIUM SERPL-SCNC: 128 MMOL/L (ref 136–145)

## 2022-02-12 PROCEDURE — 99232 SBSQ HOSP IP/OBS MODERATE 35: CPT | Performed by: NURSE PRACTITIONER

## 2022-02-12 PROCEDURE — 25010000002 ENOXAPARIN PER 10 MG: Performed by: PHYSICAL MEDICINE & REHABILITATION

## 2022-02-12 PROCEDURE — 97110 THERAPEUTIC EXERCISES: CPT | Performed by: PHYSICAL THERAPIST

## 2022-02-12 PROCEDURE — 97116 GAIT TRAINING THERAPY: CPT | Performed by: PHYSICAL THERAPIST

## 2022-02-12 PROCEDURE — 80048 BASIC METABOLIC PNL TOTAL CA: CPT | Performed by: INTERNAL MEDICINE

## 2022-02-12 PROCEDURE — 63710000001 ONDANSETRON ODT 4 MG TABLET DISPERSIBLE: Performed by: PHYSICAL MEDICINE & REHABILITATION

## 2022-02-12 RX ADMIN — LOPERAMIDE HYDROCHLORIDE 2 MG: 2 CAPSULE ORAL at 09:26

## 2022-02-12 RX ADMIN — CARVEDILOL 6.25 MG: 12.5 TABLET, FILM COATED ORAL at 17:17

## 2022-02-12 RX ADMIN — ENOXAPARIN SODIUM 40 MG: 100 INJECTION SUBCUTANEOUS at 21:14

## 2022-02-12 RX ADMIN — LOPERAMIDE HYDROCHLORIDE 2 MG: 2 CAPSULE ORAL at 21:14

## 2022-02-12 RX ADMIN — Medication 3 MG: at 21:14

## 2022-02-12 RX ADMIN — GLYCOPYRROLATE 1 MG: 1 TABLET ORAL at 09:26

## 2022-02-12 RX ADMIN — LOPERAMIDE HYDROCHLORIDE 2 MG: 2 CAPSULE ORAL at 12:21

## 2022-02-12 RX ADMIN — GLYCOPYRROLATE 1 MG: 1 TABLET ORAL at 21:14

## 2022-02-12 RX ADMIN — MIRTAZAPINE 7.5 MG: 15 TABLET, FILM COATED ORAL at 21:14

## 2022-02-12 RX ADMIN — ONDANSETRON 4 MG: 4 TABLET, ORALLY DISINTEGRATING ORAL at 18:08

## 2022-02-12 RX ADMIN — FUROSEMIDE 20 MG: 20 TABLET ORAL at 09:26

## 2022-02-12 RX ADMIN — LOPERAMIDE HYDROCHLORIDE 2 MG: 2 CAPSULE ORAL at 17:17

## 2022-02-12 NOTE — PROGRESS NOTES
Inpatient Rehabilitation Plan of Care Note    Plan of Care  Care Plan Reviewed - No updates at this time.    Safety    Performed Intervention(s)  Falls protocal  Yellow socks  bed alarm/wc alarm      Sphincter Control    Performed Intervention(s)  I and O  Btrm schedule  Wound ostomy consult for RNs      Psychosocial    Performed Intervention(s)  [place] consult encouraged  Provide emotional support    Signed by: Joleen Humphreys RN

## 2022-02-12 NOTE — THERAPY TREATMENT NOTE
Inpatient Rehabilitation - Physical Therapy Treatment Note       [facility]     Patient Name: Arine Calvo  : 1960  MRN: [id number]    Today's Date: 2022                    Admit Date: 2/3/2022      Visit Dx:     ICD-10-CM ICD-9-CM   1. Insomnia due to medical condition  G47.01 327.01       Patient Active Problem List   Diagnosis   • Cardiomyopathy (HCC)   • Paroxysmal VT (HCC)   • Palpitations   • PVC's (premature ventricular contractions)   • Exacerbation of Crohn's disease of small intestine (HCC)   • Adjustment disorder with depressed mood   • Ankylosis of spine   • Crohn's disease with complication (HCC)   • Diabetes mellitus (HCC)   • Essential hypertension   • External hemorrhoids   • Genital herpes simplex   • Gout   • Insomnia due to medical condition   • Iron deficiency   • Prostate mass   • Recurrent aphthous ulcer   • Asteatosis cutis   • History of pneumonia   • Abnormal CXR (chest x-ray)   • RUQ abdominal pain   • Crohn's disease of small intestine with other complication (HCC)   • Right lower quadrant abdominal pain   • Enteritis   • Mass-like inflammation at terminal ileum   • Crohn's disease (HCC)   • Ileostomy in place (HCC)   • Paroxysmal ventricular tachycardia (HCC)   • Chronic systolic heart failure (HCC)   • Cardiomyopathy, nonischemic (HCC)   • Orthostasis   • Iron deficiency anemia   • Orthostatic hypotension   • Crohn's disease of colon with complication (HCC)   • Dehisced intestinal anastomosis   • History of creation of ostomy (HCC)   • Hypokalemia   • Hypotension, chronic   • Malnutrition of moderate degree (HCC)   • S/P colectomy   • Fatty liver disease, nonalcoholic   • Cholecystitis   • Debility       Past Medical History:   Diagnosis Date   • Acute pain of left hip    • Adjustment disorder with depressed mood    • Anesthesia complication     SPINAL [id number]-[id number] LOWER SPINE   • Ankylosing spondylitis of cervical region (HCC)    • Ankylosis of spine     • Arthritis    • Asthma    • Cardiomyopathy (HCC)     sees Dr. Reyes; NICM/ (-) cath, EF 25-35%; has ICD   • CHF (congestive heart failure) (HCC)    • Cholecystitis    • Crohn's disease (HCC)    • Depression    • Diabetes mellitus (HCC)     Diet controlled.   • Dysthymic disorder 2012   • Dysuria    • Essential hypertension    • External hemorrhoids    • Genital herpes    • Gout    • Herpes genitalia    • History of MRSA infection 2000?    FINGERTIP-TX WITH ANTIBIOTICS-[facility] CARE-NO CURRENT OPEN WOUND OR TREATMENT 12/3/21   • Ileostomy in place ([facility])    • Insomnia    • Iron deficiency    • Paroxysmal ventricular tachycardia (HCC)    • PONV (postoperative nausea and vomiting)    • Presence of combination internal cardiac defibrillator (ICD) and pacemaker    • Prostate nodule    • Recurrent canker sores    • Thoracic back pain    • Urinary hesitancy    • Xerosis cutis        Past Surgical History:   Procedure Laterality Date   • ABDOMINAL SURGERY     • CARDIAC CATHETERIZATION     • CARDIAC DEFIBRILLATOR PLACEMENT      REPLACEMENT-Had Jude lead that was fractured.  Lead was tied off.  New lead and new device implanted.   • CARDIAC ELECTROPHYSIOLOGY PROCEDURE N/A 1/3/2019    Procedure: ICD DC generator change  [employer];  Surgeon: Zechariah Randolph MD;  Location: [facility] INVASIVE LOCATION;  Service: Cardiology   • CHOLECYSTECTOMY N/A 12/7/2021    Procedure: CHOLECYSTECTOMY;  Surgeon: Jeovany Mott MD;  Location: [facility] OR;  Service: General;  Laterality: N/A;   • COLON RESECTION N/A 12/29/2021    Procedure: PARTIAL COLECTOMY WITH OSTOMY;  Surgeon: Jeovany Mott MD;  Location: [facility] OR;  Service: General;  Laterality: N/A;   • COLON RESECTION WITH ILEOSTOMY N/A 2018   • COLONOSCOPY  04/03/2015    abnormal cecum, three polypoid masses, pre cancerous vs crohns, IH, tics, hyperplastic polyp   • COLONOSCOPY N/A 3/17/2017    polypoid masses, tics, IH, polyp   • EXPLORATORY LAPAROTOMY W/  BOWEL RESECTION  07/2018    open resection of ileum with creation of end ileostomy   • ILEOSTOMY CLOSURE  07/2018   • ILEOSTOMY CLOSURE N/A 12/7/2021    Procedure: ILEOSTOMY TAKEDOWN WITH RESECTION, PARASTOMAL HERNIA REPAIR;  Surgeon: Jeovany Mott MD;  Location: [facility] MAIN OR;  Service: General;  Laterality: N/A;   • PACEMAKER IMPLANTATION         PT ASSESSMENT (last 12 hours)     IRF PT Evaluation and Treatment     Row Name 02/12/22 1100          PT Time and Intention    Document Type daily treatment  -JS     Mode of Treatment physical therapy  -JS     Patient/Family/Caregiver Comments/Observations Pt supine in bed upon arrival. Agreeable to PT.  -     Row Name 02/12/22 1100          General Information    Patient Profile Reviewed yes  -JS     Existing Precautions/Restrictions fall  -     Row Name 02/12/22 1100          Cognition/Psychosocial    Orientation Status (Cognition) oriented x 4  -JS     Follows Commands (Cognition) follows one-step commands; over 90% accuracy  -     Personal Safety Interventions fall prevention program maintained; gait belt; muscle strengthening facilitated; nonskid shoes/slippers when out of bed  -     Row Name 02/12/22 1100          Pain Scale: Numbers Pre/Post-Treatment    Pretreatment Pain Rating 6/10  -JS     Posttreatment Pain Rating 6/10  -JS     Pain Location abdomen  -JS     Pain Intervention(s) Medication (See MAR)  -     Row Name 02/12/22 1100          Bed Mobility    Supine-Sit [place] (Bed Mobility) independent  -JS     Sit-Supine [place] (Bed Mobility) standby assist  -     Assistive Device (Bed Mobility) bed rails  -     Row Name 02/12/22 1100          Transfers    Bed-Chair [place] (Transfers) standby assist  -     Chair-Bed [place] (Transfers) standby assist  -     Assistive Device (Bed-Chair Transfers) wheelchair  -     Sit-Stand [place] (Transfers) standby assist  -     Stand-Sit [place] (Transfers) standby  assist  -JS     Row Name 02/12/22 1100          Chair-Bed Transfer    Assistive Device (Chair-Bed Transfers) wheelchair  -JS     Row Name 02/12/22 1100          Sit-Stand Transfer    Assistive Device (Sit-Stand Transfers) walker, front-wheeled  -JS     Row Name 02/12/22 1100          Stand-Sit Transfer    Assistive Device (Stand-Sit Transfers) walker, front-wheeled  -JS     Row Name 02/12/22 1100          Gait/Stairs (Locomotion)    Stanly Level (Gait) supervision  -JS     Assistive Device (Gait) walker, front-wheeled  -JS     Distance in Feet (Gait) 80'x2, 160'x1  -JS     Pattern (Gait) step-through  -JS     Deviations/Abnormal Patterns (Gait) no decreased; stride length decreased  -JS     Bilateral Gait Deviations forward flexed posture  -JS     Comment (Gait/Stairs) fatigue limiting  -JS     Row Name 02/12/22 1100          Hip (Therapeutic Exercise)    Hip Strengthening (Therapeutic Exercise) bilateral; marching while seated; sitting; 10 repetitions; 2 sets  -     Row Name 02/12/22 1100          Knee (Therapeutic Exercise)    Knee Strengthening (Therapeutic Exercise) bilateral; LAQ (long arc quad); sitting; 10 repetitions; 2 sets  -JS     Row Name 02/12/22 1100          Ankle (Therapeutic Exercise)    Ankle AROM (Therapeutic Exercise) bilateral; dorsiflexion; plantarflexion; sitting; 10 repetitions; 2 sets  -JS     Row Name 02/12/22 1100          Positioning and Restraints    Pre-Treatment Position in bed  -JS     Post Treatment Position bed  -JS     In Bed supine; call light within reach; encouraged to call for assist; exit alarm on  -JS           User Key  (r) = Recorded By, (t) = Taken By, (c) = Cosigned By    Initials Name Provider Type    JS Sweta Ferro, PT Physical Therapist              Wound 12/07/21 0757 Right abdomen Incision (Active)   Dressing Appearance dry; intact 02/11/22 2054   Closure AUGUSTO 02/12/22 0938   Base dressing in place, unable to visualize 02/12/22 0938   Periwound intact;  dry 02/11/22 1300   Periwound Temperature warm 02/11/22 1300   Periwound Skin Turgor soft 02/11/22 1300   Wound Length (cm) 1 cm 02/11/22 1300   Wound Width (cm) 6 cm 02/11/22 1300   Wound Depth (cm) 0.2 cm 02/11/22 1300   Drainage Characteristics/Odor creamy; yellow 02/11/22 1300   Drainage Amount small 02/11/22 1300   Care, Wound cleansed with; sterile normal saline 02/11/22 1300   Dressing Care dressing changed; silver impregnated; hydrofiber; silicone; border dressing 02/11/22 1300   Periwound Care barrier film applied 02/11/22 1300       Wound 12/25/21 0809 abdomen Other (comment) (Active)   Dressing Appearance dry; intact 02/11/22 2054   Closure AUGUSTO 02/12/22 0938   Base dressing in place, unable to visualize 02/12/22 0938   Periwound intact; dry 02/11/22 1300   Periwound Temperature warm 02/11/22 1300   Periwound Skin Turgor soft 02/11/22 1300   Edges open 02/11/22 1300   Wound Length (cm) 1.5 cm 02/11/22 1300   Wound Width (cm) 3.5 cm 02/11/22 1300   Wound Depth (cm) 0.1 cm 02/11/22 1300   Drainage Characteristics/Odor creamy; yellow 02/11/22 1300   Drainage Amount small 02/11/22 1300   Care, Wound cleansed with; sterile normal saline 02/11/22 1300   Dressing Care dressing changed; silver impregnated; hydrofiber; silicone; border dressing 02/11/22 1300   Periwound Care barrier film applied 02/11/22 1300       Wound 12/29/21 1518 midline abdomen Incision (Active)   Dressing Appearance dry; intact 02/11/22 2054   Closure AUGUSTO 02/12/22 0938   Base dressing in place, unable to visualize 02/12/22 0938   Periwound macerated; moist; redness 02/11/22 1300   Periwound Temperature warm 02/11/22 1300   Periwound Skin Turgor soft 02/11/22 1300   Edges open 02/11/22 1300   Wound Length (cm) 5 cm 02/11/22 1300   Wound Width (cm) 4 cm 02/11/22 1300   Wound Depth (cm) 1 cm 02/11/22 1300   Tunneling [Depth (cm)/Location] 3cms at 1 o'clock 02/11/22 1300   Drainage Characteristics/Odor green 02/11/22 1300   Drainage Amount  moderate 02/11/22 1300   Care, Wound cleansed with; sterile normal saline 02/11/22 1300   Dressing Care dressing changed; collagen; silver impregnated; hydrofiber; silicone; border dressing 02/11/22 1300   Periwound Care barrier film applied 02/11/22 1300     Physical Therapy Education                 Title: PT OT SLP Therapies (Done)     Topic: Physical Therapy (Done)     Point: Mobility training (Done)     Learning Progress Summary           Patient Acceptance, E,TB,D, VU,NR by JS at 2/12/2022 1147    Acceptance, E,TB, VU,NR by EE at 2/11/2022 1102    Acceptance, E,TB, VU,NR by EE at 2/10/2022 1123    Acceptance, E,TB, VU,NR by EE at 2/9/2022 1222    Acceptance, E,TB, VU,NR by EE at 2/8/2022 1015    Acceptance, E,TB, VU,NR by EE at 2/7/2022 1142    Acceptance, E,TB, VU,NR by KIRSTEN at 2/5/2022 1430    Acceptance, E,TB, VU,NR by EE at 2/4/2022 1408                   Point: Home exercise program (Done)     Learning Progress Summary           Patient Acceptance, E,TB,D, VU,NR by JUAN M at 2/12/2022 1147    Acceptance, E,TB, VU,NR by EE at 2/11/2022 1102    Acceptance, E,TB, VU,NR by EE at 2/10/2022 1123    Acceptance, E,TB, VU,NR by EE at 2/8/2022 1015    Acceptance, E,TB, VU,NR by EE at 2/7/2022 1142    Acceptance, E,TB, VU,NR by EE at 2/4/2022 1408                   Point: Body mechanics (Done)     Learning Progress Summary           Patient Acceptance, E,TB,D, VU,NR by JUAN M at 2/12/2022 1147    Acceptance, E,TB, VU,NR by EE at 2/10/2022 1123    Acceptance, E,TB, VU,NR by EE at 2/8/2022 1015    Acceptance, E,TB, VU,NR by EE at 2/7/2022 1142    Acceptance, E,TB, VU,NR by EE at 2/4/2022 1408                   Point: Precautions (Done)     Learning Progress Summary           Patient Acceptance, E,TB,D, VU,NR by JS at 2/12/2022 1147    Acceptance, E,TB, VU,NR by EE at 2/10/2022 1123    Acceptance, E,TB, VU,NR by EE at 2/8/2022 1015    Acceptance, E,TB, VU,NR by EE at 2/7/2022 1142    Acceptance, E,TB, VU,NR by EE at 2/4/2022  1408                               User Key     Initials Effective Dates Name Provider Type Discipline    KIRSTEN 06/16/21 -  Emily Watkins PT Physical Therapist PT    JS 06/16/21 -  Sweta Ferro PT Physical Therapist PT    EE 06/16/21 -  Wendy Ojeda PT Physical Therapist PT                PT Recommendation and Plan             Patient was wearing a face mask during this therapy encounter. Therapist used appropriate personal protective equipment including eye protection, mask, and gloves.  Mask used was standard procedure mask. Appropriate PPE was worn during the entire therapy session. Hand hygiene was completed before and after therapy session. Patient is not in enhanced droplet precautions.                Time Calculation:      PT Charges     Row Name 02/12/22 1100             Time Calculation    Start Time 1100  -JS      Stop Time 1130  -JS      Time Calculation (min) 30 min  -JS      PT Received On 02/12/22  -JUAN M      PT - Next Appointment 02/14/22  -JUAN M            User Key  (r) = Recorded By, (t) = Taken By, (c) = Cosigned By    Initials Name Provider Type    JS Sweta Ferro, PT Physical Therapist                Therapy Charges for Today     Code Description Service Date Service Provider Modifiers Qty    55208544420 HC GAIT TRAINING EA 15 MIN 2/12/2022 Sweta Ferro, PT GP 1    33341793789 HC PT THER PROC EA 15 MIN 2/12/2022 Sweta Ferro, PT GP 1                   Sweta Ferro PT  2/12/2022

## 2022-02-12 NOTE — PROGRESS NOTES
Inpatient Rehabilitation Plan of Care Note    Plan of Care  Care Plan Reviewed - No updates at this time.    Safety    Performed Intervention(s)  Falls protocal  Yellow socks  bed alarm/wc alarm      Sphincter Control    Performed Intervention(s)  I and O  Btrm schedule  Wound ostomy consult for RNs      Psychosocial    Performed Intervention(s)  Mimi consult encouraged  Provide emotional support    Signed by: Lily Good RN

## 2022-02-12 NOTE — PROGRESS NOTES
LOS: 9 days   Patient Care Team:  Zechariah Fatima MD as PCP - General  PinaRichard alegria MD as PCP - Family Medicine (Pulmonary Disease)  John Bowles MD as Consulting Physician (Gastroenterology)  Hermes Shabazz MD as Consulting Physician (Urology)  Osmar Carranza MD as Consulting Physician (Cardiology)      LEDA JAZMÍN JOHNNY  1960    Chief Complaint: Immobility syndrome  Impaired mobility/impaired self-care/impaired endurance  Ileostomy takedown , cholecystectomy, incisional hernia repair December 7, 2021.  Exploratory laparotomy with end colostomy partial colon resection December 29, 2021  Ostomy-on Imodium/glycopyrrolate  Anxiety  Anemia  Hyponatremia        Subjective     Ostomy bag leaking this a.m.  Did not receive Coreg last evening as systolic blood pressure less than 100  Participating in therapies.   Denies CP, SOB, N/V, F/C       Objective     Vitals:    02/12/22 1445   BP: 100/68   Pulse: 102   Resp: 18   Temp: 99.1 °F (37.3 °C)   SpO2: 93%       PHYSICAL EXAM:   MENTAL STATUS -  AWAKE / ALERT  HEENT-    LUNGS - CTA, NO WHEEZES, RALES OR RHONCHI  HEART-regular rate and rhythm  ABD - NORMOACTIVE BOWEL SOUNDS, SOFT, NT.  Ostomy with clean base to the wound inferior to the ostomy. Dressings in place at right and left abdomen. No signs of leakage at present  EXT - NO EDEMA OR CYANOSIS  NEURO -oriented x4.  MOTOR EXAM - RUE/RLE 5/5. LUE/LLE 5/5.           MEDICATIONS  Scheduled Meds:aluminum sulfate-calcium acetate, 1 packet, Topical, Q3 Days  carvedilol, 6.25 mg, Oral, Daily Before Supper  enoxaparin, 40 mg, Subcutaneous, Q24H  furosemide, 20 mg, Oral, Every Other Day  glycopyrrolate, 1 mg, Oral, BID  loperamide, 2 mg, Oral, 4x Daily AC & at Bedtime  melatonin, 3 mg, Oral, Nightly  [START ON 2/17/2022] mirtazapine, 15 mg, Oral, Nightly  mirtazapine, 7.5 mg, Oral, Nightly  [START ON 2/14/2022] silver nitrate, 1 application, Topical, Once      Continuous Infusions:   PRN Meds:.•   acetaminophen  •  Glycerin-Hypromellose-  •  HYDROcodone-acetaminophen  •  ondansetron ODT  •  simethicone      RESULTS  No results found for: POCGLU  Results from last 7 days   Lab Units 02/11/22  0514 02/07/22  0656   WBC 10*3/mm3 8.74 7.06   HEMOGLOBIN g/dL 10.3* 10.3*   HEMATOCRIT % 33.3* 33.1*   PLATELETS 10*3/mm3 163 163     Results from last 7 days   Lab Units 02/12/22  0527 02/11/22  0514 02/10/22  0651   SODIUM mmol/L 128* 126* 127*   POTASSIUM mmol/L 4.1 3.9 5.9*   CHLORIDE mmol/L 91* 89* 92*   CO2 mmol/L 26.4 27.8 28.1   BUN mg/dL 11 14 16   CREATININE mg/dL 0.99 1.01 1.19   CALCIUM mg/dL 8.7 9.0 9.3   GLUCOSE mg/dL 94 93 100*     Results from last 7 days   Lab Units 02/11/22  0514 02/07/22  0656   WBC 10*3/mm3 8.74 7.06   HEMOGLOBIN g/dL 10.3* 10.3*   HEMATOCRIT % 33.3* 33.1*   PLATELETS 10*3/mm3 163 163           Ref. Range 2/2/2022 11:41   Magnesium Latest Ref Range: 1.6 - 2.4 mg/dL 1.8   Prealbumin Latest Ref Range: 20.0 - 40.0 mg/dL 10.2 (L)      Chest x-ray February 6  XR CHEST 1 VW-  02/06/2022     HISTORY: Shortness of breath.     Heart size is mildly enlarged. The left hemidiaphragm is partially  obscured likely from small pleural effusion with some mild atelectasis  and/or pneumonia. Left upper lung and right lung appear clear.     Cardiac pacemaker seen in good position.     IMPRESSION:  1. Mild cardiomegaly.  2. Increased density in the left lung base may represent combination of  small amount of left pleural effusion with some mild atelectasis and/or  pneumonia. The left base may have cleared slightly since the 01/23/2022  study.         Assessment/Plan     Insomnia due to medical condition    Debility      Chief Complaint: Immobility syndrome  Impaired mobility/impaired self-care/impaired endurance     Ileostomy takedown , cholecystectomy, incisional hernia repair December 7, 2021.  Exploratory laparotomy with end colostomy partial colon resection December 29, 2021     Ostomy-on  Imodium/glycopyrrolate  February 4-continue Robinul twice daily for now, but decreased Imodium from 2 tablets to 1 tablet p.o. before every meal and at bedtime.     Abdominal wound care  February 11-[place] soak.  Ongoing ostomy care.  Wounds are improving and smaller.     Anxiety-would presently hold on adding any benzodiazepine given his multiple medical issues.    Depression-February 9-given his hyponatremia, SSRI would not be the best option at this point.  Remeron may be a better option and will review with nephrology and with the patient.  February 10-reviewed with nephrology.  Discussed with patient.  Look at starting Remeron 7.5 mg nightly for 1 week then increase to 15 mg nightly     Anemia     Electrolytes-hyponatremia/hyperkalemia  February 4-sodium was 127 on February 2, 130 on February 3, decreased 124 on February 4.  Will consult nephrology for evaluation  Feb 7 -  per Nephrology -[Continue UREA 15 gr daily and placed on fluid restriction 1.5 liters . TRIAL amiloride 10  mg daily ]   February 9-Per nephrology-Borderline hyperkalemia and Nephrology started Lokelma 10 g daily.  Continue UREA 15 gr daily and Fluid restriction 1.5 liters, Hold amiloride due to borderline hyperkalemia for 24 hours and then resume daily ,  Continue sodium chloride 1 gr BID  February 11-off salt tablets.  Off urea.  Completed Lokelma.  Fluid restriction 1200 cc/day.     Congestive heart failure/tachycardia-on Coreg 12.5 mg for supper.  Blood pressure low at times.  Parameters added evening Feb 3 - Hold if systolic is <110 and diastolic <70  .  Feb 8 - has not received Coreg 12.5 q supper since parameters added on Feb 3 after BP dropped to 88/66. He received every evening on acute care stay .  Will adjust parameter to hold if SBP < 100 and decrease dose to 6.25 mg q supper pending updated input from Cardiology to re-assess for parameters  February 9-tolerated lower dose of Coreg  February 10-Tolerating lower dose of Coreg  6.25 mg with pulse ranging 100-106 and blood pressure systolic .       Nonischemic cardiomyopathy ejection fraction 20%     DVT prophylaxis-Lovenox/SCDs     Impaired nutritional status-supplements per dietitian to follow.  Recheck prealbumin around February 16     Pulmonary- using  O2 2 L nasal cannula at night  Feb 7 - Started on cefdinir and given  breathing treatment yesterday.  He feels breathing treatment help bring up thick secretions.  He is on glycopyrrolate which probably thickens at secretions.  Tachycardia yesterday did not appear to correlate with the time of the breathing treatment as his pulse actually went down after the breathing treatment.  Will give another breathing treatment today  February 9-reviewed with patient getting outpatient sleep study  February 10-He complains of feeling short of air at night.  Previously reviewed with patient ordering outpatient sleep study.  Wears O2 2 L at night, sats 95 to 100%.  Will check overnight pulse oximetry study on 2 L nasal cannula  February 11- Overnight pulse oximetry showed sats %, average 97%, pulse , average 102.5.      Insomnia-melatonin added  February 10-added Remeron  Plan for outpatient sleep study     TEAM CONF - FEB 8 - WEAK, POOR ENDURANCE. ANXIETY.SATS 96% ON ROOM AIR DURING THE DAY.  BED SBA. TRANSFERS CTG RW. GAIT 8-=160 FEET CTG RW. TOILET TRANSFERS CTG. BATH MIN. LBD DEP FOR SOCKS. UBD SBA GOWN. GROOMING SBA.  SWALLOW - ESOPHAGEAL REFLUX. COGNITION - MILD DEFICITS, IMPAIRED ATTENTION DUE TO DISCOMFORT. WOUND CARE, OSTOMY CARE. HYPONATREMIA. FLUID RESTRICTIONS. REC THERAPY LOOKING AT ACTIVITIES DIRECTED TOWARD ANXIETY. ASK PSYCHOLOGY TO SEE.   ELOS - ONE WEEK.      REHAB - admit for comprehensive acute inpatient rehabilitation .  This would be an interdisciplinary program with physical therapy 1.5 hour,  occupational therapy 1.5 hour,  5 days a week.  Rehabilitation nursing for carryover, monitoring of cardiac and  intestinal   status, bowel and bladder, and skin  Ongoing physician follow-up.  Weekly team conferences.   The patient's functional status and clinical status is unchanged from preadmission assessment and the patient continues appropriate for acute inpatient rehabilitation.  Goal is for home with outpatient   therapies.  Barrier to discharge:.  Mobility and self-care endurance- worked on condition, transfers, progressive ambulation, activity daily living to overcome.            Bird Cash MD      During rounds, used appropriate personal protective equipment including mask and gloves.  Additional gown if indicated.  Mask used was standard procedure mask. Appropriate PPE was worn during the entire visit.  Hand hygiene was completed before and after.

## 2022-02-12 NOTE — PLAN OF CARE
Goal Outcome Evaluation:  Plan of Care Reviewed With: patient        Progress: improving  Outcome Summary: A&Ox4. Cooperative, but anxious. Wearing O2 at 3L/min per nasal canula. Meds crushed in applesauce. See MAR. No unsafe behavior. Sleeping fair this shift.

## 2022-02-12 NOTE — PLAN OF CARE
Goal Outcome Evaluation:  Plan of Care Reviewed With: patient        Progress: improving  Outcome Summary: Pt's ostomy has remained intact this shift. Pt did get up with therapies. No pain.

## 2022-02-12 NOTE — PROGRESS NOTES
LOS: 9 days   Patient Care Team:  Zechariah Fatima MD as PCP - General  Richard Heller MD as PCP - Family Medicine (Pulmonary Disease)  John Bowles MD as Consulting Physician (Gastroenterology)  Hermes Shabazz MD as Consulting Physician (Urology)  Osmar Carranza MD as Consulting Physician (Cardiology)      Chief Complaint:  Follow up cardiomyopathy, tachycardia       Interval History: Feels okay.  A little sluggish but no chest pain or shortness of breath.      Objective   Vital Signs  Temp:  [97.9 °F (36.6 °C)-99.1 °F (37.3 °C)] 99.1 °F (37.3 °C)  Heart Rate:  [113-115] 115  Resp:  [18] 18  BP: (95-98)/(64-72) 95/72    Intake/Output Summary (Last 24 hours) at 2/12/2022 1002  Last data filed at 2/12/2022 0938  Gross per 24 hour   Intake --   Output 730 ml   Net -730 ml         Physical Exam:  Constitutional: Well appearing, well developed, no acute distress   HENT: Oropharynx clear and membrane moist  Eyes: Normal conjunctiva, no sclera icterus.  Neck: Supple, no carotid bruit bilaterally.  Cardiovascular: Regular and Tachycardia rate and rhythm, No Murmur, No bilateral lower extremity edema.  Pulmonary: Normal respiratory effort, normal lung sounds, no wheezing.  Abdominal: Soft, nontender, no hepatosplenomegaly, liver is non-pulsatile.  Neurological: Alert and orient x 3.   Skin: Warm, dry, no ecchymosis, no rash.  Psych: Appropriate mood and affect. Normal judgment and insight.      Results Review:      Results from last 7 days   Lab Units 02/12/22  0527 02/11/22  0514 02/11/22  0514 02/10/22  0651 02/10/22  0651   SODIUM mmol/L 128*  --  126*  --  127*   POTASSIUM mmol/L 4.1  --  3.9  --  5.9*   CHLORIDE mmol/L 91*  --  89*  --  92*   CO2 mmol/L 26.4  --  27.8  --  28.1   BUN mg/dL 11  --  14  --  16   CREATININE mg/dL 0.99  --  1.01  --  1.19   GLUCOSE mg/dL 94   < > 93   < > 100*   CALCIUM mg/dL 8.7  --  9.0  --  9.3    < > = values in this interval not displayed.         Results from last 7  days   Lab Units 02/11/22  0514 02/07/22  0656   WBC 10*3/mm3 8.74 7.06   HEMOGLOBIN g/dL 10.3* 10.3*   HEMATOCRIT % 33.3* 33.1*   PLATELETS 10*3/mm3 163 163                       I reviewed the patient's new clinical results.  I personally viewed and interpreted the patient's EKG/Telemetry data        Medication Review:   aluminum sulfate-calcium acetate, 1 packet, Topical, Q3 Days  carvedilol, 6.25 mg, Oral, Daily Before Supper  enoxaparin, 40 mg, Subcutaneous, Q24H  furosemide, 20 mg, Oral, Every Other Day  glycopyrrolate, 1 mg, Oral, BID  loperamide, 2 mg, Oral, 4x Daily AC & at Bedtime  melatonin, 3 mg, Oral, Nightly  [START ON 2/17/2022] mirtazapine, 15 mg, Oral, Nightly  mirtazapine, 7.5 mg, Oral, Nightly  [START ON 2/14/2022] silver nitrate, 1 application, Topical, Once             Assessment/Plan       Insomnia due to medical condition    Debility      1.  Status post sigmoid colon resection with end colostomy.  2.  Nonischemic cardiomyopathy.  ICD.  EF 20%. GDMT limited given hypotension.  Lasix every other day per nephrology.  3.  Sinus tachycardia.  Not a new problem.  Resistant throughout admission.  BB held last two days for hypotension.    4.  Ventricular tachycardia.  ICD in place.   5.  Hyponatremia.  Fluid restriction.  Per nephrology.       Jenny Lima, ILIANA  02/12/22  10:02 EST

## 2022-02-13 PROCEDURE — 99232 SBSQ HOSP IP/OBS MODERATE 35: CPT | Performed by: NURSE PRACTITIONER

## 2022-02-13 PROCEDURE — 25010000002 ENOXAPARIN PER 10 MG: Performed by: PHYSICAL MEDICINE & REHABILITATION

## 2022-02-13 RX ADMIN — MIRTAZAPINE 7.5 MG: 15 TABLET, FILM COATED ORAL at 20:19

## 2022-02-13 RX ADMIN — LOPERAMIDE HYDROCHLORIDE 2 MG: 2 CAPSULE ORAL at 11:32

## 2022-02-13 RX ADMIN — LOPERAMIDE HYDROCHLORIDE 2 MG: 2 CAPSULE ORAL at 08:50

## 2022-02-13 RX ADMIN — CARVEDILOL 6.25 MG: 12.5 TABLET, FILM COATED ORAL at 17:11

## 2022-02-13 RX ADMIN — ENOXAPARIN SODIUM 40 MG: 100 INJECTION SUBCUTANEOUS at 20:19

## 2022-02-13 RX ADMIN — GLYCOPYRROLATE 1 MG: 1 TABLET ORAL at 20:19

## 2022-02-13 RX ADMIN — GLYCOPYRROLATE 1 MG: 1 TABLET ORAL at 08:50

## 2022-02-13 NOTE — PLAN OF CARE
Goal Outcome Evaluation:  Plan of Care Reviewed With: patient        Progress: improving  Outcome Summary: Pt's colostony bag has remained intact today. Pt refused to get up to WC after education, but did dangle on side of bed for 10 minutes.

## 2022-02-13 NOTE — PROGRESS NOTES
LOS: 10 days   Patient Care Team:  Zechariah Fatima MD as PCP - General  PinaRichard alegria MD as PCP - Family Medicine (Pulmonary Disease)  John Bowles MD as Consulting Physician (Gastroenterology)  Hermes Shabazz MD as Consulting Physician (Urology)  Osmar Carranza MD as Consulting Physician (Cardiology)      LEDA JAZMÍN JOHNNY  1960    Chief Complaint: Immobility syndrome  Impaired mobility/impaired self-care/impaired endurance  Ileostomy takedown , cholecystectomy, incisional hernia repair December 7, 2021.  Exploratory laparotomy with end colostomy partial colon resection December 29, 2021  Ostomy-on Imodium/glycopyrrolate  Anxiety  Anemia  Hyponatremia        Subjective     Ostomy bag no longer leaking  Slept better last two nights with Remeron  Denies CP, SOB, N/V, F/C       Objective     Vitals:    02/13/22 1445   BP: 105/62   Pulse: 103   Resp: 18   Temp: 97.9 °F (36.6 °C)   SpO2: 98%       PHYSICAL EXAM:   MENTAL STATUS -  AWAKE / ALERT  HEENT-    LUNGS - CTA, NO WHEEZES, RALES OR RHONCHI  HEART-regular rate and rhythm  ABD - NORMOACTIVE BOWEL SOUNDS, SOFT, NT.  Ostomy with clean base to the wound inferior to the ostomy. Dressings in place at right and left abdomen. No signs of leakage at present  EXT - NO EDEMA OR CYANOSIS  NEURO -oriented x4.  MOTOR EXAM - RUE/RLE 5/5. LUE/LLE 5/5.           MEDICATIONS  Scheduled Meds:aluminum sulfate-calcium acetate, 1 packet, Topical, Q3 Days  carvedilol, 6.25 mg, Oral, Daily Before Supper  enoxaparin, 40 mg, Subcutaneous, Q24H  furosemide, 20 mg, Oral, Every Other Day  glycopyrrolate, 1 mg, Oral, BID  loperamide, 2 mg, Oral, 4x Daily AC & at Bedtime  melatonin, 3 mg, Oral, Nightly  [START ON 2/17/2022] mirtazapine, 15 mg, Oral, Nightly  mirtazapine, 7.5 mg, Oral, Nightly  [START ON 2/14/2022] silver nitrate, 1 application, Topical, Once      Continuous Infusions:   PRN Meds:.•  acetaminophen  •  Glycerin-Hypromellose-  •   HYDROcodone-acetaminophen  •  ondansetron ODT  •  simethicone      RESULTS  No results found for: POCGLU  Results from last 7 days   Lab Units 02/11/22  0514 02/07/22  0656   WBC 10*3/mm3 8.74 7.06   HEMOGLOBIN g/dL 10.3* 10.3*   HEMATOCRIT % 33.3* 33.1*   PLATELETS 10*3/mm3 163 163     Results from last 7 days   Lab Units 02/12/22  0527 02/11/22  0514 02/10/22  0651   SODIUM mmol/L 128* 126* 127*   POTASSIUM mmol/L 4.1 3.9 5.9*   CHLORIDE mmol/L 91* 89* 92*   CO2 mmol/L 26.4 27.8 28.1   BUN mg/dL 11 14 16   CREATININE mg/dL 0.99 1.01 1.19   CALCIUM mg/dL 8.7 9.0 9.3   GLUCOSE mg/dL 94 93 100*     Results from last 7 days   Lab Units 02/11/22  0514 02/07/22  0656   WBC 10*3/mm3 8.74 7.06   HEMOGLOBIN g/dL 10.3* 10.3*   HEMATOCRIT % 33.3* 33.1*   PLATELETS 10*3/mm3 163 163           Ref. Range 2/2/2022 11:41   Magnesium Latest Ref Range: 1.6 - 2.4 mg/dL 1.8   Prealbumin Latest Ref Range: 20.0 - 40.0 mg/dL 10.2 (L)      Chest x-ray February 6  XR CHEST 1 VW-  02/06/2022     HISTORY: Shortness of breath.     Heart size is mildly enlarged. The left hemidiaphragm is partially  obscured likely from small pleural effusion with some mild atelectasis  and/or pneumonia. Left upper lung and right lung appear clear.     Cardiac pacemaker seen in good position.     IMPRESSION:  1. Mild cardiomegaly.  2. Increased density in the left lung base may represent combination of  small amount of left pleural effusion with some mild atelectasis and/or  pneumonia. The left base may have cleared slightly since the 01/23/2022  study.         Assessment/Plan     Insomnia due to medical condition    Debility      Chief Complaint: Immobility syndrome  Impaired mobility/impaired self-care/impaired endurance     Ileostomy takedown , cholecystectomy, incisional hernia repair December 7, 2021.  Exploratory laparotomy with end colostomy partial colon resection December 29, 2021     Ostomy-on Imodium/glycopyrrolate  February 4-continue Robinul twice  daily for now, but decreased Imodium from 2 tablets to 1 tablet p.o. before every meal and at bedtime.     Abdominal wound care  February 11-[place] soak.  Ongoing ostomy care.  Wounds are improving and smaller.     Anxiety-would presently hold on adding any benzodiazepine given his multiple medical issues.    Depression-February 9-given his hyponatremia, SSRI would not be the best option at this point.  Remeron may be a better option and will review with nephrology and with the patient.  February 10-reviewed with nephrology.  Discussed with patient.  Look at starting Remeron 7.5 mg nightly for 1 week then increase to 15 mg nightly     Anemia     Electrolytes-hyponatremia/hyperkalemia  February 4-sodium was 127 on February 2, 130 on February 3, decreased 124 on February 4.  Will consult nephrology for evaluation  Feb 7 -  per Nephrology -[Continue UREA 15 gr daily and placed on fluid restriction 1.5 liters . TRIAL amiloride 10  mg daily ]   February 9-Per nephrology-Borderline hyperkalemia and Nephrology started Lokelma 10 g daily.  Continue UREA 15 gr daily and Fluid restriction 1.5 liters, Hold amiloride due to borderline hyperkalemia for 24 hours and then resume daily ,  Continue sodium chloride 1 gr BID  February 11-off salt tablets.  Off urea.  Completed Lokelma.  Fluid restriction 1200 cc/day.     Congestive heart failure/tachycardia-on Coreg 12.5 mg for supper.  Blood pressure low at times.  Parameters added evening Feb 3 - Hold if systolic is <110 and diastolic <70  .  Feb 8 - has not received Coreg 12.5 q supper since parameters added on Feb 3 after BP dropped to 88/66. He received every evening on acute care stay .  Will adjust parameter to hold if SBP < 100 and decrease dose to 6.25 mg q supper pending updated input from Cardiology to re-assess for parameters  February 9-tolerated lower dose of Coreg  February 10-Tolerating lower dose of Coreg 6.25 mg with pulse ranging 100-106 and blood pressure  systolic .       Nonischemic cardiomyopathy ejection fraction 20%     DVT prophylaxis-Lovenox/SCDs     Impaired nutritional status-supplements per dietitian to follow.  Recheck prealbumin around February 16     Pulmonary- using  O2 2 L nasal cannula at night  Feb 7 - Started on cefdinir and given  breathing treatment yesterday.  He feels breathing treatment help bring up thick secretions.  He is on glycopyrrolate which probably thickens at secretions.  Tachycardia yesterday did not appear to correlate with the time of the breathing treatment as his pulse actually went down after the breathing treatment.  Will give another breathing treatment today  February 9-reviewed with patient getting outpatient sleep study  February 10-He complains of feeling short of air at night.  Previously reviewed with patient ordering outpatient sleep study.  Wears O2 2 L at night, sats 95 to 100%.  Will check overnight pulse oximetry study on 2 L nasal cannula  February 11- Overnight pulse oximetry showed sats %, average 97%, pulse , average 102.5.      Insomnia-melatonin added  February 10-added Remeron  Plan for outpatient sleep study     TEAM CONF - FEB 8 - WEAK, POOR ENDURANCE. ANXIETY.SATS 96% ON ROOM AIR DURING THE DAY.  BED SBA. TRANSFERS CTG RW. GAIT 8-=160 FEET CTG RW. TOILET TRANSFERS CTG. BATH MIN. LBD DEP FOR SOCKS. UBD SBA GOWN. GROOMING SBA.  SWALLOW - ESOPHAGEAL REFLUX. COGNITION - MILD DEFICITS, IMPAIRED ATTENTION DUE TO DISCOMFORT. WOUND CARE, OSTOMY CARE. HYPONATREMIA. FLUID RESTRICTIONS. REC THERAPY LOOKING AT ACTIVITIES DIRECTED TOWARD ANXIETY. ASK PSYCHOLOGY TO SEE.   ELOS - ONE WEEK.      REHAB - admit for comprehensive acute inpatient rehabilitation .  This would be an interdisciplinary program with physical therapy 1.5 hour,  occupational therapy 1.5 hour,  5 days a week.  Rehabilitation nursing for carryover, monitoring of cardiac and intestinal   status, bowel and bladder, and skin  Ongoing  physician follow-up.  Weekly team conferences.   The patient's functional status and clinical status is unchanged from preadmission assessment and the patient continues appropriate for acute inpatient rehabilitation.  Goal is for home with outpatient   therapies.  Barrier to discharge:.  Mobility and self-care endurance- worked on condition, transfers, progressive ambulation, activity daily living to overcome.            Bird Cash MD      During rounds, used appropriate personal protective equipment including mask and gloves.  Additional gown if indicated.  Mask used was standard procedure mask. Appropriate PPE was worn during the entire visit.  Hand hygiene was completed before and after.

## 2022-02-13 NOTE — PLAN OF CARE
Problem: Rehabilitation (IR[facility]) Plan of Care  Goal: Plan of Care Review  Outcome: Ongoing, Progressing  Flowsheets (Taken 2/13/2022 0311)  Progress: improving  Outcome Summary: Patient sleeping fair tonight. Wearing O2 at 2L via NC, weaning off at times and tolerating. Ostomy pouch intact, No significant leakage tonight. No c.o much pain, just feeling tired. No nausea and vomiting so far this shift.  Plan of Care Reviewed With: patient

## 2022-02-13 NOTE — PROGRESS NOTES
LOS: 10 days   Patient Care Team:  Zechariah Fatima MD as PCP - General  Richard Heller MD as PCP - Family Medicine (Pulmonary Disease)  John Bowles MD as Consulting Physician (Gastroenterology)  Hermes Shabazz MD as Consulting Physician (Urology)  Osmar Carranza MD as Consulting Physician (Cardiology)      Chief Complaint:  Follow up cardiomyopathy, tachycardia       Interval History: He was sleeping when I entered the room.  Complains of nausea,      Objective   Vital Signs  Temp:  [98.1 °F (36.7 °C)-99.1 °F (37.3 °C)] 98.8 °F (37.1 °C)  Heart Rate:  [] 99  Resp:  [18] 18  BP: ()/(62-70) 108/62    Intake/Output Summary (Last 24 hours) at 2/13/2022 0801  Last data filed at 2/13/2022 0724  Gross per 24 hour   Intake 480 ml   Output 1150 ml   Net -670 ml         Physical Exam:  Constitutional: Well appearing, well developed, no acute distress   HENT: Oropharynx clear and membrane moist  Eyes: Normal conjunctiva, no sclera icterus.  Neck: Supple, no carotid bruit bilaterally.  Cardiovascular: Regular rate and rhythm, No Murmur, No bilateral lower extremity edema.  Pulmonary: Normal respiratory effort, normal lung sounds, no wheezing.  Abdominal: Soft, nontender, no hepatosplenomegaly, liver is non-pulsatile.  Neurological: Alert and orient x 3.   Skin: Warm, dry, no ecchymosis, no rash.  Psych: Appropriate mood and affect. Normal judgment and insight.      Results Review:      Results from last 7 days   Lab Units 02/12/22  0527 02/11/22  0514 02/11/22  0514 02/10/22  0651 02/10/22  0651   SODIUM mmol/L 128*  --  126*  --  127*   POTASSIUM mmol/L 4.1  --  3.9  --  5.9*   CHLORIDE mmol/L 91*  --  89*  --  92*   CO2 mmol/L 26.4  --  27.8  --  28.1   BUN mg/dL 11  --  14  --  16   CREATININE mg/dL 0.99  --  1.01  --  1.19   GLUCOSE mg/dL 94   < > 93   < > 100*   CALCIUM mg/dL 8.7  --  9.0  --  9.3    < > = values in this interval not displayed.         Results from last 7 days   Lab Units  02/11/22  0514 02/07/22  0656   WBC 10*3/mm3 8.74 7.06   HEMOGLOBIN g/dL 10.3* 10.3*   HEMATOCRIT % 33.3* 33.1*   PLATELETS 10*3/mm3 163 163                       I reviewed the patient's new clinical results.  I personally viewed and interpreted the patient's EKG/Telemetry data        Medication Review:   aluminum sulfate-calcium acetate, 1 packet, Topical, Q3 Days  carvedilol, 6.25 mg, Oral, Daily Before Supper  enoxaparin, 40 mg, Subcutaneous, Q24H  furosemide, 20 mg, Oral, Every Other Day  glycopyrrolate, 1 mg, Oral, BID  loperamide, 2 mg, Oral, 4x Daily AC & at Bedtime  melatonin, 3 mg, Oral, Nightly  [START ON 2/17/2022] mirtazapine, 15 mg, Oral, Nightly  mirtazapine, 7.5 mg, Oral, Nightly  [START ON 2/14/2022] silver nitrate, 1 application, Topical, Once             Assessment/Plan       Insomnia due to medical condition    Debility      1.  Status post sigmoid colon resection with end colostomy.  2.  Nonischemic cardiomyopathy.  ICD.  EF 20%. GDMT limited given hypotension.  Lasix every other day per nephrology.  3.  Sinus tachycardia.  Not a new problem.  Resistant throughout admission.  BB intermittently held for hypotension.  Was able to receive a dose last night. HR mildly improved.  4.  Ventricular tachycardia.  ICD in place.   5.  Hyponatremia.  Fluid restriction.  Per nephrology.       Jenny Lima, ILIANA  02/13/22  08:01 EST

## 2022-02-13 NOTE — PROGRESS NOTES
Inpatient Rehabilitation Plan of Care Note    Plan of Care  Care Plan Reviewed - No updates at this time.    Safety    Performed Intervention(s)  Falls protocal  Yellow socks  bed alarm/wc alarm      Sphincter Control    Performed Intervention(s)  I and O  Btrm schedule  Wound ostomy consult for RNs      Psychosocial    Performed Intervention(s)  Mimi consult encouraged  Provide emotional support    Signed by: Sony Giles RN

## 2022-02-14 ENCOUNTER — TELEPHONE (OUTPATIENT)
Dept: INTERNAL MEDICINE | Facility: CLINIC | Age: 62
End: 2022-02-14

## 2022-02-14 LAB
ALBUMIN SERPL-MCNC: 2.7 G/DL (ref 3.5–5.2)
ANION GAP SERPL CALCULATED.3IONS-SCNC: 8.6 MMOL/L (ref 5–15)
BASOPHILS # BLD AUTO: 0.03 10*3/MM3 (ref 0–0.2)
BASOPHILS NFR BLD AUTO: 0.4 % (ref 0–1.5)
BUN SERPL-MCNC: 19 MG/DL (ref 8–23)
BUN/CREAT SERPL: 18.6 (ref 7–25)
CALCIUM SPEC-SCNC: 8.6 MG/DL (ref 8.6–10.5)
CHLORIDE SERPL-SCNC: 92 MMOL/L (ref 98–107)
CO2 SERPL-SCNC: 27.4 MMOL/L (ref 22–29)
CREAT SERPL-MCNC: 1.02 MG/DL (ref 0.76–1.27)
DEPRECATED RDW RBC AUTO: 46.2 FL (ref 37–54)
EOSINOPHIL # BLD AUTO: 0.1 10*3/MM3 (ref 0–0.4)
EOSINOPHIL NFR BLD AUTO: 1.2 % (ref 0.3–6.2)
ERYTHROCYTE [DISTWIDTH] IN BLOOD BY AUTOMATED COUNT: 15.6 % (ref 12.3–15.4)
GFR SERPL CREATININE-BSD FRML MDRD: 74 ML/MIN/1.73
GLUCOSE SERPL-MCNC: 86 MG/DL (ref 65–99)
HCT VFR BLD AUTO: 35.1 % (ref 37.5–51)
HGB BLD-MCNC: 11 G/DL (ref 13–17.7)
IMM GRANULOCYTES # BLD AUTO: 0.03 10*3/MM3 (ref 0–0.05)
IMM GRANULOCYTES NFR BLD AUTO: 0.4 % (ref 0–0.5)
LYMPHOCYTES # BLD AUTO: 2.75 10*3/MM3 (ref 0.7–3.1)
LYMPHOCYTES NFR BLD AUTO: 32.5 % (ref 19.6–45.3)
MCH RBC QN AUTO: 25.8 PG (ref 26.6–33)
MCHC RBC AUTO-ENTMCNC: 31.3 G/DL (ref 31.5–35.7)
MCV RBC AUTO: 82.4 FL (ref 79–97)
MONOCYTES # BLD AUTO: 0.72 10*3/MM3 (ref 0.1–0.9)
MONOCYTES NFR BLD AUTO: 8.5 % (ref 5–12)
NEUTROPHILS NFR BLD AUTO: 4.82 10*3/MM3 (ref 1.7–7)
NEUTROPHILS NFR BLD AUTO: 57 % (ref 42.7–76)
NRBC BLD AUTO-RTO: 0 /100 WBC (ref 0–0.2)
PHOSPHATE SERPL-MCNC: 4.1 MG/DL (ref 2.5–4.5)
PLATELET # BLD AUTO: 165 10*3/MM3 (ref 140–450)
PMV BLD AUTO: 10 FL (ref 6–12)
POTASSIUM SERPL-SCNC: 5 MMOL/L (ref 3.5–5.2)
RBC # BLD AUTO: 4.26 10*6/MM3 (ref 4.14–5.8)
SODIUM SERPL-SCNC: 128 MMOL/L (ref 136–145)
WBC NRBC COR # BLD: 8.45 10*3/MM3 (ref 3.4–10.8)

## 2022-02-14 PROCEDURE — 97530 THERAPEUTIC ACTIVITIES: CPT

## 2022-02-14 PROCEDURE — 25010000002 ENOXAPARIN PER 10 MG: Performed by: PHYSICAL MEDICINE & REHABILITATION

## 2022-02-14 PROCEDURE — 97535 SELF CARE MNGMENT TRAINING: CPT

## 2022-02-14 PROCEDURE — 97110 THERAPEUTIC EXERCISES: CPT

## 2022-02-14 PROCEDURE — 80069 RENAL FUNCTION PANEL: CPT | Performed by: INTERNAL MEDICINE

## 2022-02-14 PROCEDURE — 85025 COMPLETE CBC W/AUTO DIFF WBC: CPT | Performed by: PHYSICAL MEDICINE & REHABILITATION

## 2022-02-14 PROCEDURE — 97116 GAIT TRAINING THERAPY: CPT

## 2022-02-14 PROCEDURE — 94762 N-INVAS EAR/PLS OXIMTRY CONT: CPT

## 2022-02-14 PROCEDURE — 97130 THER IVNTJ EA ADDL 15 MIN: CPT

## 2022-02-14 PROCEDURE — 97129 THER IVNTJ 1ST 15 MIN: CPT

## 2022-02-14 RX ADMIN — Medication 3 MG: at 20:44

## 2022-02-14 RX ADMIN — LOPERAMIDE HYDROCHLORIDE 2 MG: 2 CAPSULE ORAL at 20:43

## 2022-02-14 RX ADMIN — GLYCOPYRROLATE 1 MG: 1 TABLET ORAL at 07:58

## 2022-02-14 RX ADMIN — FUROSEMIDE 20 MG: 20 TABLET ORAL at 08:01

## 2022-02-14 RX ADMIN — ENOXAPARIN SODIUM 40 MG: 100 INJECTION SUBCUTANEOUS at 20:43

## 2022-02-14 RX ADMIN — LOPERAMIDE HYDROCHLORIDE 2 MG: 2 CAPSULE ORAL at 17:01

## 2022-02-14 RX ADMIN — MIRTAZAPINE 7.5 MG: 15 TABLET, FILM COATED ORAL at 20:43

## 2022-02-14 RX ADMIN — GLYCOPYRROLATE 1 MG: 1 TABLET ORAL at 20:43

## 2022-02-14 RX ADMIN — LOPERAMIDE HYDROCHLORIDE 2 MG: 2 CAPSULE ORAL at 11:38

## 2022-02-14 NOTE — PROGRESS NOTES
SECTION GG      Mobility Performance Discharge:     Roll Left and Right: Patient completed the activities by him/herself with no  assistance from a helper.   Sit to Lying: Patient completed the activities by him/herself with no  assistance from a helper.   Lying to Sitting on Side of Bed: Patient completed the activities by  him/herself with no assistance from a helper.   Sit to Stand: [place] provides verbal cues and/or touching/steadying and/or  contact guard assistance as patient completes activity. Assistance may be  provided throughout the activity or intermittently.   Chair/Bed to Chair Transfer: [place] provides verbal cues and/or  touching/steadying and/or contact guard assistance as patient completes  activity. Assistance may be provided throughout the activity or intermittently.   Car Transfer: [place] provides verbal cues and/or touching/steadying and/or  contact guard assistance as patient completes activity. Assistance may be  provided throughout the activity or intermittently.   Walk 10 Feet:   [place] provides verbal cues and/or touching/steadying and/or  contact guard assistance as patient completes activity. Assistance may be  provided throughout the activity or intermittently.  Walk 50 Feet with 2 Turns:   [place] provides verbal cues and/or  touching/steadying and/or contact guard assistance as patient completes  activity. Assistance may be provided throughout the activity or intermittently.  Walk 150 Feet:   [place] provides verbal cues and/or touching/steadying and/or  contact guard assistance as patient completes activity. Assistance may be  provided throughout the activity or intermittently.  Walking 10 Feet on Uneven Surfaces:   [place] provides verbal cues and/or  touching/steadying and/or contact guard assistance as patient completes  activity. Assistance may be provided throughout the activity or intermittently.  1 Step Over Curb or Up/Down Stair:   Not attempted due to medical or  safety  concerns.  Picking up an Object:   [place] provides verbal cues and/or touching/steadying  and/or contact guard assistance as patient completes activity. Assistance may be  provided throughout the activity or intermittently. Uses Wheelchair and/or  Scooter: No    Signed by: Wendy Ojeda DPT

## 2022-02-14 NOTE — THERAPY DISCHARGE NOTE
Inpatient Rehabilitation - [facility] Speech Language Pathology /Discharge  [facility]     Patient Name: Arnie Calvo  : 1960  MRN: [id number]  Today's Date: 2022         Admit Date: 2/3/2022    Visit Dx:     ICD-10-CM ICD-9-CM   1. Insomnia due to medical condition  G47.01 327.01     Patient Active Problem List   Diagnosis   • Cardiomyopathy (HCC)   • Paroxysmal VT (HCC)   • Palpitations   • PVC's (premature ventricular contractions)   • Exacerbation of Crohn's disease of small intestine (HCC)   • Adjustment disorder with depressed mood   • Ankylosis of spine   • Crohn's disease with complication (HCC)   • Diabetes mellitus (HCC)   • Essential hypertension   • External hemorrhoids   • Genital herpes simplex   • Gout   • Insomnia due to medical condition   • Iron deficiency   • Prostate mass   • Recurrent aphthous ulcer   • Asteatosis cutis   • History of pneumonia   • Abnormal CXR (chest x-ray)   • RUQ abdominal pain   • Crohn's disease of small intestine with other complication (HCC)   • Right lower quadrant abdominal pain   • Enteritis   • Mass-like inflammation at terminal ileum   • Crohn's disease (HCC)   • Ileostomy in place (HCC)   • Paroxysmal ventricular tachycardia (HCC)   • Chronic systolic heart failure (HCC)   • Cardiomyopathy, nonischemic (HCC)   • Orthostasis   • Iron deficiency anemia   • Orthostatic hypotension   • Crohn's disease of colon with complication (HCC)   • Dehisced intestinal anastomosis   • History of creation of ostomy (HCC)   • Hypokalemia   • Hypotension, chronic   • Malnutrition of moderate degree (HCC)   • S/P colectomy   • Fatty liver disease, nonalcoholic   • Cholecystitis   • Debility       SLP Recommendation and Plan           Anticipated Discharge Disposition (SLP): home with 24/7 care (22 [id number])      Pt met all goals within established timeline. No further speech therapy is warranted as patient's abilities appear WFL. Goals and strategies were discussed with  patient and family.                      SLP GOALS     Row Name 02/14/22 1500 02/14/22 1100          Oral Nutrition/Hydration Goal 1 (SLP)    Oral Nutrition/Hydration Goal 1, SLP -- --  Reviewed reflux precautions and strategies during session. Min cues provided when pt recalled strategies  -SR            Attention Goal 1 (SLP)    Progress (Attention Goal 1, SLP) -- 100%; independently (over 90% accuracy)  -SR     Progress/Outcomes (Attention Goal 1, SLP) -- goal met  -SR     Comment (Attention Goal 1, SLP) -- sustained attn  -SR            Organizational Skills Goal 1 (SLP)    Progress (Thought Organization Skills Goal 1, SLP) -- 80%; independently (over 90% accuracy)  -SR     Progress/Outcomes (Thought Organization Skills Goal 1, SLP) -- goal met  -SR     Comment (Thought Organization Skills Goal 1, SLP) -- Completed mental manipulation task with 80% acc independently  -SR            Functional Math Skills Goal 1 (SLP)    Progress (Functional Math Skills Goal 1, SLP) -- 80%; with minimal cues (75-90%)  -SR     Progress/Outcomes (Functional Math Skills Goal 1, SLP) -- goal met  -SR     Comment (Functional Math Skills Goal 1, SLP) -- Completed word problem involving time with 80% acc given min cues. Verbal reminders increased accuracy  -SR            Executive Functional Skills Goal 1 (SLP)    Progress (Executive Function Skills Goal 1, SLP) 80%; with minimal cues (75-90%)  -SR --     Progress/Outcomes (Executive Function Skills Goal 1, SLP) goal met  -SR --     Comment (Executive Function Skills Goal 1, SLP) Completed higher level deduction task with 80% acc given min cues.  -SR --            Executive Functional Skills Goal 2 (SLP)    Progress/Outcomes (Executive Function Skills Goal 2, SLP) goal met  -SR --     Comment (Executive Function Skills Goal 2, SLP) Pt discussed his concerns with going home. Pt continues to be motivated, yet anxious regarding home mangement tasks. Discussed strategies to implement when  pt returns home  -SR --           User Key  (r) = Recorded By, (t) = Taken By, (c) = Cosigned By    Initials Name Provider Type    Kristie Gonzalez SLP Speech and Language Pathologist                EDUCATION  The patient has been educated in the following areas:   Cognitive Impairment.              Time Calculation:    Time Calculation- SLP     Row Name 02/14/22 1528 02/14/22 1131          Time Calculation- SLP    SLP Start Time 1330  -SR 1100  -SR     SLP Stop Time 1400  -SR 1130  -SR     SLP Time Calculation (min) 30 min  -SR 30 min  -SR           User Key  (r) = Recorded By, (t) = Taken By, (c) = Cosigned By    Initials Name Provider Type    Kristie Gonzalez SLP Speech and Language Pathologist                Therapy Charges for Today     Code Description Service Date Service Provider Modifiers Qty    16161588079 HC ST DEV OF COGN SKILLS INITIAL 15 MIN 2/14/2022 Kristie Luis SLP  1    36187238699 HC ST DEV OF COGN SKILLS EACH ADDT'L 15 MIN 2/14/2022 Kristie Luis SLP  3               SLP Discharge Summary  Anticipated Discharge Disposition (SLP): home with 24/7 care  Reason for Discharge: discharge from this facility  Progress Toward Achieving Short/long Term Goals: all goals met within established timelines  Discharge Destination: home w/ 24/7 care    STEFANY Davison  2/14/2022

## 2022-02-14 NOTE — THERAPY DISCHARGE NOTE
Inpatient Rehabilitation - [facility] Occupational Therapy Treatment Note/Discharge  [facility]     Patient Name: Arnie Calvo  : 1960  MRN: [id number]  Today's Date: 2022               Admit Date: 2/3/2022       ICD-10-CM ICD-9-CM   1. Insomnia due to medical condition  G47.01 327.01     Patient Active Problem List   Diagnosis   • Cardiomyopathy (HCC)   • Paroxysmal VT (HCC)   • Palpitations   • PVC's (premature ventricular contractions)   • Exacerbation of Crohn's disease of small intestine (HCC)   • Adjustment disorder with depressed mood   • Ankylosis of spine   • Crohn's disease with complication (HCC)   • Diabetes mellitus (HCC)   • Essential hypertension   • External hemorrhoids   • Genital herpes simplex   • Gout   • Insomnia due to medical condition   • Iron deficiency   • Prostate mass   • Recurrent aphthous ulcer   • Asteatosis cutis   • History of pneumonia   • Abnormal CXR (chest x-ray)   • RUQ abdominal pain   • Crohn's disease of small intestine with other complication (HCC)   • Right lower quadrant abdominal pain   • Enteritis   • Mass-like inflammation at terminal ileum   • Crohn's disease (HCC)   • Ileostomy in place (HCC)   • Paroxysmal ventricular tachycardia (HCC)   • Chronic systolic heart failure (HCC)   • Cardiomyopathy, nonischemic (HCC)   • Orthostasis   • Iron deficiency anemia   • Orthostatic hypotension   • Crohn's disease of colon with complication (HCC)   • Dehisced intestinal anastomosis   • History of creation of ostomy (HCC)   • Hypokalemia   • Hypotension, chronic   • Malnutrition of moderate degree (HCC)   • S/P colectomy   • Fatty liver disease, nonalcoholic   • Cholecystitis   • Debility     Past Medical History:   Diagnosis Date   • Acute pain of left hip    • Adjustment disorder with depressed mood    • Anesthesia complication     SPINAL [id number]-[id number] LOWER SPINE   • Ankylosing spondylitis of cervical region (HCC)    • Ankylosis of spine    • Arthritis     • Asthma    • Cardiomyopathy (HCC)     sees Dr. Reyes; NICM/ (-) cath, EF 25-35%; has ICD   • CHF (congestive heart failure) (HCC)    • Cholecystitis    • Crohn's disease (HCC)    • Depression    • Diabetes mellitus (HCC)     Diet controlled.   • Dysthymic disorder 2012   • Dysuria    • Essential hypertension    • External hemorrhoids    • Genital herpes    • Gout    • Herpes genitalia    • History of MRSA infection 2000?    FINGERTIP-TX WITH ANTIBIOTICS-[facility]-NO CURRENT OPEN WOUND OR TREATMENT 12/3/21   • Ileostomy in place (HCC)    • Insomnia    • Iron deficiency    • Paroxysmal ventricular tachycardia (HCC)    • PONV (postoperative nausea and vomiting)    • Presence of combination internal cardiac defibrillator (ICD) and pacemaker    • Prostate nodule    • Recurrent canker sores    • Thoracic back pain    • Urinary hesitancy    • Xerosis cutis      Past Surgical History:   Procedure Laterality Date   • ABDOMINAL SURGERY     • CARDIAC CATHETERIZATION     • CARDIAC DEFIBRILLATOR PLACEMENT      REPLACEMENT-Had Jude lead that was fractured.  Lead was tied off.  New lead and new device implanted.   • CARDIAC ELECTROPHYSIOLOGY PROCEDURE N/A 1/3/2019    Procedure: ICD DC generator change  [employer];  Surgeon: Zechariah Randolph MD;  Location: [facility] INVASIVE LOCATION;  Service: Cardiology   • CHOLECYSTECTOMY N/A 12/7/2021    Procedure: CHOLECYSTECTOMY;  Surgeon: Jeovany Mott MD;  Location: [facility] OR;  Service: General;  Laterality: N/A;   • COLON RESECTION N/A 12/29/2021    Procedure: PARTIAL COLECTOMY WITH OSTOMY;  Surgeon: Jeovany Mott MD;  Location: [facility] OR;  Service: General;  Laterality: N/A;   • COLON RESECTION WITH ILEOSTOMY N/A 2018   • COLONOSCOPY  04/03/2015    abnormal cecum, three polypoid masses, pre cancerous vs crohns, IH, tics, hyperplastic polyp   • COLONOSCOPY N/A 3/17/2017    polypoid masses, tics, IH, polyp   • EXPLORATORY LAPAROTOMY W/ BOWEL RESECTION   07/2018    open resection of ileum with creation of end ileostomy   • ILEOSTOMY CLOSURE  07/2018   • ILEOSTOMY CLOSURE N/A 12/7/2021    Procedure: ILEOSTOMY TAKEDOWN WITH RESECTION, PARASTOMAL HERNIA REPAIR;  Surgeon: Jeovany Mott MD;  Location: [facility] MAIN OR;  Service: General;  Laterality: N/A;   • PACEMAKER IMPLANTATION         IRF OT ASSESSMENT FLOWSHEET (last 12 hours)     IRF OT Evaluation and Treatment     Row Name 02/14/22 1540          OT Time and Intention    Document Type discharge evaluation  -DN     Mode of Treatment occupational therapy  -DN     Patient Effort good  -DN     Symptoms Noted During/After Treatment fatigue  -DN     Row Name 02/14/22 1540          Pain Scale: Numbers Pre/Post-Treatment    Pretreatment Pain Rating 3/10  -DN     Posttreatment Pain Rating 3/10  -DN     Pain Location - Side Bilateral  -DN     Pain Location - Orientation incisional  -DN     Pain Location abdomen  -DN     Pain Intervention(s) Medication (See MAR)  -DN     Row Name 02/14/22 1540          Strength (Manual Muscle Testing)    Strength (Manual Muscle Testing) bilateral upper extremities; strength is WFL  -DN     Row Name 02/14/22 1540          Hand  Strength Testing    Left Hand, Setting 1 (Dynamometer Testing) 60  -DN     Right Hand, Setting 1 (Dynamometer Testing) 66  -DN     Left Hand: Lateral (Key) Pinch Strength (Pinch Dynamometer Testing) 14  -DN     Left Hand: Three [place] (Ede) Pinch Strength (Pinch Dynamometer Testing) 15  -DN     Row Name 02/14/22 1540          Transfers    [place] Level (Toilet Transfer) supervision  -DN     Assistive Device (Toilet Transfer) commode, 3-in-1; walker, front-wheeled  -DN     Assistive Device (Shower Transfer) --  pt declined  -DN     Row Name 02/14/22 1540          Toilet Transfer    Type (Toilet Transfer) stand pivot/stand step  -DN     Row Name 02/14/22 1540          Bathing    [place] Level (Bathing) bathing skills; supervision  -DN     Position  (Bathing) sink side; supported sitting; supported standing  -DN     Set-up Assistance (Bathing) obtain supplies  -DN     Row Name 02/14/22 1540          Upper Body Dressing    [place] Level (Upper Body Dressing) upper body dressing skills; supervision; pull over garment  -DN     Position (Upper Body Dressing) supported sitting  -DN     Set-up Assistance (Upper Body Dressing) obtain clothing  -DN     Row Name 02/14/22 1540          Lower Body Dressing    [place] Level (Lower Body Dressing) doff; don; pants/bottoms; socks; supervision  -DN     Position (Lower Body Dressing) supported sitting  -DN     Set-up Assistance (Lower Body Dressing) obtain clothing  -DN     Row Name 02/14/22 1540          Grooming    [place] Level (Grooming) grooming skills; deodorant application; hair care, combing/brushing; oral care regimen; set up  -DN     Position (Grooming) sink side; supported sitting  -DN     Set-up Assistance (Grooming) obtain supplies  -DN     Row Name 02/14/22 1540          Toileting    [place] Level (Toileting) toileting skills; adjust/manage clothing; minimum assist (75% patient effort)  -DN     Assistive Device Use (Toileting) commode chair  -DN     Position (Toileting) supported sitting  -DN     Comment (Toileting) needs assist for ostomy care  -DN     Row Name 02/14/22 1540          Transfer Goal 1 (OT-IRF)    Activity/Assistive Device (Transfer Goal 1, OT-IRF) toilet; commode, 3-in-1; walker, rolling  -DN     [place] Level (Transfer Goal 1, OT-IRF) set-up required  -DN     Time Frame (Transfer Goal 1, OT-IRF) short-term goal (STG)  -DN     Progress/Outcomes (Transfer Goal 1, OT-IRF) goal met  -DN     Row Name 02/14/22 1540          Transfer Goal 2 (OT-IRF)    Activity/Assistive Device (Transfer Goal 2, OT-IRF) sit-to-stand/stand-to-sit; bed-to-chair/chair-to-bed; toilet; walk-in shower; walker, rolling  -DN     [place] Level (Transfer Goal 2, OT-IRF) set-up required  -DN     Time  Frame (Transfer Goal 2, OT-IRF) long-term goal (LTG)  -DN     Progress/Outcomes (Transfer Goal 2, OT-IRF) goal met  -DN     Row Name 02/14/22 1540          Bathing Goal 1 (OT-IRF)    Activity/Device (Bathing Goal 1, OT-IRF) bathing skills, all  -DN     [place] Level (Bathing Goal 1, OT-IRF) contact guard assist  -DN     Time Frame (Bathing Goal 1, OT-IRF) short-term goal (STG)  -DN     Progress/Outcomes (Bathing Goal 1, OT-IRF) goal met  -DN     Row Name 02/14/22 1540          Bathing Goal 2 (OT-IRF)    Activity/Device (Bathing Goal 2, OT-IRF) bathing skills, all  -DN     [place] Level (Bathing Goal 2, OT-IRF) standby assist  -DN     Time Frame (Bathing Goal 2, OT-IRF) long-term goal (LTG)  -DN     Progress/Outcomes (Bathing Goal 2, OT-IRF) goal met  -DN     Row Name 02/14/22 1540          UB Dressing Goal 1 (OT-IRF)    Activity/Device (UB Dressing Goal 1, OT-IRF) upper body dressing  -DN     [place] (UB Dress Goal 1, OT-IRF) standby assist  -DN     Time Frame (UB Dressing Goal 1, OT-IRF) short-term goal (STG)  -DN     Progress/Outcomes (UB Dressing Goal 1, OT-IRF) goal met  -DN     Row Name 02/14/22 1540          UB Dressing Goal 2 (OT-IRF)    Activity/Device (UB Dressing Goal 2, OT-IRF) upper body dressing  -DN     [place] (UB Dress Goal 2, OT-IRF) modified independence  -DN     Time Frame (UB Dressing Goal 2, OT-IRF) long-term goal (LTG)  -DN     Progress/Outcomes (UB Dressing Goal 2, OT-IRF) goal not met  -DN     Row Name 02/14/22 1540          LB Dressing Goal 1 (OT-IRF)    Activity/Device (LB Dressing Goal 1, OT-IRF) lower body dressing  -DN     [place] (LB Dressing Goal 1, OT-IRF) minimum assist (75% or more patient effort)  -DN     Time Frame (LB Dressing Goal 1, OT-IRF) short-term goal (STG)  -DN     Progress/Outcomes (LB Dressing Goal 1, OT-IRF) goal met  -DN     Row Name 02/14/22 1279          LB Dressing Goal 2 (OT-IRF)    Activity/Device (LB Dressing Goal 2, OT-IRF) lower body  dressing  -DN     [place] (LB Dressing Goal 2, OT-IRF) standby assist  -DN     Time Frame (LB Dressing Goal 2, OT-IRF) long-term goal (LTG)  -DN     Progress/Outcomes (LB Dressing Goal 2, OT-IRF) goal met  -DN     Row Name 02/14/22 1540          Grooming Goal 1 (OT-IRF)    Activity/Device (Grooming Goal 1, OT-IRF) grooming skills, all  -DN     [place] (Grooming Goal 1, OT-IRF) set-up required  -DN     Time Frame (Grooming Goal 1, OT-IRF) short-term goal (STG)  -DN     Progress/Outcomes (Grooming Goal 1, OT-IRF) goal met  -DN     Row Name 02/14/22 1540          Grooming Goal 2 (OT-IRF)    Activity/Device (Grooming Goal 2, OT-IRF) grooming skills, all  -DN     [place] (Grooming Goal 2, OT-IRF) modified independence  -DN     Time Frame (Grooming Goal 2, OT-IRF) long-term goal (LTG)  -DN     Progress/Outcomes (Grooming Goal 2, OT-IRF) goal not met  -DN     Row Name 02/14/22 1540          Toileting Goal 1 (OT-IRF)    Activity/Device (Toileting Goal 1, OT-IRF) toileting skills, all  -DN     [place] Level (Toileting Goal 1, OT-IRF) set-up required  -DN     Progress/Outcomes (Toileting Goal 1, OT-IRF) goal met  -DN     Time Frame (Toileting Goal 1, OT-IRF) short-term goal (STG)  -DN     Row Name 02/14/22 1540          Toileting Goal 2 (OT-IRF)    Activity/Device (Toileting Goal 2, OT-IRF) toileting skills, all  -DN     [place] Level (Toileting Goal 2, OT-IRF) standby assist  -DN     Strategies/Barriers (Toileting Goal 2, OT-IRF) still needs help with ostomy care  -DN     Progress/Outcomes (Toileting Goal 2, OT-IRF) goal not met  -DN     Time Frame (Toileting Goal 2, OT-IRF) long-term goal (LTG)  -DN     Row Name 02/14/22 1540          Strength Goal 1 (OT-IRF)    Strength Goal 1 (OT-IRF) incr B UE to 4/5  -DN     Time Frame (Strength Goal 1, OT-IRF) long-term goal (LTG)  -DN     Progress/Outcomes (Strength Goal 1, OT-IRF) goal met  -DN     Row Name 02/14/22 1540          Balance Goal 1 (OT)     Activity/Assistive Device (Balance Goal 1, OT) standing, dynamic  -DN     [place] Level/Cues Needed (Balance Goal 1, OT) supervision required  -DN     Time Frame (Balance Goal 1, OT) long term goal (LTG)  -DN     Progress/Outcomes (Balance Goal 1, OT) goal met  -DN     Row Name 02/14/22 1545          Caregiver Training Goal 1 (OT-IRF)    Caregiver Training Goal 1 (OT-IRF) ed pt spouse on safety w ADLs and tsf, pt and spouse independent w safety w ADLs and tsf. pt I w HEP  -DN     Time Frame (Caregiver Training Goal 1, OT-IRF) long-term goal (LTG)  -DN     Progress/Outcomes (Caregiver Training Goal 1, OT-IRF) goal not met  -DN     Row Name 02/14/22 154          Positioning and Restraints    Pre-Treatment Position in bed  -DN     Post Treatment Position bed  -DN     In Chair call light within reach; encouraged to call for assist; exit alarm on  -DN           User Key  (r) = Recorded By, (t) = Taken By, (c) = Cosigned By    Initials Name Effective Dates    DN Donato Martinez, OT 06/16/21 -               Wound 12/07/21 0757 Right abdomen Incision (Active)   Dressing Appearance dry; intact 02/14/22 0755   Closure [place] 02/14/22 0755   Base dressing in place, unable to visualize 02/14/22 0755       Wound 12/25/21 0809 abdomen Other (comment) (Active)   Dressing Appearance dry; intact 02/14/22 0755   Closure [place] 02/14/22 0755   Base dressing in place, unable to visualize 02/14/22 0755       Wound 12/29/21 1518 midline abdomen Incision (Active)   Dressing Appearance dry; intact 02/14/22 0755   Closure [place] 02/14/22 0755   Base dressing in place, unable to visualize 02/14/22 0755       Occupational Therapy Education                 Title: PT OT SLP Therapies (Done)     Topic: Occupational Therapy (Done)     Point: ADL training (Done)     Description:   Instruct learner(s) on proper safety adaptation and remediation techniques during self care or transfers.   Instruct in proper use of assistive devices.               Learning Progress Summary           Patient Acceptance, E,TB,D, VU,NR by JS at 2/12/2022 1147    Acceptance, E,TB,D,H, DU,VU by  at 2/10/2022 1540    Comment: ed pt on HEP for UE ex and ROM ex. Pt demo and verbalize understanding. Pt HEP provided and left in room in top drawer with his clothes as he dc next week on 2/15.    Acceptance, E,TB,D, DU,VU by  at 2/4/2022 1455    Comment: ed pt on role of OT. benefit of therapy POC w. OT. ed on safety w. ADL and tsf. ed on UE ex.                   Point: Home exercise program (Done)     Description:   Instruct learner(s) on appropriate technique for monitoring, assisting and/or progressing therapeutic exercises/activities.              Learning Progress Summary           Patient Acceptance, E,TB,D, VU,NR by JUAN M at 2/12/2022 1147    Acceptance, E,TB,D,H, DU,VU by  at 2/10/2022 1540    Comment: ed pt on HEP for UE ex and ROM ex. Pt demo and verbalize understanding. Pt HEP provided and left in room in top drawer with his clothes as he dc next week on 2/15.    Acceptance, E,TB,D, DU,VU by  at 2/4/2022 1455    Comment: ed pt on role of OT. benefit of therapy POC w. OT. ed on safety w. ADL and tsf. ed on UE ex.                   Point: Precautions (Done)     Description:   Instruct learner(s) on prescribed precautions during self-care and functional transfers.              Learning Progress Summary           Patient Acceptance, E,TB,D, VU,NR by  at 2/12/2022 1147    Acceptance, E,TB,D,H, DU,VU by  at 2/10/2022 1540    Comment: ed pt on HEP for UE ex and ROM ex. Pt demo and verbalize understanding. Pt HEP provided and left in room in top drawer with his clothes as he dc next week on 2/15.    Acceptance, E,TB,D, DU,VU by  at 2/4/2022 1455    Comment: ed pt on role of OT. benefit of therapy POC w. OT. ed on safety w. ADL and tsf. ed on UE ex.                   Point: Body mechanics (Done)     Description:   Instruct learner(s) on proper positioning and spine alignment  during self-care, functional mobility activities and/or exercises.              Learning Progress Summary           Patient Acceptance, E,TB,D, VU,NR by  at 2/12/2022 1147    Acceptance, E,TB,D,H, DU,VU by  at 2/10/2022 1540    Comment: ed pt on HEP for UE ex and ROM ex. Pt demo and verbalize understanding. Pt HEP provided and left in room in top drawer with his clothes as he dc next week on 2/15.    Acceptance, E,TB,D, DU,VU by  at 2/4/2022 1455    Comment: ed pt on role of OT. benefit of therapy POC w. OT. ed on safety w. ADL and tsf. ed on UE ex.                               User Key     Initials Effective Dates Name Provider Type Discipline     06/16/21 -  Lyndsey Zeng, OTR Occupational Therapist OT    JUAN M 06/16/21 -  Sweta Ferro, SALLY Physical Therapist PT                OT Recommendation and Plan  Anticipated Discharge Disposition (OT): home, home with assist  Planned Therapy Interventions (OT): adaptive equipment training, BADL retraining, activity tolerance training, functional balance retraining, patient/caregiver education/training, ROM/therapeutic exercise, strengthening exercise, transfer/mobility retraining           OT IRF GOALS     Row Name 02/14/22 1540 02/10/22 1533 02/04/22 1345       Transfer Goal 1 (OT-IRF)    Activity/Assistive Device (Transfer Goal 1, OT-IRF) toilet; commode, 3-in-1; walker, rolling  -DN toilet; sit-to-stand/stand-to-sit; bed-to-chair/chair-to-bed; shower chair with a back  -KP toilet; sit-to-stand/stand-to-sit; bed-to-chair/chair-to-bed; shower chair with a back  -KP    [place] Level (Transfer Goal 1, OT-IRF) set-up required  -DN set-up required; standby assist  -KP set-up required; standby assist  -KP    Time Frame (Transfer Goal 1, OT-IRF) short-term goal (STG)  -DN short-term goal (STG); 1 week  -KP short-term goal (STG); 1 week  -KP    Progress/Outcomes (Transfer Goal 1, OT-IRF) goal met  -DN goal met  -KP goal ongoing  -KP       Transfer Goal 2  (OT-IRF)    Activity/Assistive Device (Transfer Goal 2, OT-IRF) sit-to-stand/stand-to-sit; bed-to-chair/chair-to-bed; toilet; walk-in shower; walker, rolling  -DN sit-to-stand/stand-to-sit; bed-to-chair/chair-to-bed; toilet; walk-in shower; walker, rolling  -KP sit-to-stand/stand-to-sit; bed-to-chair/chair-to-bed; toilet; walk-in shower; walker, rolling  -KP    [place] Level (Transfer Goal 2, OT-IRF) set-up required  -DN set-up required; standby assist  -KP set-up required; standby assist  -KP    Time Frame (Transfer Goal 2, OT-IRF) long-term goal (LTG)  -DN long-term goal (LTG); by discharge; 1 week  -KP long-term goal (LTG); by discharge; 1 week; 2 weeks  -KP    Progress/Outcomes (Transfer Goal 2, OT-IRF) goal met  -DN goal met; goal ongoing  -KP goal ongoing  -KP       Bathing Goal 1 (OT-IRF)    Activity/Device (Bathing Goal 1, OT-IRF) bathing skills, all  -DN bathing skills, all; grab bar, tub/shower; hand-held shower spray hose; long-handled sponge; shower chair  -KP bathing skills, all; grab bar, tub/shower; hand-held shower spray hose; long-handled sponge; shower chair  -KP    [place] Level (Bathing Goal 1, OT-IRF) contact guard assist  -DN contact guard assist  -KP contact guard assist  -KP    Time Frame (Bathing Goal 1, OT-IRF) short-term goal (STG)  -DN short-term goal (STG); 1 week  -KP short-term goal (STG); 1 week  -KP    Progress/Outcomes (Bathing Goal 1, OT-IRF) goal met  -DN goal met  -KP goal ongoing  -KP       Bathing Goal 2 (OT-IRF)    Activity/Device (Bathing Goal 2, OT-IRF) bathing skills, all  -DN bathing skills, all; grab bar, tub/shower; hand-held shower spray hose; long-handled sponge; shower chair  -KP bathing skills, all; grab bar, tub/shower; hand-held shower spray hose; long-handled sponge; shower chair  -KP    [place] Level (Bathing Goal 2, OT-IRF) standby assist  -DN standby assist  -KP standby assist  -KP    Time Frame (Bathing Goal 2, OT-IRF) long-term goal (LTG)  -DN  long-term goal (LTG); by discharge; 1 week  -KP long-term goal (LTG); by discharge; 2 weeks; 1 week  -KP    Progress/Outcomes (Bathing Goal 2, OT-IRF) goal met  -DN goal met  -KP goal ongoing  -KP       UB Dressing Goal 1 (OT-IRF)    Activity/Device (UB Dressing Goal 1, OT-IRF) upper body dressing  -DN upper body dressing  -KP upper body dressing  -KP    [place] (UB Dress Goal 1, OT-IRF) standby assist  -DN standby assist  -KP standby assist  -KP    Time Frame (UB Dressing Goal 1, OT-IRF) short-term goal (STG)  -DN short-term goal (STG); 1 week  -KP short-term goal (STG); 1 week  -KP    Progress/Outcomes (UB Dressing Goal 1, OT-IRF) goal met  -DN goal met  -KP goal ongoing  -KP       UB Dressing Goal 2 (OT-IRF)    Activity/Device (UB Dressing Goal 2, OT-IRF) upper body dressing  -DN upper body dressing  -KP upper body dressing  -KP    [place] (UB Dress Goal 2, OT-IRF) modified independence  -DN modified independence  -KP modified independence  -KP    Time Frame (UB Dressing Goal 2, OT-IRF) long-term goal (LTG)  -DN long-term goal (LTG); 1 week; by discharge  -KP long-term goal (LTG); 1 week; 2 weeks; by discharge  -KP    Progress/Outcomes (UB Dressing Goal 2, OT-IRF) goal not met  -DN good progress toward goal  -KP goal ongoing  -KP       LB Dressing Goal 1 (OT-IRF)    Activity/Device (LB Dressing Goal 1, OT-IRF) lower body dressing  -DN lower body dressing; reacher; sock aid  -KP lower body dressing; reacher; sock aid  -KP    [place] (LB Dressing Goal 1, OT-IRF) minimum assist (75% or more patient effort)  -DN minimum assist (75% or more patient effort)  -KP minimum assist (75% or more patient effort)  -KP    Time Frame (LB Dressing Goal 1, OT-IRF) short-term goal (STG)  -DN short-term goal (STG); 1 week  -KP short-term goal (STG); 1 week  -KP    Progress/Outcomes (LB Dressing Goal 1, OT-IRF) goal met  -DN goal met  -KP goal ongoing  -KP       LB Dressing Goal 2 (OT-IRF)    Activity/Device (LB  Dressing Goal 2, OT-IRF) lower body dressing  -DN lower body dressing; reacher; sock aid  -KP lower body dressing; reacher; sock aid  -KP    [place] (LB Dressing Goal 2, OT-IRF) standby assist  -DN standby assist  -KP standby assist  -KP    Time Frame (LB Dressing Goal 2, OT-IRF) long-term goal (LTG)  -DN long-term goal (LTG); 1 week; by discharge  -KP long-term goal (LTG); 1 week; 2 weeks; by discharge  -KP    Progress/Outcomes (LB Dressing Goal 2, OT-IRF) goal met  -DN goal met; goal ongoing  -KP goal ongoing  -KP       Grooming Goal 1 (OT-IRF)    Activity/Device (Grooming Goal 1, OT-IRF) grooming skills, all  -DN grooming skills, all  -KP grooming skills, all  -KP    [place] (Grooming Goal 1, OT-IRF) set-up required  -DN set-up required  -KP set-up required  -KP    Time Frame (Grooming Goal 1, OT-IRF) short-term goal (STG)  -DN short-term goal (STG); 1 week  -KP short-term goal (STG); 1 week  -KP    Progress/Outcomes (Grooming Goal 1, OT-IRF) goal met  -DN goal met  -KP goal ongoing  -KP       Grooming Goal 2 (OT-IRF)    Activity/Device (Grooming Goal 2, OT-IRF) grooming skills, all  -DN grooming skills, all  -KP grooming skills, all  -KP    [place] (Grooming Goal 2, OT-IRF) modified independence  -DN modified independence  -KP modified independence  -KP    Time Frame (Grooming Goal 2, OT-IRF) long-term goal (LTG)  -DN long-term goal (LTG); by discharge; 1 week  -KP long-term goal (LTG); by discharge; 2 weeks; 1 week  -KP    Progress/Outcomes (Grooming Goal 2, OT-IRF) goal not met  -DN good progress toward goal  -KP goal ongoing  -KP       Toileting Goal 1 (OT-IRF)    Activity/Device (Toileting Goal 1, OT-IRF) toileting skills, all  -DN toileting skills, all; grab bar/safety frame  -KP toileting skills, all; grab bar/safety frame  -KP    [place] Level (Toileting Goal 1, OT-IRF) set-up required  -DN set-up required; contact guard assist  -KP set-up required; contact guard assist  -KP     Progress/Outcomes (Toileting Goal 1, OT-IRF) goal met  -DN goal met  -KP goal ongoing  -KP    Time Frame (Toileting Goal 1, OT-IRF) short-term goal (STG)  -DN short-term goal (STG); 1 week  -KP short-term goal (STG); 1 week  -KP       Toileting Goal 2 (OT-IRF)    Activity/Device (Toileting Goal 2, OT-IRF) toileting skills, all  -DN toileting skills, all; grab bar/safety frame  -KP toileting skills, all; grab bar/safety frame  -KP    [place] Level (Toileting Goal 2, OT-IRF) standby assist  -DN standby assist  -KP standby assist  -KP    Strategies/Barriers (Toileting Goal 2, OT-IRF) still needs help with ostomy care  -DN -- --    Progress/Outcomes (Toileting Goal 2, OT-IRF) goal not met  -DN goal met; goal ongoing  -KP goal ongoing  -KP    Time Frame (Toileting Goal 2, OT-IRF) long-term goal (LTG)  -DN long-term goal (LTG); 1 week; by discharge  -KP long-term goal (LTG); 1 week; 2 weeks; by discharge  -KP       Strength Goal 1 (OT-IRF)    Strength Goal 1 (OT-IRF) incr B UE to 4/5  -DN incr B UE to 4/5  -KP incr B UE to 4/5  -KP    Time Frame (Strength Goal 1, OT-IRF) long-term goal (LTG)  -DN long-term goal (LTG); by discharge  -KP long-term goal (LTG); by discharge; 2 weeks; 1 week  -KP    Progress/Outcomes (Strength Goal 1, OT-IRF) goal met  -DN continuing progress toward goal  -KP goal ongoing  -KP       Balance Goal 1 (OT)    Activity/Assistive Device (Balance Goal 1, OT) standing, dynamic  -DN standing, dynamic  -KP standing, dynamic  -KP    [place] Level/Cues Needed (Balance Goal 1, OT) supervision required  -DN supervision required  -KP supervision required  -KP    Time Frame (Balance Goal 1, OT) long term goal (LTG)  -DN long term goal (LTG); by discharge  -KP long term goal (LTG); by discharge  -KP    Progress/Outcomes (Balance Goal 1, OT) goal met  -DN goal met  -KP goal ongoing  -KP       Caregiver Training Goal 1 (OT-IRF)    Caregiver Training Goal 1 (OT-IRF) ed pt spouse on safety w ADLs and  tsf, pt and spouse independent w safety w ADLs and tsf. pt I w HEP  -DN ed pt spouse on safety w ADLs and tsf, pt and spouse independent w safety w ADLs and tsf. pt I w HEP  -KP ed pt spouse on safety w ADLs and tsf, pt and spouse independent w safety w ADLs and tsf. pt I w HEP  -KP    Time Frame (Caregiver Training Goal 1, OT-IRF) long-term goal (LTG)  -DN long-term goal (LTG); by discharge; 1 week; 2 weeks  -KP long-term goal (LTG); by discharge; 1 week; 2 weeks  -KP    Progress/Outcomes (Caregiver Training Goal 1, OT-IRF) goal not met  -DN goal ongoing  -KP goal ongoing  -KP          User Key  (r) = Recorded By, (t) = Taken By, (c) = Cosigned By    Initials Name Provider Type    Donato Cruz OT Occupational Therapist    Lyndsey Melo OTR Occupational Therapist                    Time Calculation:    Time Calculation- OT     Row Name 02/14/22 0900             Time Calculation- OT    OT Start Time 0900  -DN      OT Stop Time 1000  -DN      OT Time Calculation (min) 60 min  -DN            User Key  (r) = Recorded By, (t) = Taken By, (c) = Cosigned By    Initials Name Provider Type    Donato Cruz OT Occupational Therapist                Therapy Charges for Today     Code Description Service Date Service Provider Modifiers Qty    [id number]  OT SELF CARE/MGMT/TRAIN EA 15 MIN 2/14/2022 Donato Martinez OT GO 3    [id number]  OT THER PROC EA 15 MIN 2/14/2022 Donato Martinez OT GO 1               OT Discharge Summary  Anticipated Discharge Disposition (OT): home, home with assist  Reason for Discharge: Discharge from facility  Outcomes Achieved: Patient able to partially acheive established goals  Discharge Destination: Home with assist    Donato Martinez OT  2/14/2022

## 2022-02-14 NOTE — PROGRESS NOTES
LOS: 11 days   Patient Care Team:  Zechariah Fatima MD as PCP - General  PinaRichard alegria MD as PCP - Family Medicine (Pulmonary Disease)  John Bowles MD as Consulting Physician (Gastroenterology)  Hermes Shabazz MD as Consulting Physician (Urology)  Osmar Carranza MD as Consulting Physician (Cardiology)      LEDA JAZMÍN VALENZUELAE  1960    Chief Complaint: Immobility syndrome  Impaired mobility/impaired self-care/impaired endurance  Ileostomy takedown , cholecystectomy, incisional hernia repair December 7, 2021.  Exploratory laparotomy with end colostomy partial colon resection December 29, 2021  Ostomy-on Imodium/glycopyrrolate  Anxiety  Anemia  Hyponatremia        Subjective     He has been participating in therapies.  Endurance overall about the same.  Will fatigue.  Wears nasal cannula oxygen on and off at night.  Sleeping better on Remeron.         Objective     Vitals:    02/14/22 1207   BP: 104/71   Pulse: 100   Resp: 16   Temp: 98 °F (36.7 °C)   SpO2: 98%       PHYSICAL EXAM:   MENTAL STATUS -  AWAKE / ALERT  HEENT-    LUNGS - CTA, NO WHEEZES, RALES OR RHONCHI  HEART-regular rate and rhythm  ABD - NORMOACTIVE BOWEL SOUNDS, SOFT, NT.  Ostomy  . Dressings in place at right and left abdomen  EXT - NO EDEMA OR CYANOSIS  NEURO -oriented x4.  MOTOR EXAM - RUE/RLE 5/5. LUE/LLE 5/5.           MEDICATIONS  Scheduled Meds:aluminum sulfate-calcium acetate, 1 packet, Topical, Q3 Days  carvedilol, 6.25 mg, Oral, Daily Before Supper  enoxaparin, 40 mg, Subcutaneous, Q24H  furosemide, 20 mg, Oral, Every Other Day  glycopyrrolate, 1 mg, Oral, BID  loperamide, 2 mg, Oral, 4x Daily AC & at Bedtime  melatonin, 3 mg, Oral, Nightly  [START ON 2/17/2022] mirtazapine, 15 mg, Oral, Nightly  mirtazapine, 7.5 mg, Oral, Nightly  silver nitrate, 1 application, Topical, Once      Continuous Infusions:   PRN Meds:.•  acetaminophen  •  Glycerin-Hypromellose-  •  HYDROcodone-acetaminophen  •  ondansetron ODT  •   simethicone      RESULTS  No results found for: POCGLU  Results from last 7 days   Lab Units 02/14/22  0726 02/11/22  0514   WBC 10*3/mm3 8.45 8.74   HEMOGLOBIN g/dL 11.0* 10.3*   HEMATOCRIT % 35.1* 33.3*   PLATELETS 10*3/mm3 165 163     Results from last 7 days   Lab Units 02/14/22  0726 02/12/22  0527 02/11/22  0514   SODIUM mmol/L 128* 128* 126*   POTASSIUM mmol/L 5.0 4.1 3.9   CHLORIDE mmol/L 92* 91* 89*   CO2 mmol/L 27.4 26.4 27.8   BUN mg/dL 19 11 14   CREATININE mg/dL 1.02 0.99 1.01   CALCIUM mg/dL 8.6 8.7 9.0   GLUCOSE mg/dL 86 94 93     Results from last 7 days   Lab Units 02/14/22  0726 02/11/22  0514   WBC 10*3/mm3 8.45 8.74   HEMOGLOBIN g/dL 11.0* 10.3*   HEMATOCRIT % 35.1* 33.3*   PLATELETS 10*3/mm3 165 163           Ref. Range 2/2/2022 11:41   Magnesium Latest Ref Range: 1.6 - 2.4 mg/dL 1.8   Prealbumin Latest Ref Range: 20.0 - 40.0 mg/dL 10.2 (L)      Chest x-ray February 6  XR CHEST 1 VW-  02/06/2022     HISTORY: Shortness of breath.     Heart size is mildly enlarged. The left hemidiaphragm is partially  obscured likely from small pleural effusion with some mild atelectasis  and/or pneumonia. Left upper lung and right lung appear clear.     Cardiac pacemaker seen in good position.     IMPRESSION:  1. Mild cardiomegaly.  2. Increased density in the left lung base may represent combination of  small amount of left pleural effusion with some mild atelectasis and/or  pneumonia. The left base may have cleared slightly since the 01/23/2022  study.         Assessment/Plan     Insomnia due to medical condition    Debility      Chief Complaint: Immobility syndrome  Impaired mobility/impaired self-care/impaired endurance     Ileostomy takedown , cholecystectomy, incisional hernia repair December 7, 2021.  Exploratory laparotomy with end colostomy partial colon resection December 29, 2021     Ostomy-on Imodium/glycopyrrolate  February 4-continue Robinul twice daily for now, but decreased Imodium from 2 tablets  to 1 tablet p.o. before every meal and at bedtime.     Abdominal wound care  February 11-[place] soak.  Ongoing ostomy care.  Wounds are improving and smaller.     Anxiety-would presently hold on adding any benzodiazepine given his multiple medical issues.    Depression-February 9-given his hyponatremia, SSRI would not be the best option at this point.  Remeron may be a better option and will review with nephrology and with the patient.  February 10-reviewed with nephrology.  Discussed with patient.  Look at starting Remeron 7.5 mg nightly for 1 week then increase to 15 mg nightly     Anemia     Electrolytes-hyponatremia/hyperkalemia  February 4-sodium was 127 on February 2, 130 on February 3, decreased 124 on February 4.  Will consult nephrology for evaluation  Feb 7 -  per Nephrology -[Continue UREA 15 gr daily and placed on fluid restriction 1.5 liters . TRIAL amiloride 10  mg daily ]   February 9-Per nephrology-Borderline hyperkalemia and Nephrology started Lokelma 10 g daily.  Continue UREA 15 gr daily and Fluid restriction 1.5 liters, Hold amiloride due to borderline hyperkalemia for 24 hours and then resume daily ,  Continue sodium chloride 1 gr BID  February 11-off salt tablets.  Off urea.  Completed Lokelma.  Fluid restriction 1200 cc/day.  February 14-fluid restriction 1000 cc per 24 hours     Congestive heart failure/tachycardia-on Coreg 12.5 mg for supper.  Blood pressure low at times.  Parameters added evening Feb 3 - Hold if systolic is <110 and diastolic <70  .  Feb 8 - has not received Coreg 12.5 q supper since parameters added on Feb 3 after BP dropped to 88/66. He received every evening on acute care stay .  Will adjust parameter to hold if SBP < 100 and decrease dose to 6.25 mg q supper pending updated input from Cardiology to re-assess for parameters  February 9-tolerated lower dose of Coreg  February 10-Tolerating lower dose of Coreg 6.25 mg with pulse ranging 100-106 and blood pressure  systolic .       Nonischemic cardiomyopathy ejection fraction 20%     DVT prophylaxis-Lovenox/SCDs     Impaired nutritional status-supplements per dietitian to follow.  Recheck prealbumin around February 16     Pulmonary- using  O2 2 L nasal cannula at night  Feb 7 - Started on cefdinir and given  breathing treatment yesterday.  He feels breathing treatment help bring up thick secretions.  He is on glycopyrrolate which probably thickens at secretions.  Tachycardia yesterday did not appear to correlate with the time of the breathing treatment as his pulse actually went down after the breathing treatment.  Will give another breathing treatment today  February 9-reviewed with patient getting outpatient sleep study  February 10-He complains of feeling short of air at night.  Previously reviewed with patient ordering outpatient sleep study.  Wears O2 2 L at night, sats 95 to 100%.  Will check overnight pulse oximetry study on 2 L nasal cannula  February 11- Overnight pulse oximetry showed sats %, average 97%, pulse , average 102.5.  February 14-check overnight pulse oximetry on room air tonight      Insomnia-melatonin added  February 10-added Remeron  Plan for outpatient sleep study     TEAM CONF - FEB 8 - WEAK, POOR ENDURANCE. ANXIETY.SATS 96% ON ROOM AIR DURING THE DAY.  BED SBA. TRANSFERS CTG RW. GAIT 8-=160 FEET CTG RW. TOILET TRANSFERS CTG. BATH MIN. LBD DEP FOR SOCKS. UBD SBA GOWN. GROOMING SBA.  SWALLOW - ESOPHAGEAL REFLUX. COGNITION - MILD DEFICITS, IMPAIRED ATTENTION DUE TO DISCOMFORT. WOUND CARE, OSTOMY CARE. HYPONATREMIA. FLUID RESTRICTIONS. REC THERAPY LOOKING AT ACTIVITIES DIRECTED TOWARD ANXIETY. ASK PSYCHOLOGY TO SEE.   ELOS - ONE WEEK.      REHAB - admit for comprehensive acute inpatient rehabilitation .  This would be an interdisciplinary program with physical therapy 1.5 hour,  occupational therapy 1.5 hour,  5 days a week.  Rehabilitation nursing for carryover, monitoring of cardiac  and intestinal   status, bowel and bladder, and skin  Ongoing physician follow-up.  Weekly team conferences.   The patient's functional status and clinical status is unchanged from preadmission assessment and the patient continues appropriate for acute inpatient rehabilitation.  Goal is for home with outpatient   therapies.  Barrier to discharge:.  Mobility and self-care endurance- worked on condition, transfers, progressive ambulation, activity daily living to overcome.            Zechariah Pearson MD      During rounds, used appropriate personal protective equipment including mask and gloves.  Additional gown if indicated.  Mask used was standard procedure mask. Appropriate PPE was worn during the entire visit.  Hand hygiene was completed before and after.

## 2022-02-14 NOTE — PROGRESS NOTES
Inpatient Rehabilitation Plan of Care Note    Plan of Care  Care Plan Reviewed - No updates at this time.    Psychosocial    [RN] Coping/Adjustment(Active)  Current Status(02/14/2022): Pt at risk for coping issues  Weekly Goal(02/21/2022): Pt will make staff aware of concerns  Discharge Goal: NO coping problems    Performed Intervention(s)  M[place] consult encouraged  Provide emotional support      Safety    [RN] Potential for Injury(Active)  Current Status(02/14/2022): Pt is at risk for falls.  Weekly Goal(02/21/2022): Will use call bell 100% of the time  Discharge Goal: No injury    Performed Intervention(s)  Falls protocal  Yellow socks  bed alarm/wc alarm      Sphincter Control    [RN] Bladder Management(Active)  Current Status(02/14/2022): Pt cont of urine upon admission  Weekly Goal(02/21/2022): Remain cont  Discharge Goal: 100% cont of urine    [RN] Bowel Management(Active)  Current Status(02/14/2022): Pt has an ostomy in which skin RNs change  Weekly Goal(02/21/2022): Ostomy is fuctional  Discharge Goal: Pt aware of how to manage ostomy    Performed Intervention(s)  I and O  Btrm schedule  Wound ostomy consult for RNs    Signed by: Cecile Apodaca RN

## 2022-02-14 NOTE — PLAN OF CARE
Patient is A & O x 4. Ostomy bag leaking this morning. Reinforced dressing and notified wound care nurse. Patient refused ostomy and wound dressing to be changed today and to wait until tomorrow when his wife is at bedside. No further drainage noted from the ostomy. Stool is watery brown. Patient refused imodium doses 2/13 and this morning. Encouraged patient to take imodium to avoid watery stool. Patient is using call light for assistance. Will continue to monitor.

## 2022-02-14 NOTE — TELEPHONE ENCOUNTER
EVELYNE WITH [facility] CALLED STATING THAT THE PATIENT WILL BE DISCHARGING [facility] ON 02/15/22.    REQUESTING ORDERS FOR NURSING AND PT EVAL.    PLEASE ADVISE  [phone]

## 2022-02-14 NOTE — PROGRESS NOTES
Adult Nutrition  Assessment/PES    Patient Name:  Arnie Calvo  YOB: 1960  MRN: [id number]  Admit Date:  2/3/2022    Assessment Date:  2/14/2022    Comments:  Follow up note. Pt on Regular Low Vit K diet and fluid restrictions decreased to 1000ml./day. Po 25-50%. Pt also getting Boost plus supplements and HS snack. Labs/Wt/skin reviewed. Note possible d/c tomorrow. Will remain available as needed.      Reason for Assessment     Row Name 02/14/22 1353          Reason for Assessment    Reason For Assessment follow-up protocol                Nutrition/Diet History     Row Name 02/14/22 8621          Nutrition/Diet History    Typical Food/Fluid Intake po 25-50%                  Labs/Tests/Procedures/Meds     Row Name 02/14/22 5077          Labs/Procedures/Meds    Lab Results Reviewed reviewed     Lab Results Comments Na 128, alb            Diagnostic Tests/Procedures    Diagnostic Test/Procedure Reviewed reviewed            Medications    Pertinent Medications Reviewed reviewed                Physical Findings     Row Name 02/14/22 6825          Physical Findings    Overall Physical Appearance on oxygen therapy     Gastrointestinal colostomy     Skin surgical incision; non-healing wound(s)  abdomen                  Nutrition Prescription Ordered     Row Name 02/14/22 9601          Nutrition Prescription PO    Current PO Diet Regular     Supplement Boost Plus (Ensure Enlive, Ensure Plus)     Snack Times Bedtime  boost pudding     Common Modifiers Fluid Restriction     Fluid Restriction mL per Day 1000 mL                       Problem/Interventions:           Intervention Goal     Row Name 02/14/22 9375          Intervention Goal    General Disease management/therapy; Reduce/improve symptoms; Maintain nutrition; Meet nutritional needs for age/condition     PO Tolerate PO; PO intake (%)     PO Intake % 75 %     Weight Maintain weight                    Education/Evaluation     Row Name 02/14/22 1400           Monitor/Evaluation    Monitor Per protocol; Pertinent labs; Weight; PO intake; Supplement intake                 Electronically signed by:  Shannon Mancini RD  02/14/22 14:00 EST

## 2022-02-14 NOTE — PROGRESS NOTES
Inpatient Rehabilitation Plan of Care Note    Plan of Care  Care Plan Reviewed - No updates at this time.    Safety    Performed Intervention(s)  Falls protocal  Yellow socks  bed alarm/wc alarm      Sphincter Control    Performed Intervention(s)  I and O  Btrm schedule  Wound ostomy consult for RNs      Psychosocial    Performed Intervention(s)  [place] consult encouraged  Provide emotional support    Signed by: Joleen Humphreys RN

## 2022-02-14 NOTE — PROGRESS NOTES
Case Management  Inpatient Rehabilitation Plan of Care and Discharge Plan Note    Rehabilitation Diagnosis:  Branch  Date of Onset:  Branch    Medical Summary:  Branch  Past Medical History: Branch    Plan of Care  Updated Problems/Interventions  Field    Expected Intensity:  Branch  Interdisciplinary Team:  Michelle  Estimated Length of Stay/Anticipated Discharge Date: Branch  Anticipated Discharge Destination:  Anticipated discharge destination from inpatient rehabilitation is community  discharge with assistance. Patient lives with wife in one story home. No step to  enter.  Family conference held on 2/11 with patient, wife, and friend. Wife and friend  had teaching with ostomy nurse.  D/C plan is home with wife. Wife works from home.  [facility] to provide home PT, NSG      Based on the patient's medical and functional status, their prognosis and  expected level of functional improvement is:  Michelle    Signed by: ARYA Hoover

## 2022-02-14 NOTE — PROGRESS NOTES
Inpatient Rehabilitation Plan of Care Note    Plan of Care  Updated Problems/Interventions  Cognition    [ST] Attention(Active)  Current Status(02/14/2022): WFL  Weekly Goal(02/21/2022): Patient will follow daily schedule to anticipate  therapies scheduled for the day.  Discharge Goal: Patient will improve attention to detail to increase  independence at next level of care    [ST] Executive Functions(Active)  Current Status(02/14/2022): WFL  Weekly Goal(02/21/2022): Patient will organize materials needed for ADLs  Discharge Goal: Patient will improve organizational skills for increased  independence at next level of care    Signed by: Kristie Luis, SLP

## 2022-02-14 NOTE — PROGRESS NOTES
[facility] Progress Note      Patient Name: Arnie Calvo  : 1960  MRN: [id number]  Primary Care Physician:  Zechariah Fatima MD  Date of admission: 2/3/2022    Subjective     Interval History:   F/u hyponatremia    Review of Systems:   Patient no new complaints today.  Denies nausea no vomiting.  No fever no chills.  Comfortable.  Reported decreased oral intake      Objective     Vitals:   Temp:  [97.9 °F (36.6 °C)-98.5 °F (36.9 °C)] 98.5 °F (36.9 °C)  Heart Rate:  [] 99  Resp:  [16-18] 16  BP: ()/(62-71) 104/71  Flow (L/min):  [2] 2    Intake/Output Summary (Last 24 hours) at 2022 1032  Last data filed at 2022 0755  Gross per 24 hour   Intake 600 ml   Output 350 ml   Net 250 ml       Physical Exam:    General Appearance: frail WM winded and in mild resp distress edge of bed  Neck supple no JVD  Lungs CTA bilat no rales  CV RRR no m/g  abd soft NT/ND  vasc no pedal/ankle edema    Scheduled Meds:     aluminum sulfate-calcium acetate, 1 packet, Topical, Q3 Days  carvedilol, 6.25 mg, Oral, Daily Before Supper  enoxaparin, 40 mg, Subcutaneous, Q24H  furosemide, 20 mg, Oral, Every Other Day  glycopyrrolate, 1 mg, Oral, BID  loperamide, 2 mg, Oral, 4x Daily AC & at Bedtime  melatonin, 3 mg, Oral, Nightly  [START ON 2022] mirtazapine, 15 mg, Oral, Nightly  mirtazapine, 7.5 mg, Oral, Nightly  silver nitrate, 1 application, Topical, Once      IV Meds:        Results Reviewed:   I have personally reviewed the results from the time of this admission to 2022 10:32 EST     Results from last 7 days   Lab Units 22  0726 22  0527 22  0514   SODIUM mmol/L 128* 128* 126*   POTASSIUM mmol/L 5.0 4.1 3.9   CHLORIDE mmol/L 92* 91* 89*   CO2 mmol/L 27.4 26.4 27.8   BUN mg/dL 19 11 14   CREATININE mg/dL 1.02 0.99 1.01   CALCIUM mg/dL 8.6 8.7 9.0   GLUCOSE mg/dL 86 94 93     Estimated Creatinine Clearance: 82.8 mL/min (by C-G formula based on SCr of 1.02  "mg/dL).  Results from last 7 days   Lab Units 02/14/22  0726 02/11/22  0514 02/10/22  0651   PHOSPHORUS mg/dL 4.1 4.0 4.3         Results from last 7 days   Lab Units 02/14/22  0726 02/11/22  0514   WBC 10*3/mm3 8.45 8.74   HEMOGLOBIN g/dL 11.0* 10.3*   PLATELETS 10*3/mm3 165 163           Assessment / Plan     ASSESSMENT:  -Acute on chronic hypotonic hyponatremia.  Cortisol 13 . TSH normal, Urine Osmo 284. Urine Sodium < 20 . Serum osmo (273) , suspect from CHF based on studies.  Stopped salt & urea tabs.  Sodium stable at 128    -Dilated cardiomyopathy w / EF 20 %.  No periph edema and weights appear unreliable but not trending up.  CXR few days ago suggested mild edil with possible small pleural effusion.  Suspect SHER moreso due to deconditioning then vol excess but will try very low dose of lasix QOD (he's concerned about excess urination, using urinal, etc solute intake.  Decreased water intake  .   -hyperkalemia - due to amiloride, K up to 5.9 yesterday, given lokelma, down to 3.9  -Physical deconditioning.  Working w/ PT and OT   -PNA. on cefnidir .CXR \" left mild atelectasis and/or pneumonia. \"  -Hypotension seems to be chronic blood pressure is better.  Asymptomatic    PLAN:    Patient instructed to increase p.o. intake  Increase fluid restriction 1000 cc per 24 hours  Surveillance labs        Sai Yan MD  02/14/22  10:32 EST    [facility]  [phone]  "

## 2022-02-14 NOTE — PLAN OF CARE
Goal Outcome Evaluation:  Plan of Care Reviewed With: patient        Progress: improving  Outcome Summary: A&Ox4. Meds crushed in applesauce. Denies pain. Refuses Immodium and Melatonin this shift. Cont bladder and using urinal. Colostomy for BM. Wearing O2 at 2L/min per nasal canula while in bed. Pt with flat affect. Refuses SCDs. No unsafe behavior this shift. Sleeping well.

## 2022-02-14 NOTE — THERAPY DISCHARGE NOTE
Inpatient Rehabilitation - Physical Therapy Treatment Note/Discharge  [facility]     Patient Name: Arnie Calvo  : 1960  MRN: [id number]  Today's Date: 2022                Admit Date: 2/3/2022    Visit Dx:    ICD-10-CM ICD-9-CM   1. Insomnia due to medical condition  G47.01 327.01     Patient Active Problem List   Diagnosis   • Cardiomyopathy (HCC)   • Paroxysmal VT (HCC)   • Palpitations   • PVC's (premature ventricular contractions)   • Exacerbation of Crohn's disease of small intestine (HCC)   • Adjustment disorder with depressed mood   • Ankylosis of spine   • Crohn's disease with complication (HCC)   • Diabetes mellitus (HCC)   • Essential hypertension   • External hemorrhoids   • Genital herpes simplex   • Gout   • Insomnia due to medical condition   • Iron deficiency   • Prostate mass   • Recurrent aphthous ulcer   • Asteatosis cutis   • History of pneumonia   • Abnormal CXR (chest x-ray)   • RUQ abdominal pain   • Crohn's disease of small intestine with other complication (HCC)   • Right lower quadrant abdominal pain   • Enteritis   • Mass-like inflammation at terminal ileum   • Crohn's disease (HCC)   • Ileostomy in place (HCC)   • Paroxysmal ventricular tachycardia (HCC)   • Chronic systolic heart failure (HCC)   • Cardiomyopathy, nonischemic (HCC)   • Orthostasis   • Iron deficiency anemia   • Orthostatic hypotension   • Crohn's disease of colon with complication (HCC)   • Dehisced intestinal anastomosis   • History of creation of ostomy (HCC)   • Hypokalemia   • Hypotension, chronic   • Malnutrition of moderate degree (HCC)   • S/P colectomy   • Fatty liver disease, nonalcoholic   • Cholecystitis   • Debility     Past Medical History:   Diagnosis Date   • Acute pain of left hip    • Adjustment disorder with depressed mood    • Anesthesia complication     SPINAL [id number] LOWER SPINE   • Ankylosing spondylitis of cervical region (HCC)    • Ankylosis of spine    •  Arthritis    • Asthma    • Cardiomyopathy (HCC)     sees Dr. Reyes; NICM/ (-) cath, EF 25-35%; has ICD   • CHF (congestive heart failure) (HCC)    • Cholecystitis    • Crohn's disease (HCC)    • Depression    • Diabetes mellitus (HCC)     Diet controlled.   • Dysthymic disorder 2012   • Dysuria    • Essential hypertension    • External hemorrhoids    • Genital herpes    • Gout    • Herpes genitalia    • History of MRSA infection 2000?    FINGERTIP-TX WITH ANTIBIOTICS-[facility] CARE-NO CURRENT OPEN WOUND OR TREATMENT 12/3/21   • Ileostomy in place ([facility])    • Insomnia    • Iron deficiency    • Paroxysmal ventricular tachycardia (HCC)    • PONV (postoperative nausea and vomiting)    • Presence of combination internal cardiac defibrillator (ICD) and pacemaker    • Prostate nodule    • Recurrent canker sores    • Thoracic back pain    • Urinary hesitancy    • Xerosis cutis      Past Surgical History:   Procedure Laterality Date   • ABDOMINAL SURGERY     • CARDIAC CATHETERIZATION     • CARDIAC DEFIBRILLATOR PLACEMENT      REPLACEMENT-Had [place] lead that was fractured.  Lead was tied off.  New lead and new device implanted.   • CARDIAC ELECTROPHYSIOLOGY PROCEDURE N/A 1/3/2019    Procedure: ICD DC generator change  [employer];  Surgeon: Zechariah Randolph MD;  Location: [facility] INVASIVE LOCATION;  Service: Cardiology   • CHOLECYSTECTOMY N/A 12/7/2021    Procedure: CHOLECYSTECTOMY;  Surgeon: Jeovany Mott MD;  Location: [facility] OR;  Service: General;  Laterality: N/A;   • COLON RESECTION N/A 12/29/2021    Procedure: PARTIAL COLECTOMY WITH OSTOMY;  Surgeon: Jeovany Mott MD;  Location: [facility] OR;  Service: General;  Laterality: N/A;   • COLON RESECTION WITH ILEOSTOMY N/A 2018   • COLONOSCOPY  04/03/2015    abnormal cecum, three polypoid masses, pre cancerous vs crohns, IH, tics, hyperplastic polyp   • COLONOSCOPY N/A 3/17/2017    polypoid masses, tics, IH, polyp   • EXPLORATORY LAPAROTOMY W/ BOWEL  RESECTION  07/2018    open resection of ileum with creation of end ileostomy   • ILEOSTOMY CLOSURE  07/2018   • ILEOSTOMY CLOSURE N/A 12/7/2021    Procedure: ILEOSTOMY TAKEDOWN WITH RESECTION, PARASTOMAL HERNIA REPAIR;  Surgeon: Jeovany Mott MD;  Location: [facility] MAIN OR;  Service: General;  Laterality: N/A;   • PACEMAKER IMPLANTATION         PT ASSESSMENT (last 12 hours)     IRF PT Evaluation and Treatment     Row Name 02/14/22 1044          PT Time and Intention    Document Type daily treatment; discharge evaluation  -EE     Mode of Treatment physical therapy; individual therapy  -EE     Patient/Family/Caregiver Comments/Observations Pt supine in bed, agreeable to PT.  -EE     Row Name 02/14/22 1044          General Information    Existing Precautions/Restrictions fall  -EE     Row Name 02/14/22 1044          Cognition/Psychosocial    Affect/Mental Status (Cognitive) WFL  -EE     Orientation Status (Cognition) oriented x 4  -EE     Follows Commands (Cognition) follows one-step commands  -EE     Personal Safety Interventions fall prevention program maintained; gait belt; muscle strengthening facilitated; nonskid shoes/slippers when out of bed; supervised activity  -EE     Cognitive Function (Cognitive) WFL  -EE     Row Name 02/14/22 1044          Pain Scale: Numbers Pre/Post-Treatment    Pretreatment Pain Rating 6/10  -EE     Posttreatment Pain Rating 6/10  -EE     Pain Location - Orientation incisional  -EE     Pain Location abdomen  -EE     Pain Intervention(s) Medication (See MAR); Repositioned; Distraction  -EE     Row Name 02/14/22 1044          Mobility    Advanced Gait Activity rough/uneven surfaces  -EE     Additional Documentation Advanced Gait Activity (Row)  -EE     Row Name 02/14/22 1044          Bed Mobility    Rolling Left [place] (Bed Mobility) independent  -EE     Rolling Right [place] (Bed Mobility) independent  -EE     Supine-Sit [place] (Bed Mobility) independent  -EE      Sit-Supine [place] (Bed Mobility) independent  -EE     Row Name 02/14/22 1044          Transfers    Bed-Chair [place] (Transfers) supervision  -EE     Chair-Bed [place] (Transfers) supervision  -EE     Assistive Device (Bed-Chair Transfers) wheelchair  -EE     Sit-Stand [place] (Transfers) modified independence  -EE     Stand-Sit [place] (Transfers) modified independence  -EE     Row Name 02/14/22 1044          Chair-Bed Transfer    Assistive Device (Chair-Bed Transfers) wheelchair  -EE     Row Name 02/14/22 1044          Sit-Stand Transfer    Assistive Device (Sit-Stand Transfers) walker, front-wheeled  -EE     Row Name 02/14/22 1044          Stand-Sit Transfer    Assistive Device (Stand-Sit Transfers) walker, front-wheeled  -EE     Row Name 02/14/22 1044          Car Transfer    Type (Car Transfer) sit-stand; stand-sit  -EE     [place] Level (Car Transfer) supervision  -EE     Assistive Device (Car Transfer) walker, front-wheeled  -EE     Row Name 02/14/22 1044          Gait/Stairs (Locomotion)    [place] Level (Gait) supervision  -EE     Assistive Device (Gait) walker, front-wheeled  -EE     Distance in Feet (Gait) 160' x 2, 80' x 1  -EE     Pattern (Gait) step-through  -EE     Deviations/Abnormal Patterns (Gait) no decreased; stride length decreased  -EE     Bilateral Gait Deviations forward flexed posture  -EE     Row Name 02/14/22 1044          Safety Issues, Functional Mobility    Impairments Affecting Function (Mobility) endurance/activity tolerance; strength; pain  -EE     Row Name 02/14/22 1044          Rough/Uneven Surface Gait Skills (Mobility)    [place], Gait on Rough/Uneven Surface (Mobility) standby assist  -EE     Assistive Device (Rough/Uneven Surface Gait) walker, front-wheeled  -EE     Distance in Feet (Rough/Uneven Surface Gait) amb 10' across red foam mat  -EE     Row Name 02/14/22 1044          Balance    Comment, Balance Pt able to retrieve small  object from floor with SBA, rwx, and reacher.  -EE     Row Name 02/14/22 1044          Hip (Therapeutic Exercise)    Hip Strengthening (Therapeutic Exercise) bilateral; marching while seated; aBduction; red; resistance band; 10 repetitions  -EE     Row Name 02/14/22 1044          Knee (Therapeutic Exercise)    Knee Strengthening (Therapeutic Exercise) bilateral; LAQ (long arc quad); hamstring curls; red; resistance band; 10 repetitions  -EE     Row Name 02/14/22 1044          Ankle (Therapeutic Exercise)    Ankle AROM (Therapeutic Exercise) bilateral; dorsiflexion; plantarflexion; 10 repetitions  -EE     Row Name 02/14/22 1044          Bed Mobility Goal 1 (PT-IRF)    Activity/Assistive Device (Bed Mobility Goal 1, PT-IRF) bed mobility activities, all  -EE     [place] Level (Bed Mobility Goal 1, PT-IRF) independent  -EE     Progress/Outcomes (Bed Mobility Goal 1, PT-IRF) goal met  -EE     Row Name 02/14/22 1044          Transfer Goal 1 (PT-IRF)    Activity/Assistive Device (Transfer Goal 1, PT-IRF) sit-to-stand/stand-to-sit; bed-to-chair/chair-to-bed; walker, rolling  -EE     [place] Level (Transfer Goal 1, PT-IRF) modified independence  -EE     Progress/Outcomes (Transfer Goal 1, PT-IRF) goal partially met  -EE     Row Name 02/14/22 1044          Transfer Goal 2 (PT-IRF)    Activity/Assistive Device (Transfer Goal 2, PT-IRF) car transfer; walker, rolling  -EE     [place] Level (Transfer Goal 2, PT-IRF) supervision required  -EE     Progress/Outcomes (Transfer Goal 2, PT-IRF) goal met  -EE     Row Name 02/14/22 1044          Gait/Walking Locomotion Goal 1 (PT-IRF)    Activity/Assistive Device (Gait/Walking Locomotion Goal 1, PT-IRF) gait (walking locomotion); walker, rolling  -EE     Gait/Walking Locomotion Distance Goal 1 (PT-IRF) 200'  -EE     [place] Level (Gait/Walking Locomotion Goal 1, PT-IRF) supervision required  -EE     Progress/Outcomes (Gait/Walking Locomotion Goal 1, PT-IRF) goal  partially met  -EE     Row Name 02/14/22 1044          Stairs Goal 1 (PT-IRF)    Activity/Assistive Device (Stairs Goal 1, PT-IRF) ascending stairs; descending stairs; using handrail, left; using handrail, right  -EE     Number of Stairs (Stairs Goal 1, PT-IRF) 4  -EE     [place] Level (Stairs Goal 1, PT-IRF) contact guard assist  -EE     Progress/Outcomes (Stairs Goal 1, PT-IRF) goal not met  -EE     Row Name 02/14/22 1044          Positioning and Restraints    Pre-Treatment Position in bed  -EE     Post Treatment Position bed  -EE     In Bed fowlers; call light within reach; encouraged to call for assist; exit alarm on  -EE     Row Name 02/14/22 1044          Discharge Summary (PT)    Outcomes Achieved/Progress Made Upon Discharge (PT) goals partially achieved prior to discharge  -EE     Transfer to Another Level of Care or Facility (PT) recommend therapy via home health  -EE           User Key  (r) = Recorded By, (t) = Taken By, (c) = Cosigned By    Initials Name Provider Type    Wendy Holcomb, PT Physical Therapist                Physical Therapy Education                 Title: PT OT SLP Therapies (Done)     Topic: Physical Therapy (Done)     Point: Mobility training (Done)     Learning Progress Summary           Patient Acceptance, E,TB,D,H, VU,NR by EE at 2/14/2022 1046    Acceptance, E,TB,D, VU,NR by JS at 2/12/2022 1147    Acceptance, E,TB, VU,NR by EE at 2/11/2022 1102    Acceptance, E,TB, VU,NR by EE at 2/10/2022 1123    Acceptance, E,TB, VU,NR by EE at 2/9/2022 1222    Acceptance, E,TB, VU,NR by EE at 2/8/2022 1015    Acceptance, E,TB, VU,NR by EE at 2/7/2022 1142    Acceptance, E,TB, VU,NR by KIRSTEN at 2/5/2022 1430    Acceptance, E,TB, VU,NR by EE at 2/4/2022 1408                   Point: Home exercise program (Done)     Learning Progress Summary           Patient Acceptance, E,TB,D,H, VU,NR by EE at 2/14/2022 1046    Acceptance, E,TB,D, VU,NR by JS at 2/12/2022 1147    Acceptance, E,TB, VU,NR by EE  at 2/11/2022 1102    Acceptance, E,TB, VU,NR by EE at 2/10/2022 1123    Acceptance, E,TB, VU,NR by EE at 2/8/2022 1015    Acceptance, E,TB, VU,NR by EE at 2/7/2022 1142    Acceptance, E,TB, VU,NR by EE at 2/4/2022 1408                   Point: Body mechanics (Done)     Learning Progress Summary           Patient Acceptance, E,TB,D,H, VU,NR by EE at 2/14/2022 1046    Acceptance, E,TB,D, VU,NR by JS at 2/12/2022 1147    Acceptance, E,TB, VU,NR by EE at 2/10/2022 1123    Acceptance, E,TB, VU,NR by EE at 2/8/2022 1015    Acceptance, E,TB, VU,NR by EE at 2/7/2022 1142    Acceptance, E,TB, VU,NR by EE at 2/4/2022 1408                   Point: Precautions (Done)     Learning Progress Summary           Patient Acceptance, E,TB,D,H, VU,NR by EE at 2/14/2022 1046    Acceptance, E,TB,D, VU,NR by JS at 2/12/2022 1147    Acceptance, E,TB, VU,NR by EE at 2/10/2022 1123    Acceptance, E,TB, VU,NR by EE at 2/8/2022 1015    Acceptance, E,TB, VU,NR by EE at 2/7/2022 1142    Acceptance, E,TB, VU,NR by EE at 2/4/2022 1408                               User Key     Initials Effective Dates Name Provider Type Discipline    KIRSTEN 06/16/21 -  Emily Watkins, PT Physical Therapist PT    JS 06/16/21 -  Sweta Ferro, PT Physical Therapist PT    EE 06/16/21 -  Wendy Ojeda, PT Physical Therapist PT                PT Recommendation and Plan  Planned Therapy Interventions (PT): balance training, bed mobility training, gait training, home exercise program, patient/family education, ROM (range of motion), postural re-education, stair training, strengthening, stretching, transfer training               Time Calculation:    PT Charges     Row Name 02/14/22 1541 02/14/22 1101          Time Calculation    Start Time 1400  -EE 1030  -EE     Stop Time 1430  -EE 1100  -EE     Time Calculation (min) 30 min  -EE 30 min  -EE     PT Received On 02/14/22  -EE 02/14/22  -EE     PT - Next Appointment -- 02/14/22  -EE            Time Calculation- PT    Total  Timed Code Minutes- PT 30 minute(s)  -EE 30 minute(s)  -EE           User Key  (r) = Recorded By, (t) = Taken By, (c) = Cosigned By    Initials Name Provider Type    Wendy Holcomb PT Physical Therapist                Therapy Charges for Today     Code Description Service Date Service Provider Modifiers Qty    [id number]  GAIT TRAINING EA 15 MIN 2/14/2022 Wendy Ojeda, PT GP 2    [id number]  PT THER PROC EA 15 MIN 2/14/2022 Wendy Ojeda, PT GP 1    [id number]  PT THERAPEUTIC ACT EA 15 MIN 2/14/2022 Wendy Ojeda, PT GP 1            Patient was intermittently wearing a face mask during this therapy encounter. Therapist used appropriate personal protective equipment including mask and gloves.  Mask used was standard procedure mask. Appropriate PPE was worn during the entire therapy session. Hand hygiene was completed before and after therapy session. Patient is not in enhanced droplet precautions.            Wendy Ojeda PT  2/14/2022

## 2022-02-14 NOTE — PROGRESS NOTES
ICU Progress Note (Vent)   [facility]    Patient - Lisandra Barboza,  Age - 78 y.o.    - 1949      Room Number -    MRN -  [id number]   [facility]  Date of Admission -  2019  2:48 PM    Events of Past 24 Hours   No acute changes overnight. Decreased sedation this morning and pt became much more alert and indicated significant chest pain. Troponin ordered. Fentanyl given and sedation restarted. No longer on Levophed or other pressor at this time. Vitals    height is 5' (1.524 m) and weight is 130 lb 8.2 oz (59.2 kg). Her axillary temperature is 97.8 °F (36.6 °C). Her blood pressure is 101/58 (abnormal) and her pulse is 93. Her respiration is 23 and oxygen saturation is 100%. Temperature Range: Temp: 97.8 °F (36.6 °C) Temp  Av.5 °F (36.9 °C)  Min: 97.7 °F (36.5 °C)  Max: 98.7 °F (37.1 °C)  BP Range:  Systolic (31DON), SDM:699 , Min:72 , GAMA:286     Diastolic (38IOH), UYO:09, Min:24, Max:89    Pulse Range: Pulse  Av.5  Min: 83  Max: 108  Respiration Range: Resp  Av.8  Min: 16  Max: 32  Current Pulse Ox[de-identified]  SpO2: 100 %  24HR Pulse Ox Range:  SpO2  Av.5 %  Min: 18 %  Max: 100 %  Oxygen Amount and Delivery: O2 Flow Rate (L/min): 15 L/min      Wt Readings from Last 3 Encounters:   19 130 lb 8.2 oz (59.2 kg)   19 89 lb (40.4 kg)   19 94 lb 1.6 oz (42.7 kg)     I/O       Intake/Output Summary (Last 24 hours) at 2019 0932  Last data filed at 2019 4472  Gross per 24 hour   Intake 8347.35 ml   Output 6900 ml   Net 1447.35 ml     I/O last 3 completed shifts: In: 8347.4 [I.V.:3082.5; Blood:787.5; NG/GT:1400; IV Piggyback:1750]  Out: 56 [Urine:5600; Emesis/NG output:1100;  Other:200]     DRAIN/TUBE OUTPUT:     Invasive Lines   ETT Day -  4  Lines -  Right IJ day 5    ICP PRESSURE RANGE:  No data recorded  CVP PRESSURE RANGE:  No data recorded  Mechanical Ventilation Data   SETTINGS Inpatient Rehabilitation Plan of Care Note    Plan of Care  Updated Problems/Interventions  Mobility    [PT] Stairs(Active)  Current Status(02/14/2022): N/A  Weekly Goal(02/15/2022): N/A  Discharge Goal: N/A    [PT] Walk(Active)  Current Status(02/14/2022): 160' Supervision RWX  Weekly Goal(02/15/2022): SBA to BR, RWX  Discharge Goal: 160' Supervision RWX    [PT] Bed/Chair/Wheelchair(Active)  Current Status(02/14/2022): Supervision  Weekly Goal(02/15/2022): Supervision  Discharge Goal: Supervision    [PT] Bed Mobility(Active)  Current Status(02/14/2022): Indep  Weekly Goal(02/15/2022): Indep  Discharge Goal: Indep.    Signed by: Wendy Ojeda DPT     (Comprehensive)  Vent Information  $Ventilation: $Subsequent Day  Ventilator Started: Yes  Ventilation Day(s): 3  Equipment ID: [id number]  Equipment Changed: HME  Vent Type: Servo i  Vent Mode: PRVC  Vt Ordered: 400 mL  Rate Set: 16 bmp  FiO2 : 30 %  Sensitivity: 5  PEEP/CPAP: 5  I Time/ I Time %: 0.9 s  Cuff Pressure (cm H2O): 25 cm H2O  Humidification Source: HME  Additional Respiratory  Assessments  Pulse: 93  Resp: 23  SpO2: 100 %  End Tidal CO2: 32 (%)  Position: Semi-Diaz's  Humidification Source: HME  Oral Care Completed?: Yes  Oral Care: Suction toothette, Mouth suctioned, Mouth moisturizer, Mouth swabbed  Subglottic Suction Done?: Yes  Cuff Pressure (cm H2O): 25 cm H2O       ABGs:   Lab Results   Component Value Date    PHART 7.533 04/17/2019    PO2ART 66.2 04/17/2019    XVS5BTA 38.7 04/17/2019       Lab Results   Component Value Date    MODE PRVC 04/17/2019         Medications   IV   PN-Adult 2-in-1 Central Line (Standard) 65 mL/hr at 04/16/19 1754    propofol 35 mcg/kg/min (04/17/19 0105)    vasopressin (Septic Shock) infusion Stopped (04/14/19 1930)    norepinephrine Stopped (04/16/19 2102)    lactated ringers 125 mL/hr at 04/17/19 0441    dextrose        IVPB builder   Intravenous Once    magnesium sulfate  1 g Intravenous Once    pantoprazole  40 mg Intravenous Daily    And    sodium chloride (PF)  10 mL Intravenous Daily    methylPREDNISolone  40 mg Intravenous Q8H    sodium chloride  250 mL Intravenous Once    vancomycin  1,000 mg Intravenous Q12H    polyvinyl alcohol  1 drop Both Eyes Q4H    And    lubrifresh P.M.    Both Eyes Q4H    chlorhexidine  15 mL Mouth/Throat BID    ipratropium  0.5 mg Nebulization Q4H    insulin lispro  0-12 Units Subcutaneous Q6H    levalbuterol  1.25 mg Nebulization Q4H    docusate sodium  100 mg Oral TID    vancomycin (VANCOCIN) intermittent dosing (placeholder)   Other RX Placeholder    levofloxacin  500 mg Intravenous Q24H    venlafaxine  37.5 mg Oral TID    clopidogrel  75 mg Oral Daily    aspirin  81 mg Oral Daily    sodium chloride flush  10 mL Intravenous 2 times per day    enoxaparin  40 mg Subcutaneous Daily       Diet/Nutrition   PN-Adult 2-in-1 Central Line (Standard)    Exam   VITALS    height is 5' (1.524 m) and weight is 130 lb 8.2 oz (59.2 kg). Her axillary temperature is 97.8 °F (36.6 °C). Her blood pressure is 101/58 (abnormal) and her pulse is 93. Her respiration is 23 and oxygen saturation is 100%. Ventilator Settings (Basic)  Vent Mode: PRVC Rate Set: 16 bmp/Vt Ordered: 400 mL/ /FiO2 : 30 %    Constitutional - Sedated  General Appearance  well developed, well nourished  HEENT - Life support devices in place (ET,),normocephalic, atraumatic. Lungs - ET tube secured in place. Chest expands equally, no wheezes, rales or rhonchi. Cardiovascular - Pacemaker in place; left chest wall. Heart sounds are normal.  normal rate and rhythm regular, no murmur, gallop or rub.   Abdomen - soft, nontender, nondistended  Skin - no bruising or bleeding  Extremities - no cyanosis, clubbing or edema    Lab Results   CBC     Lab Results   Component Value Date    WBC 17.0 04/17/2019    RBC 3.66 04/17/2019    RBC 3.66 05/23/2012    HGB 11.2 04/17/2019    HCT 32.5 04/17/2019     04/17/2019     05/23/2012    MCV 88.8 04/17/2019    MCH 30.7 04/17/2019    MCHC 34.5 04/17/2019    RDW 14.2 04/17/2019    METASPCT 2 04/11/2019    LYMPHOPCT 4 04/11/2019    MONOPCT 2 04/11/2019    BASOPCT 0 04/11/2019    MONOSABS 0.96 04/11/2019    LYMPHSABS 1.92 04/11/2019    EOSABS 0.00 04/11/2019    BASOSABS 0.00 04/11/2019    DIFFTYPE NOT REPORTED 04/11/2019       BMP   Lab Results   Component Value Date     04/17/2019    K 3.0 04/17/2019     04/17/2019    CO2 29 04/17/2019    BUN 8 04/17/2019    CREATININE <0.40 04/17/2019    GLUCOSE 164 04/17/2019    GLUCOSE 87 05/21/2012    CALCIUM 6.8 04/17/2019       LFTS  Lab Results   Component Value Date ALKPHOS 96 04/17/2019    ALT 17 04/17/2019    AST 33 04/17/2019    PROT 4.0 04/17/2019    BILITOT 0.53 04/17/2019    BILIDIR 0.21 05/17/2012    IBILI 0.36 05/17/2012    LABALBU 1.7 04/17/2019    LABALBU 4.6 05/17/2012       INR  Recent Labs     04/15/19  0422 04/16/19  0407 04/17/19  0435   PROTIME 14.8* 13.5 13.6   INR 1.2 1.0 1.0       APTT  No results for input(s): APTT in the last 72 hours. Lactic Acid  Lab Results   Component Value Date    LACTA 2.1 04/14/2019    LACTA 1.5 04/12/2019    LACTA 1.9 04/12/2019        BNP   No results for input(s): BNP in the last 72 hours. Cultures       Radiology     Plain Films     4/17/19      There is little change from prior study.  Findings of COPD, mild central   vascular congestion, left effusion, and scattered opacities favoring   interstitial edema with multifocal pneumonitis not excluded.  Tubes and lines   as above.  However, endotracheal tube is high riding.       The findings were sent to the Radiology Results [address] at 7:58   am on 4/17/2019to be communicated to a licensed caregiver.       RECOMMENDATION:   Suggest advancement of endotracheal tube. CT Scans    See actual reports for details    SYSTEM ASSESSMENT    Neuro       Respiratory   CXR shows ET tube 6.4 cm above ron. Will advance tube 1.5-2cm and repeat CXR. Saturation was 100% with FiO2 at 30. Wean oxygen as tolerated. Keep O2 sat 90-92%. Continue levaquin (day 5) and Vancomycin (day 6). Previously on Merrem for 5 days. Continue xopenex, atrovent, and Solu-Medrol       Hemodynamics   No longer requiring pressors      Gastrointestinal/Nutrition   EGD performed yesterday which showed esophagitis, hiatal hernia, and solid food in 75% of stomach    Renal       Infectious Disease       Hematology/Oncology   Lovenox for DVT prophylaxis   Received RBC transfusion yesterday; Hgb was 7.2 at that time. Hgb today is 11.2.     Endocrine       Social/Spiritual/DNR/Disposition/Other Electronically signed by Selma Hayden on 4/17/2019 at 9:32 AM    Patient seen and examined independently by me. Above discussed and I agree with student note except where indicated in the EMR revision history. Also see my additional comments and changes indicated by discrete font, text color, italics, and/or initials. Labs, cultures, and radiographs where available were reviewed.      Acute resp failure with hypoxemia  Sepsis--- not in shock  ESBL UTI  Anemia    Cont weaning attempts  On levaquin and vanco    Electronically signed by Aaron Wagner MD on 4/17/2019 at 3:15 PM

## 2022-02-14 NOTE — PROGRESS NOTES
Spoke with patient and wife, by phone, regarding d/c home tomorrow. Patient is ready to get out of hospital but expressed concerns about putting too much on his wife. Discussed him trying to be up more at home and doing as much for self as possible. He is hoping his spirits will improve once home. He feels he will do okay at home from physical standpoint. Per home health liaison, patient only has 20 home health visits so will need to save most of these visits for nursing. Wife was going to also contact insurance company to see if this is what they tell her regarding his benefits for HH. Wife and ostomy nurse have discussed further teaching tomorrow before d/c for ostomy care. Wife planning to meet with ostomy nurse at 2:00 p.m. tomorrow. Will assist with d/c plans for home tomorrow.

## 2022-02-15 ENCOUNTER — HOME HEALTH ADMISSION (OUTPATIENT)
Dept: HOME HEALTH SERVICES | Facility: HOME HEALTHCARE | Age: 62
End: 2022-02-15

## 2022-02-15 ENCOUNTER — TRANSCRIBE ORDERS (OUTPATIENT)
Dept: HOME HEALTH SERVICES | Facility: HOME HEALTHCARE | Age: 62
End: 2022-02-15

## 2022-02-15 VITALS
TEMPERATURE: 97 F | RESPIRATION RATE: 18 BRPM | HEIGHT: 69 IN | WEIGHT: 166.45 LBS | SYSTOLIC BLOOD PRESSURE: 112 MMHG | BODY MASS INDEX: 24.65 KG/M2 | OXYGEN SATURATION: 97 % | DIASTOLIC BLOOD PRESSURE: 70 MMHG | HEART RATE: 97 BPM

## 2022-02-15 DIAGNOSIS — Z93.9 HISTORY OF CREATION OF OSTOMY: Primary | ICD-10-CM

## 2022-02-15 DIAGNOSIS — K52.9 CHRONIC COLITIS: Primary | ICD-10-CM

## 2022-02-15 LAB
ALBUMIN SERPL-MCNC: 3 G/DL (ref 3.5–5.2)
ANION GAP SERPL CALCULATED.3IONS-SCNC: 7 MMOL/L (ref 5–15)
BUN SERPL-MCNC: 19 MG/DL (ref 8–23)
BUN/CREAT SERPL: 19.8 (ref 7–25)
CALCIUM SPEC-SCNC: 9.1 MG/DL (ref 8.6–10.5)
CHLORIDE SERPL-SCNC: 93 MMOL/L (ref 98–107)
CO2 SERPL-SCNC: 30 MMOL/L (ref 22–29)
CREAT SERPL-MCNC: 0.96 MG/DL (ref 0.76–1.27)
GFR SERPL CREATININE-BSD FRML MDRD: 79 ML/MIN/1.73
GLUCOSE SERPL-MCNC: 87 MG/DL (ref 65–99)
PHOSPHATE SERPL-MCNC: 3.9 MG/DL (ref 2.5–4.5)
POTASSIUM SERPL-SCNC: 4.3 MMOL/L (ref 3.5–5.2)
SODIUM SERPL-SCNC: 130 MMOL/L (ref 136–145)

## 2022-02-15 PROCEDURE — 80069 RENAL FUNCTION PANEL: CPT | Performed by: INTERNAL MEDICINE

## 2022-02-15 RX ORDER — CALCIUM ACETATE MONOHYDRATE AND ALUMINUM SULFATE TETRADECAHYDRATE 952; 1347 MG/2299MG; MG/2299MG
1 POWDER, FOR SOLUTION TOPICAL
Qty: 12 EACH | Refills: 0 | Status: SHIPPED | OUTPATIENT
Start: 2022-02-17 | End: 2022-02-15

## 2022-02-15 RX ORDER — LANOLIN ALCOHOL/MO/W.PET/CERES
3 CREAM (GRAM) TOPICAL NIGHTLY
Qty: 30 TABLET | Refills: 1 | Status: SHIPPED | OUTPATIENT
Start: 2022-02-15 | End: 2022-02-15

## 2022-02-15 RX ORDER — MIRTAZAPINE 15 MG/1
15 TABLET, FILM COATED ORAL NIGHTLY
Qty: 30 TABLET | Refills: 1 | Status: SHIPPED | OUTPATIENT
Start: 2022-02-15

## 2022-02-15 RX ORDER — CARVEDILOL 6.25 MG/1
6.25 TABLET ORAL
Qty: 30 TABLET | Refills: 1 | Status: SHIPPED | OUTPATIENT
Start: 2022-02-15 | End: 2022-02-15

## 2022-02-15 RX ORDER — FUROSEMIDE 20 MG/1
20 TABLET ORAL EVERY OTHER DAY
Qty: 15 TABLET | Refills: 1 | Status: SHIPPED | OUTPATIENT
Start: 2022-02-16 | End: 2022-02-28 | Stop reason: ALTCHOICE

## 2022-02-15 RX ORDER — MIRTAZAPINE 15 MG/1
15 TABLET, FILM COATED ORAL NIGHTLY
Qty: 30 TABLET | Refills: 1 | Status: SHIPPED | OUTPATIENT
Start: 2022-02-15 | End: 2022-02-15

## 2022-02-15 RX ORDER — FUROSEMIDE 20 MG/1
20 TABLET ORAL EVERY OTHER DAY
Qty: 15 TABLET | Refills: 1 | Status: SHIPPED | OUTPATIENT
Start: 2022-02-16 | End: 2022-02-15

## 2022-02-15 RX ORDER — GLYCOPYRROLATE 1 MG/1
1 TABLET ORAL 2 TIMES DAILY
Qty: 60 TABLET | Refills: 1 | Status: SHIPPED | OUTPATIENT
Start: 2022-02-15

## 2022-02-15 RX ORDER — CALCIUM ACETATE MONOHYDRATE AND ALUMINUM SULFATE TETRADECAHYDRATE 952; 1347 MG/2299MG; MG/2299MG
1 POWDER, FOR SOLUTION TOPICAL
Qty: 12 EACH | Refills: 0 | Status: SHIPPED | OUTPATIENT
Start: 2022-02-17 | End: 2022-02-28

## 2022-02-15 RX ORDER — LANOLIN ALCOHOL/MO/W.PET/CERES
3 CREAM (GRAM) TOPICAL NIGHTLY
Qty: 30 TABLET | Refills: 1 | Status: SHIPPED | OUTPATIENT
Start: 2022-02-15

## 2022-02-15 RX ORDER — LOPERAMIDE HYDROCHLORIDE 2 MG/1
2 CAPSULE ORAL
Qty: 120 CAPSULE | Refills: 1 | Status: SHIPPED | OUTPATIENT
Start: 2022-02-15

## 2022-02-15 RX ORDER — LOPERAMIDE HYDROCHLORIDE 2 MG/1
2 CAPSULE ORAL
Qty: 120 CAPSULE | Refills: 1 | Status: SHIPPED | OUTPATIENT
Start: 2022-02-15 | End: 2022-02-15

## 2022-02-15 RX ORDER — HYDROCODONE BITARTRATE AND ACETAMINOPHEN 5; 325 MG/1; MG/1
TABLET ORAL
Qty: 12 TABLET | Refills: 0 | Status: SHIPPED | OUTPATIENT
Start: 2022-02-15 | End: 2022-02-15

## 2022-02-15 RX ORDER — HYDROCODONE BITARTRATE AND ACETAMINOPHEN 5; 325 MG/1; MG/1
TABLET ORAL
Qty: 12 TABLET | Refills: 0 | Status: SHIPPED | OUTPATIENT
Start: 2022-02-15

## 2022-02-15 RX ORDER — ONDANSETRON 4 MG/1
4 TABLET, ORALLY DISINTEGRATING ORAL EVERY 6 HOURS PRN
Qty: 12 TABLET | Refills: 0 | Status: SHIPPED | OUTPATIENT
Start: 2022-02-15 | End: 2022-02-15

## 2022-02-15 RX ORDER — ACETAMINOPHEN 325 MG/1
650 TABLET ORAL EVERY 4 HOURS PRN
Start: 2022-02-15 | End: 2022-02-15

## 2022-02-15 RX ORDER — ONDANSETRON 4 MG/1
4 TABLET, ORALLY DISINTEGRATING ORAL EVERY 6 HOURS PRN
Qty: 12 TABLET | Refills: 0 | Status: SHIPPED | OUTPATIENT
Start: 2022-02-15 | End: 2022-02-25 | Stop reason: SDUPTHER

## 2022-02-15 RX ORDER — GLYCOPYRROLATE 1 MG/1
1 TABLET ORAL 2 TIMES DAILY
Qty: 60 TABLET | Refills: 1 | Status: SHIPPED | OUTPATIENT
Start: 2022-02-15 | End: 2022-02-15

## 2022-02-15 RX ORDER — ACETAMINOPHEN 325 MG/1
650 TABLET ORAL EVERY 4 HOURS PRN
Start: 2022-02-15

## 2022-02-15 RX ORDER — CARVEDILOL 6.25 MG/1
6.25 TABLET ORAL
Qty: 30 TABLET | Refills: 1 | Status: SHIPPED | OUTPATIENT
Start: 2022-02-15

## 2022-02-15 RX ADMIN — HYDROCODONE BITARTRATE AND ACETAMINOPHEN 2 TABLET: 5; 325 TABLET ORAL at 10:05

## 2022-02-15 RX ADMIN — LOPERAMIDE HYDROCHLORIDE 2 MG: 2 CAPSULE ORAL at 11:22

## 2022-02-15 RX ADMIN — LOPERAMIDE HYDROCHLORIDE 2 MG: 2 CAPSULE ORAL at 06:03

## 2022-02-15 RX ADMIN — HYDROCODONE BITARTRATE AND ACETAMINOPHEN 2 TABLET: 5; 325 TABLET ORAL at 01:30

## 2022-02-15 RX ADMIN — GLYCOPYRROLATE 1 MG: 1 TABLET ORAL at 08:52

## 2022-02-15 NOTE — PROGRESS NOTES
TEAM CONF - FEB 15 - BED I. TRANSFERS SUP. GAIT 160 FEET RW SUP. TOILET AND SHOWER TRANSFERS SBA. BATH SBA. LBD SBA. UBD SET UP. TOILETING MIN OSTOMY CARE. OVERNIGHT PULSE OXIMETRY ON ROOM AIR - STUDY 6HOURS, 14 MIN. DESAT < 89% FOR 13 MINUTES, RANGE 80-98 WITH AVERAGE 92, HEART RATE RANGE . ARRANGE HOME O2 2L AT NIGHT.   ELOS - TODAY WITH HOME HEALTH PT AND NURSING. OUTPATIENT SLEEP STUDY.

## 2022-02-15 NOTE — DISCHARGE SUMMARY
[facility] - REHABILITATION UNIT    LEDA TURNER  1960    ADMIT DATE:  2/3/2022  3:03 PM  DISCHARGE DATE:  02/15/22      CHIEF COMPLAINT:    Immobility syndrome  Impaired mobility/impaired self-care/impaired endurance  Ileostomy takedown , cholecystectomy, incisional hernia repair December 7, 2021.  Exploratory laparotomy with end colostomy partial colon resection December 29, 2021  Ostomy-on Imodium/glycopyrrolate  Anxiety  Anemia  Hyponatremia  Congestive heart failure/tachycardia-on Coreg  Nonischemic cardiomyopathy ejection fraction 20%  Hyponatremia  Anxiety/depression  impaired nutritional status  Ostomy care  Abdominal wound care  Sacral wound care       HISTORY OF PRESENT ILLNESS:    61-year-old male previously independent, was hiking up to 7.5 miles in [facility], who had symptomatic cholelithiasis and moderately large parastomal hernia around ileostomy.  Underwent cholecystectomy and ileostomy takedown with repair of parastomal hernia first part of December.  He required readmission for persistent symptoms consistent with ileus versus obstruction.  Started on TPN.  Placed on antibiotics.  Inflammatory changes noted around the sigmoid colon.  Probable Crohn's related stricture in the sigmoid colon with partial bowel obstruction was felt to be likely source of his difficult postoperative course.  On December 29 he underwent exploratory laparotomy with transverse colon resection and end colostomy.  Abdominal adhesions were severe.  Hospital course noted for postoperative sepsis requiring antibiotics and pressor supports.   Operative findings were notable for high-grade partial obstruction of the sigmoid colon and relatively dusky appearance of the small bowel.  He had interventional radiology drainage on January 12 of intra-abdominal abscess, resolved acute hypoxic respiratory failure and acute kidney injury and shock.  Treated with Zosyn.  He had a wound VAC and ostomy in  place.  He had been on TPN for malnutrition and to minimize stool stoma output.  He had been on midodrine       Deconditioning with impairments with his mobility and self-care.  Wound ostomy care issues related to need for ostomy placement within the upper midline incision due to his abdominal adhesions.  Cardiology followed for baseline congestive heart failure and persistent tachycardia.  No clear etiology of his persistent tachycardia could be ascertained.  Primary issues at the time of discharge include parastomal wound healing and ostomy appliance management.  Deconditioning with need for physical therapy and Occupational Therapy.  Poor nutritional status with need for increased oral intake.  Prealbumin level of 10.2 on February 2.  Would recommend rechecking in about 2 weeks.  Chronic medical issues most significantly severe nonischemic cardiomyopathy with ejection fraction of 20%.     Patient presently comfortable at rest.  Does fatigue with activities.  Notes he gets short of air with activities.  Generalized weakness.  Has discomfort with the dressing changes.  Is having ostomy output.  Notes decreased appetite but is taking supplements more so than the meal trays.  He is contact-guard for bed mobility.  Ambulate 140 feet contact-guard with rolling walker, very slow.  Fair balance.  Endurance is slowly improving.     Given his functional impairments and comorbidities now admitted for acute inpatient rehabilitation       HOSPITAL COURSE:    Patient participated in a comprehensive acute inpatient rehabilitation program.     During the hospital stay the following areas were addressed:    Chief Complaint: Immobility syndrome  Impaired mobility/impaired self-care/impaired endurance     Ileostomy takedown , cholecystectomy, incisional hernia repair December 7, 2021.  Exploratory laparotomy with end colostomy partial colon resection December 29, 2021     Ostomy-on Imodium/glycopyrrolate  February 4-continue  Robinul twice daily for now, but decreased Imodium from 2 tablets to 1 tablet p.o. before every meal and at bedtime.  February 15-home on glycopyrrolate and Imodium     Abdominal wound care  February 11-Domeboro soak.  Ongoing ostomy care.  Wounds are improving and smaller.     Anxiety-would presently hold on adding any benzodiazepine given his multiple medical issues.   February 10-added mirtazapine 7.5 mg nightly  February 15-increase mirtazapine 15 mg nightly     Depression-February 9-given his hyponatremia, SSRI would not be the best option at this point.  Remeron may be a better option and will review with nephrology and with the patient.  February 10-reviewed with nephrology.  Discussed with patient.  Look at starting Remeron 7.5 mg nightly for 1 week then increase to 15 mg nightly     Anemia     Electrolytes-hyponatremia/hyperkalemia  February 4-sodium was 127 on February 2, 130 on February 3, decreased 124 on February 4.  Will consult nephrology for evaluation  Feb 7 -  per Nephrology -[Continue UREA 15 gr daily and placed on fluid restriction 1.5 liters . TRIAL amiloride 10  mg daily ]   February 9-Per nephrology-Borderline hyperkalemia and Nephrology started Lokelma 10 g daily.  Continue UREA 15 gr daily and Fluid restriction 1.5 liters, Hold amiloride due to borderline hyperkalemia for 24 hours and then resume daily ,  Continue sodium chloride 1 gr BID  February 11-off salt tablets.  Off urea.  Completed Lokelma.  Fluid restriction 1200 cc/day.  February 14-fluid restriction 1200 cc per 24 hours  February 15-will have go home on fluid restriction 1200 cc/day.  May need to be adjusted depending on his ostomy output.  Per nephrology-to have BMP Monday and Thursday for 2 weeks through home health.       Congestive heart failure/tachycardia-on Coreg 12.5 mg for supper.  Blood pressure low at times.  Parameters added evening Feb 3 - Hold if systolic is <110 and diastolic <70  .  Feb 8 - has not received Coreg  12.5 q supper since parameters added on Feb 3 after BP dropped to 88/66. He received every evening on acute care stay .  Will adjust parameter to hold if SBP < 100 and decrease dose to 6.25 mg q supper pending updated input from Cardiology to re-assess for parameters  February 9-tolerated lower dose of Coreg  February 10-Tolerating lower dose of Coreg 6.25 mg with pulse ranging 100-106 and blood pressure systolic .        Nonischemic cardiomyopathy ejection fraction 20%     DVT prophylaxis-Lovenox/SCDs     Impaired nutritional status-supplements per dietitian to follow.  Recheck prealbumin around February 16-patient was discharged home on February 15.  Will try to add on to labs earlier in the day from Feb 15.     Pulmonary- using  O2 2 L nasal cannula at night  Feb 7 - Started on cefdinir and given  breathing treatment yesterday.  He feels breathing treatment help bring up thick secretions.  He is on glycopyrrolate which probably thickens at secretions.  Tachycardia yesterday did not appear to correlate with the time of the breathing treatment as his pulse actually went down after the breathing treatment.  Will give another breathing treatment today  February 9-reviewed with patient getting outpatient sleep study  February 10-He complains of feeling short of air at night.  Previously reviewed with patient ordering outpatient sleep study.  Wears O2 2 L at night, sats 95 to 100%.  Will check overnight pulse oximetry study on 2 L nasal cannula  February 11- Overnight pulse oximetry showed sats %, average 97%, pulse , average 102.5.  February 14-check overnight pulse oximetry on room air tonight    Pain management-patient rarely took hydrocodone for discomfort.  Prescription for Norco 5 mg 1-2 p.o. every 8 hours as needed for pain. Disp #12 was written.  Cali report reviewed.        Insomnia-melatonin added  February 10-added Remeron  Plan for outpatient sleep study  TEAM CONF - FEB 15 - BED I.  TRANSFERS SUP. GAIT 160 FEET RW SUP. TOILET AND SHOWER TRANSFERS SBA. BATH SBA. LBD SBA. UBD SET UP. TOILETING MIN OSTOMY CARE. OVERNIGHT PULSE OXIMETRY ON ROOM AIR - STUDY 6HOURS, 14 MIN. DESAT < 89% FOR 13 MINUTES, RANGE 80-98 WITH AVERAGE 92, HEART RATE RANGE . ARRANGE HOME O2 2L AT NIGHT.   ELOS - TODAY WITH HOME HEALTH PT AND NURSING. OUTPATIENT SLEEP STUDY.     REHAB - admit for comprehensive acute inpatient rehabilitation .  This would be an interdisciplinary program with physical therapy 1.5 hour,  occupational therapy 1.5 hour,  5 days a week.  Rehabilitation nursing for carryover, monitoring of cardiac and intestinal   status, bowel and bladder, and skin  Ongoing physician follow-up.  Weekly team conferences.      Goal is for home with home health therapies.  Barrier to discharge:.  Mobility and self-care endurance- worked on condition, transfers, progressive ambulation, activity daily living to overcome.        Disposition-February 15-medically stable.  Achieved inpatient rehabilitation goals.  Medications and follow-up reviewed.  Discharge home today.    Physical Exam near the time of discharge:    MENTAL STATUS -  AWAKE / ALERT  HEENT-    LUNGS - CTA, NO WHEEZES, RALES OR RHONCHI  HEART-regular rate and rhythm  ABD - NORMOACTIVE BOWEL SOUNDS, SOFT, NT.  Ostomy  . Dressings in place at right and left abdomen  EXT - NO EDEMA OR CYANOSIS  NEURO -oriented x4.  MOTOR EXAM - RUE/RLE 5/5. LUE/LLE 5/5.      RESULTS:  No results found for: POCGLU  Results from last 7 days   Lab Units 02/14/22  0726 02/11/22  0514   WBC 10*3/mm3 8.45 8.74   HEMOGLOBIN g/dL 11.0* 10.3*   HEMATOCRIT % 35.1* 33.3*   PLATELETS 10*3/mm3 165 163     Results from last 7 days   Lab Units 02/15/22  0734 02/14/22  0726 02/12/22  0527   SODIUM mmol/L 130* 128* 128*   POTASSIUM mmol/L 4.3 5.0 4.1   CHLORIDE mmol/L 93* 92* 91*   CO2 mmol/L 30.0* 27.4 26.4   BUN mg/dL 19 19 11   CREATININE mg/dL 0.96 1.02 0.99   CALCIUM mg/dL 9.1 8.6 8.7    GLUCOSE mg/dL 87 86 94              Discharge Medications      New Medications      Instructions Start Date   acetaminophen 325 MG tablet  Commonly known as: TYLENOL   650 mg, Oral, Every 4 Hours PRN      aluminum sulfate-calcium acetate topical packet  Commonly known as: DOMEBORO   1 packet, Topical, Every 3 Days   Start Date: February 17, 2022     furosemide 20 MG tablet  Commonly known as: LASIX   20 mg, Oral, Every Other Day   Start Date: February 16, 2022     glycopyrrolate 1 MG tablet  Commonly known as: ROBINUL   1 mg, Oral, 2 Times Daily      HYDROcodone-acetaminophen 5-325 MG per tablet  Commonly known as: NORCO   One to two tabs po q 8 hours prn pain      loperamide 2 MG capsule  Commonly known as: IMODIUM   2 mg, Oral, 4 Times Daily Before Meals & Nightly      melatonin 3 MG tablet   3 mg, Oral, Nightly      mirtazapine 15 MG tablet  Commonly known as: REMERON   15 mg, Oral, Nightly      ondansetron ODT 4 MG disintegrating tablet  Commonly known as: ZOFRAN-ODT   4 mg, Translingual, Every 6 Hours PRN         Changes to Medications      Instructions Start Date   carvedilol 6.25 MG tablet  Commonly known as: COREG  What changed:   · medication strength  · how much to take  · when to take this  · additional instructions   6.25 mg, Oral, Daily Before Supper, Hold if systolic blood pressure is <100           Norco 5 mg/ 325 mg - Disp#12 - Sig: one to two tabs po q 8 hours prn pain. JOSIE reviewed.     Name Relationship Specialty Phone Fax Address Order              Zechariah Fatima MD  PCP - General Internal Medicine [phone] [facility] [facility] [address]  [place] [address]     Next Steps: Go on 2/24/2022      Instructions: At 9:45 A.M.      Richard Heller MD  PCP - Family Medicine Pulmonary Disease [phone] [address] [facility] [address]  [place] [id number]     Next Steps: Go on 3/28/2022      Instructions: At 3:30 P.M. with Dr. López Wilson. Same address as above.       Jeovany Mott MD   General Surgery [phone] [address]  [facility] [[email or web address] [address]  [facility] [address]     Next Steps: Go on 2/21/2022      Instructions: At 11:00 A.M.      Zechariah Pearson MD   Physical Medicine and Rehabilitation [phone] [address]  [address]  [facility] [address]     Next Steps: Follow up      Instructions: NO FOLLOW UP NEEDED WITH Talha Miller MD   Neurology  Sleep Medicine [phone] [facility]  [address]   [address]  [facility] [address]     Next Steps: Follow up on 3/22/2022      Instructions: 3/22/2022 10:30 AM - SLEEP MEDICINE      [facility] CARE   Home Health Services [phone] [address]  [facility] [address]     Next Steps: Follow up      Instructions: You will recieve PT eval and home NSG. They will call to schedule in home visits.       [facility] [phone] [address]  [facility] [address]     Next Steps: Follow up      Instructions: Supplier for home oxygen concentrator.         Outpatient sleep study    OVERNIGHT PULSE OXIMETRY ON ROOM AIR - STUDY 6HOURS, 14 MIN. DESAT < 89% FOR 13 MINUTES, RANGE 80-98 WITH AVERAGE 92, HEART RATE RANGE . ARRANGE HOME O2 2L AT NIGHT.     Dx- immobility syndrome/ ostomy/ wound- HOME HEALTH PT AND NURSING. QUESTIONS SHOULD BE ADDRESSED TO DR MOTT OR DR FATIMA. BMP every Monday and Thursday x 2 weeks to follow sodium level - results to Dr Fatima.    Fluid restriction - 1200 cc per day.  This may need to be adjusted depending on his ostomy output    Wound care/Ostomy care as directed  Cleanse sacral skin with soap and water, pat dry. Place sacral Optifoam dressing over the area. Change every 2-3 days and prn for soiling. Peel dressing back every shift to assess skin.  Piero pouch placed by WOC nurse with piero paste along edges. Pouch to  be changed 1-2x week depending on output and pouch adherence.  RLQ, LUQ wounds -Cleanse with NS, pat dry. Apply Caitlin and cover with silicone foam dressings. These will be changed by WOC nurse with pouch changes 2x week. Staff may change prn if pouch leaks.    Medication refills per Primary Care or General Surgery        >30 on discharge    Zechariah Pearson MD

## 2022-02-15 NOTE — DISCHARGE INSTRUCTIONS
Outpatient sleep study    OVERNIGHT PULSE OXIMETRY ON ROOM AIR - STUDY 6HOURS, 14 MIN. DESAT < 89% FOR 13 MINUTES, RANGE 80-98 WITH AVERAGE 92, HEART RATE RANGE . ARRANGE HOME O2 2L AT NIGHT.     Dx- immobility syndrome/ ostomy/ wound- HOME HEALTH PT AND NURSING. QUESTIONS SHOULD BE ADDRESSED TO DR BAINS OR DR FATIMA. BMP every Monday and Thursday x 2 weeks to follow sodium level - results to Dr Fatima.    Fluid restriction - 1200 cc per day    Wound care/Ostomy care as directed    Medication refills per Primary Care or General Surgery

## 2022-02-15 NOTE — PROGRESS NOTES
Case Management  Inpatient Rehabilitation Team Conference    Conference Date/Time: 2/15/2022 7:34:45 AM    Team Conference Attendees:  Yudelka Page MSSW Emily Erwin, PT  Donato Martinez, OT  Kiara Snyder, CTRS  Nancy Lee RD, LD  Shahriar Petersen, RN  Cecile Apodaca, RN   Kristie Luis, SLP    Demographics            Age: 61Y            Gender: Male    Admission Date: 2/3/2022 3:03:00 PM  Rehabilitation Diagnosis:  Debility s/p GI sx  Past Medical History: Past Medical History:  DiagnosisDate  ?Acute pain of left hip?  ?Adjustment disorder with depressed mood?  ?Anesthesia complication?  ?SPINAL [id number] LOWER SPINE  ?Ankylosing spondylitis of cervical region (HCC)?  ?Ankylosis of spine?  ?Arthritis?  ?Asthma?  ?Cardiomyopathy (HCC)?  ?sees Dr. Reyes; NICM/ (-) cath, EF 25-35%; has ICD  ?CHF (congestive heart failure) (HCC)?  ?Cholecystitis?  ?Crohn's disease (HCC)?  ?Diabetes mellitus (HCC)?  ?Diet controlled.  ?Dysthymic [id number]  ?Dysuria?  ?Essential hypertension?  ?External hemorrhoids?  ?Genital herpes?  ?Gout?  ?History of MRSA [id number]?  ?FINGERTIP-TX WITH ANTIBIOTICS-[facility]-NO CURRENT OPEN WOUND OR  TREATMENT 12/3/21  ?Ileostomy in place (HCC)?  ?Insomnia?  ?Iron deficiency?  ?Paroxysmal ventricular tachycardia (HCC)?  ?PONV (postoperative nausea and vomiting)?  ?Presence of combination internal cardiac defibrillator (ICD) and pacemaker?  ?Prostate nodule?  ?Recurrent canker sores?  ?Thoracic back pain?  ?Urinary hesitancy?  ?Xerosis cutis?    ?  ?  Surgical HistoryExpand by Default  Past Surgical History:  ProcedureLateralityDate  ?CARDIAC CATHETERIZATION??  ?CARDIAC DEFIBRILLATOR PLACEMENT??  ?REPLACEMENT-Had [place] lead that was fractured.  Lead was tied off.  New lead  and new device implanted.  ?CARDIAC ELECTROPHYSIOLOGY PROCEDUREN/A1/3/2019  ?Procedure: ICD DC generator change  [employer];  Surgeon: Zechariah Randolph  MD;  Location: [facility] CATH INVASIVE LOCATION;  Service: Cardiology  ?CHOLECYSTECTOMYN/A12/7/2021  ?Procedure: CHOLECYSTECTOMY;  Surgeon: Jeovany Mott MD;  Location: [facility]  MAIN OR;  Service: General;  Laterality: N/A;  ?COLON RESECTIONN/A12/29/2021  ?Procedure: PARTIAL COLECTOMY WITH OSTOMY;  Surgeon: Jeovany Mott MD;  Location: [facility] MAIN OR;  Service: General;  Laterality: N/A;  ?COLON RESECTION WITH ILEOSTOMYN/  ?COLONOSCOPY?04/03/2015  ?abnormal cecum, three polypoid masses, pre cancerous vs crohns, IH, tics,  hyperplastic polyp  ?COLONOSCOPYN/A3/17/2017  ?polypoid masses, tics, IH, polyp  ?EXPLORATORY LAPAROTOMY W/ BOWEL RESECTION?07/2018  ?open resection of ileum with creation of end ileostomy  ?ILEOSTOMY CLOSURE?07/2018  ?ILEOSTOMY CLOSUREN/A12/7/2021  ?Procedure: ILEOSTOMY TAKEDOWN WITH RESECTION, PARASTOMAL HERNIA REPAIR;  Surgeon: Jeovany Mott MD;  Location: [facility] OR;  Service: General;  Laterality: N/A;      Plan of Care  Anticipated Discharge Date/Estimated Length of Stay: ELOS: DC 2/15  Anticipated Discharge Destination: Community discharge with assistance  Discharge Plan : Patient lives with wife in one story home. No step to enter.  Family conference held on 2/11 with patient, wife, and friend. Wife and friend  had teaching with ostomy nurse.  D/C plan is home with wife. Wife works from home.  [facility] to provide home PT, NSG  Medical Necessity Expected Level Rationale: Goals are to achieve a level of  supervision modified independent with  mobility and self-care and improved  endurance.   Rehabilitation prognosis fair.  Medical prognosis defer to general  surgery.  Intensity and Duration: an average of 3 hours/5 days per week  Medical Supervision and 24 Hour Rehab Nursing: x  Physical Therapy: x  PT Intensity/Duration: 1 hour/day 5 days/week for approximately 8 - 10 days  Occupational Therapy: x  OT Intensity/Duration: 1 hour/day 5 days/week for  approximately 8 - 10 days  Speech and Language Therapy: x  SLP Intensity/Duration: 1 hour/day 5 days/week for approximately 8 - 10 days  Social Work: x  Therapeutic Recreation: x  Psychology: x  Updated (if changes indicated)    Anticipated Discharge Date/Estimated Length of Stay:   CLAUDIA: DC 2/15    Based on the patient's medical and functional status, their prognosis and  expected level of functional improvement is: Goals are to achieve a level of  supervision modified independent with  mobility and self-care and improved  endurance.   Rehabilitation prognosis fair.  Medical prognosis defer to general  surgery.      Interdisciplinary Problem/Goals/Status    All Rehab Problems:  Cognition    [ST] Attention(Active)  Current Status(02/14/2022): WFL  Weekly Goal(02/21/2022): Patient will follow daily schedule to anticipate  therapies scheduled for the day.  Discharge Goal: Patient will improve attention to detail to increase  independence at next level of care    [ST] Executive Functions(Active)  Current Status(02/14/2022): WFL  Weekly Goal(02/21/2022): Patient will organize materials needed for ADLs  Discharge Goal: Patient will improve organizational skills for increased  independence at next level of care        Mobility    [OT] Toilet Transfers(Active)  Current Status(02/15/2022): SBA RWX  Weekly Goal(02/15/2022): SBA  Discharge Goal: SBA    [OT] Tub/Shower Transfers(Active)  Current Status(02/15/2022): SBA  Weekly Goal(02/15/2022): SBA  Discharge Goal: SBA    [PT] Stairs(Active)  Current Status(02/14/2022): N/A  Weekly Goal(02/15/2022): N/A  Discharge Goal: N/A    [PT] Walk(Active)  Current Status(02/14/2022): 160' Supervision RWX  Weekly Goal(02/15/2022): SBA to BR, RWX  Discharge Goal: 160' Supervision RWX    [PT] Bed/Chair/Wheelchair(Active)  Current Status(02/14/2022): Supervision  Weekly Goal(02/15/2022): Supervision  Discharge Goal: Supervision    [PT] Bed Mobility(Active)  Current Status(02/14/2022):  Indep  Weekly Goal(02/15/2022): Indep  Discharge Goal: Indep.        Psychosocial    [RN] Coping/Adjustment(Active)  Current Status(02/15/2022): Pt at risk for coping issues  Weekly Goal(02/21/2022): Pt will make staff aware of concerns  Discharge Goal: NO coping problems        Safety    [RN] Potential for Injury(Active)  Current Status(02/15/2022): Pt is at risk for falls.  Weekly Goal(02/21/2022): Will use call bell 100% of the time  Discharge Goal: No injury        Self Care    [OT] Bathing(Active)  Current Status(02/15/2022): SBA  Weekly Goal(02/15/2022): SBA  Discharge Goal: SBA    [OT] Dressing (Lower)(Active)  Current Status(02/15/2022): SBA  Weekly Goal(02/15/2022): SBA  Discharge Goal: SBA    [OT] Dressing (Upper)(Active)  Current Status(02/15/2022): setup  Weekly Goal(02/15/2022): setup  Discharge Goal: setup    [OT] Grooming(Active)  Current Status(02/15/2022): Setup  Weekly Goal(02/15/2022): setup  Discharge Goal: setup    [OT] Toileting(Active)  Current Status(02/15/2022): Min Ostomy care  Weekly Goal(02/15/2022): Min  Discharge Goal: Min        Sphincter Control    [RN] Bladder Management(Active)  Current Status(02/15/2022): Pt cont of urine upon admission  Weekly Goal(02/21/2022): Remain cont  Discharge Goal: 100% cont of urine    [RN] Bowel Management(Active)  Current Status(02/15/2022): Pt has an ostomy in which skin RNs change  Weekly Goal(02/21/2022): Ostomy is fuctional  Discharge Goal: Pt aware of how to manage ostomy        Comments: 2/8: c/o pain/burning at ostomy site, often a barrier; poor endurance;  sats okay but wearing O2 in room for anxiety; no shower yet r/t  endurance/weakness; neuropsych to eval for anxiety/mood;    2/15: ostomy leaking overnight - NSG to contact ostomry RN to change appliance;    Signed by: Shahriar Wasem, RN    Physician CoSigned By: Zechariah Pearson 02/15/2022 09:10:13

## 2022-02-15 NOTE — PROGRESS NOTES
Inpatient Rehabilitation Functional Measures Assessment and Plan of Care    Plan of Care  Updated Problems/Interventions  Mobility    [OT] Toilet Transfers(Active)  Current Status(02/15/2022): SBA RWX  Weekly Goal(02/15/2022): SBA  Discharge Goal: SBA    [OT] Tub/Shower Transfers(Active)  Current Status(02/15/2022): SBA  Weekly Goal(02/15/2022): SBA  Discharge Goal: SBA        Self Care    [OT] Bathing(Active)  Current Status(02/15/2022): SBA  Weekly Goal(02/15/2022): SBA  Discharge Goal: SBA    [OT] Dressing (Lower)(Active)  Current Status(02/15/2022): SBA  Weekly Goal(02/15/2022): SBA  Discharge Goal: SBA    [OT] Dressing (Upper)(Active)  Current Status(02/15/2022): setup  Weekly Goal(02/15/2022): setup  Discharge Goal: setup    [OT] Grooming(Active)  Current Status(02/15/2022): Setup  Weekly Goal(02/15/2022): setup  Discharge Goal: setup    [OT] Toileting(Active)  Current Status(02/15/2022): Min Ostomy care  Weekly Goal(02/15/2022): Min  Discharge Goal: Min    Functional Measures  ROSE Eating:  Branch  ROSE Grooming: Branch  ROSE Bathing:  Branch  ROSE Upper Body Dressing:  Branch  ROSE Lower Body Dressing:  Branch  ROSE Toileting:  Branch    ROSE Bladder Management  Level of Assistance:  Branch  Frequency/Number of Accidents this Shift:  Branch    ROSE Bowel Management  Level of Assistance: Branch  Frequency/Number of Accidents this Shift: Branch    ROSE Bed/Chair/Wheelchair Transfer:  Branch  ROSE Toilet Transfer:  Branch  ROSE Tub/Shower Transfer:  Branch    Previously Documented Mode of Locomotion at Discharge: Field  [facility] Expected Mode of Locomotion at Discharge: Branch  ROSE Walk/Wheelchair:  Branch  ROSE Stairs:  Branch    ROSE Comprehension:  Branch  ROSE Expression:  Branch  ROSE Social Interaction:  Branch  ROSE Problem Solving:  Branch  ROSE Memory:  Branch    Therapy Mode Minutes  Occupational Therapy: Branch  Physical Therapy: Branch  Speech Language Pathology:  Branch    Signed by: Donato Martinez OTR/L

## 2022-02-15 NOTE — PLAN OF CARE
Goal Outcome Evaluation:  Plan of Care Reviewed With: patient        Progress: improving  Outcome Summary: A&Ox4. Pt irritable, anxious, frustrated with colostomy bag leaking. Pt cussing in frustration. This nurse brought to pt attention of inappropriate behavior. Pt was apologetic. X3 abdominal Mepilex dressings changed; cleansed with normal saline, dried, hydrofiber with silver applied, covered with Mepilex. Pt refused to allow nurse to change out colostomy bag and dressing. States he wants wife and wound nurse to change out tomorrow as scheduled. Emptied colostomy bag every 2hrs this shift. No further leaking noted up to this time. Discussed with pt the importance of taking medication (Immodium) as ordered as this will help bulk the stool and decrease chances of stool leakage. Also discussed with pt the need to follow to fluid restriction and diet recommendations as well. Informed pt acidic food such as the oranges he keeps at his bedside with increase the acidity and cause increased pain if highly acidic stool leaks into wounds. Pt did not appear to be accepting of recommendations. Meds crushed in applesauce with exception of Immodium which pt was agreable to take today. Pt had been refusing Immodium the last couple days. Pt c/o headache pain and was given Norco. Pt was able to get back to sleep. Overnight pulse ox completed this shift. Pt was on room air with HOB elevated while sleeping. Cont urine, using urinal. No unsafe behavior. Sleeping fair.

## 2022-02-15 NOTE — PROGRESS NOTES
SECTION GG    Eating Performance Discharge: Patient completed the activities by him/herself  with no assistance from a helper.    Signed by: Cecile Apodaca RN

## 2022-02-15 NOTE — PAYOR COMM NOTE
"Good afternoon!     [facility] # [id number]     NPI # [id number]     The patient listed below was discharged home today, 2/15, with plans for home health therapies. If you have any questions please call.      Thank you!     Shahriar Petersen RN  p   f     Leda Calvo (61 y.o. Male)             Date of Birth Social Security Number Address Home Phone MRN    1960  [id number] [address] [phone] [id number]    Pent[employer]ostal Marital Status             Non-Protestant        Admission Date Admission Type Admitting Provider Attending Provider Department, Room/Bed    2/3/22 Elective Zechariah Pearson MD  [facility], 4403/1    Discharge Date Discharge Disposition Discharge Destination          2/15/2022 Home or Self Care              Attending Provider: (none)   Allergies: Sulfamethoxazole-trimethoprim, Trimethoprim, Adhesive Tape, Shellfish-derived Products    Isolation: None   Infection: None   Code Status: CPR   Advance Care Planning Activity    Ht: 175.3 cm (69\")   Wt: 75.5 kg (166 lb 7.2 oz)    Admission Cmt: None   Principal Problem: None                Active Insurance as of 2/3/2022     Primary Coverage     Payor Plan Insurance Group Employer/Plan Group    MISC PHCS MISC [id number]     Coverage Address Coverage Phone Number Coverage Fax Number Effective Dates    [address] [address] [phone]  11/1/2021 - None Entered    [facility] [id number]       Subscriber Name Subscriber Birth Date Member ID       LEDA CALVO 1960 [id number]                 Emergency Contacts      (Rel.) Home Phone Work Phone Mobile Phone    Kiara Calvo (Spouse) [phone] -- [phone]              "

## 2022-02-15 NOTE — PROGRESS NOTES
Inpatient Rehabilitation Plan of Care Note    Plan of Care  Care Plan Reviewed - Updates as Follows    Psychosocial    [RN] Coping/Adjustment(Active)  Current Status(02/15/2022): Pt at risk for coping issues  Weekly Goal(02/21/2022): Pt will make staff aware of concerns  Discharge Goal: NO coping problems    Performed Intervention(s)  Mimi consult encouraged  Provide emotional support      Safety    [RN] Potential for Injury(Active)  Current Status(02/15/2022): Pt is at risk for falls.  Weekly Goal(02/21/2022): Will use call bell 100% of the time  Discharge Goal: No injury    Performed Intervention(s)  Falls protocal  Yellow socks  bed alarm/wc alarm      Sphincter Control    [RN] Bladder Management(Active)  Current Status(02/15/2022): Pt cont of urine upon admission  Weekly Goal(02/21/2022): Remain cont  Discharge Goal: 100% cont of urine    [RN] Bowel Management(Active)  Current Status(02/15/2022): Pt has an ostomy in which skin RNs change  Weekly Goal(02/21/2022): Ostomy is fuctional  Discharge Goal: Pt aware of how to manage ostomy    Performed Intervention(s)  I and O  Btrm schedule  Wound ostomy consult for RNs    Signed by: Joleen Humphreys RN

## 2022-02-15 NOTE — PROGRESS NOTES
SECTION GG      Self Care Performance Discharge:   Oral Hygiene: Warri[place]ine sets up or cleans up; patient completes activity. War[place]ine  assists only prior to or following the activity.   Toileting Hygiene: : Warri[place]ine does less than half the effort. Warriormine lifts, holds  or supports trunk or limbs but provides less than half the effort.   Shower/Bathe Self: War[place]ine sets up or cleans up; patient completes activity.  [place] assists only prior to or following the activity.   Upper Body Dressing: War[place]ine sets up or cleans up; patient completes activity.  [place] assists only prior to or following the activity.   Lower Body Dressing: Warriormine provides verbal cues and/or touching/steadying  and/or contact guard assistance as patient completes activity.   Putting On/Taking Off Footwear: Warri[place]ine provides verbal cues and/or  touching/steadying and/or contact guard assistance as patient completes  activity.    Mobility Toilet Transfer Discharge: Warri[place]ine provides verbal cues or  touching/steadying assistance as patient completes activity.    Signed by: Donato Martinez OTR/JUVENCIO

## 2022-02-15 NOTE — PROGRESS NOTES
[facility] Progress Note      Patient Name: Arnie Calvo  : 1960  MRN: [id number]  Primary Care Physician:  Zechariah Fatima MD  Date of admission: 2/3/2022    Subjective     Interval History:   Follow up hyponatremia.  Ostomy bag leaking.  Has not had breakfast. Yet.  Denies pain.  Winded when working with therapy. Urine not recorded. Weight down, doubt 6 pounds.     Review of Systems:   As noted above    Objective     Vitals:   Temp:  [97.4 °F (36.3 °C)-98.3 °F (36.8 °C)] 97.4 °F (36.3 °C)  Heart Rate:  [] 54  Resp:  [16-18] 18  BP: ()/(62-71) 100/69    Intake/Output Summary (Last 24 hours) at 2/15/2022 0853  Last data filed at 2022 2234  Gross per 24 hour   Intake 240 ml   Output 475 ml   Net -235 ml       Physical Exam:    General Appearance: alert, flat affect.  no acute distress   Skin: warm and dry  HEENT: oral mucosa normal, nonicteric sclera  Neck: supple, no JVD  Lungs: Clear to auscultation.   Heart: RRR, normal S1 and S2  Abdomen: soft, nontender, non-distended. Ostomy. + bs  Extremities: no edema.  Neuro: normal speech and mental status, but flat affect.      Scheduled Meds:     aluminum sulfate-calcium acetate, 1 packet, Topical, Q3 Days  carvedilol, 6.25 mg, Oral, Daily Before Supper  furosemide, 20 mg, Oral, Every Other Day  glycopyrrolate, 1 mg, Oral, BID  loperamide, 2 mg, Oral, 4x Daily AC & at Bedtime  melatonin, 3 mg, Oral, Nightly  [START ON 2022] mirtazapine, 15 mg, Oral, Nightly  mirtazapine, 7.5 mg, Oral, Nightly  silver nitrate, 1 application, Topical, Once      IV Meds:        Results Reviewed:   I have personally reviewed the results from the time of this admission to 2/15/2022 08:53 EST     Results from last 7 days   Lab Units 22  0726 22  0527 22  0514   SODIUM mmol/L 128* 128* 126*   POTASSIUM mmol/L 5.0 4.1 3.9   CHLORIDE mmol/L 92* 91* 89*   CO2 mmol/L 27.4 26.4 27.8   BUN mg/dL 19 11 14   CREATININE mg/dL  1.02 0.99 1.01   CALCIUM mg/dL 8.6 8.7 9.0   GLUCOSE mg/dL 86 94 93       Estimated Creatinine Clearance: 80.2 mL/min (by C-G formula based on SCr of 1.02 mg/dL).    Results from last 7 days   Lab Units 02/14/22  0726 02/11/22  0514 02/10/22  0651   PHOSPHORUS mg/dL 4.1 4.0 4.3             Results from last 7 days   Lab Units 02/14/22  0726 02/11/22  0514   WBC 10*3/mm3 8.45 8.74   HEMOGLOBIN g/dL 11.0* 10.3*   PLATELETS 10*3/mm3 165 163             Assessment / Plan     ASSESSMENT:  1. Hyponatremia, acute on chronic. Labs pending. On qod Lasix.   2. Dilated cardiomyopathy EF 20%.  3. Physical deconditioning.  4. Chronic hypotension. Tolerating low dose coreg.   5. SP ileostomy takedown, cholecystectomy, incisional heria repair 12/7/21.  Exp lap end colostomy partial colon resection 12/29/21.   PLAN:  1. Check pending labs.     Cris Garcia MD  02/15/22  08:53 EST    [facility]  [phone]

## 2022-02-15 NOTE — PROGRESS NOTES
Patient is discharging today. Face sheet information is correct and wife and patient agreeable to home health. Transcribed home health orders for SN, colostomy teaching and PT . Thank you! Verito Bingham RN

## 2022-02-15 NOTE — PROGRESS NOTES
Patient's wife here this morning to review ostomy care/changing pouch with ostomy nurse again. Patient and wife felt this went well. Reviewed progress and d/c plans for home today. Patient to d/c home with wife today, 2/15. Patient hoping he can rest better at home. [facility] Care to provide home PT, NSG. Patient had overnight oximetry done on room air last night and did drop below 88% for 13 minutes. Referral made to [facility] for nocturnal oxygen at home. Concentrator to be delivered to home today. Encouraged patient to continue to work hard on endurance and strength at home. Encouraged patient and wife to call if any further assistance needed.

## 2022-02-15 NOTE — NURSING NOTE
02/15/22 1001   Wound 12/25/21 0809 abdomen Other (comment)   Placement Date/Time: (c) 12/25/21 0809   Location: abdomen  Primary Wound Type: (c) Other (comment)   Base non-granulating; moist; pink  (hypergranulation tissue, silver nitrate applied.)   Periwound intact; redness  (confluent maculopapular rash, pt may need antifungal powder for crusting.)   Periwound Temperature warm   Periwound Skin Turgor soft   Edges open   Drainage Characteristics/Odor creamy; yellow   Drainage Amount scant   Care, Wound cleansed with; sterile normal saline  (silver nitrate applied to wound bed)   Dressing Care dressing changed; silver impregnated; hydrofiber; silicone; border dressing   Wound 12/29/21 1518 midline abdomen Incision   Placement Date/Time: 12/29/21 1518   Orientation: midline  Location: abdomen  Primary Wound Type: Incision   Base clean; moist; pink  (stoma located in proximal portion of wound bed)   Periwound excoriated  (excoriated along the proximal wound edge, due to stoma effluent.)   Periwound Temperature warm   Periwound Skin Turgor soft   Edges open   Drainage Characteristics/Odor green  (stool leaking into wound)   Drainage Amount moderate   Care, Wound cleansed with; sterile half normal saline   Dressing Care dressing changed; collagen; silver impregnated; hydrofiber; silicone; border dressing  (Caitlin packed into tunnel at 1 o'clock)   Periwound Care barrier film applied  (crusted with stoma powder and barrier spray x2 coats.)   Wound 12/07/21 0757 Right abdomen Incision   Placement Date/Time: 12/07/21 0757   Present on Hospital Admission: No  Side: Right  Location: abdomen  Primary Wound Type: Incision   Base moist; pink  (silver nitrate applied to hypertrophic tissue)   Periwound intact; dry   Periwound Temperature warm   Periwound Skin Turgor soft   Edges open   Drainage Characteristics/Odor creamy; yellow   Drainage Amount scant   Care, Wound cleansed with; sterile normal saline  (silver nitrate to  wound base)   Dressing Care dressing changed; silver impregnated; hydrofiber; silicone; border dressing   Periwound Care barrier film applied   Colostomy LUQ   Placement Date: 12/29/21   Inserted by: By Dr. Simpson in OR  Location: LUQ   Stomal Appliance 2 piece; Changed; Leaking   Stoma Appearance round; rosebud appearance; moist; pink; retracted below skin level; mucocutaneous separation  (distal edge of stoma is open wound)   Peristomal Assessment Denuded; Painful   Accessories/Skin Care cleansed with water; skin sealant; skin barrier powder; skin barrier paste; skin barrier ring; appliance belt  (domeboro soaks to denuded skin, 1-piece [place] soft convex pouch used today.)   Stoma Function flatus; stool   Stool Color brown   Stool Consistency loose   Treatment Bag change; Site care   Output (mL) 50 mL     CWON note: pt seen for follow up dressing change/ pouch change with wife, Kiara. She participated in pouch change today with hands on assistance and verbal instructions from myself.  I provided her with typed step by step instructions and a complete supply list, along with an [employer] catalog and ample supplies to get started at home. Plan is for pt to discharge home today with Islam . I have talked with the [facility] nurse in  to give her an update on pt's current pouching system.      We have been successful in isolating the stoma in the proximal midline wound bed with an appliance and placing a dressing to the distal wound. Seal has been holding for 3 days, with leakage starting on the 4th day.  Wound edges remain red but less tender, we applied Domoboro soaks x20 minutes prior to crusting with stoma powder and barrier spray x2 coats. The tunnel at 1 o'clock was lightly packed with Caitlin and covered with nacho paste in an effort to avoid fecal contamination of the tunnel. One third of an adapt convex barrier ring with nacho paste was used in the wound base at distal stoma edge in an attempt to create a  pouching surface at the distal aspect of the stoma.  Stoma is below skin level and is causing peristomal skin to be denuded and painful. After the skin was treated (as stated above), a 1-piece soft convex [place] pouch with a barrier ring was placed over the stoma with a stoma belt. It appeared that we were able to get a good seal. The remaining midline wound was packed with Opticel ag and covered with optifoam (this was done prior to placing the ostomy appliance).      Wounds to R and L abdomen were treated with silver nitrate today, then Opticel ag  was placed and wounds were covered with optifoam.     I have instructed pt in importance of using his lomotil to keep stool applesauce consistency or thicker, we have also discussed foods high in protein to enhance wound healing, as well as avoiding acidic foods to prevent a burning feeling around the stoma. Pt verbalizes understanding of all of these concepts.

## 2022-02-15 NOTE — PLAN OF CARE
Goal Outcome Evaluation:  Plan of Care Reviewed With: patient        Progress: improving  Patient is A & O x 4. Ostomy and dressing changed by spouse with supervision by wound care nurse. Dressing supply provided to take home. Discharge summary and medication list discussed with spouse. Encouraged to ask questions. Patient left the unit in wheel chair.

## 2022-02-16 ENCOUNTER — TELEPHONE (OUTPATIENT)
Dept: CARDIOLOGY | Facility: CLINIC | Age: 62
End: 2022-02-16

## 2022-02-16 DIAGNOSIS — E44.0 MALNUTRITION OF MODERATE DEGREE: Primary | ICD-10-CM

## 2022-02-16 NOTE — TELEPHONE ENCOUNTER
Patient would like to talk with you states he was D/C yest from the hosp.  Wants to know what the long term plan is.      [phone]

## 2022-02-17 ENCOUNTER — HOME CARE VISIT (OUTPATIENT)
Dept: HOME HEALTH SERVICES | Facility: HOME HEALTHCARE | Age: 62
End: 2022-02-17

## 2022-02-17 NOTE — CASE COMMUNICATION
Ronel yony is a copy of the Pouch change instructions that our [facility] Intake person received from the [facility] [place] WOC[facility]:    Pouch change instructions    1. Use adhesive remover wipes or spray to remove all dressings and the pouch.   2. Jump in the shower and let the water run over his wounds and abdomen, may use soap to clean the area with a clean wash cloth. Rinse well, then pat dry or use a blow dryer to dry the area.   3. Apply Op ticel AG (or any silver hydrofiber dressing) to the left and right abdominal wounds. Cover them with Optifoam (silicone foam dressing 4x4 on the left, half of a 6x6 on the right).  4. If the skin around the stoma is sore, you may apply Domeboro soaks x20 minutes to the skin. Follow package instructions to mix the solution. When soaks are completed, rinse with water and dry completely.  5. For pink skin, crust the skin with stoma powder  and no-sting barrier spray x2 coats. If the skin is raw and weepy, use Marathon skin protectant after you crust the skin. Allow all of this to dry completely.  6. Pack 1-2 strips of Caitlin into the tunnel just below the left side of the stoma.   7. Use 1/3 of an adapt convex barrier ring 88987 (flat side needs to face up to provide a flat pouching surface), and place nacho paste on the side that will fit into the wound. Place it into th e wound up next to the stoma; use a moist q-tip to seal all the edges with the paste.   8. Place the flat barrier ring 8805 around the stoma and heat with a hair dryer.   9. Fill the rest of the wound bed with Opticel ag and cover with Optifoam.   10. Cut the pouch opening to 1” and place over the stoma, again use the hair dryer to heat the area.  (may use the 2-piece or 1-piece soft convex pouches).  Secure the edges and apply the belt  to secure.   11. Change every 3 days and as needed.

## 2022-02-17 NOTE — CASE COMMUNICATION
Lizy- Below is a copy that our hospital Intake rep Verito made of the ostomy supplies that the hospital's WOCN had for this patient. and so I wanted to make sure that you had it for your reference. I am not sure if patient's insurance covers these but wanted for you to know what she recommended & I assume she used for patient. Thanks    Supply list   1. Pouch :  Yogi 36163 (barrier) with 62499 (pouch)  OR Yogi 8958 (1-piece sof t convex)  2. Piero Paste 046604  3. [place] Adapt CeraRing 8805  4. [place] adapt convex barrier ring 19102  5. Adhesive remover 7737  6. No sting barrier spray  (Medline) [id number]  7. [place] (Medline) [id number]  8. [place] ostomy belt 7300  9. Yogi stoma powder 7906  10. Caitlin  11. Opticel AG (or any Hydrofiber)  12. Optifoam (or any Silicone foam dressing)

## 2022-02-18 ENCOUNTER — HOME CARE VISIT (OUTPATIENT)
Dept: HOME HEALTH SERVICES | Facility: HOME HEALTHCARE | Age: 62
End: 2022-02-18

## 2022-02-18 ENCOUNTER — LAB REQUISITION (OUTPATIENT)
Dept: LAB | Facility: HOSPITAL | Age: 62
End: 2022-02-18

## 2022-02-18 DIAGNOSIS — Z00.00 ENCOUNTER FOR GENERAL ADULT MEDICAL EXAMINATION WITHOUT ABNORMAL FINDINGS: ICD-10-CM

## 2022-02-18 LAB
ANION GAP SERPL CALCULATED.3IONS-SCNC: 11 MMOL/L (ref 5–15)
BUN SERPL-MCNC: 17 MG/DL (ref 8–23)
BUN/CREAT SERPL: 19.3 (ref 7–25)
CALCIUM SPEC-SCNC: 8.8 MG/DL (ref 8.6–10.5)
CHLORIDE SERPL-SCNC: 90 MMOL/L (ref 98–107)
CO2 SERPL-SCNC: 27 MMOL/L (ref 22–29)
CREAT SERPL-MCNC: 0.88 MG/DL (ref 0.76–1.27)
GFR SERPL CREATININE-BSD FRML MDRD: 88 ML/MIN/1.73
GLUCOSE SERPL-MCNC: 75 MG/DL (ref 65–99)
POTASSIUM SERPL-SCNC: 3.9 MMOL/L (ref 3.5–5.2)
SODIUM SERPL-SCNC: 128 MMOL/L (ref 136–145)

## 2022-02-18 PROCEDURE — G0299 HHS/HOSPICE OF RN EA 15 MIN: HCPCS

## 2022-02-18 PROCEDURE — 84134 ASSAY OF PREALBUMIN: CPT | Performed by: INTERNAL MEDICINE

## 2022-02-18 PROCEDURE — 80048 BASIC METABOLIC PNL TOTAL CA: CPT | Performed by: INTERNAL MEDICINE

## 2022-02-19 ENCOUNTER — HOME CARE VISIT (OUTPATIENT)
Dept: HOME HEALTH SERVICES | Facility: HOME HEALTHCARE | Age: 62
End: 2022-02-19

## 2022-02-20 VITALS
DIASTOLIC BLOOD PRESSURE: 62 MMHG | HEIGHT: 69 IN | HEART RATE: 88 BPM | WEIGHT: 166 LBS | SYSTOLIC BLOOD PRESSURE: 108 MMHG | TEMPERATURE: 98.2 F | BODY MASS INDEX: 24.59 KG/M2 | RESPIRATION RATE: 18 BRPM | OXYGEN SATURATION: 98 %

## 2022-02-20 NOTE — HOME HEALTH
Dressings changed to 3 abd wound sites with main wound site having ostomy to mid wound. Lt and Rt wounds with hypergranulation, cleaned with NS and covered with dry dressing. Main wound cleaned with NS. To center midline with caleb stoma area covered with Ostomy powder, Skin prep applied, covered with sivler alginate to wound bed then hydrocoloid with cover bandage, sealed at top of dressing with paste. Ostomy 1 piece with nacho ring applied over wound dressing with seal obtained. [facility] patient and caregiver on appliance change and wound dressing changes with verbalization returned on teaching with pt/CG.    Patient ADMIT to [facility]:  2/3/2022 - 02/15/22 for Immobility syndrome, Impaired mobility/impaired self-care/impaired endurance, Ileostomy takedown , cholecystectomy, incisional hernia repair December 7, 2021. Exploratory laparotomy with end colostomy partial colon resection December 29, 2021, Ostomy-on Imodium/glycopyrrolate, Anxiety, Anemia, Hyponatremia, Congestive heart failure/tachycardia-on Coreg, Nonischemic cardiomyopathy ejection fraction 20%, Hyponatremia, Anxiety/depression, impaired nutritional status, Ostomy care, Abdominal wound care, Sacral wound care    Plan: CP assess, med compliance, wound dressing change with ostomy change every 3-5 days, Lab BMP every mon and thurs x 2 weeks, Patient with 1200cc fluid restriction with HypoNatremia.

## 2022-02-21 ENCOUNTER — HOME CARE VISIT (OUTPATIENT)
Dept: HOME HEALTH SERVICES | Facility: HOME HEALTHCARE | Age: 62
End: 2022-02-21

## 2022-02-21 ENCOUNTER — LAB REQUISITION (OUTPATIENT)
Dept: LAB | Facility: HOSPITAL | Age: 62
End: 2022-02-21

## 2022-02-21 DIAGNOSIS — Z00.00 ENCOUNTER FOR GENERAL ADULT MEDICAL EXAMINATION WITHOUT ABNORMAL FINDINGS: ICD-10-CM

## 2022-02-21 LAB
ANION GAP SERPL CALCULATED.3IONS-SCNC: 12 MMOL/L (ref 5–15)
BUN SERPL-MCNC: 22 MG/DL (ref 8–23)
BUN/CREAT SERPL: 16.8 (ref 7–25)
CALCIUM SPEC-SCNC: 8.6 MG/DL (ref 8.6–10.5)
CHLORIDE SERPL-SCNC: 92 MMOL/L (ref 98–107)
CO2 SERPL-SCNC: 25 MMOL/L (ref 22–29)
CREAT SERPL-MCNC: 1.31 MG/DL (ref 0.76–1.27)
GFR SERPL CREATININE-BSD FRML MDRD: 55 ML/MIN/1.73
GLUCOSE SERPL-MCNC: 73 MG/DL (ref 65–99)
POTASSIUM SERPL-SCNC: 4 MMOL/L (ref 3.5–5.2)
PREALB SERPL-MCNC: 5.2 MG/DL (ref 20–40)
SODIUM SERPL-SCNC: 129 MMOL/L (ref 136–145)

## 2022-02-21 PROCEDURE — G0299 HHS/HOSPICE OF RN EA 15 MIN: HCPCS

## 2022-02-21 PROCEDURE — 80048 BASIC METABOLIC PNL TOTAL CA: CPT | Performed by: INTERNAL MEDICINE

## 2022-02-22 ENCOUNTER — HOME CARE VISIT (OUTPATIENT)
Dept: HOME HEALTH SERVICES | Facility: HOME HEALTHCARE | Age: 62
End: 2022-02-22

## 2022-02-22 ENCOUNTER — APPOINTMENT (OUTPATIENT)
Dept: BONE DENSITY | Facility: HOSPITAL | Age: 62
End: 2022-02-22

## 2022-02-22 VITALS
TEMPERATURE: 97.8 F | OXYGEN SATURATION: 93 % | HEART RATE: 92 BPM | DIASTOLIC BLOOD PRESSURE: 62 MMHG | RESPIRATION RATE: 18 BRPM | SYSTOLIC BLOOD PRESSURE: 100 MMHG

## 2022-02-22 NOTE — PROGRESS NOTES
PPS CMG Coordinator  Inpatient Rehabilitation Discharge    Mode of Locomotion: Walking.    Discharge Against Medical Advice:  No.  Discharge Information  Patient Discharged Alive:  Yes  Discharge Destination/Living Setting: Home with Home Health Services  Diagnosis for Interruption/Death: ICD    Impairment Group: 16 Debility (non-cardiac, non-pulmonary)    Comorbidities: ICD    Complications: ICD    QUALITY INDICATORS  Section J Health Conditions: Fall(s) Since Admission:  No    Section M. Skin Conditions Discharge:  Unhealed Pressure Ulcer(s) at Stage 1 or  Higher:  No    . Current Number of Unhealed Pressure Ulcers  Branch    Section N. Medication:  Medication Intervention: N/A - There were no potential clinically significant  medication issues identified since admission or patient is not taking any  medications.    Signed by: Shahriar Petersen RN

## 2022-02-23 NOTE — HOME HEALTH
Lab drawn from Lt AC without complications and taken to [facility]. Dressings changed to 3 abd wound sites with main wound site having ostomy to mid wound. Lt and Rt wounds with hypergranulation, cleaned with NS and covered with dry dressing. Main wound cleaned with NS. To center midline with caleb stoma area covered with Ostomy powder, Skin prep applied, covered with sivler alginate to wound bed then hydrocoloid with cover bandage, sealed at top of dressing with paste. Ostomy 1 piece with nacho ring applied over wound dressing with seal obtained. Edu patient and caregiver on appliance change and wound dressing changes with verbalization returned on teaching with pt/CG.    Plan: CP assess, med compliance, wound dressing change with ostomy change every 3-5 days, Lab BMP every mon and thurs x 2 weeks, Patient with 1200cc fluid restriction with HypoNatremia.

## 2022-02-24 ENCOUNTER — HOME CARE VISIT (OUTPATIENT)
Dept: HOME HEALTH SERVICES | Facility: HOME HEALTHCARE | Age: 62
End: 2022-02-24

## 2022-02-24 ENCOUNTER — LAB REQUISITION (OUTPATIENT)
Dept: LAB | Facility: HOSPITAL | Age: 62
End: 2022-02-24

## 2022-02-24 DIAGNOSIS — Z00.00 ENCOUNTER FOR GENERAL ADULT MEDICAL EXAMINATION WITHOUT ABNORMAL FINDINGS: ICD-10-CM

## 2022-02-24 LAB
ANION GAP SERPL CALCULATED.3IONS-SCNC: 12 MMOL/L (ref 5–15)
BUN SERPL-MCNC: 24 MG/DL (ref 8–23)
BUN/CREAT SERPL: 18.5 (ref 7–25)
CALCIUM SPEC-SCNC: 8.8 MG/DL (ref 8.6–10.5)
CHLORIDE SERPL-SCNC: 91 MMOL/L (ref 98–107)
CO2 SERPL-SCNC: 25 MMOL/L (ref 22–29)
CREAT SERPL-MCNC: 1.3 MG/DL (ref 0.76–1.27)
GFR SERPL CREATININE-BSD FRML MDRD: 56 ML/MIN/1.73
GLUCOSE SERPL-MCNC: 87 MG/DL (ref 65–99)
POTASSIUM SERPL-SCNC: 4.4 MMOL/L (ref 3.5–5.2)
SODIUM SERPL-SCNC: 128 MMOL/L (ref 136–145)

## 2022-02-24 PROCEDURE — 80048 BASIC METABOLIC PNL TOTAL CA: CPT | Performed by: INTERNAL MEDICINE

## 2022-02-24 PROCEDURE — G0299 HHS/HOSPICE OF RN EA 15 MIN: HCPCS

## 2022-02-25 ENCOUNTER — OFFICE VISIT (OUTPATIENT)
Dept: INTERNAL MEDICINE | Facility: CLINIC | Age: 62
End: 2022-02-25

## 2022-02-25 ENCOUNTER — TELEPHONE (OUTPATIENT)
Dept: INTERNAL MEDICINE | Facility: CLINIC | Age: 62
End: 2022-02-25

## 2022-02-25 VITALS
TEMPERATURE: 97.1 F | OXYGEN SATURATION: 96 % | DIASTOLIC BLOOD PRESSURE: 70 MMHG | HEIGHT: 69 IN | SYSTOLIC BLOOD PRESSURE: 120 MMHG | HEART RATE: 105 BPM | WEIGHT: 169 LBS | BODY MASS INDEX: 25.03 KG/M2

## 2022-02-25 DIAGNOSIS — I95.89 HYPOTENSION, CHRONIC: ICD-10-CM

## 2022-02-25 DIAGNOSIS — E87.1 HYPONATREMIA: ICD-10-CM

## 2022-02-25 DIAGNOSIS — E46 PROTEIN-CALORIE MALNUTRITION, UNSPECIFIED SEVERITY: Primary | ICD-10-CM

## 2022-02-25 DIAGNOSIS — I42.9 CARDIOMYOPATHY, UNSPECIFIED TYPE: ICD-10-CM

## 2022-02-25 DIAGNOSIS — K50.119 CROHN'S DISEASE OF COLON WITH COMPLICATION: ICD-10-CM

## 2022-02-25 DIAGNOSIS — Z93.2 ILEOSTOMY IN PLACE: ICD-10-CM

## 2022-02-25 DIAGNOSIS — R79.89 ELEVATED SERUM CREATININE: ICD-10-CM

## 2022-02-25 PROCEDURE — 99214 OFFICE O/P EST MOD 30 MIN: CPT | Performed by: INTERNAL MEDICINE

## 2022-02-25 RX ORDER — ONDANSETRON 4 MG/1
4 TABLET, ORALLY DISINTEGRATING ORAL EVERY 6 HOURS PRN
Qty: 20 TABLET | Refills: 1 | Status: SHIPPED | OUTPATIENT
Start: 2022-02-25 | End: 2022-03-18 | Stop reason: SDUPTHER

## 2022-02-25 NOTE — TELEPHONE ENCOUNTER
Caller: Arnie Calvo    Relationship: Self    Best call back number: [phone]       What medication are you requesting:N\A    What are your current symptoms: COUGH AND CONGESTION    How long have you been experiencing symptoms: 4-5 DAYS   Have you had these symptoms before:    [] Yes  [x] No    Have you been treated for these symptoms before:   [] Yes  [x] No    If a prescription is needed, what is your preferred pharmacy and phone number:  [id number] - [place], [place] - [address] [phone] [facility] [phone]         Additional notes:PATIENT FORGOT TO ASK DR LOU ABOUT THIS WHEN HE WAS IN THE OFFICE TODAY 2/25/22

## 2022-02-25 NOTE — PROGRESS NOTES
"Follow-up (rehab)      HPI  Arnie Calvo is a 61 y.o. male RTC in f/u after D/C from rehab:  \"Doing poorly\".  Notes taht really is not able to eat as much as 'should'.  Has issues getting food down, feels full rapidly.  Struggling to get protein in.  Not using any supplements. Will not be able to take in more if waits.  Is nauseous a lot.   Will take 2 bites and then 'cant get anymore down'.   Had this issue in rehab and 'threatened with feeding tubes' if I didn't eat.  Tried Boost and worked but then 'got too rich' and could not tolerate.    Getting gatorade and water at home, stopped fluid restriction.  Stopped lasix at home due to dehydration issues. (dry skin and 'dander' resolved).  Blood pressure been better since off diuretic.   Wounds are 'weepy'.      Wife present, provides some hx.       Per D/C Summary from rehab:   HISTORY OF PRESENT ILLNESS:    61-year-old male previously independent, was hiking up to 7.5 miles in [facility], who had symptomatic cholelithiasis and moderately large parastomal hernia around ileostomy.  Underwent cholecystectomy and ileostomy takedown with repair of parastomal hernia first part of December.  He required readmission for persistent symptoms consistent with ileus versus obstruction.  Started on TPN.  Placed on antibiotics.  Inflammatory changes noted around the sigmoid colon.  Probable Crohn's related stricture in the sigmoid colon with partial bowel obstruction was felt to be likely source of his difficult postoperative course.  On December 29 he underwent exploratory laparotomy with transverse colon resection and end colostomy.  Abdominal adhesions were severe.  Hospital course noted for postoperative sepsis requiring antibiotics and pressor supports.   Operative findings were notable for high-grade partial obstruction of the sigmoid colon and relatively dusky appearance of the small bowel.  He had interventional radiology drainage on January 12 of " intra-abdominal abscess, resolved acute hypoxic respiratory failure and acute kidney injury and shock.  Treated with Zosyn.  He had a wound VAC and ostomy in place.  He had been on TPN for malnutrition and to minimize stool stoma output.  He had been on midodrine       Deconditioning with impairments with his mobility and self-care.  Wound ostomy care issues related to need for ostomy placement within the upper midline incision due to his abdominal adhesions.  Cardiology followed for baseline congestive heart failure and persistent tachycardia.  No clear etiology of his persistent tachycardia could be ascertained.  Primary issues at the time of discharge include parastomal wound healing and ostomy appliance management.  Deconditioning with need for physical therapy and Occupational Therapy.  Poor nutritional status with need for increased oral intake.  Prealbumin level of 10.2 on February 2.  Would recommend rechecking in about 2 weeks.  Chronic medical issues most significantly severe nonischemic cardiomyopathy with ejection fraction of 20%.     Patient presently comfortable at rest.  Does fatigue with activities.  Notes he gets short of air with activities.  Generalized weakness.  Has discomfort with the dressing changes.  Is having ostomy output.  Notes decreased appetite but is taking supplements more so than the meal trays.  He is contact-guard for bed mobility.  Ambulate 140 feet contact-guard with rolling walker, very slow.  Fair balance.  Endurance is slowly improving.     Given his functional impairments and comorbidities now admitted for acute inpatient rehabilitation        HOSPITAL COURSE:    Patient participated in a comprehensive acute inpatient rehabilitation program.      During the hospital stay the following areas were addressed:    Chief Complaint: Immobility syndrome  Impaired mobility/impaired self-care/impaired endurance     Ileostomy takedown , cholecystectomy, incisional hernia repair  December 7, 2021.  Exploratory laparotomy with end colostomy partial colon resection December 29, 2021     Ostomy-on Imodium/glycopyrrolate  February 4-continue Robinul twice daily for now, but decreased Imodium from 2 tablets to 1 tablet p.o. before every meal and at bedtime.  February 15-home on glycopyrrolate and Imodium     Abdominal wound care  February 11-[place] soak.  Ongoing ostomy care.  Wounds are improving and smaller.     Anxiety-would presently hold on adding any benzodiazepine given his multiple medical issues.   February 10-added mirtazapine 7.5 mg nightly  February 15-increase mirtazapine 15 mg nightly     Depression-February 9-given his hyponatremia, SSRI would not be the best option at this point.  Remeron may be a better option and will review with nephrology and with the patient.  February 10-reviewed with nephrology.  Discussed with patient.  Look at starting Remeron 7.5 mg nightly for 1 week then increase to 15 mg nightly     Anemia     Electrolytes-hyponatremia/hyperkalemia  February 4-sodium was 127 on February 2, 130 on February 3, decreased 124 on February 4.  Will consult nephrology for evaluation  Feb 7 -  per Nephrology -[Continue UREA 15 gr daily and placed on fluid restriction 1.5 liters . TRIAL amiloride 10  mg daily ]   February 9-Per nephrology-Borderline hyperkalemia and Nephrology started Lokelma 10 g daily.  Continue UREA 15 gr daily and Fluid restriction 1.5 liters, Hold amiloride due to borderline hyperkalemia for 24 hours and then resume daily ,  Continue sodium chloride 1 gr BID  February 11-off salt tablets.  Off urea.  Completed Lokelma.  Fluid restriction 1200 cc/day.  February 14-fluid restriction 1200 cc per 24 hours  February 15-will have go home on fluid restriction 1200 cc/day.  May need to be adjusted depending on his ostomy output.  Per nephrology-to have BMP Monday and Thursday for 2 weeks through home health.        Congestive heart failure/tachycardia-on Coreg  12.5 mg for supper.  Blood pressure low at times.  Parameters added evening Feb 3 - Hold if systolic is <110 and diastolic <70  .  Feb 8 - has not received Coreg 12.5 q supper since parameters added on Feb 3 after BP dropped to 88/66. He received every evening on acute care stay .  Will adjust parameter to hold if SBP < 100 and decrease dose to 6.25 mg q supper pending updated input from Cardiology to re-assess for parameters  February 9-tolerated lower dose of Coreg  February 10-Tolerating lower dose of Coreg 6.25 mg with pulse ranging 100-106 and blood pressure systolic .        Nonischemic cardiomyopathy ejection fraction 20%     DVT prophylaxis-Lovenox/SCDs     Impaired nutritional status-supplements per dietitian to follow.  Recheck prealbumin around February 16-patient was discharged home on February 15.  Will try to add on to labs earlier in the day from Feb 15.     Pulmonary- using  O2 2 L nasal cannula at night  Feb 7 - Started on cefdinir and given  breathing treatment yesterday.  He feels breathing treatment help bring up thick secretions.  He is on glycopyrrolate which probably thickens at secretions.  Tachycardia yesterday did not appear to correlate with the time of the breathing treatment as his pulse actually went down after the breathing treatment.  Will give another breathing treatment today  February 9-reviewed with patient getting outpatient sleep study  February 10-He complains of feeling short of air at night.  Previously reviewed with patient ordering outpatient sleep study.  Wears O2 2 L at night, sats 95 to 100%.  Will check overnight pulse oximetry study on 2 L nasal cannula  February 11- Overnight pulse oximetry showed sats %, average 97%, pulse , average 102.5.  February 14-check overnight pulse oximetry on room air tonight     Pain management-patient rarely took hydrocodone for discomfort.  Prescription for Norco 5 mg 1-2 p.o. every 8 hours as needed for pain. Disp #12  was written.  Cali report reviewed.        Insomnia-melatonin added  February 10-added Remeron  Plan for outpatient sleep study  TEAM CONF - FEB 15 - BED I. TRANSFERS SUP. GAIT 160 FEET RW SUP. TOILET AND SHOWER TRANSFERS SBA. BATH SBA. LBD SBA. UBD SET UP. TOILETING MIN OSTOMY CARE. OVERNIGHT PULSE OXIMETRY ON ROOM AIR - STUDY 6HOURS, 14 MIN. DESAT < 89% FOR 13 MINUTES, RANGE 80-98 WITH AVERAGE 92, HEART RATE RANGE . ARRANGE HOME O2 2L AT NIGHT.   ELOS - TODAY WITH HOME HEALTH PT AND NURSING. OUTPATIENT SLEEP STUDY.      REHAB - admit for comprehensive acute inpatient rehabilitation .  This would be an interdisciplinary program with physical therapy 1.5 hour,  occupational therapy 1.5 hour,  5 days a week.  Rehabilitation nursing for carryover, monitoring of cardiac and intestinal   status, bowel and bladder, and skin  Ongoing physician follow-up.  Weekly team conferences.      Goal is for home with home health therapies.  Barrier to discharge:.  Mobility and self-care endurance- worked on condition, transfers, progressive ambulation, activity daily living to overcome.         Disposition-February 15-medically stable.  Achieved inpatient rehabilitation goals.  Medications and follow-up reviewed.  Discharge home today.    Review of Systems   Constitutional: Positive for malaise/fatigue and weight loss. Negative for chills and fever.   Hematologic/Lymphatic: Negative for bleeding problem.   Skin: Positive for poor wound healing.   Gastrointestinal: Positive for change in bowel habit, diarrhea and nausea. Negative for bloating, abdominal pain and vomiting.   Genitourinary: Negative for dysuria and frequency.   Neurological: Negative for dizziness (resolved with improved pressures).       The following portions of the patient's history were reviewed and updated as appropriate: allergies, current medications, past medical history, past social history and problem list.      Current Outpatient Medications:   •   "acetaminophen (TYLENOL) 325 MG tablet, Take 2 tablets by mouth Every 4 (Four) Hours As Needed for Mild Pain ., Disp: , Rfl:   •  aluminum sulfate-calcium acetate (DOMEBORO) topical packet, Apply 1 packet topically to the appropriate area as directed Every 3 (Three) Days., Disp: 12 each, Rfl: 0  •  carvedilol (COREG) 6.25 MG tablet, Take 1 tablet by mouth Daily Before Supper. Hold if systolic blood pressure is <100, Disp: 30 tablet, Rfl: 1  •  glycopyrrolate (ROBINUL) 1 MG tablet, Take 1 tablet by mouth 2 (Two) Times a Day., Disp: 60 tablet, Rfl: 1  •  HYDROcodone-acetaminophen (NORCO) 5-325 MG per tablet, Take 1 to 2 tablets by mouth every 8 hours as needed for pain., Disp: 12 tablet, Rfl: 0  •  loperamide (IMODIUM) 2 MG capsule, Take 1 capsule by mouth 4 (Four) Times a Day Before Meals & at Bedtime., Disp: 120 capsule, Rfl: 1  •  melatonin 3 MG tablet, Take 1 tablet by mouth Every Night., Disp: 30 tablet, Rfl: 1  •  mirtazapine (REMERON) 15 MG tablet, Take 1 tablet by mouth Every Night., Disp: 30 tablet, Rfl: 1  •  O2 (OXYGEN), Inhale 2 L/min Every Night., Disp: , Rfl:   •  ondansetron ODT (ZOFRAN-ODT) 4 MG disintegrating tablet, Place 1 tablet on the tongue Every 6 (Six) Hours As Needed for Nausea or Vomiting., Disp: 20 tablet, Rfl: 1  •  furosemide (LASIX) 20 MG tablet, Take 1 tablet by mouth Every Other Day., Disp: 15 tablet, Rfl: 1    Vitals:    02/25/22 1131   BP: 120/70   Pulse: 105   Temp: 97.1 °F (36.2 °C)   SpO2: 96%   Weight: 76.7 kg (169 lb)   Height: 175.3 cm (69\")     Body mass index is 24.96 kg/m².      Physical Exam  Vitals reviewed.   Constitutional:       General: He is not in acute distress.     Appearance: He is well-developed. He is not ill-appearing or toxic-appearing.   HENT:      Head: Normocephalic and atraumatic.      Mouth/Throat:      Mouth: No oral lesions.      Tongue: No lesions.      Pharynx: No pharyngeal swelling or uvula swelling.   Eyes:      General: No scleral icterus.     " Conjunctiva/sclera: Conjunctivae normal.      Pupils: Pupils are equal, round, and reactive to light.   Neck:      Vascular: No carotid bruit.   Cardiovascular:      Rate and Rhythm: Normal rate and regular rhythm.      Pulses:           Carotid pulses are 2+ on the right side and 2+ on the left side.       Radial pulses are 2+ on the right side and 2+ on the left side.      Heart sounds: Normal heart sounds.   Pulmonary:      Effort: Pulmonary effort is normal. No respiratory distress.      Breath sounds: Normal breath sounds. No wheezing, rhonchi or rales.   Abdominal:      General: There is no distension.      Palpations: Abdomen is soft. There is no mass.      Tenderness: There is no abdominal tenderness. There is no guarding or rebound.       Musculoskeletal:      Cervical back: Normal range of motion and neck supple. No muscular tenderness.      Right lower leg: No edema.      Left lower leg: No edema.   Neurological:      Mental Status: He is alert and oriented to person, place, and time.      Cranial Nerves: No cranial nerve deficit.      Gait: Gait normal.   Psychiatric:         Attention and Perception: Attention normal.         Mood and Affect: Mood and affect normal.         Behavior: Behavior normal.         Thought Content: Thought content normal.         Assessment/ Plan  Diagnoses and all orders for this visit:    Protein-calorie malnutrition, unspecified severity (HCC)  -     Comprehensive Metabolic Panel    Cardiomyopathy, unspecified type (HCC)    Ileostomy in place (HCC)  -     CBC & Differential    Hypotension, chronic  -     CBC & Differential  -     Comprehensive Metabolic Panel  -     Sodium, Urine, Random - Urine, Clean Catch  -     Microalbumin / Creatinine Urine Ratio - Urine, Clean Catch  -     Osmolality, Urine - Urine, Clean Catch  -     Osmolality, Serum    Crohn's disease of colon with complication (HCC)  -     CBC & Differential  -     Comprehensive Metabolic Panel    Hyponatremia  -      CBC & Differential  -     Comprehensive Metabolic Panel  -     Sodium, Urine, Random - Urine, Clean Catch  -     Microalbumin / Creatinine Urine Ratio - Urine, Clean Catch  -     Osmolality, Urine - Urine, Clean Catch  -     Osmolality, Serum    Elevated serum creatinine  -     CBC & Differential  -     Comprehensive Metabolic Panel  -     Sodium, Urine, Random - Urine, Clean Catch  -     Microalbumin / Creatinine Urine Ratio - Urine, Clean Catch  -     Osmolality, Urine - Urine, Clean Catch  -     Osmolality, Serum    Other orders  -     ondansetron ODT (ZOFRAN-ODT) 4 MG disintegrating tablet; Place 1 tablet on the tongue Every 6 (Six) Hours As Needed for Nausea or Vomiting.        No follow-ups on file.      Discussion:  Arnie Calvo is a 61 y.o. male s/p exteneded hospitalization after symptomatic cholelithiasis and moderately large parastomal hernia around ileostomy with cholecystectomy and ileostomy takedown with repair of parastomal hernia first part of December. Readmission for persistent symptoms consistent with ileus versus obstruction, Crohn's related stricture in the sigmoid colon with partial bowel obstruction. 12/29/21 he underwent exploratory laparotomy with transverse colon resection and end colostomy, noted severe adhesions.  Hospital course noted for postoperative sepsis requiring antibiotics and pressor supports.    Now out of acute rehab and struggling with oral intake, early satiety, such that he continues to loose weight and  Prealbumin dropping on labs. Renal function declined on labs, despite off lasix, suspect malnutrition/ hypovolemia related.  D/W pt wife today goal of Boost shakes as snacks with scheduled Zofran prior due to nausea with supplement shakes. Will recheck labs today to see if can determine source of Cr elevation and trend sodium  (pt declines fluid restriction and furosemide at this time due to dehydration issues coming home). Surgical f/u on Monday, will forward note to  update.  RTC here TBA after labs.

## 2022-02-25 NOTE — TELEPHONE ENCOUNTER
Mucinex DM OTC. Nasal saline rinses TID.  No Abx empirically, notably can affect stool consistency which we want to avoid

## 2022-02-27 VITALS
SYSTOLIC BLOOD PRESSURE: 108 MMHG | DIASTOLIC BLOOD PRESSURE: 64 MMHG | OXYGEN SATURATION: 96 % | HEART RATE: 108 BPM | TEMPERATURE: 98.2 F | RESPIRATION RATE: 18 BRPM

## 2022-02-28 ENCOUNTER — OFFICE VISIT (OUTPATIENT)
Dept: SURGERY | Facility: CLINIC | Age: 62
End: 2022-02-28

## 2022-02-28 ENCOUNTER — HOME CARE VISIT (OUTPATIENT)
Dept: HOME HEALTH SERVICES | Facility: HOME HEALTHCARE | Age: 62
End: 2022-02-28

## 2022-02-28 ENCOUNTER — TELEPHONE (OUTPATIENT)
Dept: SURGERY | Facility: CLINIC | Age: 62
End: 2022-02-28

## 2022-02-28 DIAGNOSIS — Z09 FOLLOW UP: Primary | ICD-10-CM

## 2022-02-28 PROCEDURE — G0299 HHS/HOSPICE OF RN EA 15 MIN: HCPCS

## 2022-02-28 PROCEDURE — 99024 POSTOP FOLLOW-UP VISIT: CPT | Performed by: SURGERY

## 2022-02-28 NOTE — HOME HEALTH
Lab drawn from Lt AC without complications and taken to [facility]. Ostomy and dressing had jus tbeen changed and caregiver refused change of appliance/dressing. Edu patient and caregiver on appliance change and wound dressing changes with verbalization returned on teaching with pt/CG.    Plan: CP assess, med compliance, wound dressing change with ostomy change every 3-5 days, Lab BMP every mon and thurs x 2 weeks, Patient with 1200cc fluid restriction with HypoNatremia.

## 2022-02-28 NOTE — PROGRESS NOTES
Postoperative visit    Mr. Calvo was seen late last week by his primary care physician and he was not eating well or drinking well.   Lab work demonstrated a prealbumin of 5.2 (was 10.23 weeks ago) and GFR of 56 (down from 88 less than 2 weeks ago).    His principal complaint today is of dry mouth and throat making it difficult for him to eat and drink.  This is likely due to the Robinul that he is on for controlling ostomy output.    His abdomen is soft and nondistended.  His ostomy is functioning well.  His wounds are well dressed and being managed by home health.    Recommendations:  · Stop Robinul to see if this helps with his dry mouth and throat  · Increase Imodium to 2 tablets 4 times per day to control ostomy output  · I have ordered a liter of normal saline to be administered by home health today and again Thursday to try and catch up on his fluid loss  · After the dryness likely induced by Robinul passes we will see if his oral intake of fluids and food improves.  If not, may consider Marinol as appetite stimulant.

## 2022-02-28 NOTE — TELEPHONE ENCOUNTER
Called Flavia with [facility] and gave verbal order for normal saline IV infusion 1 liter over 1-2 hours today 2/28/22 and Thursday 3/3/22 per Dr. Mott. Also spoke with Lizy and gave verbal order to remove any visible sutures from patient's wound.

## 2022-03-03 ENCOUNTER — HOME CARE VISIT (OUTPATIENT)
Dept: HOME HEALTH SERVICES | Facility: HOME HEALTHCARE | Age: 62
End: 2022-03-03

## 2022-03-03 VITALS
TEMPERATURE: 98.2 F | HEART RATE: 90 BPM | SYSTOLIC BLOOD PRESSURE: 96 MMHG | OXYGEN SATURATION: 96 % | DIASTOLIC BLOOD PRESSURE: 60 MMHG

## 2022-03-03 DIAGNOSIS — R63.0 DECREASE IN APPETITE: Primary | ICD-10-CM

## 2022-03-03 PROCEDURE — G0299 HHS/HOSPICE OF RN EA 15 MIN: HCPCS

## 2022-03-03 RX ORDER — DRONABINOL 5 MG/1
5 CAPSULE ORAL
Qty: 60 CAPSULE | Refills: 0 | Status: SHIPPED | OUTPATIENT
Start: 2022-03-03 | End: 2022-04-02

## 2022-03-03 NOTE — HOME HEALTH
Patient had appt with surgeon today with new order for IV NS 1000cc for 2/28 - 3/3 due to patient's dehydration. #22 angiocath started to Rt AC without complications and IVF started. Edu patient and caregiver to flush out IV after fluids done and IV site care afterwards.  Lab drawn from Lt AC without complications and taken to [facility]. Ostomy and dressing changed without complications. Edu patient and caregiver on appliance change and wound dressing changes with verbalization returned on teaching with pt/CG.    Plan: CP assess, med compliance, wound dressing change with ostomy change every 3-5 days, Lab BMP every mon and thurs x 2 weeks, Patient with 1200cc fluid restriction with HypoNatremia.

## 2022-03-06 VITALS
TEMPERATURE: 97.8 F | HEART RATE: 100 BPM | RESPIRATION RATE: 18 BRPM | SYSTOLIC BLOOD PRESSURE: 108 MMHG | OXYGEN SATURATION: 95 % | DIASTOLIC BLOOD PRESSURE: 58 MMHG

## 2022-03-07 ENCOUNTER — HOME CARE VISIT (OUTPATIENT)
Dept: HOME HEALTH SERVICES | Facility: HOME HEALTHCARE | Age: 62
End: 2022-03-07

## 2022-03-07 PROCEDURE — G0299 HHS/HOSPICE OF RN EA 15 MIN: HCPCS

## 2022-03-07 NOTE — HOME HEALTH
2nd dose of IVF NS 1000cc for 3/3 given due to patient's dehydration. #22 angiocath to Rt AC D/C'd without complications. Edu patient and caregiver on appliance change and wound dressing changes with verbalization returned on teaching with pt/CG.    Plan: CP assess, med compliance, wound dressing change with ostomy change every 3-5 days, Patient with 1200cc fluid restriction with HypoNatremia.

## 2022-03-08 PROCEDURE — G0180 MD CERTIFICATION HHA PATIENT: HCPCS | Performed by: INTERNAL MEDICINE

## 2022-03-09 ENCOUNTER — OFFICE VISIT (OUTPATIENT)
Dept: SLEEP MEDICINE | Facility: HOSPITAL | Age: 62
End: 2022-03-09

## 2022-03-09 VITALS
TEMPERATURE: 97.8 F | OXYGEN SATURATION: 96 % | HEART RATE: 96 BPM | DIASTOLIC BLOOD PRESSURE: 56 MMHG | SYSTOLIC BLOOD PRESSURE: 102 MMHG | RESPIRATION RATE: 18 BRPM

## 2022-03-09 VITALS
HEIGHT: 69 IN | WEIGHT: 160 LBS | HEART RATE: 107 BPM | BODY MASS INDEX: 23.7 KG/M2 | OXYGEN SATURATION: 97 % | DIASTOLIC BLOOD PRESSURE: 63 MMHG | SYSTOLIC BLOOD PRESSURE: 90 MMHG

## 2022-03-09 DIAGNOSIS — G47.34 SLEEP RELATED HYPOXIA: ICD-10-CM

## 2022-03-09 DIAGNOSIS — G47.01 INSOMNIA DUE TO MEDICAL CONDITION: ICD-10-CM

## 2022-03-09 DIAGNOSIS — G47.8 NON-RESTORATIVE SLEEP: ICD-10-CM

## 2022-03-09 DIAGNOSIS — G47.30 SLEEP APNEA, UNSPECIFIED TYPE: Primary | ICD-10-CM

## 2022-03-09 DIAGNOSIS — G47.10 HYPERSOMNIA: ICD-10-CM

## 2022-03-09 PROCEDURE — G0463 HOSPITAL OUTPT CLINIC VISIT: HCPCS

## 2022-03-09 PROCEDURE — 99204 OFFICE O/P NEW MOD 45 MIN: CPT | Performed by: FAMILY MEDICINE

## 2022-03-09 NOTE — HOME HEALTH
Dressing changed to abd and appiance changed. Wound with hyper granulation tissue to wound base. 2 other wounds scabbed over and FREDDY. Applies duoderm over calcium alginate to wound base, then ring sealatn around ostomy with bag placenet.    Plan: CP assess, med compliance, wound healing and ostomy care

## 2022-03-10 ENCOUNTER — TELEPHONE (OUTPATIENT)
Dept: SLEEP MEDICINE | Facility: HOSPITAL | Age: 62
End: 2022-03-10

## 2022-03-10 ENCOUNTER — HOME CARE VISIT (OUTPATIENT)
Dept: HOME HEALTH SERVICES | Facility: HOME HEALTHCARE | Age: 62
End: 2022-03-10

## 2022-03-18 ENCOUNTER — HOME CARE VISIT (OUTPATIENT)
Dept: HOME HEALTH SERVICES | Facility: HOME HEALTHCARE | Age: 62
End: 2022-03-18

## 2022-03-18 DIAGNOSIS — Z93.2 ILEOSTOMY IN PLACE: Primary | ICD-10-CM

## 2022-03-18 RX ORDER — HYDROCODONE BITARTRATE AND ACETAMINOPHEN 5; 325 MG/1; MG/1
TABLET ORAL
Qty: 30 TABLET | Refills: 0 | Status: SHIPPED | OUTPATIENT
Start: 2022-03-18 | End: 2022-03-22

## 2022-03-18 RX ORDER — ONDANSETRON 4 MG/1
4 TABLET, ORALLY DISINTEGRATING ORAL EVERY 6 HOURS PRN
Qty: 20 TABLET | Refills: 1 | Status: SHIPPED | OUTPATIENT
Start: 2022-03-18

## 2022-03-18 NOTE — TELEPHONE ENCOUNTER
Caller: Kiara Calvo    Relationship: Emergency Contact    Best call back number: [phone]     Requested Prescriptions:   Requested Prescriptions     Pending Prescriptions Disp Refills   • ondansetron ODT (ZOFRAN-ODT) 4 MG disintegrating tablet 20 tablet 1     Sig: Place 1 tablet on the tongue Every 6 (Six) Hours As Needed for Nausea or Vomiting.        Pharmacy where request should be sent: [id number] - [place], [place] - [address] [phone] [facility] [phone] FX     Does the patient have less than a 3 day supply:  [x] Yes  [] No    Josselyn Ya Rep   03/18/22 10:07 EDT

## 2022-03-22 ENCOUNTER — APPOINTMENT (OUTPATIENT)
Dept: SLEEP MEDICINE | Facility: HOSPITAL | Age: 62
End: 2022-03-22

## 2022-03-22 ENCOUNTER — TELEPHONE (OUTPATIENT)
Dept: SURGERY | Facility: CLINIC | Age: 62
End: 2022-03-22

## 2022-03-22 DIAGNOSIS — R10.9 ABDOMINAL PAIN, UNSPECIFIED ABDOMINAL LOCATION: Primary | ICD-10-CM

## 2022-03-22 RX ORDER — HYDROCODONE BITARTRATE AND ACETAMINOPHEN 5; 325 MG/1; MG/1
1 TABLET ORAL EVERY 6 HOURS PRN
Qty: 25 TABLET | Refills: 0 | Status: SHIPPED | OUTPATIENT
Start: 2022-03-22

## 2022-03-22 NOTE — TELEPHONE ENCOUNTER
Patient's wife called stating Dr. Mott sent prescription for hydrocodone on 3/18 but it went to the [facility] pharmacy. Can you please send new rx to [facility] on [address] (pharmacy updated in chart). I will call and cancel the other prescription.

## 2022-03-23 ENCOUNTER — HOSPITAL ENCOUNTER (OUTPATIENT)
Dept: SLEEP MEDICINE | Facility: HOSPITAL | Age: 62
Discharge: HOME | End: 2022-03-23
Admitting: FAMILY MEDICINE

## 2022-03-23 PROCEDURE — 95806 SLEEP STUDY UNATT&RESP EFFT: CPT | Performed by: FAMILY MEDICINE

## 2022-03-23 PROCEDURE — 95806 SLEEP STUDY UNATT&RESP EFFT: CPT

## 2022-03-23 NOTE — TELEPHONE ENCOUNTER
Called and notified pt's wife rx sent to pharmacy. She asked if Dr. Mott could refill loperamide 2 mg and mirtazapine 15 mg as pt's insurance changed while he was in the hospital and his current PCP is no longer covered. They have arrangements to see a new PCP in May but she states he is almost out of both medications. I advised that the discharging physician gave pt an additional refill of both medications that should be available at their pharmacy. She verbalized understanding and was thankful.

## 2022-03-25 ENCOUNTER — HOME CARE VISIT (OUTPATIENT)
Dept: HOME HEALTH SERVICES | Facility: HOME HEALTHCARE | Age: 62
End: 2022-03-25

## 2022-03-25 PROCEDURE — G0299 HHS/HOSPICE OF RN EA 15 MIN: HCPCS

## 2022-03-27 VITALS
OXYGEN SATURATION: 96 % | BODY MASS INDEX: 24.44 KG/M2 | HEART RATE: 106 BPM | RESPIRATION RATE: 18 BRPM | DIASTOLIC BLOOD PRESSURE: 64 MMHG | TEMPERATURE: 97.7 F | WEIGHT: 165.5 LBS | SYSTOLIC BLOOD PRESSURE: 98 MMHG

## 2022-03-28 ENCOUNTER — HOME CARE VISIT (OUTPATIENT)
Dept: HOME HEALTH SERVICES | Facility: HOME HEALTHCARE | Age: 62
End: 2022-03-28

## 2022-03-28 NOTE — CASE COMMUNICATION
"Lizy Banks- Please see lab orders below I rec'd from Lashanda BARRIENTOS at [facility]. Please obtain w/ you visit this week (w/o 3/28-4/1). It looks like the pt is supposed to have 1 SN visit this week but I don't see that it is assigned to a nurse yet. If I need to generate a visit w/ this order, please let me know. Thanks        \"  VERBAL ORDERS RECEIVED FROM BILLY AT [facility] PER MAGGIE BARRIENTOS.   SKILLED NURSE TO OBTAIN THE FOLLOWING LABS WITH NURSE VISIT THIS WEEK (WEEK OF 3/28/22-4/1/22) PER VENIPUNCTURE WITH RESULTS TO MAGGIE BARRIENTOS: BMP AND CBC.  DIAGNOSIS:CHRONIC COMBINED SYSTOLIC AND DIASTOLIC HEART FAILURE.\"  "

## 2022-03-28 NOTE — HOME HEALTH
Dressing changed to abd and appiance changed. Wound with hyper granulation tissue to wound base. 2 other wounds scabbed over and FREDDY. Applies duoderm over calcium alginate to wound base, then ring sealatn around ostomy with bag placenet.    Plan: CP assess, med compliance, wound healing and ostomy care New Prescription: Pred-Gati-Nepaf: Ophthalmic Drops: 1 drop three times a day as directed

## 2022-03-31 VITALS
DIASTOLIC BLOOD PRESSURE: 64 MMHG | SYSTOLIC BLOOD PRESSURE: 88 MMHG | HEART RATE: 107 BPM | OXYGEN SATURATION: 93 % | HEIGHT: 69 IN | WEIGHT: 164.8 LBS

## 2022-04-01 ENCOUNTER — OFFICE (OUTPATIENT)
Dept: URBAN - METROPOLITAN AREA CLINIC 75 | Facility: CLINIC | Age: 62
End: 2022-04-01

## 2022-04-01 ENCOUNTER — HOME CARE VISIT (OUTPATIENT)
Dept: HOME HEALTH SERVICES | Facility: HOME HEALTHCARE | Age: 62
End: 2022-04-01

## 2022-04-01 DIAGNOSIS — R10.33 PERIUMBILICAL PAIN: ICD-10-CM

## 2022-04-01 DIAGNOSIS — R63.4 ABNORMAL WEIGHT LOSS: ICD-10-CM

## 2022-04-01 DIAGNOSIS — R19.7 DIARRHEA, UNSPECIFIED: ICD-10-CM

## 2022-04-01 DIAGNOSIS — K50.818 CROHN'S DISEASE OF BOTH SMALL AND LARGE INTESTINE WITH OTHER: ICD-10-CM

## 2022-04-01 DIAGNOSIS — Z92.25 PERSONAL HISTORY OF IMMUNOSUPPRESSION THERAPY: ICD-10-CM

## 2022-04-01 DIAGNOSIS — R68.81 EARLY SATIETY: ICD-10-CM

## 2022-04-01 PROCEDURE — 99214 OFFICE O/P EST MOD 30 MIN: CPT | Performed by: INTERNAL MEDICINE

## 2022-04-01 NOTE — SERVICEHPINOTES
Mister Mandel has a long-standing history of Crohn's disease.  He had an ileostomy, he underwent cholecystectomy and reversal in December and had postoperative issues and ended up with a colostomy.  Since that time he's had abdominal pain.  He's had increased output.  He is lost 40 pounds.  In spite of this he is having worsening lower extremity edema.  He says he has nothing but "liquid output" there is no bleeding.  There is no dark output.  He says he's had a total of 5 surgeries for Crohn's.  His hemoglobin is 12 his white count 6.45.  He said he "did great on Humira" but according to our records he takes Stelara.  He's been off of therapy since surgery in December.  CT scan in January did show haziness around the ileostomy and he's had issues with wound healing from the surgery but his daughter says his wounds are about healed up.  He does report pain around the appliance.  He looks chronically ill.
br
br He is also having fullness and satiety.  His daughter says he'll take a couple of drinks of water or a bite or 2 and feels like he's full.  He has not had upper endoscopy.  There is no dysphagia or odynophagia.  There is no hematemesis.  He is not a smoker or drinker.  He has nausea and he used Zofran oral dissolving tablets and dronabinol when necessary for nausea with good results.

## 2022-04-01 NOTE — SERVICENOTES
records reviewed. there was confusion regarding what biologic he was on.  He was last evaluated in our office in 2020, at that time he was on Stolara it was felt that he should not take an anti-TNF because of his heart failure.  However it was discovered that his rheumatologist wanted to switch him to Humira because of his ankylosing spondylitis, this was okayed with cardiology so he actually has been on Humira instead of Stalara so the plan will be to continue Humira.

## 2022-04-05 ENCOUNTER — INPATIENT HOSPITAL (OUTPATIENT)
Dept: URBAN - METROPOLITAN AREA HOSPITAL 107 | Facility: HOSPITAL | Age: 62
End: 2022-04-05
Payer: COMMERCIAL

## 2022-04-05 DIAGNOSIS — R53.1 WEAKNESS: ICD-10-CM

## 2022-04-05 DIAGNOSIS — R10.9 UNSPECIFIED ABDOMINAL PAIN: ICD-10-CM

## 2022-04-05 DIAGNOSIS — Z93.2 ILEOSTOMY STATUS: ICD-10-CM

## 2022-04-05 DIAGNOSIS — K50.90 CROHN'S DISEASE, UNSPECIFIED, WITHOUT COMPLICATIONS: ICD-10-CM

## 2022-04-05 PROCEDURE — 99223 1ST HOSP IP/OBS HIGH 75: CPT | Performed by: NURSE PRACTITIONER

## 2022-04-06 ENCOUNTER — INPATIENT HOSPITAL (OUTPATIENT)
Dept: URBAN - METROPOLITAN AREA HOSPITAL 107 | Facility: HOSPITAL | Age: 62
End: 2022-04-06
Payer: COMMERCIAL

## 2022-04-06 DIAGNOSIS — K50.90 CROHN'S DISEASE, UNSPECIFIED, WITHOUT COMPLICATIONS: ICD-10-CM

## 2022-04-06 DIAGNOSIS — R11.2 NAUSEA WITH VOMITING, UNSPECIFIED: ICD-10-CM

## 2022-04-06 PROCEDURE — 99232 SBSQ HOSP IP/OBS MODERATE 35: CPT | Performed by: PHYSICIAN ASSISTANT

## 2022-04-07 PROCEDURE — 99232 SBSQ HOSP IP/OBS MODERATE 35: CPT | Performed by: PHYSICIAN ASSISTANT

## 2022-04-08 ENCOUNTER — HOME CARE VISIT (OUTPATIENT)
Dept: HOME HEALTH SERVICES | Facility: HOME HEALTHCARE | Age: 62
End: 2022-04-08

## 2022-04-11 ENCOUNTER — HOME CARE VISIT (OUTPATIENT)
Dept: HOME HEALTH SERVICES | Facility: HOME HEALTHCARE | Age: 62
End: 2022-04-11

## 2022-04-20 DIAGNOSIS — G47.10 HYPERSOMNIA: ICD-10-CM

## 2022-04-20 DIAGNOSIS — G47.8 NON-RESTORATIVE SLEEP: ICD-10-CM

## 2022-04-20 DIAGNOSIS — G47.33 OBSTRUCTIVE SLEEP APNEA: Primary | ICD-10-CM

## 2022-04-20 DIAGNOSIS — G47.01 INSOMNIA DUE TO MEDICAL CONDITION: ICD-10-CM

## 2022-04-20 DIAGNOSIS — G47.34 SLEEP RELATED HYPOXIA: ICD-10-CM

## 2022-04-22 ENCOUNTER — TELEPHONE (OUTPATIENT)
Dept: SLEEP MEDICINE | Facility: HOSPITAL | Age: 62
End: 2022-04-22

## 2022-04-22 NOTE — TELEPHONE ENCOUNTER
LV requesting patient to call if he has questions about sleep study results , a preferred DME or to schedule compliance follow up once set up on CPAP. Sending orders to [facility] unless patient requests otherwise

## 2023-02-24 NOTE — PROGRESS NOTES
[facility]Consultation       Reason for Consultation: Insomnia due to medical condition      Patient Care Team:  Zechariah Fatima MD as PCP - General  Richard Heller MD as PCP - Family Medicine (Pulmonary Disease)  John Bowles MD as Consulting Physician (Gastroenterology)  Hermes Shabazz MD as Consulting Physician (Urology)  Osmar Carranza MD as Consulting Physician (Cardiology)  Colleen Sanders MD as Consulting Physician (Sleep Medicine)      History of present illness:  Thank you for asking me to see your patient.  The patient is a 61 y.o. male With cardiomyopathy paroxysmal VT PVCs hypertension chronic systolic heart failure cardiomyopathy nonischemic diabetes mellitus Crohn's disease presents today with concern for sleep disorder.  No history of prior sleep study or tonsillectomy.  Occasionally will use an over-the-counter sleep aid.  Uses supplemental O2 at night. Admitted to the hospital in February 2022; noted deconditioning with impairments with mobility and self-care; cholecystectomy and ileostomy takedown with repair of parastomal hernia December 2021; required readmission for persistent symptoms consistent with ileus versus obstruction.  Admitted due to immobility syndrome.  While hospitalized anxiety and depression were noted during hospital stay; advised holding benzodiazepines given multiple medical issues; mirtazapine was started at 7.5 mg nightly and this was increased to 50 mg nightly.  Hyponatremia was noted therefore SSRI was not best option for patient.  Nephrology did reviewed patient's case February 10 and advised mirtazapine.  Per discharge summary patient complained of feeling short of air at night.  Advised sleep study.    Currently on 2 L O2 at night.  Will wake up gasping for breath if oxygen level has moved out of place.  Very interrupted sleep.  Per patient insurance has told him they will not pay for sleep study.    Bedtime 10 PM wake time sleep  "latency 1 hour slept very.  No family history of sleep apnea or narcolepsy.    ESS: 18    Social History: No tobacco alcohol or caffeine use; recently prescribed medical marijuana last week    Allergies:  Sulfamethoxazole-trimethoprim, Trimethoprim, Adhesive tape, and Shellfish-derived products    Family History: NEAL no       Current Outpatient Medications:   •  acetaminophen (TYLENOL) 325 MG tablet, Take 2 tablets by mouth Every 4 (Four) Hours As Needed for Mild Pain ., Disp: , Rfl:   •  carvedilol (COREG) 6.25 MG tablet, Take 1 tablet by mouth Daily Before Supper. Hold if systolic blood pressure is <100, Disp: 30 tablet, Rfl: 1  •  dronabinol (Marinol) 5 MG capsule, Take 1 capsule by mouth 2 (Two) Times a Day Before Meals for 30 days., Disp: 60 capsule, Rfl: 0  •  glycopyrrolate (ROBINUL) 1 MG tablet, Take 1 tablet by mouth 2 (Two) Times a Day., Disp: 60 tablet, Rfl: 1  •  HYDROcodone-acetaminophen (NORCO) 5-325 MG per tablet, Take 1 to 2 tablets by mouth every 8 hours as needed for pain., Disp: 12 tablet, Rfl: 0  •  loperamide (IMODIUM) 2 MG capsule, Take 1 capsule by mouth 4 (Four) Times a Day Before Meals & at Bedtime., Disp: 120 capsule, Rfl: 1  •  melatonin 3 MG tablet, Take 1 tablet by mouth Every Night. (Patient taking differently: Take 3 mg by mouth At Night As Needed.), Disp: 30 tablet, Rfl: 1  •  mirtazapine (REMERON) 15 MG tablet, Take 1 tablet by mouth Every Night., Disp: 30 tablet, Rfl: 1  •  O2 (OXYGEN), Inhale 2 L/min Every Night., Disp: , Rfl:   •  ondansetron ODT (ZOFRAN-ODT) 4 MG disintegrating tablet, Place 1 tablet on the tongue Every 6 (Six) Hours As Needed for Nausea or Vomiting., Disp: 20 tablet, Rfl: 1    Vital Signs:    Vitals:    03/09/22 1430   BP: 90/63   Pulse: 107   SpO2: 97%   Weight: 72.6 kg (160 lb)   Height: 175.3 cm (69\")      Body mass index is 23.63 kg/m².  Neck Circumference: 13 inches      REVIEW OF SYSTEMS.  Full review of systems available on the intake form which is " "scanned in the media tab.  The relevant positive are noted below  1. Daytime excessive sleepiness with [place] Sleepiness Scale :Total score: 18   2. Snoring  3. All negative      Physical exam:  Vitals:    03/09/22 1430   BP: 90/63   Pulse: 107   SpO2: 97%   Weight: 72.6 kg (160 lb)   Height: 175.3 cm (69\")    Body mass index is 23.63 kg/m². Neck Circumference: 13 inches  HEENT: Head is atraumatic, normocephalic  Eyes: pupils are round equal and reacting to light and accommodation, conjunctiva normal  Throat: tongue normal, oral airway Mallampati class 2  NECK:Neck Circumference: 13 inches, trachea is in the midline, thyroid not enlarged  RESPIRATORY SYSTEM: Normal respirations  CARDIOVASULAR SYSTEM: Elevated rate; no edema  EXTREMITES: No cyanosis, clubbing  NEUROLOGICAL SYSTEM: Oriented x 3, no gross motor defects; ambulating with walker      Impression:  1. Sleep apnea, unspecified type    2. Hypersomnia    3. Insomnia due to medical condition    4. Non-restorative sleep    5. Sleep related hypoxia        Plan:    Good sleep hygiene measures should be maintained.    I discussed the pathophysiology of obstructive sleep apnea with the patient.  We discussed the adverse outcomes associated with untreated sleep-disordered breathing.  We discussed treatment modalities of obstructive sleep apnea including CPAP device as well as oral mandibular advancement device. Sleep study will be scheduled to establish definitive diagnosis of sleep disorder breathing.  Weight loss will be strongly beneficial in order to reduce the severity of sleep-disordered breathing.  Patient has narrow oropharyngeal structure.  Caution during activities that require prolonged concentration is strongly advised.  Patient will be notified of sleep study results after sleep study is completed.  If sleep apnea is only mild,  oral mandibular advancement device may be one of the treatment options.  However if sleep apnea is moderately severe, CPAP " treatment will be strongly encouraged.  The patient is not opposed to treatment with CPAP device if we confirm significant obstructive sleep apnea on polysomnography.    Thank you for allowing me to participate in your patient's care.    Colleen Sanders MD  Sleep Medicine  03/09/22  16:48 EST       Double O-Z Flap Text: The defect edges were debeveled with a #15 scalpel blade. Given the location of the defect, shape of the defect and the proximity to free margins a Double O-Z flap was deemed most appropriate. Using a sterile surgical marker, an appropriate transposition flap was drawn incorporating the defect and placing the expected incisions within the relaxed skin tension lines where possible. The area thus outlined was incised deep to adipose tissue with a #15 scalpel blade. The skin margins were undermined to an appropriate distance in all directions utilizing iris scissors. Following this, the designed flap was carried over into the primary defect and sutured into place.

## 2023-05-09 NOTE — TELEPHONE ENCOUNTER
Message left for patient regarding scheduling a colonoscopy d/t positive cologuard at the referral of Dr Lucas.   This has really progressed from the sound of it.  I would like to reassess the pt in clinic. I reviewed GI note from 2/14/18.  Please make appt tomorrow.

## 2024-02-20 NOTE — TELEPHONE ENCOUNTER
----- Message from Lakeshia Jordan sent at 1/25/2017  3:22 PM EST -----  Regarding: NEXT SCOPE   Contact: [phone]  DR. OTTO- PT CALLED ASKING WHEN IS NEXT SCOPE DUE.  
Detail Level: Zone
Message sent to Elizabet HENNING RN.  
Spoke with Dr Mayorga and he said that patient needs to be seen in the office by him secondary to his Crohns.  He has not been seen since 4/2015.   
Spoke with pt and informed him that Dr Mayorga requests to see him in the office prior to scheduling any scopes.  Office appt made for 2/23/17 at 0900 with Dr Mayorga.  
Detail Level: Simple

## (undated) DEVICE — Device

## (undated) DEVICE — COVER,MAYO STAND,STERILE: Brand: MEDLINE

## (undated) DEVICE — COUNT NDL MAG FM/BLCK 40COUNT

## (undated) DEVICE — ENSEAL TEMPERATURE CONTROLLED TISSUE SEALING TECHNOLOGY DISPOSABLE TISSUE SEALING DEVICE TAPTRONIC TRIGGER ACTIVATED POWER 5MM JAW STYLE: Brand: ENSEAL

## (undated) DEVICE — SUT VIC 3/0 TIES 18IN J110T

## (undated) DEVICE — GLV SURG PREMIERPRO ORTHO LTX PF SZ7.5 BRN

## (undated) DEVICE — ECHELON FLEX 60 ARTICULATING ENDOSCOPIC LINEAR CUTTER (NO CARTRIDGE): Brand: ECHELON FLEX ENDOPATH

## (undated) DEVICE — ANTIBACTERIAL UNDYED BRAIDED (POLYGLACTIN 910), SYNTHETIC ABSORBABLE SUTURE: Brand: COATED VICRYL

## (undated) DEVICE — SUT VIC 0 CT1 36IN J946H

## (undated) DEVICE — LIMB HOLDER, WRIST/ANKLE: Brand: DEROYAL

## (undated) DEVICE — PATIENT RETURN ELECTRODE, SINGLE-USE, CONTACT QUALITY MONITORING, ADULT, WITH 9FT CORD, FOR PATIENTS WEIGING OVER 33LBS. (15KG): Brand: MEGADYNE

## (undated) DEVICE — BANDAGE,GAUZE,BULKEE II,4.5"X4.1YD,STRL: Brand: MEDLINE

## (undated) DEVICE — ELECTRD BLD EZ CLN MOD XLNG 2.75IN

## (undated) DEVICE — STRL PENROSE DRAIN 18" X 1/4": Brand: CARDINAL HEALTH

## (undated) DEVICE — AIRLIFE™ NASAL OXYGEN CANNULA CURVED, NONFLARED TIP WITH 21 FOOT (6.4 M) CRUSH-RESISTANT TUBING, OVER-THE-EAR STYLE: Brand: AIRLIFE™

## (undated) DEVICE — GLV SURG BIOGEL LTX PF 6

## (undated) DEVICE — SUT SILK 2/0 TIES 18IN A185H

## (undated) DEVICE — SUT PDS 0 CT1 36IN Z346H

## (undated) DEVICE — PK PROC MAJ 40

## (undated) DEVICE — DRN WND JP RND W TROC SIL 15F 3/16IN

## (undated) DEVICE — SUT SILK 0 TIES 18IN SA66G

## (undated) DEVICE — PROXIMATE RH ROTATING HEAD SKIN STAPLERS (35 REGULAR) CONTAINS 35 STAINLESS STEEL STAPLES: Brand: PROXIMATE

## (undated) DEVICE — SUT SILK 3/0 TIES 18IN A184H

## (undated) DEVICE — STPLR SKIN VISISTAT WD 35CT

## (undated) DEVICE — SUT GUT CHRM 3/0 SH 36IN G172H

## (undated) DEVICE — MEDI-VAC YANKAUER SUCTION HANDLE W/BULBOUS TIP: Brand: CARDINAL HEALTH

## (undated) DEVICE — BARRIER, ABSORBABLE, ADHESION: Brand: SEPRAFILM® PROCEDURE PACK

## (undated) DEVICE — ELECTRD BLD EZ CLN MOD 6.5IN

## (undated) DEVICE — PLASMABLADE PS200-040 4.0: Brand: PLASMABLADE™

## (undated) DEVICE — TUBING, SUCTION, 1/4" X 10', STRAIGHT: Brand: MEDLINE

## (undated) DEVICE — LOU PACE DEFIB: Brand: MEDLINE INDUSTRIES, INC.

## (undated) DEVICE — PENCL ES MEGADINE EZ/CLEAN BUTN W/HOLSTR 10FT

## (undated) DEVICE — SUT PROLN 2/0 SH 36IN 8523H

## (undated) DEVICE — Device: Brand: DEFENDO AIR/WATER/SUCTION AND BIOPSY VALVE

## (undated) DEVICE — JACKSON-PRATT 100CC BULB RESERVOIR: Brand: CARDINAL HEALTH

## (undated) DEVICE — TOTAL TRAY, 16FR 10ML SIL FOLEY, URN: Brand: MEDLINE

## (undated) DEVICE — SKIN PREP TRAY W/CHG: Brand: MEDLINE INDUSTRIES, INC.

## (undated) DEVICE — THE TORRENT IRRIGATION SCOPE CONNECTOR IS USED WITH THE TORRENT IRRIGATION TUBING TO PROVIDE IRRIGATION FLUIDS SUCH AS STERILE WATER DURING GASTROINTESTINAL ENDOSCOPIC PROCEDURES WHEN USED IN CONJUNCTION WITH AN IRRIGATION PUMP (OR ELECTROSURGICAL UNIT).: Brand: TORRENT

## (undated) DEVICE — SINGLE-USE BIOPSY FORCEPS: Brand: RADIAL JAW 4

## (undated) DEVICE — SUT SILK 2/0 SH CR8 18IN CR8 C012D

## (undated) DEVICE — SPNG LAP 18X18IN LF STRL PK/5

## (undated) DEVICE — SOL IRR NACL 0.9PCT BT 1000ML

## (undated) DEVICE — JELLY,LUBE,STERILE,FLIP TOP,TUBE,4-OZ: Brand: MEDLINE

## (undated) DEVICE — LEGGINGS, PAIR, CLEAR, STERILE: Brand: MEDLINE

## (undated) DEVICE — GOWN ,SIRUS,NONREINFORCED SMALL: Brand: MEDLINE

## (undated) DEVICE — SUT SILK 3/0 SH CR5 18IN C0135

## (undated) DEVICE — SKIN AFFIX SURG ADHESIVE 72/CS 0.55ML: Brand: MEDLINE

## (undated) DEVICE — ELECTRD ECG CARBON/SNP RL FM A/ 5PK

## (undated) DEVICE — CANN NASL CO2 TRULINK W/O2 A/

## (undated) DEVICE — SUT SILK 2/0 SH 75CM 30IN BLK C016D